# Patient Record
Sex: FEMALE | Race: BLACK OR AFRICAN AMERICAN | Employment: OTHER | URBAN - METROPOLITAN AREA
[De-identification: names, ages, dates, MRNs, and addresses within clinical notes are randomized per-mention and may not be internally consistent; named-entity substitution may affect disease eponyms.]

---

## 2017-01-03 ENCOUNTER — HOSPITAL ENCOUNTER (OUTPATIENT)
Dept: ONCOLOGY | Age: 52
Discharge: HOME | End: 2017-01-03

## 2017-01-03 ENCOUNTER — OFFICE VISIT (OUTPATIENT)
Dept: ONCOLOGY | Age: 52
End: 2017-01-03

## 2017-01-03 ENCOUNTER — HOSPITAL ENCOUNTER (OUTPATIENT)
Dept: LAB | Age: 52
Discharge: HOME | End: 2017-01-03
Payer: COMMERCIAL

## 2017-01-03 VITALS
HEART RATE: 93 BPM | BODY MASS INDEX: 34.23 KG/M2 | TEMPERATURE: 98.4 F | DIASTOLIC BLOOD PRESSURE: 80 MMHG | HEIGHT: 66 IN | SYSTOLIC BLOOD PRESSURE: 132 MMHG | WEIGHT: 213 LBS

## 2017-01-03 DIAGNOSIS — M16.11 PRIMARY OSTEOARTHRITIS OF RIGHT HIP: ICD-10-CM

## 2017-01-03 DIAGNOSIS — C50.919 MALIGNANT NEOPLASM OF FEMALE BREAST, UNSPECIFIED LATERALITY, UNSPECIFIED SITE OF BREAST: ICD-10-CM

## 2017-01-03 DIAGNOSIS — E55.9 VITAMIN D DEFICIENCY: ICD-10-CM

## 2017-01-03 DIAGNOSIS — D50.8 IRON DEFICIENCY ANEMIA DUE TO DIETARY CAUSES: ICD-10-CM

## 2017-01-03 DIAGNOSIS — C50.512 BREAST CANCER OF LOWER-OUTER QUADRANT OF LEFT FEMALE BREAST (HCC): Primary | ICD-10-CM

## 2017-01-03 DIAGNOSIS — D57.1 HB-SS DISEASE WITHOUT CRISIS (HCC): ICD-10-CM

## 2017-01-03 DIAGNOSIS — D69.6 THROMBOCYTOPENIA (HCC): ICD-10-CM

## 2017-01-03 LAB
ALBUMIN SERPL BCP-MCNC: 4 G/DL (ref 3.4–5)
ALBUMIN/GLOB SERPL: 1.1 {RATIO} (ref 0.8–1.7)
ALP SERPL-CCNC: 134 U/L (ref 45–117)
ALT SERPL-CCNC: 15 U/L (ref 13–56)
ANION GAP BLD CALC-SCNC: 9 MMOL/L (ref 3–18)
AST SERPL W P-5'-P-CCNC: 17 U/L (ref 15–37)
BASO+EOS+MONOS # BLD AUTO: 0.4 K/UL (ref 0–2.3)
BASO+EOS+MONOS # BLD AUTO: 5 % (ref 0.1–17)
BILIRUB SERPL-MCNC: 2.3 MG/DL (ref 0.2–1)
BUN SERPL-MCNC: 9 MG/DL (ref 7–18)
BUN/CREAT SERPL: 9 (ref 12–20)
CALCIUM SERPL-MCNC: 9.1 MG/DL (ref 8.5–10.1)
CHLORIDE SERPL-SCNC: 105 MMOL/L (ref 100–108)
CO2 SERPL-SCNC: 25 MMOL/L (ref 21–32)
CREAT SERPL-MCNC: 1.03 MG/DL (ref 0.6–1.3)
DIFFERENTIAL METHOD BLD: ABNORMAL
ERYTHROCYTE [DISTWIDTH] IN BLOOD BY AUTOMATED COUNT: 17.2 % (ref 11.5–14.5)
FERRITIN SERPL-MCNC: 1799 NG/ML (ref 8–388)
GLOBULIN SER CALC-MCNC: 3.6 G/DL (ref 2–4)
GLUCOSE SERPL-MCNC: 101 MG/DL (ref 74–99)
HCT VFR BLD AUTO: 28.2 % (ref 36–48)
HGB BLD-MCNC: 9.9 G/DL (ref 12–16)
IRON SATN MFR SERPL: 42 %
IRON SERPL-MCNC: 94 UG/DL (ref 50–175)
LYMPHOCYTES # BLD AUTO: 22 % (ref 14–44)
LYMPHOCYTES # BLD: 1.9 K/UL (ref 1.1–5.9)
MCH RBC QN AUTO: 29.5 PG (ref 25–35)
MCHC RBC AUTO-ENTMCNC: 35.1 G/DL (ref 31–37)
MCV RBC AUTO: 83.9 FL (ref 78–102)
NEUTS SEG # BLD: 6.5 K/UL (ref 1.8–9.5)
NEUTS SEG NFR BLD AUTO: 73 % (ref 40–70)
PLATELET # BLD AUTO: 113 K/UL (ref 140–440)
POTASSIUM SERPL-SCNC: 3.8 MMOL/L (ref 3.5–5.5)
PROT SERPL-MCNC: 7.6 G/DL (ref 6.4–8.2)
RBC # BLD AUTO: 3.36 M/UL (ref 4.1–5.1)
SODIUM SERPL-SCNC: 139 MMOL/L (ref 136–145)
TIBC SERPL-MCNC: 224 UG/DL (ref 250–450)
WBC # BLD AUTO: 8.8 K/UL (ref 4.5–13)

## 2017-01-03 PROCEDURE — 82728 ASSAY OF FERRITIN: CPT | Performed by: INTERNAL MEDICINE

## 2017-01-03 PROCEDURE — 80053 COMPREHEN METABOLIC PANEL: CPT | Performed by: INTERNAL MEDICINE

## 2017-01-03 PROCEDURE — 83540 ASSAY OF IRON: CPT | Performed by: INTERNAL MEDICINE

## 2017-01-03 NOTE — PROGRESS NOTES
Hematology/Oncology  Progress Note    Name: Aden Lezama  Date: 1/3/2017  : 1965    Michael Moore MD     Ms. Keegan Worrell is a 46year old female who was seen for management of her triple-negative invasive ductal adenocarcinoma, left breast.  Current therapy: Active surveillance; the patient has previously completed systemic chemotherapy and radiation treatment. Subjective:     Ms. Keegan Worrell is a 59-year-old [facility] American woman with a history of triple-negative breast cancer, involving the left breast.  The patient reports that she is doing much better. She is continuing to use her cane for ambulation since she had a left total hip replacement. She is not experiencing a significant degree of pain at this time. She is doing much better psychologically wise and is not require the use of antianxiety medication is as much. Her recent mammogram of the right breast showed no abnormality. Today she has no new complaints or concerns to report. Her right total hip replacement was postponed because she had to undergo gallbladder surgery. She is planning to follow with orthopedic surgeon within the next couple weeks to see if this can be rescheduled. In the interim she continues to use her cane for mobility support. Past medical history, family history, and social history: these were reviewed and remains unchanged.     Past Medical History   Diagnosis Date    Anemia NEC     Arthritis     Cancer ([address]     Chronic pain     Ductal carcinoma ([address]      left breast    Fatigue     Fibromyalgia     Hx of endometriosis     Hypertension     Ill-defined condition      Sickle cell    Osteoarthritis of left hip 2016    Sickle cell anemia (HCC)     Sleep apnea      Does not use CPAP     Past Surgical History   Procedure Laterality Date    Hx  section      Hx mastectomy Left 2012     with axillary lymph node dissection    Hx breast biopsy Left     Pr lap,cholecystectomy N/A 11/10/2016     Dr. Estela Alarcon     Social History     Social History    Marital status:      Spouse name: N/A    Number of children: N/A    Years of education: N/A     Occupational History    Not on file. Social History Main Topics    Smoking status: Former Smoker     Years: 2.00     Quit date: 8/1/2011    Smokeless tobacco: Never Used    Alcohol use No    Drug use: No    Sexual activity: Not Currently     Other Topics Concern    Not on file     Social History Narrative     Family History   Problem Relation Age of Onset    Cancer Mother      breast    Diabetes Mother     Heart Disease Father     Diabetes Father     Sickle Cell Anemia Brother      Current Outpatient Prescriptions   Medication Sig Dispense Refill    HYDROmorphone (DILAUDID) 2 mg tablet Take 1-2 Tabs by mouth every four (4) hours as needed for Pain. Max Daily Amount: 24 mg. 40 Tab 0    ondansetron (ZOFRAN ODT) 4 mg disintegrating tablet Take 1 Tab by mouth every eight (8) hours as needed for Nausea. 40 Tab 1    oxyCODONE ER (OXYCONTIN) 20 mg ER tablet Take 1 Tab by mouth two (2) times a day. Max Daily Amount: 40 mg. 60 Tab 0    temazepam (RESTORIL) 15 mg capsule Take 1 Cap by mouth nightly as needed. Max Daily Amount: 15 mg. 30 Cap 1    naloxegol (MOVANTIK) 12.5 mg tab tablet Take 12.5 mg by mouth daily.  folic acid (FOLVITE) 1 mg tablet Take 1 mg by mouth daily.  metoprolol (LOPRESSOR) 25 mg tablet Take 25 mg by mouth two (2) times a day. Review of Systems  Constitutional: The patient has no acute distress or discomfort. HEENT: The patient denies recent head trauma, eye pain, blurred vision,  hearing deficit, oropharyngeal mucosal pain or lesions, and the patient denies throat pain or discomfort. Chest wall: The patient complained of having several small nodular areas over her left anterior chest wall surgical site for which she is aware it may represent recurrent breast cancer. Lymphatics:  The patient denies palpable peripheral lymphadenopathy. Hematologic: The patient denies having bruising, bleeding, or progressive fatigue. Respiratory: Patient denies having shortness of breath, cough, sputum production, fever, or dyspnea on exertion. Cardiovascular: The patient denies having leg pain, leg swelling, heart palpitations, chest permit, chest pain, or lightheadedness. The patient denies having dyspnea on exertion. Gastrointestinal: The patient denies having nausea, emesis, or diarrhea. The patient denies having any hematemesis or blood in the stool. Genitourinary: Patient denies having urinary urgency, frequency, or dysuria. The patient denies having blood in the urine. Psychological: The patient denies having symptoms of nervousness, anxiety, depression, or thoughts of harming himself some of this. Skin: Patient denies having skin rashes, skin, ulcerations, or unexplained itching or pruritus. Musculoskeletal: The patient his continued to have pain in the hips. She also has weakness and is using a walker for mobility support. Objective:     Visit Vitals    /80    Pulse 93    Temp 98.4 °F (36.9 °C)    Ht 5' 5.5\" (1.664 m)    Wt 96.6 kg (213 lb)    BMI 34.91 kg/m2     ECOG PS=0, pain score=0/10     Physical Exam:   Gen. Appearance: The patient is in no acute distress. Skin: There is no bruise or rash. HEENT: The exam is unremarkable. Neck: Supple without lymphadenopathy or thyromegaly. Lungs: Clear to auscultation and percussion; there are no wheezes or rhonchi. Heart: Regular rate and rhythm; there are no murmurs, gallops, or rubs. Anterior chest wall and breast: The right breast shows no mass, nipple discharge, nipple retraction, or skin dimpling. The axilla reveals no palpable axillary lymphadenopathy. The left breast is surgically absent.  There is some scarring over the left anterior rib cage and a few small nodular areas are noted over the anterior lateral upper chest wall is well. These have the consistency of fatty deposits however the patient is concerned that this may represent recurrent disease. Abdomen: Bowel sounds are present and normal.  There is no guarding, tenderness, or hepatosplenomegaly. Extremities: There is no clubbing, cyanosis, or edema. Neurologic: There are no focal neurologic deficits. Lymphatics: There is no palpable peripheral lymphadenopathy. Musculoskeletal: The patient has full range of motion at all joints. There is no evidence of joint deformity or effusions. There is no focal joint tenderness. Psychological/psychiatric: There is no clinical evidence of anxiety, depression, or melancholy. Lab data:      Results for orders placed or performed during the hospital encounter of 01/03/17   CBC WITH 3 PART DIFF     Status: Abnormal   Result Value Ref Range Status    WBC 8.8 4.5 - 13.0 K/uL Final    RBC 3.36 (L) 4.10 - 5.10 M/uL Final    HGB 9.9 (L) 12.0 - 16.0 g/dL Final    HCT 28.2 (L) 36 - 48 % Final    MCV 83.9 78 - 102 FL Final    MCH 29.5 25.0 - 35.0 PG Final    MCHC 35.1 31 - 37 g/dL Final    RDW 17.2 (H) 11.5 - 14.5 % Final    PLATELET 289 (L) 560 - 440 K/uL Final    NEUTROPHILS 73 (H) 40 - 70 % Final    MIXED CELLS 5 0.1 - 17 % Final    LYMPHOCYTES 22 14 - 44 % Final    ABS. NEUTROPHILS 6.5 1.8 - 9.5 K/UL Final    ABS. MIXED CELLS 0.4 0.0 - 2.3 K/uL Final    ABS. LYMPHOCYTES 1.9 1.1 - 5.9 K/UL Final     Comment: Test performed at [place] [address] Location. Results Reviewed by Medical Director. DF AUTOMATED   Final           Assessment:     1. Breast cancer of lower-outer quadrant of left female breast ([address]    2. Vitamin D deficiency    3. Hb-SS disease without crisis ([address]    4. Thrombocytopenia ([address]    5. Iron deficiency anemia due to dietary causes    6. Primary osteoarthritis of right hip      Plan:   Invasive ductal carcinoma of the left breast: The patient is status post modified radical mastectomy and continues to do well. Clinically there is no evidence of disease recurrence. Her most recent CA-27-29 level from 10/4/2016 was 53.7 U/mL. I will recheck her CA-27-29 level at this time. Vitamin D deficiency: At this time I will check her vitamin D level and if the levels are low she will be started on vitamin D 50,000 units weekly for 12 weeks. Sickle cell disease, SS: Clinically the patient is relatively stable at this time she has not had any recent pain crises. I have recommended that the patient continued to remain well-hydrated and to keep her body warm doing these winter months to minimize the onset of vaso-occlusive pain crises. Thrombocytopenia: I have explained to the patient that a CBC today shows that her platelet count is relatively stable at 113,000. We will continue to monitor this at 4 month intervals. Therapeutic intervention is not warranted unless her platelets decline below 15,000. Iron deficiency anemia: I have explained to the patient that she is not iron deficient any longer. Her most recent ferritin level from 10/4/2016 was 1235 ng/mL. Her iron saturation was 28% and his circulating iron level was 70 mcg/dL. At this point most of her anemia is related to her underlying sickle cell disease. Osteoarthritis involving the right hip: The patient is tentatively scheduled to revisit with orthopedic surgeon within the next couple of weeks to see if her surgical date can be rescheduled so that she can undergo the right total hip replacement. She has previously undergone a successful left total hip replacement. I will continue to see her in clinic at four-month intervals in the future.   Orders Placed This Encounter    COMPLETE CBC & AUTO DIFF WBC    InHouse CBC ([employer])     Standing Status:   Future     Number of Occurrences:   1     Standing Expiration Date:   1/10/2017    VITAMIN D, 1,25 + 25-HYDROXY    FERRITIN     Standing Status:   Future     Standing Expiration Date:   1/4/2018  METABOLIC PANEL, COMPREHENSIVE     Standing Status:   Future     Standing Expiration Date:   1/4/2018    IRON PROFILE     Standing Status:   Future     Standing Expiration Date:   1/4/2018       Earnstine Bence, MD  01/3/2017

## 2017-01-03 NOTE — MR AVS SNAPSHOT
Visit Information Date & Time Provider Department Dept. Phone Encounter #  
 1/3/2017 10:30 AM Esa Rubin MD [facility] END Office [phone] [id number] Follow-up Instructions Return in about 4 months (around 5/3/2017). Upcoming Health Maintenance Date Due Pneumococcal 19-64 Highest Risk (1 of 3 - PCV13) 6/5/1984 DTaP/Tdap/Td series (1 - Tdap) 6/5/1986 PAP AKA CERVICAL CYTOLOGY 6/5/1986 FOBT Q 1 YEAR AGE 50-75 6/5/2015 INFLUENZA AGE 9 TO ADULT 8/1/2016 BREAST CANCER SCRN MAMMOGRAM 9/23/2018 Allergies as of 1/3/2017  Review Complete On: 11/22/2016 By: Ada Coe MD  
  
 Severity Noted Reaction Type Reactions Neulasta [Pegfilgrastim]  08/04/2016   Side Effect Other (comments) \"Put me in a comma for 3 months\" Current Immunizations  Reviewed on 8/23/2016 No immunizations on file. Not reviewed this visit You Were Diagnosed With   
  
 Codes Comments Breast cancer of lower-outer quadrant of left female breast ([facility]    -  Primary ICD-10-CM: S05.499 ICD-9-CM: 174.5 Vitamin D deficiency     ICD-10-CM: E55.9 ICD-9-CM: 268.9 Hb-SS disease without crisis [facility])     ICD-10-CM: D57.1 ICD-9-CM: 282.61 Thrombocytopenia ([facility]     ICD-10-CM: D69.6 ICD-9-CM: 287.5 Iron deficiency anemia due to dietary causes     ICD-10-CM: D50.8 ICD-9-CM: 280.1 Vitals BP Pulse Temp Height(growth percentile) Weight(growth percentile) BMI  
 132/80 93 98.4 °F (36.9 °C) 5' 5.5\" (1.664 m) 213 lb (96.6 kg) 34.91 kg/m2 OB Status Smoking Status Chemically Induced Former Smoker BMI and BSA Data Body Mass Index Body Surface Area 34.91 kg/m 2 2.11 m 2 Preferred Pharmacy Pharmacy Name Phone [address], [address] [facility] [phone] Your Updated Medication List  
  
   
 This list is accurate as of: 1/3/17 11:29 AM.  Always use your most recent med list.  
  
  
  
  
 folic acid 1 mg tablet Commonly known as:  [employer] Take 1 mg by mouth daily. HYDROmorphone 2 mg tablet Commonly known as:  DILAUDID Take 1-2 Tabs by mouth every four (4) hours as needed for Pain. Max Daily Amount: 24 mg.  
  
 metoprolol tartrate 25 mg tablet Commonly known as:  LOPRESSOR Take 25 mg by mouth two (2) times a day.  
  
 naloxegol 12.5 mg Tab tablet Commonly known as:  [place] Take 12.5 mg by mouth daily. ondansetron 4 mg disintegrating tablet Commonly known as:  ZOFRAN ODT Take 1 Tab by mouth every eight (8) hours as needed for Nausea. oxyCODONE ER 20 mg ER tablet Commonly known as:  OxyCONTIN Take 1 Tab by mouth two (2) times a day. Max Daily Amount: 40 mg.  
  
 temazepam 15 mg capsule Commonly known as:  RESTORIL Take 1 Cap by mouth nightly as needed. Max Daily Amount: 15 mg. Follow-up Instructions Return in about 4 months (around 5/3/2017). To-Do List   
 01/03/2017 Lab:  CBC WITH 3 PART DIFF   
  
 01/03/2017 Lab:  FERRITIN   
  
 01/03/2017 Lab:  IRON PROFILE   
  
 01/03/2017 Lab:  METABOLIC PANEL, [facility]! Dear Layne Gonzalez: Thank you for requesting a [employer] account. Our records indicate that you already have an active [employer] account. You can access your account anytime at [email or web address] Did you know that you can access your hospital and ER discharge instructions at any time in [employer]? You can also review all of your test results from your hospital stay or ER visit. Additional Information If you have questions, please visit the Frequently Asked Questions section of the [employer] website at [email or web address] Remember, [employer] is NOT to be used for urgent needs. For medical emergencies, dial 911. Now available from your iPhone and Android! Please provide this summary of care documentation to your next provider. Your primary care clinician is listed as [facility] CM. If you have any questions after today's visit, please call [phone].

## 2017-01-23 ENCOUNTER — HOSPITAL ENCOUNTER (OUTPATIENT)
Dept: PREADMISSION TESTING | Age: 52
Discharge: HOME | End: 2017-01-23
Attending: ORTHOPAEDIC SURGERY
Payer: COMMERCIAL

## 2017-01-23 DIAGNOSIS — M16.11 PRIMARY OSTEOARTHRITIS OF RIGHT HIP: ICD-10-CM

## 2017-01-23 DIAGNOSIS — Z01.812 BLOOD TESTS PRIOR TO TREATMENT OR PROCEDURE: ICD-10-CM

## 2017-01-23 DIAGNOSIS — C50.512 BREAST CANCER OF LOWER-OUTER QUADRANT OF LEFT FEMALE BREAST (HCC): ICD-10-CM

## 2017-01-23 LAB
ALBUMIN SERPL BCP-MCNC: 4.2 G/DL (ref 3.4–5)
ALBUMIN/GLOB SERPL: 1.1 {RATIO} (ref 0.8–1.7)
ALP SERPL-CCNC: 137 U/L (ref 45–117)
ALT SERPL-CCNC: 18 U/L (ref 13–56)
ANION GAP BLD CALC-SCNC: 10 MMOL/L (ref 3–18)
APPEARANCE UR: NORMAL
APTT PPP: 34.3 SEC (ref 23–36.4)
AST SERPL W P-5'-P-CCNC: 15 U/L (ref 15–37)
ATRIAL RATE: 71 BPM
BACTERIA SPEC CULT: NORMAL
BASOPHILS # BLD AUTO: 0 K/UL (ref 0–0.06)
BASOPHILS # BLD: 0 % (ref 0–2)
BILIRUB SERPL-MCNC: 2.8 MG/DL (ref 0.2–1)
BILIRUB UR QL: NEGATIVE
BUN SERPL-MCNC: 9 MG/DL (ref 7–18)
BUN/CREAT SERPL: 8 (ref 12–20)
CALCIUM SERPL-MCNC: 9.5 MG/DL (ref 8.5–10.1)
CALCULATED P AXIS, ECG09: 48 DEGREES
CALCULATED R AXIS, ECG10: 61 DEGREES
CALCULATED T AXIS, ECG11: 39 DEGREES
CHLORIDE SERPL-SCNC: 105 MMOL/L (ref 100–108)
CO2 SERPL-SCNC: 26 MMOL/L (ref 21–32)
COLOR UR: YELLOW
CREAT SERPL-MCNC: 1.08 MG/DL (ref 0.6–1.3)
DIAGNOSIS, 93000: NORMAL
DIFFERENTIAL METHOD BLD: ABNORMAL
EOSINOPHIL # BLD: 0.1 K/UL (ref 0–0.4)
EOSINOPHIL NFR BLD: 1 % (ref 0–5)
ERYTHROCYTE [DISTWIDTH] IN BLOOD BY AUTOMATED COUNT: 16.1 % (ref 11.6–14.5)
ERYTHROCYTE [SEDIMENTATION RATE] IN BLOOD: 67 MM/HR (ref 0–30)
GLOBULIN SER CALC-MCNC: 3.8 G/DL (ref 2–4)
GLUCOSE SERPL-MCNC: 98 MG/DL (ref 74–99)
GLUCOSE UR STRIP.AUTO-MCNC: NEGATIVE MG/DL
HBA1C MFR BLD: <3.5 % (ref 4.5–5.6)
HCT VFR BLD AUTO: 28.4 % (ref 35–45)
HGB BLD-MCNC: 9.8 G/DL (ref 12–16)
HGB UR QL STRIP: NEGATIVE
INR PPP: 1.1 (ref 0.8–1.2)
KETONES UR QL STRIP.AUTO: NEGATIVE MG/DL
LEUKOCYTE ESTERASE UR QL STRIP.AUTO: NEGATIVE
LYMPHOCYTES # BLD AUTO: 24 % (ref 21–52)
LYMPHOCYTES # BLD: 2 K/UL (ref 0.9–3.6)
MCH RBC QN AUTO: 30 PG (ref 24–34)
MCHC RBC AUTO-ENTMCNC: 34.5 G/DL (ref 31–37)
MCV RBC AUTO: 86.9 FL (ref 74–97)
MONOCYTES # BLD: 0.5 K/UL (ref 0.05–1.2)
MONOCYTES NFR BLD AUTO: 6 % (ref 3–10)
NEUTS SEG # BLD: 5.9 K/UL (ref 1.8–8)
NEUTS SEG NFR BLD AUTO: 69 % (ref 40–73)
NITRITE UR QL STRIP.AUTO: NEGATIVE
P-R INTERVAL, ECG05: 150 MS
PH UR STRIP: 5.5 [PH] (ref 5–8)
PLATELET # BLD AUTO: 133 K/UL (ref 135–420)
PMV BLD AUTO: 9.8 FL (ref 9.2–11.8)
POTASSIUM SERPL-SCNC: 4.2 MMOL/L (ref 3.5–5.5)
PROT SERPL-MCNC: 8 G/DL (ref 6.4–8.2)
PROT UR STRIP-MCNC: NEGATIVE MG/DL
PROTHROMBIN TIME: 13.9 SEC (ref 11.5–15.2)
Q-T INTERVAL, ECG07: 384 MS
QRS DURATION, ECG06: 82 MS
QTC CALCULATION (BEZET), ECG08: 417 MS
RBC # BLD AUTO: 3.27 M/UL (ref 4.2–5.3)
SERVICE CMNT-IMP: NORMAL
SODIUM SERPL-SCNC: 141 MMOL/L (ref 136–145)
SP GR UR REFRACTOMETRY: 1.01 (ref 1–1.03)
UROBILINOGEN UR QL STRIP.AUTO: 1 EU/DL (ref 0.2–1)
VENTRICULAR RATE, ECG03: 71 BPM
WBC # BLD AUTO: 8.5 K/UL (ref 4.6–13.2)

## 2017-01-23 PROCEDURE — 36415 COLL VENOUS BLD VENIPUNCTURE: CPT | Performed by: INTERNAL MEDICINE

## 2017-01-23 PROCEDURE — 85025 COMPLETE CBC W/AUTO DIFF WBC: CPT | Performed by: INTERNAL MEDICINE

## 2017-01-23 PROCEDURE — 93005 ELECTROCARDIOGRAM TRACING: CPT

## 2017-01-23 PROCEDURE — 81003 URINALYSIS AUTO W/O SCOPE: CPT | Performed by: ORTHOPAEDIC SURGERY

## 2017-01-23 PROCEDURE — 87641 MR-STAPH DNA AMP PROBE: CPT | Performed by: ORTHOPAEDIC SURGERY

## 2017-01-23 PROCEDURE — 85730 THROMBOPLASTIN TIME PARTIAL: CPT | Performed by: INTERNAL MEDICINE

## 2017-01-23 PROCEDURE — 85610 PROTHROMBIN TIME: CPT | Performed by: INTERNAL MEDICINE

## 2017-01-23 PROCEDURE — 85652 RBC SED RATE AUTOMATED: CPT | Performed by: INTERNAL MEDICINE

## 2017-01-23 PROCEDURE — 80053 COMPREHEN METABOLIC PANEL: CPT | Performed by: INTERNAL MEDICINE

## 2017-01-23 PROCEDURE — 83036 HEMOGLOBIN GLYCOSYLATED A1C: CPT | Performed by: ORTHOPAEDIC SURGERY

## 2017-01-23 PROCEDURE — 86300 IMMUNOASSAY TUMOR CA 15-3: CPT | Performed by: INTERNAL MEDICINE

## 2017-01-25 PROBLEM — M16.11 OSTEOARTHRITIS OF RIGHT HIP: Chronic | Status: ACTIVE | Noted: 2017-01-03

## 2017-01-25 LAB — CANCER AG27-29 SERPL-ACNC: 41 U/ML (ref 0–38.6)

## 2017-01-26 NOTE — H&P
[address] [facility] [address] Medicine  History and Physical Exam    Patient: Stacie Roth MRN: [id number]  SSN: [id number]    YOB: 1965  Age: 46 y.o. Sex: female      Subjective:      Chief Complaint: right hip pain    History of Present Illness:  Patient complains of right hip pain and difficulty ambulating, which has progressed over the past several months. X-rays showed osteoarthritis of the joint. The patient's pain has persisted and progressed despite conservative treatments and therapies. The patient has been previously treated with nsaids. The patient has at this time opted for surgical intervention. Past Medical History   Diagnosis Date    Anemia NEC     Arthritis     Cancer ([facility]     Chronic pain     Coagulation disorder ([facility]     Ductal carcinoma ([facility]      left breast    Fatigue     Fibromyalgia     GERD (gastroesophageal reflux disease)     Hx of endometriosis     Hypertension     Ill-defined condition      Sickle cell    Osteoarthritis of left hip 2016    Osteoarthritis of right hip 1/3/2017    Sickle cell anemia (HCC)     Sleep apnea      Does not use CPAP     Past Surgical History   Procedure Laterality Date    Hx  section      Hx mastectomy Left 2012     with axillary lymph node dissection    Hx breast biopsy Left     Pr lap,cholecystectomy N/A 11/10/2016     Dr. Olayinka Connelly Hx orthopaedic Left      hip replacement    Hx lap cholecystectomy      Hx hernia repair       Social History     Occupational History    Not on file. Social History Main Topics    Smoking status: Former Smoker     Packs/day: 0.25     Years: 30.00    Smokeless tobacco: Never Used      Comment: advised to stop smoking and no smoking before the surgery    Alcohol use No    Drug use: No    Sexual activity: Not Currently     Prior to Admission medications    Medication Sig Start Date End Date Taking?  Authorizing Provider   oxyCODONE IR (ROXICODONE) 10 mg tab immediate release tablet Take 10 mg by mouth four (4) times daily. Historical Provider   HYDROmorphone (DILAUDID) 2 mg tablet Take 1-2 Tabs by mouth every four (4) hours as needed for Pain. Max Daily Amount: 24 mg. 11/15/16   Leila Mckinney MD   oxyCODONE ER (OXYCONTIN) 20 mg ER tablet Take 1 Tab by mouth two (2) times a day. Max Daily Amount: 40 mg. 11/15/16   Leila Mckinney MD   temazepam (RESTORIL) 15 mg capsule Take 1 Cap by mouth nightly as needed. Max Daily Amount: 15 mg. 11/15/16   Leila Mckinney MD   naloxegol (MOVANTIK) 12.5 mg tab tablet Take 12.5 mg by mouth daily. Historical Provider   folic acid (FOLVITE) 1 mg tablet Take 1 mg by mouth daily. Historical Provider   metoprolol (LOPRESSOR) 25 mg tablet Take 25 mg by mouth two (2) times a day. Historical Provider       Allergies: Allergies   Allergen Reactions    Neulasta [Pegfilgrastim] Other (comments)     \"Put me in a comma for 3 months\"        Review of Systems:  A comprehensive review of systems was negative except for that written in the History of Present Illness. Objective:       Physical Exam:  HEENT: Normocephalic, atraumatic  Lungs:  Clear to auscultation  Heart:   Regular rate and rhythm  Abdomen: Soft  Extremities:  Pain with range of motion of the right hip. Passive flexion  degrees,                       passive internal rotation 0-10 degrees, with pain throughout ROM,                        passive external rotation 10-20 degrees with pain at the arc of motion. Antalgic gait noted. Assessment:      Arthritis of the right hip. Plan:       Proceed with scheduled RIGHT TOTAL HIP ARTHROPLASTY. The various methods of treatment have been discussed with the patient and family. After consideration of risks, benefits, and other options for treatment, the patient has consented to surgical interventions.  Questions were answered and preoperative teaching was done by  Stewart Matute.      Signed By: MARTIN Barksdale     January 25, 2017

## 2017-02-02 ENCOUNTER — ANESTHESIA (OUTPATIENT)
Dept: SURGERY | Age: 52
DRG: 470 | End: 2017-02-02
Payer: COMMERCIAL

## 2017-02-02 ENCOUNTER — APPOINTMENT (OUTPATIENT)
Dept: GENERAL RADIOLOGY | Age: 52
DRG: 470 | End: 2017-02-02
Attending: PHYSICIAN ASSISTANT
Payer: COMMERCIAL

## 2017-02-02 ENCOUNTER — HOSPITAL ENCOUNTER (INPATIENT)
Age: 52
LOS: 1 days | Discharge: HOME HEALTH | DRG: 470 | End: 2017-02-03
Attending: ORTHOPAEDIC SURGERY | Admitting: ORTHOPAEDIC SURGERY
Payer: COMMERCIAL

## 2017-02-02 ENCOUNTER — ANESTHESIA EVENT (OUTPATIENT)
Dept: SURGERY | Age: 52
DRG: 470 | End: 2017-02-02
Payer: COMMERCIAL

## 2017-02-02 ENCOUNTER — APPOINTMENT (OUTPATIENT)
Dept: GENERAL RADIOLOGY | Age: 52
DRG: 470 | End: 2017-02-02
Attending: ORTHOPAEDIC SURGERY
Payer: COMMERCIAL

## 2017-02-02 LAB — HCG SERPL QL: NEGATIVE

## 2017-02-02 PROCEDURE — 77030011640 HC PAD GRND REM COVD -A: Performed by: ORTHOPAEDIC SURGERY

## 2017-02-02 PROCEDURE — 74011250636 HC RX REV CODE- 250/636: Performed by: SPECIALIST

## 2017-02-02 PROCEDURE — 76010000153 HC OR TIME 1.5 TO 2 HR: Performed by: ORTHOPAEDIC SURGERY

## 2017-02-02 PROCEDURE — 77030026024 HC DRSG MEPILEX 16-48IN NO BORD MOLN -B: Performed by: ORTHOPAEDIC SURGERY

## 2017-02-02 PROCEDURE — 77030012508 HC MSK AIRWY LMA AMBU -A: Performed by: SPECIALIST

## 2017-02-02 PROCEDURE — 77030031139 HC SUT VCRL2 J&J -A: Performed by: ORTHOPAEDIC SURGERY

## 2017-02-02 PROCEDURE — 76060000034 HC ANESTHESIA 1.5 TO 2 HR: Performed by: ORTHOPAEDIC SURGERY

## 2017-02-02 PROCEDURE — 74011000250 HC RX REV CODE- 250: Performed by: PHYSICIAN ASSISTANT

## 2017-02-02 PROCEDURE — 86922 COMPATIBILITY TEST ANTIGLOB: CPT | Performed by: ORTHOPAEDIC SURGERY

## 2017-02-02 PROCEDURE — 74011250636 HC RX REV CODE- 250/636: Performed by: ORTHOPAEDIC SURGERY

## 2017-02-02 PROCEDURE — 77030018836 HC SOL IRR NACL ICUM -A: Performed by: ORTHOPAEDIC SURGERY

## 2017-02-02 PROCEDURE — 77030027355 HC HNDPC IRR SURGLAV STRY -B: Performed by: ORTHOPAEDIC SURGERY

## 2017-02-02 PROCEDURE — 76210000017 HC OR PH I REC 1.5 TO 2 HR: Performed by: ORTHOPAEDIC SURGERY

## 2017-02-02 PROCEDURE — 74011250636 HC RX REV CODE- 250/636

## 2017-02-02 PROCEDURE — 77030020782 HC GWN BAIR PAWS FLX 3M -B: Performed by: ORTHOPAEDIC SURGERY

## 2017-02-02 PROCEDURE — 36415 COLL VENOUS BLD VENIPUNCTURE: CPT | Performed by: SPECIALIST

## 2017-02-02 PROCEDURE — 86921 COMPATIBILITY TEST INCUBATE: CPT | Performed by: ORTHOPAEDIC SURGERY

## 2017-02-02 PROCEDURE — 74011000250 HC RX REV CODE- 250

## 2017-02-02 PROCEDURE — 74011000258 HC RX REV CODE- 258: Performed by: PHYSICIAN ASSISTANT

## 2017-02-02 PROCEDURE — 77030032490 HC SLV COMPR SCD KNE COVD -B: Performed by: ORTHOPAEDIC SURGERY

## 2017-02-02 PROCEDURE — 74011000258 HC RX REV CODE- 258

## 2017-02-02 PROCEDURE — 84703 CHORIONIC GONADOTROPIN ASSAY: CPT | Performed by: SPECIALIST

## 2017-02-02 PROCEDURE — C1776 JOINT DEVICE (IMPLANTABLE): HCPCS | Performed by: ORTHOPAEDIC SURGERY

## 2017-02-02 PROCEDURE — 77030034695 HC SCPL CANADY PLSM EXT DISP USMD -E: Performed by: ORTHOPAEDIC SURGERY

## 2017-02-02 PROCEDURE — 77030016547 HC BLD SAW SAG1 STRY -B: Performed by: ORTHOPAEDIC SURGERY

## 2017-02-02 PROCEDURE — 86920 COMPATIBILITY TEST SPIN: CPT | Performed by: ORTHOPAEDIC SURGERY

## 2017-02-02 PROCEDURE — 77030003666 HC NDL SPINAL BD -A: Performed by: ORTHOPAEDIC SURGERY

## 2017-02-02 PROCEDURE — 77010033678 HC OXYGEN DAILY

## 2017-02-02 PROCEDURE — 74011250636 HC RX REV CODE- 250/636: Performed by: PHYSICIAN ASSISTANT

## 2017-02-02 PROCEDURE — 65270000029 HC RM PRIVATE

## 2017-02-02 PROCEDURE — 86900 BLOOD TYPING SEROLOGIC ABO: CPT | Performed by: ORTHOPAEDIC SURGERY

## 2017-02-02 PROCEDURE — 0SR904A REPLACEMENT OF RIGHT HIP JOINT WITH CERAMIC ON POLYETHYLENE SYNTHETIC SUBSTITUTE, UNCEMENTED, OPEN APPROACH: ICD-10-PCS | Performed by: ORTHOPAEDIC SURGERY

## 2017-02-02 PROCEDURE — 74011250637 HC RX REV CODE- 250/637: Performed by: SPECIALIST

## 2017-02-02 PROCEDURE — 74011250637 HC RX REV CODE- 250/637: Performed by: PHYSICIAN ASSISTANT

## 2017-02-02 PROCEDURE — 77030034479 HC ADH SKN CLSR PRINEO J&J -B: Performed by: ORTHOPAEDIC SURGERY

## 2017-02-02 PROCEDURE — 73501 X-RAY EXAM HIP UNI 1 VIEW: CPT

## 2017-02-02 DEVICE — CUP ACET DIA50MM HIP GRIPTION PRI CEMENTLESS FIX SECT SER: Type: IMPLANTABLE DEVICE | Site: HIP | Status: FUNCTIONAL

## 2017-02-02 DEVICE — STEM FEM SZ 9 L130MM NK L38.5MM 38.5MM STD OFFSET 135DEG: Type: IMPLANTABLE DEVICE | Site: HIP | Status: FUNCTIONAL

## 2017-02-02 DEVICE — COMPONENT TOT HIP PRIMARY CERM ALTRX: Type: IMPLANTABLE DEVICE | Site: HIP | Status: FUNCTIONAL

## 2017-02-02 DEVICE — LINER ACET OD50MM ID32MM NEUT OFFSET HIP ALTRX PINN: Type: IMPLANTABLE DEVICE | Site: HIP | Status: FUNCTIONAL

## 2017-02-02 DEVICE — HEAD FEM DIA32MM +1MM OFFSET 12/14 TAPR HIP CERAMIC BIOLOX: Type: IMPLANTABLE DEVICE | Site: HIP | Status: FUNCTIONAL

## 2017-02-02 RX ORDER — SODIUM CHLORIDE, SODIUM LACTATE, POTASSIUM CHLORIDE, CALCIUM CHLORIDE 600; 310; 30; 20 MG/100ML; MG/100ML; MG/100ML; MG/100ML
125 INJECTION, SOLUTION INTRAVENOUS CONTINUOUS
Status: DISCONTINUED | OUTPATIENT
Start: 2017-02-02 | End: 2017-02-02 | Stop reason: HOSPADM

## 2017-02-02 RX ORDER — HYDROMORPHONE HYDROCHLORIDE 2 MG/ML
INJECTION, SOLUTION INTRAMUSCULAR; INTRAVENOUS; SUBCUTANEOUS AS NEEDED
Status: DISCONTINUED | OUTPATIENT
Start: 2017-02-02 | End: 2017-02-02 | Stop reason: HOSPADM

## 2017-02-02 RX ORDER — FOLIC ACID 1 MG/1
1 TABLET ORAL DAILY
Status: DISCONTINUED | OUTPATIENT
Start: 2017-02-03 | End: 2017-02-03 | Stop reason: HOSPADM

## 2017-02-02 RX ORDER — METOPROLOL TARTRATE 25 MG/1
25 TABLET, FILM COATED ORAL 2 TIMES DAILY
Status: DISCONTINUED | OUTPATIENT
Start: 2017-02-02 | End: 2017-02-03 | Stop reason: HOSPADM

## 2017-02-02 RX ORDER — PREGABALIN 50 MG/1
50 CAPSULE ORAL
Status: COMPLETED | OUTPATIENT
Start: 2017-02-02 | End: 2017-02-02

## 2017-02-02 RX ORDER — METOCLOPRAMIDE HYDROCHLORIDE 5 MG/ML
10 INJECTION INTRAMUSCULAR; INTRAVENOUS
Status: DISCONTINUED | OUTPATIENT
Start: 2017-02-02 | End: 2017-02-03 | Stop reason: HOSPADM

## 2017-02-02 RX ORDER — CELECOXIB 100 MG/1
400 CAPSULE ORAL
Status: COMPLETED | OUTPATIENT
Start: 2017-02-02 | End: 2017-02-02

## 2017-02-02 RX ORDER — OXYCODONE HYDROCHLORIDE 5 MG/1
10 TABLET ORAL 4 TIMES DAILY
Status: DISCONTINUED | OUTPATIENT
Start: 2017-02-02 | End: 2017-02-03 | Stop reason: HOSPADM

## 2017-02-02 RX ORDER — MAGNESIUM SULFATE 100 %
4 CRYSTALS MISCELLANEOUS AS NEEDED
Status: DISCONTINUED | OUTPATIENT
Start: 2017-02-02 | End: 2017-02-02 | Stop reason: HOSPADM

## 2017-02-02 RX ORDER — HYDROMORPHONE HYDROCHLORIDE 1 MG/ML
0.5 INJECTION, SOLUTION INTRAMUSCULAR; INTRAVENOUS; SUBCUTANEOUS
Status: DISCONTINUED | OUTPATIENT
Start: 2017-02-02 | End: 2017-02-03 | Stop reason: HOSPADM

## 2017-02-02 RX ORDER — SODIUM CHLORIDE 9 MG/ML
300 INJECTION, SOLUTION INTRAVENOUS CONTINUOUS
Status: DISPENSED | OUTPATIENT
Start: 2017-02-02 | End: 2017-02-02

## 2017-02-02 RX ORDER — TEMAZEPAM 15 MG/1
15 CAPSULE ORAL
Status: DISCONTINUED | OUTPATIENT
Start: 2017-02-02 | End: 2017-02-03 | Stop reason: HOSPADM

## 2017-02-02 RX ORDER — ACETAMINOPHEN 10 MG/ML
1000 INJECTION, SOLUTION INTRAVENOUS EVERY 6 HOURS
Status: COMPLETED | OUTPATIENT
Start: 2017-02-02 | End: 2017-02-03

## 2017-02-02 RX ORDER — DEXTROSE 50 % IN WATER (D50W) INTRAVENOUS SYRINGE
25-50 AS NEEDED
Status: DISCONTINUED | OUTPATIENT
Start: 2017-02-02 | End: 2017-02-02 | Stop reason: HOSPADM

## 2017-02-02 RX ORDER — LANOLIN ALCOHOL/MO/W.PET/CERES
1 CREAM (GRAM) TOPICAL 3 TIMES DAILY
Status: DISCONTINUED | OUTPATIENT
Start: 2017-02-02 | End: 2017-02-03 | Stop reason: HOSPADM

## 2017-02-02 RX ORDER — OXYCODONE HCL 20 MG/1
20 TABLET, FILM COATED, EXTENDED RELEASE ORAL 2 TIMES DAILY
Status: DISCONTINUED | OUTPATIENT
Start: 2017-02-02 | End: 2017-02-03 | Stop reason: HOSPADM

## 2017-02-02 RX ORDER — SODIUM CHLORIDE 9 MG/ML
125 INJECTION, SOLUTION INTRAVENOUS CONTINUOUS
Status: DISCONTINUED | OUTPATIENT
Start: 2017-02-02 | End: 2017-02-03 | Stop reason: HOSPADM

## 2017-02-02 RX ORDER — ONDANSETRON 2 MG/ML
INJECTION INTRAMUSCULAR; INTRAVENOUS AS NEEDED
Status: DISCONTINUED | OUTPATIENT
Start: 2017-02-02 | End: 2017-02-02 | Stop reason: HOSPADM

## 2017-02-02 RX ORDER — DEXAMETHASONE SODIUM PHOSPHATE 4 MG/ML
INJECTION, SOLUTION INTRA-ARTICULAR; INTRALESIONAL; INTRAMUSCULAR; INTRAVENOUS; SOFT TISSUE AS NEEDED
Status: DISCONTINUED | OUTPATIENT
Start: 2017-02-02 | End: 2017-02-02 | Stop reason: HOSPADM

## 2017-02-02 RX ORDER — ASPIRIN 325 MG/1
325 TABLET, FILM COATED ORAL
Status: DISCONTINUED | OUTPATIENT
Start: 2017-02-02 | End: 2017-02-03 | Stop reason: HOSPADM

## 2017-02-02 RX ORDER — KETOROLAC TROMETHAMINE 15 MG/ML
15 INJECTION, SOLUTION INTRAMUSCULAR; INTRAVENOUS
Status: DISPENSED | OUTPATIENT
Start: 2017-02-02 | End: 2017-02-03

## 2017-02-02 RX ORDER — MELOXICAM 7.5 MG/1
7.5 TABLET ORAL 2 TIMES DAILY
Qty: 28 TAB | Refills: 0 | Status: SHIPPED | OUTPATIENT
Start: 2017-02-02 | End: 2017-02-16

## 2017-02-02 RX ORDER — MIDAZOLAM HYDROCHLORIDE 1 MG/ML
INJECTION, SOLUTION INTRAMUSCULAR; INTRAVENOUS AS NEEDED
Status: DISCONTINUED | OUTPATIENT
Start: 2017-02-02 | End: 2017-02-02 | Stop reason: HOSPADM

## 2017-02-02 RX ORDER — SODIUM CHLORIDE 0.9 % (FLUSH) 0.9 %
5-10 SYRINGE (ML) INJECTION AS NEEDED
Status: DISCONTINUED | OUTPATIENT
Start: 2017-02-02 | End: 2017-02-03 | Stop reason: HOSPADM

## 2017-02-02 RX ORDER — LIDOCAINE HYDROCHLORIDE 20 MG/ML
INJECTION, SOLUTION EPIDURAL; INFILTRATION; INTRACAUDAL; PERINEURAL AS NEEDED
Status: DISCONTINUED | OUTPATIENT
Start: 2017-02-02 | End: 2017-02-02 | Stop reason: HOSPADM

## 2017-02-02 RX ORDER — SODIUM CHLORIDE 0.9 % (FLUSH) 0.9 %
5-10 SYRINGE (ML) INJECTION AS NEEDED
Status: DISCONTINUED | OUTPATIENT
Start: 2017-02-02 | End: 2017-02-02 | Stop reason: HOSPADM

## 2017-02-02 RX ORDER — ZOLPIDEM TARTRATE 5 MG/1
5-10 TABLET ORAL
Status: DISCONTINUED | OUTPATIENT
Start: 2017-02-02 | End: 2017-02-03 | Stop reason: HOSPADM

## 2017-02-02 RX ORDER — SODIUM CHLORIDE, SODIUM LACTATE, POTASSIUM CHLORIDE, CALCIUM CHLORIDE 600; 310; 30; 20 MG/100ML; MG/100ML; MG/100ML; MG/100ML
125 INJECTION, SOLUTION INTRAVENOUS CONTINUOUS
Status: DISCONTINUED | OUTPATIENT
Start: 2017-02-02 | End: 2017-02-03 | Stop reason: HOSPADM

## 2017-02-02 RX ORDER — OXYCODONE HYDROCHLORIDE 5 MG/1
5 TABLET ORAL ONCE
Status: COMPLETED | OUTPATIENT
Start: 2017-02-02 | End: 2017-02-02

## 2017-02-02 RX ORDER — PANTOPRAZOLE SODIUM 40 MG/1
40 TABLET, DELAYED RELEASE ORAL DAILY
Status: DISCONTINUED | OUTPATIENT
Start: 2017-02-02 | End: 2017-02-03 | Stop reason: HOSPADM

## 2017-02-02 RX ORDER — MELOXICAM 7.5 MG/1
7.5 TABLET ORAL 2 TIMES DAILY
Status: DISCONTINUED | OUTPATIENT
Start: 2017-02-02 | End: 2017-02-03 | Stop reason: HOSPADM

## 2017-02-02 RX ORDER — FENTANYL CITRATE 50 UG/ML
25 INJECTION, SOLUTION INTRAMUSCULAR; INTRAVENOUS
Status: DISCONTINUED | OUTPATIENT
Start: 2017-02-02 | End: 2017-02-02 | Stop reason: HOSPADM

## 2017-02-02 RX ORDER — DIPHENHYDRAMINE HCL 25 MG
25 CAPSULE ORAL
Status: DISCONTINUED | OUTPATIENT
Start: 2017-02-02 | End: 2017-02-03 | Stop reason: HOSPADM

## 2017-02-02 RX ORDER — ACETAMINOPHEN 325 MG/1
650 TABLET ORAL EVERY 6 HOURS
Status: DISCONTINUED | OUTPATIENT
Start: 2017-02-03 | End: 2017-02-03 | Stop reason: HOSPADM

## 2017-02-02 RX ORDER — ACETAMINOPHEN 10 MG/ML
1000 INJECTION, SOLUTION INTRAVENOUS ONCE
Status: COMPLETED | OUTPATIENT
Start: 2017-02-02 | End: 2017-02-02

## 2017-02-02 RX ORDER — PROPOFOL 10 MG/ML
INJECTION, EMULSION INTRAVENOUS AS NEEDED
Status: DISCONTINUED | OUTPATIENT
Start: 2017-02-02 | End: 2017-02-02 | Stop reason: HOSPADM

## 2017-02-02 RX ORDER — CEFAZOLIN SODIUM 2 G/50ML
2 SOLUTION INTRAVENOUS ONCE
Status: COMPLETED | OUTPATIENT
Start: 2017-02-02 | End: 2017-02-02

## 2017-02-02 RX ORDER — ASPIRIN 325 MG
325 TABLET ORAL 2 TIMES DAILY
Qty: 42 TAB | Refills: 0 | Status: SHIPPED | OUTPATIENT
Start: 2017-02-02 | End: 2017-02-25

## 2017-02-02 RX ORDER — NALOXONE HYDROCHLORIDE 0.4 MG/ML
0.4 INJECTION, SOLUTION INTRAMUSCULAR; INTRAVENOUS; SUBCUTANEOUS AS NEEDED
Status: DISCONTINUED | OUTPATIENT
Start: 2017-02-02 | End: 2017-02-03 | Stop reason: HOSPADM

## 2017-02-02 RX ORDER — HYDROMORPHONE HYDROCHLORIDE 1 MG/ML
0.5 INJECTION, SOLUTION INTRAMUSCULAR; INTRAVENOUS; SUBCUTANEOUS
Status: DISCONTINUED | OUTPATIENT
Start: 2017-02-02 | End: 2017-02-02 | Stop reason: HOSPADM

## 2017-02-02 RX ORDER — DOCUSATE SODIUM 100 MG/1
100 CAPSULE, LIQUID FILLED ORAL 2 TIMES DAILY
Status: DISCONTINUED | OUTPATIENT
Start: 2017-02-02 | End: 2017-02-03 | Stop reason: HOSPADM

## 2017-02-02 RX ORDER — CEFAZOLIN SODIUM 2 G/50ML
2 SOLUTION INTRAVENOUS EVERY 8 HOURS
Status: COMPLETED | OUTPATIENT
Start: 2017-02-02 | End: 2017-02-03

## 2017-02-02 RX ORDER — NALOXONE HYDROCHLORIDE 0.4 MG/ML
0.2 INJECTION, SOLUTION INTRAMUSCULAR; INTRAVENOUS; SUBCUTANEOUS AS NEEDED
Status: DISCONTINUED | OUTPATIENT
Start: 2017-02-02 | End: 2017-02-02 | Stop reason: HOSPADM

## 2017-02-02 RX ORDER — HYDROMORPHONE HYDROCHLORIDE 2 MG/1
2-4 TABLET ORAL
Status: DISCONTINUED | OUTPATIENT
Start: 2017-02-02 | End: 2017-02-03 | Stop reason: HOSPADM

## 2017-02-02 RX ORDER — DIPHENHYDRAMINE HYDROCHLORIDE 50 MG/ML
12.5 INJECTION, SOLUTION INTRAMUSCULAR; INTRAVENOUS
Status: DISCONTINUED | OUTPATIENT
Start: 2017-02-02 | End: 2017-02-03 | Stop reason: HOSPADM

## 2017-02-02 RX ORDER — FENTANYL CITRATE 50 UG/ML
INJECTION, SOLUTION INTRAMUSCULAR; INTRAVENOUS AS NEEDED
Status: DISCONTINUED | OUTPATIENT
Start: 2017-02-02 | End: 2017-02-02 | Stop reason: HOSPADM

## 2017-02-02 RX ORDER — INSULIN LISPRO 100 [IU]/ML
INJECTION, SOLUTION INTRAVENOUS; SUBCUTANEOUS ONCE
Status: DISCONTINUED | OUTPATIENT
Start: 2017-02-02 | End: 2017-02-02 | Stop reason: HOSPADM

## 2017-02-02 RX ORDER — ONDANSETRON 2 MG/ML
4 INJECTION INTRAMUSCULAR; INTRAVENOUS
Status: DISCONTINUED | OUTPATIENT
Start: 2017-02-02 | End: 2017-02-03 | Stop reason: HOSPADM

## 2017-02-02 RX ORDER — SODIUM CHLORIDE 0.9 % (FLUSH) 0.9 %
5-10 SYRINGE (ML) INJECTION EVERY 8 HOURS
Status: DISCONTINUED | OUTPATIENT
Start: 2017-02-02 | End: 2017-02-03 | Stop reason: HOSPADM

## 2017-02-02 RX ADMIN — DOCUSATE SODIUM 100 MG: 100 CAPSULE, LIQUID FILLED ORAL at 22:03

## 2017-02-02 RX ADMIN — FENTANYL CITRATE 50 MCG: 50 INJECTION, SOLUTION INTRAMUSCULAR; INTRAVENOUS at 13:49

## 2017-02-02 RX ADMIN — ONDANSETRON 4 MG: 2 INJECTION INTRAMUSCULAR; INTRAVENOUS at 14:24

## 2017-02-02 RX ADMIN — FENTANYL CITRATE 50 MCG: 50 INJECTION INTRAMUSCULAR; INTRAVENOUS at 15:30

## 2017-02-02 RX ADMIN — FERROUS SULFATE TAB 325 MG (65 MG ELEMENTAL FE) 325 MG: 325 (65 FE) TAB at 18:38

## 2017-02-02 RX ADMIN — FENTANYL CITRATE 50 MCG: 50 INJECTION, SOLUTION INTRAMUSCULAR; INTRAVENOUS at 14:35

## 2017-02-02 RX ADMIN — KETOROLAC TROMETHAMINE 15 MG: 15 INJECTION, SOLUTION INTRAMUSCULAR; INTRAVENOUS at 16:07

## 2017-02-02 RX ADMIN — FENTANYL CITRATE 25 MCG: 50 INJECTION, SOLUTION INTRAMUSCULAR; INTRAVENOUS at 14:58

## 2017-02-02 RX ADMIN — OXYCODONE HYDROCHLORIDE 10 MG: 5 TABLET ORAL at 22:08

## 2017-02-02 RX ADMIN — SODIUM CHLORIDE, SODIUM LACTATE, POTASSIUM CHLORIDE, AND CALCIUM CHLORIDE 1000 ML: 600; 310; 30; 20 INJECTION, SOLUTION INTRAVENOUS at 06:59

## 2017-02-02 RX ADMIN — SODIUM CHLORIDE, SODIUM LACTATE, POTASSIUM CHLORIDE, AND CALCIUM CHLORIDE 125 ML/HR: 600; 310; 30; 20 INJECTION, SOLUTION INTRAVENOUS at 06:59

## 2017-02-02 RX ADMIN — FENTANYL CITRATE 25 MCG: 50 INJECTION INTRAMUSCULAR; INTRAVENOUS at 16:28

## 2017-02-02 RX ADMIN — LIDOCAINE HYDROCHLORIDE 100 MG: 20 INJECTION, SOLUTION EPIDURAL; INFILTRATION; INTRACAUDAL; PERINEURAL at 13:49

## 2017-02-02 RX ADMIN — SODIUM CHLORIDE 1 G: 900 INJECTION, SOLUTION INTRAVENOUS at 14:58

## 2017-02-02 RX ADMIN — OXYCODONE HYDROCHLORIDE 5 MG: 5 TABLET ORAL at 13:20

## 2017-02-02 RX ADMIN — OXYCODONE HYDROCHLORIDE 10 MG: 5 TABLET ORAL at 18:38

## 2017-02-02 RX ADMIN — FENTANYL CITRATE 50 MCG: 50 INJECTION, SOLUTION INTRAMUSCULAR; INTRAVENOUS at 13:57

## 2017-02-02 RX ADMIN — HYDROMORPHONE HYDROCHLORIDE 1 MG: 2 INJECTION, SOLUTION INTRAMUSCULAR; INTRAVENOUS; SUBCUTANEOUS at 14:04

## 2017-02-02 RX ADMIN — DEXAMETHASONE SODIUM PHOSPHATE 4 MG: 4 INJECTION, SOLUTION INTRA-ARTICULAR; INTRALESIONAL; INTRAMUSCULAR; INTRAVENOUS; SOFT TISSUE at 14:24

## 2017-02-02 RX ADMIN — CEFAZOLIN SODIUM 2 G: 2 SOLUTION INTRAVENOUS at 13:47

## 2017-02-02 RX ADMIN — FENTANYL CITRATE 25 MCG: 50 INJECTION INTRAMUSCULAR; INTRAVENOUS at 16:12

## 2017-02-02 RX ADMIN — FERROUS SULFATE TAB 325 MG (65 MG ELEMENTAL FE) 325 MG: 325 (65 FE) TAB at 22:02

## 2017-02-02 RX ADMIN — ACETAMINOPHEN 1000 MG: 10 INJECTION, SOLUTION INTRAVENOUS at 20:02

## 2017-02-02 RX ADMIN — PANTOPRAZOLE SODIUM 40 MG: 40 TABLET, DELAYED RELEASE ORAL at 13:20

## 2017-02-02 RX ADMIN — CEFAZOLIN SODIUM 2 G: 2 SOLUTION INTRAVENOUS at 21:57

## 2017-02-02 RX ADMIN — MIDAZOLAM HYDROCHLORIDE 2 MG: 1 INJECTION, SOLUTION INTRAMUSCULAR; INTRAVENOUS at 13:42

## 2017-02-02 RX ADMIN — MELOXICAM 7.5 MG: 7.5 TABLET ORAL at 22:03

## 2017-02-02 RX ADMIN — PREGABALIN 50 MG: 50 CAPSULE ORAL at 13:20

## 2017-02-02 RX ADMIN — PROPOFOL 170 MG: 10 INJECTION, EMULSION INTRAVENOUS at 13:49

## 2017-02-02 RX ADMIN — FENTANYL CITRATE 50 MCG: 50 INJECTION, SOLUTION INTRAMUSCULAR; INTRAVENOUS at 14:19

## 2017-02-02 RX ADMIN — SODIUM CHLORIDE, SODIUM LACTATE, POTASSIUM CHLORIDE, AND CALCIUM CHLORIDE 125 ML/HR: 600; 310; 30; 20 INJECTION, SOLUTION INTRAVENOUS at 16:04

## 2017-02-02 RX ADMIN — ACETAMINOPHEN 1000 MG: 10 INJECTION, SOLUTION INTRAVENOUS at 14:10

## 2017-02-02 RX ADMIN — FENTANYL CITRATE 25 MCG: 50 INJECTION, SOLUTION INTRAMUSCULAR; INTRAVENOUS at 15:00

## 2017-02-02 RX ADMIN — OXYCODONE HYDROCHLORIDE 20 MG: 20 TABLET, FILM COATED, EXTENDED RELEASE ORAL at 22:08

## 2017-02-02 RX ADMIN — FENTANYL CITRATE 25 MCG: 50 INJECTION INTRAMUSCULAR; INTRAVENOUS at 16:37

## 2017-02-02 RX ADMIN — CELECOXIB 400 MG: 100 CAPSULE ORAL at 13:20

## 2017-02-02 RX ADMIN — HYDROMORPHONE HYDROCHLORIDE 0.5 MG: 1 INJECTION, SOLUTION INTRAMUSCULAR; INTRAVENOUS; SUBCUTANEOUS at 11:10

## 2017-02-02 RX ADMIN — SODIUM CHLORIDE 300 ML/HR: 900 INJECTION, SOLUTION INTRAVENOUS at 17:00

## 2017-02-02 RX ADMIN — HYDROMORPHONE HYDROCHLORIDE 0.5 MG: 1 INJECTION, SOLUTION INTRAMUSCULAR; INTRAVENOUS; SUBCUTANEOUS at 09:06

## 2017-02-02 RX ADMIN — SODIUM CHLORIDE, SODIUM LACTATE, POTASSIUM CHLORIDE, AND CALCIUM CHLORIDE: 600; 310; 30; 20 INJECTION, SOLUTION INTRAVENOUS at 14:20

## 2017-02-02 RX ADMIN — KETOROLAC TROMETHAMINE 15 MG: 15 INJECTION, SOLUTION INTRAMUSCULAR; INTRAVENOUS at 20:02

## 2017-02-02 RX ADMIN — SODIUM CHLORIDE 1 G: 900 INJECTION, SOLUTION INTRAVENOUS at 14:19

## 2017-02-02 RX ADMIN — ASPIRIN 325 MG: 325 TABLET, FILM COATED ORAL at 18:38

## 2017-02-02 RX ADMIN — FENTANYL CITRATE 25 MCG: 50 INJECTION INTRAMUSCULAR; INTRAVENOUS at 16:19

## 2017-02-02 NOTE — PERIOP NOTES
Patient transferred to room 205 via bed. Blood pressure 111/61, pulse 71, temperature 98.6 °F (37 °C), resp. rate 12, height 5' 6\" (1.676 m), weight 97.3 kg (214 lb 9 oz), SpO2 100 %, unknown if currently breastfeeding. Patient awake and alert. Sylwia RN at bedside to receive patient.

## 2017-02-02 NOTE — ANESTHESIA POSTPROCEDURE EVALUATION
Post-Anesthesia Evaluation and Assessment    Cardiovascular Function/Vital Signs  Visit Vitals    /60    Pulse 77    Temp 36.6 °C (97.8 °F)    Resp 19    Ht 5' 6\" (1.676 m)    Wt 97.3 kg (214 lb 9 oz)    SpO2 100%    BMI 34.63 kg/m2       Patient is status post Procedure(s):  RIGHT TOTAL HIP REPLACEMENT ANTERIOR APPROACH WITH C-ARM. Nausea/Vomiting: Controlled. Postoperative hydration reviewed and adequate. Pain:  Pain Scale 1: FLACC (02/02/17 1705)  Pain Intensity 1: 0 (02/02/17 1705)   Managed. Neurological Status:   Neuro (WDL): Within Defined Limits (02/02/17 1528)   At baseline. Mental Status and Level of Consciousness: Baseline and stable. Pulmonary Status:   O2 Device: Nasal cannula (02/02/17 1705)   Adequate oxygenation and airway patent. Complications related to anesthesia: None    Post-anesthesia assessment completed. No concerns. Patient has met all discharge requirements.     Signed By: Krista Win MD

## 2017-02-02 NOTE — ROUTINE PROCESS
TRANSFER - IN REPORT:    Verbal report received from Mable Holstein (name) on Aden Pickup  being received from PACU (unit) for routine post - op      Report consisted of patients Situation, Background, Assessment and   Recommendations(SBAR). Information from the following report(s) SBAR, Kardex, OR Summary, Intake/Output and MAR was reviewed with the receiving nurse. Opportunity for questions and clarification was provided. Assessment to be completed upon patients arrival to unit and care assumed.

## 2017-02-02 NOTE — PERIOP NOTES
aware that her T&S is going to take longer related to hx of transfusions and antibodies present.  Pt aware of plan of care, regarding wait time,

## 2017-02-02 NOTE — PERIOP NOTES
Rosamaria Potts and  stated that they are okay to proceed with surgery. Pt and her  updated. Pt up to bathroom to void.

## 2017-02-02 NOTE — PERIOP NOTES
Spoke with Dr Mary Ann Marinelli and requested phase one sign ut, offered hospitalist consult and he states no need at this time, [id number]

## 2017-02-02 NOTE — PERIOP NOTES
Pt stated that she had blood transfusion in Nov and prior to that it was in Aug after left hip replacement. Ami from lab aware that the last time pt was here she had to have multiple tops drawn related to antibodies from past transfusions.

## 2017-02-02 NOTE — ANESTHESIA PREPROCEDURE EVALUATION
Anesthetic History               Review of Systems / Medical History  Patient summary reviewed, nursing notes reviewed and pertinent labs reviewed    Pulmonary        Sleep apnea  Smoker         Neuro/Psych   Within defined limits           Cardiovascular    Hypertension: well controlled                   GI/Hepatic/Renal     GERD: well controlled           Endo/Other        Arthritis     Other Findings   Comments: Sickle cell; last crises approx 4 months ago. Last admission for ss crisis at least 1.5 years. Physical Exam    Airway  Mallampati: II  TM Distance: 4 - 6 cm  Neck ROM: normal range of motion   Mouth opening: Normal     Cardiovascular    Rhythm: regular  Rate: normal         Dental    Dentition: Caps/crowns     Pulmonary  Breath sounds clear to auscultation               Abdominal  GI exam deferred       Other Findings            Anesthetic Plan    ASA: 3  Anesthesia type: general          Induction: Intravenous  Anesthetic plan and risks discussed with: Patient and Family          Keep warm; keep hydrated.   Pt admits she was counselled to stop smoking but smoked this am.

## 2017-02-02 NOTE — OP NOTES
[address] [address],[address]  Total Hip Arthroplasty      Patient: Raymon Domínguez MRN: [id number]  SSN: [id number]    YOB: 1965  Age: 46 y.o. Sex: female      Date of Surgery: 2/2/2017   Preoperative Diagnosis: OSTEOARTHRITIS OF RIGHT HIP   Postoperative Diagnosis: OSTEOARTHRITIS OF RIGHT HIP   Location: [facility]  Surgeon: Gideon Garcia MD  Assistant: Ashley Wright PA-C    Anesthesia: general    Procedure: Total Right Hip Arthroplasty    Findings: Degenerative joint disease of the right hip. Estimated Blood Loss: 300ml    Specimens: None    Implants:   Implant Name Type Inv. Item Serial No.  Lot No. LRB No. Used Action   LINER ACET PINN NEUT [id number] -- ALTRX - SN/A  LINER ACET PINN NEUT [id number] -- ALTRX N/A [facility] [id number] Right 1 Implanted   CUP ACET SECTOR GRIPTION 50MM -- TI - SN/A  CUP ACET SECTOR GRIPTION 50MM -- TI N/A [employer] [id number] Right 1 Implanted   HEAD FEM CER 32MM +1MM NK -- DELTA - SN/A  HEAD FEM CER 32MM +1MM NK -- DELTA N/A [facility] [id number] Right 1 Implanted   STEM FEM CORAIL STD COL SZ 9 --  - SN/A   STEM FEM CORAIL STD COL SZ 9 --  N/A [facility] [id number] Right 1 Implanted       Procedure Detail:  After the patient was brought to the operating suite, She was effectively anesthetized using general anesthesia, then transferred to the [place] table and secured in a standard fashion. Her right hip was then prepped and draped in a normal sterile orthopedic fashion. She was given appropriate intravenous antibiotics preoperatively. After a proper timeout was performed, a direct anterior approach to the hip was performed using a short Brown-Covarrubias interval. Anterior capsulotomy was performed. The degenerative changes of the hip were noted. Femoral neck osteotomy was then performed to the templated area. The head and neck were removed. The pulvinar and labrum were excised.  The acetabulum was then reamed up to 49 mm with good bleeding cancellous bone obtained. The cup was then irrigated with pulse lavage system. A 50 mm Gription cup was then impacted in place with excellent stable fixation obtained, placing the cup at about 45 degrees of abduction, 20 degrees of anteversion. The liner was then impacted in place. A screw was not placed. Attention was turned to the femur, which was delivered into the wound with a combination of extension, external rotation, and adduction, and using the hook on the [place] table to deliver the femur into the wound. The canal was broached up to a size 9 for the [place] stem system with excellent stable fixation obtained. A trial reduction was then performed with the standard neck offset and 32 mm head balls with various neck lengths. With the +1, she appeared to have equalization of leg lengths and restoration of offset radiographically, and excellent functional stability was noted. The trial broach was removed. The canal was irrigated with the pulse lavage system. The final components were impacted in place with excellent stable fixation obtained once again. The final reduction was performed and once again leg lengths and offset were restored radiographically, using the C-arm radiographically intraoperatively, and excellent functional stability was noted. The wound was then irrigated one more time, and then closed in layers. The fascia of the tensor was closed with #1 Vicryl in a running type stitch. Subcutaneous tissue was closed with 2-0 Vicryl in a simple buried stitch, and the skin was closed with Prineo. Dry, sterile dressing was then applied. She tolerated this well, was transferred to the bed, and taken to recovery room, extubated, in stable condition. All sponge and needle counts were correct.     Signed By: Teodora Singh MD     February 2, 2017

## 2017-02-02 NOTE — PERIOP NOTES
Room is 205 and Diandra is the nurse, sean CHAWLAAR, family briefed on room and ETD PACU 45 minutes to 1 hour

## 2017-02-02 NOTE — IP AVS SNAPSHOT
Current Discharge Medication List  
  
Take these medications at their scheduled times Dose & Instructions Dispensing Information Comments Morning Noon Evening Bedtime  
 aspirin 325 mg tablet Commonly known as:  ASPIRIN Your next dose is: Today, Tomorrow Other:  ____________ Dose:  325 mg Take 1 Tab by mouth two (2) times a day for 21 days. Quantity:  42 Tab Refills:  0  
     
   
   
   
  
 folic acid 1 mg tablet Commonly known as:  Google Your next dose is: Today, Tomorrow Other:  ____________ Dose:  1 mg Take 1 mg by mouth daily. Refills:  0  
     
   
   
   
  
 meloxicam 7.5 mg tablet Commonly known as:  MOBIC Your next dose is: Today, Tomorrow Other:  ____________ Dose:  7.5 mg Take 1 Tab by mouth two (2) times a day for 14 days. Quantity:  28 Tab Refills:  0  
     
   
   
   
  
 metoprolol tartrate 25 mg tablet Commonly known as:  LOPRESSOR Your next dose is: Today, Tomorrow Other:  ____________ Dose:  25 mg Take 25 mg by mouth two (2) times a day. Refills:  0  
     
   
   
   
  
 naloxegol 12.5 mg Tab tablet Commonly known as:  Justino Wilcox Your next dose is: Today, Tomorrow Other:  ____________ Dose:  12.5 mg Take 12.5 mg by mouth daily. Refills:  0  
     
   
   
   
  
 * oxyCODONE IR 10 mg Tab immediate release tablet Commonly known as:  Urban [place] Your next dose is: Today, Tomorrow Other:  ____________ Dose:  10 mg Take 10 mg by mouth four (4) times daily. Refills:  0  
     
   
   
   
  
 * oxyCODONE ER 20 mg ER tablet Commonly known as:  OxyCONTIN Your next dose is: Today, Tomorrow Other:  ____________ Dose:  20 mg Take 1 Tab by mouth two (2) times a day. Max Daily Amount: 40 mg.  
 Quantity:  60 Tab Refills:  0 * Notice: This list has 2 medication(s) that are the same as other medications prescribed for you. Read the directions carefully, and ask your doctor or other care provider to review them with you. Take these medications as needed Dose & Instructions Dispensing Information Comments Morning Noon Evening Bedtime HYDROmorphone 2 mg tablet Commonly known as:  DILAUDID Your next dose is: Today, Tomorrow Other:  ____________ Dose:  2-4 mg Take 1-2 Tabs by mouth every four (4) hours as needed for Pain. Max Daily Amount: 24 mg. Quantity:  40 Tab Refills:  0  
     
   
   
   
  
 temazepam 15 mg capsule Commonly known as:  RESTORIL Your next dose is: Today, Tomorrow Other:  ____________ Dose:  15 mg Take 1 Cap by mouth nightly as needed. Max Daily Amount: 15 mg. Quantity:  30 Cap Refills:  1 Where to Get Your Medications Information about where to get these medications is not yet available ! Ask your nurse or doctor about these medications  
  aspirin 325 mg tablet  
 meloxicam 7.5 mg tablet

## 2017-02-02 NOTE — DISCHARGE SUMMARY
[address] [id number]   [address] [facility] [address]     DISCHARGE SUMMARY     PATIENT: Misti Johnson     MRN: [id number]   ADMIT DATE: 2017   [facility]   DISCHARGE DATE:      ATTENDING: Claire Mcgrath MD   DICTATING: MARTIN Jacques     ADMISSION DIAGNOSIS: OSTEOARTHRITIS OF RIGHT HIP  Osteoarthritis of right hip    DISCHARGE DIAGNOSIS: Status post RIGHT TOTAL HIP ARTHROPLASTY    HISTORY OF PRESENT ILLNESS: The patient is a 46y.o. year-old female   with ongoing right hip pain secondary to osteoarthritis of right hip. The patient's pain has persisted and progressed despite conservative treatments and therapies. The patient has at this time opted for surgical intervention. PAST MEDICAL HISTORY:   Past Medical History   Diagnosis Date    Anemia NEC     Arthritis     [facility] ([facility]     Chronic pain     Coagulation disorder ([facility]     Ductal carcinoma ([facility] 2011     left breast    Fatigue     Fibromyalgia     GERD (gastroesophageal reflux disease)     Hx of endometriosis     Hypertension     Ill-defined condition      Sickle cell    Osteoarthritis of left hip 2016    Osteoarthritis of right hip 1/3/2017    Sickle cell anemia (HCC)     Sleep apnea      Does not use CPAP       PAST SURGICAL HISTORY:   Past Surgical History   Procedure Laterality Date    Hx  section      Hx mastectomy Left 2012     with axillary lymph node dissection    Hx breast biopsy Left     Pr lap,cholecystectomy N/A 11/10/2016     Dr. Mary Nicholas Hx orthopaedic Left      hip replacement    Hx lap cholecystectomy      Hx hernia repair         ALLERGIES:   Allergies   Allergen Reactions    Neulasta [Pegfilgrastim] Other (comments)     \"Put me in a comma for 3 months\"        CURRENT MEDICATIONS:  A list of medications prior to the time of admission include:  Prior to Admission medications    Medication Sig Start Date End Date Taking?  Authorizing Provider   IBUPROFEN (MOTRIN PO) Take 800 mg by mouth two (2) times daily as needed for Pain. Yes Historical Provider   oxyCODONE IR (ROXICODONE) 10 mg tab immediate release tablet Take 10 mg by mouth four (4) times daily. Yes Historical Provider   oxyCODONE ER (OXYCONTIN) 20 mg ER tablet Take 1 Tab by mouth two (2) times a day. Max Daily Amount: 40 mg. 11/15/16  Yes Nadeem Chao MD   temazepam (RESTORIL) 15 mg capsule Take 1 Cap by mouth nightly as needed. Max Daily Amount: 15 mg. 11/15/16  Yes Nadeem Chao MD   metoprolol (LOPRESSOR) 25 mg tablet Take 25 mg by mouth two (2) times a day. Yes Historical Provider   HYDROmorphone (DILAUDID) 2 mg tablet Take 1-2 Tabs by mouth every four (4) hours as needed for Pain. Max Daily Amount: 24 mg. Patient taking differently: Take 2-4 mg by mouth every four (4) hours as needed for Pain (sickle cell crisis or severe pain). 11/15/16   Nadeem Chao MD   naloxegol (MOVANTIK) 12.5 mg tab tablet Take 12.5 mg by mouth daily. Historical Provider   folic acid (FOLVITE) 1 mg tablet Take 1 mg by mouth daily. Historical Provider       FAMILY HISTORY:   Family History   Problem Relation Age of Onset   [employer] Cancer Mother      breast    Diabetes Mother     Heart Disease Father     Diabetes Father     Sickle Cell Anemia Brother        SOCIAL HISTORY:   Social History     Social History    Marital status:      Spouse name: N/A    Number of children: N/A    Years of education: N/A     Social History Main Topics    Smoking status: Former Smoker     Packs/day: 0.25     Years: 30.00    Smokeless tobacco: Never Used      Comment: advised to stop smoking and no smoking before the surgery    Alcohol use No    Drug use: No    Sexual activity: Not Currently     Other Topics Concern    None     Social History Narrative       REVIEW OF SYSTEMS: All review of systems are negative.     PHYSICAL EXAMINATION: For a detailed physical exam, please refer to the patient's chart.    HOSPITAL COURSE: The patient was taken to surgery the day of admission. she underwent right total hip replacement via the anterior approach. Operative course was benign. Estimated blood loss approximately 300 cc. The patient was taken to the PACU in stable condition and was later taken to the floor in stable condition. Post-op Day #1, patient has done very well.  she has had little to no pain. she had been cleared by physical therapy with stair training. she was placed on Aspirin for DVT prophylaxis. her vitals have remained stable with the exception of low hemoglobin for which the hospitalist was consulted and the patient received a transfusion. The patient has been recommended for discharge home. DISCHARGE INSTRUCTIONS: The patient is to be discharged home. she is to continue on her prior medications per the medication reconciliation form, to which we will add:         1)  Aspirin 325 mg; 1 tablet p.o. b.i.d. X 21 days         2)  Mobic 7.5 mg; 1 tablet p.o. BID x 14 days      The patient is to continue at home with home physical therapy 3 times a week to work on gait training, range of motion, strengthening, and weightbearing exercises as tolerated on her right lower extremity. The patient is to progress from a walker to a cane to complete total weightbearing as tolerable. The patient is to continue to keep her incision dry. The patient is to followup with Dr. Madeline Baez, Lizzy Montez PA-C, and/or [facility] in the office approximately 10-14 days status post for x-rays and further evaluation.       Seth Fernandez [address] [place] [address]  02/03/17  7:14 AM

## 2017-02-02 NOTE — DISCHARGE SUMMARY
[address] [address]     DISCHARGE SUMMARY     PATIENT: Jessica Plata     MRN: [id number]   ADMIT DATE: 2017   BILLIN   DISCHARGE DATE:      ATTENDING: Tresa Castellon MD   DICTATING: MARTIN Campuzano     ADMISSION DIAGNOSIS: OSTEOARTHRITIS OF RIGHT HIP    DISCHARGE DIAGNOSIS: Status post RIGHT TOTAL HIP ARTHROPLASTY    HISTORY OF PRESENT ILLNESS: The patient is a 46y.o. year-old female   with ongoing right hip pain secondary to osteoarthritis of right hip. The patient's pain has persisted and progressed despite conservative treatments and therapies. The patient has at this time opted for surgical intervention. PAST MEDICAL HISTORY:   Past Medical History   Diagnosis Date    Anemia NEC     Arthritis     Cancer ([facility]     Chronic pain     Coagulation disorder ([facility]     Ductal carcinoma ([facility]      left breast    Fatigue     Fibromyalgia     GERD (gastroesophageal reflux disease)     Hx of endometriosis     Hypertension     Ill-defined condition      Sickle cell    Osteoarthritis of left hip 2016    Osteoarthritis of right hip 1/3/2017    Sickle cell anemia (HCC)     Sleep apnea      Does not use CPAP       PAST SURGICAL HISTORY:   Past Surgical History   Procedure Laterality Date    Hx  section      Hx mastectomy Left 2012     with axillary lymph node dissection    Hx breast biopsy Left     Pr lap,cholecystectomy N/A 11/10/2016     Dr. Crowe Dural Hx orthopaedic Left      hip replacement    Hx lap cholecystectomy      Hx hernia repair         ALLERGIES:   Allergies   Allergen Reactions    Neulasta [Pegfilgrastim] Other (comments)     \"Put me in a comma for 3 months\"        CURRENT MEDICATIONS:  A list of medications prior to the time of admission include:  Prior to Admission medications    Medication Sig Start Date End Date Taking?  Authorizing Provider   IBUPROFEN (MOTRIN PO) Take 800 mg by mouth two (2) times daily as needed for Pain. Yes Historical Provider   oxyCODONE IR (ROXICODONE) 10 mg tab immediate release tablet Take 10 mg by mouth four (4) times daily. Yes Historical Provider   oxyCODONE ER (OXYCONTIN) 20 mg ER tablet Take 1 Tab by mouth two (2) times a day. Max Daily Amount: 40 mg. 11/15/16  Yes Margareth Jha MD   temazepam (RESTORIL) 15 mg capsule Take 1 Cap by mouth nightly as needed. Max Daily Amount: 15 mg. 11/15/16  Yes Margareth Jha MD   metoprolol (LOPRESSOR) 25 mg tablet Take 25 mg by mouth two (2) times a day. Yes Historical Provider   HYDROmorphone (DILAUDID) 2 mg tablet Take 1-2 Tabs by mouth every four (4) hours as needed for Pain. Max Daily Amount: 24 mg. Patient taking differently: Take 2-4 mg by mouth every four (4) hours as needed for Pain (sickle cell crisis or severe pain). 11/15/16   Margareth Jha MD   naloxegol (MOVANTIK) 12.5 mg tab tablet Take 12.5 mg by mouth daily. Historical Provider   folic acid (FOLVITE) 1 mg tablet Take 1 mg by mouth daily. Historical Provider       FAMILY HISTORY:   Family History   Problem Relation Age of Onset   [employer] Cancer Mother      breast    Diabetes Mother     Heart Disease Father     Diabetes Father     Sickle Cell Anemia Brother        SOCIAL HISTORY:   Social History     Social History    Marital status:      Spouse name: N/A    Number of children: N/A    Years of education: N/A     Social History Main Topics    Smoking status: Former Smoker     Packs/day: 0.25     Years: 30.00    Smokeless tobacco: Never Used      Comment: advised to stop smoking and no smoking before the surgery    Alcohol use No    Drug use: No    Sexual activity: Not Currently     Other Topics Concern    None     Social History Narrative       REVIEW OF SYSTEMS: All review of systems are negative. PHYSICAL EXAMINATION: For a detailed physical exam, please refer to the patient's chart.     HOSPITAL COURSE: The patient was taken to surgery the day of admission. she underwent right total hip replacement via the anterior approach. Operative course was benign. Estimated blood loss approximately 300 cc. The patient was taken to the PACU in stable condition and was later taken to the floor in stable condition. Post-op Day #1, patient has done very well.  she has had little to no pain. she had been cleared by physical therapy with stair training. she was placed on Aspirin for DVT prophylaxis. her vitals have remained stable. she has also remained hemodynamically stable. The patient has been recommended for discharge home. DISCHARGE INSTRUCTIONS: The patient is to be discharged home. she is to continue on her prior medications per the medication reconciliation form, to which we will add:         1)  Aspirin 325 mg; 1 tablet p.o. b.i.d. X 21 days         2)  Mobic 7.5 mg; 1 tablet p.o. BID x 14 days      The patient is to continue at home with home physical therapy 3 times a week to work on gait training, range of motion, strengthening, and weightbearing exercises as tolerated on her right lower extremity. The patient is to progress from a walker to a cane to complete total weightbearing as tolerable. The patient is to continue to keep her incision dry. The patient is to followup with Dr. Kerri Cool, Tracey Sharp PA-C, and/or [facility] in the office approximately 10-14 days status post for x-rays and further evaluation.       Sawyer Wilson  2/6/120134:12 AM

## 2017-02-02 NOTE — IP AVS SNAPSHOT
[place] [address] [address] 
[phone] Patient: Luz Maria Guy MRN: [id number] GME:5/9/6355 You are allergic to the following Allergen Reactions Neulasta (Pegfilgrastim) Other (comments) \"Put me in a comma for 3 months\" Recent Documentation Height Weight Breastfeeding? BMI OB Status Smoking Status 1.676 m 97.3 kg No 34.63 kg/m2 Chemically Induced Former Smoker Unresulted Labs Order Current Status TYPE & SCREEN Preliminary result Emergency Contacts Name Discharge Info Relation Home Work Mobile [address] CAREGIVER [3] Spouse [3] [phone] About your hospitalization You were admitted on:  February 2, 2017 You last received care in the:  [facility] [address] You were discharged on:  February 3, 2017 Unit phone number:  [phone] Why you were hospitalized Your primary diagnosis was:  Not on File Your diagnoses also included:  Osteoarthritis Of Right Hip Providers Seen During Your Hospitalizations Provider Role Specialty Primary office phone Anand Michel MD Attending Provider Orthopedic Surgery [phone] Your Primary Care Physician (PCP) Primary Care Physician Office Phone Office Fax Rober Brizuela [phone] Follow-up Information Follow up With Details Comments Contact Info Anand Michel MD On 2/17/2017 Follow up appointment @ 10:45am [address] [place] [address] [address] [place] [address] 
[phone] Emerald Delacruz MD   [address] [place] [address] 
[phone] [facility]   [phone] Current Discharge Medication List  
  
START taking these medications Dose & Instructions Dispensing Information Comments Morning Noon Evening Bedtime  
 aspirin 325 mg tablet Commonly known as:  ASPIRIN  
   
 Your next dose is: Today, Tomorrow Other:  _________ Dose:  325 mg Take 1 Tab by mouth two (2) times a day for 21 days. Quantity:  42 Tab Refills:  0  
     
   
   
   
  
 meloxicam 7.5 mg tablet Commonly known as:  MOBIC Your next dose is: Today, Tomorrow Other:  _________ Dose:  7.5 mg Take 1 Tab by mouth two (2) times a day for 14 days. Quantity:  28 Tab Refills:  0 CONTINUE these medications which have CHANGED Dose & Instructions Dispensing Information Comments Morning Noon Evening Bedtime HYDROmorphone 2 mg tablet Commonly known as:  DILAUDID What changed:  reasons to take this Your next dose is: Today, Tomorrow Other:  _________ Dose:  2-4 mg Take 1-2 Tabs by mouth every four (4) hours as needed for Pain. Max Daily Amount: 24 mg. Quantity:  40 Tab Refills:  0 CONTINUE these medications which have NOT CHANGED Dose & Instructions Dispensing Information Comments Morning Noon Evening Bedtime  
 folic acid 1 mg tablet Commonly known as:  Google Your next dose is: Today, Tomorrow Other:  _________ Dose:  1 mg Take 1 mg by mouth daily. Refills:  0  
     
   
   
   
  
 metoprolol tartrate 25 mg tablet Commonly known as:  LOPRESSOR Your next dose is: Today, Tomorrow Other:  _________ Dose:  25 mg Take 25 mg by mouth two (2) times a day. Refills:  0  
     
   
   
   
  
 naloxegol 12.5 mg Tab tablet Commonly known as:  Ugo Serrano Your next dose is: Today, Tomorrow Other:  _________ Dose:  12.5 mg Take 12.5 mg by mouth daily. Refills:  0  
     
   
   
   
  
 * oxyCODONE IR 10 mg Tab immediate release tablet Commonly known as:  Julita Irvin Your next dose is: Today, Tomorrow Other:  _________  Dose:  10 mg  
 Take 10 mg by mouth four (4) times daily. Refills:  0  
     
   
   
   
  
 * oxyCODONE ER 20 mg ER tablet Commonly known as:  OxyCONTIN Your next dose is: Today, Tomorrow Other:  _________ Dose:  20 mg Take 1 Tab by mouth two (2) times a day. Max Daily Amount: 40 mg.  
 Quantity:  60 Tab Refills:  0  
     
   
   
   
  
 temazepam 15 mg capsule Commonly known as:  RESTORIL Your next dose is: Today, Tomorrow Other:  _________ Dose:  15 mg Take 1 Cap by mouth nightly as needed. Max Daily Amount: 15 mg. Quantity:  30 Cap Refills:  1  
     
   
   
   
  
 * Notice: This list has 2 medication(s) that are the same as other medications prescribed for you. Read the directions carefully, and ask your doctor or other care provider to review them with you. STOP taking these medications MOTRIN PO Where to Get Your Medications Information on where to get these meds will be given to you by the nurse or doctor. ! Ask your nurse or doctor about these medications  
  aspirin 325 mg tablet  
 meloxicam 7.5 mg tablet Discharge Instructions [address] [place] [address] Patient Discharge Instructions Tangelasalvatoretaylor Schuster / [id number] : 1965 Admitted 2017 Discharged: 2017 IF YOU HAVE ANY PROBLEMS ONCE YOU ARE AT HOME CALL THE FOLLOWING NUMBERS:  
Main office number: [phone] Your follow up appointment to see either Dr. Charo Rivers PA-C, or [facility] ROBERTO as scheduled in 2 weeks. If you are unsure of your appointment date call the office at [phone]. Medication Instructions · Resume your home medictions as directed, you may have directed not to resume supplements until after your follow up. · A prescription for pain medication has been given · It is important that you take the medication exactly as they are prescribed. · Keep your medication in the bottles provided by the pharmacist and keep a list of the medication names, dosages, and times to be taken in your wallet. · Do not take other medications without consulting your doctor. What to do at [facility] Resume your prehospital diet. If you have excessive nausea or vomitting call your doctor's office. Be sure to maintain adequate fluid intake. Some pain medications may cause constipation. Remember to drink fluids, stay as active as possible, and eat plenty of fiber-rich foods. Begin In-Home Physical Therapy; 3 times a week to work on gait training, range of motion, strengthening, and weight bearing exercises as tolerable. Continue to use your walker or cane when walking. May progress from the walker to a cane to complete total bearing as tolerable. Patient may shower. Wrap incision with plastic wrap/covering to prevent incision from getting wet. Avoid complete immersion. YOUR DRESSING SHOULD BE CHANGED BY YOUR HOME HEALTH NURSE 3-5 DAYS AFTER SURGERY. When to Call - Call if you have a temperature greater then 101 
- Unable to keep food down - Are unable to bear any wieght  
- Need a pain medication refill Information obtained by : 
I understand that if any problems occur once I am at home I am to contact my physician. I understand and acknowledge receipt of the instructions indicated above. Physician's or R.N.'s Signature                                                                  Date/Time Patient or Representative Signature                                                          Date/Time Discharge Orders None MyChart Announcement We are excited to announce that we are making your provider's discharge notes available to you in [employer]. You will see these notes when they are completed and signed by the physician that discharged you from your recent hospital stay. If you have any questions or concerns about any information you see in [employer], please call the Health Information Department where you were seen or reach out to your Primary Care Provider for more information about your plan of care. Introducing [facility] & HEALTH SERVICES! Dear Maksim Connelly 59: Thank you for requesting a [employer] account. Our records indicate that you already have an active [employer] account. You can access your account anytime at [email or web address] Did you know that you can access your hospital and ER discharge instructions at any time in [employer]? You can also review all of your test results from your hospital stay or ER visit. Additional Information If you have questions, please visit the Frequently Asked Questions section of the [employer] website at [email or web address] Remember, [employer] is NOT to be used for urgent needs. For medical emergencies, dial 911. Now available from your iPhone and Android! General Information Please provide this summary of care documentation to your next provider. Patient Signature:  ____________________________________________________________ Date:  ____________________________________________________________  
  
Aminta Gonzalez Provider Signature:  ____________________________________________________________ Date:  ____________________________________________________________

## 2017-02-02 NOTE — PERIOP NOTES
TRANSFER - OUT REPORT:    Verbal report given to HENNA Jim(name) on Charlotte Villalba  being transferred to [address] [place] [address](unit) for routine progression of care       Report consisted of patients Situation, Background, Assessment and   Recommendations(SBAR). Information from the following report(s) SBAR, Procedure Summary, Intake/Output, MAR and Recent Results was reviewed with the receiving nurse. Lines:   Peripheral IV 02/02/17 Right Wrist (Active)   Site Assessment Clean, dry, & intact 2/2/2017  4:04 PM   Phlebitis Assessment 0 2/2/2017  4:04 PM   Infiltration Assessment 0 2/2/2017  4:04 PM   Dressing Status Clean, dry, & intact 2/2/2017  4:04 PM   Dressing Type Tape;Transparent 2/2/2017  4:04 PM   Hub Color/Line Status Infusing 2/2/2017  4:04 PM        Opportunity for questions and clarification was provided.       Patient transported with:   O2 @ 2 liters   Also briefed on history, EBL, blood type and cross, IV site to right hand, nothing on left upper extremity for BP's history of sickle cell

## 2017-02-02 NOTE — PROGRESS NOTES
conducted a pre-surgery visit with Dewayne Bermudez, who is a 46 y.o.,female.  at the bedside. Members of a [address] in [place]. Dot Stafford is aware of the surgery. Good support. She stated that this was her second hip. First went well. \"Knees are next. \"     The  provided the following Interventions:  Initiated a relationship of care and support. Offered prayer and assurance of continued prayers on patient's behalf. Plan:  Chaplains will continue to follow and will provide pastoral care on an as needed/requested basis.  recommends bedside caregivers page  on duty if patient shows signs of acute spiritual or emotional distress. [address], M.Div.   Board Certified   [phone] - Office

## 2017-02-02 NOTE — INTERVAL H&P NOTE
H&P Update:  Raymon Domínguez was seen and examined. History and physical has been reviewed. The patient has been examined.  There have been no significant clinical changes since the completion of the originally dated History and Physical.    Signed By: Gideon Garcia MD     February 2, 2017 6:54 AM

## 2017-02-02 NOTE — PERIOP NOTES
Maryam Saver Manager in Lab notified that  is ordering Type and Cross for 2 units.  aware of H&H pre-op no repeat at this time.

## 2017-02-03 VITALS
WEIGHT: 214.56 LBS | SYSTOLIC BLOOD PRESSURE: 138 MMHG | HEIGHT: 66 IN | HEART RATE: 76 BPM | DIASTOLIC BLOOD PRESSURE: 65 MMHG | TEMPERATURE: 98.3 F | RESPIRATION RATE: 18 BRPM | OXYGEN SATURATION: 100 % | BODY MASS INDEX: 34.48 KG/M2

## 2017-02-03 LAB
ANION GAP BLD CALC-SCNC: 9 MMOL/L (ref 3–18)
BUN SERPL-MCNC: 10 MG/DL (ref 7–18)
BUN/CREAT SERPL: 8 (ref 12–20)
CALCIUM SERPL-MCNC: 8.5 MG/DL (ref 8.5–10.1)
CHLORIDE SERPL-SCNC: 109 MMOL/L (ref 100–108)
CO2 SERPL-SCNC: 25 MMOL/L (ref 21–32)
CREAT SERPL-MCNC: 1.19 MG/DL (ref 0.6–1.3)
ERYTHROCYTE [DISTWIDTH] IN BLOOD BY AUTOMATED COUNT: 15.4 % (ref 11.6–14.5)
GLUCOSE SERPL-MCNC: 140 MG/DL (ref 74–99)
HCT VFR BLD AUTO: 19.6 % (ref 35–45)
HCT VFR BLD AUTO: 26.3 % (ref 35–45)
HGB BLD-MCNC: 6.8 G/DL (ref 12–16)
HGB BLD-MCNC: 9.2 G/DL (ref 12–16)
MCH RBC QN AUTO: 29.8 PG (ref 24–34)
MCHC RBC AUTO-ENTMCNC: 34.7 G/DL (ref 31–37)
MCV RBC AUTO: 86 FL (ref 74–97)
PLATELET # BLD AUTO: 80 K/UL (ref 135–420)
PMV BLD AUTO: 9.3 FL (ref 9.2–11.8)
POTASSIUM SERPL-SCNC: 4.6 MMOL/L (ref 3.5–5.5)
RBC # BLD AUTO: 2.28 M/UL (ref 4.2–5.3)
SODIUM SERPL-SCNC: 143 MMOL/L (ref 136–145)
WBC # BLD AUTO: 9.2 K/UL (ref 4.6–13.2)

## 2017-02-03 PROCEDURE — 36430 TRANSFUSION BLD/BLD COMPNT: CPT

## 2017-02-03 PROCEDURE — 80048 BASIC METABOLIC PNL TOTAL CA: CPT | Performed by: PHYSICIAN ASSISTANT

## 2017-02-03 PROCEDURE — 85027 COMPLETE CBC AUTOMATED: CPT | Performed by: PHYSICIAN ASSISTANT

## 2017-02-03 PROCEDURE — 30233N1 TRANSFUSION OF NONAUTOLOGOUS RED BLOOD CELLS INTO PERIPHERAL VEIN, PERCUTANEOUS APPROACH: ICD-10-PCS | Performed by: ORTHOPAEDIC SURGERY

## 2017-02-03 PROCEDURE — 97165 OT EVAL LOW COMPLEX 30 MIN: CPT

## 2017-02-03 PROCEDURE — P9040 RBC LEUKOREDUCED IRRADIATED: HCPCS | Performed by: ORTHOPAEDIC SURGERY

## 2017-02-03 PROCEDURE — 74011250637 HC RX REV CODE- 250/637: Performed by: PHYSICIAN ASSISTANT

## 2017-02-03 PROCEDURE — 97116 GAIT TRAINING THERAPY: CPT

## 2017-02-03 PROCEDURE — 74011250636 HC RX REV CODE- 250/636: Performed by: PHYSICIAN ASSISTANT

## 2017-02-03 PROCEDURE — 97530 THERAPEUTIC ACTIVITIES: CPT

## 2017-02-03 PROCEDURE — 97162 PT EVAL MOD COMPLEX 30 MIN: CPT

## 2017-02-03 PROCEDURE — 85018 HEMOGLOBIN: CPT | Performed by: ORTHOPAEDIC SURGERY

## 2017-02-03 PROCEDURE — 36415 COLL VENOUS BLD VENIPUNCTURE: CPT | Performed by: PHYSICIAN ASSISTANT

## 2017-02-03 RX ORDER — SODIUM CHLORIDE 9 MG/ML
250 INJECTION, SOLUTION INTRAVENOUS AS NEEDED
Status: DISCONTINUED | OUTPATIENT
Start: 2017-02-03 | End: 2017-02-03 | Stop reason: HOSPADM

## 2017-02-03 RX ADMIN — OXYCODONE HYDROCHLORIDE 10 MG: 5 TABLET ORAL at 17:21

## 2017-02-03 RX ADMIN — METOPROLOL TARTRATE 25 MG: 25 TABLET ORAL at 09:09

## 2017-02-03 RX ADMIN — ACETAMINOPHEN 1000 MG: 10 INJECTION, SOLUTION INTRAVENOUS at 01:49

## 2017-02-03 RX ADMIN — ASPIRIN 325 MG: 325 TABLET, FILM COATED ORAL at 09:09

## 2017-02-03 RX ADMIN — FERROUS SULFATE TAB 325 MG (65 MG ELEMENTAL FE) 325 MG: 325 (65 FE) TAB at 09:10

## 2017-02-03 RX ADMIN — HYDROMORPHONE HYDROCHLORIDE 4 MG: 2 TABLET ORAL at 16:28

## 2017-02-03 RX ADMIN — FERROUS SULFATE TAB 325 MG (65 MG ELEMENTAL FE) 325 MG: 325 (65 FE) TAB at 16:28

## 2017-02-03 RX ADMIN — CEFAZOLIN SODIUM 2 G: 2 SOLUTION INTRAVENOUS at 05:27

## 2017-02-03 RX ADMIN — FOLIC ACID 1 MG: 1 TABLET ORAL at 09:10

## 2017-02-03 RX ADMIN — DIPHENHYDRAMINE HYDROCHLORIDE 12.5 MG: 50 INJECTION, SOLUTION INTRAMUSCULAR; INTRAVENOUS at 10:39

## 2017-02-03 RX ADMIN — PANTOPRAZOLE SODIUM 40 MG: 40 TABLET, DELAYED RELEASE ORAL at 09:09

## 2017-02-03 RX ADMIN — ASPIRIN 325 MG: 325 TABLET, FILM COATED ORAL at 17:21

## 2017-02-03 RX ADMIN — KETOROLAC TROMETHAMINE 15 MG: 15 INJECTION, SOLUTION INTRAMUSCULAR; INTRAVENOUS at 02:17

## 2017-02-03 RX ADMIN — HYDROMORPHONE HYDROCHLORIDE 2 MG: 2 TABLET ORAL at 05:00

## 2017-02-03 RX ADMIN — OXYCODONE HYDROCHLORIDE 20 MG: 20 TABLET, FILM COATED, EXTENDED RELEASE ORAL at 09:09

## 2017-02-03 RX ADMIN — DIPHENHYDRAMINE HYDROCHLORIDE 12.5 MG: 50 INJECTION, SOLUTION INTRAMUSCULAR; INTRAVENOUS at 07:23

## 2017-02-03 RX ADMIN — MELOXICAM 7.5 MG: 7.5 TABLET ORAL at 09:09

## 2017-02-03 RX ADMIN — OXYCODONE HYDROCHLORIDE 10 MG: 5 TABLET ORAL at 09:09

## 2017-02-03 RX ADMIN — OXYCODONE HYDROCHLORIDE 10 MG: 5 TABLET ORAL at 12:45

## 2017-02-03 RX ADMIN — ACETAMINOPHEN 650 MG: 325 TABLET, FILM COATED ORAL at 09:09

## 2017-02-03 RX ADMIN — SODIUM CHLORIDE 125 ML/HR: 900 INJECTION, SOLUTION INTRAVENOUS at 05:27

## 2017-02-03 RX ADMIN — DOCUSATE SODIUM 100 MG: 100 CAPSULE, LIQUID FILLED ORAL at 09:10

## 2017-02-03 RX ADMIN — ACETAMINOPHEN 650 MG: 325 TABLET, FILM COATED ORAL at 16:30

## 2017-02-03 NOTE — PROGRESS NOTES
Problem: Mobility Impaired (Adult and Pediatric)  Goal: *Acute Goals and Plan of Care (Insert Text)  Physical Therapy Goals  Initiated 2/3/2017 to be met within 1-2 day  STGs:  1. Bed mobility: Supine to/from supine with SBA in prep for ADL activity. 2. Transfers: Sit to/from stand with S in prep for gait. 3. Standing/Ambulation Balance: Good with RW for safe transfers and gait. LTGs  1. Ambulation: Ambulate 150 ft.with RW/S for home mobility at discharge. 2. Patient Education: SBA with HEP for home performance accurately at discharge. 3. Step Negotiation: Up/Down 3-5 of steps with CGA with HR for home navigation as indicated. Outcome: Resolved/Met Date Met:  02/03/17  PHYSICAL THERAPY TREATMENT/DISCHARGE     Patient: Thien Lemon (17 y.o. female)  Date: 2/3/2017  Diagnosis: OSTEOARTHRITIS OF RIGHT HIP  Osteoarthritis of right hip <principal problem not specified>  Procedure(s) (LRB):  RIGHT TOTAL HIP REPLACEMENT ANTERIOR APPROACH WITH C-ARM (Right) 1 Day Post-Op  Precautions: Fall, WBAT  Chart, physical therapy assessment, plan of care and goals were reviewed. ASSESSMENT:  Pt up in chair on PT arrival. At completion of session pt able to complete transfers sit<>stand with supervision/additional time. Pt ambulating 150ft. with RW/S/WBAT RLR and decreased rochelle, step to gt with early heel rise right. Demonstrated stair negotiation with bilateral and L handrail (CGA/min A respectively). Pt educated in  [place] and familiar with same from previous surgery. Bed mobility not assessed at this time; pt SO to assist with same as needed. . Pt has met or progressing to all acute functional goals and is appropriate to transition to home health PT at this time. Pt left up in chair witha all needs in place. Nurse Unruly Cuellar notified of above.   Progression toward goals:  [X]      Goals met/progressing toward goals  [ ]      Improving appropriately and progressing toward goals  [ ]      Improving slowly and progressing toward goals  [ ]      Not making progress toward goals and plan of care will be adjusted       PLAN:  Patient will be discharged from physical therapy at this time. Rationale for discharge:  [X] Goals Achieved  [ ] Mery Espino  [ ] Patient not participating in therapy  [ ] Other:  Discharge Recommendations:  Home Health  Further Equipment Recommendations for Discharge:  N/A       SUBJECTIVE:   Patient stated It's about a six.       OBJECTIVE DATA SUMMARY:   Critical Behavior:  Neurologic State: Alert, Appropriate for age  Orientation Level: Oriented X4  Cognition: Appropriate decision making, Appropriate for age attention/concentration, Appropriate safety awareness, Follows commands  Safety/Judgement: Awareness of environment, Fall prevention  Functional Mobility Training:  Transfers:  Sit to Stand: Supervision  Stand to Sit: Supervision  Balance:  Sitting: Intact  Standing: Intact; With support  Ambulation/Gait Training:  Distance (ft): 150 Feet (ft)  Assistive Device: Gait belt;Walker, rolling  Ambulation - Level of Assistance: Supervision  Gait Description (WDL): Exceptions to WDL  Gait Abnormalities: Early heel rise;Decreased step clearance; Antalgic; Step to gait  Right Side Weight Bearing: As tolerated  Base of Support: Narrowed  Speed/Yoana: Pace decreased (<100 feet/min)  Step Length: Left shortened;Right shortened  Swing Pattern: Left asymmetrical;Right asymmetrical  Interventions: Safety awareness training;Verbal cues; Visual/Demos  Stairs:  Number of Stairs Trained: 5  Stairs - Level of Assistance: Contact guard assistance              Rail Use: Both (and left)  Therapeutic Exercises:   Educated in HEP per protocol and able to complete same with assist as needed at home  Pain:  Pain Scale 1: Numeric (0 - 10)  Pain Intensity 1: 7  Pain Location 1: Hip  Pain Orientation 1: Right  Pain Description 1: Aching  Pain Intervention(s) 1: Medication (see MAR)  Activity Tolerance:   Good   Please refer to the flowsheet for vital signs taken during this treatment.   After treatment:   [X] Patient left in no apparent distress sitting up in chair  [ ] Patient left in no apparent distress in bed  [X] Call bell left within reach  [X] Nursing notified-Errica  [ ] Caregiver present  [ ] Bed alarm activated  Lakeshia Smith, AUGUST   Time Calculation: 20 mins

## 2017-02-03 NOTE — PROGRESS NOTES
0915 Assessment complete. Patient alert and oriented x 4. Cap refills brisk and less than 3 sec. Pedal pulses palpable. Lung sounds clear, bilateraly. Respirations even and unlabored. Abd soft and nontender. Bowel sounds active to all 4 quads. Skin warm and dry. Drsg to right hip noted CDI. IV to left wrist, site CDI. Teds hose to bilateral lower extremites. SCD's to bilateral lower extremites. Medications given. Reports pain 7/10. PAIN medication given. Potential medication side effects explained to patient, patient verbalizes understanding, opportunities for questions provided. Patient stable, no apparent distress at this time, bed in locked position,ice pack applied, patient resting in bed with call light in reach. 1038 Blood trasfusion stopped. Normal saline started to flush line between transfusions. 1039 PRN benadryl given profalactically for blood administration. 1056 Second transfusion started. 1111 Transfusion rate increased to 125 ml/hr. 1245 PAIN medication given. Potential medication side effects explained to patient, patient verbalizes understanding, opportunities for questions provided. Patient stable, no apparent distress at this time, bed in locked position, call bell within reach. 1336 Transfusion complete, switched to normal saline at this time. 1628 PAIN medication given. Potential medication side effects explained to patient, patient verbalizes understanding, opportunities for questions provided. Patient stable, no apparent distress at this time, bed in locked position, call bell within reach. Pt reassessed at this time, no changes noted. 1645 Stat H&H ordered per Tom Barraza, ok to discharge if levels have not decreased. 1704 Stat results came back, ok to discharge pt. Transportation called. 1721 PAIN medication given. Potential medication side effects explained to patient, patient verbalizes understanding, opportunities for questions provided. Patient stable, no apparent distress at this time, bed in locked position, call bell within reach. Dual AVS reviewed with Aracelis Bush RN. All medications reviewed individually with patient. Opportunities for questions and concerns provided. Patient discharged via (mode of transport ie. Car, ambulance or air transport) car. Patient's arm band appropriately discarded. 1800 Shift summary: Patient had uneventful shift. Patient ambulated with assistance. Pain remained well-controlled with medication. No issues/concerns at this time.

## 2017-02-03 NOTE — PROGRESS NOTES
0147-Initial assessment complete. Dressing to right abdominal area in place, no drainage. Patient states that she had a hernia repair in November, but there was no dressing present prior to surgery. Mepilex, right anterior hip, intact, no drainage, ice pack in place. Incentive spirometer in use. Call light and personal items within reach. 0303-Paged Sawyer Ortiz via [facility], waiting return call to report H&H.    0310-Spoke with Sawyer Curran; order placed to transfuse to 2 units PRBC.    0339-Spoke to MARTIN Ortiz. Reported Abel Sheriff from the lab stating Physician has to sign paper work consenting to having the least incompatible blood transfused because of patient status. Order given for Hospitalist Consult to evaluate patient for transfusion of blood components. Paged Hospitalist via answering service. 0357-Spoke to Dr. Yossi Rosas via telephone regarding consult. Dr. Lalo Darling not on call. Dr. Yossi Rosas states this consult should be a Physician to Physician consult. MARTIN Ortiz paged via [facility] again. 0403-Stable. Asymptomatic, no dizziness, vital signs stable. No change from initial assessment. 0407-Spoke to MARTIN Ortiz. Hold blood for now, if patient symptomatic call Dr. Yossi Rosas. 0612-Love Mejiak on floor to see patient. Informed Dr. Yossi Rosas that transfusing the blood wasn't an issue, the units of blood was ready and waiting to be picked up, it's the paper work that has to be signed by a Physician that has to be done within 24 hours of release of blood. Lab called to inquire about paper work to be signed by Physician to transfuse blood for this patient to clarify for Dr. Yossi Rosas, who was at the desk listening to the conversation call via speaker, at the nurses station. Dr. Yossi Rosas to sign paper work needed for lab.    0709-First unit of blood infusing. Stable. Shift Summary:  Uneventful shift. Stable. Chlorhexidine wipes done.

## 2017-02-03 NOTE — PROGRESS NOTES
1912 Assumed care of patient from Rosa Isela Mena RN. Patient stable. Patient voiced at 6/10, will medicated per MAR. Spouse at bedside. No signs of distress noted. Call bell and phone within reach. Will continue to monitor for changes. 2000 Pain voiced at 6/10, medicate per MAR. Assisted to bedside commode. No further needs. 2203 Scheduled medications given. Assisted to bedside commode. No further needs at this time. Bedside and Verbal shift change report given to O. Madelin Aase, RN (oncoming nurse) by CAITLIN Albarado RN (offgoing nurse). Report included the following information SBAR, Kardex, MAR, Recent Results and Med Rec Status.

## 2017-02-03 NOTE — PROGRESS NOTES
Progress Note        Patient: Richie Delarosa MRN: [id number]  SSN: [id number]    YOB: 1965  Age: 46 y.o. Sex: female      1 Day Post-Op status post Procedure(s) (LRB):  RIGHT TOTAL HIP REPLACEMENT ANTERIOR APPROACH WITH C-ARM (Right)    Admit Date: 2017  Admit Diagnosis: OSTEOARTHRITIS OF RIGHT HIP  Osteoarthritis of right hip    Subjective:      Doing well. No complaints. No SOB. No Chest Pain. No Nausea or Vomiting. No problems eating or voiding. Objective:        Temp (24hrs), Av °F (36.7 °C), Min:97.1 °F (36.2 °C), Max:98.6 °F (37 °C)    Body mass index is 34.63 kg/(m^2). Patient Vitals for the past 12 hrs:   BP Temp Pulse Resp SpO2   17 0709 112/61 97.8 °F (36.6 °C) 74 15 100 %   17 0648 114/63 97.8 °F (36.6 °C) 70 16 100 %   17 0403 111/56 97.9 °F (36.6 °C) 76 16 100 %   17 2336 108/59 97.9 °F (36.6 °C) 78 16 100 %   17 2203 112/61 - - - -   177 116/60 97.9 °F (36.6 °C) 68 16 100 %   17 1947 116/67 97.1 °F (36.2 °C) 72 14 99 %     Recent Labs      17   0122   HGB  6.8*   HCT  19.6*   NA  143   K  4.6   CL  109*   CO2  25   BUN  10   CREA  1.19   GLU  140*       Physical Exam:  Vital Signs are Stable. No Acute Distress. Alert and Oriented. Negative Homans sign. Toes AROM Full. Neurovascular exam is normal.    Dressing is Clean, Dry, and Intact. Assessment/Plan:     Stable s/p thr  Acute on chronic anemia-will transfuse  oob with rehab  1.  D/c planning    Continue PT/OT  Discharge Plan: Home    Signed By: Yasmine Velez MD     February 3, 2017

## 2017-02-03 NOTE — PROGRESS NOTES
PT note: consult received and chart reviewed. Evaluation attempted. Nurses currently present starting second unit of blood. Request PT return after transfusion has run initial 15 min (for monitoring). Will f/u at later time as requested. Thank you.  Kal Blum, PT

## 2017-02-03 NOTE — PROGRESS NOTES
Problem: Mobility Impaired (Adult and Pediatric)  Goal: *Acute Goals and Plan of Care (Insert Text)  Physical Therapy Goals  Initiated 2/3/2017 to be met within 1-2 day  STGs:  1. Bed mobility: Supine to/from supine with SBA in prep for ADL activity. 2. Transfers: Sit to/from stand with S in prep for gait. 3. Standing/Ambulation Balance: Good with RW for safe transfers and gait. LTGs  1. Ambulation: Ambulate 150 ft.with RW/S for home mobility at discharge. 2. Patient Education: SBA with HEP for home performance accurately at discharge. 3. Step Negotiation: Up/Down 3-5 of steps with CGA with HR for home navigation as indicated. Outcome: Progressing Towards Goal  PHYSICAL THERAPY EVALUATION     Patient: Demetri Simpson (78 y.o. female)  Date: 2/3/2017  Primary Diagnosis: OSTEOARTHRITIS OF RIGHT HIP  Osteoarthritis of right hip  Procedure(s) (LRB):  RIGHT TOTAL HIP REPLACEMENT ANTERIOR APPROACH WITH C-ARM (Right) 1 Day Post-Op   Precautions:Fall, WBAT right LE      ASSESSMENT :  Based on the objective data described below, the patient presents with decreased independence in functional mobility with regard to bed mobility, transfers, gait, balance and activity tolerance. Patient reports pain 5/10 pre and 6/10 post treatment. Pt transitioned to sit with minimal assist and additional time. Completed transfers with CGA and bed elevated slightly. Pt able to participate in gt training [facility]. Demonstrates step to gt, slow rochelle and early heel rise right LE (decreased foot clearance also R). Pt educated in safety and proper sequencing of activity with accurate return demonstration. Pt returned to room and left sitting up in chair at bedside with all needs in reach and CNA Gita Helms notified of pt need for bathroom assistance. Recommend HHPT for follow up physical therapy upon discharge to reach maximal level of independence/safety with functional mobility.        Patient will benefit from skilled intervention to address the above impairments. Patients rehabilitation potential is considered to be   Factors which may influence rehabilitation potential include:   [ ]         None noted  [ ]         Mental ability/status  [X]         Medical condition  [ ]         Home/family situation and support systems  [ ]         Safety awareness  [ ]         Pain tolerance/management  [ ]         Other:        PLAN :  Recommendations and Planned Interventions:  [X]           Bed Mobility Training             [ ]    Neuromuscular Re-Education  [X]           Transfer Training                   [ ]    Orthotic/Prosthetic Training  [X]           Gait Training                          [ ]    Modalities  [X]           Therapeutic Exercises          [ ]    Edema Management/Control  [X]           Therapeutic Activities            [X]    Patient and Family Training/Education  [ ]           Other (comment):     Frequency/Duration: Patient will be followed by physical therapy  to address goals. Discharge Recommendations: Home Health  Further Equipment Recommendations for Discharge: rolling walker       SUBJECTIVE:   Patient stated I'm ready now.       OBJECTIVE DATA SUMMARY:       Past Medical History   Diagnosis Date    Anemia NEC      Arthritis      [facility] [address] [facility]      Chronic pain      [facility]agulation disorder [facility] [facility]      Ductal carcinoma ([facility] [address] [facility]        left breast    Fatigue      Fibromyalgia      GERD (gastroesophageal reflux disease)      Hx of endometriosis      Hypertension 2011    Ill-defined condition         Sickle cell    Osteoarthritis of left hip 2016    Osteoarthritis of right hip 1/3/2017    Sickle cell anemia (HCC)      Sleep apnea         Does not use CPAP     Past Surgical History   Procedure Laterality Date    Hx  section        Hx mastectomy Left 2012       with axillary lymph node dissection    Hx breast biopsy Left     Pr lap,cholecystectomy N/A 11/10/2016 Dr. Miguel A Pillai Hx orthopaedic Left         hip replacement    Hx lap cholecystectomy        Hx hernia repair         Barriers to Learning/Limitations: None  Compensate with: N/A  Prior Level of Function/Home Situation: amb with cane PTA  Home Situation  Home Environment: Private residence  # Steps to Enter: 4  Rails to Enter: Yes  Hand Rails : Right  One/Two Story Residence: Other (Comment) (split level)  # of Interior Steps: 15 (7 up and 7 down)  [employer]: Left (ascending)  Lift Chair Available: No  Living Alone: No  Support Systems: Spouse/Significant Other/Partner  Patient Expects to be Discharged to[de-identified] Private residence  Current DME Used/Available at Home: Cane, straight, Grab bars, Raised toilet seat, Walker, rolling  Critical Behavior:  Neurologic State: Alert; Appropriate for age  Orientation Level: Oriented X4  Cognition: Appropriate decision making; Appropriate for age attention/concentration; Appropriate safety awareness; Follows commands  Safety/Judgement: Awareness of environment; Fall prevention  Psychosocial  Patient Behaviors: Calm; Cooperative  Family  Behaviors: Calm;Supportive  Family  Behaviors: Calm;Supportive  Strength:    Strength: Generally decreased, functional right LE  Tone & Sensation:   Tone: Normal  Sensation: Intact  Range Of Motion:  AROM: Generally decreased, functional right LE  PROM: Generally decreased, functional right LE  Functional Mobility:  Bed Mobility:  Supine to Sit: Minimum assistance  Transfers:  Sit to Stand: Contact guard assistance  Stand to Sit: Contact guard assistance  Bed to Chair: Contact guard assistance  Balance:   Sitting: Intact  Standing: Intact; With support  Ambulation/Gait Training:  Distance (ft): 80 Feet (ft)  Assistive Device: Gait belt;Walker, rolling  Ambulation - Level of Assistance: Contact guard assistance  Gait Description (WDL): Exceptions to WDL  Gait Abnormalities: Decreased step clearance; Step to gait; Antalgic  Right Side Weight Bearing: As tolerated  Base of Support: Narrowed  Speed/Yoana: Pace decreased (<100 feet/min)  Step Length: Right shortened;Left shortened  Swing Pattern: Right asymmetrical;Left asymmetrical  Interventions: Safety awareness training;Verbal cues; Visual/Demos  Pain:  Pain Scale 1: Numeric (0 - 10)  Pain Intensity 1: 7  Pain Location 1: Hip  Pain Orientation 1: Right  Pain Description 1: Aching  Pain Intervention(s) 1: Medication (see MAR)  Activity Tolerance:   Good   Please refer to the flowsheet for vital signs taken during this treatment. After treatment:   [X]         Patient left in no apparent distress sitting up in chair  [ ]         Patient left in no apparent distress in bed  [X]         Call bell left within reach  [X]         Nursing notified  [ ]         Caregiver present  [ ]         Bed alarm activated      COMMUNICATION/EDUCATION:   [X]         Fall prevention education was provided and the patient/caregiver indicated understanding. [X]         Patient/family have participated as able in goal setting and plan of care. [X]         Patient/family agree to work toward stated goals and plan of care. [ ]         Patient understands intent and goals of therapy, but is neutral about his/her participation. [ ]         Patient is unable to participate in goal setting and plan of care.      Eval Complexity: History: HIGH Complexity :3+ comorbidities / personal factors will impact the outcome/ POC Exam:MEDIUM Complexity : 3 Standardized tests and measures addressing body structure, function, activity limitation and / or participation in recreation  Presentation: LOW Complexity : Stable, uncomplicated  Clinical Decision Making:Low Complexity ambulating > 50 ft Overall Complexity:LOW      Thank you for this referral.  Bosie Aase, PT   Time Calculation: 31 mins

## 2017-02-03 NOTE — PROGRESS NOTES
Problem: Self Care Deficits Care Plan (Adult)  Goal: *Acute Goals and Plan of Care (Insert Text)  OCCUPATIONAL THERAPY EVALUATION/DISCHARGE     Patient: Rebecca Chandler (83 y.o. female)  Date: 2/3/2017  Primary Diagnosis: OSTEOARTHRITIS OF RIGHT HIP  Osteoarthritis of right hip  Procedure(s) (LRB):  RIGHT TOTAL HIP REPLACEMENT ANTERIOR APPROACH WITH C-ARM (Right) 1 Day Post-Op   Precautions:  Fall, WBAT      ASSESSMENT AND RECOMMENDATIONS:  Based on the objective data described below, the patient presents with Right KIMBERLY. Pt completed LB ADL with max assist and UB with supervision. Pt and spouse reported pt has assistance with LB dressing prior to admission and will continue to assist with dressing. Pt not interested in AE for dressing at this time. Pt completed transfers and functional mobility using RW with supervision. No further skilled acute OT needs indicated at this time. Skilled occupational therapy is not indicated at this time. Discharge Recommendations: Home Health  Further Equipment Recommendations for Discharge: N/A        SUBJECTIVE:   Patient stated I'm ready.       OBJECTIVE DATA SUMMARY:       Past Medical History   Diagnosis Date    Anemia NEC      Arthritis      [facility] ([facility] [address]      Chronic pain      Coagulation disorder ([facility] [address] [facility]      Ductal carcinoma ([facility] [address] 2011       left breast    Fatigue      Fibromyalgia      GERD (gastroesophageal reflux disease)      Hx of endometriosis      Hypertension 2011    Ill-defined condition         Sickle cell    Osteoarthritis of left hip 2016    Osteoarthritis of right hip 1/3/2017    Sickle cell anemia (HCC)      Sleep apnea         Does not use CPAP       Past Surgical History   Procedure Laterality Date    Hx  section        Hx mastectomy Left 2012       with axillary lymph node dissection    Hx breast biopsy Left     Pr lap,cholecystectomy N/A 11/10/2016       Dr. Jessie Narvaez Hx orthopaedic Left         hip replacement    Hx lap cholecystectomy        Hx hernia repair          Barriers to Learning/Limitations: None  Compensate with: visual, verbal, tactile, kinesthetic cues/model  Prior Level of Function/Home Situation: I with ADLs prior to admission  Home Situation  Home Environment: Private residence  # Steps to Enter: 4  Rails to Enter: Yes  Hand Rails : Right  One/Two Story Residence: Other (Comment) (split level)  # of Interior Steps: 15 (7 up and 7 down)  [employer]: Left (ascending)  Lift Chair Available: No  Living Alone: No  Support Systems: Spouse/Significant Other/Partner  Patient Expects to be Discharged to[de-identified] Private residence  Current DME Used/Available at Home: Cane, straight, Grab bars, Raised toilet seat, Walker, rolling  Tub or Shower Type: Shower  [ ]     Right hand dominant        [ ]     Left hand dominant  Cognitive/Behavioral Status:  Neurologic State: Alert; Appropriate for age  Orientation Level: Oriented X4  Cognition: Appropriate decision making; Appropriate for age attention/concentration; Appropriate safety awareness; Follows commands  Safety/Judgement: Awareness of environment; Fall prevention  Skin: incision on right LE covered with dressing  Edema: min edema noted on right LE  Vision/Perceptual:    Corrective Lenses: Glasses  Coordination:  Coordination: Within functional limits  Fine Motor Skills-Upper: Left Intact; Right Intact    Gross Motor Skills-Upper: Left Intact; Right Intact  Balance:  Sitting: Intact  Standing: Intact; With support  Strength:  Strength:  Within functional limits  Tone & Sensation:  Tone: Normal  Sensation: Intact  Range of Motion:  AROM: Within functional limits  PROM: Generally decreased, functional  Functional Mobility and Transfers for ADLs:  Bed Mobility:  Supine to Sit: Minimum assistance  Transfers:  Sit to Stand: Supervision  Bed to Chair: Contact guard assistance  ADL Assessment:  Upper Body Dressing: Supervision  Lower Body Dressing: Maximum assistance  ADL Intervention:  Cognitive Retraining  Safety/Judgement: Awareness of environment; Fall prevention     Pain:  Pain Scale 1: Numeric (0 - 10)  Pain Intensity 1: 7  Pain Location 1: Hip  Pain Orientation 1: Right  Pain Description 1: Aching  Pain Intervention(s) 1: Medication (see MAR)  Activity Tolerance:   good  Please refer to the flowsheet for vital signs taken during this treatment. After treatment:   [X]  Patient left in no apparent distress sitting up in chair  [ ]  Patient left in no apparent distress in bed  [X]  Call bell left within reach  [X]  Nursing notified  [X]  Caregiver present  [ ]  Bed alarm activated      COMMUNICATION/EDUCATION:   Communication/Collaboration:  [X]      Home safety education was provided and the patient/caregiver indicated understanding. [X]      Patient/family have participated as able and agree with findings and recommendations. [ ]      Patient is unable to participate in plan of care at this time.      Tyra Lovelace OTR/L  Time Calculation: 29 mins

## 2017-02-03 NOTE — ROUTINE PROCESS
Bedside and Verbal shift change report given to Stefanie Turner RN (oncoming nurse) by Curtis Anderson RN (offgoing nurse). Report included the following information SBAR and Kardex.

## 2017-02-03 NOTE — CONSULTS
Medicine Consult    Patient:  Charlotte Villalba 46 y.o. female  Asked to evaluate patient by Rey SALAZAR  Primary Care Provider:  Carlos Rodríguez MD  Date of Admission:  2/2/2017  Reason for Consult: anemia        Assessment/Plan     Patient Active Problem List   Diagnosis Code    Anemia D64.9    Breast cancer ([facility] [address] C50.919    Sickle cell anemia ([facility] [address] [facility] D57.1    Dehydration E86.0    Fibromyalgia M79.7    Hx of endometriosis Z87.42    Sickle cell crisis ([facility] [address] D57.00    Vitamin D deficiency E55.9    Osteoarthritis M19.90    Breast cancer of lower-outer quadrant of left female breast ([facility] [address] C50.512    Iron deficiency anemia due to dietary causes D50.8    Osteoarthritis of left hip M16.12    Thrombocytopenia (HCC) D69.6    Acute blood loss anemia D62    Choledocholithiasis K80.50    Cholecystitis with cholelithiasis K80.10    Osteoarthritis of right hip M16.11       PLAN:      -Monitor hemodynamics and hemoglobin in and postoperative.   -anemia, post op, in a patient with history of sickle cell anemia. She has multiple transfusions in the past. She has developed antibodies due to multiple transfusions. Patient reports that he surgery was delayed as compatible blood was not ready. She had 2 units of blood matched that has least incompatability before surgery. It is reasonable to transfuse as her H/H is below 7 and it may drop further. Called and spoke with lab and was told these are the only 2 units available in [facility]. Will transfuse 2 units.   -Monitor kidney function. Avoid nephrotoxins. Gentle hydration.  -Continue blood pressure medications. Monitor for hypotension. If that is  to present, we may need to hold some or all of her blood pressure  medications.  -Routine postoperative management, pain control, and deep venous  thrombosis per admitting team.    Thank you for allowing us to participate in this patients care.   HPI:   CC:  Charlotte Villalba is a 46 y.o. female with past medical history significant for sickle cell anemia, breast ca, HTN is admitted by orthopedic service for back surgery. Patient had H/H of 6.8/19.6. She denies any symptoms, no chest pain, SOB, dizziness. She has history of sickle cell anemia and has multiple blood transfusions in the past. She has developed antibodies. Her surgery delayed to get blood matched to least incompatible. Past Medical History   Diagnosis Date    Anemia NEC     Arthritis     Cancer [facility] [address]     Chronic pain     Coagulation disorder ([facility] [address] [facility]     Ductal carcinoma ([facility] [address]      left breast    Fatigue     Fibromyalgia     GERD (gastroesophageal reflux disease)     Hx of endometriosis     Hypertension     Ill-defined condition      Sickle cell    Osteoarthritis of left hip 2016    Osteoarthritis of right hip 1/3/2017    Sickle cell anemia (HCC)     Sleep apnea      Does not use CPAP     Past Surgical History   Procedure Laterality Date    Hx  section      Hx mastectomy Left 2012     with axillary lymph node dissection    Hx breast biopsy Left     Pr lap,cholecystectomy N/A 11/10/2016     Dr. Poli Seals Hx orthopaedic Left      hip replacement    Hx lap cholecystectomy      Hx hernia repair        Social History   Substance Use Topics    Smoking status: Former Smoker     Packs/day: 0.25     Years: 30.00    Smokeless tobacco: Never Used      Comment: advised to stop smoking and no smoking before the surgery    Alcohol use No     Family History   Problem Relation Age of Onset    Cancer Mother      breast    Diabetes Mother     Heart Disease Father     Diabetes Father     Sickle Cell Anemia Brother      No current facility-administered medications on file prior to encounter. Current Outpatient Prescriptions on File Prior to Encounter   Medication Sig Dispense Refill    oxyCODONE ER (OXYCONTIN) 20 mg ER tablet Take 1 Tab by mouth two (2) times a day.  Max Daily Amount: 40 mg. 60 Tab 0    temazepam (RESTORIL) 15 mg capsule Take 1 Cap by mouth nightly as needed. Max Daily Amount: 15 mg. 30 Cap 1    metoprolol (LOPRESSOR) 25 mg tablet Take 25 mg by mouth two (2) times a day.  HYDROmorphone (DILAUDID) 2 mg tablet Take 1-2 Tabs by mouth every four (4) hours as needed for Pain. Max Daily Amount: 24 mg. (Patient taking differently: Take 2-4 mg by mouth every four (4) hours as needed for Pain (sickle cell crisis or severe pain). ) 40 Tab 0    naloxegol (MOVANTIK) 12.5 mg tab tablet Take 12.5 mg by mouth daily.  folic acid (FOLVITE) 1 mg tablet Take 1 mg by mouth daily. Allergies   Allergen Reactions    Neulasta [Pegfilgrastim] Other (comments)     \"Put me in a comma for 3 months\"           Review of Systems  Constitutional: No fever, chills, diaphoresis, malaise, fatigue or weight gain/loss or falls  Skin: no itching or rashes  HEENT: no ear discomfort, hearing loss, tinnitus, epistaxis or sore throat  EYES: no blurry vision, double vision or photophobia  CARDIOVASCULAR: no claudication, cp, palpitations, orthopnea, pnd or LE edema  PULMONARY: no cough, wheeze, shortness of breath or sputum production  GI: no nausea, vomiting, diarrhea, abdominal pain, melena, hematemesis or brbpr  : no dysuria, hematuria  MUSCULOSKELETAL: no back pain, joint pain or myalgias  ENDOCRINE: no heat/cold intolerance, polyuria or polydipsia  HEME: see above  NEURO: no unilateral weakness, numbness, tingling or seizures      Physical Exam:      Visit Vitals    /63 (BP 1 Location: Left arm, BP Patient Position: At rest)    Pulse 70    Temp 97.8 °F (36.6 °C)    Resp 16    Ht 5' 6\" (1.676 m)    Wt 97.3 kg (214 lb 9 oz)    SpO2 100%    Breastfeeding No    BMI 34.63 kg/m2     Body mass index is 34.63 kg/(m^2).     Physical Exam:  GEN: well nourished, laying in bed in no acute distress  HEENT: atraumatic, nose normal,oropharynx clear, MMM  NECK: supple, trachea midline, no supraclavicular or submandibular adenopathy noted  EYES: conjuctiva normal, lids with out lesions, PERRL  HEART: RRR with out m/r/g, pmi nondisplaced, pulses 2+ distally  LUNGS: equal chest wall expansion, cta bl with out wheezes/rales or rhonchi  AB: soft, +BS, nt/nd no organomegaly  NEURO: alert, awake and oriented x3, gait not assessed, cranial nerves intact, strength 5/5 bl UE and LE, sensation intact, reflexes nonpathological  SKIN: dry, intact, warm no breakdown noted        Laboratory Studies:    Recent Results (from the past 24 hour(s))   METABOLIC PANEL, BASIC    Collection Time: 02/03/17  1:22 AM   Result Value Ref Range    Sodium 143 136 - 145 mmol/L    Potassium 4.6 3.5 - 5.5 mmol/L    Chloride 109 (H) 100 - 108 mmol/L    CO2 25 21 - 32 mmol/L    Anion gap 9 3.0 - 18 mmol/L    Glucose 140 (H) 74 - 99 mg/dL    BUN 10 7.0 - 18 MG/DL    Creatinine 1.19 0.6 - 1.3 MG/DL    BUN/Creatinine ratio 8 (L) 12 - 20      GFR est AA 58 (L) >60 ml/min/1.73m2    GFR est non-AA 48 (L) >60 ml/min/1.73m2    Calcium 8.5 8.5 - 10.1 MG/DL   CBC W/O DIFF    Collection Time: 02/03/17  1:22 AM   Result Value Ref Range    WBC 9.2 4.6 - 13.2 K/uL    RBC 2.28 (L) 4.20 - 5.30 M/uL    HGB 6.8 (L) 12.0 - 16.0 g/dL    HCT 19.6 (L) 35.0 - 45.0 %    MCV 86.0 74.0 - 97.0 FL    MCH 29.8 24.0 - 34.0 PG    MCHC 34.7 31.0 - 37.0 g/dL    RDW 15.4 (H) 11.6 - 14.5 %    PLATELET 80 (L) 843 - 420 K/uL    MPV 9.3 9.2 - 11.8 FL

## 2017-02-03 NOTE — DISCHARGE INSTRUCTIONS
[address] [place] [address] Sports Medicine   Patient Discharge Instructions    Robby Foster / [id number] : 1965    Admitted 2017 Discharged: 2017     IF YOU HAVE ANY PROBLEMS ONCE YOU ARE  [facility] FOLLOWING NUMBERS:   Main office number: [phone]    Your follow up appointment to see either Dr. Keyonna Lawton, Tom Barraza PA-C, or [facility] ROBERTO as scheduled in 2 weeks. If you are unsure of your appointment date call the office at [phone]. Medication Instructions     · Resume your home medictions as directed, you may have directed not to resume supplements until after your follow up. · A prescription for pain medication has been given   · It is important that you take the medication exactly as they are prescribed. · Keep your medication in the bottles provided by the pharmacist and keep a list of the medication names, dosages, and times to be taken in your wallet. · Do not take other medications without consulting your doctor. What to do at [address] [place] [address] Ave your prehospital diet. If you have excessive nausea or vomitting call your doctor's office. Be sure to maintain adequate fluid intake. Some pain medications may cause constipation. Remember to drink fluids, stay as active as possible, and eat plenty of fiber-rich foods. Begin In-Home Physical Therapy; 3 times a week to work on gait training, range of motion, strengthening, and weight bearing exercises as tolerable. Continue to use your walker or cane when walking. May progress from the walker to a cane to complete total bearing as tolerable. Patient may shower. Wrap incision with plastic wrap/covering to prevent incision from getting wet. Avoid complete immersion. YOUR DRESSING SHOULD BE CHANGED BY YOUR HOME HEALTH NURSE 3-5 DAYS AFTER SURGERY.           When to Call    - Call if you have a temperature greater then 101  - Unable to keep food down  - Are unable to bear any wieght   - Need a pain medication refill     Information obtained by :  I understand that if any problems occur once I am at home I am to contact my physician. I understand and acknowledge receipt of the instructions indicated above.                                                                                                                                            Physician's or R.N.'s Signature                                                                  Date/Time                                                                                                                                              Patient or Representative Signature                                                          Date/Time

## 2017-02-03 NOTE — ROUTINE PROCESS
Bedside and Verbal shift change report given to CAITLIN Avendaño RN (oncoming nurse) by Jc Reynolds RN (offgoing nurse). Report included the following information SBAR, Kardex, Intake/Output and MAR.

## 2017-02-03 NOTE — PROGRESS NOTES
1837 Assessment completed and documented. Patient alert and oriented x4. Lungs clear/diminished . Bowel sounds hypoactive. Heart sounds WNL. Incision to right hip. mepilex Dressing clean, dry, and intact. Pain 8/10 on  0-10/10 numeric scale. Ice packs to site. Medicated per MAR. Patient denies numbness and tingling to bilateral upper and lower extremities. Palpable bilateral dorsalis pedis. No nausea/vomiting No SOB, dizziness, distress. Teds applied bilateral. SCD applied bilateral. Patient gets up to 1000 using IS. Patient instructed and encouraged to use 10X an hour. Patient instructed not to get out of bed without staff member present and to alert staff when needing to get out of bed for their safety. Patient verbalizes understanding. Patient instructed to alert nurse for pain medication, to stay ahead of pain, and alert nurse if pain is not relieved with pain medication. Side effects sheet handed out and patient educated on possible side effects of pain medication. Patient verbalizes understanding. Shift Summary- Patient had an uneventful shift.

## 2017-02-03 NOTE — PROGRESS NOTES
Met with pt at bedside. Pt plans return home upon discharge with spouse to assist. Pt has not been evaluated by PT,however. FOC offered and pt would like [facility] at [phone] for follow up; referral placed with CMS. Pt has RW for use at home. Care Management Interventions  PCP Verified by CM:  Yes  Transition of Care Consult (CM Consult): [address]: No  Reason Outside [place]: Physician referred to specific agency ([facility] at [phone])  Discharge Durable Medical Equipment: No (pt has RW)  Physical Therapy Consult: Yes  Occupational Therapy Consult: Yes  Current Support Network: Lives with Spouse, Own Home  Confirm Follow Up Transport: Family  Plan discussed with Pt/Family/Caregiver: Yes  Freedom of Choice Offered: Yes  Discharge Location  Discharge Placement: Home with home health

## 2017-02-04 LAB
ABO + RH BLD: NORMAL
ANTIGENS PRESENT RBC DONR: NORMAL
ANTIGENS PRESENT RBC DONR: NORMAL
BLD PROD TYP BPU: NORMAL
BLD PROD TYP BPU: NORMAL
BLOOD BANK CMNT PATIENT-IMP: NORMAL
BLOOD GROUP ANTIBODIES SERPL: NORMAL
BPU ID: NORMAL
BPU ID: NORMAL
CROSSMATCH RESULT,%XM: NORMAL
CROSSMATCH RESULT,%XM: NORMAL
SPECIMEN EXP DATE BLD: NORMAL
STATUS OF UNIT,%ST: NORMAL
STATUS OF UNIT,%ST: NORMAL
UNIT DIVISION, %UDIV: 0
UNIT DIVISION, %UDIV: 0

## 2017-02-20 ENCOUNTER — HOSPITAL ENCOUNTER (INPATIENT)
Age: 52
LOS: 5 days | Discharge: HOME HEALTH | DRG: 812 | End: 2017-02-25
Attending: EMERGENCY MEDICINE | Admitting: INTERNAL MEDICINE
Payer: COMMERCIAL

## 2017-02-20 ENCOUNTER — APPOINTMENT (OUTPATIENT)
Dept: GENERAL RADIOLOGY | Age: 52
DRG: 812 | End: 2017-02-20
Attending: EMERGENCY MEDICINE
Payer: COMMERCIAL

## 2017-02-20 DIAGNOSIS — D64.9 ANEMIA, UNSPECIFIED TYPE: Primary | ICD-10-CM

## 2017-02-20 PROBLEM — D72.829 LEUKOCYTOSIS: Status: ACTIVE | Noted: 2017-02-20

## 2017-02-20 LAB
ANION GAP BLD CALC-SCNC: 11 MMOL/L (ref 3–18)
BASOPHILS # BLD AUTO: 0 K/UL (ref 0–0.06)
BASOPHILS # BLD: 0 % (ref 0–2)
BUN SERPL-MCNC: 12 MG/DL (ref 7–18)
BUN/CREAT SERPL: 11 (ref 12–20)
CALCIUM SERPL-MCNC: 9 MG/DL (ref 8.5–10.1)
CHLORIDE SERPL-SCNC: 103 MMOL/L (ref 100–108)
CO2 SERPL-SCNC: 25 MMOL/L (ref 21–32)
CREAT SERPL-MCNC: 1.05 MG/DL (ref 0.6–1.3)
D DIMER PPP FEU-MCNC: 3.46 UG/ML(FEU)
DIFFERENTIAL METHOD BLD: ABNORMAL
EOSINOPHIL # BLD: 0 K/UL (ref 0–0.4)
EOSINOPHIL NFR BLD: 0 % (ref 0–5)
ERYTHROCYTE [DISTWIDTH] IN BLOOD BY AUTOMATED COUNT: 21.5 % (ref 11.6–14.5)
GLUCOSE SERPL-MCNC: 112 MG/DL (ref 74–99)
HCT VFR BLD AUTO: 15.2 % (ref 35–45)
HGB BLD-MCNC: 4.6 G/DL (ref 12–16)
LYMPHOCYTES # BLD AUTO: 19 % (ref 21–52)
LYMPHOCYTES # BLD: 3.1 K/UL (ref 0.9–3.6)
MCH RBC QN AUTO: 30.5 PG (ref 24–34)
MCHC RBC AUTO-ENTMCNC: 30.3 G/DL (ref 31–37)
MCV RBC AUTO: 100.7 FL (ref 74–97)
MONOCYTES # BLD: 0.6 K/UL (ref 0.05–1.2)
MONOCYTES NFR BLD AUTO: 4 % (ref 3–10)
NEUTS SEG # BLD: 12.5 K/UL (ref 1.8–8)
NEUTS SEG NFR BLD AUTO: 77 % (ref 40–73)
PLATELET # BLD AUTO: 156 K/UL (ref 135–420)
PLATELET COMMENTS,PCOM: ABNORMAL
PMV BLD AUTO: 8.7 FL (ref 9.2–11.8)
POTASSIUM SERPL-SCNC: 4.1 MMOL/L (ref 3.5–5.5)
RBC # BLD AUTO: 1.51 M/UL (ref 4.2–5.3)
RBC MORPH BLD: ABNORMAL
RETICS/RBC NFR AUTO: 31.3 % (ref 0.5–2.3)
SODIUM SERPL-SCNC: 139 MMOL/L (ref 136–145)
WBC # BLD AUTO: 16.2 K/UL (ref 4.6–13.2)

## 2017-02-20 PROCEDURE — 86978 RBC PRETREATMENT SERUM: CPT | Performed by: EMERGENCY MEDICINE

## 2017-02-20 PROCEDURE — 86922 COMPATIBILITY TEST ANTIGLOB: CPT | Performed by: EMERGENCY MEDICINE

## 2017-02-20 PROCEDURE — 86900 BLOOD TYPING SEROLOGIC ABO: CPT | Performed by: EMERGENCY MEDICINE

## 2017-02-20 PROCEDURE — 86902 BLOOD TYPE ANTIGEN DONOR EA: CPT | Performed by: EMERGENCY MEDICINE

## 2017-02-20 PROCEDURE — 85379 FIBRIN DEGRADATION QUANT: CPT | Performed by: EMERGENCY MEDICINE

## 2017-02-20 PROCEDURE — 86880 COOMBS TEST DIRECT: CPT | Performed by: EMERGENCY MEDICINE

## 2017-02-20 PROCEDURE — 86901 BLOOD TYPING SEROLOGIC RH(D): CPT | Performed by: EMERGENCY MEDICINE

## 2017-02-20 PROCEDURE — 71020 XR CHEST AP LAT: CPT

## 2017-02-20 PROCEDURE — 99283 EMERGENCY DEPT VISIT LOW MDM: CPT

## 2017-02-20 PROCEDURE — 94761 N-INVAS EAR/PLS OXIMETRY MLT: CPT

## 2017-02-20 PROCEDURE — 86860 RBC ANTIBODY ELUTION: CPT | Performed by: EMERGENCY MEDICINE

## 2017-02-20 PROCEDURE — 65270000029 HC RM PRIVATE

## 2017-02-20 PROCEDURE — 85045 AUTOMATED RETICULOCYTE COUNT: CPT | Performed by: EMERGENCY MEDICINE

## 2017-02-20 PROCEDURE — 80048 BASIC METABOLIC PNL TOTAL CA: CPT | Performed by: EMERGENCY MEDICINE

## 2017-02-20 PROCEDURE — 93005 ELECTROCARDIOGRAM TRACING: CPT

## 2017-02-20 PROCEDURE — 86870 RBC ANTIBODY IDENTIFICATION: CPT | Performed by: EMERGENCY MEDICINE

## 2017-02-20 PROCEDURE — 74011250636 HC RX REV CODE- 250/636: Performed by: EMERGENCY MEDICINE

## 2017-02-20 PROCEDURE — 86850 RBC ANTIBODY SCREEN: CPT | Performed by: EMERGENCY MEDICINE

## 2017-02-20 PROCEDURE — 86920 COMPATIBILITY TEST SPIN: CPT | Performed by: EMERGENCY MEDICINE

## 2017-02-20 PROCEDURE — 85025 COMPLETE CBC W/AUTO DIFF WBC: CPT | Performed by: EMERGENCY MEDICINE

## 2017-02-20 RX ORDER — ONDANSETRON 2 MG/ML
4 INJECTION INTRAMUSCULAR; INTRAVENOUS
Status: COMPLETED | OUTPATIENT
Start: 2017-02-20 | End: 2017-02-20

## 2017-02-20 RX ORDER — MORPHINE SULFATE 4 MG/ML
4 INJECTION, SOLUTION INTRAMUSCULAR; INTRAVENOUS
Status: COMPLETED | OUTPATIENT
Start: 2017-02-20 | End: 2017-02-20

## 2017-02-20 RX ORDER — SODIUM CHLORIDE 9 MG/ML
250 INJECTION, SOLUTION INTRAVENOUS AS NEEDED
Status: DISCONTINUED | OUTPATIENT
Start: 2017-02-20 | End: 2017-02-25 | Stop reason: HOSPADM

## 2017-02-20 RX ADMIN — Medication 4 MG: at 22:38

## 2017-02-20 RX ADMIN — ONDANSETRON HYDROCHLORIDE 4 MG: 2 SOLUTION INTRAMUSCULAR; INTRAVENOUS at 22:36

## 2017-02-20 RX ADMIN — SODIUM CHLORIDE 1000 ML: 900 INJECTION, SOLUTION INTRAVENOUS at 22:36

## 2017-02-20 NOTE — IP AVS SNAPSHOT
Current Discharge Medication List  
  
Take these medications at their scheduled times Dose & Instructions Dispensing Information Comments Morning Noon Evening Bedtime  
 aspirin 325 mg tablet Commonly known as:  ASPIRIN Your next dose is: Today, Tomorrow Other:  ____________ Dose:  325 mg Take 1 Tab by mouth two (2) times a day. Quantity:  30 Tab Refills:  2  
     
   
   
   
  
 folic acid 1 mg tablet Commonly known as:  Google Your next dose is: Today, Tomorrow Other:  ____________ Dose:  1 mg Take 1 mg by mouth daily. Refills:  0  
     
   
   
   
  
 metoprolol tartrate 25 mg tablet Commonly known as:  LOPRESSOR Your next dose is: Today, Tomorrow Other:  ____________ Dose:  25 mg Take 25 mg by mouth two (2) times a day. Refills:  0  
     
   
   
   
  
 naloxegol 12.5 mg Tab tablet Commonly known as:  Velton Moh Your next dose is: Today, Tomorrow Other:  ____________ Dose:  12.5 mg Take 12.5 mg by mouth daily. Refills:  0  
     
   
   
   
  
 * oxyCODONE IR 10 mg Tab immediate release tablet Commonly known as:  Corrinne Mitten Your next dose is: Today, Tomorrow Other:  ____________ Dose:  10 mg Take 10 mg by mouth four (4) times daily. Refills:  0  
     
   
   
   
  
 * oxyCODONE ER 20 mg ER tablet Commonly known as:  OxyCONTIN Your next dose is: Today, Tomorrow Other:  ____________ Dose:  20 mg Take 1 Tab by mouth two (2) times a day. Max Daily Amount: 40 mg.  
 Quantity:  60 Tab Refills:  0  
     
   
   
   
  
 * Notice: This list has 2 medication(s) that are the same as other medications prescribed for you. Read the directions carefully, and ask your doctor or other care provider to review them with you. Take these medications as needed Dose & Instructions Dispensing Information Comments Morning Noon Evening Bedtime HYDROmorphone 2 mg tablet Commonly known as:  DILAUDID Your next dose is: Today, Tomorrow Other:  ____________ Dose:  2-4 mg Take 1-2 Tabs by mouth every four (4) hours as needed for Pain. Max Daily Amount: 24 mg. Quantity:  40 Tab Refills:  0  
     
   
   
   
  
 temazepam 15 mg capsule Commonly known as:  RESTORIL Your next dose is: Today, Tomorrow Other:  ____________ Dose:  15 mg Take 1 Cap by mouth nightly as needed. Max Daily Amount: 15 mg. Quantity:  30 Cap Refills:  1 Where to Get Your Medications Information about where to get these medications is not yet available ! Ask your nurse or doctor about these medications  
  aspirin 325 mg tablet

## 2017-02-20 NOTE — IP AVS SNAPSHOT
[address] [address] Patient: Ritu Fails MRN: [id number] MFR:[id number] You are allergic to the following Allergen Reactions Neulasta (Pegfilgrastim) Other (comments) \"Put me in a comma for 3 months\" Immunizations Administered for This Admission Name Date Influenza Vaccine (Quad) PF  Deferred () Recent Documentation Height  
  
  
  
  
  
 1.676 m Unresulted Labs Order Current Status TYPE & CROSSMATCH Preliminary result Emergency Contacts Name Discharge Info Relation Home Work Mobile [address] CAREGIVER [3] Spouse [3] [phone] About your hospitalization You were admitted on:  February 20, 2017 You last received care in the:  [facility] [address] You were discharged on:  February 25, 2017 Unit phone number:  [phone] Why you were hospitalized Your primary diagnosis was:  Symptomatic Anemia Your diagnoses also included:  Sickle Cell Anemia (Hcc), Dehydration, Fibromyalgia, Vitamin D Deficiency, Leukocytosis, Breast Cancer (Hcc) Providers Seen During Your Hospitalizations Provider Role Specialty Primary office phone Joselin Ewing MD Attending Provider Emergency Medicine [phone] Fadia Bridges MD Attending Provider Emergency Medicine [phone] Kayce Lynch MD Attending Provider Internal Medicine [phone] Your Primary Care Physician (PCP) Primary Care Physician Office Phone Office Fax Ignacio Boast [phone] Follow-up Information Follow up With Details Comments Contact Info Lydia Barron MD   [address] [place] [address] 
[phone] Current Discharge Medication List  
  
CONTINUE these medications which have CHANGED Dose & Instructions Dispensing Information Comments Morning Noon Evening Bedtime HYDROmorphone 2 mg tablet Commonly known as:  DILAUDID What changed:  reasons to take this Your next dose is: Today, Tomorrow Other:  _________ Dose:  2-4 mg Take 1-2 Tabs by mouth every four (4) hours as needed for Pain. Max Daily Amount: 24 mg. Quantity:  40 Tab Refills:  0 CONTINUE these medications which have NOT CHANGED Dose & Instructions Dispensing Information Comments Morning Noon Evening Bedtime  
 aspirin 325 mg tablet Commonly known as:  ASPIRIN Your next dose is: Today, Tomorrow Other:  _________ Dose:  325 mg Take 1 Tab by mouth two (2) times a day. Quantity:  30 Tab Refills:  2  
     
   
   
   
  
 folic acid 1 mg tablet Commonly known as:  Google Your next dose is: Today, Tomorrow Other:  _________ Dose:  1 mg Take 1 mg by mouth daily. Refills:  0  
     
   
   
   
  
 metoprolol tartrate 25 mg tablet Commonly known as:  LOPRESSOR Your next dose is: Today, Tomorrow Other:  _________ Dose:  25 mg Take 25 mg by mouth two (2) times a day. Refills:  0  
     
   
   
   
  
 naloxegol 12.5 mg Tab tablet Commonly known as:  Layman Peyton Your next dose is: Today, Tomorrow Other:  _________ Dose:  12.5 mg Take 12.5 mg by mouth daily. Refills:  0  
     
   
   
   
  
 * oxyCODONE IR 10 mg Tab immediate release tablet Commonly known as:  Jordan Reis Your next dose is: Today, Tomorrow Other:  _________ Dose:  10 mg Take 10 mg by mouth four (4) times daily. Refills:  0  
     
   
   
   
  
 * oxyCODONE ER 20 mg ER tablet Commonly known as:  OxyCONTIN Your next dose is: Today, Tomorrow Other:  _________ Dose:  20 mg Take 1 Tab by mouth two (2) times a day. Max Daily Amount: 40 mg.  
 Quantity:  60 Tab Refills:  0 temazepam 15 mg capsule Commonly known as:  RESTORIL Your next dose is: Today, Tomorrow Other:  _________ Dose:  15 mg Take 1 Cap by mouth nightly as needed. Max Daily Amount: 15 mg. Quantity:  30 Cap Refills:  1  
     
   
   
   
  
 * Notice: This list has 2 medication(s) that are the same as other medications prescribed for you. Read the directions carefully, and ask your doctor or other care provider to review them with you. Where to Get Your Medications Information on where to get these meds will be given to you by the nurse or doctor. ! Ask your nurse or doctor about these medications  
  aspirin 325 mg tablet Discharge Instructions Anemia: Care Instructions Your Care Instructions Anemia is a low level of red blood cells, which carry oxygen throughout your body. Many things can cause anemia. Lack of iron is one of the most common causes. Your body needs iron to make hemoglobin, a substance in red blood cells that carries oxygen from the lungs to your body's cells. Without enough iron, the body produces fewer and smaller red blood cells. As a result, your body's cells do not get enough oxygen, and you feel tired and weak. And you may have trouble concentrating. Bleeding is the most common cause of a lack of iron. You may have heavy menstrual bleeding or bleeding caused by conditions such as ulcers, hemorrhoids, or cancer. Regular use of aspirin or other anti-inflammatory medicines (such as ibuprofen) also can cause bleeding in some people. A lack of iron in your diet also can cause anemia, especially at times when the body needs more iron, such as during pregnancy, infancy, and the teen years. Your doctor may have prescribed iron pills. It may take several months of treatment for your iron levels to return to normal. Your doctor also may suggest that you eat foods that are rich in iron, such as meat and beans. There are many other causes of anemia. It is not always due to a lack of iron. Finding the specific cause of your anemia will help your doctor find the right treatment for you. Follow-up care is a key part of your treatment and safety. Be sure to make and go to all appointments, and call your doctor if you are having problems. It's also a good idea to know your test results and keep a list of the medicines you take. How can you care for yourself at home? · Take your medicines exactly as prescribed. Call your doctor if you think you are having a problem with your medicine. · If your doctor recommends iron pills, take them as directed: ¨ Try to take the pills on an empty stomach about 1 hour before or 2 hours after meals. But you may need to take iron with food to avoid an upset stomach. ¨ Do not take antacids or drink milk or caffeine drinks (such as coffee, tea, or cola) at the same time or within 2 hours of the time that you take your iron. They can make it hard for your body to absorb the iron. ¨ Vitamin C (from food or supplements) helps your body absorb iron. Try taking iron pills with a glass of orange juice or some other food that is high in vitamin C, such as citrus fruits. ¨ Iron pills may cause stomach problems, such as heartburn, nausea, diarrhea, constipation, and cramps. Be sure to drink plenty of fluids, and include fruits, vegetables, and fiber in your diet each day. Iron pills often make your bowel movements dark or green. ¨ If you forget to take an iron pill, do not take a double dose of iron the next time you take a pill. ¨ Keep iron pills out of the reach of small children. An overdose of iron can be very dangerous. · Follow your doctor's advice about eating iron-rich foods. These include red meat, shellfish, poultry, eggs, beans, raisins, whole-grain bread, and leafy green vegetables. · Steam vegetables to help them keep their iron content. When should you call for help? Call 911 anytime you think you may need emergency care. For example, call if: 
· You have symptoms of a heart attack. These may include: ¨ Chest pain or pressure, or a strange feeling in the chest. 
¨ Sweating. ¨ Shortness of breath. ¨ Nausea or vomiting. ¨ Pain, pressure, or a strange feeling in the back, neck, jaw, or upper belly or in one or both shoulders or arms. ¨ Lightheadedness or sudden weakness. ¨ A fast or irregular heartbeat. After you call 911, the  may tell you to chew 1 adult-strength or 2 to 4 low-dose aspirin. Wait for an ambulance. Do not try to drive yourself. · You passed out (lost consciousness). Call your doctor now or seek immediate medical care if: 
· You have new or increased shortness of breath. · You are dizzy or lightheaded, or you feel like you may faint. · Your fatigue and weakness continue or get worse. · You have any abnormal bleeding, such as: 
¨ Nosebleeds. ¨ Vaginal bleeding that is different (heavier, more frequent, at a different time of the month) than what you are used to. ¨ Bloody or black stools, or rectal bleeding. ¨ Bloody or pink urine. Watch closely for changes in your health, and be sure to contact your doctor if: 
· You do not get better as expected. Where can you learn more? Go to http://beth-roman.info/. Enter R301 in the search box to learn more about \"Anemia: Care Instructions. \" Current as of: February 5, 2016 Content Version: 11.1 © 6665-4140 [employer]. Care instructions adapted under license by [employer] (which disclaims liability or warranty for this information). If you have questions about a medical condition or this instruction, always ask your healthcare professional. [place] [address] any warranty or liability for your use of this information. [facility] [address] DISCHARGE SUMMARY from Nurse The following personal items are in your possession at time of discharge: 
 
Dental Appliances: None Visual Aid: None Home Medications: None Jewelry: None Clothing: None Other Valuables: None PATIENT INSTRUCTIONS: 
 
 
F-face looks uneven A-arms unable to move or move unevenly S-speech slurred or non-existent T-time-call 911 as soon as signs and symptoms begin-DO NOT go Back to bed or wait to see if you get better-TIME IS BRAIN. Warning Signs of HEART ATTACK Call 911 if you have these symptoms: 
? Chest discomfort. Most heart attacks involve discomfort in the center of the chest that lasts more than a few minutes, or that goes away and comes back. It can feel like uncomfortable pressure, squeezing, fullness, or pain. ? Discomfort in other areas of the upper body. Symptoms can include pain or discomfort in one or both arms, the back, neck, jaw, or stomach. ? Shortness of breath with or without chest discomfort. ? Other signs may include breaking out in a cold sweat, nausea, or lightheadedness. Don't wait more than five minutes to call [address]! Fast action can save your life. Calling 911 is almost always the fastest way to get lifesaving treatment. Emergency Medical Services staff can begin treatment when they arrive  up to an hour sooner than if someone gets to the hospital by car. The discharge information has been reviewed with the patient. The patient verbalized understanding. Discharge medications reviewed with the patient and appropriate educational materials and side effects teaching were provided. Patient armband removed and shredded Discharge Instructions Attachments/References SMOKING CESSATION: HEALTH BENEFITS: GENERAL INFO (ENGLISH) ANEMIA (ENGLISH) Discharge Orders None [facility] Announcement We are excited to announce that we are making your provider's discharge notes available to you in [employer]. You will see these notes when they are completed and signed by the physician that discharged you from your recent hospital stay. If you have any questions or concerns about any information you see in [employer], please call the Health Information Department where you were seen or reach out to your Primary Care Provider for more information about your plan of care. Introducing [facility]! Dear Lawrence Vu: Thank you for requesting a [employer] account. Our records indicate that you already have an active [employer] account. You can access your account anytime at [email or web address]"[email or web address]. [employer]/"[employer] Did you know that you can access your hospital and ER discharge instructions at any time in [employer]? You can also review all of your test results from your hospital stay or ER visit. Additional Information If you have questions, please visit the Frequently Asked Questions section of the [employer] website at [email or web address]"[email or web address]. [employer]/"[employer]/. Remember, [employer] is NOT to be used for urgent needs. For medical emergencies, dial 911. Now available from your iPhone and Android! General Information Please provide this summary of care documentation to your next provider. Patient Signature:  ____________________________________________________________ Date:  ____________________________________________________________  
  
[facility] Provider Signature:  ____________________________________________________________ Date:  ____________________________________________________________ More Information Learning About Benefits From Quitting Smoking How does quitting smoking make you healthier? If you're thinking about quitting smoking, you may have a few reasons to be smoke-free. Your health may be one of them. · When you quit smoking, you lower your risks for cancer, lung disease, heart attack, stroke, blood vessel disease, and blindness from macular degeneration. · When you're smoke-free, you get sick less often, and you heal faster. You are less likely to get colds, flu, bronchitis, and pneumonia. · As a nonsmoker, you may find that your mood is better and you are less stressed. When and how will you feel healthier? Quitting has real health benefits that start from day 1 of being smoke-free. And the longer you stay smoke-free, the healthier you get and the better you feel. The first hours · After just 20 minutes, your blood pressure and heart rate go down. That means there's less stress on your heart and blood vessels. · Within 12 hours, the level of carbon monoxide in your blood drops back to normal. That makes room for more oxygen. With more oxygen in your body, you may notice that you have more energy than when you smoked. After 2 weeks · Your lungs start to work better. · Your risk of heart attack starts to drop. After 1 month · When your lungs are clear, you cough less and breathe deeper, so it's easier to be active. · Your sense of taste and smell return. That means you can enjoy food more than you have since you started smoking. Over the years · After 1 year, your risk of heart disease is half what it would be if you kept smoking. · After 5 years, your risk of stroke starts to shrink. Within a few years after that, it's about the same as if you'd never smoked. · After 10 years, your risk of dying from lung cancer is cut by about half. And your risk for many other types of cancer is lower too. How would quitting help others in your life? When you quit smoking, you improve the health of everyone who now breathes in your smoke. · Their heart, lung, and cancer risks drop, much like yours. · They are sick less. For babies and small children, living smoke-free means they're less likely to have ear infections, pneumonia, and bronchitis. · If you're a woman who is or will be pregnant someday, quitting smoking means a healthier . · Children who are close to you are less likely to become adult smokers. Where can you learn more? Go to http://beth-roman.info/. Enter 052 [id number] 72 11 in the search box to learn more about \"Learning About Benefits From Quitting Smoking. \" Current as of: May 26, 2016 Content Version: 11.1 © 6014-5770 [employer]. Care instructions adapted under license by [employer] (which disclaims liability or warranty for this information). If you have questions about a medical condition or this instruction, always ask your healthcare professional. [place] [address] any warranty or liability for your use of this information. Anemia: Care Instructions Your Care Instructions Anemia is a low level of red blood cells, which carry oxygen throughout your body. Many things can cause anemia. Lack of iron is one of the most common causes. Your body needs iron to make hemoglobin, a substance in red blood cells that carries oxygen from the lungs to your body's cells. Without enough iron, the body produces fewer and smaller red blood cells. As a result, your body's cells do not get enough oxygen, and you feel tired and weak. And you may have trouble concentrating. Bleeding is the most common cause of a lack of iron. You may have heavy menstrual bleeding or bleeding caused by conditions such as ulcers, hemorrhoids, or cancer. Regular use of aspirin or other anti-inflammatory medicines (such as ibuprofen) also can cause bleeding in some people.  A lack of iron in your diet also can cause anemia, especially at times when the body needs more iron, such as during pregnancy, infancy, and the teen years. Your doctor may have prescribed iron pills. It may take several months of treatment for your iron levels to return to normal. Your doctor also may suggest that you eat foods that are rich in iron, such as meat and beans. There are many other causes of anemia. It is not always due to a lack of iron. Finding the specific cause of your anemia will help your doctor find the right treatment for you. Follow-up care is a key part of your treatment and safety. Be sure to make and go to all appointments, and call your doctor if you are having problems. It's also a good idea to know your test results and keep a list of the medicines you take. How can you care for yourself at home? · Take your medicines exactly as prescribed. Call your doctor if you think you are having a problem with your medicine. · If your doctor recommends iron pills, take them as directed: ¨ Try to take the pills on an empty stomach about 1 hour before or 2 hours after meals. But you may need to take iron with food to avoid an upset stomach. ¨ Do not take antacids or drink milk or caffeine drinks (such as coffee, tea, or cola) at the same time or within 2 hours of the time that you take your iron. They can make it hard for your body to absorb the iron. ¨ Vitamin C (from food or supplements) helps your body absorb iron. Try taking iron pills with a glass of orange juice or some other food that is high in vitamin C, such as citrus fruits. ¨ Iron pills may cause stomach problems, such as heartburn, nausea, diarrhea, constipation, and cramps. Be sure to drink plenty of fluids, and include fruits, vegetables, and fiber in your diet each day. Iron pills often make your bowel movements dark or green. ¨ If you forget to take an iron pill, do not take a double dose of iron the next time you take a pill. ¨ Keep iron pills out of the reach of small children. An overdose of iron can be very dangerous. · Follow your doctor's advice about eating iron-rich foods. These include red meat, shellfish, poultry, eggs, beans, raisins, whole-grain bread, and leafy green vegetables. · Steam vegetables to help them keep their iron content. When should you call for help? Call 911 anytime you think you may need emergency care. For example, call if: 
· You have symptoms of a heart attack. These may include: ¨ Chest pain or pressure, or a strange feeling in the chest. 
¨ Sweating. ¨ Shortness of breath. ¨ Nausea or vomiting. ¨ Pain, pressure, or a strange feeling in the back, neck, jaw, or upper belly or in one or both shoulders or arms. ¨ Lightheadedness or sudden weakness. ¨ A fast or irregular heartbeat. After you call 911, the  may tell you to chew 1 adult-strength or 2 to 4 low-dose aspirin. Wait for an ambulance. Do not try to drive yourself. · You passed out (lost consciousness). Call your doctor now or seek immediate medical care if: 
· You have new or increased shortness of breath. · You are dizzy or lightheaded, or you feel like you may faint. · Your fatigue and weakness continue or get worse. · You have any abnormal bleeding, such as: 
¨ Nosebleeds. ¨ Vaginal bleeding that is different (heavier, more frequent, at a different time of the month) than what you are used to. ¨ Bloody or black stools, or rectal bleeding. ¨ Bloody or pink urine. Watch closely for changes in your health, and be sure to contact your doctor if: 
· You do not get better as expected. Where can you learn more? Go to http://beth-roman.info/. Enter R301 in the search box to learn more about \"Anemia: Care Instructions. \" Current as of: February 5, 2016 Content Version: 11.1 © 7592-3853 [employer].  Care instructions adapted under license by [address] (which disclaims liability or warranty for this information). If you have questions about a medical condition or this instruction, always ask your healthcare professional. [address] any warranty or liability for your use of this information.

## 2017-02-20 NOTE — Clinical Note
Status[de-identified] Inpatient [101] Type of Bed: Intensive Care [6] Inpatient Hospitalization Certified Necessary for the Following Reasons: 4. Patient requires ICU level of care interventions (further clarification in H&P documentation) Admitting Diagnosis: Anemia [086494] Admitting Physician: Kathrin Carroll [[id number]] Attending Physician: Kathrin Carroll [[id number]] Estimated Length of Stay: > or = to 2 Midnights Discharge Plan[de-identified] Home with Office Follow-up

## 2017-02-20 NOTE — ED PROVIDER NOTES
HPI Comments: 6:11 PM Tegan Neal is a 46 y.o. female with hx of sickle cell disease, anemia, fibromyalgia, sleep apnea, HTN, and GERD who presents to the ED c/o SOB with gradual onset 3 days ago. SOB worse with exertion and she also c/o elevated heart rate. Pt is followed by Dr. Jefferson Longoria, Hematology/Oncology, had R hip replacement at [facility] on 17, and had blood transfusion on 2/3/17. Pt denies cough, CP, SOB, or any other sx at this time. Patient is a 46 y.o. female presenting with shortness of breath. The history is provided by the patient. No  was used. Shortness of Breath   Pertinent negatives include no fever, no headaches, no rhinorrhea, no sore throat, no cough, no chest pain, no vomiting, no abdominal pain and no rash.         Past Medical History:   Diagnosis Date    Anemia NEC     Arthritis     [facility]     Chronic pain     Coagulation disorder ([facility]     Ductal carcinoma ([facility]      left breast    Fatigue     Fibromyalgia     GERD (gastroesophageal reflux disease)     Hx of endometriosis     Hypertension 2011    Ill-defined condition      Sickle cell    Osteoarthritis of left hip 2016    Osteoarthritis of right hip 1/3/2017    Sickle cell anemia (HCC)     Sleep apnea      Does not use CPAP       Past Surgical History:   Procedure Laterality Date    Hx  section      Hx mastectomy Left 2012     with axillary lymph node dissection    Hx breast biopsy Left     Pr lap,cholecystectomy N/A 11/10/2016     Dr. Samira Mota Hx orthopaedic Left      hip replacement    Hx lap cholecystectomy      Hx hernia repair           Family History:   Problem Relation Age of Onset    Cancer Mother      breast    Diabetes Mother     Heart Disease Father     Diabetes Father     Sickle Cell Anemia Brother        Social History     Social History    Marital status:      Spouse name: N/A    Number of children: N/A    Years of education: N/A     Occupational History    Not on file. Social History Main Topics    Smoking status: Former Smoker     Packs/day: 0.25     Years: 30.00    Smokeless tobacco: Never Used      Comment: advised to stop smoking and no smoking before the surgery    Alcohol use No    Drug use: No    Sexual activity: Not Currently     Other Topics Concern    Not on file     Social History Narrative         ALLERGIES: Neulasta [pegfilgrastim]    Review of Systems   Constitutional: Negative for chills, fatigue and fever. HENT: Negative for congestion, rhinorrhea and sore throat. Eyes: Negative for visual disturbance. Respiratory: Positive for shortness of breath. Negative for cough. Cardiovascular: Negative for chest pain and palpitations. Gastrointestinal: Negative for abdominal pain, diarrhea, nausea and vomiting. Genitourinary: Negative for dysuria, hematuria and urgency. Musculoskeletal: Negative for arthralgias and myalgias. Skin: Negative for rash and wound. Neurological: Negative for dizziness and headaches. Psychiatric/Behavioral: The patient is not nervous/anxious. All other systems reviewed and are negative. Vitals:    02/20/17 2100 02/20/17 2115 02/20/17 2141 02/20/17 2222   BP: 120/62 113/83 120/64 124/65   Pulse: 92 99 91 90   Resp: 15 15 16 15   Temp:   99.3 °F (37.4 °C) 98.9 °F (37.2 °C)   SpO2: 100% 100% 100% 100%   Weight:       Height:                Physical Exam   Constitutional: She is oriented to person, place, and time. She appears well-developed and well-nourished. No distress. HENT:   Head: Normocephalic and atraumatic. Right Ear: External ear normal.   Left Ear: External ear normal.   Nose: Nose normal.   Mouth/Throat: Uvula is midline, oropharynx is clear and moist and mucous membranes are normal.   Eyes: Conjunctivae are normal. No scleral icterus. Pale sclera bilaterally    Neck: Neck supple.    Cardiovascular: Regular rhythm, normal heart sounds and intact distal pulses. Tachycardia present. Pulmonary/Chest: Effort normal and breath sounds normal.   Abdominal: Soft. There is no tenderness. Musculoskeletal: She exhibits no edema. Neurological: She is alert and oriented to person, place, and time. Gait normal.   Skin: Skin is warm and dry. She is not diaphoretic. Psychiatric: Her behavior is normal.   Nursing note and vitals reviewed.        [facility]  ED Course       Procedures    Vitals:  Patient Vitals for the past 12 hrs:   Temp Pulse Resp BP SpO2   02/20/17 2222 98.9 °F (37.2 °C) 90 15 124/65 100 %   02/20/17 2141 99.3 °F (37.4 °C) 91 16 120/64 100 %   02/20/17 2115 - 99 15 113/83 100 %   02/20/17 2100 - 92 15 120/62 100 %   02/20/17 2045 - 90 18 127/66 100 %   02/20/17 2030 - 92 18 113/62 100 %   02/20/17 2015 - 94 22 121/61 100 %   02/20/17 2007 98.9 °F (37.2 °C) 94 16 133/72 100 %   02/20/17 1904 - - - - 100 %   02/20/17 1735 97.4 °F (36.3 °C) (!) 105 25 131/82 95 %         Medications ordered:   Medications   0.9% sodium chloride infusion 250 mL (not administered)   ondansetron (ZOFRAN) injection 4 mg (not administered)   morphine injection 4 mg (not administered)   sodium chloride 0.9 % bolus infusion 1,000 mL (not administered)         Lab findings:  Recent Results (from the past 12 hour(s))   EKG, 12 LEAD, INITIAL    Collection Time: 02/20/17  5:41 PM   Result Value Ref Range    Ventricular Rate 102 BPM    Atrial Rate 102 BPM    P-R Interval 132 ms    QRS Duration 82 ms    Q-T Interval 362 ms    QTC Calculation (Bezet) 471 ms    Calculated P Axis 43 degrees    Calculated R Axis 20 degrees    Calculated T Axis 19 degrees    Diagnosis       Sinus tachycardia  Possible Left atrial enlargement  Nonspecific T wave abnormality  Abnormal ECG  When compared with ECG of 23-JAN-2017 07:51,  QT has lengthened     D DIMER    Collection Time: 02/20/17  7:29 PM   Result Value Ref Range    D DIMER 3.46 (H) <0.46 ug/ml(FEU)   TYPE & CROSSMATCH    Collection Time: 02/20/17  7:29 PM   Result Value Ref Range    Crossmatch Expiration 02/23/2017     ABO/Rh(D) O POSITIVE     Antibody screen POS    METABOLIC PANEL, BASIC    Collection Time: 02/20/17  7:55 PM   Result Value Ref Range    Sodium 139 136 - 145 mmol/L    Potassium 4.1 3.5 - 5.5 mmol/L    Chloride 103 100 - 108 mmol/L    CO2 25 21 - 32 mmol/L    Anion gap 11 3.0 - 18 mmol/L    Glucose 112 (H) 74 - 99 mg/dL    BUN 12 7.0 - 18 MG/DL    Creatinine 1.05 0.6 - 1.3 MG/DL    BUN/Creatinine ratio 11 (L) 12 - 20      GFR est AA >60 >60 ml/min/1.73m2    GFR est non-AA 55 (L) >60 ml/min/1.73m2    Calcium 9.0 8.5 - 10.1 MG/DL   CBC WITH AUTOMATED DIFF    Collection Time: 02/20/17  7:55 PM   Result Value Ref Range    WBC 16.2 (H) 4.6 - 13.2 K/uL    RBC 1.51 (L) 4.20 - 5.30 M/uL    HGB 4.6 (LL) 12.0 - 16.0 g/dL    HCT 15.2 (LL) 35.0 - 45.0 %    .7 (H) 74.0 - 97.0 FL    MCH 30.5 24.0 - 34.0 PG    MCHC 30.3 (L) 31.0 - 37.0 g/dL    RDW 21.5 (H) 11.6 - 14.5 %    PLATELET 383 217 - 198 K/uL    MPV 8.7 (L) 9.2 - 11.8 FL    NEUTROPHILS 77 (H) 40 - 73 %    LYMPHOCYTES 19 (L) 21 - 52 %    MONOCYTES 4 3 - 10 %    EOSINOPHILS 0 0 - 5 %    BASOPHILS 0 0 - 2 %    ABS. NEUTROPHILS 12.5 (H) 1.8 - 8.0 K/UL    ABS. LYMPHOCYTES 3.1 0.9 - 3.6 K/UL    ABS. MONOCYTES 0.6 0.05 - 1.2 K/UL    ABS. EOSINOPHILS 0.0 0.0 - 0.4 K/UL    ABS. BASOPHILS 0.0 0.0 - 0.06 K/UL    DF AUTOMATED      PLATELET COMMENTS DECREASED PLATELETS      RBC COMMENTS ANISOCYTOSIS  2+        RBC COMMENTS POLYCHROMASIA  1+        RBC COMMENTS HYPOCHROMIA  2+        RBC COMMENTS POIKILOCYTOSIS  1+       RETICULOCYTE COUNT    Collection Time: 02/20/17  7:55 PM   Result Value Ref Range    Reticulocyte count 31.3 (H) 0.5 - 2.3 %           X-Ray, CT or other radiology findings or impressions:  XR CHEST AP LAT   Final Result   IMPRESSION:  1. No acute cardiopulmonary process.     NM LUNG PERFUSION W VENT    (Results Pending)           Progress notes, Consult notes or additional Procedure notes:   7:00 PM Pt turned over from Dr. Anika Faria to Dr. Brennon Paul. 8:19 PM Consult:  Discussed care with Dr. Naomie Marte, Hospitalist. Standard discussion; including history of patients chief complaint, available diagnostic results, and treatment course. 8:34 PM Consult:  Discussed care with Dr. Judge Georges, Intensivist. Standard discussion; including history of patients chief complaint, available diagnostic results, and treatment course. Accept to ICU.     8:46 PM Consult:  Discussed care with Dr. Vandana Arteaga, [facility] ED Attending. Standard discussion; including history of patients chief complaint, available diagnostic results, and treatment course. Disposition:  Diagnosis:   1. Anemia, unspecified type        Disposition: Admit    Follow-up Information     None           Patient's Medications   Start Taking    No medications on file   Continue Taking    ASPIRIN (ASPIRIN) 325 MG TABLET    Take 1 Tab by mouth two (2) times a day for 21 days. FOLIC ACID (FOLVITE) 1 MG TABLET    Take 1 mg by mouth daily. HYDROMORPHONE (DILAUDID) 2 MG TABLET    Take 1-2 Tabs by mouth every four (4) hours as needed for Pain. Max Daily Amount: 24 mg. METOPROLOL (LOPRESSOR) 25 MG TABLET    Take 25 mg by mouth two (2) times a day. NALOXEGOL (MOVANTIK) 12.5 MG TAB TABLET    Take 12.5 mg by mouth daily. OXYCODONE ER (OXYCONTIN) 20 MG ER TABLET    Take 1 Tab by mouth two (2) times a day. Max Daily Amount: 40 mg. OXYCODONE IR (ROXICODONE) 10 MG TAB IMMEDIATE RELEASE TABLET    Take 10 mg by mouth four (4) times daily. TEMAZEPAM (RESTORIL) 15 MG CAPSULE    Take 1 Cap by mouth nightly as needed. Max Daily Amount: 15 mg.    These Medications have changed    No medications on file   Stop Taking    No medications on file        Scribe Attestation:   Quirino Montez acting as a scribe for and in the presence of Angelito Monae MD February 20, 2017 at 6:08 PM     Signed by: Jovanni Wilder, February 20, 2017, 6:08 PM    Provider Attestation:   I personally performed the services described in the documentation, reviewed the documentation, as recorded by the scribe in my presence, and it accurately and completely records my words and actions. Reviewed and signed by:  Yoshi Murillo MD    Provider Attestation:   I personally performed the services described in the documentation, reviewed the documentation, as recorded by the scribe in my presence, and it accurately and completely records my words and actions.      Reviewed and signed by:  Chavez Pizano MD

## 2017-02-21 ENCOUNTER — APPOINTMENT (OUTPATIENT)
Dept: NUCLEAR MEDICINE | Age: 52
DRG: 812 | End: 2017-02-21
Attending: INTERNAL MEDICINE
Payer: COMMERCIAL

## 2017-02-21 LAB
APTT PPP: 35.4 SEC (ref 23–36.4)
ATRIAL RATE: 102 BPM
CALCULATED P AXIS, ECG09: 43 DEGREES
CALCULATED R AXIS, ECG10: 20 DEGREES
CALCULATED T AXIS, ECG11: 19 DEGREES
DIAGNOSIS, 93000: NORMAL
INR PPP: 1.2 (ref 0.8–1.2)
P-R INTERVAL, ECG05: 132 MS
PROTHROMBIN TIME: 14.7 SEC (ref 11.5–15.2)
Q-T INTERVAL, ECG07: 362 MS
QRS DURATION, ECG06: 82 MS
QTC CALCULATION (BEZET), ECG08: 471 MS
TSH SERPL DL<=0.05 MIU/L-ACNC: 2.35 UIU/ML (ref 0.36–3.74)
VENTRICULAR RATE, ECG03: 102 BPM

## 2017-02-21 PROCEDURE — A9540 TC99M MAA: HCPCS

## 2017-02-21 PROCEDURE — 74011250636 HC RX REV CODE- 250/636: Performed by: PHYSICIAN ASSISTANT

## 2017-02-21 PROCEDURE — 36430 TRANSFUSION BLD/BLD COMPNT: CPT

## 2017-02-21 PROCEDURE — 93970 EXTREMITY STUDY: CPT

## 2017-02-21 PROCEDURE — 30233N1 TRANSFUSION OF NONAUTOLOGOUS RED BLOOD CELLS INTO PERIPHERAL VEIN, PERCUTANEOUS APPROACH: ICD-10-PCS | Performed by: INTERNAL MEDICINE

## 2017-02-21 PROCEDURE — 74011250637 HC RX REV CODE- 250/637: Performed by: INTERNAL MEDICINE

## 2017-02-21 PROCEDURE — 74011250636 HC RX REV CODE- 250/636: Performed by: INTERNAL MEDICINE

## 2017-02-21 PROCEDURE — 85610 PROTHROMBIN TIME: CPT | Performed by: PHYSICIAN ASSISTANT

## 2017-02-21 PROCEDURE — 65270000029 HC RM PRIVATE

## 2017-02-21 PROCEDURE — 77010033678 HC OXYGEN DAILY

## 2017-02-21 PROCEDURE — 74011250637 HC RX REV CODE- 250/637: Performed by: PHYSICIAN ASSISTANT

## 2017-02-21 PROCEDURE — 85660 RBC SICKLE CELL TEST: CPT | Performed by: EMERGENCY MEDICINE

## 2017-02-21 PROCEDURE — 86922 COMPATIBILITY TEST ANTIGLOB: CPT | Performed by: EMERGENCY MEDICINE

## 2017-02-21 PROCEDURE — P9016 RBC LEUKOCYTES REDUCED: HCPCS | Performed by: EMERGENCY MEDICINE

## 2017-02-21 PROCEDURE — 85730 THROMBOPLASTIN TIME PARTIAL: CPT | Performed by: PHYSICIAN ASSISTANT

## 2017-02-21 PROCEDURE — 84443 ASSAY THYROID STIM HORMONE: CPT | Performed by: PHYSICIAN ASSISTANT

## 2017-02-21 PROCEDURE — 86921 COMPATIBILITY TEST INCUBATE: CPT | Performed by: EMERGENCY MEDICINE

## 2017-02-21 RX ORDER — TEMAZEPAM 15 MG/1
15 CAPSULE ORAL
Status: DISCONTINUED | OUTPATIENT
Start: 2017-02-21 | End: 2017-02-25 | Stop reason: HOSPADM

## 2017-02-21 RX ORDER — OXYCODONE HYDROCHLORIDE 5 MG/1
10 TABLET ORAL 4 TIMES DAILY
Status: DISCONTINUED | OUTPATIENT
Start: 2017-02-21 | End: 2017-02-25 | Stop reason: HOSPADM

## 2017-02-21 RX ORDER — METOPROLOL TARTRATE 25 MG/1
25 TABLET, FILM COATED ORAL 2 TIMES DAILY
Status: DISCONTINUED | OUTPATIENT
Start: 2017-02-21 | End: 2017-02-25 | Stop reason: HOSPADM

## 2017-02-21 RX ORDER — OXYCODONE HCL 10 MG/1
20 TABLET, FILM COATED, EXTENDED RELEASE ORAL 2 TIMES DAILY
Status: DISCONTINUED | OUTPATIENT
Start: 2017-02-21 | End: 2017-02-25 | Stop reason: HOSPADM

## 2017-02-21 RX ORDER — HEPARIN SODIUM 5000 [USP'U]/ML
5000 INJECTION, SOLUTION INTRAVENOUS; SUBCUTANEOUS EVERY 8 HOURS
Status: DISCONTINUED | OUTPATIENT
Start: 2017-02-21 | End: 2017-02-21

## 2017-02-21 RX ORDER — HYDROMORPHONE HYDROCHLORIDE 2 MG/1
2-4 TABLET ORAL
Status: DISCONTINUED | OUTPATIENT
Start: 2017-02-21 | End: 2017-02-25 | Stop reason: HOSPADM

## 2017-02-21 RX ORDER — SODIUM CHLORIDE 9 MG/ML
100 INJECTION, SOLUTION INTRAVENOUS CONTINUOUS
Status: DISCONTINUED | OUTPATIENT
Start: 2017-02-21 | End: 2017-02-25

## 2017-02-21 RX ORDER — DIPHENHYDRAMINE HYDROCHLORIDE 50 MG/ML
12.5 INJECTION, SOLUTION INTRAMUSCULAR; INTRAVENOUS ONCE
Status: COMPLETED | OUTPATIENT
Start: 2017-02-21 | End: 2017-02-21

## 2017-02-21 RX ORDER — ASPIRIN 325 MG
325 TABLET ORAL 2 TIMES DAILY
Status: DISCONTINUED | OUTPATIENT
Start: 2017-02-21 | End: 2017-02-25 | Stop reason: HOSPADM

## 2017-02-21 RX ORDER — FOLIC ACID 1 MG/1
1 TABLET ORAL DAILY
Status: DISCONTINUED | OUTPATIENT
Start: 2017-02-21 | End: 2017-02-25 | Stop reason: HOSPADM

## 2017-02-21 RX ORDER — PANTOPRAZOLE SODIUM 40 MG/1
40 TABLET, DELAYED RELEASE ORAL DAILY
Status: DISCONTINUED | OUTPATIENT
Start: 2017-02-21 | End: 2017-02-21

## 2017-02-21 RX ORDER — PANTOPRAZOLE SODIUM 40 MG/1
40 TABLET, DELAYED RELEASE ORAL DAILY
Status: DISCONTINUED | OUTPATIENT
Start: 2017-02-22 | End: 2017-02-25 | Stop reason: HOSPADM

## 2017-02-21 RX ADMIN — NALOXEGOL OXALATE 12.5 MG: 12.5 TABLET, FILM COATED ORAL at 12:16

## 2017-02-21 RX ADMIN — ASPIRIN 325 MG ORAL TABLET 325 MG: 325 PILL ORAL at 18:37

## 2017-02-21 RX ADMIN — HYDROMORPHONE HYDROCHLORIDE 4 MG: 2 TABLET ORAL at 05:04

## 2017-02-21 RX ADMIN — METOPROLOL TARTRATE 25 MG: 25 TABLET ORAL at 18:37

## 2017-02-21 RX ADMIN — ASPIRIN 325 MG ORAL TABLET 325 MG: 325 PILL ORAL at 08:15

## 2017-02-21 RX ADMIN — OXYCODONE HYDROCHLORIDE 20 MG: 20 TABLET, FILM COATED, EXTENDED RELEASE ORAL at 12:16

## 2017-02-21 RX ADMIN — DIPHENHYDRAMINE HYDROCHLORIDE 12.5 MG: 50 INJECTION INTRAMUSCULAR; INTRAVENOUS at 21:07

## 2017-02-21 RX ADMIN — SODIUM CHLORIDE 100 ML/HR: 900 INJECTION, SOLUTION INTRAVENOUS at 05:07

## 2017-02-21 RX ADMIN — OXYCODONE HYDROCHLORIDE 10 MG: 5 TABLET ORAL at 08:15

## 2017-02-21 RX ADMIN — OXYCODONE HYDROCHLORIDE 10 MG: 5 TABLET ORAL at 18:37

## 2017-02-21 RX ADMIN — OXYCODONE HYDROCHLORIDE 20 MG: 20 TABLET, FILM COATED, EXTENDED RELEASE ORAL at 19:44

## 2017-02-21 RX ADMIN — PANTOPRAZOLE SODIUM 40 MG: 40 TABLET, DELAYED RELEASE ORAL at 08:15

## 2017-02-21 RX ADMIN — FOLIC ACID 1 MG: 1 TABLET ORAL at 08:15

## 2017-02-21 RX ADMIN — METOPROLOL TARTRATE 25 MG: 25 TABLET ORAL at 08:15

## 2017-02-21 RX ADMIN — OXYCODONE HYDROCHLORIDE 10 MG: 5 TABLET ORAL at 13:00

## 2017-02-21 RX ADMIN — OXYCODONE HYDROCHLORIDE 10 MG: 5 TABLET ORAL at 22:39

## 2017-02-21 NOTE — CONSULTS
[facility]  Pulmonary, Critical Care, and Sleep Medicine      Name: Robby Foster MRN: [id number]   : 1965 Hospital: [address] [place] [address]   Date: 2017          Critical Care Initial Patient Consult    Requesting MD:  Dr. Christina Plaza                                                Reason for CC Consult: Acute on Chronic Sickle Cell Anemia     IMPRESSION:   · Severe Acute on Chronic Sickle Cell Anemia- with elevated retic count  · Sickle Cell Crisis- mild, minimal pain, dyspnea on exertion present  · Elevated D-Dimer- consider DVT/PE given recent hip surgery   · Leucocytosis- mild, likely reactive   · HTN- well-controlled  · OA s/p right hip replacement on 17 and left hip replacement 2016  · Tobacco Abuse- 7 cigarettes/day for 30 years. · Hx of KINSEY- does not use CPAP  · Hx of breast cancer s/p mastectomy   · Hx of cholecystitis s/p cholecystectomy    · Fibromyalgia       RECOMMENDATIONS:   · Resp - stable on 2 Liters NC. Smoking Cessation recommended. CXR-no acute process. Elevated D-Dimer in ER, V/Q scan-pending. · ID - Afebrile, leucocytosis-most likely reactive. Monitor for s/s of infection. · CVS - Hemodynamically stable. Restart home BP meds- metoprolol and aspirin. Hold for SBP < 100 or HR < 65. EKG-sinus tach  · Heme/Onc- H/H 4.6/15.2 with elevated retic count, Transfuse 2 units PRBCs. H/H q 6 hrs. INR-pending. Elevated D-Dimer, V/Q Scan and Lower Extremity venous Duplex-pending. Occult Blood, Stool-pending. Dr. Mariely Davis has been consulted. Daily CBC. · Metabolic - Monitor electrolytes and replace as needed per protocol. Daily BMP, Mg.   · Renal - No acute issues. Monitor Cr and UOP closely. NS @ 100 ml/hr. · Endocrine - No acute issues. TSH-elevated in past, will check. · Neuro/ Pain/ Sedation - Resume pt's home pain medications, Oxycontin and Roxicodone. PRN dilaudid. · GI - Regular Diet. LFTs-pending.    · Prophylaxis - DVT(hold SCDs for now), GI(Protonix)      Subjective/History: This patient has been seen and evaluated at the request of Dr. Alberto Lopez for Acute Sickle Cell Anemia. Patient is a 46 y.o. female  with pmhx of sickle cell anemia, fibromyalgia, GERD, HTN, breast cancer s/p mastectomy, OA s/p right hip surgery, tobacco abuse,  presented to the [facility] ER with 3 days of increasing dyspnea on exertion and dizziness. She states that she knew she needed a blood transfusion. Pt states she recently had right hip replacement at [facility] about 3 weeks ago and got several units of PRBCs during her stay at the hospital. She denies any pain, hemoptysis, cough, fevers/chills, n/v/d, chest pain, palpitations, peripheral edema, orthopnea, hematuria, epistaxis, sick contacts, or recent illnesses. Pt sees Dr Serina Pitt as an outpatient for management of her sickle cell disease and past breast cancer. Pt states that she usually has several sickle cell crisis's per year. Her last admission for a sickle cell crisis was last March where she was found to have pneumonia as well. Patient takes chronic pain meds at home. Pt admits to smoking 7 cigarettes/day for the last 30 years. Denies alcohol or IV drug use. Pt's D-dimer was found to be elevated upon arrival to the ER with a very low H/H. Pt was transferred from the [facility] ER to the [facility] ER because there are no beds in the ICU. Pt is currently resting in bed on 2 L NC awaiting her blood transfusions and a V/Q Scan. Will monitor in the ICU until H/H has improved.         Past Medical History   Diagnosis Date    Anemia NEC     Arthritis     Cancer ([address]     Chronic pain     Coagulation disorder ([address]     Ductal carcinoma ([address] 2011     left breast    Fatigue     Fibromyalgia     GERD (gastroesophageal reflux disease)     Hx of endometriosis     Hypertension 2011    Ill-defined condition      Sickle cell    Osteoarthritis of left hip 8/17/2016    Osteoarthritis of right hip 1/3/2017    Sickle cell anemia ([address]     Sleep apnea      Does not use CPAP      Past Surgical History   Procedure Laterality Date    Hx  section      Hx mastectomy Left 2012     with axillary lymph node dissection    Hx breast biopsy Left     Pr lap,cholecystectomy N/A 11/10/2016     Dr. Chadwick Cushing Hx orthopaedic Left      hip replacement    Hx lap cholecystectomy      Hx hernia repair        Prior to Admission medications    Medication Sig Start Date End Date Taking? Authorizing Provider   aspirin (ASPIRIN) 325 mg tablet Take 1 Tab by mouth two (2) times a day for 21 days. 17  MARTIN Alvares   oxyCODONE IR (ROXICODONE) 10 mg tab immediate release tablet Take 10 mg by mouth four (4) times daily. Historical Provider   HYDROmorphone (DILAUDID) 2 mg tablet Take 1-2 Tabs by mouth every four (4) hours as needed for Pain. Max Daily Amount: 24 mg. Patient taking differently: Take 2-4 mg by mouth every four (4) hours as needed for Pain (sickle cell crisis or severe pain). 11/15/16   Juarez Romero MD   oxyCODONE ER (OXYCONTIN) 20 mg ER tablet Take 1 Tab by mouth two (2) times a day. Max Daily Amount: 40 mg. 11/15/16   Juarez Romero MD   temazepam (RESTORIL) 15 mg capsule Take 1 Cap by mouth nightly as needed. Max Daily Amount: 15 mg. 11/15/16   Juarez Romero MD   naloxegol (MOVANTIK) 12.5 mg tab tablet Take 12.5 mg by mouth daily. Historical Provider   folic acid (FOLVITE) 1 mg tablet Take 1 mg by mouth daily. Historical Provider   metoprolol (LOPRESSOR) 25 mg tablet Take 25 mg by mouth two (2) times a day.     Historical Provider     Current Facility-Administered Medications   Medication Dose Route Frequency    0.9% sodium chloride infusion  100 mL/hr IntraVENous CONTINUOUS    heparin (porcine) injection 5,000 Units  5,000 Units SubCUTAneous Q8H     Allergies   Allergen Reactions    Neulasta [Pegfilgrastim] Other (comments)     \"Put me in a comma for 3 months\"      Social History   Substance Use Topics    Smoking status: Former Smoker     Packs/day: 0.25     Years: 30.00    Smokeless tobacco: Never Used      Comment: advised to stop smoking and no smoking before the surgery    Alcohol use No      Family History   Problem Relation Age of Onset    Cancer Mother      breast    Diabetes Mother     Heart Disease Father     Diabetes Father     Sickle Cell Anemia Brother         Review of Systems:  Constitutional: positive for fatigue and malaise, negative for fevers, chills, sweats, anorexia and weight loss  Eyes: negative for irritation and redness  Ears, nose, mouth, throat, and face: negative for hearing loss, epistaxis, sore throat and voice change  Respiratory: positive for dyspnea on exertion, negative for cough, sputum or hemoptysis  Cardiovascular: positive for dyspnea, dizziness, negative for chest pain, chest pressure/discomfort, palpitations, irregular heart beats, orthopnea, lower extremity edema  Gastrointestinal: negative for nausea, vomiting, melena, diarrhea, constipation and abdominal pain  Genitourinary:negative for frequency, dysuria and hematuria  Integument/breast: negative for rash, skin lesion(s), pruritus and dryness  Hematologic/lymphatic: negative for easy bruising, bleeding, blood in stool or urine, coughing up blood and epistaxis  Musculoskeletal:positive for arthralgias and stiff joints, negative for myalgias  Neurological: positive for dizziness and weakness, negative for headaches, vertigo and tremor  Endocrine: negative for diabetic symptoms including polyuria, polydipsia and polyphagia    Objective:   Vital Signs:    Visit Vitals    /76    Pulse 90    Temp 98.9 °F (37.2 °C)    Resp 18    Ht 5' 6\" (1.676 m)    Wt 94.3 kg (208 lb)    SpO2 100%    BMI 33.57 kg/m2       O2 Device: Nasal cannula   O2 Flow Rate (L/min): 2 l/min   Temp (24hrs), Av.6 °F (37 °C), Min:97.4 °F (36.3 °C), Max:99.3 °F (37.4 °C)       Intake/Output:   Last shift:         Last 3 shifts:    No intake or output data in the 24 hours ending 02/21/17 0215      Physical Exam:    General:  Alert, cooperative, no distress, appears stated age. Head:  Normocephalic, without obvious abnormality, atraumatic. Eyes:  Conjunctivae pale. PERRL, EOMs intact. Nose: Nares normal. Septum midline. Mucosa normal.   Throat: Lips, mucosa, and tongue normal. Teeth and gums normal.   Neck: Supple, symmetrical, trachea midline. Lungs:   Clear to auscultation bilaterally. Chest wall:  No tenderness or deformity. Heart:  Regular rate and rhythm, S1, S2 normal, no murmur, click, rub or gallop. Abdomen:   Soft, non-tender. Bowel sounds normal. No masses,  No organomegaly. Extremities: Extremities normal, atraumatic, no cyanosis or edema. Pulses: 2+ and symmetric all extremities. Skin: Skin color, texture, turgor normal. No rashes or lesions. mastectomy on left side. Clean, dry incision on right hip.     Neurologic: Grossly nonfocal       Data:     Recent Results (from the past 24 hour(s))   EKG, 12 LEAD, INITIAL    Collection Time: 02/20/17  5:41 PM   Result Value Ref Range    Ventricular Rate 102 BPM    Atrial Rate 102 BPM    P-R Interval 132 ms    QRS Duration 82 ms    Q-T Interval 362 ms    QTC Calculation (Bezet) 471 ms    Calculated P Axis 43 degrees    Calculated R Axis 20 degrees    Calculated T Axis 19 degrees    Diagnosis       Sinus tachycardia  Possible Left atrial enlargement  Nonspecific T wave abnormality  Abnormal ECG  When compared with ECG of 23-JAN-2017 07:51,  QT has lengthened     D DIMER    Collection Time: 02/20/17  7:29 PM   Result Value Ref Range    D DIMER 3.46 (H) <0.46 ug/ml(FEU)   TYPE & CROSSMATCH    Collection Time: 02/20/17  7:29 PM   Result Value Ref Range    Crossmatch Expiration 02/23/2017     ABO/Rh(D) Nichole Sheriff POSITIVE     Antibody screen POS    METABOLIC PANEL, BASIC    Collection Time: 02/20/17  7:55 PM   Result Value Ref Range    Sodium 139 136 - 145 mmol/L    Potassium 4.1 3.5 - 5.5 mmol/L    Chloride 103 100 - 108 mmol/L    CO2 25 21 - 32 mmol/L    Anion gap 11 3.0 - 18 mmol/L    Glucose 112 (H) 74 - 99 mg/dL    BUN 12 7.0 - 18 MG/DL    Creatinine 1.05 0.6 - 1.3 MG/DL    BUN/Creatinine ratio 11 (L) 12 - 20      GFR est AA >60 >60 ml/min/1.73m2    GFR est non-AA 55 (L) >60 ml/min/1.73m2    Calcium 9.0 8.5 - 10.1 MG/DL   CBC WITH AUTOMATED DIFF    Collection Time: 02/20/17  7:55 PM   Result Value Ref Range    WBC 16.2 (H) 4.6 - 13.2 K/uL    RBC 1.51 (L) 4.20 - 5.30 M/uL    HGB 4.6 (LL) 12.0 - 16.0 g/dL    HCT 15.2 (LL) 35.0 - 45.0 %    .7 (H) 74.0 - 97.0 FL    MCH 30.5 24.0 - 34.0 PG    MCHC 30.3 (L) 31.0 - 37.0 g/dL    RDW 21.5 (H) 11.6 - 14.5 %    PLATELET 691 455 - 008 K/uL    MPV 8.7 (L) 9.2 - 11.8 FL    NEUTROPHILS 77 (H) 40 - 73 %    LYMPHOCYTES 19 (L) 21 - 52 %    MONOCYTES 4 3 - 10 %    EOSINOPHILS 0 0 - 5 %    BASOPHILS 0 0 - 2 %    ABS. NEUTROPHILS 12.5 (H) 1.8 - 8.0 K/UL    ABS. LYMPHOCYTES 3.1 0.9 - 3.6 K/UL    ABS. MONOCYTES 0.6 0.05 - 1.2 K/UL    ABS. EOSINOPHILS 0.0 0.0 - 0.4 K/UL    ABS. BASOPHILS 0.0 0.0 - 0.06 K/UL    DF AUTOMATED      PLATELET COMMENTS DECREASED PLATELETS      RBC COMMENTS ANISOCYTOSIS  2+        RBC COMMENTS POLYCHROMASIA  1+        RBC COMMENTS HYPOCHROMIA  2+        RBC COMMENTS POIKILOCYTOSIS  1+       RETICULOCYTE COUNT    Collection Time: 02/20/17  7:55 PM   Result Value Ref Range    Reticulocyte count 31.3 (H) 0.5 - 2.3 %             Telemetry:normal sinus rhythm    Imaging:  I have personally reviewed the patients radiographs and have reviewed the reports:    CXR 02/20/17: 1. No acute cardiopulmonary process.        Jeff Gardiner PA-C

## 2017-02-21 NOTE — PROGRESS NOTES
met with patient's  while patient is in Emergency Department. Patient is out of room for a medical test.  completed the initial Spiritual Assessment of the patient via the , and offered Pastoral Care, see flow sheets for interventions. The family is a member of a [facility] [employer] and family will let Roman Catholic know. Pastoral support provided. Patient does not have any Catholic/cultural needs that will affect patients preferences in health care. Chart reviewed. Chaplains will continue to follow and will provide pastoral care on an as needed/as requested basis. Idris Mazno MDiv.   Board Certified [employer] [phone]

## 2017-02-21 NOTE — PROGRESS NOTES
PCCM Follow up    Followed up on patient's transfusion orders. Still no matching PRBCs available per RN who has followed up with [place]. May take 2 days before matched blood to be received. VS on review appears stable.     Vane Holman PA-C  6:14 PM

## 2017-02-21 NOTE — ROUTINE PROCESS
TRANSFER - OUT REPORT:    Verbal report given to [facility] RN(name) on Charlotte Villalba  being transferred to [facility] ED(unit) for routine progression of care for rule out PE and blood transfusions    Report consisted of patients Situation, Background, Assessment and   Recommendations(SBAR) to include need for blood transfusion and VQ scan    Information from the following report(s) ED Summary was reviewed with the receiving nurse. Lines:   Peripheral IV 02/20/17 Right Forearm (Active)        Opportunity for questions and clarification was provided.       Patient transported with:  Monitor/nasal cannula

## 2017-02-21 NOTE — ED NOTES
Patient assisted back onto bedside commode. Patient complaining of sickle cell pain all over her body. Requesting pain medication. Will page hospitalist. Denies any other complaints at this time.

## 2017-02-21 NOTE — ED NOTES
Pt watching tv without distress; other than report of feeling fatigued pt has no other complaints. Affect is calm, skin warm/dry, respirations regular/non labored. No distress noted. IV site clear, rails up x 2, call light in reach.

## 2017-02-21 NOTE — ED NOTES
Unable to call report d/t emergent situation in [facility] ED; will attempt shortly

## 2017-02-21 NOTE — ED TRIAGE NOTES
Recent treatment of iv fluids/medications provided discussed with [facility] RN.   Pt transferred awake/alert/conversant without distress

## 2017-02-21 NOTE — ED NOTES
2L NC applied; pt affect is calm, skin warm/dry, respirations regular/non labored. Pt denies pain/dizziness/shortness of breath/other concerns at this time. Dr Martinez Diaz notified of elevated d dimer and hgb 4.6; no further orders noted at this time.

## 2017-02-21 NOTE — ED NOTES
Patient arrived from [facility] emergency department. Patient connected to cardiac monitor, receiving 2 liters O2 via NC. Patient denies any complaints of chest pain, SOB, abdominal pain, N/V at this time. Awaiting patient's blood from blood bank.

## 2017-02-21 NOTE — H&P
Hospitalist Admission History and Physical    NAME:  Kerri Verdugo   :   1965   MRN:   [id number]     PCP:  Kip Gifford MD  Date/Time:  2017 12:01 AM  Subjective:   CHIEF COMPLAINT:  sob    HISTORY OF PRESENT ILLNESS:     Joie Aguilar is a 46 y.o. With pmx as listed comes to the hospital for evaluation of sob. Patient states she recently had right hip replacement at [facility] about 3 weeks ago which went well and had been doing well since. During that hospital stay she did receive multiple units of PRBC. But for past 2 days she has been feeling increasingly short of breath with exertion. Even walking to the bathroom makes her feel short of breath. She denies any pain, hemoptysis, cough, fevers, chills, nausea, vomiting, chest pain and other complaints. No other stress at home at this time, illness or sick contact. She follows up Dr Milton Ma for breast cancer (which has been in remission for 6 yrs now) and Sickle cell. Doesn't take any particular meds for sickle cell besides pain meds. No other complaints at this time. Smokes about 6-7 cig/day, no alcohol or IVDA.      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 Dasha Nugent Hx orthopaedic Left      hip replacement    Hx lap cholecystectomy      Hx hernia repair         Social History   Substance Use Topics    Smoking status: Former Smoker     Packs/day: 0.25     Years: 30.00    Smokeless tobacco: Never Used      Comment: advised to stop smoking and no smoking before the surgery    Alcohol use No        Family History   Problem Relation Age of Onset    Cancer Mother      breast    Diabetes Mother     Heart Disease Father     Diabetes Father     Sickle Cell Anemia Brother         Allergies   Allergen Reactions    Neulasta [Pegfilgrastim] Other (comments)     \"Put me in a comma for 3 months\"        Prior to Admission Medications   Prescriptions Last Dose Informant Patient Reported? Taking? HYDROmorphone (DILAUDID) 2 mg tablet   No No   Sig: Take 1-2 Tabs by mouth every four (4) hours as needed for Pain. Max Daily Amount: 24 mg. Patient taking differently: Take 2-4 mg by mouth every four (4) hours as needed for Pain (sickle cell crisis or severe pain). aspirin (ASPIRIN) 325 mg tablet   No No   Sig: Take 1 Tab by mouth two (2) times a day for 21 days. folic acid (FOLVITE) 1 mg tablet   Yes No   Sig: Take 1 mg by mouth daily. metoprolol (LOPRESSOR) 25 mg tablet   Yes No   Sig: Take 25 mg by mouth two (2) times a day.   naloxegol (MOVANTIK) 12.5 mg tab tablet   Yes No   Sig: Take 12.5 mg by mouth daily. oxyCODONE ER (OXYCONTIN) 20 mg ER tablet   No No   Sig: Take 1 Tab by mouth two (2) times a day. Max Daily Amount: 40 mg.   oxyCODONE IR (ROXICODONE) 10 mg tab immediate release tablet   Yes No   Sig: Take 10 mg by mouth four (4) times daily. temazepam (RESTORIL) 15 mg capsule   No No   Sig: Take 1 Cap by mouth nightly as needed. Max Daily Amount: 15 mg.       Facility-Administered Medications: None       REVIEW OF SYSTEMS:     [] Unable to obtain  ROS due to  []mental status change  []sedated   []intubated   [x]Total of 12 systems reviewed as follows:  Constitutional:  negative fever, negative chills, negative weight loss  Eyes:   negative visual changes  ENT:   negative sore throat, tongue or lip swelling  Respiratory:  negative cough  Cards:   negative for chest pain, palpitations, lower extremity edema  GI:   negative for nausea, vomiting, diarrhea, and abdominal pain  Genitourinary: negative for frequency, dysuria  Integument:  negative for rash and pruritus  Hematologic:  negative for easy bruising and gum/nose bleeding  Musculoskel: negative for myalgias,  back pain and muscle weakness  Neurological:  negative for headaches, dizziness, vertigo  Behavl/Psych:  negative for feelings of anxiety, depression     Pertinent Positives include : per hpi     Objective:   VITALS:    Visit Vitals    /65 (BP Patient Position: At rest)    Pulse 90    Temp 98.9 °F (37.2 °C)    Resp 15    Ht 5' 6\" (1.676 m)    Wt 94.3 kg (208 lb)    SpO2 100%    BMI 33.57 kg/m2     Temp (24hrs), Av.6 °F (37 °C), Min:97.4 °F (36.3 °C), Max:99.3 °F (37.4 °C)      PHYSICAL EXAM:   General:    Alert, cooperative, no distress, appears stated age. Head:   Normocephalic, without obvious abnormality, atraumatic. Eyes:   Conjunctivae clear, anicteric sclerae. Pupils are equal  Nose:  Nares normal. No drainage or sinus tenderness. Throat:    Lips, mucosa, and tongue normal.  No Thrush  Neck:  Supple, symmetrical,  no adenopathy, thyroid: non tender    no carotid bruit and no JVD. Back:    Symmetric,  No CVA tenderness. Lungs:   Clear to auscultation bilaterally. No Wheezing or Rhonchi. No rales. Chest wall:  No tenderness or deformity. No Accessory muscle use. Heart:   Regular rate and rhythm,  no murmur, rub or gallop. Abdomen:   Soft, non-tender. Not distended. Bowel sounds normal. No masses  Extremities: Extremities normal, atraumatic, No cyanosis. No edema. No clubbing  Skin:     Texture, turgor normal. No rashes or lesions. Not Jaundiced  Lymph nodes: Cervical, supraclavicular normal.  Psych:  Good insight. Not depressed.   Not anxious or agitated. Neurologic: EOMs intact. No facial asymmetry. No aphasia or slurred speech. Normal   strength, Alert and oriented X 3. LAB DATA REVIEWED:    Lab Results   Component Value Date/Time    Glucose (POC) 109 11/13/2016 08:14 AM    Glucose (POC) 101 11/10/2016 10:50 AM    Glucose (POC) 97 11/08/2016 11:05 AM    Glucose (POC) 87 11/14/2011 03:56 PM    Glucose (POC) 109 11/14/2011 11:12 AM     Recent Labs      02/20/17   1955   NA  139   K  4.1   CL  103   CO2  25   BUN  12   CREA  1.05   GLU  112*   CA  9.0   WBC  16.2*   HGB  4.6*   HCT  15.2*   PLT  156         IMAGING RESULTS:   [x]  I have personally reviewed the actual   [x]CXR  []CT scan    CXR: no acute cardiopulm process   CT :    Assessment/Plan:      Principal Problem:    Symptomatic anemia (8/20/2012)    Active Problems:    Breast cancer ([facility] (8/20/2012)      Sickle cell anemia ([facility] (8/20/2012)      Dehydration (11/16/2012)      Fibromyalgia ()      Sickle cell crisis ([facility] (8/24/2013)      Overview: Acute sickle cell crisis. Vitamin D deficiency (5/19/2015)      Leukocytosis (2/20/2017)       ___________________________________________________  PLAN:    Risk of deterioration:  []Low    []Moderate  [x]High    1. Symptomatic Anemia  2. Sickle Cell Crisis  3. Shortness of Breath with Exertion   4. Dehydration  5. Leukocytosis, Reactive   6. Hx of Breast Cancer in Remission  7. Recent Right Hip Replacement in [facility]  8. Fibromyalgia  FULL CODE     -admit to ICU; unsure of exact etiology, stress vs infection. Elevated Retic count  -type and screen done, transfuse 2Units prbc. Recheck H&H, transfuse prn   -IVF, supplemental O2  -Currently not in pain, cont her home pain meds for pain control   -consult Dr Mae Zacarias think she needs Abx at this time as her leukocytosis appears to be reactive in nature. Low threshold for Abx administration.  Monitor WBC count for now.   -monitor LFTs  -Orthopedic surgical site looks ok.   -D Dimer was elevated, therefore V/Q scan ordered in ED.              Prophylaxis:  []Lovenox  []Coumadin  [x]Hep SQ  []SCDs  []H2B/PPI    Disposition:  []Home w/ Family   []HH PT,OT,RN   []SNF/LTC   []SAH/Rehab    Discussed Code Status:    [x]Full Code      []DNR     ___________________________________________________    Care Plan discussed with:    [x]Patient   []Family    []ED Care Manager  []ED Doc   []Specialist :    Total Time Coordinating Admission:   70   minutes    []Total Critical Care Time:     ___________________________________________________  Admitting Physician: Farooq Luther MD

## 2017-02-21 NOTE — ED NOTES
8:36 PM d/w dr Lorie Gilford, to accept to icu  D/w dr Urban Franco, to accept  D/w dr Rick Rico in ed, to accept for transfusion pending bed avail

## 2017-02-21 NOTE — PROCEDURES
[facility]  *** FINAL REPORT ***    Name: Darius Mario  MRN: [id number]    Inpatient  : 1965  HIS Order #: [id number]  [address] [facility] Visit #: [id number]  Date: 2017    TYPE OF TEST: Peripheral Venous Testing    REASON FOR TEST  Pain in limb, Limb swelling, Limb Pain    Right Leg:-  Deep venous thrombosis:           No  Superficial venous thrombosis:    No  Deep venous insufficiency:        Not examined  Superficial venous insufficiency: Not examined    Left Leg:-  Deep venous thrombosis:           No  Superficial venous thrombosis:    No  Deep venous insufficiency:        Not examined  Superficial venous insufficiency: Not examined      INTERPRETATION/FINDINGS  Duplex images were obtained using 2-D gray scale, color flow and  spectral doppler analysis. Right leg :  1. No evidence of deep venous thrombosis detected in the veins  visualized. 2. Deep vein(s) visualized include the common femoral, femoral,  popliteal, posterior tibial and peroneal veins. 3. No evidence of superficial thrombosis detected. 4. Superficial vein(s) visualized include the proximal great saphenous   vein. 5. Pulsatile flow detected. Left leg :  1. No evidence of deep venous thrombosis detected in the veins  visualized. 2. Deep vein(s) visualized include the common femoral, femoral,  popliteal, posterior tibial and peroneal veins. 3. No evidence of superficial thrombosis detected. 4. Superficial vein(s) visualized include the proximal great saphenous   vein. 5. Pulsatile flow detected. ADDITIONAL COMMENTS    I have personally reviewed the data relevant to the interpretation of  this  study.     TECHNOLOGIST: Felicity Shetty RVT  Signed: 2017 09:03 AM    PHYSICIAN: Sidra Paredes MD  Signed: 2017 10:16 AM

## 2017-02-21 NOTE — PROGRESS NOTES
Patient has been accepted for icu admission at [facility] for Hb 4.6 with hx of sickle cell disease and no active symptoms of sickle cell crisis. Patient will be transferred to [facility] ER and receive blood transfusion followed by repeat CBC.    HD stable  CXR unremarkable    Full consult to follow     Shauna Tanner MD 2/20/2017

## 2017-02-21 NOTE — ED NOTES
Report given to EMS transport; pt affect remains calm, skin warm/dry, respirations regular/non labored. Reports generalized pain 7/10 which per pt presents as her usual sickle cell pain. Verbal orders received for morphine, fluids, and zofran. EMS transport notified.

## 2017-02-21 NOTE — ED NOTES
Paged blood bank regarding pt blood. Informed that [place] has not found a compatible match at this time.  Will continue to follow-up

## 2017-02-22 LAB
ALBUMIN SERPL BCP-MCNC: 3.2 G/DL (ref 3.4–5)
ALBUMIN/GLOB SERPL: 1 {RATIO} (ref 0.8–1.7)
ALP SERPL-CCNC: 141 U/L (ref 45–117)
ALT SERPL-CCNC: 17 U/L (ref 13–56)
ANION GAP BLD CALC-SCNC: 7 MMOL/L (ref 3–18)
ANTI-COMPLEMENT (C3B,C3D): NORMAL
AST SERPL W P-5'-P-CCNC: 22 U/L (ref 15–37)
BASOPHILS # BLD AUTO: 0 K/UL (ref 0–0.06)
BASOPHILS # BLD AUTO: 0 K/UL (ref 0–0.06)
BASOPHILS # BLD: 0 % (ref 0–3)
BASOPHILS # BLD: 0 % (ref 0–3)
BILIRUB SERPL-MCNC: 7.2 MG/DL (ref 0.2–1)
BLOOD GROUP ANTIBODIES SERPL: NORMAL
BUN SERPL-MCNC: 11 MG/DL (ref 7–18)
BUN/CREAT SERPL: 10 (ref 12–20)
CALCIUM SERPL-MCNC: 8.5 MG/DL (ref 8.5–10.1)
CHLORIDE SERPL-SCNC: 108 MMOL/L (ref 100–108)
CO2 SERPL-SCNC: 25 MMOL/L (ref 21–32)
CREAT SERPL-MCNC: 1.1 MG/DL (ref 0.6–1.3)
DAT IGG-SP REAG RBC QL: NORMAL
DAT POLY-SP REAG RBC QL: NORMAL
DIFFERENTIAL METHOD BLD: ABNORMAL
DIFFERENTIAL METHOD BLD: ABNORMAL
EOSINOPHIL # BLD: 0 K/UL (ref 0–0.4)
EOSINOPHIL # BLD: 0.1 K/UL (ref 0–0.4)
EOSINOPHIL NFR BLD: 0 % (ref 0–5)
EOSINOPHIL NFR BLD: 1 % (ref 0–5)
ERYTHROCYTE [DISTWIDTH] IN BLOOD BY AUTOMATED COUNT: 19.7 % (ref 11.6–14.5)
ERYTHROCYTE [DISTWIDTH] IN BLOOD BY AUTOMATED COUNT: 21.1 % (ref 11.6–14.5)
GLOBULIN SER CALC-MCNC: 3.3 G/DL (ref 2–4)
GLUCOSE SERPL-MCNC: 98 MG/DL (ref 74–99)
HCT VFR BLD AUTO: 12.5 % (ref 35–45)
HCT VFR BLD AUTO: 18.1 % (ref 35–45)
HGB BLD-MCNC: 4 G/DL (ref 12–16)
HGB BLD-MCNC: 5.8 G/DL (ref 12–16)
INR PPP: 1.3 (ref 0.8–1.2)
LYMPHOCYTES # BLD AUTO: 11 % (ref 20–51)
LYMPHOCYTES # BLD AUTO: 20 % (ref 20–51)
LYMPHOCYTES # BLD: 1.8 K/UL (ref 0.8–3.5)
LYMPHOCYTES # BLD: 2.7 K/UL (ref 0.8–3.5)
MAGNESIUM SERPL-MCNC: 2.3 MG/DL (ref 1.8–2.4)
MCH RBC QN AUTO: 30.3 PG (ref 24–34)
MCH RBC QN AUTO: 30.4 PG (ref 24–34)
MCHC RBC AUTO-ENTMCNC: 32 G/DL (ref 31–37)
MCHC RBC AUTO-ENTMCNC: 32 G/DL (ref 31–37)
MCV RBC AUTO: 94.7 FL (ref 74–97)
MCV RBC AUTO: 94.8 FL (ref 74–97)
MONOCYTES # BLD: 0.3 K/UL (ref 0–1)
MONOCYTES # BLD: 0.4 K/UL (ref 0–1)
MONOCYTES NFR BLD AUTO: 2 % (ref 2–9)
MONOCYTES NFR BLD AUTO: 3 % (ref 2–9)
NEUTS BAND NFR BLD MANUAL: 2 % (ref 0–5)
NEUTS BAND NFR BLD MANUAL: 2 % (ref 0–5)
NEUTS SEG # BLD: 14 K/UL (ref 1.8–8)
NEUTS SEG # BLD: 9.9 K/UL (ref 1.8–8)
NEUTS SEG NFR BLD AUTO: 70 % (ref 42–75)
NEUTS SEG NFR BLD AUTO: 85 % (ref 42–75)
NRBC BLD-RTO: 5 PER 100 WBC
NRBC BLD-RTO: 7 PER 100 WBC
PHOSPHATE SERPL-MCNC: 2.9 MG/DL (ref 2.5–4.9)
PLATELET # BLD AUTO: 56 K/UL (ref 135–420)
PLATELET # BLD AUTO: 63 K/UL (ref 135–420)
PLATELET COMMENTS,PCOM: ABNORMAL
PLATELET COMMENTS,PCOM: ABNORMAL
PMV BLD AUTO: 9.4 FL (ref 9.2–11.8)
PMV BLD AUTO: 9.6 FL (ref 9.2–11.8)
POTASSIUM SERPL-SCNC: 4.2 MMOL/L (ref 3.5–5.5)
PROT SERPL-MCNC: 6.5 G/DL (ref 6.4–8.2)
PROTHROMBIN TIME: 15.9 SEC (ref 11.5–15.2)
RBC # BLD AUTO: 1.32 M/UL (ref 4.2–5.3)
RBC # BLD AUTO: 1.91 M/UL (ref 4.2–5.3)
RBC MORPH BLD: ABNORMAL
SODIUM SERPL-SCNC: 140 MMOL/L (ref 136–145)
WBC # BLD AUTO: 13.7 K/UL (ref 4.6–13.2)
WBC # BLD AUTO: 16.1 K/UL (ref 4.6–13.2)
WBC OTHER # BLD MANUAL: 4 10*3/UL

## 2017-02-22 PROCEDURE — 86880 COOMBS TEST DIRECT: CPT | Performed by: INTERNAL MEDICINE

## 2017-02-22 PROCEDURE — 36430 TRANSFUSION BLD/BLD COMPNT: CPT

## 2017-02-22 PROCEDURE — 83735 ASSAY OF MAGNESIUM: CPT | Performed by: PHYSICIAN ASSISTANT

## 2017-02-22 PROCEDURE — 86922 COMPATIBILITY TEST ANTIGLOB: CPT | Performed by: EMERGENCY MEDICINE

## 2017-02-22 PROCEDURE — 85660 RBC SICKLE CELL TEST: CPT | Performed by: EMERGENCY MEDICINE

## 2017-02-22 PROCEDURE — 86921 COMPATIBILITY TEST INCUBATE: CPT | Performed by: EMERGENCY MEDICINE

## 2017-02-22 PROCEDURE — 80053 COMPREHEN METABOLIC PANEL: CPT | Performed by: PHYSICIAN ASSISTANT

## 2017-02-22 PROCEDURE — 84100 ASSAY OF PHOSPHORUS: CPT | Performed by: PHYSICIAN ASSISTANT

## 2017-02-22 PROCEDURE — 97162 PT EVAL MOD COMPLEX 30 MIN: CPT

## 2017-02-22 PROCEDURE — 85025 COMPLETE CBC W/AUTO DIFF WBC: CPT | Performed by: PHYSICIAN ASSISTANT

## 2017-02-22 PROCEDURE — 74011250637 HC RX REV CODE- 250/637: Performed by: INTERNAL MEDICINE

## 2017-02-22 PROCEDURE — 97165 OT EVAL LOW COMPLEX 30 MIN: CPT

## 2017-02-22 PROCEDURE — 65610000006 HC RM INTENSIVE CARE

## 2017-02-22 PROCEDURE — 85610 PROTHROMBIN TIME: CPT | Performed by: PHYSICIAN ASSISTANT

## 2017-02-22 PROCEDURE — 97530 THERAPEUTIC ACTIVITIES: CPT

## 2017-02-22 PROCEDURE — P9016 RBC LEUKOCYTES REDUCED: HCPCS | Performed by: EMERGENCY MEDICINE

## 2017-02-22 PROCEDURE — P9040 RBC LEUKOREDUCED IRRADIATED: HCPCS | Performed by: EMERGENCY MEDICINE

## 2017-02-22 PROCEDURE — 86902 BLOOD TYPE ANTIGEN DONOR EA: CPT | Performed by: EMERGENCY MEDICINE

## 2017-02-22 PROCEDURE — 74011250636 HC RX REV CODE- 250/636: Performed by: INTERNAL MEDICINE

## 2017-02-22 PROCEDURE — 36415 COLL VENOUS BLD VENIPUNCTURE: CPT | Performed by: PHYSICIAN ASSISTANT

## 2017-02-22 RX ORDER — SODIUM CHLORIDE 9 MG/ML
250 INJECTION, SOLUTION INTRAVENOUS AS NEEDED
Status: DISCONTINUED | OUTPATIENT
Start: 2017-02-22 | End: 2017-02-25 | Stop reason: HOSPADM

## 2017-02-22 RX ADMIN — PANTOPRAZOLE SODIUM 40 MG: 40 TABLET, DELAYED RELEASE ORAL at 09:05

## 2017-02-22 RX ADMIN — OXYCODONE HYDROCHLORIDE 20 MG: 20 TABLET, FILM COATED, EXTENDED RELEASE ORAL at 21:05

## 2017-02-22 RX ADMIN — SODIUM CHLORIDE 100 ML/HR: 900 INJECTION, SOLUTION INTRAVENOUS at 19:30

## 2017-02-22 RX ADMIN — OXYCODONE HYDROCHLORIDE 10 MG: 5 TABLET ORAL at 17:23

## 2017-02-22 RX ADMIN — TEMAZEPAM 15 MG: 15 CAPSULE ORAL at 01:26

## 2017-02-22 RX ADMIN — HYDROMORPHONE HYDROCHLORIDE 4 MG: 2 TABLET ORAL at 01:02

## 2017-02-22 RX ADMIN — NALOXEGOL OXALATE 12.5 MG: 12.5 TABLET, FILM COATED ORAL at 09:56

## 2017-02-22 RX ADMIN — HYDROMORPHONE HYDROCHLORIDE 4 MG: 2 TABLET ORAL at 07:41

## 2017-02-22 RX ADMIN — ASPIRIN 325 MG ORAL TABLET 325 MG: 325 PILL ORAL at 09:05

## 2017-02-22 RX ADMIN — ASPIRIN 325 MG ORAL TABLET 325 MG: 325 PILL ORAL at 17:23

## 2017-02-22 RX ADMIN — OXYCODONE HYDROCHLORIDE 20 MG: 20 TABLET, FILM COATED, EXTENDED RELEASE ORAL at 09:09

## 2017-02-22 RX ADMIN — METOPROLOL TARTRATE 25 MG: 25 TABLET ORAL at 09:05

## 2017-02-22 RX ADMIN — FOLIC ACID 1 MG: 1 TABLET ORAL at 09:05

## 2017-02-22 RX ADMIN — METOPROLOL TARTRATE 25 MG: 25 TABLET ORAL at 17:23

## 2017-02-22 RX ADMIN — OXYCODONE HYDROCHLORIDE 10 MG: 5 TABLET ORAL at 12:53

## 2017-02-22 NOTE — PROGRESS NOTES
attended the interdisciplinary rounds for Hawthorn Tatiana, who is a 46 y.o.,female. Patients Primary Language is: [place]. According to the patients EMR Scientologist Affiliation is: [facility] [employer] [facility].     The reason the Patient came to the hospital is:   Patient Active Problem List    Diagnosis Date Noted    Leukocytosis 02/20/2017    Osteoarthritis of right hip 01/03/2017    Choledocholithiasis 11/07/2016    Cholecystitis with cholelithiasis 11/07/2016    Thrombocytopenia ([address] 08/22/2016    Acute blood loss anemia 08/22/2016    Osteoarthritis of left hip 08/17/2016    Breast cancer of lower-outer quadrant of left female breast ([address] 07/01/2016    Iron deficiency anemia due to dietary causes 07/01/2016    Osteoarthritis 04/08/2016    Vitamin D deficiency 05/19/2015    Sickle cell crisis ([address] 08/24/2013    Fibromyalgia     Hx of endometriosis     Dehydration 11/16/2012    Symptomatic anemia 08/20/2012    Breast cancer ([address] 08/20/2012    Sickle cell anemia ([address] 08/20/2012      Plan:  Analilia Black will continue to follow and will provide pastoral care on an as needed/requested basis.  recommends bedside caregivers page  on duty if patient shows signs of acute spiritual or emotional distress.     [address] [facility]  Board Certified [address] [place] [address]   [phone]

## 2017-02-22 NOTE — ED NOTES
TRANSFER - OUT REPORT:    Verbal report given to HENNA Cook(name) on Luz Maria Guy  being transferred to [facility](unit) for routine progression of care       Report consisted of patients Situation, Background, Assessment and   Recommendations(SBAR). Information from the following report(s) SBAR, ED Summary, [facility] and Recent Results was reviewed with the receiving nurse. Opportunity for questions and clarification was provided.       Patient transported with:   Monitor  Registered Nurse

## 2017-02-22 NOTE — CONSULTS
[address]    Name:  Wilian Briggs  MR#:  [id number]  :  1965  Account #:  [de-identified]  Date of Adm:  2017  Date of Consultation:      REASON FOR CONSULTATION: Severe anemia in a sickle cell  patient who is status post recent right total hip replacement. CHIEF COMPLAINT ON ADMISSION: Progressive shortness of  breath. HISTORY OF PRESENT ILLNESS: The patient is a 49-year-old   woman who has a history of sickle cell disease and  breast cancer. She has undergone remission inducing chemotherapy  for breast cancer at least 6 years ago. She has remained with no  evidence of disease since that time. She also has lifelong sickle cell  disease. Recently, about 3 weeks ago, she underwent a right total hip  replacement at [facility] and did well. She had been  doing her physical rehabilitation/physical therapy program as  recommended. Unfortunately, she began to experience shortness of  breath and was seen at the [place] [address] Emergency room initially  with shortness of breath and was told that she was markedly anemic. She was subsequently referred here for admission [facility]. She had a hemoglobin on admission of approximately  4.6 g/dL and did receive 2 units of packed red blood cells; however,  her repeat a.m. CBC from today shows that her hemoglobin is at 4  grams per deciliter. She has antibodies and therefore it is difficult to  find compatible blood, so now she is being transfused with the least  incompatible units. She reports that shortness of breath has resolved  significantly following the initial 2 units of packed red blood cells. She  denies having any hematuria or blood in her stool. I was called to see  the patient to assist in her inpatient management. ALLERGIES: SHE IS ALLERGIC TO NEULASTA. MEDICATIONS PRIOR TO ADMISSION  1. The patient has been taking aspirin 325 mg in the outpatient setting.   2. Oxycodone IR 10 mg every 4 hours as needed for pain. 3. Dilaudid 2 mg every 4 hours as needed for pain. 4. She also takes long-acting OxyContin 20 mg 1 tablet twice daily. 5. Restoril 15 mg at bedtime. 6. She also takes Movantik 12.5 mg tablets daily. 7. Folvite 1 mg daily. 8. Lopressor 25 mg daily. MEDICAL DIAGNOSES: Invasive ductal adenocarcinoma of the  breast, sickle cell disease, chronic pain due to sickle cell, coagulation  disorder, and chronic arthritis. SURGICAL HISTORY: The patient has undergone bilateral hip  replacements. She had a left total hip replacement in 2016 and  about 3 weeks ago underwent a right total hip replacement. She has  also undergone a modified radical mastectomy and axillary lymph node  dissection on the left breast in . She also has a history of  cholecystectomy, a left breast biopsy, bilateral total hip replacement  and hernia repair. Additionally, the patient has had a  section. SOCIAL HISTORY: The patient is  and lives at home with her  . She is a tobacco user, continuing to smoke at least 1/4 pack  of cigarettes per day and she has done this for 30 years. There is no  alcohol use or drug use. FAMILY HISTORY: The family history is notable for breast cancer in  her mother, and her mother also has diabetes. Her father had a history  of diabetes and heart disease. Brother has sickle cell anemia. REVIEW OF SYSTEMS  The primary complaint on admission was of progressive shortness of  breath. She denied having any hematuria or blood in stools. She  denied having any significant chest pain. She also has chronic low-  grade pain associated with her underlying sickle cell disease. She  denied all other complaints on the multiorgan system review. PHYSICAL EXAMINATION  GENERAL APPEARANCE: The patient is appearing comfortable at  this time. She is in no acute distress.   VITAL SIGNS: Most recent vital signs show blood pressure 107/51,  pulse 105, respiratory rate 20, and temperature was 98.7. Oxygen  saturation is currently 100% on room air. SKIN: Examination of the skin shows no bruise or rash. HEENT: The head is normocephalic and atraumatic. Conjunctivae and  sclerae are clear. Pupils are reactive to light and accommodation. EOMs are intact. ENT reveals no oral mucosal lesions or ulceration. NECK: Supple. LUNGS: Clear to auscultation and percussion. HEART: There is tachycardia, but no murmurs or gallops. ABDOMEN: Bowel sounds are present and normal. There is no focal  guarding, tenderness, organomegaly. GENITOURINARY/RECTAL: Exams deferred. EXTREMITIES: There is no clubbing, cyanosis, or edema. NEUROLOGIC: There are no focal neurologic deficits. LABORATORY DATA: On the a.m. lab data, the CBC from today  shows a WBC count of 13.7, hemoglobin is 4 grams per deciliter,  hematocrit is 12.5%, MCV 94.7, and a platelet count of 26,244. There  was a decline in her platelets from 64/78/7941 of 156,000 down to the  current level of 63,000. Chemistry profile from today shows sodium 140, potassium 4.2,  chloride 108, CO2 of 25, BUN 11, creatinine 1.1, calcium 8.5,  phosphorus 2.9, magnesium 2.3, total bilirubin 7.2, total protein 6.5,  albumin 3.2, ALT 17, AST 22, alkaline phosphatase 141. A general  diagnostic chest x-ray from 02/20/2017 showed no acute pulmonary  processes. ASSESSMENT AND PLAN: The patient is a 49-year-old [facility]  American woman with sickle cell disease and a history of breast  cancer. She recently had a right total hip replacement and came in with  complaints of shortness of breath and was found to have severe  anemia. I suspect that she has some ongoing accelerated hemolysis  due to her underlying sickle cell disease. Her bilirubin total from  01/23/2017 was 2.8. The most recent bilirubin following this admission  shows bilirubin level is now at 7. Her a.m. CBC showed that her  hemoglobin is 4 grams per deciliter.  I agree with additional transfusion  of packed red blood cells. Due to the presence of multiple antibodies,  she will receive the least incompatible units. She has also had a  significant decline in her platelets from 906,561 down to 63,000. Platelet transfusion will be reserved for platelet counts below 20,000. She is in the process of receiving an additional unit of blood at this  time. She will probably require 2-3 more units of packed red blood cells  in order to raise her hemoglobin above 7 grams per deciliter. I will  check for direct and indirect Ino to help define whether or not she  has any progressive immune mediated hemolysis ongoing beyond the  innate hemolytic process associated with her underlying sickle cell  disease. Thanks for notifying me of this patient's admission.         MD Neo Reyna / Sruthi Sellerscal  D:  02/22/2017   08:04  T:  02/22/2017   08:44  Job #:  [id number]

## 2017-02-22 NOTE — PROGRESS NOTES
Problem: Self Care Deficits Care Plan (Adult)  Goal: *Acute Goals and Plan of Care (Insert Text)  Outcome: Resolved/Met Date Met:  02/22/17  OCCUPATIONAL THERAPY EVALUATION/DISCHARGE     Patient: Lubna Velasquez (25 y.o. female)  Date: 2/22/2017  Primary Diagnosis: Anemia  Symptomatic anemia        Precautions:   Fall      ASSESSMENT AND RECOMMENDATIONS:  Based on the objective data described below, the patient is able to perform basic self care tasks without assistance using AE she has at home from recent hip surgery. Patient receives min assist at times from her  secondary to multiple medical issues. She states that she has received OT recently and feels she has all needed AE/DME for home. Patient declines further OT presently but desires to focus on mobility with PT. Skilled occupational therapy is not indicated at this time. Discharge Recommendations: None  Further Equipment Recommendations for Discharge: N/A       COMPLEXITY      Eval Complexity: History: LOW Complexity : Brief history review ; Examination: LOW Complexity : 1-3 performance deficits relating to physical, cognitive , or psychosocial skils that result in activity limitations and / or participation restrictions ; Decision Making:LOW Complexity : No comorbidities that affect functional and no verbal or physical assistance needed to complete eval tasks  Assessment: Low Complexity          G-CODES:      Self Care  Current  CJ= 20-39%   Goal  CJ= 20-39%   D/C  CJ= 20-39%. The severity rating is based on the Level of Assistance required for Functional Mobility and ADLs. SUBJECTIVE:   Patient stated My  helps me at home.       OBJECTIVE DATA SUMMARY:       Past Medical History:   Diagnosis Date    Anemia NEC      Arthritis      Cancer ([facility]      Chronic pain      [facility]      Ductal carcinoma ([facility] 2011     left breast    Fatigue      Fibromyalgia      GERD (gastroesophageal reflux disease)      Hx of endometriosis      Hypertension     Ill-defined condition       Sickle cell    Osteoarthritis of left hip 2016    Osteoarthritis of right hip 1/3/2017    Sickle cell anemia (HCC)      Sleep apnea       Does not use CPAP     Past Surgical History:   Procedure Laterality Date    HX BREAST BIOPSY Left 2016    HX  SECTION        HX HERNIA REPAIR        HX LAP CHOLECYSTECTOMY        HX MASTECTOMY Left 2012     with axillary lymph node dissection    HX ORTHOPAEDIC Left       hip replacement    LAP,CHOLECYSTECTOMY N/A 11/10/2016     Dr. Kee Varghese     Prior Level of Function/Home Situation:Pt required min assist with basic self care tasks and used a RW for functional mobility PTA. Home Situation  Home Environment: Private residence  # Steps to Enter: 4  One/Two Story Residence: Two story  # of Interior Steps: 4  Height of Each Step (in): 12 inches  Interior Rails: Left  Lift Chair Available: No  Living Alone: No  Support Systems: Spouse/Significant Other/Partner  Patient Expects to be Discharged to[de-identified] Private residence  Current DME Used/Available at Home: Walker, rolling  Tub or Shower Type: Shower (with bench)  [X]     Right hand dominant       [ ]     Left hand dominant  Cognitive/Behavioral Status:  Neurologic State: Alert  Orientation Level: Oriented X4  Cognition: Follows commands  Safety/Judgement: Awareness of environment; Fall prevention  Skin: Intact on UEs  Edema: None noted in UEs  Vision/Perceptual:    Acuity: Within Defined Limits    Corrective Lenses: Glasses  Coordination:  Coordination:  (N/A)  Fine Motor Skills-Upper: Left Intact; Right Intact    Gross Motor Skills-Upper: Left Intact; Right Intact  Balance:  Sitting: Intact  Standing: Intact; With support (with RW)  Strength:  Strength:  Within functional limits (UEs)  Tone & Sensation:  Tone: Normal (UEs)  Sensation: Intact (UEs)  Range of Motion:  AROM: Within functional limits (UEs)  PROM: Within functional limits (UEs)  Functional Mobility and Transfers for ADLs:  Bed Mobility:  Rolling: Supervision  Supine to Sit: Stand-by asssistance  Sit to Supine: Stand-by asssistance  Scooting: Supervision  Transfers:  Sit to Stand: Contact guard assistance; Additional time (with RW)              Toilet Transfer : Contact guard assistance  ADL Assessment:  Feeding: Independent     Oral Facial Hygiene/Grooming: Independent     Bathing: Minimum assistance     Upper Body Dressing: Independent     Lower Body Dressing: Minimum assistance (without AD)     Toileting: Contact guard assistance     Pain:  Pt reports 3/10 pain or discomfort prior to treatment, in right hip. Pt reports 3/10 pain or discomfort post treatment, in right hip. Activity Tolerance:   Good  Please refer to the flowsheet for vital signs taken during this treatment. After treatment:   [ ]  Patient left in no apparent distress sitting up in chair  [X]  Patient left in no apparent distress in bed  [X]  Call bell left within reach  [ ]  Nursing notified  [ ]  Caregiver present  [ ]  Bed alarm activated      COMMUNICATION/EDUCATION:   Communication/Collaboration:  [X]      Home safety education was provided and the patient/caregiver indicated understanding. [X]      Patient/family have participated as able and agree with findings and recommendations. [ ]      Patient is unable to participate in plan of care at this time.      Yareli Joseph MS OTR/L  Time Calculation: 25 mins

## 2017-02-22 NOTE — ED NOTES
Pt resting comfortably in bed. Spouse at the bedside. Vitals stable. Pt denies SOB. Pt states she is feeling good at this time.

## 2017-02-22 NOTE — ED NOTES
Bedside and Verbal shift change report given to Alanna Colon (oncoming nurse) by Alejandra Shah RN (offgoing nurse). Report included the following information SBAR, Kardex, Intake/Output, MAR and Recent Results.

## 2017-02-22 NOTE — PROGRESS NOTES
[facility]  ICU Progress Note      Name: Raymon Domínguez   : 1965   MRN: [id number]   Date: 2017 1:42 AM     [x]I have reviewed the flowsheet and previous days notes. Events overnight reviewed and discussed with nursing staff. Vital signs and records reviewed. Pt is a 46 y.o. female with pmhx of sickle cell anemia, fibromyalgia, GERD, HTN, breast cancer s/p mastectomy, OA s/p right hip surgery, tobacco abuse, presented to the [facility] ER with 3 days of increasing dyspnea on exertion and dizziness. She states that she knew she needed a blood transfusion. D-dimer was found to be elevated upon arrival to the ER with a very low H/H. Pt was transferred from the [facility] ER to the [facility] ER because there are no beds in the ICU. Will monitor in the ICU until H/H has improved.         Subjective:    Pt was transferred from the ICU to the ER in the middle of the night. Her PRBCs took 24 hrs to obtain due to complicated blood type with antigens. Blood transfusion began at 7:00 pm on 17, pt states that her breathing and weakness has improved significantly after 2 units of PRBCs. She feels generalized mild pain throughout her body. Denies chest pain, palpitations,  hematuria, epistaxis, melena, hemoptysis, abdominal pain, n/v/d, fevers/chills. Urine light colored and with adequate output. Pt has not had any stools since admission. Respiratory status Stable on 2 L NC. Hemodynamically stable. Afebrile. ROS:Pertinent items are noted in HPI.     Medication Review:  · Pressors - none  · Sedation - none  · Antibiotics - none  · Pain - oxycodone   · GI/ DVT - Protonix/SCDS  · Others (other gtts)    Safety Bundles: Electrolyte Replacement Protocol    Vital Signs:    Visit Vitals    /75    Pulse 96    Temp 99.5 °F (37.5 °C)    Resp 16    Ht 5' 6\" (1.676 m)    Wt 101.3 kg (223 lb 5.2 oz)    SpO2 99%    BMI 36.05 kg/m2       O2 Device: Nasal cannula   O2 Flow Rate (L/min): 2 l/min Temp (24hrs), Av °F (37.2 °C), Min:98.5 °F (36.9 °C), Max:99.5 °F (37.5 °C)       Intake/Output:   Last shift:         Last 3 shifts:    No intake or output data in the 24 hours ending 17 0142      Physical Exam:    General:  Alert, cooperative, no distress, appears stated age. Head:  Normocephalic, without obvious abnormality, atraumatic. Eyes:  Conjunctivae pale. PERRL, EOMs intact. Nose: Nares normal. Septum midline. Mucosa normal.   Throat: Lips, mucosa, and tongue normal. Teeth and gums normal.   Neck: Supple, symmetrical, trachea midline. Lungs:  Clear to auscultation bilaterally. Chest wall:  No tenderness or deformity. Heart:  Regular rate and rhythm, S1, S2 normal, no murmur, click, rub or gallop. Abdomen:  Soft, non-tender. Bowel sounds normal. No masses, No organomegaly. Extremities: Extremities normal, atraumatic, no cyanosis or edema. Pulses: 2+ and symmetric all extremities. Skin: Skin color, texture, turgor normal. No rashes or lesions. mastectomy on left side. Clean, dry incision on right hip.     Neurologic: Grossly nonfocal       DATA:     Current Facility-Administered Medications   Medication Dose Route Frequency    aspirin (ASPIRIN) tablet 325 mg  325 mg Oral BID    folic acid (FOLVITE) tablet 1 mg  1 mg Oral DAILY    HYDROmorphone (DILAUDID) tablet 2-4 mg  2-4 mg Oral Q4H PRN    metoprolol tartrate (LOPRESSOR) tablet 25 mg  25 mg Oral BID    naloxegol (MOVANTIK) tablet 12.5 mg  12.5 mg Oral DAILY    oxyCODONE ER (OxyCONTIN) tablet 20 mg  20 mg Oral BID    oxyCODONE IR (ROXICODONE) tablet 10 mg  10 mg Oral QID    temazepam (RESTORIL) capsule 15 mg  15 mg Oral QHS PRN    0.9% sodium chloride infusion  100 mL/hr IntraVENous CONTINUOUS    pantoprazole (PROTONIX) tablet 40 mg  40 mg Oral DAILY    0.9% sodium chloride infusion 250 mL  250 mL IntraVENous PRN         Labs: Results:       Chemistry Recent Labs      17   GLU  112*   NA  139   K  4.1 CL  103   CO2  25   BUN  12   CREA  1.05   CA  9.0   AGAP  11   BUCR  11*      CBC w/Diff Recent Labs      02/20/17   1955   WBC  16.2*   RBC  1.51*   HGB  4.6*   HCT  15.2*   PLT  156   GRANS  77*   LYMPH  19*   EOS  0      Coagulation Recent Labs      02/21/17   0456   PTP  14.7   INR  1.2   APTT  35.4       Liver Enzymes No results for input(s): TP, ALB, TBIL, AP, SGOT, GPT in the last 72 hours. No lab exists for component: DBIL   ABG No results found for: PH, PHI, PCO2, PCO2I, PO2, PO2I, HCO3, HCO3I, FIO2, FIO2I   Microbiology No results for input(s): CULT in the last 72 hours. Telemetry: [x]Sinus []A-flutter []Paced    []A-fib []Multiple PVCs                    Imaging:  [x]I have personally reviewed the patients radiographs  []Radiographs reviewed with radiologist   [x]No change from prior, tubes and lines in adequate position  []Improved   []Worsening        IMPRESSION:   · Severe Acute on Chronic Sickle Cell Anemia- with elevated retic count  · Sickle Cell Crisis- mild, minimal pain, dyspnea on exertion present  · Elevated D-Dimer- negative for PE  · Leucocytosis- mild, likely reactive   · HTN- well-controlled  · OA s/p right hip replacement on 02/03/17 and left hip replacement 08/2016  · Tobacco Abuse- 7 cigarettes/day for 30 years. · Hx of KINSEY- does not use CPAP  · Hx of breast cancer s/p mastectomy   · Hx of cholecystitis s/p cholecystectomy   · Fibromyalgia       PLAN:   · Resp - stable on 2 Liters NC. Smoking Cessation recommended. CXR-no acute process. V/Q scan-negative for PE.   · ID - Afebrile, leucocytosis-most likely reactive. Monitor for s/s of infection. · CVS - Hemodynamically stable. Continue home BP meds- metoprolol and aspirin. Hold for SBP < 100 or HR < 65.   · Heme/Onc- H/H 4.6/15.2 with elevated retic count, 2 units PRBCs were transfused and H/H remained 4.0/12.5, Transfuse 3 additional units and recheck H/H. Platelets dropped this AM, monitor closely.  INR-normal. Elevated D-Dimer, V/Q Scan and Lower Extremity venous Duplex-negative. Occult Blood, Stool-pending. Dr. Brian Washington has been consulted. Daily CBC. · Metabolic - Monitor electrolytes and replace as needed per protocol. Daily BMP, Mg.   · Renal - No acute issues. Monitor Cr and UOP closely. NS @ 100 ml/hr. · Endocrine - No acute issues. TSH-normal.   · Neuro/ Pain/ Sedation - Resume pt's home pain medications, Oxycontin and Roxicodone. PRN dilaudid. · GI - Regular Diet.  LFTs-normal   · Prophylaxis - DVT(SCDs), GI(Protonix)              The patient is: [x] acutely ill Risk of deterioration: [x] moderate    [] critically ill  [] high     [x]See my orders for details    My assessment/plan was discussed with:  [x]nursing []PT/OT    []respiratory therapy [x]Dr. Michael Monique   []family []         Elle Newman PA-C

## 2017-02-22 NOTE — PROGRESS NOTES
Problem: Mobility Impaired (Adult and Pediatric)  Goal: *Acute Goals and Plan of Care (Insert Text)  STGs to be addressed within 3 days:  1. Bed mobility: Supine to sit to supine S with HR for meals. 2. Activity tolerance: Tolerate up in chair 1-2 hrs for ADLs. 3. Transfers: Sit to stand to chair S with LRAD for ADLs. LTGs to be addressed within 7 days:  1. Standing/Ambulation Balance: Increase to Good with LRAD for safe transfers and gait. 2. Ambulation: Ambulate > 150 ft. S with LRAD for home mobility. 3. Patient Education: Independent with HEP for home safety. Outcome: Progressing Towards Goal  PHYSICAL THERAPY EVALUATION     Patient: Luz Maria Guy (65 y.o. female)  Date: 2/22/2017  Primary Diagnosis: Anemia  Symptomatic anemia  Precautions:   Fall      ASSESSMENT :  Based on the objective data described below, the patient presents to PT with decreased functional mobility with regard to bed mobility, transfers, gait, and overall tolerance for activity. Patient reports that she is s/p R KIMBERLY (anterior approach), developed shortness of breath shortly after discharged home. Patient demonstrated SBA for bed mobility, extended time required. Patient transitioned into standing CGA with RW. Patient able to ambulate ~ 10 ft CGA with RW, further distance limited by increased dyspnea on exertion. Patient was assisted back to bed, positioned for comfort. Patient would benefit from PT to address above impairments and assist with discharge planning. Patient will benefit from skilled intervention to address the above impairments.   Patients rehabilitation potential is considered to be Good  Factors which may influence rehabilitation potential include:   [X]         None noted  [ ]         Mental ability/status  [ ]         Medical condition  [ ]         Home/family situation and support systems  [ ]         Safety awareness  [ ]         Pain tolerance/management  [ ]         Other:        PLAN :  Recommendations and Planned Interventions:  [X]           Bed Mobility Training             [X]    Neuromuscular Re-Education  [X]           Transfer Training                   [ ]    Orthotic/Prosthetic Training  [X]           Gait Training                          [ ]    Modalities  [X]           Therapeutic Exercises          [ ]    Edema Management/Control  [X]           Therapeutic Activities            [X]    Patient and Family Training/Education  [ ]           Other (comment):     Frequency/Duration: Patient will be followed by physical therapy daily x 4-7 x week to address goals. Discharge Recommendations: Home Health  Further Equipment Recommendations for Discharge: Pt has equipment at home       SUBJECTIVE:   Patient stated I seem to be doing better.       OBJECTIVE DATA SUMMARY:       Past Medical History:   Diagnosis Date    Anemia NEC      Arthritis      Cancer ([facility]      Chronic pain      Coagulation disorder ([facility]      Ductal carcinoma ([facility]      left breast    Fatigue      Fibromyalgia      GERD (gastroesophageal reflux disease)      Hx of endometriosis      Hypertension     Ill-defined condition       Sickle cell    Osteoarthritis of left hip 2016    Osteoarthritis of right hip 1/3/2017    Sickle cell anemia ([facility]      Sleep apnea       Does not use CPAP     Past Surgical History:   Procedure Laterality Date    HX BREAST BIOPSY Left     HX  SECTION        HX HERNIA REPAIR        HX LAP CHOLECYSTECTOMY        HX MASTECTOMY Left 2012     with axillary lymph node dissection    HX ORTHOPAEDIC Left       hip replacement    LAP,CHOLECYSTECTOMY N/A 11/10/2016     Dr. Sarah Luther     Barriers to Learning/Limitations: None  Compensate with: N/A  Prior Level of Function/Home Situation:   Home Situation  Home Environment: Private residence  # Steps to Enter: 4  One/Two Story Residence: Two story  # of Interior Steps: 4  Height of Each Step (in): 12 inches  Interior Rails: Left  Lift Chair Available: No  Living Alone: No  Support Systems: Spouse/Significant Other/Partner  Patient Expects to be Discharged to[de-identified] Private residence  Current DME Used/Available at Home: [facility]  Critical Behavior:  Neurologic State: Alert; Appropriate for age  Psychosocial  Patient Behaviors: Calm; Cooperative  Strength:    Strength: Generally decreased, functional (R LE 3/5, L LE 5/5)  Tone & Sensation:   Tone: Normal  Sensation: Intact (B LE intact to LT)   Range Of Motion:  AROM: Generally decreased, functional (R hip flexion)  Functional Mobility:  Bed Mobility:  Rolling: Supervision  Supine to Sit: Stand-by asssistance  Sit to Supine: Stand-by asssistance  Scooting: Supervision  Transfers:  Sit to Stand: Contact guard assistance; Additional time (with RW)  Stand to Sit: Contact guard assistance (with RW)  Balance:   Sitting: Intact  Standing: Intact; With support (with RW)  Ambulation/Gait Training:  Distance (ft): 10 Feet (ft)  Assistive Device: Gait belt;Walker, rolling  Ambulation - Level of Assistance: Contact guard assistance  Base of Support: Widened  Speed/Yoana: Pace decreased (<100 feet/min); Slow  Interventions: Visual/Demos; Verbal cues  Stairs:  Stairs - Level of Assistance:  (N/A)  Pain:  Pain Scale 1: Numeric (0 - 10)  Pain Intensity 1: 3  Pain Location 1: Generalized  Pain Intervention(s) 1: Medication (see MAR)  Activity Tolerance:   Good  Please refer to the flowsheet for vital signs taken during this treatment.   After treatment:   [ ] Patient left in no apparent distress sitting up in chair  [ ] Patient left sitting on EOB  [X] Patient left in no apparent distress in bed  [ ] Patient declined to be OOB at this time due to   [X] Call bell left within reach  [X] Nursing notified(HENNA Lira)  [ ] Caregiver present  [ ] Bed alarm activated      COMMUNICATION/EDUCATION:   [X]         Fall prevention education was provided and the patient/caregiver indicated understanding. [X]         Patient/family have participated as able in goal setting and plan of care. [X]         Patient/family agree to work toward stated goals and plan of care. [ ]         Patient understands intent and goals of therapy, but is neutral about his/her participation. [ ]         Patient is unable to participate in goal setting and plan of care. Thank you for this referral.  Deirdre Waters, PT   Time Calculation: 18 mins      G-codes:  Mobility  Current  CI= 1-19%   Goal  CI= 1-19%. The severity rating is based on the Level of Assistance required for Functional Mobility and ADLs.      Eval Complexity: History: MEDIUM  Complexity : 1-2 comorbidities / personal factors will impact the outcome/ POC Exam:MEDIUM Complexity : 3 Standardized tests and measures addressing body structure, function, activity limitation and / or participation in recreation  Presentation: MEDIUM Complexity : Evolving with changing characteristics  Overall Complexity:MEDIUM

## 2017-02-22 NOTE — ED NOTES
One unit PRBC infused. No s/s of allergic reaction. Vitals stable. Pt up to [facility] with assist. Pt updated on room assignment. Pt placed on portable monitor and awaiting transport to room.

## 2017-02-23 ENCOUNTER — APPOINTMENT (OUTPATIENT)
Dept: GENERAL RADIOLOGY | Age: 52
DRG: 812 | End: 2017-02-23
Attending: PHYSICIAN ASSISTANT
Payer: COMMERCIAL

## 2017-02-23 LAB
ALBUMIN SERPL BCP-MCNC: 3 G/DL (ref 3.4–5)
ALBUMIN/GLOB SERPL: 1 {RATIO} (ref 0.8–1.7)
ALP SERPL-CCNC: 133 U/L (ref 45–117)
ALT SERPL-CCNC: 13 U/L (ref 13–56)
ANION GAP BLD CALC-SCNC: 8 MMOL/L (ref 3–18)
AST SERPL W P-5'-P-CCNC: 23 U/L (ref 15–37)
BASOPHILS # BLD AUTO: 0 K/UL (ref 0–0.06)
BASOPHILS # BLD: 0 % (ref 0–3)
BILIRUB SERPL-MCNC: 8.9 MG/DL (ref 0.2–1)
BUN SERPL-MCNC: 17 MG/DL (ref 7–18)
BUN/CREAT SERPL: 19 (ref 12–20)
CALCIUM SERPL-MCNC: 8.2 MG/DL (ref 8.5–10.1)
CHLORIDE SERPL-SCNC: 109 MMOL/L (ref 100–108)
CO2 SERPL-SCNC: 23 MMOL/L (ref 21–32)
CREAT SERPL-MCNC: 0.89 MG/DL (ref 0.6–1.3)
DIFFERENTIAL METHOD BLD: ABNORMAL
EOSINOPHIL # BLD: 0.1 K/UL (ref 0–0.4)
EOSINOPHIL NFR BLD: 1 % (ref 0–5)
ERYTHROCYTE [DISTWIDTH] IN BLOOD BY AUTOMATED COUNT: 20.6 % (ref 11.6–14.5)
GLOBULIN SER CALC-MCNC: 3.1 G/DL (ref 2–4)
GLUCOSE SERPL-MCNC: 95 MG/DL (ref 74–99)
HCT VFR BLD AUTO: 17.5 % (ref 35–45)
HGB BLD-MCNC: 5.7 G/DL (ref 12–16)
LYMPHOCYTES # BLD AUTO: 11 % (ref 20–51)
LYMPHOCYTES # BLD: 1.2 K/UL (ref 0.8–3.5)
MAGNESIUM SERPL-MCNC: 2.1 MG/DL (ref 1.8–2.4)
MCH RBC QN AUTO: 29.8 PG (ref 24–34)
MCHC RBC AUTO-ENTMCNC: 32.6 G/DL (ref 31–37)
MCV RBC AUTO: 91.6 FL (ref 74–97)
METAMYELOCYTES NFR BLD MANUAL: 1 %
MONOCYTES # BLD: 0.2 K/UL (ref 0–1)
MONOCYTES NFR BLD AUTO: 2 % (ref 2–9)
NEUTS BAND NFR BLD MANUAL: 5 % (ref 0–5)
NEUTS SEG # BLD: 8.9 K/UL (ref 1.8–8)
NEUTS SEG NFR BLD AUTO: 80 % (ref 42–75)
NRBC BLD-RTO: 3 PER 100 WBC
PHOSPHATE SERPL-MCNC: 2.7 MG/DL (ref 2.5–4.9)
PLATELET # BLD AUTO: 47 K/UL (ref 135–420)
PLATELET COMMENTS,PCOM: ABNORMAL
PMV BLD AUTO: 10.2 FL (ref 9.2–11.8)
POTASSIUM SERPL-SCNC: 3.6 MMOL/L (ref 3.5–5.5)
PROT SERPL-MCNC: 6.1 G/DL (ref 6.4–8.2)
RBC # BLD AUTO: 1.91 M/UL (ref 4.2–5.3)
RBC MORPH BLD: ABNORMAL
SODIUM SERPL-SCNC: 140 MMOL/L (ref 136–145)
WBC # BLD AUTO: 10.5 K/UL (ref 4.6–13.2)

## 2017-02-23 PROCEDURE — 85025 COMPLETE CBC W/AUTO DIFF WBC: CPT | Performed by: PHYSICIAN ASSISTANT

## 2017-02-23 PROCEDURE — 74011250636 HC RX REV CODE- 250/636: Performed by: INTERNAL MEDICINE

## 2017-02-23 PROCEDURE — 74011250637 HC RX REV CODE- 250/637: Performed by: INTERNAL MEDICINE

## 2017-02-23 PROCEDURE — 80053 COMPREHEN METABOLIC PANEL: CPT | Performed by: PHYSICIAN ASSISTANT

## 2017-02-23 PROCEDURE — 85660 RBC SICKLE CELL TEST: CPT | Performed by: EMERGENCY MEDICINE

## 2017-02-23 PROCEDURE — P9040 RBC LEUKOREDUCED IRRADIATED: HCPCS | Performed by: EMERGENCY MEDICINE

## 2017-02-23 PROCEDURE — 36415 COLL VENOUS BLD VENIPUNCTURE: CPT | Performed by: PHYSICIAN ASSISTANT

## 2017-02-23 PROCEDURE — 36430 TRANSFUSION BLD/BLD COMPNT: CPT

## 2017-02-23 PROCEDURE — 86921 COMPATIBILITY TEST INCUBATE: CPT | Performed by: EMERGENCY MEDICINE

## 2017-02-23 PROCEDURE — 86902 BLOOD TYPE ANTIGEN DONOR EA: CPT | Performed by: EMERGENCY MEDICINE

## 2017-02-23 PROCEDURE — 86922 COMPATIBILITY TEST ANTIGLOB: CPT | Performed by: EMERGENCY MEDICINE

## 2017-02-23 PROCEDURE — 71010 XR CHEST PORT: CPT

## 2017-02-23 PROCEDURE — 84100 ASSAY OF PHOSPHORUS: CPT | Performed by: PHYSICIAN ASSISTANT

## 2017-02-23 PROCEDURE — 83735 ASSAY OF MAGNESIUM: CPT | Performed by: PHYSICIAN ASSISTANT

## 2017-02-23 PROCEDURE — 74011250637 HC RX REV CODE- 250/637: Performed by: PHYSICIAN ASSISTANT

## 2017-02-23 PROCEDURE — 65610000006 HC RM INTENSIVE CARE

## 2017-02-23 RX ORDER — ACETAMINOPHEN 325 MG/1
650 TABLET ORAL
Status: COMPLETED | OUTPATIENT
Start: 2017-02-23 | End: 2017-02-23

## 2017-02-23 RX ORDER — SODIUM CHLORIDE 9 MG/ML
250 INJECTION, SOLUTION INTRAVENOUS AS NEEDED
Status: DISCONTINUED | OUTPATIENT
Start: 2017-02-23 | End: 2017-02-25 | Stop reason: HOSPADM

## 2017-02-23 RX ORDER — DIPHENHYDRAMINE HYDROCHLORIDE 50 MG/ML
25 INJECTION, SOLUTION INTRAMUSCULAR; INTRAVENOUS
Status: COMPLETED | OUTPATIENT
Start: 2017-02-23 | End: 2017-02-23

## 2017-02-23 RX ORDER — POTASSIUM CHLORIDE 20 MEQ/1
40 TABLET, EXTENDED RELEASE ORAL ONCE
Status: COMPLETED | OUTPATIENT
Start: 2017-02-23 | End: 2017-02-23

## 2017-02-23 RX ADMIN — OXYCODONE HYDROCHLORIDE 10 MG: 5 TABLET ORAL at 05:40

## 2017-02-23 RX ADMIN — ACETAMINOPHEN 650 MG: 325 TABLET, FILM COATED ORAL at 18:55

## 2017-02-23 RX ADMIN — POTASSIUM CHLORIDE 40 MEQ: 1500 TABLET, EXTENDED RELEASE ORAL at 08:19

## 2017-02-23 RX ADMIN — ASPIRIN 325 MG ORAL TABLET 325 MG: 325 PILL ORAL at 17:53

## 2017-02-23 RX ADMIN — PANTOPRAZOLE SODIUM 40 MG: 40 TABLET, DELAYED RELEASE ORAL at 08:18

## 2017-02-23 RX ADMIN — OXYCODONE HYDROCHLORIDE 10 MG: 5 TABLET ORAL at 00:25

## 2017-02-23 RX ADMIN — OXYCODONE HYDROCHLORIDE 10 MG: 5 TABLET ORAL at 17:53

## 2017-02-23 RX ADMIN — HYDROMORPHONE HYDROCHLORIDE 4 MG: 2 TABLET ORAL at 08:34

## 2017-02-23 RX ADMIN — OXYCODONE HYDROCHLORIDE 20 MG: 20 TABLET, FILM COATED, EXTENDED RELEASE ORAL at 21:43

## 2017-02-23 RX ADMIN — DIPHENHYDRAMINE HYDROCHLORIDE 25 MG: 50 INJECTION INTRAMUSCULAR; INTRAVENOUS at 18:55

## 2017-02-23 RX ADMIN — OXYCODONE HYDROCHLORIDE 10 MG: 5 TABLET ORAL at 12:46

## 2017-02-23 RX ADMIN — FOLIC ACID 1 MG: 1 TABLET ORAL at 08:19

## 2017-02-23 RX ADMIN — NALOXEGOL OXALATE 12.5 MG: 12.5 TABLET, FILM COATED ORAL at 08:35

## 2017-02-23 RX ADMIN — OXYCODONE HYDROCHLORIDE 10 MG: 5 TABLET ORAL at 23:35

## 2017-02-23 RX ADMIN — METOPROLOL TARTRATE 25 MG: 25 TABLET ORAL at 08:19

## 2017-02-23 RX ADMIN — HYDROMORPHONE HYDROCHLORIDE 4 MG: 2 TABLET ORAL at 16:09

## 2017-02-23 RX ADMIN — ASPIRIN 325 MG ORAL TABLET 325 MG: 325 PILL ORAL at 08:18

## 2017-02-23 RX ADMIN — OXYCODONE HYDROCHLORIDE 20 MG: 20 TABLET, FILM COATED, EXTENDED RELEASE ORAL at 10:52

## 2017-02-23 RX ADMIN — METOPROLOL TARTRATE 25 MG: 25 TABLET ORAL at 17:53

## 2017-02-23 NOTE — INTERDISCIPLINARY ROUNDS
CRITICAL CARE INTERDISCIPLINARY ROUNDS      Patient Information:    Name:   Mary Acosta    Age:   46 y.o. Admission Date:   2/20/2017    Readmit Risk Assessment Information:      Readmit Risk Tool Support Systems: Spouse/Significant Other/Partner    Surgery Date:      Day of Stay:     Expected Discharge Date:        Attending Provider:   Iona Alpers, MD    Surgeon:        Consultant:       Primary Care Provider:   Lee Land MD    Problem List:     Patient Active Problem List   Diagnosis Code    Symptomatic anemia D64.9    Breast cancer ([facility] C50.919    Sickle cell anemia ([facility] D57.1    Dehydration E86.0    Fibromyalgia M79.7    Hx of endometriosis Z87.42    Sickle cell crisis ([facility] D57.00    Vitamin D deficiency E55.9    Osteoarthritis M19.90    Breast cancer of lower-outer quadrant of left female breast ([facility] C50.512    Iron deficiency anemia due to dietary causes D50.8    Osteoarthritis of left hip M16.12    Thrombocytopenia (HCC) D69.6    Acute blood loss anemia D62    Choledocholithiasis K80.50    Cholecystitis with cholelithiasis K80.10    Osteoarthritis of right hip M16.11    Leukocytosis D72.829       Principal Problem:  Symptomatic anemia    Procedure:       During rounds the following quality care indicators and evidence based practices were addressed :     DVT Prophylaxis, Pressure Ulcer Prevention, Pain Management, Sepsis resuscitation and management, Nutritional Status, Critical Care Interventions Airways, Drains and Lines and IHI Bundles:  Bull Bundle Followed           Acute MI/PCI:   Not applicable    Heart Failure:    Not applicable    Cardiac Surgery:  Not applicable    SCIP Measures for other Surgeries:   Not applicable    Pneumonia:    Not applicable    Stroke:  Patient's Personal Risk Factors for Stroke are:   hypertension, family history, hyperlipidemia or diabetes mellitus    NIH Stroke Score       Transfer Level of Care:  Progressing to Transfer    The patient will require the following interventions based on the Readmission Risk Assessment:  Pharmacy evaluation and teaching, Care Management involvement for home health follow up for:  mobility and assistance with ADL's and Spiritual Care evaluation      Discharge Management:  Home    Anticipated Discharge Date:  3days      Interdisciplinary team rounds were held  with the following team membersNursing, Physician and Clinical Coordinator and the  patient. Plan of care discussed. See clinical pathway and/or care plan for interventions and desired outcomes.  Transfuse blood and repeat H/H.

## 2017-02-23 NOTE — PROGRESS NOTES
Care Management Interventions  PCP Verified by CM: No  Palliative Care Consult (Criteria: CHF and RRAT>21): No  Reason for No Palliative Care Consult: Other (see comment)  Mode of Transport at Discharge: Other (see comment)  Transition of Care Consult (CM Consult): Discharge Planning  MyChart Signup: No  Discharge Durable Medical Equipment: No  Health Maintenance Reviewed: Yes  Physical Therapy Consult: Yes  Occupational Therapy Consult: Yes  Speech Therapy Consult: No  Current Support Network: Other  Confirm Follow Up Transport: Other (see comment)  Plan discussed with Pt/Family/Caregiver: No  Discharge Location  Discharge Placement:  (tbd)    Patient 46years old female admitted to ICU with symptomatic anemia. Saw the patient, her mother, and her aunt in hospital room. Patient was awake and alert, fully oriented. CM made an attempt to conduct interview with this patient and to assess her living situation. However, patient was aggravated and said that she is already provided information about the house she lives in and about her level of function and support earlier. CM informed this patient to stand by in case if any questions/concerns to rise and to assist if any dcp needs come up.  Johanna Beach rn, cm

## 2017-02-23 NOTE — PROGRESS NOTES
Bedside and Verbal shift change report given to MaryRN (oncoming nurse) by Blaise Barron RN   (offgoing nurse). Report included the following information SBAR, MAR and Cardiac Rhythm . Obdulia Jackson

## 2017-02-23 NOTE — ROUTINE PROCESS
Bedside and Verbal shift change report given to Luis Castanon RN (oncoming nurse) by SABINO Vidal RN (offgoing nurse). Report included the following information SBAR, Kardex, Intake/Output, MAR and Recent Results.

## 2017-02-23 NOTE — PROGRESS NOTES
[facility] Hospitalist Group  Progress Note    Patient: Mary Acosta Age: 46 y.o. : 1965 MR#: [id number] SSN: [id number]  Date/Time: 2017 11:16 AM    Subjective/24-hour events:     Relatively stable overnight. Received PRBCs, but minimal improvement in H/H. Pain in good control. Remains afebrile. Assessment:   Symptomatic anemia  SC disease with acute hemolytic crisis  Leukocytosis, suspect reactive  Recent R hip replacement  Fibromyalgia  Breast cancer hx    Plan: Additional PRBCs ordered - continue to trend. Usual pain regimen as ordered. Keep in ICU until counts improve/stabilize. Following. Case discussed with:  [x]Patient  []Family  []Nursing  []Case Management  DVT Prophylaxis:  []Lovenox  []Hep SQ  [x]SCDs  []Coumadin   []On Heparin gtt    Objective:   VS:   Visit Vitals    /71    Pulse 70    Temp 98 °F (36.7 °C)    Resp 14    Ht 5' 6\" (1.676 m)    Wt 101.3 kg (223 lb 5.2 oz)    SpO2 100%    Breastfeeding No    BMI 36.05 kg/m2      Tmax/24hrs: Temp (24hrs), Av.5 °F (36.9 °C), Min:98 °F (36.7 °C), Max:99.1 °F (37.3 °C)      Intake/Output Summary (Last 24 hours) at 17 1118  Last data filed at 17 0915   Gross per 24 hour   Intake           2572.9 ml   Output             2050 ml   Net            522.9 ml       General:  In NAD. Nontoxic-appearing. Cardiovascular:  RRR. Pulmonary:  Clear, no wheezes. Effort nonlabored. GI:  Abdomen soft, NTTP. Extremities:  Warm, no ischemia. Neuro:  Alert/appropriate. Moves extremities spontaneously.     Labs:    Recent Results (from the past 24 hour(s))   CBC WITH AUTOMATED DIFF    Collection Time: 17  3:30 PM   Result Value Ref Range    WBC 16.1 (H) 4.6 - 13.2 K/uL    RBC 1.91 (L) 4.20 - 5.30 M/uL    HGB 5.8 (LL) 12.0 - 16.0 g/dL    HCT 18.1 (L) 35.0 - 45.0 %    MCV 94.8 74.0 - 97.0 FL    MCH 30.4 24.0 - 34.0 PG    MCHC 32.0 31.0 - 37.0 g/dL    RDW 19.7 (H) 11.6 - 14.5 % PLATELET 56 (L) 248 - 420 K/uL    MPV 9.6 9.2 - 11.8 FL    NEUTROPHILS 85 (H) 42 - 75 %    BAND NEUTROPHILS 2 0 - 5 %    LYMPHOCYTES 11 (L) 20 - 51 %    MONOCYTES 2 2 - 9 %    EOSINOPHILS 0 0 - 5 %    BASOPHILS 0 0 - 3 %    NRBC 5.0 (H) 0  WBC    ABS. NEUTROPHILS 14.0 (H) 1.8 - 8.0 K/UL    ABS. LYMPHOCYTES 1.8 0.8 - 3.5 K/UL    ABS. MONOCYTES 0.3 0 - 1.0 K/UL    ABS. EOSINOPHILS 0.0 0.0 - 0.4 K/UL    ABS. BASOPHILS 0.0 0.0 - 0.06 K/UL    DF MANUAL      PLATELET COMMENTS DECREASED PLATELETS      RBC COMMENTS ANISOCYTOSIS  2+        RBC COMMENTS POLYCHROMASIA  2+        RBC COMMENTS HYPOCHROMIA  1+        RBC COMMENTS SCHISTOCYTES  1+       PROTHROMBIN TIME + INR    Collection Time: 02/22/17  3:30 PM   Result Value Ref Range    Prothrombin time 15.9 (H) 11.5 - 15.2 sec    INR 1.3 (H) 0.8 - 1.2     DIRECT & INDIRECT RUDI    Collection Time: 02/22/17  3:30 PM   Result Value Ref Range    ROOPA Poly POS     Antibody screen POS     ROOPA IgG POS     ROOPA C3b/C3d POS    CBC WITH AUTOMATED DIFF    Collection Time: 02/23/17  3:45 AM   Result Value Ref Range    WBC 10.5 4.6 - 13.2 K/uL    RBC 1.91 (L) 4.20 - 5.30 M/uL    HGB 5.7 (LL) 12.0 - 16.0 g/dL    HCT 17.5 (LL) 35.0 - 45.0 %    MCV 91.6 74.0 - 97.0 FL    MCH 29.8 24.0 - 34.0 PG    MCHC 32.6 31.0 - 37.0 g/dL    RDW 20.6 (H) 11.6 - 14.5 %    PLATELET 47 (L) 247 - 420 K/uL    MPV 10.2 9.2 - 11.8 FL    NEUTROPHILS 80 (H) 42 - 75 %    BAND NEUTROPHILS 5 0 - 5 %    LYMPHOCYTES 11 (L) 20 - 51 %    MONOCYTES 2 2 - 9 %    EOSINOPHILS 1 0 - 5 %    BASOPHILS 0 0 - 3 %    METAMYELOCYTES 1 (H) 0 %    NRBC 3.0 (H) 0  WBC    ABS. NEUTROPHILS 8.9 (H) 1.8 - 8.0 K/UL    ABS. LYMPHOCYTES 1.2 0.8 - 3.5 K/UL    ABS. MONOCYTES 0.2 0 - 1.0 K/UL    ABS. EOSINOPHILS 0.1 0.0 - 0.4 K/UL    ABS.  BASOPHILS 0.0 0.0 - 0.06 K/UL    DF MANUAL      PLATELET COMMENTS DECREASED PLATELETS      RBC COMMENTS ANISOCYTOSIS  2+        RBC COMMENTS POLYCHROMASIA  2+        RBC COMMENTS STOMATOCYTES  2+        RBC COMMENTS TARGET CELLS  1+       METABOLIC PANEL, COMPREHENSIVE    Collection Time: 02/23/17  3:45 AM   Result Value Ref Range    Sodium 140 136 - 145 mmol/L    Potassium 3.6 3.5 - 5.5 mmol/L    Chloride 109 (H) 100 - 108 mmol/L    CO2 23 21 - 32 mmol/L    Anion gap 8 3.0 - 18 mmol/L    Glucose 95 74 - 99 mg/dL    BUN 17 7.0 - 18 MG/DL    Creatinine 0.89 0.6 - 1.3 MG/DL    BUN/Creatinine ratio 19 12 - 20      GFR est AA >60 >60 ml/min/1.73m2    GFR est non-AA >60 >60 ml/min/1.73m2    Calcium 8.2 (L) 8.5 - 10.1 MG/DL    Bilirubin, total 8.9 (H) 0.2 - 1.0 MG/DL    ALT (SGPT) 13 13 - 56 U/L    AST (SGOT) 23 15 - 37 U/L    Alk.  phosphatase 133 (H) 45 - 117 U/L    Protein, total 6.1 (L) 6.4 - 8.2 g/dL    Albumin 3.0 (L) 3.4 - 5.0 g/dL    Globulin 3.1 2.0 - 4.0 g/dL    A-G Ratio 1.0 0.8 - 1.7     MAGNESIUM    Collection Time: 02/23/17  3:45 AM   Result Value Ref Range    Magnesium 2.1 1.8 - 2.4 mg/dL   PHOSPHORUS    Collection Time: 02/23/17  3:45 AM   Result Value Ref Range    Phosphorus 2.7 2.5 - 4.9 MG/DL       Signed By: Reynold Meza MD     February 23, 2017 11:16 AM

## 2017-02-23 NOTE — PROGRESS NOTES
Hematology/ Medical Oncology  Progress Note      NAME: Kevin Chance   :  1965  MRM:  [id number]    Date/Time: 2017  08:59 AM         Subjective: The patient is a 66-year-old [facility] American woman with sickle cell disease and a history of breast cancer. She recently had a right total hip replacement and came in with complaints of shortness of breath and was found to have severe anemia. Due to the presence of multiple antibodies, it takes longer to obtain compatible units of PRBCs. She is resting in bed in NAD. She denies significant pain at this time. She states he current pain medication is managing her pain adequately. She denies CP, light headedness but does have SOB when she gets up and out of bed. Otherwise there are no complaints. .    Past Medical History reviewed and unchanged from Admission History and Physical    Review of Systems   Constitutional: Positive for fatigue. Negative for activity change. HENT: Negative. Eyes: Negative. Respiratory: Positive for shortness of breath. When out of bed. Cardiovascular: Negative for chest pain. Gastrointestinal: Negative for abdominal distention, abdominal pain, constipation, diarrhea and nausea. Endocrine: Negative. Genitourinary: Negative. Musculoskeletal: Negative for arthralgias and myalgias. Skin: Negative. Neurological: Negative for dizziness, light-headedness and headaches. Hematological: Negative for adenopathy. Does not bruise/bleed easily. Psychiatric/Behavioral: Negative. Objective:       Vitals:      Last 24hrs VS reviewed since prior progress note.  Most recent are:    Visit Vitals    /64    Pulse 85    Temp 98 °F (36.7 °C)    Resp 12    Ht 5' 6\" (1.676 m)    Wt 101.3 kg (223 lb 5.2 oz)    SpO2 100%    Breastfeeding No    BMI 36.05 kg/m2     SpO2 Readings from Last 6 Encounters:   17 100%   17 100%   16 99%   11/15/16 99%   10/05/16 100%   16 100% O2 Flow Rate (L/min): 2 l/min     Intake/Output Summary (Last 24 hours) at 02/23/17 1003  Last data filed at 02/23/17 0408   Gross per 24 hour   Intake           2262.9 ml   Output             1450 ml   Net            812.9 ml          Exam:      Physical Exam   Nursing note and vitals reviewed. Constitutional: She is oriented to person, place, and time. She appears well-developed and well-nourished. HENT:   Head: Normocephalic and atraumatic. Eyes: Pupils are equal, round, and reactive to light. Neck: Normal range of motion. Neck supple. No thyromegaly present. Cardiovascular: Normal rate and regular rhythm. Pulmonary/Chest: Effort normal and breath sounds normal.   Abdominal: Soft. Bowel sounds are normal.   Musculoskeletal: Normal range of motion. Lymphadenopathy:     She has no cervical adenopathy. Neurological: She is alert and oriented to person, place, and time. Skin: Skin is warm and dry. Psychiatric: She has a normal mood and affect.  Her behavior is normal. Judgment and thought content normal.       Telemetry reviewed:   normal sinus rhythm        Lab Data Reviewed: (see below)      Medications:  Current Facility-Administered Medications   Medication Dose Route Frequency    0.9% sodium chloride infusion 250 mL  250 mL IntraVENous PRN    0.9% sodium chloride infusion 250 mL  250 mL IntraVENous PRN    0.9% sodium chloride infusion 250 mL  250 mL IntraVENous PRN    influenza vaccine 2016-17 (36mos+)(PF) (FLUZONE/FLUARIX/FLULAVAL QUAD) injection 0.5 mL  0.5 mL IntraMUSCular PRIOR TO DISCHARGE    aspirin (ASPIRIN) tablet 325 mg  325 mg Oral BID    folic acid (FOLVITE) tablet 1 mg  1 mg Oral DAILY    HYDROmorphone (DILAUDID) tablet 2-4 mg  2-4 mg Oral Q4H PRN    metoprolol tartrate (LOPRESSOR) tablet 25 mg  25 mg Oral BID    naloxegol (MOVANTIK) tablet 12.5 mg  12.5 mg Oral DAILY    oxyCODONE ER (OxyCONTIN) tablet 20 mg  20 mg Oral BID    oxyCODONE IR (ROXICODONE) tablet 10 mg  10 mg Oral QID    temazepam (RESTORIL) capsule 15 mg  15 mg Oral QHS PRN    0.9% sodium chloride infusion  100 mL/hr IntraVENous CONTINUOUS    pantoprazole (PROTONIX) tablet 40 mg  40 mg Oral DAILY    0.9% sodium chloride infusion 250 mL  250 mL IntraVENous PRN       ______________________________________________________________________      Lab Review:     Recent Labs      02/23/17 0345 02/22/17 1530 02/22/17 0520   WBC  10.5  16.1*  13.7*   HGB  5.7*  5.8*  4.0*   HCT  17.5*  18.1*  12.5*   PLT  47*  56*  63*     Recent Labs      02/23/17 0345 02/22/17 1530 02/22/17 0520  02/21/17 0456 02/20/17   1955   NA  140   --   140   --   139   K  3.6   --   4.2   --   4.1   CL  109*   --   108   --   103   CO2  23   --   25   --   25   GLU  95   --   98   --   112*   BUN  17   --   11   --   12   CREA  0.89   --   1.10   --   1.05   CA  8.2*   --   8.5   --   9.0   MG  2.1   --   2.3   --    --    PHOS  2.7   --   2.9   --    --    ALB  3.0*   --   3.2*   --    --    SGOT  23   --   22   --    --    ALT  13   --   17   --    --    INR   --   1.3*   --   1.2   --      No components found for: GLPOC  No results for input(s): PH, PCO2, PO2, HCO3, FIO2 in the last 72 hours. Recent Labs      02/22/17 1530 02/21/17 0456   INR  1.3*  1.2                Assessment:     Principal Problem:    Symptomatic anemia (8/20/2012)    Active Problems:    Breast cancer ([address] (8/20/2012)      Sickle cell anemia (HCC) (8/20/2012)      Dehydration (11/16/2012)      Fibromyalgia ()      Sickle cell crisis ([facility] [address] (8/24/2013)      Overview: Acute sickle cell crisis. Vitamin D deficiency (5/19/2015)      Leukocytosis (2/20/2017)           Plan:     Risk of deterioration: medium             1. Sickle cell anemia:  H/H this morning is 5.7/17.5. She is S/P 5 units of blood. I will order an additional 2 units of PRBCs today. We will continue to monitor her CBC daily.   She will continue to receive additional transfusions until her HGB is > 7.0. Direct and indirect garry results still pending. 2. Thrombocytopenia:  Platelets today are 89,172. Transfusion will be received for platelets of < 49,624. Will continue to monitor daily. 3. Sickle cell associated pain:  Continue current pain management regimen.          Total time spent with patient: 20 minutes                 Care Plan discussed with: Patient and Nursing Staff    Discussed:  Care Plan    Prophylaxis:  SCD's and H2B/PPI    Disposition:  Home w/Family           ___________________________________________________    Attending Physician: Enrrique Mora NP

## 2017-02-23 NOTE — PROGRESS NOTES
[facility]  ICU Progress Note      Name: Thien Lemon   : 1965   MRN: [id number]   Date: 2017 1:42 AM     [x]I have reviewed the flowsheet and previous days notes. Events overnight reviewed and discussed with nursing staff. Vital signs and records reviewed. Pt is a 46 y.o. female with pmhx of sickle cell anemia, fibromyalgia, GERD, HTN, breast cancer s/p mastectomy, OA s/p right hip surgery, tobacco abuse, presented to the [facility] ER with 3 days of increasing dyspnea on exertion and dizziness. She states that she knew she needed a blood transfusion. D-dimer was found to be elevated upon arrival to the ER with a very low H/H. Pt was transferred from the [facility] ER to the [facility] ER because there are no beds in the ICU. Will monitor in the ICU until H/H has improved.         Subjective:    Received PRBC still HB is 5.8  Another unit of blood is ordered  Patient is doing well   Drop in PLT is concerning for hemopysis     ROS:Pertinent items are noted in HPI.     Medication Review:  · Pressors - none  · Sedation - none  · Antibiotics - none  · Pain - oxycodone   · GI/ DVT - Protonix/SCDS  · Others (other gtts)    Safety Bundles: Electrolyte Replacement Protocol    Vital Signs:    Visit Vitals    /64    Pulse 85    Temp 98 °F (36.7 °C)    Resp 12    Ht 5' 6\" (1.676 m)    Wt 101.3 kg (223 lb 5.2 oz)    SpO2 100%    Breastfeeding No    BMI 36.05 kg/m2       O2 Device: Room air   O2 Flow Rate (L/min): 2 l/min   Temp (24hrs), Av.6 °F (37 °C), Min:98 °F (36.7 °C), Max:99.6 °F (37.6 °C)       Intake/Output:   Last shift:         Last 3 shifts:  1901 -  0700  In: 5074.6 [P.O.:480; I.V.:3821.7]  Out: 2260 [Urine:2260]    Intake/Output Summary (Last 24 hours) at 17 0921  Last data filed at 17 0408   Gross per 24 hour   Intake           2262.9 ml   Output             1450 ml   Net            812.9 ml         Physical Exam:    General:  Alert, cooperative, no distress, appears stated age. Head:  Normocephalic, without obvious abnormality, atraumatic. Eyes:  Conjunctivae pale. PERRL, EOMs intact. Nose: Nares normal. Septum midline. Mucosa normal.   Throat: Lips, mucosa, and tongue normal. Teeth and gums normal.   Neck: Supple, symmetrical, trachea midline. Lungs:  Clear to auscultation bilaterally. Chest wall:  No tenderness or deformity. Heart:  Regular rate and rhythm, S1, S2 normal, no murmur, click, rub or gallop. Abdomen:  Soft, non-tender. Bowel sounds normal. No masses, No organomegaly. Extremities: Extremities normal, atraumatic, no cyanosis or edema. Pulses: 2+ and symmetric all extremities. Skin: Skin color, texture, turgor normal. No rashes or lesions. mastectomy on left side. Clean, dry incision on right hip.     Neurologic: Grossly nonfocal       DATA:     Current Facility-Administered Medications   Medication Dose Route Frequency    0.9% sodium chloride infusion 250 mL  250 mL IntraVENous PRN    0.9% sodium chloride infusion 250 mL  250 mL IntraVENous PRN    0.9% sodium chloride infusion 250 mL  250 mL IntraVENous PRN    influenza vaccine 2016-17 (36mos+)(PF) (FLUZONE/FLUARIX/FLULAVAL QUAD) injection 0.5 mL  0.5 mL IntraMUSCular PRIOR TO DISCHARGE    aspirin (ASPIRIN) tablet 325 mg  325 mg Oral BID    folic acid (FOLVITE) tablet 1 mg  1 mg Oral DAILY    HYDROmorphone (DILAUDID) tablet 2-4 mg  2-4 mg Oral Q4H PRN    metoprolol tartrate (LOPRESSOR) tablet 25 mg  25 mg Oral BID    naloxegol (MOVANTIK) tablet 12.5 mg  12.5 mg Oral DAILY    oxyCODONE ER (OxyCONTIN) tablet 20 mg  20 mg Oral BID    oxyCODONE IR (ROXICODONE) tablet 10 mg  10 mg Oral QID    temazepam (RESTORIL) capsule 15 mg  15 mg Oral QHS PRN    0.9% sodium chloride infusion  100 mL/hr IntraVENous CONTINUOUS    pantoprazole (PROTONIX) tablet 40 mg  40 mg Oral DAILY    0.9% sodium chloride infusion 250 mL  250 mL IntraVENous PRN         Labs: Results: Chemistry Recent Labs      02/23/17   0345  02/22/17   0520  02/20/17   1955   GLU  95  98  112*   NA  140  140  139   K  3.6  4.2  4.1   CL  109*  108  103   CO2  23  25  25   BUN  17  11  12   CREA  0.89  1.10  1.05   CA  8.2*  8.5  9.0   AGAP  8  7  11   BUCR  19  10*  11*   AP  133*  141*   --    TP  6.1*  6.5   --    ALB  3.0*  3.2*   --    GLOB  3.1  3.3   --    AGRAT  1.0  1.0   --       CBC w/Diff Recent Labs      02/23/17 0345 02/22/17   1530  02/22/17   0520   WBC  10.5  16.1*  13.7*   RBC  1.91*  1.91*  1.32*   HGB  5.7*  5.8*  4.0*   HCT  17.5*  18.1*  12.5*   PLT  47*  56*  63*   GRANS  80*  85*  70   LYMPH  11*  11*  20   EOS  1  0  1      Coagulation Recent Labs      02/22/17   1530  02/21/17   0456   PTP  15.9*  14.7   INR  1.3*  1.2   APTT   --   35.4       Liver Enzymes Recent Labs      02/23/17 0345   TP  6.1*   ALB  3.0*   AP  133*   SGOT  23      ABG No results found for: PH, PHI, PCO2, PCO2I, PO2, PO2I, HCO3, HCO3I, FIO2, FIO2I   Microbiology No results for input(s): CULT in the last 72 hours. Telemetry: [x]Sinus []A-flutter []Paced    []A-fib []Multiple PVCs                    Imaging:  [x]I have personally reviewed the patients radiographs  []Radiographs reviewed with radiologist   [x]No change from prior, tubes and lines in adequate position  []Improved   []Worsening        IMPRESSION:   · Severe Acute on Chronic Sickle Cell Anemia- with elevated retic count  · Sickle Cell Crisis- mild, minimal pain, dyspnea on exertion present  · Issues getting blood due to multiple antibodies and trying to give her  · Elevated D-Dimer- negative for PE  · Leucocytosis- mild, likely reactive   · HTN- well-controlled  · OA s/p right hip replacement on 02/03/17 and left hip replacement 08/2016  · Tobacco Abuse- 7 cigarettes/day for 30 years.    · Hx of KINSEY- does not use CPAP  · Hx of breast cancer s/p mastectomy   · Hx of cholecystitis s/p cholecystectomy   · Fibromyalgia       PLAN:  Hematology consult appreciated needs to decide of exchange transfusion  Worry about DIC   Patient has multiple antibodies to blood and will need special blood from [facility] is aware about it if HB is > 7 will hold and repeat CBC if <7 than will transfuse accordingly  Hemodynamically stable  Oxygenating well   OOB to chair  No grijalva  No central line  GI DVT prophylaxis  CCM time 31 mi      · Resp - stable on 2 Liters NC. Smoking Cessation recommended. CXR-no acute process. V/Q scan-negative for PE.   · ID - Afebrile, leucocytosis-most likely reactive. Monitor for s/s of infection. · CVS - Hemodynamically stable. Continue home BP meds- metoprolol and aspirin. Hold for SBP < 100 or HR < 65.   · Heme/Onc- H/H 5.7with elevated retic count, 1 units PRBCs were transfused and H/H remained  INR-normal. Elevated D-Dimer, V/Q Scan and Lower Extremity venous Duplex-negative. Occult Blood, Stool-pending. Dr. Dennis Ruiz has been consulted. Daily CBC. · Metabolic - Monitor electrolytes and replace as needed per protocol. Daily BMP, Mg.   · Renal - No acute issues. Monitor Cr and UOP closely. NS @ 100 ml/hr. · Endocrine - No acute issues. TSH-normal.   · Neuro/ Pain/ Sedation - Resume pt's home pain medications, Oxycontin and Roxicodone. PRN dilaudid. · GI - Regular Diet.  LFTs-normal   · Prophylaxis - DVT(SCDs), GI(Protonix)              CCM time 31 min    Niya Andino MD

## 2017-02-24 ENCOUNTER — APPOINTMENT (OUTPATIENT)
Dept: GENERAL RADIOLOGY | Age: 52
DRG: 812 | End: 2017-02-24
Attending: NURSE PRACTITIONER
Payer: COMMERCIAL

## 2017-02-24 LAB
ALBUMIN SERPL BCP-MCNC: 2.8 G/DL (ref 3.4–5)
ALBUMIN/GLOB SERPL: 0.9 {RATIO} (ref 0.8–1.7)
ALP SERPL-CCNC: 123 U/L (ref 45–117)
ALT SERPL-CCNC: 13 U/L (ref 13–56)
ANION GAP BLD CALC-SCNC: 8 MMOL/L (ref 3–18)
AST SERPL W P-5'-P-CCNC: 14 U/L (ref 15–37)
BASOPHILS # BLD AUTO: 0 K/UL (ref 0–0.06)
BASOPHILS # BLD: 0 % (ref 0–3)
BILIRUB SERPL-MCNC: 6.9 MG/DL (ref 0.2–1)
BUN SERPL-MCNC: 15 MG/DL (ref 7–18)
BUN/CREAT SERPL: 18 (ref 12–20)
CA-I SERPL-SCNC: 1.15 MMOL/L (ref 1.12–1.32)
CALCIUM SERPL-MCNC: 7.9 MG/DL (ref 8.5–10.1)
CHLORIDE SERPL-SCNC: 112 MMOL/L (ref 100–108)
CO2 SERPL-SCNC: 22 MMOL/L (ref 21–32)
CREAT SERPL-MCNC: 0.82 MG/DL (ref 0.6–1.3)
DIFFERENTIAL METHOD BLD: ABNORMAL
EOSINOPHIL # BLD: 0.1 K/UL (ref 0–0.4)
EOSINOPHIL NFR BLD: 2 % (ref 0–5)
ERYTHROCYTE [DISTWIDTH] IN BLOOD BY AUTOMATED COUNT: 20.1 % (ref 11.6–14.5)
GLOBULIN SER CALC-MCNC: 3 G/DL (ref 2–4)
GLUCOSE SERPL-MCNC: 87 MG/DL (ref 74–99)
HCT VFR BLD AUTO: 20.2 % (ref 35–45)
HGB BLD-MCNC: 6.5 G/DL (ref 12–16)
LYMPHOCYTES # BLD AUTO: 25 % (ref 20–51)
LYMPHOCYTES # BLD: 1.8 K/UL (ref 0.8–3.5)
MAGNESIUM SERPL-MCNC: 2.3 MG/DL (ref 1.8–2.4)
MCH RBC QN AUTO: 29.8 PG (ref 24–34)
MCHC RBC AUTO-ENTMCNC: 32.2 G/DL (ref 31–37)
MCV RBC AUTO: 92.7 FL (ref 74–97)
METAMYELOCYTES NFR BLD MANUAL: 1 %
MONOCYTES # BLD: 0.3 K/UL (ref 0–1)
MONOCYTES NFR BLD AUTO: 4 % (ref 2–9)
NEUTS BAND NFR BLD MANUAL: 4 % (ref 0–5)
NEUTS SEG # BLD: 5 K/UL (ref 1.8–8)
NEUTS SEG NFR BLD AUTO: 64 % (ref 42–75)
PHOSPHATE SERPL-MCNC: 2.9 MG/DL (ref 2.5–4.9)
PLATELET # BLD AUTO: 39 K/UL (ref 135–420)
PLATELET COMMENTS,PCOM: ABNORMAL
PMV BLD AUTO: 10.7 FL (ref 9.2–11.8)
POTASSIUM SERPL-SCNC: 3.7 MMOL/L (ref 3.5–5.5)
PROT SERPL-MCNC: 5.8 G/DL (ref 6.4–8.2)
RBC # BLD AUTO: 2.18 M/UL (ref 4.2–5.3)
RBC MORPH BLD: ABNORMAL
SODIUM SERPL-SCNC: 142 MMOL/L (ref 136–145)
WBC # BLD AUTO: 7.3 K/UL (ref 4.6–13.2)

## 2017-02-24 PROCEDURE — 82330 ASSAY OF CALCIUM: CPT | Performed by: NURSE PRACTITIONER

## 2017-02-24 PROCEDURE — 71010 XR CHEST PORT: CPT

## 2017-02-24 PROCEDURE — 65610000006 HC RM INTENSIVE CARE

## 2017-02-24 PROCEDURE — 74011250637 HC RX REV CODE- 250/637: Performed by: PHYSICIAN ASSISTANT

## 2017-02-24 PROCEDURE — 86870 RBC ANTIBODY IDENTIFICATION: CPT | Performed by: PHYSICIAN ASSISTANT

## 2017-02-24 PROCEDURE — 84100 ASSAY OF PHOSPHORUS: CPT | Performed by: PHYSICIAN ASSISTANT

## 2017-02-24 PROCEDURE — 86900 BLOOD TYPING SEROLOGIC ABO: CPT | Performed by: PHYSICIAN ASSISTANT

## 2017-02-24 PROCEDURE — 80053 COMPREHEN METABOLIC PANEL: CPT | Performed by: PHYSICIAN ASSISTANT

## 2017-02-24 PROCEDURE — 85025 COMPLETE CBC W/AUTO DIFF WBC: CPT | Performed by: PHYSICIAN ASSISTANT

## 2017-02-24 PROCEDURE — 83735 ASSAY OF MAGNESIUM: CPT | Performed by: PHYSICIAN ASSISTANT

## 2017-02-24 PROCEDURE — 74011250637 HC RX REV CODE- 250/637: Performed by: INTERNAL MEDICINE

## 2017-02-24 PROCEDURE — 86920 COMPATIBILITY TEST SPIN: CPT | Performed by: PHYSICIAN ASSISTANT

## 2017-02-24 PROCEDURE — 74011250636 HC RX REV CODE- 250/636: Performed by: INTERNAL MEDICINE

## 2017-02-24 PROCEDURE — 36415 COLL VENOUS BLD VENIPUNCTURE: CPT | Performed by: PHYSICIAN ASSISTANT

## 2017-02-24 RX ORDER — SODIUM CHLORIDE 9 MG/ML
250 INJECTION, SOLUTION INTRAVENOUS AS NEEDED
Status: DISCONTINUED | OUTPATIENT
Start: 2017-02-24 | End: 2017-02-25 | Stop reason: HOSPADM

## 2017-02-24 RX ORDER — POTASSIUM CHLORIDE 20 MEQ/1
20 TABLET, EXTENDED RELEASE ORAL ONCE
Status: COMPLETED | OUTPATIENT
Start: 2017-02-24 | End: 2017-02-24

## 2017-02-24 RX ADMIN — NALOXEGOL OXALATE 12.5 MG: 12.5 TABLET, FILM COATED ORAL at 10:45

## 2017-02-24 RX ADMIN — PANTOPRAZOLE SODIUM 40 MG: 40 TABLET, DELAYED RELEASE ORAL at 10:37

## 2017-02-24 RX ADMIN — OXYCODONE HYDROCHLORIDE 10 MG: 5 TABLET ORAL at 13:47

## 2017-02-24 RX ADMIN — HYDROMORPHONE HYDROCHLORIDE 4 MG: 2 TABLET ORAL at 04:08

## 2017-02-24 RX ADMIN — POTASSIUM CHLORIDE 20 MEQ: 1500 TABLET, EXTENDED RELEASE ORAL at 10:10

## 2017-02-24 RX ADMIN — METOPROLOL TARTRATE 25 MG: 25 TABLET ORAL at 10:37

## 2017-02-24 RX ADMIN — METOPROLOL TARTRATE 25 MG: 25 TABLET ORAL at 18:38

## 2017-02-24 RX ADMIN — OXYCODONE HYDROCHLORIDE 10 MG: 5 TABLET ORAL at 18:38

## 2017-02-24 RX ADMIN — OXYCODONE HYDROCHLORIDE 10 MG: 5 TABLET ORAL at 06:38

## 2017-02-24 RX ADMIN — ASPIRIN 325 MG ORAL TABLET 325 MG: 325 PILL ORAL at 10:37

## 2017-02-24 RX ADMIN — OXYCODONE HYDROCHLORIDE 20 MG: 20 TABLET, FILM COATED, EXTENDED RELEASE ORAL at 10:38

## 2017-02-24 RX ADMIN — FOLIC ACID 1 MG: 1 TABLET ORAL at 10:38

## 2017-02-24 RX ADMIN — ASPIRIN 325 MG ORAL TABLET 325 MG: 325 PILL ORAL at 18:38

## 2017-02-24 RX ADMIN — SODIUM CHLORIDE 100 ML/HR: 900 INJECTION, SOLUTION INTRAVENOUS at 04:09

## 2017-02-24 RX ADMIN — OXYCODONE HYDROCHLORIDE 20 MG: 20 TABLET, FILM COATED, EXTENDED RELEASE ORAL at 21:20

## 2017-02-24 RX ADMIN — SODIUM CHLORIDE 100 ML/HR: 900 INJECTION, SOLUTION INTRAVENOUS at 22:25

## 2017-02-24 NOTE — PROGRESS NOTES
Bedside and Verbal shift change report given to HENNA Gibson  (oncoming nurse) by Robyn Rai RN   (offgoing nurse). Report included the following information SBAR, Procedure Summary, Intake/Output and MAR. Monica Armando

## 2017-02-24 NOTE — PROGRESS NOTES
Hematology/ Medical Oncology  Progress Note      NAME: Thien Lemon   :  1965  MRM:  [id number]    Date/Time: 2017  08:03 AM         Subjective: The patient is a 68-year-old [facility] woman with sickle cell disease and a history of breast cancer. She recently had a right total hip replacement and came in with complaints of shortness of breath and was found to have severe anemia. Due to the presence of multiple antibodies, it takes longer to obtain compatible units of PRBCs. She is resting in bed in NAD. She denies significant pain at this time. She is reporting relatively good pain control with the current pain medication. She denies CP, light headedness but does have mid SOB when she gets up and out of bed. Otherwise there are no new complaints. .    Past Medical History reviewed and unchanged from Admission History and Physical    Review of Systems   Constitutional: Positive for fatigue. Negative for activity change. HENT: Negative. Eyes: Negative. Respiratory: Positive for shortness of breath. When out of bed. Cardiovascular: Negative for chest pain. Gastrointestinal: Negative for abdominal distention, abdominal pain, constipation, diarrhea and nausea. Endocrine: Negative. Genitourinary: Negative. Musculoskeletal: Negative for arthralgias and myalgias. Skin: Negative. Neurological: Negative for dizziness, light-headedness and headaches. Hematological: Negative for adenopathy. Does not bruise/bleed easily. Psychiatric/Behavioral: Negative. Objective:       Vitals:      Last 24hrs VS reviewed since prior progress note.  Most recent are:    Visit Vitals    /68    Pulse 72    Temp 98 °F (36.7 °C)    Resp 10    Ht 5' 6\" (1.676 m)    Wt 101.3 kg (223 lb 5.2 oz)    SpO2 100%    Breastfeeding No    BMI 36.05 kg/m2     SpO2 Readings from Last 6 Encounters:   17 100%   17 100%   16 99%   11/15/16 99%   10/05/16 100% 08/23/16 100%    O2 Flow Rate (L/min): 2 l/min       Intake/Output Summary (Last 24 hours) at 02/24/17 0803  Last data filed at 02/24/17 0000   Gross per 24 hour   Intake             1290 ml   Output             1750 ml   Net             -460 ml          Exam:      Physical Exam   Nursing note and vitals reviewed. Constitutional: She is oriented to person, place, and time. She appears well-developed and well-nourished. HENT:   Head: Normocephalic and atraumatic. Eyes: Pupils are equal, round, and reactive to light. Neck: Normal range of motion. Neck supple. No thyromegaly present. Cardiovascular: Normal rate and regular rhythm. Pulmonary/Chest: Effort normal and breath sounds normal.   Abdominal: Soft. Bowel sounds are normal.   Musculoskeletal: Normal range of motion. Lymphadenopathy:     She has no cervical adenopathy. Neurological: She is alert and oriented to person, place, and time. Skin: Skin is warm and dry. Psychiatric: She has a normal mood and affect.  Her behavior is normal. Judgment and thought content normal.       Telemetry reviewed:   normal sinus rhythm        Lab Data Reviewed: (see below)      Medications:  Current Facility-Administered Medications   Medication Dose Route Frequency    0.9% sodium chloride infusion 250 mL  250 mL IntraVENous PRN    potassium chloride (K-DUR, KLOR-CON) SR tablet 20 mEq  20 mEq Oral ONCE    0.9% sodium chloride infusion 250 mL  250 mL IntraVENous PRN    0.9% sodium chloride infusion 250 mL  250 mL IntraVENous PRN    0.9% sodium chloride infusion 250 mL  250 mL IntraVENous PRN    influenza vaccine 2016-17 (36mos+)(PF) (FLUZONE/FLUARIX/FLULAVAL QUAD) injection 0.5 mL  0.5 mL IntraMUSCular PRIOR TO DISCHARGE    aspirin (ASPIRIN) tablet 325 mg  325 mg Oral BID    folic acid (FOLVITE) tablet 1 mg  1 mg Oral DAILY    HYDROmorphone (DILAUDID) tablet 2-4 mg  2-4 mg Oral Q4H PRN    metoprolol tartrate (LOPRESSOR) tablet 25 mg  25 mg Oral BID    naloxegol (MOVANTIK) tablet 12.5 mg  12.5 mg Oral DAILY    oxyCODONE ER (OxyCONTIN) tablet 20 mg  20 mg Oral BID    oxyCODONE IR (ROXICODONE) tablet 10 mg  10 mg Oral QID    temazepam (RESTORIL) capsule 15 mg  15 mg Oral QHS PRN    0.9% sodium chloride infusion  100 mL/hr IntraVENous CONTINUOUS    pantoprazole (PROTONIX) tablet 40 mg  40 mg Oral DAILY    0.9% sodium chloride infusion 250 mL  250 mL IntraVENous PRN       ______________________________________________________________________      Lab Review:     Recent Labs      02/24/17 0340 02/23/17   0345  02/22/17   1530   WBC  7.3  10.5  16.1*   HGB  6.5*  5.7*  5.8*   HCT  20.2*  17.5*  18.1*   PLT  39*  47*  56*     Recent Labs      02/24/17   0340 02/23/17   0345  02/22/17   1530  02/22/17   0520   NA  142  140   --   140   K  3.7  3.6   --   4.2   CL  112*  109*   --   108   CO2  22  23   --   25   GLU  87  95   --   98   BUN  15  17   --   11   CREA  0.82  0.89   --   1.10   CA  7.9*  8.2*   --   8.5   MG  2.3  2.1   --   2.3   PHOS  2.9  2.7   --   2.9   ALB  2.8*  3.0*   --   3.2*   SGOT  14*  23   --   22   ALT  13  13   --   17   INR   --    --   1.3*   --      No components found for: GLPOC  No results for input(s): PH, PCO2, PO2, HCO3, FIO2 in the last 72 hours. Recent Labs      02/22/17   1530   INR  1.3*                Assessment:     Principal Problem:    Symptomatic anemia (8/20/2012)    Active Problems:    Breast cancer ([facility] (8/20/2012)      Vitamin D deficiency (5/19/2015)      Sickle cell anemia ([facility] (8/20/2012)      Dehydration (11/16/2012)      Fibromyalgia ()      Sickle cell crisis ([facility] (8/24/2013)      Overview: Acute sickle cell crisis. Leukocytosis (2/20/2017)           Plan:     Risk of deterioration: medium             1. Sickle cell anemia:  The am cbc shows a hemoglobin of 6.5 gm/dl and hematocrit of 20.2%. She was transfused with two additional units of blood yesterday.  The cbc will be checked again in the am tomorrow. We will continue to monitor her CBC daily. Direct and indirect garry results still pending. 2. Thrombocytopenia:  Platelets today are 63,833. Transfusion will be received for platelets of < 03,907. Will continue to monitor daily. 3. Sickle cell associated pain:  Continue current pain management regimen.          Total time spent with patient: 30 minutes                 Care Plan discussed with: Patient and Nursing Staff    Discussed:  Care Plan    Prophylaxis:  SCD's and H2B/PPI    Disposition:  Home w/Family           ___________________________________________________    Attending Physician: Al Guadalupe MD

## 2017-02-24 NOTE — ROUTINE PROCESS
Bedside shift change report given to Gino Rob RN (oncoming nurse) by Cynthia Vasquez RN (offgoing nurse). Report included the following information SBAR, Kardex, Procedure Summary, Intake/Output, MAR, Recent Results, Med Rec Status and Cardiac Rhythm NSR.

## 2017-02-24 NOTE — PROGRESS NOTES
[facility]  ICU Progress Note      Name: Ritu Bell   : 1965   MRN: [id number]   Date: 2017 1:42 AM     [x]I have reviewed the flowsheet and previous days notes. Events overnight reviewed and discussed with nursing staff. Vital signs and records reviewed. Pt is a 46 y.o. female with pmhx of sickle cell anemia, fibromyalgia, GERD, HTN, breast cancer s/p mastectomy, OA s/p right hip surgery, tobacco abuse, presented to the [facility] ER with 3 days of increasing dyspnea on exertion and dizziness. She states that she knew she needed a blood transfusion. D-dimer was found to be elevated upon arrival to the ER with a very low H/H. Pt was transferred from the [facility] ER to the [facility] ER because there are no beds in the ICU. Will monitor in the ICU until H/H has improved.         Subjective:    Pt has received total of 7 units PRBCs, H/H 6.5/20.2 this AM.   Pt states she is feeling much better with no complaints at this time. Denies chest pain, palpitations, hematuria, epistaxis, melena, hemoptysis, abdominal pain, n/v/d, fevers/chills. Urine light colored and with adequate output. Pt has not had any stools since admission. Respiratory status Stable on 2 L NC. Hemodynamically stable. Afebrile. ROS:Pertinent items are noted in HPI.     Medication Review:  · Pressors - none  · Sedation - none  · Antibiotics - none  · Pain - oxycodone   · GI/ DVT - Protonix/SCDS  · Others (other gtts)    Safety Bundles: Electrolyte Replacement Protocol    Vital Signs:    Visit Vitals    /60    Pulse 73    Temp 98 °F (36.7 °C)    Resp 17    Ht 5' 6\" (1.676 m)    Wt 101.3 kg (223 lb 5.2 oz)    SpO2 98%    Breastfeeding No    BMI 36.05 kg/m2       O2 Device: Room air   O2 Flow Rate (L/min): 2 l/min   Temp (24hrs), Av.2 °F (36.8 °C), Min:98 °F (36.7 °C), Max:98.5 °F (36.9 °C)       Intake/Output:   Last shift:      1901 -  0700  In: 500 [P.O.:240]  Out: 700 [Urine:700]  Last 3 shifts: 02/22 0701 - 02/23 1900  In: 3292.9 [P.O.:960; I.V.:1650]  Out: 3160 [Urine:3160]    Intake/Output Summary (Last 24 hours) at 02/24/17 0524  Last data filed at 02/24/17 0000   Gross per 24 hour   Intake             1290 ml   Output             2250 ml   Net             -960 ml         Physical Exam:    General:  Alert, cooperative, no distress, appears stated age. Head:  Normocephalic, without obvious abnormality, atraumatic. Eyes:  Conjunctivae pale. PERRL, EOMs intact. Nose: Nares normal. Septum midline. Mucosa normal.   Throat: Lips, mucosa, and tongue normal. Teeth and gums normal.   Neck: Supple, symmetrical, trachea midline. Lungs:  Clear to auscultation bilaterally. Chest wall:  No tenderness or deformity. Heart:  Regular rate and rhythm, S1, S2 normal, no murmur, click, rub or gallop. Abdomen:  Soft, non-tender. Bowel sounds normal. No masses, No organomegaly. Extremities: Extremities normal, atraumatic, no cyanosis or edema. Pulses: 2+ and symmetric all extremities. Skin: Skin color, texture, turgor normal. No rashes or lesions. mastectomy on left side. Clean, dry incision on right hip.     Neurologic: Grossly nonfocal       DATA:     Current Facility-Administered Medications   Medication Dose Route Frequency    0.9% sodium chloride infusion 250 mL  250 mL IntraVENous PRN    0.9% sodium chloride infusion 250 mL  250 mL IntraVENous PRN    0.9% sodium chloride infusion 250 mL  250 mL IntraVENous PRN    influenza vaccine 2016-17 (36mos+)(PF) (FLUZONE/FLUARIX/FLULAVAL QUAD) injection 0.5 mL  0.5 mL IntraMUSCular PRIOR TO DISCHARGE    aspirin (ASPIRIN) tablet 325 mg  325 mg Oral BID    folic acid (FOLVITE) tablet 1 mg  1 mg Oral DAILY    HYDROmorphone (DILAUDID) tablet 2-4 mg  2-4 mg Oral Q4H PRN    metoprolol tartrate (LOPRESSOR) tablet 25 mg  25 mg Oral BID    naloxegol (MOVANTIK) tablet 12.5 mg  12.5 mg Oral DAILY    oxyCODONE ER (OxyCONTIN) tablet 20 mg  20 mg Oral BID  oxyCODONE IR (ROXICODONE) tablet 10 mg  10 mg Oral QID    temazepam (RESTORIL) capsule 15 mg  15 mg Oral QHS PRN    0.9% sodium chloride infusion  100 mL/hr IntraVENous CONTINUOUS    pantoprazole (PROTONIX) tablet 40 mg  40 mg Oral DAILY    0.9% sodium chloride infusion 250 mL  250 mL IntraVENous PRN         Labs: Results:       Chemistry Recent Labs      02/23/17   0345  02/22/17   0520   GLU  95  98   NA  140  140   K  3.6  4.2   CL  109*  108   CO2  23  25   BUN  17  11   CREA  0.89  1.10   CA  8.2*  8.5   AGAP  8  7   BUCR  19  10*   AP  133*  141*   TP  6.1*  6.5   ALB  3.0*  3.2*   GLOB  3.1  3.3   AGRAT  1.0  1.0      CBC w/Diff Recent Labs      02/24/17   0340  02/23/17   0345  02/22/17   1530   WBC  7.3  10.5  16.1*   RBC  2.18*  1.91*  1.91*   HGB  6.5*  5.7*  5.8*   HCT  20.2*  17.5*  18.1*   PLT  39*  47*  56*   GRANS  PENDING  80*  85*   LYMPH  PENDING  11*  11*   EOS  PENDING  1  0      Coagulation Recent Labs      02/22/17   1530   PTP  15.9*   INR  1.3*       Liver Enzymes Recent Labs      02/23/17   0345   TP  6.1*   ALB  3.0*   AP  133*   SGOT  23      ABG No results found for: PH, PHI, PCO2, PCO2I, PO2, PO2I, HCO3, HCO3I, FIO2, FIO2I   Microbiology No results for input(s): CULT in the last 72 hours. Telemetry: [x]Sinus []A-flutter []Paced    []A-fib []Multiple PVCs                    Imaging:  [x]I have personally reviewed the patients radiographs  []Radiographs reviewed with radiologist   [x]No change from prior, tubes and lines in adequate position  []Improved   []Worsening        IMPRESSION:   · Severe Acute on Chronic Sickle Cell Anemia- with elevated retic count  · Sickle Cell Crisis- mild, minimal pain, dyspnea on exertion present  · Elevated D-Dimer- negative for PE  · Leucocytosis- mild, likely reactive   · HTN- well-controlled  · OA s/p right hip replacement on 02/03/17 and left hip replacement 08/2016  · Tobacco Abuse- 7 cigarettes/day for 30 years.    · Hx of KINSEY- does not use CPAP  · Hx of breast cancer s/p mastectomy   · Hx of cholecystitis s/p cholecystectomy   · Fibromyalgia       PLAN:   · Resp - stable on 2 Liters NC. Smoking Cessation recommended. CXR-no acute process. V/Q scan-negative for PE.   · ID - Afebrile, leucocytosis-most likely reactive. Monitor for s/s of infection. · CVS - Hemodynamically stable. Continue home BP meds- metoprolol and aspirin. Hold for SBP < 100 or HR < 65.   · Heme/Onc- Per Hematology Recommendations, H/H 6.5/20/2 with elevated retic count. Total of 7 units PRBCs have been given, will order 1 more to maintain HgHb > 7. Platelets continue to trend down from 47 to 39, transfuse if platelets drop < 66,384. INR-normal. Elevated D-Dimer, V/Q Scan and Lower Extremity venous Duplex-negative. Occult Blood, Stool-pending. Dr. Oz Damian has been consulted. Daily CBC. · Metabolic - Monitor electrolytes and replace as needed per protocol. Daily BMP, Mg.   · Renal - No acute issues. Monitor Cr and UOP closely. NS @ 100 ml/hr. · Endocrine - No acute issues. TSH-normal.   · Neuro/ Pain/ Sedation - Resume pt's home pain medications, Oxycontin and Roxicodone. PRN dilaudid. · GI - Regular Diet. LFTs-normal   · No central line, No Bull.    · Prophylaxis - DVT(SCDs), GI(Protonix)              The patient is: [x] acutely ill Risk of deterioration: [x] moderate    [] critically ill  [] high     [x]See my orders for details    My assessment/plan was discussed with:  [x]nursing []PT/OT    []respiratory therapy [x]Dr. Hong Fritz   []family []         Noris Sauceda PA-C

## 2017-02-25 VITALS
RESPIRATION RATE: 13 BRPM | BODY MASS INDEX: 36.35 KG/M2 | WEIGHT: 226.19 LBS | DIASTOLIC BLOOD PRESSURE: 95 MMHG | SYSTOLIC BLOOD PRESSURE: 128 MMHG | TEMPERATURE: 97.6 F | HEIGHT: 66 IN | OXYGEN SATURATION: 100 % | HEART RATE: 60 BPM

## 2017-02-25 LAB
ALBUMIN SERPL BCP-MCNC: 3 G/DL (ref 3.4–5)
ALBUMIN/GLOB SERPL: 0.9 {RATIO} (ref 0.8–1.7)
ALP SERPL-CCNC: 129 U/L (ref 45–117)
ALT SERPL-CCNC: 15 U/L (ref 13–56)
ANION GAP BLD CALC-SCNC: 7 MMOL/L (ref 3–18)
AST SERPL W P-5'-P-CCNC: 19 U/L (ref 15–37)
BASOPHILS # BLD AUTO: 0 K/UL (ref 0–0.1)
BASOPHILS # BLD: 0 % (ref 0–2)
BILIRUB SERPL-MCNC: 4.4 MG/DL (ref 0.2–1)
BUN SERPL-MCNC: 12 MG/DL (ref 7–18)
BUN/CREAT SERPL: 13 (ref 12–20)
CALCIUM SERPL-MCNC: 8.3 MG/DL (ref 8.5–10.1)
CHLORIDE SERPL-SCNC: 108 MMOL/L (ref 100–108)
CO2 SERPL-SCNC: 25 MMOL/L (ref 21–32)
CREAT SERPL-MCNC: 0.91 MG/DL (ref 0.6–1.3)
DIFFERENTIAL METHOD BLD: ABNORMAL
EOSINOPHIL # BLD: 0.1 K/UL (ref 0–0.4)
EOSINOPHIL NFR BLD: 1 % (ref 0–5)
ERYTHROCYTE [DISTWIDTH] IN BLOOD BY AUTOMATED COUNT: 18.2 % (ref 11.6–14.5)
GLOBULIN SER CALC-MCNC: 3.4 G/DL (ref 2–4)
GLUCOSE SERPL-MCNC: 74 MG/DL (ref 74–99)
HCT VFR BLD AUTO: 27.3 % (ref 35–45)
HGB BLD-MCNC: 9.1 G/DL (ref 12–16)
LYMPHOCYTES # BLD AUTO: 24 % (ref 21–52)
LYMPHOCYTES # BLD: 1.8 K/UL (ref 0.9–3.6)
MAGNESIUM SERPL-MCNC: 2.3 MG/DL (ref 1.8–2.4)
MCH RBC QN AUTO: 30.3 PG (ref 24–34)
MCHC RBC AUTO-ENTMCNC: 33.3 G/DL (ref 31–37)
MCV RBC AUTO: 91 FL (ref 74–97)
MONOCYTES # BLD: 0.2 K/UL (ref 0.05–1.2)
MONOCYTES NFR BLD AUTO: 3 % (ref 3–10)
NEUTS SEG # BLD: 5.6 K/UL (ref 1.8–8)
NEUTS SEG NFR BLD AUTO: 72 % (ref 40–73)
PHOSPHATE SERPL-MCNC: 3.2 MG/DL (ref 2.5–4.9)
PLATELET # BLD AUTO: 36 K/UL (ref 135–420)
PMV BLD AUTO: 10.2 FL (ref 9.2–11.8)
POTASSIUM SERPL-SCNC: 4.1 MMOL/L (ref 3.5–5.5)
PROT SERPL-MCNC: 6.4 G/DL (ref 6.4–8.2)
RBC # BLD AUTO: 3 M/UL (ref 4.2–5.3)
SODIUM SERPL-SCNC: 140 MMOL/L (ref 136–145)
WBC # BLD AUTO: 7.7 K/UL (ref 4.6–13.2)

## 2017-02-25 PROCEDURE — 74011250637 HC RX REV CODE- 250/637: Performed by: INTERNAL MEDICINE

## 2017-02-25 PROCEDURE — 86922 COMPATIBILITY TEST ANTIGLOB: CPT | Performed by: PHYSICIAN ASSISTANT

## 2017-02-25 PROCEDURE — 85660 RBC SICKLE CELL TEST: CPT | Performed by: PHYSICIAN ASSISTANT

## 2017-02-25 PROCEDURE — 85025 COMPLETE CBC W/AUTO DIFF WBC: CPT | Performed by: INTERNAL MEDICINE

## 2017-02-25 PROCEDURE — 84100 ASSAY OF PHOSPHORUS: CPT | Performed by: PHYSICIAN ASSISTANT

## 2017-02-25 PROCEDURE — 36415 COLL VENOUS BLD VENIPUNCTURE: CPT | Performed by: PHYSICIAN ASSISTANT

## 2017-02-25 PROCEDURE — 86902 BLOOD TYPE ANTIGEN DONOR EA: CPT | Performed by: PHYSICIAN ASSISTANT

## 2017-02-25 PROCEDURE — 86921 COMPATIBILITY TEST INCUBATE: CPT | Performed by: PHYSICIAN ASSISTANT

## 2017-02-25 PROCEDURE — 83735 ASSAY OF MAGNESIUM: CPT | Performed by: PHYSICIAN ASSISTANT

## 2017-02-25 PROCEDURE — 80053 COMPREHEN METABOLIC PANEL: CPT | Performed by: PHYSICIAN ASSISTANT

## 2017-02-25 PROCEDURE — 36430 TRANSFUSION BLD/BLD COMPNT: CPT

## 2017-02-25 PROCEDURE — P9040 RBC LEUKOREDUCED IRRADIATED: HCPCS | Performed by: PHYSICIAN ASSISTANT

## 2017-02-25 RX ORDER — ASPIRIN 325 MG
325 TABLET ORAL 2 TIMES DAILY
Qty: 30 TAB | Refills: 2 | Status: SHIPPED | OUTPATIENT
Start: 2017-02-25 | End: 2018-06-27

## 2017-02-25 RX ADMIN — ASPIRIN 325 MG ORAL TABLET 325 MG: 325 PILL ORAL at 17:44

## 2017-02-25 RX ADMIN — METOPROLOL TARTRATE 25 MG: 25 TABLET ORAL at 08:45

## 2017-02-25 RX ADMIN — PANTOPRAZOLE SODIUM 40 MG: 40 TABLET, DELAYED RELEASE ORAL at 08:44

## 2017-02-25 RX ADMIN — METOPROLOL TARTRATE 25 MG: 25 TABLET ORAL at 17:44

## 2017-02-25 RX ADMIN — NALOXEGOL OXALATE 12.5 MG: 12.5 TABLET, FILM COATED ORAL at 08:52

## 2017-02-25 RX ADMIN — OXYCODONE HYDROCHLORIDE 10 MG: 5 TABLET ORAL at 17:44

## 2017-02-25 RX ADMIN — ASPIRIN 325 MG ORAL TABLET 325 MG: 325 PILL ORAL at 08:44

## 2017-02-25 RX ADMIN — OXYCODONE HYDROCHLORIDE 20 MG: 20 TABLET, FILM COATED, EXTENDED RELEASE ORAL at 09:14

## 2017-02-25 RX ADMIN — HYDROMORPHONE HYDROCHLORIDE 4 MG: 2 TABLET ORAL at 16:32

## 2017-02-25 RX ADMIN — OXYCODONE HYDROCHLORIDE 10 MG: 5 TABLET ORAL at 06:29

## 2017-02-25 RX ADMIN — OXYCODONE HYDROCHLORIDE 10 MG: 5 TABLET ORAL at 12:46

## 2017-02-25 RX ADMIN — FOLIC ACID 1 MG: 1 TABLET ORAL at 08:45

## 2017-02-25 RX ADMIN — OXYCODONE HYDROCHLORIDE 10 MG: 5 TABLET ORAL at 00:43

## 2017-02-25 NOTE — DISCHARGE SUMMARY
Discharge Summary    Patient: Jesus Matute MRN: [id number]  CSN: [id number]    YOB: 1965  Age: 46 y.o. Sex: female    DOA: 2/20/2017 LOS:  LOS: 5 days   Discharge Date:      Admission Diagnoses: Anemia  Symptomatic anemia    Discharge Diagnoses:    Problem List as of 2/25/2017  Date Reviewed: 2/20/2017          Codes Class Noted - Resolved    Leukocytosis ICD-10-CM: D72.829  ICD-9-CM: 288.60  2/20/2017 - Present        Osteoarthritis of right hip (Chronic) ICD-10-CM: M16.11  ICD-9-CM: 715.95  1/3/2017 - Present        Choledocholithiasis ICD-10-CM: K80.50  ICD-9-CM: 574.50  11/7/2016 - Present        Cholecystitis with cholelithiasis ICD-10-CM: K80.10  ICD-9-CM: 574.10  11/7/2016 - Present        Thrombocytopenia ([facility] ICD-10-CM: D69.6  ICD-9-CM: 287.5  8/22/2016 - Present        Acute blood loss anemia ICD-10-CM: D62  ICD-9-CM: 285.1  8/22/2016 - Present        Osteoarthritis of left hip (Chronic) ICD-10-CM: M16.12  ICD-9-CM: 715.95  8/17/2016 - Present        Breast cancer of lower-outer quadrant of left female breast ([facility] ICD-10-CM: C50.512  ICD-9-CM: 174.5  7/1/2016 - Present        Iron deficiency anemia due to dietary causes ICD-10-CM: D50.8  ICD-9-CM: 280.1  7/1/2016 - Present        Osteoarthritis ICD-10-CM: M19.90  ICD-9-CM: 715.90  4/8/2016 - Present        Vitamin D deficiency ICD-10-CM: E55.9  ICD-9-CM: 268.9  5/19/2015 - Present        Sickle cell crisis ([facility] ICD-10-CM: D57.00  ICD-9-CM: 282.62  8/24/2013 - Present    Overview Signed 8/24/2013  2:57 PM by Bal Burgos     Acute sickle cell crisis.               Fibromyalgia ICD-10-CM: M79.7  ICD-9-CM: 729.1  Unknown - Present        Hx of endometriosis ICD-10-CM: Z87.42  ICD-9-CM: V13.29  Unknown - Present        Dehydration ICD-10-CM: E86.0  ICD-9-CM: 276.51  11/16/2012 - Present        * (Principal)Symptomatic anemia ICD-10-CM: D64.9  ICD-9-CM: 285.9  8/20/2012 - Present        Breast cancer ([facility] ICD-10-CM: C50.919  ICD-9-CM: 174.9  8/20/2012 - Present        Sickle cell anemia (HCC) ICD-10-CM: D57.1  ICD-9-CM: 282.60  8/20/2012 - Present        RESOLVED: UTI (lower urinary tract infection) ICD-10-CM: N39.0  ICD-9-CM: 599.0  8/20/2012 - 7/1/2016            Reason for Admission  46 y. o.with sickle cell anemia and hx breast ca in remission presented to the ED forr evaluation of sob. Patient recently had right hip replacement at [facility] about 3 weeks prior which went well and had been doing well since. During that hospital stay she did receive multiple units of PRBC. But for 2 days she felt increasingly short of breath with exertion. Even walking to the bathroom made her feel short of breath. She denied any pain, hemoptysis, cough, fevers, chills, nausea, vomiting, chest pain. No other stress at home at this time, illness or sick contact. She follows up Dr Nan Jimenez for breast cancer (which has been in remission for 6 yrs now) and sickle cell. Doesn't take any particular meds for sickle cell besides pain meds. Hb noted 4.6 in the ED. Discharge Condition: good    PHYSICAL EXAM at discharge  Visit Vitals    BP (!) 128/95    Pulse 61    Temp 97.6 °F (36.4 °C)    Resp 17    Ht 5' 6\" (1.676 m)    Wt 102.6 kg (226 lb 3.1 oz)    SpO2 100%    Breastfeeding No    BMI 36.51 kg/m2     Awake alert and oriented. Ambulating in room independently and w/o difficulty  [place]. Perrl. Oropharynx clear. MMM  RRR  cta b.l  Soft nt nd nabs. No edema. dp 2+ b.l. Clean, dry incision on right hip. No focal deficit  No rash    Hospital Course:   1. Symptomatic anemia transfused w/ appropriate increase in hct. 2. SC disease with acute hemolytic crisis resolving. No evidence of acute chest syndrome. 3. Leukocytosis, likely reactive and resolved. 4. Recent R hip replacement  5. Fibromyalgia  6. Breast cancer hx s/p mastectomy - follows w/ Dr. Nan Jimenez. 7. TOBACCO abuse - smoking cessation education  8. anemia requiring transfusion  9. Elevated D-Dimer in the setting of recent hip surgery - duplex and VQ scan negative for dvt / PE  10. Hypertension controlled  11. OA s/p right hip replacement on 02/03/17 and left hip replacement 08/2016  12. Hx of KINSEY- does not use CPAP  13. Hx of cholecystitis s/p cholecystectomy  14. Recent death in the family - referred to [facility]  15. Full code. Discharge to home. Patient agrees; all questions answered to the best of my ability. Consults: Pulmonary/Critical Care Dr. Vahe Ramirez / onc Dr. Denia Valente    Significant Diagnostic Studies: labs:   Recent Results (from the past 24 hour(s))   MAGNESIUM    Collection Time: 02/25/17  3:58 AM   Result Value Ref Range    Magnesium 2.3 1.8 - 2.4 mg/dL   PHOSPHORUS    Collection Time: 02/25/17  3:58 AM   Result Value Ref Range    Phosphorus 3.2 2.5 - 4.9 MG/DL   METABOLIC PANEL, COMPREHENSIVE    Collection Time: 02/25/17  3:58 AM   Result Value Ref Range    Sodium 140 136 - 145 mmol/L    Potassium 4.1 3.5 - 5.5 mmol/L    Chloride 108 100 - 108 mmol/L    CO2 25 21 - 32 mmol/L    Anion gap 7 3.0 - 18 mmol/L    Glucose 74 74 - 99 mg/dL    BUN 12 7.0 - 18 MG/DL    Creatinine 0.91 0.6 - 1.3 MG/DL    BUN/Creatinine ratio 13 12 - 20      GFR est AA >60 >60 ml/min/1.73m2    GFR est non-AA >60 >60 ml/min/1.73m2    Calcium 8.3 (L) 8.5 - 10.1 MG/DL    Bilirubin, total 4.4 (H) 0.2 - 1.0 MG/DL    ALT (SGPT) 15 13 - 56 U/L    AST (SGOT) 19 15 - 37 U/L    Alk.  phosphatase 129 (H) 45 - 117 U/L    Protein, total 6.4 6.4 - 8.2 g/dL    Albumin 3.0 (L) 3.4 - 5.0 g/dL    Globulin 3.4 2.0 - 4.0 g/dL    A-G Ratio 0.9 0.8 - 1.7     CBC WITH AUTOMATED DIFF    Collection Time: 02/25/17  9:46 AM   Result Value Ref Range    WBC 7.7 4.6 - 13.2 K/uL    RBC 3.00 (L) 4.20 - 5.30 M/uL    HGB 9.1 (L) 12.0 - 16.0 g/dL    HCT 27.3 (L) 35.0 - 45.0 %    MCV 91.0 74.0 - 97.0 FL    MCH 30.3 24.0 - 34.0 PG    MCHC 33.3 31.0 - 37.0 g/dL    RDW 18.2 (H) 11.6 - 14.5 %    PLATELET 36 (L) 859 - 420 K/uL MPV 10.2 9.2 - 11.8 FL    NEUTROPHILS 72 40 - 73 %    LYMPHOCYTES 24 21 - 52 %    MONOCYTES 3 3 - 10 %    EOSINOPHILS 1 0 - 5 %    BASOPHILS 0 0 - 2 %    ABS. NEUTROPHILS 5.6 1.8 - 8.0 K/UL    ABS. LYMPHOCYTES 1.8 0.9 - 3.6 K/UL    ABS. MONOCYTES 0.2 0.05 - 1.2 K/UL    ABS. EOSINOPHILS 0.1 0.0 - 0.4 K/UL    ABS. BASOPHILS 0.0 0.0 - 0.1 K/UL    DF AUTOMATED       All Micro Results     None        IMAGING  XR Results (most recent):    Results from Hospital Encounter encounter on 02/20/17   XR CHEST PORT   Narrative Chest One View portable 0433 hours    CPT CODE: 24634    HISTORY: Dyspnea on exertion. , Sickle cell disease, severe anemia, history of  breast cancer    COMPARISON: Chest x-ray February 23, 2017, February 20, 2017. FINDINGS:     One view obtained. The cardiac and mediastinal silhouette is top normal. The  lungs are clear and hypoinflated. Pulmonary vascularity is normal. The  costophrenic angles are sharply defined. Mild disc space narrowing with marginal  spurring involves the mid and lower thoracic spine. .         Impression IMPRESSION:    Persistent hypoinflation. Top normal cardiac size. Minimal scarring at the  lingula. Peripheral Venous Testing 21 Feb 2017  Right Leg:-  Deep venous thrombosis: No  Superficial venous thrombosis: No  Deep venous insufficiency: Not examined  Superficial venous insufficiency: Not examined  Left Leg:-  Deep venous thrombosis: No  Superficial venous thrombosis: No  Deep venous insufficiency: Not examined  Superficial venous insufficiency: Not examined    Nuclear Medicine Results (most recent):    Results from [place] encounter on 02/20/17   NM LUNG PERFUSION W VENT   Narrative VQ SCAN     CPT CODE: 54388    INDICATION: Shortness of breath. COMPARISON: None Available.     TECHNIQUE: After aerosolization of 33 mCi 99mTc-DTPA, which delivers  approximately 1-2 mCi of radiopharmaceutical to the lungs, ventilatory images of  the lungs were obtained in multiple projections. After intravenous  administration of 6.02 mCi 99mTc-MAA in the right forearm IV, perfusion images  of the lungs were obtained in multiple projections. Images are correlated with  PA and lateral chest radiographs dated 2/20/2017. FINDINGS:    Moderate area of matched ventilation/perfusion defect in the superior left lower  lobe without clear correlate on the radiographs. There is no evidence of  moderate or large mismatched segmental perfusion defect to indicate the presence  of pulmonary embolism. Impression IMPRESSION:    Low probability VQ scan. Discharge Medications:     Current Discharge Medication List      CONTINUE these medications which have CHANGED    Details   aspirin (ASPIRIN) 325 mg tablet Take 1 Tab by mouth two (2) times a day. Qty: 30 Tab, Refills: 2         CONTINUE these medications which have NOT CHANGED    Details   oxyCODONE IR (ROXICODONE) 10 mg tab immediate release tablet Take 10 mg by mouth four (4) times daily. HYDROmorphone (DILAUDID) 2 mg tablet Take 1-2 Tabs by mouth every four (4) hours as needed for Pain. Max Daily Amount: 24 mg. Qty: 40 Tab, Refills: 0      oxyCODONE ER (OXYCONTIN) 20 mg ER tablet Take 1 Tab by mouth two (2) times a day. Max Daily Amount: 40 mg.  Qty: 60 Tab, Refills: 0      temazepam (RESTORIL) 15 mg capsule Take 1 Cap by mouth nightly as needed. Max Daily Amount: 15 mg.  Qty: 30 Cap, Refills: 1      naloxegol (MOVANTIK) 12.5 mg tab tablet Take 12.5 mg by mouth daily. folic acid (FOLVITE) 1 mg tablet Take 1 mg by mouth daily. metoprolol (LOPRESSOR) 25 mg tablet Take 25 mg by mouth two (2) times a day. Activity: PT/OT per Home Health    Diet: Cardiac Diet    Wound Care: None needed    Follow-up:   1. Dr. Jerald Thorne 7 - 10 days  2. Dr. Serina Pitt 2 - 3 weeks. 3. [facility] 4 weeks. New patient; dx: grief reaction.     Minutes spent on discharge: greater than 30

## 2017-02-25 NOTE — PROGRESS NOTES
[facility] Hospitalist Group  Progress Note    Patient: Kayleigh Hogue Age: 46 y.o. : 1965 MR#: [id number] SSN: [id number]  Date/Time: 2017 3:59 PM    Subjective/24-hour events:     Feels well, no chest pain or SOB. Denies any significant pain currently. Assessment:   Symptomatic anemia  SC disease with acute hemolytic crisis  Leukocytosis, suspect reactive  Recent R hip replacement  Fibromyalgia  Breast cancer hx    Plan:  PRBCs as necessary, monitor H/H. Analgesia per usual regimen. Continue to monitor in ICU until counts improve/stabilize. Case discussed with:  [x]Patient  []Family  []Nursing  []Case Management  DVT Prophylaxis:  []Lovenox  []Hep SQ  [x]SCDs  []Coumadin   []On Heparin gtt    Objective:   VS:   Visit Vitals    /62    Pulse 68    Temp 97.5 °F (36.4 °C)    Resp 10    Ht 5' 6\" (1.676 m)    Wt 101.3 kg (223 lb 5.2 oz)    SpO2 100%    Breastfeeding No    BMI 36.05 kg/m2      Tmax/24hrs: Temp (24hrs), Av.2 °F (36.8 °C), Min:97.5 °F (36.4 °C), Max:98.7 °F (37.1 °C)      Intake/Output Summary (Last 24 hours)    Gross per 24 hour   Intake             2500 ml   Output             1450 ml   Net             1050 ml       General:  In NAD. Nontoxic-appearing. Cardiovascular:  RRR. Pulmonary:  Clear, no wheezes. Effort nonlabored. GI:  Abdomen soft, NTTP. Extremities:  Warm, no ischemia. Neuro:  Alert/appropriate. Moves extremities spontaneously.     Labs:    Recent Results (from the past 24 hour(s))   CBC WITH AUTOMATED DIFF    Collection Time: 17  3:40 AM   Result Value Ref Range    WBC 7.3 4.6 - 13.2 K/uL    RBC 2.18 (L) 4.20 - 5.30 M/uL    HGB 6.5 (L) 12.0 - 16.0 g/dL    HCT 20.2 (L) 35.0 - 45.0 %    MCV 92.7 74.0 - 97.0 FL    MCH 29.8 24.0 - 34.0 PG    MCHC 32.2 31.0 - 37.0 g/dL    RDW 20.1 (H) 11.6 - 14.5 %    PLATELET 39 (L) 854 - 420 K/uL    MPV 10.7 9.2 - 11.8 FL    NEUTROPHILS 64 42 - 75 %    BAND NEUTROPHILS 4 0 - 5 % LYMPHOCYTES 25 20 - 51 %    MONOCYTES 4 2 - 9 %    EOSINOPHILS 2 0 - 5 %    BASOPHILS 0 0 - 3 %    METAMYELOCYTES 1 (H) 0 %    ABS. NEUTROPHILS 5.0 1.8 - 8.0 K/UL    ABS. LYMPHOCYTES 1.8 0.8 - 3.5 K/UL    ABS. MONOCYTES 0.3 0 - 1.0 K/UL    ABS. EOSINOPHILS 0.1 0.0 - 0.4 K/UL    ABS. BASOPHILS 0.0 0.0 - 0.06 K/UL    DF MANUAL      PLATELET COMMENTS DECREASED PLATELETS      RBC COMMENTS ANISOCYTOSIS  2+        RBC COMMENTS POLYCHROMASIA  2+        RBC COMMENTS TARGET CELLS  1+        RBC COMMENTS STOMATOCYTES  1+       MAGNESIUM    Collection Time: 02/24/17  3:40 AM   Result Value Ref Range    Magnesium 2.3 1.8 - 2.4 mg/dL   PHOSPHORUS    Collection Time: 02/24/17  3:40 AM   Result Value Ref Range    Phosphorus 2.9 2.5 - 4.9 MG/DL   METABOLIC PANEL, COMPREHENSIVE    Collection Time: 02/24/17  3:40 AM   Result Value Ref Range    Sodium 142 136 - 145 mmol/L    Potassium 3.7 3.5 - 5.5 mmol/L    Chloride 112 (H) 100 - 108 mmol/L    CO2 22 21 - 32 mmol/L    Anion gap 8 3.0 - 18 mmol/L    Glucose 87 74 - 99 mg/dL    BUN 15 7.0 - 18 MG/DL    Creatinine 0.82 0.6 - 1.3 MG/DL    BUN/Creatinine ratio 18 12 - 20      GFR est AA >60 >60 ml/min/1.73m2    GFR est non-AA >60 >60 ml/min/1.73m2    Calcium 7.9 (L) 8.5 - 10.1 MG/DL    Bilirubin, total 6.9 (H) 0.2 - 1.0 MG/DL    ALT (SGPT) 13 13 - 56 U/L    AST (SGOT) 14 (L) 15 - 37 U/L    Alk. phosphatase 123 (H) 45 - 117 U/L    Protein, total 5.8 (L) 6.4 - 8.2 g/dL    Albumin 2.8 (L) 3.4 - 5.0 g/dL    Globulin 3.0 2.0 - 4.0 g/dL    A-G Ratio 0.9 0.8 - 1.7     CALCIUM, IONIZED    Collection Time: 02/24/17  3:40 AM   Result Value Ref Range    Ionized Calcium 1.15 1.12 - 1.32 MMOL/L   TYPE & CROSSMATCH    Collection Time: 02/24/17  6:25 AM   Result Value Ref Range    Crossmatch Expiration 02/27/2017     ABO/Rh(D) O POSITIVE     Antibody screen POS     Antibody ID NONSPECIFIC ANTIBODY     CALLED TO: SUDEEP NEAL RN(ICU) BY SHAYAN AT 0109 02/25/2017.      Unit number [id number]     Blood component type [facility] AS1     Unit division 00     Status of unit ALLOCATED     Crossmatch result LEAST INCOMPATIBLE     ANTIGEN/ANTIBODY INFO       C NEGATIVE,  S NEGATIVE,  FY(A) NEGATIVE,  Jk(B) NEGATIVE,  RHIANNA NEGATIVE,      Unit number [id number]     Blood component type [facility] AS1     Unit division 00     Status of unit ISSUED     Crossmatch result LEAST INCOMPATIBLE     ANTIGEN/ANTIBODY INFO       C NEGATIVE,  S NEGATIVE,  RHIANNA NEGATIVE,  FY(A) NEGATIVE,  Jk(B) NEGATIVE,         Signed By: Chai Piper MD     February 24, 2017 3:59 PM

## 2017-02-25 NOTE — PROGRESS NOTES
2000 Received pt alert, pt denies pain, assisted to bedside commode. Bed in lowest position and call bell within reach. Will continue to monitor. [address] from Blood Bank to ask for the status of 2 units of  blood. They haven't received it from [facility] yet. They will call us to [facility] when its ready. Assisted pt to bedside commode. 0000 Pt is resting. No signs of distress noted. 0400 Pt is resting, 2nd unit of blood txt started. Will continue to monitor. 0700 Bedside and Verbal shift change report given to Elvia Meza by Henry Zamora RN   (offgoing nurse). Report included the following information SBAR, MAR and Recent Results.

## 2017-02-25 NOTE — ROUTINE PROCESS
Bedside and Verbal shift change report given to Korin Barrera RN (oncoming nurse) by Gerber Quintana RN (offgoing nurse).  Report included the following information SBAR, Intake/Output, MAR, Recent Results and Cardiac Rhythm SR.

## 2017-02-25 NOTE — PROGRESS NOTES
[facility]  ICU Progress Note  DUPLICATE NOTE, SEE MY NOTE DATED SAME DAY      Name: Kayleigh Hogue   : 1965   MRN: [id number]   Date: 2017 1:42 AM     [x]I have reviewed the flowsheet and previous days notes. Events overnight reviewed and discussed with nursing staff. Vital signs and records reviewed. Pt is a 46 y.o. female with pmhx of sickle cell anemia, fibromyalgia, GERD, HTN, breast cancer s/p mastectomy, OA s/p right hip surgery, tobacco abuse, presented to the [facility] ER with 3 days of increasing dyspnea on exertion and dizziness. She states that she knew she needed a blood transfusion. D-dimer was found to be elevated upon arrival to the ER with a very low H/H. Pt was transferred from the [facility] ER to the [facility] ER because there are no beds in the ICU. Will monitor in the ICU until H/H has improved.         Subjective:    Pt has received total of 7 units PRBCs, H/H 6.5/20.2 this AM, pt is currently getting transfused two more units. Pt states she is feeling much better with no complaints at this time. Denies chest pain, palpitations, hematuria, epistaxis, melena, hemoptysis, abdominal pain, n/v/d, fevers/chills. Urine light colored and with adequate output. Respiratory status Stable on RA. Hemodynamically stable. Afebrile. ROS:Pertinent items are noted in HPI.     Medication Review:  · Pressors - none  · Sedation - none  · Antibiotics - none  · Pain - oxycodone   · GI/ DVT - Protonix/SCDS  · Others (other gtts)    Safety Bundles: Electrolyte Replacement Protocol    Vital Signs:    Visit Vitals    /62    Pulse 68    Temp 97.5 °F (36.4 °C)    Resp 10    Ht 5' 6\" (1.676 m)    Wt 101.3 kg (223 lb 5.2 oz)    SpO2 100%    Breastfeeding No    BMI 36.05 kg/m2       O2 Device: Room air   O2 Flow Rate (L/min): 2 l/min   Temp (24hrs), Av.2 °F (36.8 °C), Min:97.5 °F (36.4 °C), Max:98.7 °F (37.1 °C)       Intake/Output:   Last shift:      1901 -  0700  In: 100 [I.V.:100]  Out: 300 [Urine:300]  Last 3 shifts: 02/23 0701 - 02/24 1900  In: 8431 [P.O.:1420; I.V.:3970]  Out: 3400 [Urine:3400]    Intake/Output Summary (Last 24 hours) at 02/25/17 0229  Last data filed at 02/24/17 2000   Gross per 24 hour   Intake             2500 ml   Output             1450 ml   Net             1050 ml         Physical Exam:    General:  Alert, cooperative, no distress, appears stated age. Head:  Normocephalic, without obvious abnormality, atraumatic. Eyes:  Conjunctivae pale. PERRL, EOMs intact. Nose: Nares normal. Septum midline. Mucosa normal.   Throat: Lips, mucosa, and tongue normal. Teeth and gums normal.   Neck: Supple, symmetrical, trachea midline. Lungs:  Clear to auscultation bilaterally. Chest wall:  No tenderness or deformity. Heart:  Regular rate and rhythm, S1, S2 normal, no murmur, click, rub or gallop. Abdomen:  Soft, non-tender. Bowel sounds normal. No masses, No organomegaly. Extremities: Extremities normal, atraumatic, no cyanosis or edema. Pulses: 2+ and symmetric all extremities. Skin: Skin color, texture, turgor normal. No rashes or lesions. mastectomy on left side. Clean, dry incision on right hip.     Neurologic: Grossly nonfocal       DATA:     Current Facility-Administered Medications   Medication Dose Route Frequency    0.9% sodium chloride infusion 250 mL  250 mL IntraVENous PRN    0.9% sodium chloride infusion 250 mL  250 mL IntraVENous PRN    0.9% sodium chloride infusion 250 mL  250 mL IntraVENous PRN    0.9% sodium chloride infusion 250 mL  250 mL IntraVENous PRN    influenza vaccine 2016-17 (36mos+)(PF) (FLUZONE/FLUARIX/FLULAVAL QUAD) injection 0.5 mL  0.5 mL IntraMUSCular PRIOR TO DISCHARGE    aspirin (ASPIRIN) tablet 325 mg  325 mg Oral BID    folic acid (FOLVITE) tablet 1 mg  1 mg Oral DAILY    HYDROmorphone (DILAUDID) tablet 2-4 mg  2-4 mg Oral Q4H PRN    metoprolol tartrate (LOPRESSOR) tablet 25 mg  25 mg Oral BID  naloxegol (MOVANTIK) tablet 12.5 mg  12.5 mg Oral DAILY    oxyCODONE ER (OxyCONTIN) tablet 20 mg  20 mg Oral BID    oxyCODONE IR (ROXICODONE) tablet 10 mg  10 mg Oral QID    temazepam (RESTORIL) capsule 15 mg  15 mg Oral QHS PRN    0.9% sodium chloride infusion  100 mL/hr IntraVENous CONTINUOUS    pantoprazole (PROTONIX) tablet 40 mg  40 mg Oral DAILY    0.9% sodium chloride infusion 250 mL  250 mL IntraVENous PRN         Labs: Results:       Chemistry Recent Labs      02/24/17   0340  02/23/17   0345  02/22/17   0520   GLU  87  95  98   NA  142  140  140   K  3.7  3.6  4.2   CL  112*  109*  108   CO2  22  23  25   BUN  15  17  11   CREA  0.82  0.89  1.10   CA  7.9*  8.2*  8.5   AGAP  8  8  7   BUCR  18  19  10*   AP  123*  133*  141*   TP  5.8*  6.1*  6.5   ALB  2.8*  3.0*  3.2*   GLOB  3.0  3.1  3.3   AGRAT  0.9  1.0  1.0      CBC w/Diff Recent Labs      02/24/17   0340 02/23/17   0345  02/22/17   1530   WBC  7.3  10.5  16.1*   RBC  2.18*  1.91*  1.91*   HGB  6.5*  5.7*  5.8*   HCT  20.2*  17.5*  18.1*   PLT  39*  47*  56*   GRANS  64  80*  85*   LYMPH  25  11*  11*   EOS  2  1  0      Coagulation Recent Labs      02/22/17   1530   PTP  15.9*   INR  1.3*       Liver Enzymes Recent Labs      02/24/17   0340   TP  5.8*   ALB  2.8*   AP  123*   SGOT  14*      ABG No results found for: PH, PHI, PCO2, PCO2I, PO2, PO2I, HCO3, HCO3I, FIO2, FIO2I   Microbiology No results for input(s): CULT in the last 72 hours.        Telemetry: [x]Sinus []A-flutter []Paced    []A-fib []Multiple PVCs                    Imaging:  [x]I have personally reviewed the patients radiographs  []Radiographs reviewed with radiologist   [x]No change from prior, tubes and lines in adequate position  []Improved   []Worsening        IMPRESSION:   · Severe Acute on Chronic Sickle Cell Anemia- with elevated retic count  · Sickle Cell Crisis- mild, minimal pain, dyspnea on exertion present  · Elevated D-Dimer- negative for PE  · Leucocytosis- mild, likely reactive   · HTN- well-controlled  · OA s/p right hip replacement on 02/03/17 and left hip replacement 08/2016  · Tobacco Abuse- 7 cigarettes/day for 30 years. · Hx of KINSEY- does not use CPAP  · Hx of breast cancer s/p mastectomy   · Hx of cholecystitis s/p cholecystectomy   · Fibromyalgia       PLAN:     DUPLICATE NOTE, SEE MY NOTE DATED SAME DAY    · Resp - off O2, CXR with e/o bronchitis, no ABX though pt improving without ABX or BD (would ordinarily give if ACS present). Smoking Cessation recommended. CXR-no acute process. V/Q scan-negative for PE.   · ID - Afebrile, leucocytosis-most likely reactive. Monitor for s/s of infection. If platelets still falling, will start nebs and ABX given bronchitis/bronchopneumonia on CT  · CVS - Hemodynamically stable. Continue home BP meds- metoprolol and aspirin. Hold for SBP < 100 or HR < 65.   · Heme/Onc- Per Hematology Recommendations, H/H 6.5/20/2 with elevated retic count. Post Tx CBC pending today. Etiology of thrombocytopenia unclear--> splenic sequestration, which would explain anemia and thrombocytopenia (had U/S in 2011 which surprisingly showed splenomegaly at age 46; will repeat). Possibly anemia and thrombocytopenia all hemolytic crisis  · Metabolic - Monitor electrolytes and replace as needed per protocol. Daily BMP, Mg.   · Renal - No acute issues. Monitor Cr and UOP closely. DC IVF. · Endocrine - No acute issues. TSH-normal.   · Neuro/ Pain/ Sedation - Resume pt's home pain medications, Oxycontin and Roxicodone. PRN dilaudid. · GI - Regular Diet. LFTs-normal   · No central line, No Bull.    · Prophylaxis - DVT(SCDs), GI(Protonix)     If CBC improved, will transfer out of ICU    Natalie Vanessa MD  PCCM  35 min             The patient is: [x] acutely ill Risk of deterioration: [x] moderate    [] critically ill  [] high     [x]See my orders for details    My assessment/plan was discussed with:  [x]nursing []PT/OT    []respiratory therapy [x] Edward    []family [x] Patient          Colleen Lao PA-C

## 2017-02-25 NOTE — DISCHARGE INSTRUCTIONS
Anemia: Care Instructions  Your Care Instructions    Anemia is a low level of red blood cells, which carry oxygen throughout your body. Many things can cause anemia. Lack of iron is one of the most common causes. Your body needs iron to make hemoglobin, a substance in red blood cells that carries oxygen from the lungs to your body's cells. Without enough iron, the body produces fewer and smaller red blood cells. As a result, your body's cells do not get enough oxygen, and you feel tired and weak. And you may have trouble concentrating. Bleeding is the most common cause of a lack of iron. You may have heavy menstrual bleeding or bleeding caused by conditions such as ulcers, hemorrhoids, or cancer. Regular use of aspirin or other anti-inflammatory medicines (such as ibuprofen) also can cause bleeding in some people. A lack of iron in your diet also can cause anemia, especially at times when the body needs more iron, such as during pregnancy, infancy, and the teen years. Your doctor may have prescribed iron pills. It may take several months of treatment for your iron levels to return to normal. Your doctor also may suggest that you eat foods that are rich in iron, such as meat and beans. There are many other causes of anemia. It is not always due to a lack of iron. Finding the specific cause of your anemia will help your doctor find the right treatment for you. Follow-up care is a key part of your treatment and safety. Be sure to make and go to all appointments, and call your doctor if you are having problems. It's also a good idea to know your test results and keep a list of the medicines you take. How can you care for yourself at home? · Take your medicines exactly as prescribed. Call your doctor if you think you are having a problem with your medicine. · If your doctor recommends iron pills, take them as directed:  ¨ Try to take the pills on an empty stomach about 1 hour before or 2 hours after meals. But you may need to take iron with food to avoid an upset stomach. ¨ Do not take antacids or drink milk or caffeine drinks (such as coffee, tea, or cola) at the same time or within 2 hours of the time that you take your iron. They can make it hard for your body to absorb the iron. ¨ Vitamin C (from food or supplements) helps your body absorb iron. Try taking iron pills with a glass of orange juice or some other food that is high in vitamin C, such as citrus fruits. ¨ Iron pills may cause stomach problems, such as heartburn, nausea, diarrhea, constipation, and cramps. Be sure to drink plenty of fluids, and include fruits, vegetables, and fiber in your diet each day. Iron pills often make your bowel movements dark or green. ¨ If you forget to take an iron pill, do not take a double dose of iron the next time you take a pill. ¨ Keep iron pills out of the reach of small children. An overdose of iron can be very dangerous. · Follow your doctor's advice about eating iron-rich foods. These include red meat, shellfish, poultry, eggs, beans, raisins, whole-grain bread, and leafy green vegetables. · Steam vegetables to help them keep their iron content. When should you call for help? Call 911 anytime you think you may need emergency care. For example, call if:  · You have symptoms of a heart attack. These may include:  ¨ Chest pain or pressure, or a strange feeling in the chest.  ¨ Sweating. ¨ Shortness of breath. ¨ Nausea or vomiting. ¨ Pain, pressure, or a strange feeling in the back, neck, jaw, or upper belly or in one or both shoulders or arms. ¨ Lightheadedness or sudden weakness. ¨ A fast or irregular heartbeat. After you call 911, the  may tell you to chew 1 adult-strength or 2 to 4 low-dose aspirin. Wait for an ambulance. Do not try to drive yourself. · You passed out (lost consciousness).   Call your doctor now or seek immediate medical care if:  · You have new or increased shortness of breath. · You are dizzy or lightheaded, or you feel like you may faint. · Your fatigue and weakness continue or get worse. · You have any abnormal bleeding, such as:  ¨ Nosebleeds. ¨ Vaginal bleeding that is different (heavier, more frequent, at a different time of the month) than what you are used to. ¨ Bloody or black stools, or rectal bleeding. ¨ Bloody or pink urine. Watch closely for changes in your health, and be sure to contact your doctor if:  · You do not get better as expected. Where can you learn more? Go to http://beth-roman.info/. Enter R301 in the search box to learn more about \"Anemia: Care Instructions. \"  Current as of: February 5, 2016  Content Version: 11.1  © 3982-8642 [employer]. Care instructions adapted under license by [employer] (which disclaims liability or warranty for this information). If you have questions about a medical condition or this instruction, always ask your healthcare professional. [place] [address] any warranty or liability for your use of this information. [facility] [address]  [phone]    DISCHARGE SUMMARY from Nurse    The following personal items are in your possession at time of discharge:    Dental Appliances: None  Visual Aid: None     Home Medications: None  Jewelry: None  Clothing: None  Other Valuables: None             PATIENT INSTRUCTIONS:    After general anesthesia or intravenous sedation, for 24 hours or while taking prescription Narcotics:  · Limit your activities  · Do not drive and operate hazardous machinery  · Do not make important personal or business decisions  · Do  not drink alcoholic beverages  · If you have not urinated within 8 hours after discharge, please contact your surgeon on call.     Report the following to your surgeon:  · Excessive pain, swelling, redness or odor of or around the surgical area  · Temperature over 100.5  · Nausea and vomiting lasting longer than 4 hours or if unable to take medications  · Any signs of decreased circulation or nerve impairment to extremity: change in color, persistent  numbness, tingling, coldness or increase pain  · Any questions        What to do at Home:  Recommended activity: Activity as tolerated    If you experience any of the following symptoms- Shortness of breath, dizziness, any symptoms as before- please follow up with Dr. aSmantha Laguerre. *  Please give a list of your current medications to your Primary Care Provider. *  Please update this list whenever your medications are discontinued, doses are      changed, or new medications (including over-the-counter products) are added. *  Please carry medication information at all times in case of emergency situations. These are general instructions for a healthy lifestyle:    No smoking/ No tobacco products/ Avoid exposure to second hand smoke    Surgeon General's Warning:  Quitting smoking now greatly reduces serious risk to your health. Obesity, smoking, and sedentary lifestyle greatly increases your risk for illness    A healthy diet, regular physical exercise & weight monitoring are important for maintaining a healthy lifestyle    You may be retaining fluid if you have a history of heart failure or if you experience any of the following symptoms:  Weight gain of 3 pounds or more overnight or 5 pounds in a week, increased swelling in our hands or feet or shortness of breath while lying flat in bed. Please call your doctor as soon as you notice any of these symptoms; do not wait until your next office visit. Recognize signs and symptoms of STROKE:    F-face looks uneven    A-arms unable to move or move unevenly    S-speech slurred or non-existent    T-time-call 911 as soon as signs and symptoms begin-DO NOT go       Back to bed or wait to see if you get better-TIME IS BRAIN.     Warning Signs of HEART ATTACK     Call 911 if you have these symptoms:   Chest discomfort. Most heart attacks involve discomfort in the center of the chest that lasts more than a few minutes, or that goes away and comes back. It can feel like uncomfortable pressure, squeezing, fullness, or pain.  Discomfort in other areas of the upper body. Symptoms can include pain or discomfort in one or both arms, the back, neck, jaw, or stomach.  Shortness of breath with or without chest discomfort.  Other signs may include breaking out in a cold sweat, nausea, or lightheadedness. Don't wait more than five minutes to call 911 - MINUTES MATTER! Fast action can save your life. Calling 911 is almost always the fastest way to get lifesaving treatment. Emergency Medical Services staff can begin treatment when they arrive -- up to an hour sooner than if someone gets to the hospital by car. The discharge information has been reviewed with the patient. The patient verbalized understanding. Discharge medications reviewed with the patient and appropriate educational materials and side effects teaching were provided.     Patient armband removed and shredded

## 2017-02-25 NOTE — PROGRESS NOTES
Hematology/Medical Oncology Progress Note             Name: Divya Short   : 1965   MRN: [id number]   Date: 2017 6:22 PM     [x]I have reviewed the flowsheet and previous days notes. Events overnight reviewed and discussed with nursing staff. Vital signs and records reviewed. The patient is a 80-year-old [facility] American woman with sickle cell disease and a history of breast cancer. She recently had a right total hip replacement and came in with complaints of shortness of breath and was found to have severe anemia. She denies significant pain at this time. She is reporting relatively good pain control with the current pain medication. She denies CP, light headedness or SOB. Pt states that she feels much better this evening and is ready to be discharged to home. ROS:  Constitutional:  Negative for fever, chills, diaphoresis, activity change, appetite change and unexpected weight change. HENT: Negative for nosebleeds, congestion, facial swelling, mouth sores, trouble swallowing, neck pain, neck stiffness, voice change and postnasal drip. Eyes: Negative for photophobia, pain, discharge and itching. Respiratory: Negative for apnea, cough, choking, chest tightness, wheezing and stridor. Cardiovascular: Negative for chest pain, palpitations and leg swelling. Gastrointestinal: Negative for abdominal pain. Negative for nausea, diarrhea, constipation, blood in stool and rectal pain. Genitourinary: Negative for dysuria, urgency, hematuria, flank pain and difficulty urinating. Musculoskeletal: Negative for back pain, joint swelling, arthralgias and gait problem. Skin: Negative for color change, pallor, rash and wound. Neurological:  Negative for dizziness, facial asymmetry, speech difficulty, light-headedness and headaches. Hematological: Negative for adenopathy. Does not bruise/bleed easily.    Psychiatric/Behavioral: Negative for hallucinations, confusion, disturbed wake/sleep cycle and agitation. Vital Signs:    Visit Vitals    BP (!) 128/95    Pulse 61    Temp 97.6 °F (36.4 °C)    Resp 17    Ht 5' 6\" (1.676 m)    Wt 102.6 kg (226 lb 3.1 oz)    SpO2 100%    Breastfeeding No    BMI 36.51 kg/m2       O2 Device: Room air   O2 Flow Rate (L/min): 2 l/min   Temp (24hrs), Av.9 °F (36.6 °C), Min:97.5 °F (36.4 °C), Max:98.4 °F (36.9 °C)       Intake/Output:   Last shift:       07 -  190  In: 360 [P.O.:360]  Out: 935 [Urine:935]  Last 3 shifts:  190 -  0700  In: 4500 [P.O.:940; I.V.:3300]  Out: 2150 [Urine:2150]    Intake/Output Summary (Last 24 hours) at 17 1822  Last data filed at 17 1631   Gross per 24 hour   Intake             1360 ml   Output             1235 ml   Net              125 ml       Physical Exam:  Constitutional: Comfortable, sitting up in bed, NAD. HENT:   Head: Normocephalic and atraumatic. Eyes: Pupils are equal, round, and reactive to light. Neck: Normal range of motion. Neck supple. No thyromegaly present. Cardiovascular: Normal rate and regular rhythm. Pulmonary/Chest: Effort normal and breath sounds normal.   Abdominal: Soft. Bowel sounds are normal.   Musculoskeletal: Normal range of motion. Lymphadenopathy:   She has no cervical adenopathy. Neurological: She is alert and oriented to person, place, and time. Skin: Skin is warm and dry. Psychiatric: She has a normal mood and affect.  Her behavior is normal. Judgment and thought content normal.      DATA:   Current Facility-Administered Medications   Medication Dose Route Frequency    0.9% sodium chloride infusion 250 mL  250 mL IntraVENous PRN    0.9% sodium chloride infusion 250 mL  250 mL IntraVENous PRN    0.9% sodium chloride infusion 250 mL  250 mL IntraVENous PRN    0.9% sodium chloride infusion 250 mL  250 mL IntraVENous PRN    influenza vaccine - (36mos+)(PF) (FLUZONE/FLUARIX/FLULAVAL QUAD) injection 0.5 mL  0.5 mL IntraMUSCular PRIOR TO DISCHARGE    aspirin (ASPIRIN) tablet 325 mg  325 mg Oral BID    folic acid (FOLVITE) tablet 1 mg  1 mg Oral DAILY    HYDROmorphone (DILAUDID) tablet 2-4 mg  2-4 mg Oral Q4H PRN    metoprolol tartrate (LOPRESSOR) tablet 25 mg  25 mg Oral BID    naloxegol (MOVANTIK) tablet 12.5 mg  12.5 mg Oral DAILY    oxyCODONE ER (OxyCONTIN) tablet 20 mg  20 mg Oral BID    oxyCODONE IR (ROXICODONE) tablet 10 mg  10 mg Oral QID    temazepam (RESTORIL) capsule 15 mg  15 mg Oral QHS PRN    pantoprazole (PROTONIX) tablet 40 mg  40 mg Oral DAILY    0.9% sodium chloride infusion 250 mL  250 mL IntraVENous PRN                    Labs:  Recent Labs      02/25/17   0946  02/24/17   0340  02/23/17   0345   WBC  7.7  7.3  10.5   HGB  9.1*  6.5*  5.7*   HCT  27.3*  20.2*  17.5*   PLT  36*  39*  47*     Recent Labs      02/25/17   0358  02/24/17   0340  02/23/17   0345   NA  140  142  140   K  4.1  3.7  3.6   CL  108  112*  109*   CO2  25  22  23   GLU  74  87  95   BUN  12  15  17   CREA  0.91  0.82  0.89   CA  8.3*  7.9*  8.2*   MG  2.3  2.3  2.1   PHOS  3.2  2.9  2.7   ALB  3.0*  2.8*  3.0*   SGOT  19  14*  23   ALT  15  13  13     No results for input(s): PH, PCO2, PO2, HCO3, FIO2 in the last 72 hours. IMPRESSION:   Principal Problem:  Symptomatic anemia (8/20/2012)     Active Problems:  Breast cancer ([facility] (8/20/2012)     Vitamin D deficiency (5/19/2015)     Sickle cell anemia ([facility] (8/20/2012)     Dehydration (11/16/2012), resolved     Fibromyalgia ()     Sickle cell crisis ([facility] (8/24/2013)  Overview: Acute sickle cell crisis.      Leukocytosis (2/20/2017), resolved              PLAN:   1. Sickle cell anemia: CBC after 2 additional units of PRBC today  is 9.1  gm/dl and hematocrit of 27.3%. Direct and indirect garry results still pending. 2. Thrombocytopenia:  Platelets today are 21,320.    3. Sickle cell associated pain:  Continue home pain management regimen.  Follow up with Dr. Serina Pitt 1-1/2 week after discharge.   4. Ok to discharge today per Heme/Onc perspective  ·  ·          My assessment/plan was discussed with:  [x]nursing []PT/OT    []respiratory therapy [x]Dr.Lloyd David Arteaga MD      []family []       Aminta Elliott NP

## 2017-02-25 NOTE — PROGRESS NOTES
[facility]  ICU Progress Note      Name: Juan Castillo   : 1965   MRN: [id number]   Date: 2017 1:42 AM     [x]I have reviewed the flowsheet and previous days notes. Events overnight reviewed and discussed with nursing staff. Vital signs and records reviewed. Pt is a 46 y.o. female with pmhx of sickle cell anemia, fibromyalgia, GERD, HTN, breast cancer s/p mastectomy, OA s/p right hip surgery, tobacco abuse, presented to the [facility] ER with 3 days of increasing dyspnea on exertion and dizziness. She states that she knew she needed a blood transfusion. D-dimer was found to be elevated upon arrival to the ER with a very low H/H. Pt was transferred from the [facility] ER to the [facility] ER because there are no beds in the ICU. Will monitor in the ICU until H/H has improved.         Subjective:    17: pt feels much better this AM. Less breathless. Had 2 units RBCs early this AM, CBC pending. ROS:Pertinent items are noted in HPI. Medication Review:  · Pressors - none  · Sedation - none  · Antibiotics - none  · Pain - oxycodone   · GI/ DVT - Protonix/SCDS  · Others (other gtts)    Safety Bundles: Electrolyte Replacement Protocol    Vital Signs:    Visit Vitals    /69    Pulse 66    Temp 98.4 °F (36.9 °C)    Resp 13    Ht 5' 6\" (1.676 m)    Wt 102.6 kg (226 lb 3.1 oz)    SpO2 100%    Breastfeeding No    BMI 36.51 kg/m2       O2 Device: Room air   O2 Flow Rate (L/min): 2 l/min   Temp (24hrs), Av.1 °F (36.7 °C), Min:97.5 °F (36.4 °C), Max:98.7 °F (37.1 °C)       Intake/Output:   Last shift:         Last 3 shifts:  1901 -  0700  In: 4500 [P.O.:940; I.V.:3300]  Out: 2150 [Urine:2150]    Intake/Output Summary (Last 24 hours) at 17 0746  Last data filed at 17 0400   Gross per 24 hour   Intake             2900 ml   Output             1450 ml   Net             1450 ml         Physical Exam:    General:  Alert, cooperative, no distress, appears stated age. Head:  Normocephalic, without obvious abnormality, atraumatic. Eyes:  Conjunctivae pale. PERRL, EOMs intact. Nose: Nares normal. Septum midline. Mucosa normal.   Throat: Lips, mucosa, and tongue normal. Teeth and gums normal.   Neck: Supple, symmetrical, trachea midline. Lungs:  Clear to auscultation bilaterally. Chest wall:  No tenderness or deformity. Heart:  Regular rate and rhythm, S1, S2 normal, no murmur, click, rub or gallop. Abdomen:  Soft, non-tender. Bowel sounds normal. No masses, No organomegaly. Extremities: Extremities normal, atraumatic, no cyanosis or edema. Pulses: 2+ and symmetric all extremities. Skin: Skin color, texture, turgor normal. No rashes or lesions. mastectomy on left side. Clean, dry incision on right hip.     Neurologic: Grossly nonfocal       DATA:     Current Facility-Administered Medications   Medication Dose Route Frequency    0.9% sodium chloride infusion 250 mL  250 mL IntraVENous PRN    0.9% sodium chloride infusion 250 mL  250 mL IntraVENous PRN    0.9% sodium chloride infusion 250 mL  250 mL IntraVENous PRN    0.9% sodium chloride infusion 250 mL  250 mL IntraVENous PRN    influenza vaccine 2016-17 (36mos+)(PF) (FLUZONE/FLUARIX/FLULAVAL QUAD) injection 0.5 mL  0.5 mL IntraMUSCular PRIOR TO DISCHARGE    aspirin (ASPIRIN) tablet 325 mg  325 mg Oral BID    folic acid (FOLVITE) tablet 1 mg  1 mg Oral DAILY    HYDROmorphone (DILAUDID) tablet 2-4 mg  2-4 mg Oral Q4H PRN    metoprolol tartrate (LOPRESSOR) tablet 25 mg  25 mg Oral BID    naloxegol (MOVANTIK) tablet 12.5 mg  12.5 mg Oral DAILY    oxyCODONE ER (OxyCONTIN) tablet 20 mg  20 mg Oral BID    oxyCODONE IR (ROXICODONE) tablet 10 mg  10 mg Oral QID    temazepam (RESTORIL) capsule 15 mg  15 mg Oral QHS PRN    pantoprazole (PROTONIX) tablet 40 mg  40 mg Oral DAILY    0.9% sodium chloride infusion 250 mL  250 mL IntraVENous PRN         Labs: Results:       Chemistry Recent Labs      02/25/17 9192  02/24/17   0340  02/23/17   0345   GLU  74  87  95   NA  140  142  140   K  4.1  3.7  3.6   CL  108  112*  109*   CO2  25  22  23   BUN  12  15  17   CREA  0.91  0.82  0.89   CA  8.3*  7.9*  8.2*   AGAP  7  8  8   BUCR  13  18  19   AP  129*  123*  133*   TP  6.4  5.8*  6.1*   ALB  3.0*  2.8*  3.0*   GLOB  3.4  3.0  3.1   AGRAT  0.9  0.9  1.0      CBC w/Diff Recent Labs      02/24/17   0340  02/23/17   0345  02/22/17   1530   WBC  7.3  10.5  16.1*   RBC  2.18*  1.91*  1.91*   HGB  6.5*  5.7*  5.8*   HCT  20.2*  17.5*  18.1*   PLT  39*  47*  56*   GRANS  64  80*  85*   LYMPH  25  11*  11*   EOS  2  1  0      Coagulation Recent Labs      02/22/17   1530   PTP  15.9*   INR  1.3*       Liver Enzymes Recent Labs      02/25/17   0358   TP  6.4   ALB  3.0*   AP  129*   SGOT  19      ABG No results found for: PH, PHI, PCO2, PCO2I, PO2, PO2I, HCO3, HCO3I, FIO2, FIO2I   Microbiology No results for input(s): CULT in the last 72 hours. Telemetry: [x]Sinus []A-flutter []Paced    []A-fib []Multiple PVCs                    Imaging:  [x]I have personally reviewed the patients radiographs  []Radiographs reviewed with radiologist   [x]No change from prior, tubes and lines in adequate position  []Improved   []Worsening        IMPRESSION:   · Severe Acute on Chronic Sickle Cell Anemia- with elevated retic count  · Sickle Cell Crisis- mild, minimal pain, dyspnea on exertion present  · Elevated D-Dimer- negative for PE  · Leucocytosis- mild, likely reactive   · HTN- well-controlled  · OA s/p right hip replacement on 02/03/17 and left hip replacement 08/2016  · Tobacco Abuse- 7 cigarettes/day for 30 years. · Hx of KINSEY- does not use CPAP  · Hx of breast cancer s/p mastectomy   · Hx of cholecystitis s/p cholecystectomy   · Fibromyalgia       PLAN:   · Resp - off O2, CXR with e/o bronchitis, no ABX though pt improving without ABX or BD (would ordinarily give if ACS present). Smoking Cessation recommended.  CXR-no acute process. V/Q scan-negative for PE.   · ID - Afebrile, leucocytosis-most likely reactive. Monitor for s/s of infection. If platelets still falling, will start nebs and ABX given bronchitis/bronchopneumonia on CT  · CVS - Hemodynamically stable. Continue home BP meds- metoprolol and aspirin. Hold for SBP < 100 or HR < 65.   · Heme/Onc- Per Hematology Recommendations, H/H 6.5/20/2 with elevated retic count. Post Tx CBC pending today. Etiology of thrombocytopenia unclear--> splenic sequestration, which would explain anemia and thrombocytopenia (had U/S in 2011 which surprisingly showed splenomegaly at age 46; will repeat). Possibly anemia and thrombocytopenia all hemolytic crisis  · Metabolic - Monitor electrolytes and replace as needed per protocol. Daily BMP, Mg.   · Renal - No acute issues. Monitor Cr and UOP closely. DC IVF. · Endocrine - No acute issues. TSH-normal.   · Neuro/ Pain/ Sedation - Resume pt's home pain medications, Oxycontin and Roxicodone. PRN dilaudid. · GI - Regular Diet. LFTs-normal   · No central line, No Bull.    · Prophylaxis - DVT(SCDs), GI(Protonix)     If CBC improved, will transfer out of ICU    Torres Shea MD  PCCM  35 min             The patient is: [x] acutely ill Risk of deterioration: [x] moderate    [] critically ill  [] high     [x]See my orders for details    My assessment/plan was discussed with:  [x]nursing []PT/OT        []family []         Sotero Ha MD

## 2017-02-25 NOTE — PROGRESS NOTES
Pt states she is active with [facility]. FOC given and signed by pt and she chose [facility]. [facility]. notified.

## 2017-02-26 LAB
ABO + RH BLD: NORMAL
ANTIGENS PRESENT RBC DONR: NORMAL
ANTIGENS PRESENT RBC DONR: NORMAL
BLD PROD TYP BPU: NORMAL
BLD PROD TYP BPU: NORMAL
BLOOD GROUP ANTIBODIES SERPL: NORMAL
BLOOD GROUP ANTIBODIES SERPL: NORMAL
BPU ID: NORMAL
BPU ID: NORMAL
CALLED TO:,BCALL1: NORMAL
CROSSMATCH RESULT,%XM: NORMAL
CROSSMATCH RESULT,%XM: NORMAL
PHYSICIAN INSTRUCTIO,%PI: NORMAL
SPECIMEN EXP DATE BLD: NORMAL
STATUS OF UNIT,%ST: NORMAL
STATUS OF UNIT,%ST: NORMAL
UNIT DIVISION, %UDIV: 0
UNIT DIVISION, %UDIV: 0

## 2017-02-26 NOTE — ROUTINE PROCESS
1840- discharged home with -home health in place per case management- informed of appt scheduling by staff on Mon- will confirm with her per phone.

## 2017-02-27 ENCOUNTER — PATIENT OUTREACH (OUTPATIENT)
Dept: CARDIOTHORACIC SURGERY | Age: 52
End: 2017-02-27

## 2017-02-27 NOTE — PROGRESS NOTES
Transitions of Care Coordination    Manda Little was hospitalized at [facility] 2/20-2/25/17 for symptomatic anemia, sickle cell crisis and discharged to home with continuation of [facility]. The patient had a right THR on 2/2/17 at [facility]. Reached patient and identified self/role. Ms Jodi Ruiz stated \"I am doing just fine. \"  Patient politely stated that she has received three calls from Lester Mendoza today (including this call) and four calls from [facility]. Patient stated she wishes Lester Mendoza could coordinate calls to patients. Medication reconciliation and assessment not completed in entirety. The patient agreed to a call back in 2-3 weeks. Will follow.

## 2017-02-28 LAB
ABO + RH BLD: NORMAL
ANTIGENS PRESENT RBC DONR: NORMAL
BLD PROD TYP BPU: NORMAL
BLOOD GROUP ANTIBODIES SERPL: NORMAL
BLOOD GROUP ANTIBODIES SERPL: NORMAL
BPU ID: NORMAL
CALLED TO:,BCALL1: NORMAL
CALLED TO:,BCALL2: NORMAL
CALLED TO:,BCALL3: NORMAL
CROSSMATCH RESULT,%XM: NORMAL
PHYSICIAN INSTRUCTIO,%PI: NORMAL
SPECIMEN EXP DATE BLD: NORMAL
STATUS OF UNIT,%ST: NORMAL
UNIT DIVISION, %UDIV: 0

## 2017-03-17 ENCOUNTER — PATIENT OUTREACH (OUTPATIENT)
Dept: CARDIOTHORACIC SURGERY | Age: 52
End: 2017-03-17

## 2017-03-17 NOTE — PROGRESS NOTES
Transitions of Care Coordination    Follow up for symptomatic anemia, sickle cell disease. Reached patient and identified self/role. Ms Dior Bronson stated \"I'm doing just fine, thank you. \"  [facility] is still providing physical therapy for right THR done on 2/2/17. Patient voiced no questions or concerns. Will follow.

## 2017-03-24 LAB
Lab: NORMAL
TEST DESCRIPTION:,ATST: NORMAL

## 2017-03-27 ENCOUNTER — PATIENT OUTREACH (OUTPATIENT)
Dept: CARDIOTHORACIC SURGERY | Age: 52
End: 2017-03-27

## 2017-03-27 NOTE — PROGRESS NOTES
Transitions of Care Coordination    Karlene Fritz was hospitalized at [facility] 2/20-2/25/17 for symptomatic anemia, sickle cell crisis and discharged to home with continuation of [facility]. The patient had a right THR on 2/2/17 at [facility]. The patient has completed a 30 day post hospital period with no readmission and no ED visits noted. Goal met. Episode resolved.

## 2017-05-16 ENCOUNTER — OFFICE VISIT (OUTPATIENT)
Dept: ONCOLOGY | Age: 52
End: 2017-05-16

## 2017-05-16 ENCOUNTER — HOSPITAL ENCOUNTER (OUTPATIENT)
Dept: ONCOLOGY | Age: 52
Discharge: HOME | End: 2017-05-16

## 2017-05-16 ENCOUNTER — HOSPITAL ENCOUNTER (OUTPATIENT)
Dept: LAB | Age: 52
Discharge: HOME | End: 2017-05-16
Payer: COMMERCIAL

## 2017-05-16 VITALS
HEIGHT: 66 IN | SYSTOLIC BLOOD PRESSURE: 137 MMHG | DIASTOLIC BLOOD PRESSURE: 85 MMHG | HEART RATE: 67 BPM | TEMPERATURE: 98.3 F

## 2017-05-16 DIAGNOSIS — C50.512 BREAST CANCER OF LOWER-OUTER QUADRANT OF LEFT FEMALE BREAST (HCC): ICD-10-CM

## 2017-05-16 DIAGNOSIS — M16.12 PRIMARY OSTEOARTHRITIS OF LEFT HIP: Chronic | ICD-10-CM

## 2017-05-16 DIAGNOSIS — D50.8 IRON DEFICIENCY ANEMIA DUE TO DIETARY CAUSES: ICD-10-CM

## 2017-05-16 DIAGNOSIS — D72.829 LEUKOCYTOSIS, UNSPECIFIED TYPE: ICD-10-CM

## 2017-05-16 DIAGNOSIS — M16.11 PRIMARY OSTEOARTHRITIS OF RIGHT HIP: Chronic | ICD-10-CM

## 2017-05-16 DIAGNOSIS — D57.1 HB-SS DISEASE WITHOUT CRISIS (HCC): ICD-10-CM

## 2017-05-16 DIAGNOSIS — C50.512 BREAST CANCER OF LOWER-OUTER QUADRANT OF LEFT FEMALE BREAST (HCC): Primary | ICD-10-CM

## 2017-05-16 DIAGNOSIS — D69.6 THROMBOCYTOPENIA (HCC): ICD-10-CM

## 2017-05-16 DIAGNOSIS — E55.9 VITAMIN D DEFICIENCY: ICD-10-CM

## 2017-05-16 LAB
25(OH)D3 SERPL-MCNC: 22.3 NG/ML (ref 30–100)
ALBUMIN SERPL BCP-MCNC: 4 G/DL (ref 3.4–5)
ALBUMIN/GLOB SERPL: 1.1 {RATIO} (ref 0.8–1.7)
ALP SERPL-CCNC: 139 U/L (ref 45–117)
ALT SERPL-CCNC: 15 U/L (ref 13–56)
ANION GAP BLD CALC-SCNC: 8 MMOL/L (ref 3–18)
AST SERPL W P-5'-P-CCNC: 16 U/L (ref 15–37)
BASO+EOS+MONOS # BLD AUTO: 0.2 K/UL (ref 0–2.3)
BASO+EOS+MONOS # BLD AUTO: 3 % (ref 0.1–17)
BILIRUB SERPL-MCNC: 2 MG/DL (ref 0.2–1)
BUN SERPL-MCNC: 13 MG/DL (ref 7–18)
BUN/CREAT SERPL: 12 (ref 12–20)
CALCIUM SERPL-MCNC: 8.9 MG/DL (ref 8.5–10.1)
CHLORIDE SERPL-SCNC: 107 MMOL/L (ref 100–108)
CO2 SERPL-SCNC: 24 MMOL/L (ref 21–32)
CREAT SERPL-MCNC: 1.11 MG/DL (ref 0.6–1.3)
DIFFERENTIAL METHOD BLD: ABNORMAL
ERYTHROCYTE [DISTWIDTH] IN BLOOD BY AUTOMATED COUNT: 14.5 % (ref 11.5–14.5)
FERRITIN SERPL-MCNC: 1330 NG/ML (ref 8–388)
GLOBULIN SER CALC-MCNC: 3.7 G/DL (ref 2–4)
GLUCOSE SERPL-MCNC: 95 MG/DL (ref 74–99)
HCT VFR BLD AUTO: 27.7 % (ref 36–48)
HGB BLD-MCNC: 9.7 G/DL (ref 12–16)
IRON SATN MFR SERPL: 39 %
IRON SERPL-MCNC: 93 UG/DL (ref 50–175)
LYMPHOCYTES # BLD AUTO: 35 % (ref 14–44)
LYMPHOCYTES # BLD: 2.5 K/UL (ref 1.1–5.9)
MCH RBC QN AUTO: 29.3 PG (ref 25–35)
MCHC RBC AUTO-ENTMCNC: 35 G/DL (ref 31–37)
MCV RBC AUTO: 83.7 FL (ref 78–102)
NEUTS SEG # BLD: 4.6 K/UL (ref 1.8–9.5)
NEUTS SEG NFR BLD AUTO: 63 % (ref 40–70)
PLATELET # BLD AUTO: 134 K/UL (ref 140–440)
POTASSIUM SERPL-SCNC: 4.3 MMOL/L (ref 3.5–5.5)
PROT SERPL-MCNC: 7.7 G/DL (ref 6.4–8.2)
RBC # BLD AUTO: 3.31 M/UL (ref 4.1–5.1)
SODIUM SERPL-SCNC: 139 MMOL/L (ref 136–145)
TIBC SERPL-MCNC: 240 UG/DL (ref 250–450)
WBC # BLD AUTO: 7.3 K/UL (ref 4.5–13)

## 2017-05-16 PROCEDURE — 82728 ASSAY OF FERRITIN: CPT | Performed by: INTERNAL MEDICINE

## 2017-05-16 PROCEDURE — 80053 COMPREHEN METABOLIC PANEL: CPT | Performed by: INTERNAL MEDICINE

## 2017-05-16 PROCEDURE — 83540 ASSAY OF IRON: CPT | Performed by: INTERNAL MEDICINE

## 2017-05-16 PROCEDURE — 82306 VITAMIN D 25 HYDROXY: CPT | Performed by: INTERNAL MEDICINE

## 2017-05-16 PROCEDURE — 36415 COLL VENOUS BLD VENIPUNCTURE: CPT | Performed by: INTERNAL MEDICINE

## 2017-05-16 PROCEDURE — 86300 IMMUNOASSAY TUMOR CA 15-3: CPT | Performed by: INTERNAL MEDICINE

## 2017-05-16 NOTE — PATIENT INSTRUCTIONS
Iron Deficiency Anemia: Care Instructions  Your Care Instructions    Anemia means that you do not have enough red blood cells. Red blood cells carry oxygen around your body. When you have anemia, it can make you pale, weak, and tired. Many things can cause anemia. The most common cause is loss of blood. This can happen if you have heavy menstrual periods. It can also happen if you have bleeding in your stomach or bowel. You can also get anemia if you don't have enough iron in your diet or if it's hard for your body to absorb iron. In some cases, pregnancy causes anemia. That's because a pregnant woman needs more iron. Your doctor may do more tests to find the cause of your anemia. If a disease or other health problem is causing it, your doctor will treat that problem. It's important to follow up with your doctor to make sure that your iron level returns to normal.  Follow-up care is a key part of your treatment and safety. Be sure to make and go to all appointments, and call your doctor if you are having problems. It's also a good idea to know your test results and keep a list of the medicines you take. How can you care for yourself at home? · If your doctor recommended iron pills, take them as directed. ¨ Try to take the pills on an empty stomach. You can do this about 1 hour before or 2 hours after meals. But you may need to take iron with food to avoid an upset stomach. ¨ Do not take antacids or drink milk or anything with caffeine within 2 hours of when you take your iron. They can keep your body from absorbing the iron well. ¨ Vitamin C helps your body absorb iron. You may want to take iron pills with a glass of orange juice or some other food high in vitamin C.  ¨ Iron pills may cause stomach problems. These include heartburn, nausea, diarrhea, constipation, and cramps. It can help to drink plenty of fluids and include fruits, vegetables, and fiber in your diet.   ¨ It's normal for iron pills to make your stool a greenish or grayish black. But internal bleeding can also cause dark stool. So it's important to tell your doctor about any color changes. ¨ Call your doctor if you think you are having a problem with your iron pills. Even after you start to feel better, it will take several months for your body to build up its supply of iron. ¨ If you miss a pill, don't take a double dose. ¨ Keep iron pills out of the reach of small children. Too much iron can be very dangerous. · Eat foods with a lot of iron. These include red meat, shellfish, poultry, and eggs. They also include beans, raisins, whole-grain bread, and leafy green vegetables. · Steam your vegetables. This is the best way to prepare them if you want to get as much iron as possible. · Be safe with medicines. Do not take nonsteroidal anti-inflammatory pain relievers unless your doctor tells you to. These include aspirin, naproxen (Aleve), and ibuprofen (Advil, Motrin). · Liquid iron can stain your teeth. But you can mix it with water or juice and drink it with a straw. Then it won't get on your teeth. When should you call for help? Call 911 anytime you think you may need emergency care. For example, call if:  · You passed out (lost consciousness). · You vomit blood or what looks like coffee grounds. · You pass maroon or very bloody stools. Call your doctor now or seek immediate medical care if:  · Your stools are black and look like tar, or they have streaks of blood. · You are dizzy or lightheaded, or you feel like you may faint. Watch closely for changes in your health, and be sure to contact your doctor if:  · Your fatigue and weakness continue or get worse. · You have side effects from taking iron pills, such as nausea, vomiting, constipation, diarrhea, or heartburn. · You do not get better as expected. Where can you learn more? Go to http://beth-roman.info/.   Enter M958 in the search box to learn more about \"Iron Deficiency Anemia: Care Instructions. \"  Current as of: October 13, 2016  Content Version: 11.2  © 2930-6487 [employer]. Care instructions adapted under license by [employer] (which disclaims liability or warranty for this information). If you have questions about a medical condition or this instruction, always ask your healthcare professional. [address] any warranty or liability for your use of this information. Breast Cancer: Care Instructions  Your Care Instructions  Breast cancer occurs when abnormal cells grow out of control in the breast. These cancer cells can spread within the breast, to nearby lymph nodes and other tissues, and to other parts of the body. Being treated for cancer can weaken your body, and you may feel very tired. Get the rest your body needs so you can feel better. Finding out that you have cancer is scary. You may feel many emotions and may need some help coping. Seek out family, friends, and counselors for support. You also can do things at home to make yourself feel better while you go through treatment. Call the [employer] (1-492.214.1452) or visit its website at [address] for more information. Follow-up care is a key part of your treatment and safety. Be sure to make and go to all appointments, and call your doctor if you are having problems. It's also a good idea to know your test results and keep a list of the medicines you take. How can you care for yourself at home? · Take your medicines exactly as prescribed. Call your doctor if you think you are having a problem with your medicine. You may get medicine for nausea and vomiting if you have these side effects. · Follow your doctor's instructions to relieve pain. Pain from cancer and surgery can almost always be controlled. Use pain medicine when you first notice pain, before it becomes severe. · Eat healthy food.  If you do not feel like eating, try to eat food that has protein and extra calories to keep up your strength and prevent weight loss. Drink liquid meal replacements for extra calories and protein. Try to eat your main meal early. · Get some physical activity every day, but do not get too tired. Keep doing the hobbies you enjoy as your energy allows. · Do not smoke. Smoking can make your cancer worse. If you need help quitting, talk to your doctor about stop-smoking programs and medicines. These can increase your chances of quitting for good. · Take steps to control your stress and workload. Learn relaxation techniques. ¨ Share your feelings. Stress and tension affect our emotions. By expressing your feelings to others, you may be able to understand and cope with them. ¨ Consider joining a support group. Talking about a problem with your spouse, a good friend, or other people with similar problems is a good way to reduce tension and stress. ¨ Express yourself through art. Try writing, crafts, dance, or art to relieve stress. Some dance, writing, or art groups may be available just for people who have cancer. ¨ Be kind to your body and mind. Getting enough sleep, eating a healthy diet, and taking time to do things you enjoy can contribute to an overall feeling of balance in your life and can help reduce stress. ¨ Get help if you need it. Discuss your concerns with your doctor or counselor. · If you are vomiting or have diarrhea:  ¨ Drink plenty of fluids (enough so that your urine is light yellow or clear like water) to prevent dehydration. Choose water and other caffeine-free clear liquids. If you have kidney, heart, or liver disease and have to limit fluids, talk with your doctor before you increase the amount of fluids you drink. ¨ When you are able to eat, try clear soups, mild foods, and liquids until all symptoms are gone for 12 to 48 hours.  Other good choices include dry toast, crackers, cooked cereal, and gelatin dessert, such as Jell-O.  · If you have not already done so, prepare a list of advance directives. Advance directives are instructions to your doctor and family members about what kind of care you want if you become unable to speak or express yourself. When should you call for help? Call your doctor now or seek immediate medical care if:  · You have a fever. · Any part of your breast becomes red, tender, swollen, or hot. · You have pain, redness, or swelling in the arm on the same side as your breast cancer. Watch closely for changes in your health, and be sure to contact your doctor if:  · You have pain that is not controlled by medicine. · You have nausea or vomiting. · You are constipated or have diarrhea. Where can you learn more? Go to http://beth-roman.info/. Enter V321 in the search box to learn more about \"Breast Cancer: Care Instructions. \"  Current as of: July 26, 2016  Content Version: 11.2  © 0429-4344 [employer]. Care instructions adapted under license by [employer] (which disclaims liability or warranty for this information). If you have questions about a medical condition or this instruction, always ask your healthcare professional. [place] [address] any warranty or liability for your use of this information. Sickle Cell Disease: Care Instructions  Your Care Instructions    Sickle cell disease turns normal, round red blood cells into misshaped cells that look like viki or crescent moons. The sickle-shaped cells can get stuck in blood vessels, blocking blood flow and causing severe pain. The sickle-shaped cells also can harm organs, muscles, and bones. It is a lifelong condition. Sickle cell disease is passed down in families. You can talk to your doctor about whether to have genetic tests to find out the chance of having a child with the disease.  Your doctor also may recommend that your family members get tested for sickle cell disease. Your doctor may treat you with medicines. Some people get blood transfusions or a bone marrow transplant. Managing pain is an important part of your treatment. Follow-up care is a key part of your treatment and safety. Be sure to make and go to all appointments, and call your doctor if you are having problems. It's also a good idea to know your test results and keep a list of the medicines you take. How can you care for yourself at home? · Take your medicines exactly as prescribed. Call your doctor if you think you are having a problem with your medicine. · Take pain medicines exactly as directed. ¨ If the doctor gave you a prescription medicine for pain, take it as prescribed. ¨ If you are not taking a prescription pain medicine, ask your doctor if you can take an over-the-counter medicine. · Try to help ease pain by distracting yourself. Use guided imagery, deep breathing, and relaxation exercises. A pain specialist can teach you pain management skills. · Avoid alcohol. It can make you dehydrated. · Dress warmly in cold weather. The cold and windy weather can lead to severe pain. · Do not smoke. Smoking can reduce the amount of oxygen in your blood. If you need help quitting, talk to your doctor about stop-smoking programs and medicines. These can increase your chances of quitting for good. · Get plenty of sleep. · Get regular eye exams. Sickle cell disease can cause vision problems. · Wear medical alert jewelry that says that you have sickle cell disease. You can buy this at most drugstores. · Avoid colds and flu. Get a pneumococcal vaccine shot. If you have had one before, ask your doctor whether you need another dose. Get a flu shot every year. If you must be around people with colds or flu, wash your hands often. When should you call for help? Call 911 anytime you think you may need emergency care. For example, call if:  · You passed out (lost consciousness).   · You are in severe pain that is not helped by your pain medicines. Call your doctor now or seek immediate medical care if:  · You have these signs of acute chest syndrome, a problem caused by sickle cell disease:  ¨ Cough. ¨ Chest pain. ¨ Fever. ¨ Shortness of breath. · You have vision problems. · You have severe belly pain. · You have a severe headache. · You have less urine than normal or no urine. · You have vomiting or diarrhea that does not go away after 2 hours. · You are dizzy or lightheaded, or you feel like you may faint. · You have sudden numbness or tingling in your hands, feet, fingers, or toes (even if it goes away). · You suddenly have poor balance and coordination when you walk (even if it goes away). · You have an erection that lasts more than 2 to 3 hours or is extremely painful. Watch closely for changes in your health, and be sure to contact your doctor if you have any problems. Where can you learn more? Go to http://beth-roman.info/. Enter 035 [id number] in the search box to learn more about \"Sickle Cell Disease: Care Instructions. \"  Current as of: October 13, 2016  Content Version: 11.2  © 1742-5061 [employer]. Care instructions adapted under license by [employer] (which disclaims liability or warranty for this information). If you have questions about a medical condition or this instruction, always ask your healthcare professional. [place] [address] any warranty or liability for your use of this information.

## 2017-05-16 NOTE — PROGRESS NOTES
Hematology/Oncology  Progress Note    Name: Jaxson Palacios  Date: 2017  : 1965    Mirian Figueroa MD     Ms. 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  Current therapy: Active surveillance; the patient has previously completed systemic chemotherapy and radiation treatment. Subjective:     Ms. Anna So is a 20-year-old [facility] [place] woman with a history of triple-negative breast cancer, involving the left breast.  The patient reports that she is doing much better. She is continuing to use her cane for ambulation since she had a left total hip replacement. She is not experiencing a significant degree of pain at this time. She is doing much better psychologically wise and is not require the use of antianxiety medication is as much. Her recent mammogram of the right breast showed no abnormality. Today she has no new complaints or concerns to report. Her right total hip replacement was postponed because she had to undergo gallbladder surgery. She continues to use her cane for mobility support. Past medical history, family history, and social history: these were reviewed and remains unchanged.     Past Medical History:   Diagnosis Date    Anemia NEC     Arthritis     Cancer ([address]     Chronic pain     Coagulation disorder ([address]     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    Fatigue     Fibromyalgia     GERD (gastroesophageal reflux disease)     Hx of endometriosis     Hypertension     Ill-defined condition     Sickle cell    Osteoarthritis of left hip 2016    Osteoarthritis of right hip 1/3/2017    Sickle cell anemia ([address]     Sleep apnea     Does not use CPAP     Past Surgical History:   Procedure Laterality Date    HX BREAST BIOPSY Left     HX  SECTION      HX HERNIA REPAIR      HX LAP CHOLECYSTECTOMY      HX MASTECTOMY Left 2012    with axillary lymph node dissection    HX ORTHOPAEDIC Left     hip replacement    LAP,CHOLECYSTECTOMY N/A 11/10/2016    Dr. Bocanegra Cone History     Social History    Marital status:      Spouse name: N/A    Number of children: N/A    Years of education: N/A     Occupational History    Not on file. Social History Main Topics    Smoking status: Former Smoker     Packs/day: 0.25     Years: 30.00    Smokeless tobacco: Never Used      Comment: advised to stop smoking and no smoking before the surgery    Alcohol use No    Drug use: No    Sexual activity: Not Currently     Other Topics Concern    Not on file     Social History Narrative     Family History   Problem Relation Age of Onset    Cancer Mother      breast    Diabetes Mother     Heart Disease Father     Diabetes Father     Sickle Cell Anemia Brother      Current Outpatient Prescriptions   Medication Sig Dispense Refill    aspirin (ASPIRIN) 325 mg tablet Take 1 Tab by mouth two (2) times a day. 30 Tab 2    oxyCODONE IR (ROXICODONE) 10 mg tab immediate release tablet Take 10 mg by mouth four (4) times daily.  HYDROmorphone (DILAUDID) 2 mg tablet Take 1-2 Tabs by mouth every four (4) hours as needed for Pain. Max Daily Amount: 24 mg. (Patient taking differently: Take 2-4 mg by mouth every four (4) hours as needed for Pain (sickle cell crisis or severe pain). ) 40 Tab 0    oxyCODONE ER (OXYCONTIN) 20 mg ER tablet Take 1 Tab by mouth two (2) times a day. Max Daily Amount: 40 mg. 60 Tab 0    temazepam (RESTORIL) 15 mg capsule Take 1 Cap by mouth nightly as needed. Max Daily Amount: 15 mg. 30 Cap 1    naloxegol (MOVANTIK) 12.5 mg tab tablet Take 12.5 mg by mouth daily.  folic acid (FOLVITE) 1 mg tablet Take 1 mg by mouth daily.  metoprolol (LOPRESSOR) 25 mg tablet Take 25 mg by mouth two (2) times a day. Review of Systems  Constitutional: The patient has no acute distress or discomfort.   HEENT: The patient denies recent head trauma, eye pain, blurred vision,  hearing deficit, oropharyngeal mucosal pain or lesions, and the patient denies throat pain or discomfort. Chest wall: The patient complained of having several small nodular areas over her left anterior chest wall surgical site for which she is aware it may represent recurrent breast cancer. Lymphatics: The patient denies palpable peripheral lymphadenopathy. Hematologic: The patient denies having bruising, bleeding, or progressive fatigue. Respiratory: Patient denies having shortness of breath, cough, sputum production, fever, or dyspnea on exertion. Cardiovascular: The patient denies having leg pain, leg swelling, heart palpitations, chest permit, chest pain, or lightheadedness. The patient denies having dyspnea on exertion. Gastrointestinal: The patient denies having nausea, emesis, or diarrhea. The patient denies having any hematemesis or blood in the stool. Genitourinary: Patient denies having urinary urgency, frequency, or dysuria. The patient denies having blood in the urine. Psychological: The patient denies having symptoms of nervousness, anxiety, depression, or thoughts of harming himself some of this. Skin: Patient denies having skin rashes, skin, ulcerations, or unexplained itching or pruritus. Musculoskeletal: The patient his continued to have pain in the hips. She also has weakness and is using a walker for mobility support. Objective:     Visit Vitals    /85    Pulse 67    Temp 98.3 °F (36.8 °C)    Ht 5' 6\" (1.676 m)     ECOG PS=0, pain score=0/10     Physical Exam:   Gen. Appearance: The patient is in no acute distress. Skin: There is no bruise or rash. HEENT: The exam is unremarkable. Neck: Supple without lymphadenopathy or thyromegaly. Lungs: Clear to auscultation and percussion; there are no wheezes or rhonchi. Heart: Regular rate and rhythm; there are no murmurs, gallops, or rubs.  Anterior chest wall and breast: The right breast shows no mass, nipple discharge, nipple retraction, or skin dimpling. The axilla reveals no palpable axillary lymphadenopathy. The left breast is surgically absent. There is some scarring over the left anterior rib cage and a few small nodular areas are noted over the anterior lateral upper chest wall is well. These have the consistency of fatty deposits however the patient is concerned that this may represent recurrent disease. Abdomen: Bowel sounds are present and normal.  There is no guarding, tenderness, or hepatosplenomegaly. Extremities: There is no clubbing, cyanosis, or edema. Neurologic: There are no focal neurologic deficits. Lymphatics: There is no palpable peripheral lymphadenopathy. Musculoskeletal: The patient has full range of motion at all joints. There is no evidence of joint deformity or effusions. There is no focal joint tenderness. Psychological/psychiatric: There is no clinical evidence of anxiety, depression, or melancholy. Lab data:      Results for orders placed or performed during the hospital encounter of 05/16/17   CBC WITH 3 PART DIFF     Status: Abnormal   Result Value Ref Range Status    WBC 7.3 4.5 - 13.0 K/uL Final    RBC 3.31 (L) 4.10 - 5.10 M/uL Final    HGB 9.7 (L) 12.0 - 16.0 g/dL Final    HCT 27.7 (L) 36 - 48 % Final    MCV 83.7 78 - 102 FL Final    MCH 29.3 25.0 - 35.0 PG Final    MCHC 35.0 31 - 37 g/dL Final    RDW 14.5 11.5 - 14.5 % Final    PLATELET 949 (L) 677 - 440 K/uL Final    NEUTROPHILS 63 40 - 70 % Final    MIXED CELLS 3 0.1 - 17 % Final    LYMPHOCYTES 35 14 - 44 % Final    ABS. NEUTROPHILS 4.6 1.8 - 9.5 K/UL Final    ABS. MIXED CELLS 0.2 0.0 - 2.3 K/uL Final    ABS. LYMPHOCYTES 2.5 1.1 - 5.9 K/UL Final     Comment: Test performed at [place] [address] Location. Results Reviewed by Medical Director. DF AUTOMATED   Final           Assessment:     1. Breast cancer of lower-outer quadrant of left female breast ([facility]    2.  Hb-SS disease without crisis ([facility] 3. Primary osteoarthritis of right hip    4. Primary osteoarthritis of left hip    5. Leukocytosis, unspecified type    6. Iron deficiency anemia due to dietary causes    7. Vitamin D deficiency    8. Thrombocytopenia ([address]      Plan:   Invasive ductal carcinoma of the left breast: The patient is status post modified radical mastectomy and continues to do well. Clinically there is no evidence of disease recurrence. Her most recent CA-27-29 level from 1/23/2017 was 41 U/mL. I will recheck her CA-27-29 level at this time. Vitamin D deficiency: At this time I will check her vitamin D level and if the levels are low she will be started on vitamin D 50,000 units weekly for 12 weeks. Sickle cell disease, SS: Clinically the patient is relatively stable at this time she has not had any recent pain crises. I have recommended that the patient continued to remain well-hydrated and to keep her body warm doing these winter months to minimize the onset of vaso-occlusive pain crises. Thrombocytopenia: I have explained to the patient that a CBC today shows that her platelet count is relatively stable at 134,000. We will continue to monitor this at 4 month intervals. Therapeutic intervention is not warranted unless her platelets decline below 15,000. Iron deficiency anemia: I have explained to the patient that she is not iron deficient any longer. Her most recent ferritin level from from 1/23/2017 was 1799 ng/mL. Her iron saturation was 28% and his circulating iron level was 70 mcg/dL. At this point most of her anemia is related to her underlying sickle cell disease. Osteoarthritis involving the right hip: The patient is tentatively scheduled to revisit with orthopedic surgeon regarding total hip replacement on the right. Lymphocytosis: The total WBC count is 7.3 and absolute lymphocyte count is 2.5. Therefore, I have explained to the patient that her lymphocytosis has resolved.   Her lymphocyte count represents 35% of the total white count at 7.3. I will continue to see her in clinic at four-month intervals in the future.   Orders Placed This Encounter    COMPLETE CBC & AUTO DIFF WBC    InHouse CBC ([place])     Standing Status:   Future     Number of Occurrences:   1     Standing Expiration Date:   5/23/2017    VITAMIN D, 25 HYDROXY     Standing Status:   Future     Standing Expiration Date:   5/17/2018    CA 27.29     Standing Status:   Future     Standing Expiration Date:   6/08/5759    METABOLIC PANEL, COMPREHENSIVE     Standing Status:   Future     Standing Expiration Date:   5/17/2018    IRON PROFILE     Standing Status:   Future     Standing Expiration Date:   5/17/2018    FERRITIN     Standing Status:   Future     Standing Expiration Date:   5/17/2018       Damon Lara MD  5/16/2017

## 2017-05-17 LAB — CANCER AG27-29 SERPL-ACNC: 38.2 U/ML (ref 0–38.6)

## 2017-05-18 DIAGNOSIS — E55.9 VITAMIN D DEFICIENCY: Primary | ICD-10-CM

## 2017-05-18 RX ORDER — ERGOCALCIFEROL 1.25 MG/1
50000 CAPSULE ORAL
Qty: 12 CAP | Refills: 0 | Status: SHIPPED | OUTPATIENT
Start: 2017-05-18 | End: 2018-03-08 | Stop reason: SDUPTHER

## 2017-05-18 NOTE — PROGRESS NOTES
Precription sent to patients pharmacy for Vitamin D 50,000 units take 1 tab weekly for the next 12 weeks.

## 2017-09-17 NOTE — PERIOP NOTES
Assumed care from Timothy Rivera Rn, continued phase one level of care.  Bear paws warmer applied on admission to PACU right

## 2017-10-25 ENCOUNTER — HOSPITAL ENCOUNTER (OUTPATIENT)
Dept: ONCOLOGY | Age: 52
Discharge: HOME | End: 2017-10-25

## 2017-10-25 ENCOUNTER — OFFICE VISIT (OUTPATIENT)
Dept: ONCOLOGY | Age: 52
End: 2017-10-25

## 2017-10-25 VITALS
WEIGHT: 223 LBS | HEART RATE: 62 BPM | SYSTOLIC BLOOD PRESSURE: 136 MMHG | TEMPERATURE: 98.5 F | DIASTOLIC BLOOD PRESSURE: 84 MMHG | BODY MASS INDEX: 35.99 KG/M2

## 2017-10-25 DIAGNOSIS — Z17.1 MALIGNANT NEOPLASM OF BREAST IN FEMALE, ESTROGEN RECEPTOR NEGATIVE, UNSPECIFIED LATERALITY, UNSPECIFIED SITE OF BREAST (HCC): Primary | ICD-10-CM

## 2017-10-25 DIAGNOSIS — E55.9 VITAMIN D DEFICIENCY: ICD-10-CM

## 2017-10-25 DIAGNOSIS — D57.1 HB-SS DISEASE WITHOUT CRISIS (HCC): ICD-10-CM

## 2017-10-25 DIAGNOSIS — D69.6 THROMBOCYTOPENIA (HCC): ICD-10-CM

## 2017-10-25 DIAGNOSIS — D63.8 ANEMIA IN OTHER CHRONIC DISEASES CLASSIFIED ELSEWHERE: ICD-10-CM

## 2017-10-25 DIAGNOSIS — C50.919 MALIGNANT NEOPLASM OF BREAST IN FEMALE, ESTROGEN RECEPTOR NEGATIVE, UNSPECIFIED LATERALITY, UNSPECIFIED SITE OF BREAST (HCC): Primary | ICD-10-CM

## 2017-10-25 LAB
BASO+EOS+MONOS # BLD AUTO: 0.3 K/UL (ref 0–2.3)
BASO+EOS+MONOS # BLD AUTO: 4 % (ref 0.1–17)
DIFFERENTIAL METHOD BLD: ABNORMAL
ERYTHROCYTE [DISTWIDTH] IN BLOOD BY AUTOMATED COUNT: 14.9 % (ref 11.5–14.5)
HCT VFR BLD AUTO: 33.3 % (ref 36–48)
HGB BLD-MCNC: 11.6 G/DL (ref 12–16)
LYMPHOCYTES # BLD: 2.5 K/UL (ref 1.1–5.9)
LYMPHOCYTES NFR BLD: 33 % (ref 14–44)
MCH RBC QN AUTO: 29.4 PG (ref 25–35)
MCHC RBC AUTO-ENTMCNC: 34.8 G/DL (ref 31–37)
MCV RBC AUTO: 84.5 FL (ref 78–102)
NEUTS SEG # BLD: 4.8 K/UL (ref 1.8–9.5)
NEUTS SEG NFR BLD: 63 % (ref 40–70)
PLATELET # BLD AUTO: 111 K/UL (ref 140–440)
RBC # BLD AUTO: 3.94 M/UL (ref 4.1–5.1)
WBC # BLD AUTO: 7.6 K/UL (ref 4.5–13)

## 2017-10-25 RX ORDER — HYDROMORPHONE HYDROCHLORIDE 2 MG/1
2-4 TABLET ORAL
Qty: 40 TAB | Refills: 0 | Status: SHIPPED | OUTPATIENT
Start: 2017-10-25 | End: 2018-07-23 | Stop reason: SDUPTHER

## 2017-10-25 NOTE — MR AVS SNAPSHOT
Visit Information Date & Time Provider Department Dept. Phone Encounter #  
 10/25/2017  9:30 AM Tatyana Cartwright MD [facility] Medical Oncology [phone] [id number] Your Appointments 2/21/2018 10:00 AM  
Office Visit with Tatyana Cartwright MD  
[address]  Oncology [facility]) Appt Note: 4 month  
 [address], [address] [place] [address] Upcoming Health Maintenance Date Due Pneumococcal 19-64 Highest Risk (1 of 3 - PCV13) 6/5/1984 DTaP/Tdap/Td series (1 - Tdap) 6/5/1986 PAP AKA CERVICAL CYTOLOGY 6/5/1986 FOBT Q 1 YEAR AGE 50-75 6/5/2015 INFLUENZA AGE 9 TO ADULT 8/1/2017 BREAST CANCER SCRN MAMMOGRAM 9/27/2019 Allergies as of 10/25/2017  Review Complete On: 5/16/2017 By: Tatyana Cartwright MD  
  
 Severity Noted Reaction Type Reactions Neulasta [Pegfilgrastim] High 08/04/2016   Side Effect Other (comments) \"Put me in a comma for 3 months\" Current Immunizations  Reviewed on 8/23/2016 No immunizations on file. Not reviewed this visit You Were Diagnosed With   
  
 Codes Comments Malignant neoplasm of breast in female, estrogen receptor negative, unspecified laterality, unspecified site of breast ([facility]    -  Primary ICD-10-CM: C50.919, Z17.1 ICD-9-CM: 174.9, V86.1 Hb-SS disease without crisis [facility])     ICD-10-CM: D57.1 ICD-9-CM: 282.61 Vitamin D deficiency     ICD-10-CM: E55.9 ICD-9-CM: 268.9 Thrombocytopenia ([facility]     ICD-10-CM: D69.6 ICD-9-CM: 287.5 Primary osteoarthritis of right hip     ICD-10-CM: M16.11 
ICD-9-CM: 715.15 Anemia in other chronic diseases classified elsewhere     ICD-10-CM: D63.8 ICD-9-CM: 285.29 Vitals OB Status Smoking Status Chemically Induced Former Smoker Preferred Pharmacy Pharmacy Name Phone [address], [phone]  [place] [facility] [phone] Your Updated Medication List  
  
   
This list is accurate as of: 10/25/17 11:03 AM.  Always use your most recent med list.  
  
  
  
  
 aspirin 325 mg tablet Commonly known as:  ASPIRIN Take 1 Tab by mouth two (2) times a day. ergocalciferol 50,000 unit capsule Commonly known as:  ERGOCALCIFEROL Take 1 Cap by mouth every seven (7) days. folic acid 1 mg tablet Commonly known as:  Google Take 1 mg by mouth daily. HYDROmorphone 2 mg tablet Commonly known as:  DILAUDID Take 1-2 Tabs by mouth every four (4) hours as needed for Pain. Max Daily Amount: 24 mg.  
  
 metoprolol tartrate 25 mg tablet Commonly known as:  LOPRESSOR Take 25 mg by mouth two (2) times a day.  
  
 naloxegol 12.5 mg Tab tablet Commonly known as:  Teryl Olive Take 12.5 mg by mouth daily. * oxyCODONE IR 10 mg Tab immediate release tablet Commonly known as:  Enzo Fuelling Take 10 mg by mouth four (4) times daily. * oxyCODONE ER 20 mg ER tablet Commonly known as:  OxyCONTIN Take 1 Tab by mouth two (2) times a day. Max Daily Amount: 40 mg.  
  
 temazepam 15 mg capsule Commonly known as:  RESTORIL Take 1 Cap by mouth nightly as needed. Max Daily Amount: 15 mg.  
  
 * Notice: This list has 2 medication(s) that are the same as other medications prescribed for you. Read the directions carefully, and ask your doctor or other care provider to review them with you. Prescriptions Printed Refills HYDROmorphone (DILAUDID) 2 mg tablet 0 Sig: Take 1-2 Tabs by mouth every four (4) hours as needed for Pain. Max Daily Amount: 24 mg. Class: Print Route: Oral  
  
We Performed the Following COMPLETE CBC & AUTO DIFF WBC [87190 CPT(R)] To-Do List   
 10/25/2017 Lab:  CBC WITH 3 PART DIFF   
  
 10/25/2017 Lab: VITAMIN D, 25 HYDROXY   
  
 10/26/2017 Lab:  CA 27.29   
  
 10/26/2017 Lab:  FERRITIN   
  
 10/26/2017 Lab:  IRON PROFILE   
  
 10/26/2017 Lab:  METABOLIC PANEL, COMPREHENSIVE Introducing \[facility]\"" & HEALTH SERVICES! Dear Finn Watts: Thank you for requesting a [email or web address] account. Our records indicate that you already have an active [email or web address] account. You can access your account anytime at [email or web address] Did you know that you can access your hospital and ER discharge instructions at any time in [email or web address]? You can also review all of your test results from your hospital stay or ER visit. Additional Information If you have questions, please visit the Frequently Asked Questions section of the [email or web address] website at [email or web address] Remember, [email or web address] is NOT to be used for urgent needs. For medical emergencies, dial 911. Now available from your iPhone and Android! Please provide this summary of care documentation to your next provider. Your primary care clinician is listed as [address] [place] [address]. If you have any questions after today's visit, please call [phone].

## 2017-10-25 NOTE — PROGRESS NOTES
Hematology/Oncology  Progress Note    Name: Jesus Matute  Date: 10/25/2017  : 1965    Lam Villavicencio MD     Ms. 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  Current therapy: Active surveillance; the patient has previously completed systemic chemotherapy and radiation treatment. Subjective:     Ms. Malka Luis is a 40-year-old [facility] American woman with a history of triple-negative breast cancer, involving the left breast.  The patient reports that she is doing much better. She is continuing to use her cane for ambulation since she had a left total hip replacement. She is not experiencing a significant degree of pain at this time. She is doing much better psychologically wise and is not require the use of antianxiety medication is as much. Her recent mammogram of the right breast showed no abnormality. Today she has no new complaints or concerns to report. The patient reports she recently received knee injections for pain. She will probably undergo knee replacements in the near future. Past medical history, family history, and social history: these were reviewed and remains unchanged.     Past Medical History:   Diagnosis Date    Anemia NEC     Arthritis     Cancer ([address]     Chronic pain     Coagulation disorder ([address]     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    Fatigue     Fibromyalgia     GERD (gastroesophageal reflux disease)     Hx of endometriosis     Hypertension 2011    Ill-defined condition     Sickle cell    Osteoarthritis of left hip 2016    Osteoarthritis of right hip 1/3/2017    Sickle cell anemia ([address]     Sleep apnea     Does not use CPAP     Past Surgical History:   Procedure Laterality Date    HX BREAST BIOPSY Left 2016    HX  SECTION      HX HERNIA REPAIR      HX LAP CHOLECYSTECTOMY      HX MASTECTOMY Left 2012    with axillary lymph node dissection    HX ORTHOPAEDIC Left     hip replacement    LAP,CHOLECYSTECTOMY N/A 11/10/2016    Dr. Apoorva Bansal History     Social History    Marital status:      Spouse name: N/A    Number of children: N/A    Years of education: N/A     Occupational History    Not on file. Social History Main Topics    Smoking status: Former Smoker     Packs/day: 0.25     Years: 30.00    Smokeless tobacco: Never Used      Comment: advised to stop smoking and no smoking before the surgery    Alcohol use No    Drug use: No    Sexual activity: Not Currently     Other Topics Concern    Not on file     Social History Narrative     Family History   Problem Relation Age of Onset    Cancer Mother      breast    Diabetes Mother     Heart Disease Father     Diabetes Father     Sickle Cell Anemia Brother      Current Outpatient Prescriptions   Medication Sig Dispense Refill    HYDROmorphone (DILAUDID) 2 mg tablet Take 1-2 Tabs by mouth every four (4) hours as needed for Pain. Max Daily Amount: 24 mg. 40 Tab 0    ergocalciferol (ERGOCALCIFEROL) 50,000 unit capsule Take 1 Cap by mouth every seven (7) days. 12 Cap 0    aspirin (ASPIRIN) 325 mg tablet Take 1 Tab by mouth two (2) times a day. 30 Tab 2    oxyCODONE IR (ROXICODONE) 10 mg tab immediate release tablet Take 10 mg by mouth four (4) times daily.  oxyCODONE ER (OXYCONTIN) 20 mg ER tablet Take 1 Tab by mouth two (2) times a day. Max Daily Amount: 40 mg. 60 Tab 0    temazepam (RESTORIL) 15 mg capsule Take 1 Cap by mouth nightly as needed. Max Daily Amount: 15 mg. 30 Cap 1    naloxegol (MOVANTIK) 12.5 mg tab tablet Take 12.5 mg by mouth daily.  folic acid (FOLVITE) 1 mg tablet Take 1 mg by mouth daily.  metoprolol (LOPRESSOR) 25 mg tablet Take 25 mg by mouth two (2) times a day. Review of Systems  Constitutional: The patient has no acute distress or discomfort.   HEENT: The patient denies recent head trauma, eye pain, blurred vision,  hearing deficit, oropharyngeal mucosal pain or lesions, and the patient denies throat pain or discomfort. Chest wall: The patient complained of having several small nodular areas over her left anterior chest wall surgical site for which she is aware it may represent recurrent breast cancer. Lymphatics: The patient denies palpable peripheral lymphadenopathy. Hematologic: The patient denies having bruising, bleeding, or progressive fatigue. Respiratory: Patient denies having shortness of breath, cough, sputum production, fever, or dyspnea on exertion. Cardiovascular: The patient denies having leg pain, leg swelling, heart palpitations, chest permit, chest pain, or lightheadedness. The patient denies having dyspnea on exertion. Gastrointestinal: The patient denies having nausea, emesis, or diarrhea. The patient denies having any hematemesis or blood in the stool. Genitourinary: Patient denies having urinary urgency, frequency, or dysuria. The patient denies having blood in the urine. Psychological: The patient denies having symptoms of nervousness, anxiety, depression, or thoughts of harming himself some of this. Skin: Patient denies having skin rashes, skin, ulcerations, or unexplained itching or pruritus. Musculoskeletal: The patient his continued to have pain in the knees. She also has weakness and is using a canefor mobility support. Objective:     Visit Vitals    /84 (BP 1 Location: Right arm, BP Patient Position: Sitting)    Pulse 62    Temp 98.5 °F (36.9 °C) (Oral)    Wt 101.2 kg (223 lb)    BMI 35.99 kg/m2     ECOG PS=0, pain score=0/10     Physical Exam:   Gen. Appearance: The patient is in no acute distress. Skin: There is no bruise or rash. HEENT: The exam is unremarkable. Neck: Supple without lymphadenopathy or thyromegaly. Lungs: Clear to auscultation and percussion; there are no wheezes or rhonchi. Heart: Regular rate and rhythm; there are no murmurs, gallops, or rubs.  Anterior chest wall and breast: The right breast shows no mass, nipple discharge, nipple retraction, or skin dimpling. The axilla reveals no palpable axillary lymphadenopathy. The left breast is surgically absent. There is some scarring over the left anterior rib cage and a few small nodular areas are noted over the anterior lateral upper chest wall is well. These have the consistency of fatty deposits however the patient is concerned that this may represent recurrent disease. Abdomen: Bowel sounds are present and normal.  There is no guarding, tenderness, or hepatosplenomegaly. Extremities: There is no clubbing, cyanosis, or edema. Neurologic: There are no focal neurologic deficits. Lymphatics: There is no palpable peripheral lymphadenopathy. Musculoskeletal: The patient has full range of motion at all joints. There is no evidence of joint deformity or effusions. There is no focal joint tenderness. Psychological/psychiatric: There is no clinical evidence of anxiety, depression, or melancholy. Lab data:      Results for orders placed or performed during the hospital encounter of 10/25/17   CBC WITH 3 PART DIFF     Status: Abnormal   Result Value Ref Range Status    WBC 7.6 4.5 - 13.0 K/uL Final    RBC 3.94 (L) 4.10 - 5.10 M/uL Final    HGB 11.6 (L) 12.0 - 16 g/dL Final    HCT 33.3 (L) 36 - 48 % Final    MCV 84.5 78 - 102 FL Final    MCH 29.4 25.0 - 35.0 PG Final    MCHC 34.8 31 - 37 g/dL Final    RDW 14.9 (H) 11.5 - 14.5 % Final    PLATELET 940 (L) 796 - 440 K/uL Final    NEUTROPHILS 63 40 - 70 % Final    MIXED CELLS 4 0.1 - 17 % Final    LYMPHOCYTES 33 14 - 44 % Final    ABS. NEUTROPHILS 4.8 1.8 - 9.5 K/UL Final    ABS. MIXED CELLS 0.3 0.0 - 2.3 K/uL Final    ABS. LYMPHOCYTES 2.5 1.1 - 5.9 K/UL Final     Comment: Test performed at [place] [address] Location. Results Reviewed by Medical Director. DF AUTOMATED   Final           Assessment:     1.  Malignant neoplasm of breast in female, estrogen receptor negative, unspecified laterality, unspecified site of breast ([facility]    2. Hb-SS disease without crisis ([facility]    3. Vitamin D deficiency    4. Thrombocytopenia ([facility]    5. Anemia in other chronic diseases classified elsewhere      Plan:   Invasive ductal carcinoma of the left breast: The patient is status post modified radical mastectomy and continues to do well. Clinically there is no evidence of disease recurrence. Her most recent CA-27-29 level  was 38.2 U/mL. I will recheck her CA-27-29 level at this time. Vitamin D deficiency: She recently completed a 12 week prescription of Ergocalciferol 50,000 units weekly. At this time I will check her vitamin D level. She was advised to begin taking an oral vitamin D and calcium supplement once daily. Sickle cell disease, SS: Clinically the patient is relatively stable at this time she has not had any recent pain crises. I have recommended that the patient continued to remain well-hydrated and to keep her body warm doing these winter months to minimize the onset of vaso-occlusive pain crises. A Rx for Dilaudid 2mg tabs was provided for breakthrough pain. Thrombocytopenia: I have explained to the patient that a CBC today shows that her platelet count is relatively stable at 111,000. We will continue to monitor this at 4 month intervals. Therapeutic intervention is not warranted unless her platelets decline below 15,000. Iron deficiency anemia: I have explained to the patient that she is not iron deficient any longer. Her most recent ferritin level  was 1330 ng/mL. Her iron saturation was 39% and his circulating iron level was 93 mcg/dL. At this point most of her anemia is related to her underlying sickle cell disease. Lymphocytosis: The total WBC count is 7.3 and absolute lymphocyte count is 2.5. Therefore, I have explained to the patient that her lymphocytosis has resolved. Her lymphocyte count represents 35% of the total white count at 7.3. I will continue to see her in clinic at four-month intervals in the future. Orders Placed This Encounter    COMPLETE CBC & AUTO DIFF WBC    InHouse CBC ([employer])     Standing Status:   Future     Number of Occurrences:   1     Standing Expiration Date:   62/7/4383    METABOLIC PANEL, COMPREHENSIVE     Standing Status:   Future     Number of Occurrences:   1     Standing Expiration Date:   10/26/2018    IRON PROFILE     Standing Status:   Future     Number of Occurrences:   1     Standing Expiration Date:   10/26/2018    CA 27.29     Standing Status:   Future     Number of Occurrences:   1     Standing Expiration Date:   10/26/2018    FERRITIN     Standing Status:   Future     Number of Occurrences:   1     Standing Expiration Date:   10/26/2018    VITAMIN D, 25 HYDROXY     Standing Status:   Future     Number of Occurrences:   1     Standing Expiration Date:   10/26/2017    HYDROmorphone (DILAUDID) 2 mg tablet     Sig: Take 1-2 Tabs by mouth every four (4) hours as needed for Pain. Max Daily Amount: 24 mg.      Dispense:  40 Tab     Refill:  0       Osiel Martinez MD  10/25/2017

## 2017-10-26 LAB
25(OH)D3+25(OH)D2 SERPL-MCNC: 39.4 NG/ML (ref 30–100)
ALBUMIN SERPL-MCNC: 4.7 G/DL (ref 3.5–5.5)
ALBUMIN/GLOB SERPL: 1.5 {RATIO} (ref 1.2–2.2)
ALP SERPL-CCNC: 151 IU/L (ref 39–117)
ALT SERPL-CCNC: 7 IU/L (ref 0–32)
AST SERPL-CCNC: 13 IU/L (ref 0–40)
BILIRUB SERPL-MCNC: 2 MG/DL (ref 0–1.2)
BUN SERPL-MCNC: 11 MG/DL (ref 6–24)
BUN/CREAT SERPL: 10 (ref 9–23)
CALCIUM SERPL-MCNC: 9.7 MG/DL (ref 8.7–10.2)
CANCER AG27-29 SERPL-ACNC: 45.2 U/ML (ref 0–38.6)
CHLORIDE SERPL-SCNC: 99 MMOL/L (ref 96–106)
CO2 SERPL-SCNC: 25 MMOL/L (ref 18–29)
CREAT SERPL-MCNC: 1.06 MG/DL (ref 0.57–1)
FERRITIN SERPL-MCNC: 1595 NG/ML (ref 15–150)
GFR SERPLBLD CREATININE-BSD FMLA CKD-EPI: 61 ML/MIN/1.73
GFR SERPLBLD CREATININE-BSD FMLA CKD-EPI: 70 ML/MIN/1.73
GLOBULIN SER CALC-MCNC: 3.1 G/DL (ref 1.5–4.5)
GLUCOSE SERPL-MCNC: 90 MG/DL (ref 65–99)
IRON SATN MFR SERPL: 50 % (ref 15–55)
IRON SERPL-MCNC: 126 UG/DL (ref 27–159)
POTASSIUM SERPL-SCNC: 4.3 MMOL/L (ref 3.5–5.2)
PROT SERPL-MCNC: 7.8 G/DL (ref 6–8.5)
SODIUM SERPL-SCNC: 140 MMOL/L (ref 134–144)
TIBC SERPL-MCNC: 253 UG/DL (ref 250–450)
UIBC SERPL-MCNC: 127 UG/DL (ref 131–425)

## 2018-03-07 ENCOUNTER — OFFICE VISIT (OUTPATIENT)
Dept: ONCOLOGY | Age: 53
End: 2018-03-07

## 2018-03-07 VITALS
WEIGHT: 226.4 LBS | HEIGHT: 66 IN | SYSTOLIC BLOOD PRESSURE: 143 MMHG | DIASTOLIC BLOOD PRESSURE: 82 MMHG | BODY MASS INDEX: 36.38 KG/M2 | TEMPERATURE: 97.5 F | HEART RATE: 77 BPM

## 2018-03-07 DIAGNOSIS — E55.9 VITAMIN D DEFICIENCY: ICD-10-CM

## 2018-03-07 DIAGNOSIS — D69.6 THROMBOCYTOPENIA (HCC): ICD-10-CM

## 2018-03-07 DIAGNOSIS — Z85.3 HISTORY OF BREAST CANCER: Primary | ICD-10-CM

## 2018-03-07 DIAGNOSIS — D57.1 HB-SS DISEASE WITHOUT CRISIS (HCC): ICD-10-CM

## 2018-03-07 DIAGNOSIS — M15.9 PRIMARY OSTEOARTHRITIS INVOLVING MULTIPLE JOINTS: ICD-10-CM

## 2018-03-07 NOTE — PROGRESS NOTES
Hematology/Oncology  Progress Note    Name: Carlos Proffer  Date: 3/7/2018  : 1965    Elizabeth Clayton MD     Ms. Vikki Duverney is a 46year old female who was seen for management of her triple-negative invasive ductal adenocarcinoma, left breast.  Current therapy: Active surveillance; the patient has previously completed systemic chemotherapy and radiation treatment. Subjective:     Ms. Vikki Duverney is a 59-year-old [facility] American woman with a history of triple-negative breast cancer, involving the left breast.  The patient reports that she is doing much better. She is continuing to use her cane for ambulation since she had a left total hip replacement. She is not experiencing a significant degree of pain at this time. She is doing much better psychologically wise and is not require the use of antianxiety medication is as much. Her recent mammogram in 2017 of the right breast showed no abnormality. Today she has no new complaints or concerns to report. Past medical history, family history, and social history: these were reviewed and remains unchanged.     Past Medical History:   Diagnosis Date    Anemia NEC     Arthritis     Cancer ([address]     Chronic pain     Coagulation disorder ([address]     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    Fatigue     Fibromyalgia     GERD (gastroesophageal reflux disease)     Hx of endometriosis     Hypertension 2011    Ill-defined condition     Sickle cell    Osteoarthritis of left hip 2016    Osteoarthritis of right hip 1/3/2017    Sickle cell anemia ([address]     Sleep apnea     Does not use CPAP     Past Surgical History:   Procedure Laterality Date    HX BREAST BIOPSY Left     HX  SECTION      HX HERNIA REPAIR      HX LAP CHOLECYSTECTOMY      HX MASTECTOMY Left 2012    with axillary lymph node dissection    HX ORTHOPAEDIC Left     hip replacement    LAP,CHOLECYSTECTOMY N/A 11/10/2016    Dr. Oscar Marinelli History    Marital status:      Spouse name: N/A    Number of children: N/A    Years of education: N/A     Occupational History    Not on file. Social History Main Topics    Smoking status: Former Smoker     Packs/day: 0.25     Years: 30.00    Smokeless tobacco: Never Used      Comment: advised to stop smoking and no smoking before the surgery    Alcohol use No    Drug use: No    Sexual activity: Not Currently     Other Topics Concern    Not on file     Social History Narrative     Family History   Problem Relation Age of Onset    Cancer Mother      breast    Diabetes Mother     Heart Disease Father     Diabetes Father     Sickle Cell Anemia Brother      Current Outpatient Prescriptions   Medication Sig Dispense Refill    HYDROmorphone (DILAUDID) 2 mg tablet Take 1-2 Tabs by mouth every four (4) hours as needed for Pain. Max Daily Amount: 24 mg. 40 Tab 0    ergocalciferol (ERGOCALCIFEROL) 50,000 unit capsule Take 1 Cap by mouth every seven (7) days. 12 Cap 0    aspirin (ASPIRIN) 325 mg tablet Take 1 Tab by mouth two (2) times a day. 30 Tab 2    oxyCODONE IR (ROXICODONE) 10 mg tab immediate release tablet Take 10 mg by mouth four (4) times daily.  oxyCODONE ER (OXYCONTIN) 20 mg ER tablet Take 1 Tab by mouth two (2) times a day. Max Daily Amount: 40 mg. 60 Tab 0    temazepam (RESTORIL) 15 mg capsule Take 1 Cap by mouth nightly as needed. Max Daily Amount: 15 mg. 30 Cap 1    naloxegol (MOVANTIK) 12.5 mg tab tablet Take 12.5 mg by mouth daily.  folic acid (FOLVITE) 1 mg tablet Take 1 mg by mouth daily.  metoprolol (LOPRESSOR) 25 mg tablet Take 25 mg by mouth two (2) times a day. Review of Systems  Constitutional: The patient has no acute distress or discomfort. HEENT: The patient denies recent head trauma, eye pain, blurred vision,  hearing deficit, oropharyngeal mucosal pain or lesions, and the patient denies throat pain or discomfort. Chest wall:  The patient complained of having several small nodular areas over her left anterior chest wall surgical site for which she is aware it may represent recurrent breast cancer. Lymphatics: The patient denies palpable peripheral lymphadenopathy. Hematologic: The patient denies having bruising, bleeding, or progressive fatigue. Respiratory: Patient denies having shortness of breath, cough, sputum production, fever, or dyspnea on exertion. Cardiovascular: The patient denies having leg pain, leg swelling, heart palpitations, chest permit, chest pain, or lightheadedness. The patient denies having dyspnea on exertion. Gastrointestinal: The patient denies having nausea, emesis, or diarrhea. The patient denies having any hematemesis or blood in the stool. Genitourinary: Patient denies having urinary urgency, frequency, or dysuria. The patient denies having blood in the urine. Psychological: The patient denies having symptoms of nervousness, anxiety, depression, or thoughts of harming himself some of this. Skin: Patient denies having skin rashes, skin, ulcerations, or unexplained itching or pruritus. Musculoskeletal: She denies muscle or joint aches/pain. Objective:     Visit Vitals    /82 (BP 1 Location: Left arm)    Pulse 77    Temp 97.5 °F (36.4 °C)    Ht 5' 6\" (1.676 m)    Wt 102.7 kg (226 lb 6.4 oz)    BMI 36.54 kg/m2     ECOG PS=0, pain score=0/10     Physical Exam:   Gen. Appearance: The patient is in no acute distress. Skin: There is no bruise or rash. HEENT: The exam is unremarkable. Neck: Supple without lymphadenopathy or thyromegaly. Lungs: Clear to auscultation and percussion; there are no wheezes or rhonchi. Heart: Regular rate and rhythm; there are no murmurs, gallops, or rubs. Anterior chest wall and breast: The right breast shows no mass, nipple discharge, nipple retraction, or skin dimpling. The axilla reveals no palpable axillary lymphadenopathy. The left breast is surgically absent.  There is some scarring over the left anterior rib cage and a few small nodular areas are noted over the anterior lateral upper chest wall is well. These have the consistency of fatty deposits however the patient is concerned that this may represent recurrent disease. Abdomen: Bowel sounds are present and normal.  There is no guarding, tenderness, or hepatosplenomegaly. Extremities: There is no clubbing, cyanosis, or edema. Neurologic: There are no focal neurologic deficits. Lymphatics: There is no palpable peripheral lymphadenopathy. Musculoskeletal: The patient has full range of motion at all joints. There is no evidence of joint deformity or effusions. There is no focal joint tenderness. Psychological/psychiatric: There is no clinical evidence of anxiety, depression, or melancholy. Lab data:      Results for orders placed or performed during the hospital encounter of 10/25/17   CBC WITH 3 PART DIFF     Status: Abnormal   Result Value Ref Range Status    WBC 7.6 4.5 - 13.0 K/uL Final    RBC 3.94 (L) 4.10 - 5.10 M/uL Final    HGB 11.6 (L) 12.0 - 16 g/dL Final    HCT 33.3 (L) 36 - 48 % Final    MCV 84.5 78 - 102 FL Final    MCH 29.4 25.0 - 35.0 PG Final    MCHC 34.8 31 - 37 g/dL Final    RDW 14.9 (H) 11.5 - 14.5 % Final    PLATELET 832 (L) 645 - 440 K/uL Final    NEUTROPHILS 63 40 - 70 % Final    MIXED CELLS 4 0.1 - 17 % Final    LYMPHOCYTES 33 14 - 44 % Final    ABS. NEUTROPHILS 4.8 1.8 - 9.5 K/UL Final    ABS. MIXED CELLS 0.3 0.0 - 2.3 K/uL Final    ABS. LYMPHOCYTES 2.5 1.1 - 5.9 K/UL Final     Comment: Test performed at [place] [address] Location. Results Reviewed by Medical Director. DF AUTOMATED   Final           Assessment:     1. History of breast cancer    2. Hb-SS disease without crisis ([address]    3. Vitamin D deficiency    4. Primary osteoarthritis involving multiple joints    5.  Thrombocytopenia ([facility]      Plan:   Invasive ductal carcinoma of the left breast: The patient is status post modified radical mastectomy and continues to do well. Clinically there is no evidence of disease recurrence. Her most recent CA-27-29 level  was 45.2 U/mL. I will recheck her CA-27-29 level at this time. If there is a significant increase her value, we will order CT scans of the chest, abdomen, and pelvis. Vitamin D deficiency: She recently completed a 12 week prescription of Ergocalciferol 50,000 units weekly. At this time I will check her vitamin D level. She was advised to begin taking an oral vitamin D and calcium supplement once daily. Sickle cell disease, SS: Clinically the patient is relatively stable at this time she has not had any recent pain crises. I have recommended that the patient continued to remain well-hydrated and to keep her body warm doing these winter months to minimize the onset of vaso-occlusive pain crises. Thrombocytopenia: I have explained to the patient that a CBC today will be sent out for interpretation. Therapeutic intervention is not warranted unless her platelets decline below 15,000. Iron deficiency anemia: CBC will sent out for interpretation. Her most recent ferritin level  was 1595 ng/mL. Her iron saturation was 50% and his circulating iron level was 126 mcg/dL. At this point most of her anemia is related to her underlying sickle cell disease. She was advised to hold PO iron at this time. Lymphocytosis: The total WBC count  Will be sent out for interpretation. I will continue to see her in clinic at four-month intervals in the future.   Orders Placed This Encounter    CBC WITH AUTOMATED DIFF     Standing Status:   Future     Number of Occurrences:   1     Standing Expiration Date:   3/8/2019    VITAMIN D, 25 HYDROXY     Standing Status:   Future     Number of Occurrences:   1     Standing Expiration Date:   6/8/2539    METABOLIC PANEL, COMPREHENSIVE     Standing Status:   Future     Number of Occurrences:   1     Standing Expiration Date: 3/8/2019    IRON PROFILE     Standing Status:   Future     Number of Occurrences:   1     Standing Expiration Date:   3/8/2019    CA 27.29     Standing Status:   Future     Number of Occurrences:   1     Standing Expiration Date:   3/8/2019    FERRITIN     Standing Status:   Future     Number of Occurrences:   1     Standing Expiration Date:   3/8/2019       Es Frazier MD  3/7/2018

## 2018-03-07 NOTE — MR AVS SNAPSHOT
[address] [address] [facility] 
[phone] Patient: Marquetta Kanner MRN: [id number] TOB:[birth year] Visit Information Date & Time Provider Department Dept. Phone Encounter #  
 3/7/2018  9:15 AM Jennifer Pereira MD [facility] Medical Oncology [phone] [id number] Follow-up Instructions Return in about 4 months (around 7/7/2018). Your Appointments 7/11/2018 10:15 AM  
Office Visit with Jennifer Pereira MD  
[address]  Oncology [facility]) Appt Note: 4 month  
 [facility], [address], [address] [place] [address] 200 [facility] Upcoming Health Maintenance Date Due Pneumococcal 19-64 Highest Risk (1 of 3 - PCV13) 6/5/1984 DTaP/Tdap/Td series (1 - Tdap) 6/5/1986 PAP AKA CERVICAL CYTOLOGY 6/5/1986 FOBT Q 1 YEAR AGE 50-75 6/5/2015 Influenza Age 5 to Adult 8/1/2017 BREAST CANCER SCRN MAMMOGRAM 9/27/2019 Allergies as of 3/7/2018  Review Complete On: 5/16/2017 By: Jennifer Pereira MD  
  
 Severity Noted Reaction Type Reactions Neulasta [Pegfilgrastim] High 08/04/2016   Side Effect Other (comments) \"Put me in a comma for 3 months\" Current Immunizations  Reviewed on 8/23/2016 No immunizations on file. Not reviewed this visit You Were Diagnosed With   
  
 Codes Comments History of breast cancer    -  Primary ICD-10-CM: Z85.3 ICD-9-CM: V10.3 Hb-SS disease without crisis [facility])     ICD-10-CM: D57.1 ICD-9-CM: 282.61 Vitamin D deficiency     ICD-10-CM: E55.9 ICD-9-CM: 268.9 Primary osteoarthritis involving multiple joints     ICD-10-CM: M15.0 ICD-9-CM: 715.09 Thrombocytopenia ([address]     ICD-10-CM: D69.6 ICD-9-CM: 287.5 Vitals BP Pulse Temp Height(growth percentile) Weight(growth percentile) BMI 143/82 (BP 1 Location: Left arm) 77 97.5 °F (36.4 °C) 5' 6\" (1.676 m) 226 lb 6.4 oz (102.7 kg) 36.54 kg/m2 OB Status Smoking Status Chemically Induced Former Smoker BMI and BSA Data Body Mass Index Body Surface Area  
 36.54 kg/m 2 2.19 m 2 Preferred Pharmacy Pharmacy Name Phone [address], [address] [place] [phone] [facility] [phone] Your Updated Medication List  
  
   
This list is accurate as of 3/7/18 10:00 AM.  Always use your most recent med list.  
  
  
  
  
 aspirin 325 mg tablet Commonly known as:  ASPIRIN Take 1 Tab by mouth two (2) times a day. ergocalciferol 50,000 unit capsule Commonly known as:  ERGOCALCIFEROL Take 1 Cap by mouth every seven (7) days. folic acid 1 mg tablet Commonly known as:  Google Take 1 mg by mouth daily. HYDROmorphone 2 mg tablet Commonly known as:  DILAUDID Take 1-2 Tabs by mouth every four (4) hours as needed for Pain. Max Daily Amount: 24 mg.  
  
 metoprolol tartrate 25 mg tablet Commonly known as:  LOPRESSOR Take 25 mg by mouth two (2) times a day.  
  
 naloxegol 12.5 mg Tab tablet Commonly known as:  Melvinia Stallings Take 12.5 mg by mouth daily. * oxyCODONE IR 10 mg Tab immediate release tablet Commonly known as:  Inge Snellen Take 10 mg by mouth four (4) times daily. * oxyCODONE ER 20 mg ER tablet Commonly known as:  OxyCONTIN Take 1 Tab by mouth two (2) times a day. Max Daily Amount: 40 mg.  
  
 temazepam 15 mg capsule Commonly known as:  RESTORIL Take 1 Cap by mouth nightly as needed. Max Daily Amount: 15 mg.  
  
 * Notice: This list has 2 medication(s) that are the same as other medications prescribed for you. Read the directions carefully, and ask your doctor or other care provider to review them with you. We Performed the Following CA 27.29 [22866 CPT(R)] CBC WITH AUTOMATED DIFF [25132 CPT(R)] FERRITIN [86327 CPT(R)] IRON PROFILE [id number] CPT(R)] METABOLIC PANEL, COMPREHENSIVE [26835 CPT(R)] VITAMIN D, 25 HYDROXY [id number] CPT(R)] Follow-up Instructions Return in about 4 months (around 7/7/2018). To-Do List   
 03/07/2018 Lab:  CBC WITH AUTOMATED DIFF Introducing [facility] & [facility] SERVICES! Dear Sridhar Armas: Thank you for requesting a [employer] account. Our records indicate that you already have an active [employer] account. You can access your account anytime at [email or web address]. [email or web address] Did you know that you can access your hospital and ER discharge instructions at any time in [employer]? You can also review all of your test results from your hospital stay or ER visit. Additional Information If you have questions, please visit the Frequently Asked Questions section of the [employer] website at [email or web address] Remember, [employer] is NOT to be used for urgent needs. For medical emergencies, dial 911. Now available from your iPhone and Android! Please provide this summary of care documentation to your next provider. Your primary care clinician is listed as Alla Quigley. If you have any questions after today's visit, please call [phone].

## 2018-03-08 DIAGNOSIS — E55.9 VITAMIN D DEFICIENCY: ICD-10-CM

## 2018-03-08 LAB
25(OH)D3+25(OH)D2 SERPL-MCNC: 28.5 NG/ML (ref 30–100)
ALBUMIN SERPL-MCNC: 4.6 G/DL (ref 3.5–5.5)
ALBUMIN/GLOB SERPL: 1.4 {RATIO} (ref 1.2–2.2)
ALP SERPL-CCNC: 145 IU/L (ref 39–117)
ALT SERPL-CCNC: 10 IU/L (ref 0–32)
AST SERPL-CCNC: 16 IU/L (ref 0–40)
BASOPHILS # BLD AUTO: 0 X10E3/UL (ref 0–0.2)
BASOPHILS NFR BLD AUTO: 0 %
BILIRUB SERPL-MCNC: 2.3 MG/DL (ref 0–1.2)
BUN SERPL-MCNC: 11 MG/DL (ref 6–24)
BUN/CREAT SERPL: 10 (ref 9–23)
CALCIUM SERPL-MCNC: 9.7 MG/DL (ref 8.7–10.2)
CANCER AG27-29 SERPL-ACNC: 40.9 U/ML (ref 0–38.6)
CHLORIDE SERPL-SCNC: 103 MMOL/L (ref 96–106)
CO2 SERPL-SCNC: 24 MMOL/L (ref 18–29)
CREAT SERPL-MCNC: 1.07 MG/DL (ref 0.57–1)
EOSINOPHIL # BLD AUTO: 0.1 X10E3/UL (ref 0–0.4)
EOSINOPHIL NFR BLD AUTO: 1 %
ERYTHROCYTE [DISTWIDTH] IN BLOOD BY AUTOMATED COUNT: 15.5 % (ref 12.3–15.4)
FERRITIN SERPL-MCNC: 2126 NG/ML (ref 15–150)
GFR SERPLBLD CREATININE-BSD FMLA CKD-EPI: 60 ML/MIN/1.73
GFR SERPLBLD CREATININE-BSD FMLA CKD-EPI: 69 ML/MIN/1.73
GLOBULIN SER CALC-MCNC: 3.2 G/DL (ref 1.5–4.5)
GLUCOSE SERPL-MCNC: 81 MG/DL (ref 65–99)
HCT VFR BLD AUTO: 34.3 % (ref 34–46.6)
HGB BLD-MCNC: 11.6 G/DL (ref 11.1–15.9)
IMM GRANULOCYTES # BLD: 0 X10E3/UL (ref 0–0.1)
IMM GRANULOCYTES NFR BLD: 0 %
IRON SATN MFR SERPL: 55 % (ref 15–55)
IRON SERPL-MCNC: 128 UG/DL (ref 27–159)
LYMPHOCYTES # BLD AUTO: 2.2 X10E3/UL (ref 0.7–3.1)
LYMPHOCYTES NFR BLD AUTO: 32 %
MCH RBC QN AUTO: 30 PG (ref 26.6–33)
MCHC RBC AUTO-ENTMCNC: 33.8 G/DL (ref 31.5–35.7)
MCV RBC AUTO: 89 FL (ref 79–97)
MONOCYTES # BLD AUTO: 0.4 X10E3/UL (ref 0.1–0.9)
MONOCYTES NFR BLD AUTO: 5 %
NEUTROPHILS # BLD AUTO: 4.3 X10E3/UL (ref 1.4–7)
NEUTROPHILS NFR BLD AUTO: 62 %
PLATELET # BLD AUTO: 125 X10E3/UL (ref 150–379)
POTASSIUM SERPL-SCNC: 4.2 MMOL/L (ref 3.5–5.2)
PROT SERPL-MCNC: 7.8 G/DL (ref 6–8.5)
RBC # BLD AUTO: 3.87 X10E6/UL (ref 3.77–5.28)
SODIUM SERPL-SCNC: 144 MMOL/L (ref 134–144)
TIBC SERPL-MCNC: 231 UG/DL (ref 250–450)
UIBC SERPL-MCNC: 103 UG/DL (ref 131–425)
WBC # BLD AUTO: 7 X10E3/UL (ref 3.4–10.8)

## 2018-03-08 RX ORDER — ERGOCALCIFEROL 1.25 MG/1
50000 CAPSULE ORAL
Qty: 12 CAP | Refills: 0 | Status: SHIPPED | OUTPATIENT
Start: 2018-03-08 | End: 2019-05-15

## 2018-03-08 NOTE — PROGRESS NOTES
She recently completed a 12 week prescription of Ergocalciferol 50,000 units weekly. She was advised to begin taking an oral vitamin D and calcium supplement once daily. Also, she was advised to hold her PO iron.

## 2018-06-19 ENCOUNTER — OFFICE VISIT (OUTPATIENT)
Dept: ONCOLOGY | Age: 53
End: 2018-06-19

## 2018-06-19 ENCOUNTER — HOSPITAL ENCOUNTER (OUTPATIENT)
Dept: ONCOLOGY | Age: 53
Discharge: HOME | End: 2018-06-19

## 2018-06-19 VITALS
WEIGHT: 224 LBS | DIASTOLIC BLOOD PRESSURE: 82 MMHG | SYSTOLIC BLOOD PRESSURE: 139 MMHG | BODY MASS INDEX: 36.15 KG/M2 | TEMPERATURE: 98.5 F | HEART RATE: 71 BPM

## 2018-06-19 DIAGNOSIS — Z17.1 MALIGNANT NEOPLASM OF BREAST IN FEMALE, ESTROGEN RECEPTOR NEGATIVE, UNSPECIFIED LATERALITY, UNSPECIFIED SITE OF BREAST (HCC): Primary | ICD-10-CM

## 2018-06-19 DIAGNOSIS — C50.919 MALIGNANT NEOPLASM OF BREAST IN FEMALE, ESTROGEN RECEPTOR NEGATIVE, UNSPECIFIED LATERALITY, UNSPECIFIED SITE OF BREAST (HCC): Primary | ICD-10-CM

## 2018-06-19 DIAGNOSIS — E55.9 VITAMIN D DEFICIENCY: ICD-10-CM

## 2018-06-19 DIAGNOSIS — D64.9 SYMPTOMATIC ANEMIA: ICD-10-CM

## 2018-06-19 DIAGNOSIS — Z17.1 MALIGNANT NEOPLASM OF BREAST IN FEMALE, ESTROGEN RECEPTOR NEGATIVE, UNSPECIFIED LATERALITY, UNSPECIFIED SITE OF BREAST (HCC): ICD-10-CM

## 2018-06-19 DIAGNOSIS — D57.1 HB-SS DISEASE WITHOUT CRISIS (HCC): ICD-10-CM

## 2018-06-19 DIAGNOSIS — D72.820 LYMPHOCYTOSIS: ICD-10-CM

## 2018-06-19 DIAGNOSIS — Z17.0 MALIGNANT NEOPLASM OF LOWER-OUTER QUADRANT OF LEFT BREAST OF FEMALE, ESTROGEN RECEPTOR POSITIVE (HCC): ICD-10-CM

## 2018-06-19 DIAGNOSIS — D69.6 THROMBOCYTOPENIA (HCC): ICD-10-CM

## 2018-06-19 DIAGNOSIS — C50.919 MALIGNANT NEOPLASM OF BREAST IN FEMALE, ESTROGEN RECEPTOR NEGATIVE, UNSPECIFIED LATERALITY, UNSPECIFIED SITE OF BREAST (HCC): ICD-10-CM

## 2018-06-19 DIAGNOSIS — D50.8 IRON DEFICIENCY ANEMIA DUE TO DIETARY CAUSES: ICD-10-CM

## 2018-06-19 DIAGNOSIS — C50.512 MALIGNANT NEOPLASM OF LOWER-OUTER QUADRANT OF LEFT BREAST OF FEMALE, ESTROGEN RECEPTOR POSITIVE (HCC): ICD-10-CM

## 2018-06-19 LAB
BASO+EOS+MONOS # BLD AUTO: 0.5 K/UL (ref 0–2.3)
BASO+EOS+MONOS # BLD AUTO: 5 % (ref 0.1–17)
DIFFERENTIAL METHOD BLD: ABNORMAL
ERYTHROCYTE [DISTWIDTH] IN BLOOD BY AUTOMATED COUNT: 13.7 % (ref 11.5–14.5)
HCT VFR BLD AUTO: 31.6 % (ref 36–48)
HGB BLD-MCNC: 11.4 G/DL (ref 12–16)
LYMPHOCYTES # BLD: 2.7 K/UL (ref 1.1–5.9)
LYMPHOCYTES NFR BLD: 28 % (ref 14–44)
MCH RBC QN AUTO: 30 PG (ref 25–35)
MCHC RBC AUTO-ENTMCNC: 36.1 G/DL (ref 31–37)
MCV RBC AUTO: 83.2 FL (ref 78–102)
NEUTS SEG # BLD: 6.2 K/UL (ref 1.8–9.5)
NEUTS SEG NFR BLD: 67 % (ref 40–70)
PLATELET # BLD AUTO: 144 K/UL (ref 140–440)
RBC # BLD AUTO: 3.8 M/UL (ref 4.1–5.1)
WBC # BLD AUTO: 9.4 K/UL (ref 4.5–13)

## 2018-06-19 NOTE — MR AVS SNAPSHOT
[address] [place] [address] [address] [place] [address] 
[phone] Patient: Rajni Romo MRN: [id number] BOA:0[birth year] Visit Information Date & Time Provider Department Dept. Phone Encounter #  
 6/19/2018  3:45 PM Clarisa Vasquez MD [facility]  [phone] [id number] Follow-up Instructions Return in about 4 months (around 10/19/2018). Your Appointments 12/19/2018 10:00 AM  
Office Visit with Clarisa Vasquez MD  
[facility]  Oncology [address] [place] [address]) Appt Note: [address], [address] [place] [address] Upcoming Health Maintenance Date Due Pneumococcal 19-64 Highest Risk (1 of 3 - PCV13) 6/5/1984 DTaP/Tdap/Td series (1 - Tdap) 6/5/1986 PAP AKA CERVICAL CYTOLOGY 6/5/1986 FOBT Q 1 YEAR AGE 50-75 6/5/2015 MEDICARE YEARLY EXAM 6/19/2018 Influenza Age 5 to Adult 8/1/2018 BREAST CANCER SCRN MAMMOGRAM 9/27/2019 Allergies as of 6/19/2018  Review Complete On: 6/19/2018 By: Clarisa Vasquez MD  
  
 Severity Noted Reaction Type Reactions Neulasta [Pegfilgrastim] High 08/04/2016   Side Effect Other (comments) \"Put me in a comma for 3 months\" Current Immunizations  Reviewed on 8/23/2016 No immunizations on file. Not reviewed this visit You Were Diagnosed With   
  
 Codes Comments Malignant neoplasm of breast in female, estrogen receptor negative, unspecified laterality, unspecified site of breast ([facility]    -  Primary ICD-10-CM: C50.919, Z17.1 ICD-9-CM: 174.9, V86.1 Malignant neoplasm of lower-outer quadrant of left breast of female, estrogen receptor positive ([facility]     ICD-10-CM: C50.512, Z17.0 ICD-9-CM: 174.5, V86.0 Vitamin D deficiency     ICD-10-CM: E55.9 ICD-9-CM: 268.9 Symptomatic anemia     ICD-10-CM: D64.9 ICD-9-CM: 285.9 Hb-SS disease without crisis [facility])     ICD-10-CM: D57.1 ICD-9-CM: 282.61 Thrombocytopenia ([address]     ICD-10-CM: D69.6 ICD-9-CM: 287.5 Iron deficiency anemia due to dietary causes     ICD-10-CM: D50.8 ICD-9-CM: 280.1 Lymphocytosis     ICD-10-CM: M76.527 ICD-9-CM: 288.61 Vitals BP Pulse Temp Weight(growth percentile) BMI OB Status 139/82 71 98.5 °F (36.9 °C) (Oral) 224 lb (101.6 kg) 36.15 kg/m2 Chemically Induced Smoking Status Former Smoker BMI and BSA Data Body Mass Index Body Surface Area  
 36.15 kg/m 2 2.18 m 2 Preferred Pharmacy Pharmacy Name Phone [address], [address] [phone] [facility] [phone] Your Updated Medication List  
  
   
This list is accurate as of 6/19/18  4:23 PM.  Always use your most recent med list.  
  
  
  
  
 aspirin 325 mg tablet Commonly known as:  ASPIRIN Take 1 Tab by mouth two (2) times a day. ergocalciferol 50,000 unit capsule Commonly known as:  ERGOCALCIFEROL Take 1 Cap by mouth every seven (7) days. folic acid 1 mg tablet Commonly known as:  Google Take 1 mg by mouth daily. HYDROmorphone 2 mg tablet Commonly known as:  DILAUDID Take 1-2 Tabs by mouth every four (4) hours as needed for Pain. Max Daily Amount: 24 mg.  
  
 metoprolol tartrate 25 mg tablet Commonly known as:  LOPRESSOR Take 25 mg by mouth two (2) times a day.  
  
 naloxegol 12.5 mg Tab tablet Commonly known as:  Evalina Gonzalo Take 12.5 mg by mouth daily. * oxyCODONE IR 10 mg Tab immediate release tablet Commonly known as:  Wyvonnia Finley Take 10 mg by mouth four (4) times daily. * oxyCODONE ER 20 mg ER tablet Commonly known as:  OxyCONTIN Take 1 Tab by mouth two (2) times a day. Max Daily Amount: 40 mg.  
  
 temazepam 15 mg capsule Commonly known as:  RESTORIL  
 Take 1 Cap by mouth nightly as needed. Max Daily Amount: 15 mg.  
  
 * Notice: This list has 2 medication(s) that are the same as other medications prescribed for you. Read the directions carefully, and ask your doctor or other care provider to review them with you. We Performed the Following CA 27.29 [23283 CPT(R)] COMPLETE CBC & AUTO DIFF WBC [58420 CPT(R)] FERRITIN [04476 CPT(R)] IRON PROFILE [id number] CPT(R)] METABOLIC PANEL, COMPREHENSIVE [34604 CPT(R)] VITAMIN D, 25 HYDROXY [id number] CPT(R)] Follow-up Instructions Return in about 4 months (around 10/19/2018). To-Do List   
 06/19/2018 Lab:  CBC WITH 3 PART DIFF   
  
 06/27/2018 10:00 AM  
  Appointment with [facility] PAT ROOM P5 at [address] [place] [address] ([phone]) Introducing [facility] SERVICES! Dear Monica Slot: Thank you for requesting a [employer] account. Our records indicate that you already have an active [employer] account. You can access your account anytime at [email or web address]) Did you know that you can access your hospital and ER discharge instructions at any time in [employer]? You can also review all of your test results from your hospital stay or ER visit. Additional Information If you have questions, please visit the Frequently Asked Questions section of the [employer] website at [email or web address]. Remember, [employer] is NOT to be used for urgent needs. For medical emergencies, dial 911. Now available from your iPhone and Android! Please provide this summary of care documentation to your next provider. Your primary care clinician is listed as [address] [place] [address]. If you have any questions after today's visit, please call [phone].

## 2018-06-19 NOTE — PROGRESS NOTES
Hematology/Oncology  Progress Note    Name: Linda Gave  Date: 2018  : 1965    Chan Calderon MD     Ms. Elida Somers is a 48year old female who was seen for management of her triple-negative invasive ductal adenocarcinoma, left breast.  Current therapy: Active surveillance; the patient has previously completed systemic chemotherapy and radiation treatment. Subjective:     Ms. Elida Somers is a 63-year-old [facility] American woman with a history of triple-negative breast cancer, involving the left breast.  The patient reports that she is doing much better. She is continuing to use her cane for ambulation since she had a left total hip replacement. She is not experiencing a significant degree of pain at this time. She is doing much better psychologically wise and is not requiring the use of antianxiety medication as much. She is scheduled to have mammography done in the upcoming month. The patient is also asking for release so that she can undergoing orthopedic surgical procedure in the upcoming weeks as well. Past medical history, family history, and social history: these were reviewed and remains unchanged.     Past Medical History:   Diagnosis Date    Anemia NEC     Arthritis     Cancer ([address]     Chronic pain     Coagulation disorder ([address]     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    Fatigue     Fibromyalgia     GERD (gastroesophageal reflux disease)     Hx of endometriosis     Hypertension     Ill-defined condition     Sickle cell    Osteoarthritis of left hip 2016    Osteoarthritis of right hip 1/3/2017    Sickle cell anemia ([address]     Sleep apnea     Does not use CPAP     Past Surgical History:   Procedure Laterality Date    HX BREAST BIOPSY Left     HX  SECTION      HX HERNIA REPAIR      HX LAP CHOLECYSTECTOMY      HX MASTECTOMY Left 2012    with axillary lymph node dissection    HX ORTHOPAEDIC Left     hip replacement    LAP,CHOLECYSTECTOMY N/A 11/10/2016    Dr. Dale Navarrete     Social History     Social History    Marital status:      Spouse name: N/A    Number of children: N/A    Years of education: N/A     Occupational History    Not on file. Social History Main Topics    Smoking status: Former Smoker     Packs/day: 0.25     Years: 30.00    Smokeless tobacco: Never Used      Comment: advised to stop smoking and no smoking before the surgery    Alcohol use No    Drug use: No    Sexual activity: Not Currently     Other Topics Concern    Not on file     Social History Narrative     Family History   Problem Relation Age of Onset    Cancer Mother      breast    Diabetes Mother     Heart Disease Father     Diabetes Father     Sickle Cell Anemia Brother      Current Outpatient Prescriptions   Medication Sig Dispense Refill    ergocalciferol (ERGOCALCIFEROL) 50,000 unit capsule Take 1 Cap by mouth every seven (7) days. 12 Cap 0    HYDROmorphone (DILAUDID) 2 mg tablet Take 1-2 Tabs by mouth every four (4) hours as needed for Pain. Max Daily Amount: 24 mg. 40 Tab 0    aspirin (ASPIRIN) 325 mg tablet Take 1 Tab by mouth two (2) times a day. 30 Tab 2    oxyCODONE IR (ROXICODONE) 10 mg tab immediate release tablet Take 10 mg by mouth four (4) times daily.  oxyCODONE ER (OXYCONTIN) 20 mg ER tablet Take 1 Tab by mouth two (2) times a day. Max Daily Amount: 40 mg. 60 Tab 0    temazepam (RESTORIL) 15 mg capsule Take 1 Cap by mouth nightly as needed. Max Daily Amount: 15 mg. 30 Cap 1    naloxegol (MOVANTIK) 12.5 mg tab tablet Take 12.5 mg by mouth daily.  folic acid (FOLVITE) 1 mg tablet Take 1 mg by mouth daily.  metoprolol (LOPRESSOR) 25 mg tablet Take 25 mg by mouth two (2) times a day. Review of Systems  Constitutional: The patient has no acute distress or discomfort.   HEENT: The patient denies recent head trauma, eye pain, blurred vision,  hearing deficit, oropharyngeal mucosal pain or lesions, and the patient denies throat pain or discomfort. Chest wall: The patient complained of having several small nodular areas over her left anterior chest wall surgical site for which she is aware it may represent recurrent breast cancer. Lymphatics: The patient denies palpable peripheral lymphadenopathy. Hematologic: The patient denies having bruising, bleeding, or progressive fatigue. Respiratory: Patient denies having shortness of breath, cough, sputum production, fever, or dyspnea on exertion. Cardiovascular: The patient denies having leg pain, leg swelling, heart palpitations, chest permit, chest pain, or lightheadedness. The patient denies having dyspnea on exertion. Gastrointestinal: The patient denies having nausea, emesis, or diarrhea. The patient denies having any hematemesis or blood in the stool. Genitourinary: Patient denies having urinary urgency, frequency, or dysuria. The patient denies having blood in the urine. Psychological: The patient denies having symptoms of nervousness, anxiety, depression, or thoughts of harming himself some of this. Skin: Patient denies having skin rashes, skin, ulcerations, or unexplained itching or pruritus. Musculoskeletal: She denies muscle or joint aches/pain. Objective:     Visit Vitals    /82    Pulse 71    Temp 98.5 °F (36.9 °C) (Oral)    Wt 101.6 kg (224 lb)    BMI 36.15 kg/m2     ECOG PS=0, pain score=0/10     Physical Exam:   Gen. Appearance: The patient is in no acute distress. Skin: There is no bruise or rash. HEENT: The exam is unremarkable. Neck: Supple without lymphadenopathy or thyromegaly. Lungs: Clear to auscultation and percussion; there are no wheezes or rhonchi. Heart: Regular rate and rhythm; there are no murmurs, gallops, or rubs. Anterior chest wall and breast: The right breast shows no mass, nipple discharge, nipple retraction, or skin dimpling. The axilla reveals no palpable axillary lymphadenopathy.  The left breast is surgically absent. There is some scarring over the left anterior rib cage and a few small nodular areas are noted over the anterior lateral upper chest wall is well. These have the consistency of fatty deposits however the patient is concerned that this may represent recurrent disease. Abdomen: Bowel sounds are present and normal.  There is no guarding, tenderness, or hepatosplenomegaly. Extremities: There is no clubbing, cyanosis, or edema. Neurologic: There are no focal neurologic deficits. Lymphatics: There is no palpable peripheral lymphadenopathy. Musculoskeletal: The patient has full range of motion at all joints. There is no evidence of joint deformity or effusions. There is no focal joint tenderness. Psychological/psychiatric: There is no clinical evidence of anxiety, depression, or melancholy. Lab data:      Results for orders placed or performed during the hospital encounter of 06/19/18   CBC WITH 3 PART DIFF     Status: Abnormal   Result Value Ref Range Status    WBC 9.4 4.5 - 13.0 K/uL Final    RBC 3.80 (L) 4.10 - 5.10 M/uL Final    HGB 11.4 (L) 12.0 - 16.0 g/dL Final    HCT 31.6 (L) 36 - 48 % Final    MCV 83.2 78 - 102 FL Final    MCH 30.0 25.0 - 35.0 PG Final    MCHC 36.1 31 - 37 g/dL Final    RDW 13.7 11.5 - 14.5 % Final    PLATELET 673 812 - 338 K/uL Final    NEUTROPHILS 67 40 - 70 % Final    MIXED CELLS 5 0.1 - 17 % Final    LYMPHOCYTES 28 14 - 44 % Final    ABS. NEUTROPHILS 6.2 1.8 - 9.5 K/UL Final    ABS. MIXED CELLS 0.5 0.0 - 2.3 K/uL Final    ABS. LYMPHOCYTES 2.7 1.1 - 5.9 K/UL Final     Comment: Test performed at [place] [id number] Location. Results Reviewed by Medical Director. DF AUTOMATED   Final           Assessment:     1. Malignant neoplasm of breast in female, estrogen receptor negative, unspecified laterality, unspecified site of breast ([address]    2. Malignant neoplasm of lower-outer quadrant of left breast of female, estrogen receptor positive ([address]    3.  Vitamin D deficiency 4. Symptomatic anemia    5. Hb-SS disease without crisis ([facility]    6. Thrombocytopenia ([facility]    7. Iron deficiency anemia due to dietary causes      Plan:   Invasive ductal carcinoma of the left breast: The patient is status post modified radical mastectomy and continues to do well. Clinically there is no evidence of disease recurrence. Her most recent CA-27-29 level  was 40.9 u/mL, from 3/17/2018. I will recheck her CA-27-29 level at this time. If there is a significant increase her value, we will order CT scans of the chest, abdomen, and pelvis. Vitamin D deficiency: She previously completed a 12 week prescription of Ergocalciferol 50,000 units weekly. At this time I will check her vitamin D level. She was advised to begin taking an oral vitamin D and calcium supplement once daily. Sickle cell disease, SS: Clinically the patient is relatively stable at this time she has not had any recent pain crises. I have recommended that the patient continued to remain well-hydrated and to keep her body warm doing these winter months to minimize the onset of vaso-occlusive pain crises. Thrombocytopenia: I have explained to the patient that her CBC from today shows that her platelet count is normal at 144,000. Therapeutic intervention is not warranted unless her platelets decline below 15,000. Iron deficiency anemia: CBC will sent out for interpretation. Her most recent ferritin level  was 1595 ng/mL. Her iron saturation was 50% and his circulating iron level was 126 mcg/dL. At this point most of her anemia is related to her underlying sickle cell disease. She was advised to hold PO iron at this time. Lymphocytosis: The total WBC count is currently 9.4 with 67% neutrophils and 28% lymphocytes. I have explained to the patient that her CBC shows stable findings. Clinically she is doing well with no evidence of recurrent breast cancer.   Based on her clinical presentation and lab data she is cleared for her upcoming orthopedic surgical procedure. I have signed a clearance form that was provided to her former orthopedic surgeon's office as well. I will continue to see her in clinic at four-month intervals in the future.   Orders Placed This Encounter    COMPLETE CBC & AUTO DIFF WBC    InHouse CBC ([employer])     Standing Status:   Future     Number of Occurrences:   1     Standing Expiration Date:   6/26/2018       Janusz Paez MD  6/19/2018

## 2018-06-20 LAB
25(OH)D3+25(OH)D2 SERPL-MCNC: 38.5 NG/ML (ref 30–100)
ALBUMIN SERPL-MCNC: 4.7 G/DL (ref 3.5–5.5)
ALBUMIN/GLOB SERPL: 1.7 {RATIO} (ref 1.2–2.2)
ALP SERPL-CCNC: 151 IU/L (ref 39–117)
ALT SERPL-CCNC: 12 IU/L (ref 0–32)
AST SERPL-CCNC: 16 IU/L (ref 0–40)
BILIRUB SERPL-MCNC: 1.5 MG/DL (ref 0–1.2)
BUN SERPL-MCNC: 12 MG/DL (ref 6–24)
BUN/CREAT SERPL: 10 (ref 9–23)
CALCIUM SERPL-MCNC: 9.5 MG/DL (ref 8.7–10.2)
CANCER AG27-29 SERPL-ACNC: 28.4 U/ML (ref 0–38.6)
CHLORIDE SERPL-SCNC: 105 MMOL/L (ref 96–106)
CO2 SERPL-SCNC: 19 MMOL/L (ref 20–29)
CREAT SERPL-MCNC: 1.24 MG/DL (ref 0.57–1)
FERRITIN SERPL-MCNC: 2084 NG/ML (ref 15–150)
GFR SERPLBLD CREATININE-BSD FMLA CKD-EPI: 50 ML/MIN/1.73
GFR SERPLBLD CREATININE-BSD FMLA CKD-EPI: 57 ML/MIN/1.73
GLOBULIN SER CALC-MCNC: 2.8 G/DL (ref 1.5–4.5)
GLUCOSE SERPL-MCNC: 99 MG/DL (ref 65–99)
IRON SATN MFR SERPL: 26 % (ref 15–55)
IRON SERPL-MCNC: 58 UG/DL (ref 27–159)
POTASSIUM SERPL-SCNC: 4.1 MMOL/L (ref 3.5–5.2)
PROT SERPL-MCNC: 7.5 G/DL (ref 6–8.5)
SODIUM SERPL-SCNC: 142 MMOL/L (ref 134–144)
TIBC SERPL-MCNC: 226 UG/DL (ref 250–450)
UIBC SERPL-MCNC: 168 UG/DL (ref 131–425)

## 2018-06-26 ENCOUNTER — HOSPITAL ENCOUNTER (OUTPATIENT)
Dept: PREADMISSION TESTING | Age: 53
Discharge: HOME | End: 2018-06-26
Payer: COMMERCIAL

## 2018-06-26 DIAGNOSIS — M17.11 OSTEOARTHRITIS OF RIGHT KNEE: ICD-10-CM

## 2018-06-26 LAB
ALBUMIN SERPL-MCNC: 4 G/DL (ref 3.4–5)
ALBUMIN/GLOB SERPL: 0.9 {RATIO} (ref 0.8–1.7)
ALP SERPL-CCNC: 157 U/L (ref 45–117)
ALT SERPL-CCNC: 19 U/L (ref 13–56)
ANION GAP SERPL CALC-SCNC: 10 MMOL/L (ref 3–18)
APPEARANCE UR: NORMAL
APTT PPP: 40 SEC (ref 23–36.4)
AST SERPL-CCNC: 18 U/L (ref 15–37)
ATRIAL RATE: 63 BPM
BACTERIA SPEC CULT: NORMAL
BASOPHILS # BLD: 0 K/UL (ref 0–0.06)
BASOPHILS NFR BLD: 0 % (ref 0–2)
BILIRUB SERPL-MCNC: 2 MG/DL (ref 0.2–1)
BILIRUB UR QL: NEGATIVE
BUN SERPL-MCNC: 11 MG/DL (ref 7–18)
BUN/CREAT SERPL: 10 (ref 12–20)
CALCIUM SERPL-MCNC: 9 MG/DL (ref 8.5–10.1)
CALCULATED P AXIS, ECG09: 28 DEGREES
CALCULATED R AXIS, ECG10: 25 DEGREES
CALCULATED T AXIS, ECG11: 16 DEGREES
CHLORIDE SERPL-SCNC: 105 MMOL/L (ref 100–108)
CO2 SERPL-SCNC: 25 MMOL/L (ref 21–32)
COLOR UR: YELLOW
CREAT SERPL-MCNC: 1.09 MG/DL (ref 0.6–1.3)
DIAGNOSIS, 93000: NORMAL
DIFFERENTIAL METHOD BLD: ABNORMAL
EOSINOPHIL # BLD: 0 K/UL (ref 0–0.4)
EOSINOPHIL NFR BLD: 1 % (ref 0–5)
ERYTHROCYTE [DISTWIDTH] IN BLOOD BY AUTOMATED COUNT: 14.6 % (ref 11.6–14.5)
ERYTHROCYTE [SEDIMENTATION RATE] IN BLOOD: 40 MM/HR (ref 0–30)
EST. AVERAGE GLUCOSE BLD GHB EST-MCNC: ABNORMAL MG/DL
GLOBULIN SER CALC-MCNC: 4.4 G/DL (ref 2–4)
GLUCOSE SERPL-MCNC: 101 MG/DL (ref 74–99)
GLUCOSE UR STRIP.AUTO-MCNC: NEGATIVE MG/DL
HBA1C MFR BLD: 4.1 % (ref 4.5–5.6)
HCT VFR BLD AUTO: 29.3 % (ref 35–45)
HGB BLD-MCNC: 10.2 G/DL (ref 12–16)
HGB UR QL STRIP: NEGATIVE
INR PPP: 1.1 (ref 0.8–1.2)
KETONES UR QL STRIP.AUTO: NEGATIVE MG/DL
LEUKOCYTE ESTERASE UR QL STRIP.AUTO: NEGATIVE
LYMPHOCYTES # BLD: 2.1 K/UL (ref 0.9–3.6)
LYMPHOCYTES NFR BLD: 27 % (ref 21–52)
MCH RBC QN AUTO: 29.3 PG (ref 24–34)
MCHC RBC AUTO-ENTMCNC: 34.8 G/DL (ref 31–37)
MCV RBC AUTO: 84.2 FL (ref 74–97)
MONOCYTES # BLD: 0.4 K/UL (ref 0.05–1.2)
MONOCYTES NFR BLD: 5 % (ref 3–10)
NEUTS SEG # BLD: 5.1 K/UL (ref 1.8–8)
NEUTS SEG NFR BLD: 67 % (ref 40–73)
NITRITE UR QL STRIP.AUTO: NEGATIVE
P-R INTERVAL, ECG05: 148 MS
PH UR STRIP: 5.5 [PH] (ref 5–8)
PLATELET # BLD AUTO: 87 K/UL (ref 135–420)
PMV BLD AUTO: 9.8 FL (ref 9.2–11.8)
POTASSIUM SERPL-SCNC: 4.1 MMOL/L (ref 3.5–5.5)
PROT SERPL-MCNC: 8.4 G/DL (ref 6.4–8.2)
PROT UR STRIP-MCNC: NEGATIVE MG/DL
PROTHROMBIN TIME: 13.5 SEC (ref 11.5–15.2)
Q-T INTERVAL, ECG07: 422 MS
QRS DURATION, ECG06: 82 MS
QTC CALCULATION (BEZET), ECG08: 431 MS
RBC # BLD AUTO: 3.48 M/UL (ref 4.2–5.3)
SERVICE CMNT-IMP: NORMAL
SODIUM SERPL-SCNC: 140 MMOL/L (ref 136–145)
SP GR UR REFRACTOMETRY: 1.01 (ref 1–1.03)
UROBILINOGEN UR QL STRIP.AUTO: 1 EU/DL (ref 0.2–1)
VENTRICULAR RATE, ECG03: 63 BPM
WBC # BLD AUTO: 7.7 K/UL (ref 4.6–13.2)

## 2018-06-26 PROCEDURE — 85730 THROMBOPLASTIN TIME PARTIAL: CPT | Performed by: ORTHOPAEDIC SURGERY

## 2018-06-26 PROCEDURE — 85652 RBC SED RATE AUTOMATED: CPT | Performed by: ORTHOPAEDIC SURGERY

## 2018-06-26 PROCEDURE — 85610 PROTHROMBIN TIME: CPT | Performed by: ORTHOPAEDIC SURGERY

## 2018-06-26 PROCEDURE — 85025 COMPLETE CBC W/AUTO DIFF WBC: CPT | Performed by: ORTHOPAEDIC SURGERY

## 2018-06-26 PROCEDURE — 93005 ELECTROCARDIOGRAM TRACING: CPT

## 2018-06-26 PROCEDURE — 83036 HEMOGLOBIN GLYCOSYLATED A1C: CPT | Performed by: ORTHOPAEDIC SURGERY

## 2018-06-26 PROCEDURE — 80053 COMPREHEN METABOLIC PANEL: CPT | Performed by: ORTHOPAEDIC SURGERY

## 2018-06-26 PROCEDURE — 36415 COLL VENOUS BLD VENIPUNCTURE: CPT | Performed by: ORTHOPAEDIC SURGERY

## 2018-06-26 PROCEDURE — 81003 URINALYSIS AUTO W/O SCOPE: CPT | Performed by: ORTHOPAEDIC SURGERY

## 2018-06-26 PROCEDURE — 87641 MR-STAPH DNA AMP PROBE: CPT | Performed by: ORTHOPAEDIC SURGERY

## 2018-06-28 PROBLEM — M17.11 OSTEOARTHRITIS OF RIGHT KNEE: Chronic | Status: ACTIVE | Noted: 2018-06-28

## 2018-06-28 NOTE — H&P
[address] [facility] [address] Medicine  History and Physical Exam    Patient: Josef Schwartz MRN: [id number]  SSN: [id number]    YOB: 1965  Age: 48 y.o. Sex: female      Subjective:      Chief Complaint: Right knee pain    History of Present Illness:  Patient complains of pain to the right knee and difficulty ambulating, which has progressively worsened over several months. X-rays showed osteoarthritis of the joint. The patient's pain has persisted and progressed despite conservative treatments and therapies. The patient has been previously treated with nsaids. The patient has at this time opted for surgical intervention. Past Medical History:   Diagnosis Date    Anemia NEC     Arthritis     Chronic pain     Ductal carcinoma ([facility]     left breast    Fatigue     Fibromyalgia     GERD (gastroesophageal reflux disease)     Hx of endometriosis     Hypertension     Ill-defined condition 2018    Sickle cell crisis    Osteoarthritis of left hip 2016    Osteoarthritis of right hip 1/3/2017    Osteoarthritis of right knee 2018    Sickle cell anemia ([facility]     Sleep apnea     Does not use CPAP    Thyroid disease      Past Surgical History:   Procedure Laterality Date    HX BREAST BIOPSY Left     HX  SECTION      HX HERNIA REPAIR      HX LAP CHOLECYSTECTOMY      HX MASTECTOMY Left 2012    with axillary lymph node dissection    TOTAL HIP ARTHROPLASTY Bilateral  &      Social History     Occupational History    Not on file. Social History Main Topics    Smoking status: Former Smoker     Packs/day: 0.25     Years: 30.00     Quit date: 2011    Smokeless tobacco: Former User    Alcohol use Yes      Comment: 2 times yearly    Drug use: No    Sexual activity: Not Currently     Prior to Admission medications    Medication Sig Start Date End Date Taking?  Authorizing Provider   thyroid, Pork, (ARMOUR THYROID) 30 mg tablet Take 30 mg by mouth daily. Historical Provider   ergocalciferol (ERGOCALCIFEROL) 50,000 unit capsule Take 1 Cap by mouth every seven (7) days. 3/8/18   Lyle Michaels NP   HYDROmorphone (DILAUDID) 2 mg tablet Take 1-2 Tabs by mouth every four (4) hours as needed for Pain. Max Daily Amount: 24 mg. 10/25/17   Susan Hinton NP   oxyCODONE IR (ROXICODONE) 10 mg tab immediate release tablet Take 10 mg by mouth four (4) times daily as needed. Historical Provider   oxyCODONE ER (OXYCONTIN) 20 mg ER tablet Take 1 Tab by mouth two (2) times a day. Max Daily Amount: 40 mg. Patient taking differently: Take 20 mg by mouth two (2) times a day. 0600 & 1800 11/15/16   Kaya Sanches MD   naloxegol (MOVANTIK) 12.5 mg tab tablet Take 12.5 mg by mouth daily. Historical Provider   folic acid (FOLVITE) 1 mg tablet Take 1 mg by mouth daily. Historical Provider   metoprolol (LOPRESSOR) 25 mg tablet Take 25 mg by mouth two (2) times a day. Historical Provider       Allergies: Allergies   Allergen Reactions    Neulasta [Pegfilgrastim] Other (comments)     \"Put me in a comma for 3 months\"        Review of Systems:  A comprehensive review of systems was negative except for that written in the History of Present Illness. Objective:       Physical Exam:  HEENT: Normocephalic, atraumatic  Lungs:  Clear to auscultation  Heart:   Regular rate and rhythm  Abdomen: Soft  Extremities:  Pain with range of motion of the right knee(s). Active extension decreased, active flexion decreased. Tenderness generalized. No deformity. No effusion. Positive crepitus. Antalgic gait. Assessment:      Arthritis of the right knee. Plan:       Proceed with scheduled RIGHT TOTAL KNEE ARTHROPLASTY. The various methods of treatment have been discussed with the patient and family. After consideration of risks, benefits, and other options for treatment, the patient has consented to surgical interventions.  Questions were answered and preoperative teaching was done by Dr Jose Chisholm.      Signed By: MARTIN Francisco     June 28, 2018

## 2018-07-13 ENCOUNTER — HOSPITAL ENCOUNTER (OUTPATIENT)
Dept: PREADMISSION TESTING | Age: 53
Discharge: HOME | End: 2018-07-13
Payer: COMMERCIAL

## 2018-07-13 PROCEDURE — 86902 BLOOD TYPE ANTIGEN DONOR EA: CPT | Performed by: ORTHOPAEDIC SURGERY

## 2018-07-13 PROCEDURE — 86870 RBC ANTIBODY IDENTIFICATION: CPT | Performed by: ORTHOPAEDIC SURGERY

## 2018-07-13 PROCEDURE — 86920 COMPATIBILITY TEST SPIN: CPT | Performed by: ORTHOPAEDIC SURGERY

## 2018-07-13 PROCEDURE — 86880 COOMBS TEST DIRECT: CPT | Performed by: ORTHOPAEDIC SURGERY

## 2018-07-13 PROCEDURE — 36415 COLL VENOUS BLD VENIPUNCTURE: CPT | Performed by: ORTHOPAEDIC SURGERY

## 2018-07-13 PROCEDURE — 86900 BLOOD TYPING SEROLOGIC ABO: CPT | Performed by: ORTHOPAEDIC SURGERY

## 2018-07-13 PROCEDURE — 86978 RBC PRETREATMENT SERUM: CPT | Performed by: ORTHOPAEDIC SURGERY

## 2018-07-13 PROCEDURE — 86901 BLOOD TYPING SEROLOGIC RH(D): CPT | Performed by: ORTHOPAEDIC SURGERY

## 2018-07-13 PROCEDURE — 86922 COMPATIBILITY TEST ANTIGLOB: CPT | Performed by: ORTHOPAEDIC SURGERY

## 2018-07-13 PROCEDURE — 86921 COMPATIBILITY TEST INCUBATE: CPT | Performed by: ORTHOPAEDIC SURGERY

## 2018-07-19 ENCOUNTER — ANESTHESIA (OUTPATIENT)
Dept: SURGERY | Age: 53
DRG: 470 | End: 2018-07-19
Payer: COMMERCIAL

## 2018-07-19 ENCOUNTER — ANESTHESIA EVENT (OUTPATIENT)
Dept: SURGERY | Age: 53
DRG: 470 | End: 2018-07-19
Payer: COMMERCIAL

## 2018-07-19 ENCOUNTER — APPOINTMENT (OUTPATIENT)
Dept: GENERAL RADIOLOGY | Age: 53
DRG: 470 | End: 2018-07-19
Attending: PHYSICIAN ASSISTANT
Payer: COMMERCIAL

## 2018-07-19 ENCOUNTER — HOSPITAL ENCOUNTER (INPATIENT)
Age: 53
LOS: 1 days | Discharge: HOME HEALTH | DRG: 470 | End: 2018-07-20
Attending: ORTHOPAEDIC SURGERY | Admitting: ORTHOPAEDIC SURGERY
Payer: COMMERCIAL

## 2018-07-19 DIAGNOSIS — M17.11 PRIMARY OSTEOARTHRITIS OF RIGHT KNEE: Primary | Chronic | ICD-10-CM

## 2018-07-19 PROCEDURE — 77030038011: Performed by: ORTHOPAEDIC SURGERY

## 2018-07-19 PROCEDURE — 77030027138 HC INCENT SPIROMETER -A

## 2018-07-19 PROCEDURE — 74011250636 HC RX REV CODE- 250/636: Performed by: ORTHOPAEDIC SURGERY

## 2018-07-19 PROCEDURE — 77030020259 HC SOL INJ SOD CL 0.9% 100ML BG: Performed by: ORTHOPAEDIC SURGERY

## 2018-07-19 PROCEDURE — 77030039267 HC ADH SKN EXOFIN S2SG -B: Performed by: ORTHOPAEDIC SURGERY

## 2018-07-19 PROCEDURE — 77030013708 HC HNDPC SUC IRR PULS STRY –B: Performed by: ORTHOPAEDIC SURGERY

## 2018-07-19 PROCEDURE — 77030031139 HC SUT VCRL2 J&J -A: Performed by: ORTHOPAEDIC SURGERY

## 2018-07-19 PROCEDURE — 77030002934 HC SUT MCRYL J&J -B: Performed by: ORTHOPAEDIC SURGERY

## 2018-07-19 PROCEDURE — 77030038010: Performed by: ORTHOPAEDIC SURGERY

## 2018-07-19 PROCEDURE — 74011000258 HC RX REV CODE- 258: Performed by: ORTHOPAEDIC SURGERY

## 2018-07-19 PROCEDURE — 73560 X-RAY EXAM OF KNEE 1 OR 2: CPT

## 2018-07-19 PROCEDURE — 74011250636 HC RX REV CODE- 250/636

## 2018-07-19 PROCEDURE — 77030012508 HC MSK AIRWY LMA AMBU -A: Performed by: ANESTHESIOLOGY

## 2018-07-19 PROCEDURE — 74011250637 HC RX REV CODE- 250/637: Performed by: ANESTHESIOLOGY

## 2018-07-19 PROCEDURE — 74011250637 HC RX REV CODE- 250/637: Performed by: PHYSICIAN ASSISTANT

## 2018-07-19 PROCEDURE — 0SRC0J9 REPLACEMENT OF RIGHT KNEE JOINT WITH SYNTHETIC SUBSTITUTE, CEMENTED, OPEN APPROACH: ICD-10-PCS | Performed by: ORTHOPAEDIC SURGERY

## 2018-07-19 PROCEDURE — 77030020407 HC IV BLD WRMR ST 3M -A: Performed by: ANESTHESIOLOGY

## 2018-07-19 PROCEDURE — 76060000034 HC ANESTHESIA 1.5 TO 2 HR: Performed by: ORTHOPAEDIC SURGERY

## 2018-07-19 PROCEDURE — 77010033678 HC OXYGEN DAILY

## 2018-07-19 PROCEDURE — 3E0T3BZ INTRODUCTION OF ANESTHETIC AGENT INTO PERIPHERAL NERVES AND PLEXI, PERCUTANEOUS APPROACH: ICD-10-PCS | Performed by: ANESTHESIOLOGY

## 2018-07-19 PROCEDURE — 77030011628: Performed by: ORTHOPAEDIC SURGERY

## 2018-07-19 PROCEDURE — 77030020813 HC INST SCULP CEM KT DISP S&N -B: Performed by: ORTHOPAEDIC SURGERY

## 2018-07-19 PROCEDURE — C1776 JOINT DEVICE (IMPLANTABLE): HCPCS | Performed by: ORTHOPAEDIC SURGERY

## 2018-07-19 PROCEDURE — 77030032489 HC SLV COMPR SCD FT CUF COVD -B: Performed by: ORTHOPAEDIC SURGERY

## 2018-07-19 PROCEDURE — 97530 THERAPEUTIC ACTIVITIES: CPT

## 2018-07-19 PROCEDURE — 65270000029 HC RM PRIVATE

## 2018-07-19 PROCEDURE — 77030003666 HC NDL SPINAL BD -A: Performed by: ORTHOPAEDIC SURGERY

## 2018-07-19 PROCEDURE — 77030036563 HC WRP CLD THER KNE S2SG -B: Performed by: ORTHOPAEDIC SURGERY

## 2018-07-19 PROCEDURE — 77030020782 HC GWN BAIR PAWS FLX 3M -B: Performed by: ORTHOPAEDIC SURGERY

## 2018-07-19 PROCEDURE — C9290 INJ, BUPIVACAINE LIPOSOME: HCPCS | Performed by: ORTHOPAEDIC SURGERY

## 2018-07-19 PROCEDURE — 77030037876 HC DRSG MEPILEX 16-48IN BORD MOLN -A: Performed by: ORTHOPAEDIC SURGERY

## 2018-07-19 PROCEDURE — 77030016060 HC NDL NRV BLK TELE -A: Performed by: ANESTHESIOLOGY

## 2018-07-19 PROCEDURE — 74011250637 HC RX REV CODE- 250/637: Performed by: ORTHOPAEDIC SURGERY

## 2018-07-19 PROCEDURE — 76010000153 HC OR TIME 1.5 TO 2 HR: Performed by: ORTHOPAEDIC SURGERY

## 2018-07-19 PROCEDURE — 77030018836 HC SOL IRR NACL ICUM -A: Performed by: ORTHOPAEDIC SURGERY

## 2018-07-19 PROCEDURE — 74011000250 HC RX REV CODE- 250

## 2018-07-19 PROCEDURE — 64447 NJX AA&/STRD FEMORAL NRV IMG: CPT | Performed by: ANESTHESIOLOGY

## 2018-07-19 PROCEDURE — 74011250636 HC RX REV CODE- 250/636: Performed by: PHYSICIAN ASSISTANT

## 2018-07-19 PROCEDURE — C1713 ANCHOR/SCREW BN/BN,TIS/BN: HCPCS | Performed by: ORTHOPAEDIC SURGERY

## 2018-07-19 PROCEDURE — 74011000250 HC RX REV CODE- 250: Performed by: ORTHOPAEDIC SURGERY

## 2018-07-19 PROCEDURE — 77030011640 HC PAD GRND REM COVD -A: Performed by: ORTHOPAEDIC SURGERY

## 2018-07-19 PROCEDURE — 76942 ECHO GUIDE FOR BIOPSY: CPT | Performed by: ORTHOPAEDIC SURGERY

## 2018-07-19 PROCEDURE — 76210000006 HC OR PH I REC 0.5 TO 1 HR: Performed by: ORTHOPAEDIC SURGERY

## 2018-07-19 PROCEDURE — 97162 PT EVAL MOD COMPLEX 30 MIN: CPT

## 2018-07-19 PROCEDURE — 77030034694 HC SCPL CANADY PLSM DISP USMD -E: Performed by: ORTHOPAEDIC SURGERY

## 2018-07-19 DEVICE — CEMENT BNE 40GM FULL DOSE PMMA W/O ANTIBIO HI VISC N RADPQ: Type: IMPLANTABLE DEVICE | Site: KNEE | Status: FUNCTIONAL

## 2018-07-19 DEVICE — INSERT TIB RP FEM KNEE CEM: Type: IMPLANTABLE DEVICE | Site: KNEE | Status: FUNCTIONAL

## 2018-07-19 DEVICE — IMPLANTABLE DEVICE: Type: IMPLANTABLE DEVICE | Site: KNEE | Status: FUNCTIONAL

## 2018-07-19 DEVICE — COMPONENT PAT DIA35MM KNEE POLY DOME CEM MEDIALIZED ATTUNE: Type: IMPLANTABLE DEVICE | Site: KNEE | Status: FUNCTIONAL

## 2018-07-19 RX ORDER — DEXMEDETOMIDINE HYDROCHLORIDE 4 UG/ML
INJECTION, SOLUTION INTRAVENOUS AS NEEDED
Status: DISCONTINUED | OUTPATIENT
Start: 2018-07-19 | End: 2018-07-19 | Stop reason: HOSPADM

## 2018-07-19 RX ORDER — ERGOCALCIFEROL 1.25 MG/1
50000 CAPSULE ORAL
Status: DISCONTINUED | OUTPATIENT
Start: 2018-07-22 | End: 2018-07-20 | Stop reason: HOSPADM

## 2018-07-19 RX ORDER — METOCLOPRAMIDE HYDROCHLORIDE 5 MG/ML
10 INJECTION INTRAMUSCULAR; INTRAVENOUS
Status: DISCONTINUED | OUTPATIENT
Start: 2018-07-19 | End: 2018-07-20 | Stop reason: HOSPADM

## 2018-07-19 RX ORDER — FENTANYL CITRATE 50 UG/ML
50 INJECTION, SOLUTION INTRAMUSCULAR; INTRAVENOUS
Status: DISCONTINUED | OUTPATIENT
Start: 2018-07-19 | End: 2018-07-19 | Stop reason: HOSPADM

## 2018-07-19 RX ORDER — NALOXONE HYDROCHLORIDE 0.4 MG/ML
0.4 INJECTION, SOLUTION INTRAMUSCULAR; INTRAVENOUS; SUBCUTANEOUS AS NEEDED
Status: DISCONTINUED | OUTPATIENT
Start: 2018-07-19 | End: 2018-07-20 | Stop reason: HOSPADM

## 2018-07-19 RX ORDER — ACETAMINOPHEN 500 MG
1000 TABLET ORAL
Status: COMPLETED | OUTPATIENT
Start: 2018-07-19 | End: 2018-07-19

## 2018-07-19 RX ORDER — FENTANYL CITRATE 50 UG/ML
INJECTION, SOLUTION INTRAMUSCULAR; INTRAVENOUS AS NEEDED
Status: DISCONTINUED | OUTPATIENT
Start: 2018-07-19 | End: 2018-07-19 | Stop reason: HOSPADM

## 2018-07-19 RX ORDER — PREGABALIN 50 MG/1
50 CAPSULE ORAL
Status: COMPLETED | OUTPATIENT
Start: 2018-07-19 | End: 2018-07-19

## 2018-07-19 RX ORDER — FLUMAZENIL 0.1 MG/ML
0.2 INJECTION INTRAVENOUS
Status: DISCONTINUED | OUTPATIENT
Start: 2018-07-19 | End: 2018-07-19 | Stop reason: HOSPADM

## 2018-07-19 RX ORDER — LANOLIN ALCOHOL/MO/W.PET/CERES
1 CREAM (GRAM) TOPICAL 3 TIMES DAILY
Status: DISCONTINUED | OUTPATIENT
Start: 2018-07-19 | End: 2018-07-20 | Stop reason: HOSPADM

## 2018-07-19 RX ORDER — ASPIRIN 325 MG
325 TABLET ORAL 2 TIMES DAILY
Qty: 42 TAB | Refills: 0 | Status: SHIPPED | OUTPATIENT
Start: 2018-07-19 | End: 2018-08-09

## 2018-07-19 RX ORDER — ZOLPIDEM TARTRATE 5 MG/1
5-10 TABLET ORAL
Status: DISCONTINUED | OUTPATIENT
Start: 2018-07-19 | End: 2018-07-20 | Stop reason: HOSPADM

## 2018-07-19 RX ORDER — TRANEXAMIC ACID 650 1/1
1950 TABLET ORAL ONCE
Status: COMPLETED | OUTPATIENT
Start: 2018-07-19 | End: 2018-07-19

## 2018-07-19 RX ORDER — MELOXICAM 7.5 MG/1
7.5 TABLET ORAL 2 TIMES DAILY
Qty: 28 TAB | Refills: 0 | Status: SHIPPED | OUTPATIENT
Start: 2018-07-19 | End: 2018-07-19

## 2018-07-19 RX ORDER — METOPROLOL TARTRATE 25 MG/1
25 TABLET, FILM COATED ORAL 2 TIMES DAILY
Status: DISCONTINUED | OUTPATIENT
Start: 2018-07-19 | End: 2018-07-20 | Stop reason: HOSPADM

## 2018-07-19 RX ORDER — ROPIVACAINE HYDROCHLORIDE 5 MG/ML
INJECTION, SOLUTION EPIDURAL; INFILTRATION; PERINEURAL AS NEEDED
Status: DISCONTINUED | OUTPATIENT
Start: 2018-07-19 | End: 2018-07-19 | Stop reason: HOSPADM

## 2018-07-19 RX ORDER — CELECOXIB 100 MG/1
400 CAPSULE ORAL
Status: COMPLETED | OUTPATIENT
Start: 2018-07-19 | End: 2018-07-19

## 2018-07-19 RX ORDER — CEFADROXIL 500 MG/1
500 CAPSULE ORAL 2 TIMES DAILY
Qty: 10 CAP | Refills: 0 | Status: SHIPPED | OUTPATIENT
Start: 2018-07-19 | End: 2018-07-24

## 2018-07-19 RX ORDER — HYDROMORPHONE HYDROCHLORIDE 2 MG/ML
INJECTION, SOLUTION INTRAMUSCULAR; INTRAVENOUS; SUBCUTANEOUS AS NEEDED
Status: DISCONTINUED | OUTPATIENT
Start: 2018-07-19 | End: 2018-07-19 | Stop reason: HOSPADM

## 2018-07-19 RX ORDER — PANTOPRAZOLE SODIUM 40 MG/1
40 TABLET, DELAYED RELEASE ORAL DAILY
Status: DISCONTINUED | OUTPATIENT
Start: 2018-07-19 | End: 2018-07-20 | Stop reason: HOSPADM

## 2018-07-19 RX ORDER — KETOROLAC TROMETHAMINE 15 MG/ML
15 INJECTION, SOLUTION INTRAMUSCULAR; INTRAVENOUS EVERY 6 HOURS
Status: DISCONTINUED | OUTPATIENT
Start: 2018-07-19 | End: 2018-07-20

## 2018-07-19 RX ORDER — ACETAMINOPHEN 325 MG/1
650 TABLET ORAL EVERY 6 HOURS
Status: DISCONTINUED | OUTPATIENT
Start: 2018-07-19 | End: 2018-07-20 | Stop reason: HOSPADM

## 2018-07-19 RX ORDER — PROPOFOL 10 MG/ML
INJECTION, EMULSION INTRAVENOUS AS NEEDED
Status: DISCONTINUED | OUTPATIENT
Start: 2018-07-19 | End: 2018-07-19 | Stop reason: HOSPADM

## 2018-07-19 RX ORDER — DIPHENHYDRAMINE HCL 25 MG
25 CAPSULE ORAL
Status: DISCONTINUED | OUTPATIENT
Start: 2018-07-19 | End: 2018-07-20 | Stop reason: HOSPADM

## 2018-07-19 RX ORDER — OXYCODONE HCL 20 MG/1
20 TABLET, FILM COATED, EXTENDED RELEASE ORAL 2 TIMES DAILY
Status: DISCONTINUED | OUTPATIENT
Start: 2018-07-19 | End: 2018-07-20 | Stop reason: HOSPADM

## 2018-07-19 RX ORDER — SODIUM CHLORIDE, SODIUM LACTATE, POTASSIUM CHLORIDE, CALCIUM CHLORIDE 600; 310; 30; 20 MG/100ML; MG/100ML; MG/100ML; MG/100ML
125 INJECTION, SOLUTION INTRAVENOUS CONTINUOUS
Status: DISCONTINUED | OUTPATIENT
Start: 2018-07-19 | End: 2018-07-20 | Stop reason: HOSPADM

## 2018-07-19 RX ORDER — SODIUM CHLORIDE 9 MG/ML
125 INJECTION, SOLUTION INTRAVENOUS CONTINUOUS
Status: DISCONTINUED | OUTPATIENT
Start: 2018-07-19 | End: 2018-07-20 | Stop reason: HOSPADM

## 2018-07-19 RX ORDER — ONDANSETRON 2 MG/ML
4 INJECTION INTRAMUSCULAR; INTRAVENOUS ONCE
Status: DISCONTINUED | OUTPATIENT
Start: 2018-07-19 | End: 2018-07-19 | Stop reason: HOSPADM

## 2018-07-19 RX ORDER — DEXAMETHASONE SODIUM PHOSPHATE 4 MG/ML
8 INJECTION, SOLUTION INTRA-ARTICULAR; INTRALESIONAL; INTRAMUSCULAR; INTRAVENOUS; SOFT TISSUE ONCE
Status: COMPLETED | OUTPATIENT
Start: 2018-07-19 | End: 2018-07-19

## 2018-07-19 RX ORDER — SODIUM CHLORIDE 0.9 % (FLUSH) 0.9 %
5-10 SYRINGE (ML) INJECTION AS NEEDED
Status: DISCONTINUED | OUTPATIENT
Start: 2018-07-19 | End: 2018-07-20 | Stop reason: HOSPADM

## 2018-07-19 RX ORDER — LEVOTHYROXINE AND LIOTHYRONINE 19; 4.5 UG/1; UG/1
30 TABLET ORAL DAILY
Status: DISCONTINUED | OUTPATIENT
Start: 2018-07-20 | End: 2018-07-20 | Stop reason: HOSPADM

## 2018-07-19 RX ORDER — ONDANSETRON 2 MG/ML
INJECTION INTRAMUSCULAR; INTRAVENOUS AS NEEDED
Status: DISCONTINUED | OUTPATIENT
Start: 2018-07-19 | End: 2018-07-19 | Stop reason: HOSPADM

## 2018-07-19 RX ORDER — KETAMINE HYDROCHLORIDE 10 MG/ML
INJECTION, SOLUTION INTRAMUSCULAR; INTRAVENOUS AS NEEDED
Status: DISCONTINUED | OUTPATIENT
Start: 2018-07-19 | End: 2018-07-19 | Stop reason: HOSPADM

## 2018-07-19 RX ORDER — ACETAMINOPHEN 10 MG/ML
1000 INJECTION, SOLUTION INTRAVENOUS EVERY 6 HOURS
Status: DISCONTINUED | OUTPATIENT
Start: 2018-07-19 | End: 2018-07-19 | Stop reason: CLARIF

## 2018-07-19 RX ORDER — MELOXICAM 7.5 MG/1
7.5 TABLET ORAL 2 TIMES DAILY
Qty: 28 TAB | Refills: 0 | Status: SHIPPED | OUTPATIENT
Start: 2018-07-19 | End: 2018-07-20

## 2018-07-19 RX ORDER — CEFADROXIL 500 MG/1
500 CAPSULE ORAL 2 TIMES DAILY
Qty: 10 CAP | Refills: 0 | Status: SHIPPED | OUTPATIENT
Start: 2018-07-19 | End: 2018-07-19

## 2018-07-19 RX ORDER — ASPIRIN 325 MG/1
325 TABLET, FILM COATED ORAL
Status: DISCONTINUED | OUTPATIENT
Start: 2018-07-20 | End: 2018-07-20 | Stop reason: HOSPADM

## 2018-07-19 RX ORDER — SODIUM CHLORIDE 0.9 % (FLUSH) 0.9 %
5-10 SYRINGE (ML) INJECTION EVERY 8 HOURS
Status: DISCONTINUED | OUTPATIENT
Start: 2018-07-19 | End: 2018-07-20 | Stop reason: HOSPADM

## 2018-07-19 RX ORDER — OXYCODONE HYDROCHLORIDE 5 MG/1
10 TABLET ORAL
Status: DISCONTINUED | OUTPATIENT
Start: 2018-07-19 | End: 2018-07-20 | Stop reason: HOSPADM

## 2018-07-19 RX ORDER — HYDROMORPHONE HYDROCHLORIDE 2 MG/1
2-4 TABLET ORAL
Status: DISCONTINUED | OUTPATIENT
Start: 2018-07-19 | End: 2018-07-20 | Stop reason: HOSPADM

## 2018-07-19 RX ORDER — MELOXICAM 7.5 MG/1
7.5 TABLET ORAL 2 TIMES DAILY
Status: DISCONTINUED | OUTPATIENT
Start: 2018-07-19 | End: 2018-07-20

## 2018-07-19 RX ORDER — CEFAZOLIN SODIUM/WATER 2 G/20 ML
2 SYRINGE (ML) INTRAVENOUS ONCE
Status: COMPLETED | OUTPATIENT
Start: 2018-07-19 | End: 2018-07-19

## 2018-07-19 RX ORDER — CEFAZOLIN SODIUM/WATER 2 G/20 ML
2 SYRINGE (ML) INTRAVENOUS EVERY 8 HOURS
Status: COMPLETED | OUTPATIENT
Start: 2018-07-19 | End: 2018-07-20

## 2018-07-19 RX ORDER — SODIUM CHLORIDE 9 MG/ML
300 INJECTION, SOLUTION INTRAVENOUS CONTINUOUS
Status: DISPENSED | OUTPATIENT
Start: 2018-07-19 | End: 2018-07-19

## 2018-07-19 RX ORDER — ASPIRIN 325 MG
325 TABLET ORAL 2 TIMES DAILY
Qty: 42 TAB | Refills: 0 | Status: SHIPPED | OUTPATIENT
Start: 2018-07-19 | End: 2018-07-19

## 2018-07-19 RX ORDER — ONDANSETRON 2 MG/ML
4 INJECTION INTRAMUSCULAR; INTRAVENOUS
Status: DISCONTINUED | OUTPATIENT
Start: 2018-07-19 | End: 2018-07-20 | Stop reason: HOSPADM

## 2018-07-19 RX ORDER — NALOXONE HYDROCHLORIDE 0.4 MG/ML
0.4 INJECTION, SOLUTION INTRAMUSCULAR; INTRAVENOUS; SUBCUTANEOUS AS NEEDED
Status: DISCONTINUED | OUTPATIENT
Start: 2018-07-19 | End: 2018-07-19 | Stop reason: HOSPADM

## 2018-07-19 RX ORDER — DIPHENHYDRAMINE HYDROCHLORIDE 50 MG/ML
12.5 INJECTION, SOLUTION INTRAMUSCULAR; INTRAVENOUS
Status: DISCONTINUED | OUTPATIENT
Start: 2018-07-19 | End: 2018-07-20 | Stop reason: HOSPADM

## 2018-07-19 RX ORDER — DOCUSATE SODIUM 100 MG/1
100 CAPSULE, LIQUID FILLED ORAL 2 TIMES DAILY
Status: DISCONTINUED | OUTPATIENT
Start: 2018-07-19 | End: 2018-07-20 | Stop reason: HOSPADM

## 2018-07-19 RX ORDER — FOLIC ACID 1 MG/1
1 TABLET ORAL DAILY
Status: DISCONTINUED | OUTPATIENT
Start: 2018-07-20 | End: 2018-07-20 | Stop reason: HOSPADM

## 2018-07-19 RX ORDER — MIDAZOLAM HYDROCHLORIDE 1 MG/ML
INJECTION, SOLUTION INTRAMUSCULAR; INTRAVENOUS AS NEEDED
Status: DISCONTINUED | OUTPATIENT
Start: 2018-07-19 | End: 2018-07-19 | Stop reason: HOSPADM

## 2018-07-19 RX ORDER — LIDOCAINE HYDROCHLORIDE 20 MG/ML
INJECTION, SOLUTION EPIDURAL; INFILTRATION; INTRACAUDAL; PERINEURAL AS NEEDED
Status: DISCONTINUED | OUTPATIENT
Start: 2018-07-19 | End: 2018-07-19 | Stop reason: HOSPADM

## 2018-07-19 RX ORDER — SODIUM CHLORIDE, SODIUM LACTATE, POTASSIUM CHLORIDE, CALCIUM CHLORIDE 600; 310; 30; 20 MG/100ML; MG/100ML; MG/100ML; MG/100ML
100 INJECTION, SOLUTION INTRAVENOUS CONTINUOUS
Status: DISCONTINUED | OUTPATIENT
Start: 2018-07-19 | End: 2018-07-19 | Stop reason: HOSPADM

## 2018-07-19 RX ADMIN — MELOXICAM 7.5 MG: 7.5 TABLET ORAL at 22:01

## 2018-07-19 RX ADMIN — ONDANSETRON 4 MG: 2 INJECTION INTRAMUSCULAR; INTRAVENOUS at 14:28

## 2018-07-19 RX ADMIN — SODIUM CHLORIDE, SODIUM LACTATE, POTASSIUM CHLORIDE, AND CALCIUM CHLORIDE 1000 ML: 600; 310; 30; 20 INJECTION, SOLUTION INTRAVENOUS at 12:54

## 2018-07-19 RX ADMIN — HYDROMORPHONE HYDROCHLORIDE 1 MG: 2 INJECTION, SOLUTION INTRAMUSCULAR; INTRAVENOUS; SUBCUTANEOUS at 15:17

## 2018-07-19 RX ADMIN — PREGABALIN 50 MG: 50 CAPSULE ORAL at 12:51

## 2018-07-19 RX ADMIN — CELECOXIB 400 MG: 100 CAPSULE ORAL at 12:51

## 2018-07-19 RX ADMIN — DEXMEDETOMIDINE HYDROCHLORIDE 8 MCG: 4 INJECTION, SOLUTION INTRAVENOUS at 14:25

## 2018-07-19 RX ADMIN — DEXAMETHASONE SODIUM PHOSPHATE 8 MG: 4 INJECTION, SOLUTION INTRAMUSCULAR; INTRAVENOUS at 12:50

## 2018-07-19 RX ADMIN — Medication 2 G: at 14:26

## 2018-07-19 RX ADMIN — FERROUS SULFATE TAB 325 MG (65 MG ELEMENTAL FE) 325 MG: 325 (65 FE) TAB at 22:00

## 2018-07-19 RX ADMIN — METOPROLOL TARTRATE 25 MG: 25 TABLET ORAL at 22:01

## 2018-07-19 RX ADMIN — OXYCODONE HYDROCHLORIDE 20 MG: 20 TABLET, FILM COATED, EXTENDED RELEASE ORAL at 22:01

## 2018-07-19 RX ADMIN — FENTANYL CITRATE 100 MCG: 50 INJECTION, SOLUTION INTRAMUSCULAR; INTRAVENOUS at 13:26

## 2018-07-19 RX ADMIN — LIDOCAINE HYDROCHLORIDE 40 MG: 20 INJECTION, SOLUTION EPIDURAL; INFILTRATION; INTRACAUDAL; PERINEURAL at 14:21

## 2018-07-19 RX ADMIN — TRANEXAMIC ACID 1950 MG: 650 TABLET ORAL at 12:38

## 2018-07-19 RX ADMIN — MIDAZOLAM HYDROCHLORIDE 2 MG: 1 INJECTION, SOLUTION INTRAMUSCULAR; INTRAVENOUS at 13:26

## 2018-07-19 RX ADMIN — DEXMEDETOMIDINE HYDROCHLORIDE 12 MCG: 4 INJECTION, SOLUTION INTRAVENOUS at 14:21

## 2018-07-19 RX ADMIN — SODIUM CHLORIDE, SODIUM LACTATE, POTASSIUM CHLORIDE, AND CALCIUM CHLORIDE 125 ML/HR: 600; 310; 30; 20 INJECTION, SOLUTION INTRAVENOUS at 12:55

## 2018-07-19 RX ADMIN — Medication 2 G: at 22:04

## 2018-07-19 RX ADMIN — DOCUSATE SODIUM 100 MG: 100 CAPSULE, LIQUID FILLED ORAL at 22:00

## 2018-07-19 RX ADMIN — PROPOFOL 200 MG: 10 INJECTION, EMULSION INTRAVENOUS at 14:21

## 2018-07-19 RX ADMIN — SODIUM CHLORIDE 125 ML/HR: 900 INJECTION, SOLUTION INTRAVENOUS at 22:05

## 2018-07-19 RX ADMIN — HYDROMORPHONE HYDROCHLORIDE 2 MG: 2 TABLET ORAL at 18:27

## 2018-07-19 RX ADMIN — SODIUM CHLORIDE, SODIUM LACTATE, POTASSIUM CHLORIDE, AND CALCIUM CHLORIDE: 600; 310; 30; 20 INJECTION, SOLUTION INTRAVENOUS at 14:54

## 2018-07-19 RX ADMIN — KETAMINE HYDROCHLORIDE 30 MG: 10 INJECTION, SOLUTION INTRAMUSCULAR; INTRAVENOUS at 14:45

## 2018-07-19 RX ADMIN — KETAMINE HYDROCHLORIDE 20 MG: 10 INJECTION, SOLUTION INTRAMUSCULAR; INTRAVENOUS at 14:21

## 2018-07-19 RX ADMIN — PANTOPRAZOLE SODIUM 40 MG: 40 TABLET, DELAYED RELEASE ORAL at 12:51

## 2018-07-19 RX ADMIN — ACETAMINOPHEN 1000 MG: 500 TABLET, FILM COATED ORAL at 12:51

## 2018-07-19 RX ADMIN — ROPIVACAINE HYDROCHLORIDE 30 ML: 5 INJECTION, SOLUTION EPIDURAL; INFILTRATION; PERINEURAL at 13:30

## 2018-07-19 RX ADMIN — KETOROLAC TROMETHAMINE 15 MG: 15 INJECTION, SOLUTION INTRAMUSCULAR; INTRAVENOUS at 18:34

## 2018-07-19 RX ADMIN — ACETAMINOPHEN 650 MG: 325 TABLET, FILM COATED ORAL at 18:27

## 2018-07-19 RX ADMIN — SODIUM CHLORIDE 300 ML/HR: 900 INJECTION, SOLUTION INTRAVENOUS at 18:29

## 2018-07-19 RX ADMIN — FENTANYL CITRATE 100 MCG: 50 INJECTION, SOLUTION INTRAMUSCULAR; INTRAVENOUS at 14:18

## 2018-07-19 RX ADMIN — MIDAZOLAM HYDROCHLORIDE 2 MG: 1 INJECTION, SOLUTION INTRAMUSCULAR; INTRAVENOUS at 14:18

## 2018-07-19 RX ADMIN — HYDROMORPHONE HYDROCHLORIDE 1 MG: 2 INJECTION, SOLUTION INTRAMUSCULAR; INTRAVENOUS; SUBCUTANEOUS at 14:58

## 2018-07-19 NOTE — PERIOP NOTES
Reviewed PTA medication list with patient/caregiver and patient/caregiver denies any additional medications. Patient admits to having a responsible adult care for them for at least 24 hours after surgery.     Dual skin assessment completed by Alton Bermeo RN and RICHAR Varghese RN

## 2018-07-19 NOTE — ROUTINE PROCESS
1646 rcved PACU report, will assess pt upon arrival to unit    1708 rcved pt from PACU, pt awake and alert, no acute distress noted, vitals obtained, will monitor     1950 Bedside and Verbal shift change report given to Laura Hendreson RN (oncoming nurse) by HENNA Luong (offgoing nurse). Report included the following information SBAR, Kardex and MAR.

## 2018-07-19 NOTE — PERIOP NOTES
TRANSFER - OUT REPORT:    Verbal report given to Zhou Sparrow RN(name) on Allie Ferrari  being transferred to (unit) for routine post - op       Report consisted of patients Situation, Background, Assessment and   Recommendations(SBAR). Information from the following report(s) SBAR, Kardex, OR Summary, Procedure Summary, Intake/Output and MAR was reviewed with the receiving nurse. Lines:   Peripheral IV 19/83/69 Right Cephalic (Active)   Site Assessment Clean, dry, & intact 7/19/2018  4:32 PM   Phlebitis Assessment 0 7/19/2018  4:32 PM   Infiltration Assessment 0 7/19/2018  4:32 PM   Dressing Status Clean, dry, & intact 7/19/2018  4:32 PM   Dressing Type Transparent;Tape 7/19/2018  4:32 PM   Hub Color/Line Status Green; Infusing 7/19/2018  4:32 PM        Opportunity for questions and clarification was provided.       Patient transported with:   Registered Nurse  Tech

## 2018-07-19 NOTE — INTERVAL H&P NOTE
H&P Update:  Nel Piper was seen and examined. History and physical has been reviewed. The patient has been examined.  There have been no significant clinical changes since the completion of the originally dated History and Physical.    Signed By: Katrine Hatchet, MD     July 19, 2018 11:05 AM

## 2018-07-19 NOTE — ANESTHESIA POSTPROCEDURE EVALUATION
Post-Anesthesia Evaluation and Assessment    Cardiovascular Function/Vital Signs  Visit Vitals    /69    Pulse 76    Temp (!) 38.3 °C (101 °F)    Resp 13    Ht 5' 6.5\" (1.689 m)    Wt 101.6 kg (224 lb)    SpO2 98%    BMI 35.61 kg/m2       Patient is status post Procedure(s):  RIGHT TOTAL KNEE REPLACEMENT, \"SPEC POP\". Nausea/Vomiting: Controlled. Postoperative hydration reviewed and adequate. Pain:  Pain Scale 1: Visual (07/19/18 1606)  Pain Intensity 1: 0 (07/19/18 1606)   Managed. Neurological Status:   Neuro (WDL): Exceptions to WDL (unsteady gait) (07/19/18 1242)   At baseline. Mental Status and Level of Consciousness: Arousable. Pulmonary Status:   O2 Device: Nasal cannula (07/19/18 1621)   Adequate oxygenation and airway patent. Complications related to anesthesia: None    Post-anesthesia assessment completed. No concerns. Patient has met all discharge requirements.     Signed By: Dominick Mi CRNA    July 19, 2018

## 2018-07-19 NOTE — ANESTHESIA PROCEDURE NOTES
Peripheral Block    Start time: 7/19/2018 1:26 PM  End time: 7/19/2018 1:36 PM  Performed by: Azul Clark  Authorized by: Azul Clark       Pre-procedure: Indications: at surgeon's request and post-op pain management    Preanesthetic Checklist: patient identified, risks and benefits discussed, site marked, timeout performed, anesthesia consent given and patient being monitored    Timeout Time: 13:26          Block Type:   Block Type:   Adductor canal  Laterality:  Right  Monitoring:  Standard ASA monitoring, continuous pulse ox, frequent vital sign checks, heart rate, oxygen and responsive to questions  Injection Technique:  Single shot  Procedures: ultrasound guided    Patient Position: supine (frog leg)  Prep: chlorhexidine    Location:  Mid thigh  Needle Type:  Stimuplex  Needle Gauge:  21 G  Needle Localization:  Ultrasound guidance  Medication Injected:  0.5%  ropivacaine  Volume (mL):  30    Assessment:  Number of attempts:  1  Injection Assessment:  Incremental injection every 5 mL, local visualized surrounding nerve on ultrasound, negative aspiration for blood, no intravascular symptoms, no paresthesia and ultrasound image on chart  Patient tolerance:  Patient tolerated the procedure well with no immediate complications

## 2018-07-19 NOTE — OP NOTES
[address] [address],[address]  Total Knee Arthroplasty    Patient: Josef Schwartz MRN: [id number]  SSN: [id number]    YOB: 1965  Age: 48 y.o. Sex: female      Date of Surgery: 7/19/2018   Preoperative Diagnosis: RIGHT KNEE OSTEOARTHRITIS   Postoperative Diagnosis: RIGHT KNEE OSTEOARTHRITIS   [facility]: [facility]  Surgeon: Manjeet Lujan MD  Assistant: Jones Louie PA-C    Anesthesia: General and Femoral Nerve Block    Procedure: Total Knee Arthroplasty: Depuy   The complexity of the total joint surgery requires the use of a first assistant for positioning, retraction and assistance in closure. Tourniquet Time: Tourniquet not used. Estimated Blood Loss: Less than 100cc     Implants:   Implant Name Type Inv. Item Serial No.  Lot No. LRB No. Used Action   CEMENT BNE SMARTSET HV 40GM -- ORDER IN SETS OF 20 - [id number]  CEMENT BNE SMARTSET HV 40GM -- ORDER IN SETS OF 20  [facility] [employer] [id number] Right 1 Implanted   ATTUNE TIBIAL INSERT ROTATING PLATFORM CRUCIATE RETAINING SIZE 6 5MM AOX    [facility] [employer] [id number] Right 1 Implanted   ATTUNE TIBIAL BASE ROATAING PLATFORM SIZE 5 CEMENTED    [facility] [employer] [id number] Right 1 Implanted   PAT ELMER DOME MEDIAL 35MM -- ATTUNE - [id number]  PAT ELMER DOME MEDIAL 35MM -- ATTUNE  [facility] [employer] [id number] Right 1 Implanted   FEM CR RT CAMILA SZ 6 ELMER -- ATTUNE - [id number]   FEM CR RT CAMILA SZ 6 ELMER -- ATTUNE   [facility] [employer] [id number] Right 1 Implanted        Specimens: None    Additional Findings: None     Body Mass Index: Body mass index is 35.61 kg/(m^2). Procedure Detail:  Prior to the surgery the patient was administered a femoral nerve block in the preoperative holding area by the anesthesiologist. Josef Schwartz was brought to the operating room and positioned on the operating table. She was anesthetized with anesthesia. Intravenous antibiotics were administered.  Prior to the incision being made a timeout was called identifying the patient, procedure, operative side, and surgeon. A pneumatic tourniquet was placed about the limb and the right leg was prepped and draped in the usual sterile manner. The tourniquet was not inflated throughout the case. A midline anterior incision made over the knee. The incision was carried down through the subcutaneous tissue to the underlying capsule. A medial parapatellar capsular incision was performed. The medial capsular flap was carefully elevated around to the posterior medial corner protecting the medial collateral ligaments and the fibers. The patella was sized with a caliper, and approximately 10-12 mm was resected with an oscillating saw allowing the patella to be slid into the lateral gutter. It was not everted throughout the case. Our attention was first turned to the distal femur and using intermedullary instrumentation, a 5-degree valgus cut was on the distal end of the femur. The distal end of the femur was sized to a size 6N femoral component. Pins were inserted through the sizer and the corresponding 4-in-1 block was slid into place and pinned for stability. Anterior and posterior and chamfer cuts were made to accommodate the femoral component. The medial and lateral menisci were excised as were the anterior cruciate ligaments. Our attention was then turned to the tibia. Using extramedullary instrumentation, a 3-degree cut was made on the proximal end of the tibia. A spacer block was placed to show gaps had been balanced and a size 5  tibial base plate was placed on the tibia and pinned into place. Intramedullary reaming guide was placed on the tibia and the appropriate reamer was used followed by the keel punch to complete the preparation of the tibia. A trial femoral component was then impacted on to the distal end of the femur. Trial reduction was then performed with incremental size trial bearing surfaces.  The Attune RP CR 5mm bearing surface matching the femur was inserted and allowed for full extension, good medial, lateral stability at 90 degrees of flexion, especially medially. Our attention was then turned to the patella. The patella was sized to a 35mm oval DePuy patella. The guide was pinned, placed over patella, 3 holes were drilled. Trial patella button was inserted and the patella was reduced in the knee. The patella tracked normally using no-touch technique. The trial components were then all removed. The real components were opened on the back. The cut surfaces of the bone were prepared using the PulsaVac lavage. Prior to this, the Aquamantys was used to cauterize any soft tissue bleeding. 40 grams of Depuy HV cement was mixed. The femoral and tibial components  and patellar component was cemented into place. Excess cement was removed from around the edge of bone using plastic curette. Once the cement had hardened, the knee was placed through range of motion and noted to be stable as mentioned above with the trail components. The wound was dry, therefore no drain was used. The operative knee was injected with 20 cc of exparel. The knee was then soaked with a diluted betadine solution for approximately 3 min. This was then thoroughly irrigated. The capsular layer was closed using a #2 stratafix suture with the knee flexed 90 degrees, while subcutaneous layers were closed using 2-0 Vicryl suture. Finally the skin was closed using Prineo, which were applied in occlusive fashion and sterile bandage applied. An Iceman cryotherapy pad was applied on the operative leg. Sponge count and needle counts were correct. She was taken to the recovery room extubated in stable condition.     Signed By: Flaca Woodward MD     July 19, 2018

## 2018-07-19 NOTE — IP AVS SNAPSHOT
[address] [address] 
[phone] Patient: Tiera Rowe MRN: [id number] OCR:3[birth year] About your hospitalization You were admitted on:  July 19, 2018 You last received care in the:  THE [facility] [address] You were discharged on:  July 20, 2018 Why you were hospitalized Your primary diagnosis was:  Osteoarthritis Of Right Knee Follow-up Information Follow up With Details Comments Contact Info Jewel Webb MD On 8/3/2018 Follow up appointment @ [address] [address] [address] [place] [address] 
[phone] Bettina James MD   [address] [place] [address] [address] [place] [id number] [phone] [address] to continue managing your healthcare needs. [phone] Discharge Orders None A check brandy indicates which time of day the medication should be taken. My Medications START taking these medications Instructions Each Dose to Equal  
 Morning Noon Evening Bedtime  
 aspirin 325 mg tablet Commonly known as:  ASPIRIN Your last dose was:  9:39 am 7/20/18 Your next dose is:  6 pm 7/20/18 Take 1 Tab by mouth two (2) times a day for 21 days. 325 mg  
    
   
   
   
  
 cefadroxil 500 mg capsule Commonly known as:  Deven Sober Your next dose is:  When obtained Take 1 Cap by mouth two (2) times a day for 5 days. 500 mg CHANGE how you take these medications Instructions Each Dose to Equal  
 Morning Noon Evening Bedtime * oxyCODONE IR 10 mg Tab immediate release tablet Commonly known as:  Madhu Hoyt What changed:  Another medication with the same name was changed. Make sure you understand how and when to take each. Your last dose was:  11:54 pm 7/20/18 Your next dose is:  3:54 pm 7/20/18 Take 10 mg by mouth four (4) times daily as needed.   
 10 mg  
    
   
   
 * oxyCODONE ER 20 mg ER tablet Commonly known as:  OxyCONTIN What changed:  additional instructions Your last dose was:  9:40 am 7/20/18 Your next dose is:  9:40 pm 7/20/18 Take 1 Tab by mouth two (2) times a day. Max Daily Amount: 40 mg.  
 20 mg  
    
   
   
   
  
 * Notice: This list has 2 medication(s) that are the same as other medications prescribed for you. Read the directions carefully, and ask your doctor or other care provider to review them with you. CONTINUE taking these medications Instructions Each Dose to Equal  
 Morning Noon Evening Bedtime ARMOUR THYROID 30 mg tablet Generic drug:  thyroid (Pork) Take 30 mg by mouth daily. 30 mg  
    
   
   
   
  
 ergocalciferol 50,000 unit capsule Commonly known as:  ERGOCALCIFEROL Take 1 Cap by mouth every seven (7) days. [address] Units  
    
   
   
   
  
 folic acid 1 mg tablet Commonly known as:  Google Take 1 mg by mouth daily. 1 mg HYDROmorphone 2 mg tablet Commonly known as:  DILAUDID Your last dose was:  11:54 7/20/18 Your next dose is:  3:54 pm  
   
 Take 1-2 Tabs by mouth every four (4) hours as needed for Pain. Max Daily Amount: 24 mg.  
 2-4 mg  
    
   
   
   
  
 metoprolol tartrate 25 mg tablet Commonly known as:  LOPRESSOR Take 25 mg by mouth two (2) times a day. 25 mg  
    
   
   
   
  
 naloxegol 12.5 mg Tab tablet Commonly known as:  Anthony Crews Take 12.5 mg by mouth daily. 12.5 mg Where to Get Your Medications Information on where to get these meds will be given to you by the nurse or doctor. ! Ask your nurse or doctor about these medications  
  aspirin 325 mg tablet  
 cefadroxil 500 mg capsule Opioid Education  Prescription Opioids: What You Need to Know: 
 
Prescription opioids can be used to help relieve moderate-to-severe pain and are often prescribed following a surgery or injury, or for certain health conditions. These medications can be an important part of treatment but also come with serious risks. Opioids are strong pain medicines. Examples include hydrocodone, oxycodone, fentanyl, and morphine. Heroin is an example of an illegal opioid. It is important to work with your health care provider to make sure you are getting the safest, most effective care. WHAT ARE THE RISKS AND SIDE EFFECTS OF OPIOID USE? Prescription opioids carry serious risks of addiction and overdose, especially with prolonged use. An opioid overdose, often marked by slow breathing, can cause sudden death. The use of prescription opioids can have a number of side effects as well, even when taken as directed. · Tolerance-meaning you might need to take more of a medication for the same pain relief · Physical dependence-meaning you have symptoms of withdrawal when the medication is stopped. Withdrawal symptoms can include nausea, sweating, chills, diarrhea, stomach cramps, and muscle aches. Withdrawal can last up to several weeks, depending on which drug you took and how long you took it. · Increased sensitivity to pain · Constipation · Nausea, vomiting, and dry mouth · Sleepiness and dizziness · Confusion · Depression · Low levels of testosterone that can result in lower sex drive, energy, and strength · Itching and sweating RISKS ARE GREATER WITH:      
· History of drug misuse, substance use disorder, or overdose · Mental health conditions (such as depression or anxiety) · Sleep apnea · Older age (72 years or older) · Pregnancy Avoid alcohol while taking prescription opioids. Also, unless specifically advised by your health care provider, medications to avoid include: · Benzodiazepines (such as Xanax or Valium) · Muscle relaxants (such as Soma or Flexeril) · Hypnotics (such as Ambien or Lunesta) · Other prescription opioids KNOW YOUR OPTIONS Talk to your health care provider about ways to manage your pain that don't involve prescription opioids. Some of these options may actually work better and have fewer risks and side effects. Options may include: 
· Pain relievers such as acetaminophen, ibuprofen, and naproxen · Some medications that are also used for depression or seizures · Physical therapy and exercise · Counseling to help patients learn how to cope better with triggers of pain and stress. · Application of heat or cold compress · Massage therapy · Relaxation techniques Be Informed Make sure you know the name of your medication, how much and how often to take it, and its potential risks & side effects. IF YOU ARE PRESCRIBED OPIOIDS FOR PAIN: 
· Never take opioids in greater amounts or more often than prescribed. Remember the goal is not to be pain-free but to manage your pain at a tolerable level. · Follow up with your primary care provider to: · Work together to create a plan on how to manage your pain. · Talk about ways to help manage your pain that don't involve prescription opioids. · Talk about any and all concerns and side effects. · Help prevent misuse and abuse. · Never sell or share prescription opioids · Help prevent misuse and abuse. · Store prescription opioids in a secure place and out of reach of others (this may include visitors, children, friends, and family). · Safely dispose of unused/unwanted prescription opioids: Find your community drug take-back program or your pharmacy mail-back program, or flush them down the toilet, following guidance from the Food and Drug Administration (www.fda.gov/Drugs/ResourcesForYou). · Visit www.cdc.gov/drugoverdose to learn about the risks of opioid abuse and overdose. · If you believe you may be struggling with addiction, tell your health care provider and ask for guidance or call [employer] at 4-687-125-DSLZ. Discharge Instructions [facility] Patient Discharge Instructions Charlotte Villalba / [id number] : 1965 Admitted (Not on file) Discharged: 2018 IF YOU HAVE ANY PROBLEMS ONCE YOU ARE AT HOME CALL THE FOLLOWING NUMBERS:  
Main office number: [phone] Your follow up appointment to see either Dr. Silvia Hector PA-C, or [facility] ROBERTO as scheduled in 2 weeks. If you are unsure of your appointment date call the office at [phone]. Medication Instructions · Resume your home medictions as directed, you may have directed not to resume supplements until after your follow up. · A prescription for pain medication has been given · It is important that you take the medication exactly as they are prescribed. · Keep your medication in the bottles provided by the pharmacist and keep a list of the medication names, dosages, and times to be taken in your wallet. · Do not take other medications without consulting your doctor. What to do at [facility] Resume your prehospital diet. If you have excessive nausea or vomitting call your doctor's office. Be sure to maintain adequate fluid intake. Some pain medications may cause constipation. Remember to drink fluids, stay as active as possible, and eat plenty of fiber-rich foods. Begin In-Home Physical Therapy; 3 times a week to work on gait training, range of motion, strengthening, and weight bearing exercises as tolerable. Continue to use your walker or cane when walking. May progress from the walker to a cane to complete total bearing as tolerable. Patient may shower. Wrap incision with plastic wrap/covering to prevent incision from getting wet. Avoid complete immersion. YOUR DRESSING SHOULD BE CHANGED IN 3-5 DAYS BY [facility] NURSE. When to Call - Call if you have a temperature greater then 101 
- Unable to keep food down - Are unable to bear any wieght  
- Need a pain medication refill Information obtained by : 
I understand that if any problems occur once I am at home I am to contact my physician. I understand and acknowledge receipt of the instructions indicated above. Physician's or R.N.'s Signature                                                                  Date/Time Patient or Representative Signature                                                          Date/Time 
 
 
 
 
 
ACO Transitions of Care Introducing [employer] Shefali Garcia offers a voluntary care coordination program to provide high quality service and care to [facility] fee-for-service beneficiaries. Conniefrankaugusto Lopez was designed to help you enhance your health and well-being through the following services: ? Transitions of Care  support for individuals who are transitioning from one care setting to another (example: Hospital to home). ? Chronic and Complex Care Coordination  support for individuals and caregivers of those with serious or chronic illnesses or with more than one chronic (ongoing) condition and those who take a number of different medications. If you meet specific medical criteria, a [facility] [address] may call you directly to coordinate your care with your primary care physician and your other care providers. For questions about the [facility] MEDICAL [place] programs, please, contact your physicians office. For general questions or additional information about Accountable Care Organizations: Please visit www.medicare.gov/acos. html or call 1-800-MEDICARE (3-280.336.7949) TTY users should call 3-771.768.1023. Next[facility] Announcement We are excited to announce that we are making your provider's discharge notes available to you in NextCare. You will see these notes when they are completed and signed by the physician that discharged you from your recent hospital stay. If you have any questions or concerns about any information you see in NextCare, please call the Health Information Department where you were seen or reach out to your Primary Care Provider for more information about your plan of care. Introducing [facility]! Dear Ananda Livingston: Thank you for requesting a NextCare account. Our records indicate that you already have an active NextCare account. You can access your account anytime at [email or web address] Did you know that you can access your hospital and ER discharge instructions at any time in NextCare? You can also review all of your test results from your hospital stay or ER visit. Additional Information If you have questions, please visit the Frequently Asked Questions section of the NextCare website at [email or web address] Remember, NextCare is NOT to be used for urgent needs. For medical emergencies, dial 911. Now available from your iPhone and Android! Introducing Vicente Munguia As a [employer] patient, I wanted to make you aware of our electronic visit tool called Vicente Munguia. [employer] 24/7 allows you to connect within minutes with a medical provider 24 hours a day, seven days a week via a mobile device or tablet or logging into a secure website from your computer. You can access Vicente Munguia from anywhere in the [place].  
 
A virtual visit might be right for you when you have a simple condition and feel like you just dont want to get out of bed, or cant get away from work for an appointment, when your regular [address] [place] [address] provider is not available (evenings, weekends or holidays), or when youre out of town and need minor care. Electronic visits cost only $49 and if the [address] [place] [address] 24/7 provider determines a prescription is needed to treat your condition, one can be electronically transmitted to a nearby pharmacy*. Please take a moment to enroll today if you have not already done so. The enrollment process is free and takes just a few minutes. To enroll, please download the [employer] [facility] 24/7 elba to your tablet or phone, or visit [email or web address] to enroll on your computer. And, as an [address] [place] [address] patient with a [employer] account, the results of your visits will be scanned into your electronic medical record and your primary care provider will be able to view the scanned results. We urge you to continue to see your regular [address] [place] [address] provider for your ongoing medical care. And while your primary care provider may not be the one available when you seek a Done In :60 Secondsradhafin virtual visit, the peace of mind you get from getting a real diagnosis real time can be priceless. For more information on [employer], view our Frequently Asked Questions (FAQs) at [email or web address]. Sincerely, 
 
Karo Arguello MD 
Chief Medical Officer [facility] Shefali Garcia *:  certain medications cannot be prescribed via [employer] Providers Seen During Your Hospitalization Provider Specialty Primary office phone Cherie Bell MD Orthopedic Surgery [phone] Your Primary Care Physician (PCP) Primary Care Physician Office Phone Office Fax Angela Jacobson [phone] You are allergic to the following Allergen Reactions Neulasta (Pegfilgrastim) Other (comments) \"Put me in a comma for 3 months\" Recent Documentation Height Weight Breastfeeding? BMI OB Status Smoking Status 1.689 m 101.6 kg No 35.61 kg/m2 Chemically Induced Former Smoker Emergency Contacts Name Discharge Info Relation Home Work Mobile [address] CAREGIVER [3] Spouse [3] [phone] Patient Belongings The following personal items are in your possession at time of discharge: 
  Dental Appliances: None  Visual Aid: Glasses          Jewelry: None  Clothing: Pants, Shirt, Footwear, Undergarments, Socks (with spouse)    Other Valuables: Eyeglasses (with spouse) Please provide this summary of care documentation to your next provider. Signatures-by signing, you are acknowledging that this After Visit Summary has been reviewed with you and you have received a copy. Patient Signature:  ____________________________________________________________ Date:  ____________________________________________________________  
  
Ivonneia Kras Provider Signature:  ____________________________________________________________ Date:  ____________________________________________________________

## 2018-07-19 NOTE — ANESTHESIA PREPROCEDURE EVALUATION
Anesthetic History   No history of anesthetic complications            Review of Systems / Medical History  Patient summary reviewed, nursing notes reviewed and pertinent labs reviewed    Pulmonary        Sleep apnea: CPAP      Pertinent negatives: No COPD, asthma and recent URI  Comments: Former smoker   Neuro/Psych   Within defined limits           Cardiovascular    Hypertension: well controlled            Pertinent negatives: No past MI, CAD, PAD, dysrhythmias, angina and CHF  Exercise tolerance: >4 METS     GI/Hepatic/Renal     GERD: well controlled        Pertinent negatives: No hepatitis, liver disease and renal disease   Endo/Other      Hypothyroidism: well controlled  Obesity, blood dyscrasia and arthritis  Pertinent negatives: No diabetes, hyperthyroidism and morbid obesity   Other Findings   Comments: Sickle cell anemia         Physical Exam    Airway  Mallampati: III  TM Distance: 4 - 6 cm  Neck ROM: normal range of motion   Mouth opening: Normal     Cardiovascular  Regular rate and rhythm,  S1 and S2 normal,  no murmur, click, rub, or gallop             Dental  No notable dental hx       Pulmonary  Breath sounds clear to auscultation               Abdominal  GI exam deferred       Other Findings            Anesthetic Plan    ASA: 3  Anesthesia type: general and regional          Induction: Intravenous  Anesthetic plan and risks discussed with: Patient and Spouse      GA/LMA with single shot adductor canal block for postop pain control

## 2018-07-19 NOTE — IP AVS SNAPSHOT
[address] [place] [id number] 
[phone] Patient: Darrin Gomez MRN: [id number] A check brandy indicates which time of day the medication should be taken. My Medications START taking these medications Instructions Each Dose to Equal  
 Morning Noon Evening Bedtime  
 aspirin 325 mg tablet Commonly known as:  ASPIRIN Your last dose was:  9:39 am 18 Your next dose is:  6 pm 18 Take 1 Tab by mouth two (2) times a day for 21 days. 325 mg  
    
   
   
   
  
 cefadroxil 500 mg capsule Commonly known as:  Mary Dex Your next dose is:  When obtained Take 1 Cap by mouth two (2) times a day for 5 days. 500 mg CHANGE how you take these medications Instructions Each Dose to Equal  
 Morning Noon Evening Bedtime * oxyCODONE IR 10 mg Tab immediate release tablet Commonly known as:  Vita Nathan What changed:  Another medication with the same name was changed. Make sure you understand how and when to take each. Your last dose was:  11:54 pm 18 Your next dose is:  3:54 pm 18 Take 10 mg by mouth four (4) times daily as needed. 10 mg  
    
   
   
   
  
 * oxyCODONE ER 20 mg ER tablet Commonly known as:  OxyCONTIN What changed:  additional instructions Your last dose was:  9:40 am 18 Your next dose is:  9:40 pm 18 Take 1 Tab by mouth two (2) times a day. Max Daily Amount: 40 mg.  
 20 mg  
    
   
   
   
  
 * Notice: This list has 2 medication(s) that are the same as other medications prescribed for you. Read the directions carefully, and ask your doctor or other care provider to review them with you. CONTINUE taking these medications Instructions Each Dose to Equal  
 Morning Noon Evening Bedtime ARMOUR THYROID 30 mg tablet Generic drug:  thyroid (Pork) Take 30 mg by mouth daily. 30 mg  
    
   
   
   
  
 ergocalciferol 50,000 unit capsule Commonly known as:  ERGOCALCIFEROL Take 1 Cap by mouth every seven (7) days. 41455 Units  
    
   
   
   
  
 folic acid 1 mg tablet Commonly known as:  Google Take 1 mg by mouth daily. 1 mg HYDROmorphone 2 mg tablet Commonly known as:  DILAUDID Your last dose was:  11:54 7/20/18 Your next dose is:  3:54 pm  
   
 Take 1-2 Tabs by mouth every four (4) hours as needed for Pain. Max Daily Amount: 24 mg.  
 2-4 mg  
    
   
   
   
  
 metoprolol tartrate 25 mg tablet Commonly known as:  LOPRESSOR Take 25 mg by mouth two (2) times a day. 25 mg  
    
   
   
   
  
 naloxegol 12.5 mg Tab tablet Commonly known as:  James Lin Take 12.5 mg by mouth daily. 12.5 mg Where to Get Your Medications Information on where to get these meds will be given to you by the nurse or doctor. ! Ask your nurse or doctor about these medications  
  aspirin 325 mg tablet  
 cefadroxil 500 mg capsule

## 2018-07-20 VITALS
HEART RATE: 62 BPM | OXYGEN SATURATION: 100 % | TEMPERATURE: 98.1 F | SYSTOLIC BLOOD PRESSURE: 132 MMHG | WEIGHT: 224 LBS | DIASTOLIC BLOOD PRESSURE: 62 MMHG | HEIGHT: 67 IN | RESPIRATION RATE: 16 BRPM | BODY MASS INDEX: 35.16 KG/M2

## 2018-07-20 LAB
ANION GAP SERPL CALC-SCNC: 8 MMOL/L (ref 3–18)
BUN SERPL-MCNC: 16 MG/DL (ref 7–18)
BUN/CREAT SERPL: 12 (ref 12–20)
CALCIUM SERPL-MCNC: 8.6 MG/DL (ref 8.5–10.1)
CHLORIDE SERPL-SCNC: 109 MMOL/L (ref 100–108)
CO2 SERPL-SCNC: 25 MMOL/L (ref 21–32)
CREAT SERPL-MCNC: 1.32 MG/DL (ref 0.6–1.3)
ERYTHROCYTE [DISTWIDTH] IN BLOOD BY AUTOMATED COUNT: 14.3 % (ref 11.6–14.5)
GLUCOSE SERPL-MCNC: 152 MG/DL (ref 74–99)
HCT VFR BLD AUTO: 21.8 % (ref 35–45)
HGB BLD-MCNC: 7.5 G/DL (ref 12–16)
MCH RBC QN AUTO: 28.7 PG (ref 24–34)
MCHC RBC AUTO-ENTMCNC: 34.4 G/DL (ref 31–37)
MCV RBC AUTO: 83.5 FL (ref 74–97)
PLATELET # BLD AUTO: 60 K/UL (ref 135–420)
PMV BLD AUTO: 10.1 FL (ref 9.2–11.8)
POTASSIUM SERPL-SCNC: 4.7 MMOL/L (ref 3.5–5.5)
RBC # BLD AUTO: 2.61 M/UL (ref 4.2–5.3)
SODIUM SERPL-SCNC: 142 MMOL/L (ref 136–145)
WBC # BLD AUTO: 7.2 K/UL (ref 4.6–13.2)

## 2018-07-20 PROCEDURE — 74011250637 HC RX REV CODE- 250/637: Performed by: PHYSICIAN ASSISTANT

## 2018-07-20 PROCEDURE — 97116 GAIT TRAINING THERAPY: CPT

## 2018-07-20 PROCEDURE — 85027 COMPLETE CBC AUTOMATED: CPT | Performed by: PHYSICIAN ASSISTANT

## 2018-07-20 PROCEDURE — 36430 TRANSFUSION BLD/BLD COMPNT: CPT

## 2018-07-20 PROCEDURE — 97165 OT EVAL LOW COMPLEX 30 MIN: CPT

## 2018-07-20 PROCEDURE — 36415 COLL VENOUS BLD VENIPUNCTURE: CPT | Performed by: PHYSICIAN ASSISTANT

## 2018-07-20 PROCEDURE — P9040 RBC LEUKOREDUCED IRRADIATED: HCPCS | Performed by: ORTHOPAEDIC SURGERY

## 2018-07-20 PROCEDURE — 80048 BASIC METABOLIC PNL TOTAL CA: CPT | Performed by: PHYSICIAN ASSISTANT

## 2018-07-20 PROCEDURE — 74011250636 HC RX REV CODE- 250/636: Performed by: PHYSICIAN ASSISTANT

## 2018-07-20 RX ORDER — SODIUM CHLORIDE 9 MG/ML
250 INJECTION, SOLUTION INTRAVENOUS AS NEEDED
Status: DISCONTINUED | OUTPATIENT
Start: 2018-07-20 | End: 2018-07-20 | Stop reason: HOSPADM

## 2018-07-20 RX ADMIN — HYDROMORPHONE HYDROCHLORIDE 4 MG: 2 TABLET ORAL at 00:06

## 2018-07-20 RX ADMIN — LEVOTHYROXINE, LIOTHYRONINE 30 MG: 19; 4.5 TABLET ORAL at 09:39

## 2018-07-20 RX ADMIN — HYDROMORPHONE HYDROCHLORIDE 4 MG: 2 TABLET ORAL at 05:24

## 2018-07-20 RX ADMIN — FOLIC ACID 1 MG: 1 TABLET ORAL at 09:40

## 2018-07-20 RX ADMIN — KETOROLAC TROMETHAMINE 15 MG: 15 INJECTION, SOLUTION INTRAMUSCULAR; INTRAVENOUS at 05:24

## 2018-07-20 RX ADMIN — METOPROLOL TARTRATE 25 MG: 25 TABLET ORAL at 09:39

## 2018-07-20 RX ADMIN — PANTOPRAZOLE SODIUM 40 MG: 40 TABLET, DELAYED RELEASE ORAL at 09:40

## 2018-07-20 RX ADMIN — ASPIRIN 325 MG: 325 TABLET, FILM COATED ORAL at 09:39

## 2018-07-20 RX ADMIN — DOCUSATE SODIUM 100 MG: 100 CAPSULE, LIQUID FILLED ORAL at 09:39

## 2018-07-20 RX ADMIN — ACETAMINOPHEN 650 MG: 325 TABLET, FILM COATED ORAL at 06:41

## 2018-07-20 RX ADMIN — FERROUS SULFATE TAB 325 MG (65 MG ELEMENTAL FE) 325 MG: 325 (65 FE) TAB at 09:39

## 2018-07-20 RX ADMIN — NALOXEGOL OXALATE 12.5 MG: 12.5 TABLET, FILM COATED ORAL at 09:38

## 2018-07-20 RX ADMIN — Medication 2 G: at 05:24

## 2018-07-20 RX ADMIN — OXYCODONE HYDROCHLORIDE 20 MG: 20 TABLET, FILM COATED, EXTENDED RELEASE ORAL at 09:40

## 2018-07-20 RX ADMIN — HYDROMORPHONE HYDROCHLORIDE 4 MG: 2 TABLET ORAL at 11:54

## 2018-07-20 RX ADMIN — KETOROLAC TROMETHAMINE 15 MG: 15 INJECTION, SOLUTION INTRAMUSCULAR; INTRAVENOUS at 00:06

## 2018-07-20 RX ADMIN — ACETAMINOPHEN 650 MG: 325 TABLET, FILM COATED ORAL at 00:06

## 2018-07-20 RX ADMIN — ACETAMINOPHEN 650 MG: 325 TABLET, FILM COATED ORAL at 12:38

## 2018-07-20 NOTE — PROGRESS NOTES
Problem: Mobility Impaired (Adult and Pediatric)  Goal: *Acute Goals and Plan of Care (Insert Text)  In 1-7 days pt will be able to perform:  ST.  Bed mobility:  Rolling L to R to L modified independent for positioning. 2.  Supine to sit to supine S with HR for meals. 3.  Sit to stand to sit S with RW in prep for ambulation. LT.  Gait:  Ambulate >150ft S with RW, WBAT, for home/community mobility. 2.  Stair Negotiation:  Ascend/descend >2 steps CGA with HR for home entry. 3.  Activity tolerance: Tolerate up in chair 1-2 hours for ADLs. 4.  Patient/Family Education:  Patient/family to be independent with HEP for follow-up care and safe discharge. physical Therapy EVALUATION    Patient: Dinesh Abbott (12 y.o. female)  Date: 2018  Primary Diagnosis: RIGHT KNEE OSTEOARTHRITIS  Osteoarthritis of right knee  Procedure(s) (LRB):  RIGHT TOTAL KNEE REPLACEMENT, \"SPEC POP\" (Right) Day of Surgery   Precautions:   Fall, WBAT    ASSESSMENT :  Based on the objective data described below, the patient presents with decreased functional mobility and independence in regard to bed mobility, transfers, gt quality and tolerance, R knee AROM, R knee strength, pain, stair negotiation and safety due to recent R TKA surgery. Pt rating pain in R knee 9/10 on numerical pain scale. Pt required CGA for supine to sit. Max A for sit to stand w/ multiple attempts and bed elevation. Pt had difficulty WS to L LE to offload R LE as pt reports not wanting to WB on R despite WBAT. Pt able to participate in limited gt training w/ RW, WBAT, GB and mod A w/ antalgic pattern. Pt able to void on [facility] and perform own hygiene. Pt returned to sitting EOB per request w/ all needs within reach and ice pack to R knee. Nurse Marie Ye 28. aware and  present. Recommend [facility] vs rehab upon hospital d/c depending on progress w/ PT. Patient will benefit from skilled intervention to address the above impairments.   Patients rehabilitation potential is considered to be Fair  Factors which may influence rehabilitation potential include:   []         None noted  []         Mental ability/status  []         Medical condition  []         Home/family situation and support systems  []         Safety awareness  [x]         Pain tolerance/management  []         Other:      PLAN :  Recommendations and Planned Interventions:  [x]           Bed Mobility Training             []    Neuromuscular Re-Education  [x]           Transfer Training                   []    Orthotic/Prosthetic Training  [x]           Gait Training                          []    Modalities  [x]           Therapeutic Exercises          []    Edema Management/Control  [x]           Therapeutic Activities            [x]    Patient and Family Training/Education  []           Other (comment):    Frequency/Duration: Patient will be followed by physical therapy twice daily to address goals. Discharge Recommendations: To Be Determined  Further Equipment Recommendations for Discharge: N/A     SUBJECTIVE:   Patient stated I just can't figure out how to do this.     OBJECTIVE DATA SUMMARY:     Past Medical History:   Diagnosis Date    Anemia NEC     Arthritis     Chronic pain     Ductal carcinoma ([facility] [address]     left breast    Fatigue     Fibromyalgia     GERD (gastroesophageal reflux disease)     Hx of endometriosis     Hypertension     Ill-defined condition 2018    Sickle cell crisis    Osteoarthritis of left hip 2016    Osteoarthritis of right hip 1/3/2017    Osteoarthritis of right knee 2018    Sickle cell anemia ([facility] [address]     Sleep apnea     Does not use CPAP    Thyroid disease     hypo     Past Surgical History:   Procedure Laterality Date    HX BREAST BIOPSY Left     HX  SECTION      HX HERNIA REPAIR      HX LAP CHOLECYSTECTOMY      HX MASTECTOMY Left 2012    with axillary lymph node dissection    TOTAL HIP ARTHROPLASTY Bilateral 2016 & 2017     Barriers to Learning/Limitations: None  Compensate with: visual, verbal, tactile, kinesthetic cues/model  Prior Level of Function/Home Situation:   Home Situation  Home Environment: Private residence  # Steps to Enter: 3  Rails to Enter: Yes  Hand Rails : Left  One/Two Story Residence: Two story  # of Interior Steps: 14  Interior Rails: Both  Lift Chair Available: No  Living Alone: No  Support Systems: Spouse/Significant Other/Partner, Family member(s)  Patient Expects to be Discharged to[de-identified] Private residence  Current DME Used/Available at Home: Walker, rolling  Critical Behavior:  Neurologic State: Alert; Appropriate for age  Orientation Level: Oriented X4  Cognition: Appropriate decision making; Appropriate for age attention/concentration; Appropriate safety awareness; Follows commands  Safety/Judgement: Awareness of environment  Psychosocial  Patient Behaviors: Calm; Cooperative  Family  Behaviors: Supportive;Calm  Purposeful Interaction: Yes  Pt Identified Daily Priority: Clinical issues (comment)  Caritas Process: Nurture loving kindness;Establish trust  Caring Interventions: Reassure; Therapeutic modalities  Reassure: Therapeutic listening  Therapeutic Modalities: Humor; Intentional therapeutic touch  Skin Condition/Temp: Dry;Warm  Family  Behaviors: Supportive;Calm  Skin Integrity: Incision (comment) (R knee)  Skin Integumentary  Skin Color: Appropriate for ethnicity  Skin Condition/Temp: Dry;Warm  Skin Integrity: Incision (comment) (R knee)  Strength:    Strength: Generally decreased, functional  Tone & Sensation:   Tone: Normal  Sensation: Intact  Range Of Motion:  AROM: Generally decreased, functional  Functional Mobility:  Bed Mobility:  Supine to Sit: Contact guard assistance; Additional time (vc)  Scooting: Minimum assistance; Additional time (vc)  Transfers:  Sit to Stand: Maximum assistance; Additional time (vc; bed elevated)  Stand to Sit: Moderate assistance; Additional time (vc; bed elevated)  Balance:   Sitting: Intact  Standing: Impaired; With support  Standing - Static: Fair;Constant support  Standing - Dynamic : Poor  Ambulation/Gait Training:  Distance (ft): 2 Feet (ft)  Assistive Device: Walker, rolling;Gait belt  Ambulation - Level of Assistance: Moderate assistance; Additional time (vc)  Gait Abnormalities: Antalgic;Decreased step clearance; Step to gait  Right Side Weight Bearing: As tolerated  Base of Support: Shift to left  Stance: Right decreased  Speed/Yoana: Slow  Step Length: Left shortened;Right shortened  Swing Pattern: Right asymmetrical;Left asymmetrical  Interventions: Safety awareness training; Tactile cues; Verbal cues  Therapeutic Exercises:   HEP written copy issued to pt per MD protocol. Pain:  Pain Scale 1: Numeric (0 - 10)  Pain Intensity 1: 9  Pain Location 1: Knee  Pain Orientation 1: Right  Pain Description 1: Aching  Pain Intervention(s) 1: Rest  Activity Tolerance:   Fair   Please refer to the flowsheet for vital signs taken during this treatment. After treatment:   [x]         Patient left in no apparent distress sitting up EOB  []         Patient left in no apparent distress in bed  [x]         Call bell left within reach  [x]         Nursing notified  []         Caregiver present  []         Bed alarm activated    COMMUNICATION/EDUCATION:   [x]         Fall prevention education was provided and the patient/caregiver indicated understanding. [x]         Patient/family have participated as able in goal setting and plan of care. [x]         Patient/family agree to work toward stated goals and plan of care. []         Patient understands intent and goals of therapy, but is neutral about his/her participation. []         Patient is unable to participate in goal setting and plan of care.     Thank you for this referral.  Diogenes Robert, PT   Time Calculation: 31 mins      Eval Complexity: History: HIGH Complexity :3+ comorbidities / personal factors will impact the outcome/ POC Exam:MEDIUM Complexity : 3 Standardized tests and measures addressing body structure, function, activity limitation and / or participation in recreation  Presentation: MEDIUM Complexity : Evolving with changing characteristics  Clinical Decision Making:High Complexity amb 2' Overall Complexity:MEDIUM       Mobility  Current  CL= 60-79%   Goal  CI= 1-19%. The severity rating is based on the Level of Assistance required for Functional Mobility and ADLs.

## 2018-07-20 NOTE — DISCHARGE INSTRUCTIONS
[address] [place] [address] Sports Medicine   Patient Discharge Instructions    Darrin Patricia / [id number] : 1965    Admitted (Not on file) Discharged: 2018     IF YOU HAVE ANY PROBLEMS ONCE YOU ARE  [facility]:   Main office number: [phone]    Your follow up appointment to see either Dr. Jonah Delong PA-C, or [facility] ROBERTO as scheduled in 2 weeks. If you are unsure of your appointment date call the office at [phone]. Medication Instructions     · Resume your home medictions as directed, you may have directed not to resume supplements until after your follow up. · A prescription for pain medication has been given   · It is important that you take the medication exactly as they are prescribed. · Keep your medication in the bottles provided by the pharmacist and keep a list of the medication names, dosages, and times to be taken in your wallet. · Do not take other medications without consulting your doctor. What to do at [address] [place] [address] Ave your prehospital diet. If you have excessive nausea or vomitting call your doctor's office. Be sure to maintain adequate fluid intake. Some pain medications may cause constipation. Remember to drink fluids, stay as active as possible, and eat plenty of fiber-rich foods. Begin In-Home Physical Therapy; 3 times a week to work on gait training, range of motion, strengthening, and weight bearing exercises as tolerable. Continue to use your walker or cane when walking. May progress from the walker to a cane to complete total bearing as tolerable. Patient may shower. Wrap incision with plastic wrap/covering to prevent incision from getting wet. Avoid complete immersion. YOUR DRESSING SHOULD BE CHANGED IN 3-5 DAYS BY [facility] NURSE.       When to Call    - Call if you have a temperature greater then 101  - Unable to keep food down  - Are unable to bear any wieght   - Need a pain medication refill     Information obtained by :  I understand that if any problems occur once I am at home I am to contact my physician. I understand and acknowledge receipt of the instructions indicated above.                                                                                                                                            Physician's or R.N.'s Signature                                                                  Date/Time                                                                                                                                              Patient or Representative Signature                                                          Date/Time

## 2018-07-20 NOTE — PROGRESS NOTES
1940 - Bedside report received from Fadumo Zimmer RN. Patient in bed. Pain 6/10. Will medicate when due.    2205 - Patient in bed at this time. IV to R wrist  intact and patent. Plexis compression device bilaterally. TEDs to BLE. Dressing to R knee CDI. + CMS. Pt A & O x 4. LS clear, on RA. Abdomen soft, NT and ND. + BS to all 4 quadrants. Denies nausea. Pain 4/10. Call light within reach. Medications given. Potential side effects explained to patient, patient verbalizes understanding, opportunities for questions provided. Patient stable, No apparent distress at this time, bed in locked position, call bell and phone within reach. 0006-Medications given. Potential side effects explained to patient, patient verbalizes understanding, opportunities for questions provided. Patient stable, No apparent distress at this time, bed in locked position, call bell and phone within reach. 0526-Medications given. Potential side effects explained to patient, patient verbalizes understanding, opportunities for questions provided. Patient stable, No apparent distress at this time, bed in locked position, call bell and phone within reach. 0715-The PA, Halford Spatz made aware that pt's Hgn is low and the pt was concerned as she always gets a blood transfusion after surgery, has H/o sickle cell anemia. Halford Spatz verbalized that as long as she is asymptomatic, they do not transfuse per protocol. Rishabh Madden he would talk to Dr Zaria Soria. Pt had uneventful shift. Uses IS every hour while awake. Pt ambulated with assistance with a walker. Pain remained well-controlled with medication. No issues/concerns at this time.  Call bell within reach

## 2018-07-20 NOTE — PROGRESS NOTES
Problem: Falls - Risk of  Goal: *Absence of Falls  Document Jasmyn Fall Risk and appropriate interventions in the flowsheet.    Outcome: Progressing Towards Goal  Fall Risk Interventions:  Mobility Interventions: Communicate number of staff needed for ambulation/transfer, Patient to call before getting OOB, Utilize walker, cane, or other assistive device         Medication Interventions: Patient to call before getting OOB, Teach patient to arise slowly

## 2018-07-20 NOTE — PROGRESS NOTES
Problem: Mobility Impaired (Adult and Pediatric)  Goal: *Acute Goals and Plan of Care (Insert Text)  In 1-7 days pt will be able to perform:  ST.  Bed mobility:  Rolling L to R to L modified independent for positioning. 2.  Supine to sit to supine S with HR for meals. 3.  Sit to stand to sit S with RW in prep for ambulation. LT.  Gait:  Ambulate >150ft S with RW, WBAT, for home/community mobility. 2.  Stair Negotiation:  Ascend/descend >2 steps CGA with HR for home entry. 3.  Activity tolerance: Tolerate up in chair 1-2 hours for ADLs. 4.  Patient/Family Education:  Patient/family to be independent with HEP for follow-up care and safe discharge. PT session held due to:  []  Nausea/vomiting  [x]  RN Communication/ suggestion. Blood transfusion started  []  Extreme Pain  []  Dialysis treatment in progress. Will f/u later as pt's schedule allows. Thank you.   Porfirio Reyes, PTA

## 2018-07-20 NOTE — DISCHARGE SUMMARY
[address] [id number]   [address] [facility] [place] [address]     DISCHARGE SUMMARY     PATIENT: Raymon Domínguez     MRN: [id number]   ADMIT DATE: 2018   BILLIN   DISCHARGE DATE:      ATTENDING: Vikki Marino MD   DICTATING: MARTIN Chance         ADMISSION DIAGNOSIS: RIGHT KNEE OSTEOARTHRITIS  Osteoarthritis of right knee    DISCHARGE DIAGNOSIS: Status post RIGHT TOTAL KNEE ARTHROPLASTY    HISTORY OF PRESENT ILLNESS: The patient is a 48y.o. year-old female   with ongoing right knee pain secondary to osteoarthritis of her right knee. The patient's pain has persisted and progressed despite conservative treatments and therapies. The patient has at this time opted for surgical intervention. PAST MEDICAL HISTORY:   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    Fatigue     Fibromyalgia     GERD (gastroesophageal reflux disease)     Hx of endometriosis     Hypertension     Ill-defined condition 2018    Sickle cell crisis    Osteoarthritis of left hip 2016    Osteoarthritis of right hip 1/3/2017    Osteoarthritis of right knee 2018    Sickle cell anemia ([address]     Sleep apnea     Does not use CPAP    Thyroid disease     hypo       PAST SURGICAL HISTORY:   Past Surgical History:   Procedure Laterality Date    HX BREAST BIOPSY Left 2016    HX  SECTION      HX HERNIA REPAIR      HX LAP CHOLECYSTECTOMY      HX MASTECTOMY Left 2012    with axillary lymph node dissection    TOTAL HIP ARTHROPLASTY Bilateral  &        ALLERGIES:   Allergies   Allergen Reactions    Neulasta [Pegfilgrastim] Other (comments)     \"Put me in a comma for 3 months\"        CURRENT MEDICATIONS:  A list of medications prior to the time of admission include:  Prior to Admission medications    Medication Sig Start Date End Date Taking?  Authorizing Provider aspirin (ASPIRIN) 325 mg tablet Take 1 Tab by mouth two (2) times a day for 21 days. 7/19/18 8/9/18 Yes MARTIN Marinelli   meloxicam (MOBIC) 7.5 mg tablet Take 1 Tab by mouth two (2) times a day for 14 days. 7/19/18 8/2/18 Yes MARTIN Mairnelli   cefadroxil (DURICEF) 500 mg capsule Take 1 Cap by mouth two (2) times a day for 5 days. 7/19/18 7/24/18 Yes MARTIN Marinelli   thyroid, Pork, (ARMOUR THYROID) 30 mg tablet Take 30 mg by mouth daily. Yes Historical Provider   HYDROmorphone (DILAUDID) 2 mg tablet Take 1-2 Tabs by mouth every four (4) hours as needed for Pain. Max Daily Amount: 24 mg. 10/25/17  Yes Riley Cervantes NP   oxyCODONE IR (ROXICODONE) 10 mg tab immediate release tablet Take 10 mg by mouth four (4) times daily as needed. Yes Historical Provider   naloxegol (MOVANTIK) 12.5 mg tab tablet Take 12.5 mg by mouth daily. Yes Historical Provider   folic acid (FOLVITE) 1 mg tablet Take 1 mg by mouth daily. Yes Historical Provider   metoprolol (LOPRESSOR) 25 mg tablet Take 25 mg by mouth two (2) times a day. Yes Historical Provider   ergocalciferol (ERGOCALCIFEROL) 50,000 unit capsule Take 1 Cap by mouth every seven (7) days. 3/8/18   Bettina Santos NP   oxyCODONE ER (OXYCONTIN) 20 mg ER tablet Take 1 Tab by mouth two (2) times a day. Max Daily Amount: 40 mg. Patient taking differently: Take 20 mg by mouth two (2) times a day.  0600 & 1800 11/15/16   Clau Cruz MD       FAMILY HISTORY:   Family History   Problem Relation Age of Onset   [facility] Cancer Mother      breast    Diabetes Mother     Heart Disease Father     Diabetes Father     Sickle Cell Anemia Brother        SOCIAL HISTORY:   Social History     Social History    Marital status:      Spouse name: N/A    Number of children: N/A    Years of education: N/A     Social History Main Topics    Smoking status: Former Smoker     Packs/day: 0.25     Years: 30.00     Quit date: 1/1/2011    Smokeless tobacco: Former User    Alcohol use Yes      Comment: 2 times yearly    Drug use: No    Sexual activity: Not Currently     Other Topics Concern    None     Social History Narrative       REVIEW OF SYSTEMS: All review of systems are negative. PHYSICAL EXAMINATION: For a detailed physical exam, please refer to the patient's chart. HOSPITAL COURSE: The patient was taken to surgery the day of admission. she underwent a right total knee replacement. Operative course was benign. Estimated blood loss was approximately 150 cc. The patient was taken to the PACU in stable condition and was later taken to the floor in stable condition. During her hospital stay, the patient progressed well with physical therapy and occupational therapy, adherent to instructions. she had been cleared by physical therapy with stair training. she was placed on Aspirin for DVT prophylaxis. her pain has been well controlled with oral pain medications. her vitals have remained stable. she has also remained hemodynamically stable. The patient has been recommended for discharge home. DISCHARGE INSTRUCTIONS: The patient is to be discharged home. she is to continue on her prior medications per the medication reconciliation form, to which we will add:   1. Aspirin 325 mg; 1 tablet po bid x 21 days              2.   Duricef 500 mg; 1 tablet p.o. Every 12 hours x 5 days    The patient is to continue at home with home physical therapy 3 times a week to work on gait training, range of motion, strengthening, and weightbearing exercises as tolerated on her right lower extremity. The patient is to continue to keep her incision dry. The patient is to followup with Madhu Forrest PA-C and/or [facility] ROBERTO in the office approximately 10-14 days status post for x-rays and further evaluation.     Jaja Gray [facility]  [id number]/53/[id number]:84 AM

## 2018-07-20 NOTE — PROGRESS NOTES
Problem: Mobility Impaired (Adult and Pediatric)  Goal: *Acute Goals and Plan of Care (Insert Text)  In 1-7 days pt will be able to perform:  ST.  Bed mobility:  Rolling L to R to L modified independent for positioning. 2.  Supine to sit to supine S with HR for meals. 3.  Sit to stand to sit S with RW in prep for ambulation. LT.  Gait:  Ambulate >150ft S with RW, WBAT, for home/community mobility. 2.  Stair Negotiation:  Ascend/descend >2 steps CGA with HR for home entry. 3.  Activity tolerance: Tolerate up in chair 1-2 hours for ADLs. 4.  Patient/Family Education:  Patient/family to be independent with HEP for follow-up care and safe discharge. Outcome: Resolved/Met Date Met: 18  physical Therapy TREATMENT/DISCHARGE    Patient: Allie Ferrari (56 y.o. female)  Date: 2018  Diagnosis: RIGHT KNEE OSTEOARTHRITIS  Osteoarthritis of right knee Osteoarthritis of right knee  Procedure(s) (LRB):  RIGHT TOTAL KNEE REPLACEMENT, \"SPEC POP\" (Right) 1 Day Post-Op  Precautions: Fall, WBAT  Chart, physical therapy assessment, plan of care and goals were reviewed. ASSESSMENT:  Pt meets needs for safe home mobility, expresses understanding of HEP and is cleared from this level of PT. Progression toward goals:  [x]      Goals met  []      Improving appropriately and progressing toward goals  []      Improving slowly and progressing toward goals  []      Not making progress toward goals and plan of care will be adjusted     PLAN:  Patient will be discharged from physical therapy at this time. Rationale for discharge:  [x] Goals Achieved  [] [address]  [] Patient not participating in therapy  [] Other:  Discharge Recommendations:  Home Health  Further Equipment Recommendations for Discharge:  rolling walker     SUBJECTIVE:   Patient stated  I have had enough.  I am ready to go home      OBJECTIVE DATA SUMMARY:   Critical Behavior:  Neurologic State: Alert, Appropriate for age  Orientation Level: Oriented X4  Cognition: Appropriate decision making  Safety/Judgement: Awareness of environment  Functional Mobility Training:  Bed Mobility:  Rolling: Supervision  Supine to Sit: Supervision  Sit to Supine: Supervision  Scooting: Supervision  Transfers:  Sit to Stand: Supervision  Stand to Sit: Supervision  Balance:  Sitting: Intact  Standing: Intact; With support  Standing - Static: Good  Standing - Dynamic : Fair  Ambulation/Gait Training:  Distance (ft): 150 Feet (ft)  Assistive Device: Walker, rolling;Gait belt  Ambulation - Level of Assistance: Supervision  Gait Abnormalities: Antalgic;Decreased step clearance; Step to gait  Right Side Weight Bearing: As tolerated  Base of Support: Shift to left  Stance: Right decreased  Speed/Yoana: Slow  Step Length: Right shortened;Left shortened  Swing Pattern: Left asymmetrical;Right asymmetrical  Interventions: Verbal cues; Safety awareness training    Stairs:  Number of Stairs Trained: 3  Stairs - Level of Assistance: Contact guard assistance   Rail Use: Both    Therapeutic Exercises:   Reviewed HEP     Pain:  Pain Scale 1: Numeric (0 - 10)  Pain Intensity 1: 5  Pain Location 1: Knee  Pain Orientation 1: Right  Pain Description 1: Aching  Pain Intervention(s) 1: Medication (see MAR)  Activity Tolerance:   Good     After treatment:   [] Patient left in no apparent distress sitting up in chair  [x] Patient left in no apparent distress in bed(EOB with OT)   [x] Call bell left within reach  [] Nursing notified  [x] Caregiver present  [] Bed alarm activated  Marky Gannon PTA   Time Calculation: 19 mins

## 2018-07-20 NOTE — PROGRESS NOTES
OT note: Pt just started getting 1st unit of blood. Will re-attempt eval later when pt done getting blood.  Thank you Lola Rey OTR/L

## 2018-07-20 NOTE — PROGRESS NOTES
Transition of Care (LG) Plan:     Chart reviewed, met with pt in room. Pt receiving blood, plans discharge home, has spouse and daughter at home to assist. [address] offered, pt chose [facility] ([phone] for follow up; referral placed with CMS. Pt has RW for home. LG Transportation:   How is patient being transported at discharge? Family/Friend      When? Once cleared by Therapy between 12-2pm     Is transport scheduled? N/A      Follow-up appointment and transportation:   PCP/Specialist?  See AVS for Appointment         Who is transporting to the follow-up appointment? Family/Friend      Is transport for follow up appointment scheduled? N/A    Communication plan (with patient/family): Who is being called? Patient or Next of Kin? Responsible party? Patient      What number(s) is to be used? See Facesheet      What service provider is calling for [facility] services? When are they calling? 24-48 hours following discharge    Readmission Risk? (Green/Low; Yellow/Moderate; Red/High):  Green    Care Management Interventions  PCP Verified by CM:  Yes  Transition of Care Consult (CM Consult): [address]: No  Reason Outside [facility]: Patient already serviced by other home care/hospice agency ([facility])  Discharge Durable Medical Equipment: No  Physical Therapy Consult: Yes  Occupational Therapy Consult: Yes  Current Support Network: Lives with Spouse, Own Home  Confirm Follow Up Transport: Family  Plan discussed with Pt/Family/Caregiver: Yes  Freedom of Choice Offered: Yes  Discharge Location  Discharge Placement: Home with home health

## 2018-07-20 NOTE — PROGRESS NOTES
Progress Note        Patient: Abel Schuster MRN: [id number]  SSN: [id number]    YOB: 1965  Age: 48 y.o. Sex: female      1 Day Post-Op status post Procedure(s) (LRB):  RIGHT TOTAL KNEE REPLACEMENT, \"SPEC POP\" (Right)    Admit Date: 2018  Admit Diagnosis: RIGHT KNEE OSTEOARTHRITIS  Osteoarthritis of right knee    Subjective:      Doing well. No complaints. No SOB. No Chest Pain. No Nausea or Vomiting. No problems eating or voiding. Objective:        Temp (24hrs), Av.2 °F (36.8 °C), Min:96.8 °F (36 °C), Max:101 °F (38.3 °C)    Body mass index is 35.61 kg/(m^2). Patient Vitals for the past 12 hrs:   BP Temp Pulse Resp SpO2   18 0713 131/67 98 °F (36.7 °C) (!) 58 16 100 %   18 0240 114/67 97.7 °F (36.5 °C) 65 16 99 %   18 2340 117/56 98 °F (36.7 °C) 64 15 100 %     Recent Labs      18   0225   HGB  7.5*   HCT  21.8*   NA  142   K  4.7   CL  109*   CO2  25   BUN  16   CREA  1.32*   GLU  152*       Physical Exam:  Vital Signs are Stable. No Acute Distress. Alert and Oriented. Negative Homans sign. Toes AROM Full. Neurovascular exam is normal.    Dressing is Clean, Dry, and Intact. Assessment/Plan:     1. Continue oob/rehab  2. Acute on chronic anemia-will transfuse 1 unit prbc's at pt request  3.  D/c planning    Continue PT/OT  Continue CPM/ROM    Discharge Plan: Home    Signed By: Anthony Rodriguez MD     2018

## 2018-07-20 NOTE — PROGRESS NOTES
Problem: Self Care Deficits Care Plan (Adult)  Goal: *Acute Goals and Plan of Care (Insert Text)  Occupational Therapy EVALUATION/discharge    Patient: Thien Lemon (68 y.o. female)  Date: 2018  Primary Diagnosis: RIGHT KNEE OSTEOARTHRITIS  Osteoarthritis of right knee  Procedure(s) (LRB):  RIGHT TOTAL KNEE REPLACEMENT, \"SPEC POP\" (Right) 1 Day Post-Op   Precautions:  Fall, WBAT    ASSESSMENT AND RECOMMENDATIONS:  Based on the objective data described below, the patient presents with right TKA. Pt completed transfers and functional mobility with supervision using RW. Pt completed UB and LB dressing with supervision. Pt education on home safety, fall prevention, ADL technique and demonstrated understanding through verbal feedback. No further skilled acute OT needs indicated at this time. Skilled occupational therapy is not indicated at this time. Discharge Recommendations: Home Health  Further Equipment Recommendations for Discharge: N/A      SUBJECTIVE:   Patient stated I'm doing much better.     OBJECTIVE DATA SUMMARY:     Past Medical History:   Diagnosis Date    Anemia NEC     Arthritis     Chronic pain     Ductal carcinoma ([facility] [address]     left breast    Fatigue     Fibromyalgia     GERD (gastroesophageal reflux disease)     Hx of endometriosis     Hypertension 2011    Ill-defined condition 2018    Sickle cell crisis    Osteoarthritis of left hip 2016    Osteoarthritis of right hip 1/3/2017    Osteoarthritis of right knee 2018    Sickle cell anemia ([address]     Sleep apnea     Does not use CPAP    Thyroid disease     hypo     Past Surgical History:   Procedure Laterality Date    HX BREAST BIOPSY Left 2016    HX  SECTION      HX HERNIA REPAIR      HX LAP CHOLECYSTECTOMY      HX MASTECTOMY Left 2012    with axillary lymph node dissection    TOTAL HIP ARTHROPLASTY Bilateral  & 2017     Barriers to Learning/Limitations: None  Compensate with: visual, verbal, tactile, kinesthetic cues/model  Prior Level of Function/Home Situation: I with ADLs prior to admission  Home Situation  Home Environment: Private residence  # Steps to Enter: 3  Rails to Enter: Yes  Hand Rails : Left  One/Two Story Residence: One story  # of Interior Steps: 14  Interior Rails: Both  Lift Chair Available: No  Living Alone: No  Support Systems: Spouse/Significant Other/Partner  Patient Expects to be Discharged to[de-identified] Private residence  Current DME Used/Available at Home: Commode, bedside, Shower chair  Tub or Shower Type: Shower  []     Right hand dominant   []     Left hand dominant  Cognitive/Behavioral Status:  Neurologic State: Alert; Appropriate for age  Orientation Level: Oriented X4  Cognition: Appropriate decision making  Safety/Judgement: Awareness of environment; Fall prevention  Skin: incision on right LE covered with dressing  Edema: min edema noted on right LE  Vision/Perceptual:    Corrective Lenses: Glasses  Coordination:  Coordination: Within functional limits  Fine Motor Skills-Upper: Left Intact; Right Intact    Gross Motor Skills-Upper: Left Intact; Right Intact  Balance:  Sitting: Intact  Standing: Intact; With support  Standing - Static: Good  Standing - Dynamic : Fair  Strength:  Strength: Within functional limits  Tone & Sensation:  Tone: Normal  Sensation: Intact  Range of Motion:  AROM: Within functional limits  Functional Mobility and Transfers for ADLs:  Bed Mobility:  Rolling: Supervision  Supine to Sit: Supervision  Sit to Supine: Supervision  Scooting: Supervision  Transfers:  Sit to Stand: Supervision  ADL Assessment:  Upper Body Dressing: Supervision  Lower Body Dressing: Supervision  ADL Intervention:  Cognitive Retraining  Safety/Judgement: Awareness of environment; Fall prevention    Pain:  Pain Scale 1: Numeric (0 - 10)  Pain Intensity 1: 5  Pain Location 1: Knee  Pain Orientation 1: Right  Pain Description 1: Aching  Pain Intervention(s) 1: Medication (see MAR)  Activity Tolerance:   good  Please refer to the flowsheet for vital signs taken during this treatment. After treatment:   []  Patient left in no apparent distress sitting up in chair  [x]  Patient left in no apparent distress in bed  [x]  Call bell left within reach  []  Nursing notified  [x]  Caregiver present  []  Bed alarm activated    COMMUNICATION/EDUCATION:   Communication/Collaboration:  [x]      Home safety education was provided and the patient/caregiver indicated understanding. [x]      Patient/family have participated as able and agree with findings and recommendations. []      Patient is unable to participate in plan of care at this time.     Katelyn Murillo, OTR/L  Time Calculation: 14 mins

## 2018-07-20 NOTE — ROUTINE PROCESS
Bedside and Verbal shift change report given to [facility], RN by Shamir Kinney. Report included the following information SBAR, Kardex, OR Summary, Intake/Output and MAR.

## 2018-07-20 NOTE — ROUTINE PROCESS
Bedside report received from [facility]. Patient in chair at this time. Plan of care for the day addressed with the patient.

## 2018-07-20 NOTE — ROUTINE PROCESS
0935 Blood admin started at 70ml/hr    0945 - Patient in bed at this time. A/O x 3. IV to right arm  intact and patent. Teds and plexis to legs. Mepilex dressing to right knee CDI. Denies numbness/tingling. Pedal pulses palpable. Lungs clear. Bowel sounds present to all quadrants. Patient able to get to 1750 on the incentive spirometer. Pain 6/10.     0950- Pt stable no signs or symptoms of distress blood rate changed to 125ml/hr. 1158-Infusion completed. PRN Dilaudid 4 mg given for 7/10 pain.     12:30 Discharge instructions given, Dual AVS with Tomas Nunez RN

## 2018-07-23 ENCOUNTER — DOCUMENTATION ONLY (OUTPATIENT)
Dept: ONCOLOGY | Age: 53
End: 2018-07-23

## 2018-07-23 DIAGNOSIS — C50.512 MALIGNANT NEOPLASM OF LOWER-OUTER QUADRANT OF LEFT BREAST OF FEMALE, ESTROGEN RECEPTOR POSITIVE (HCC): Primary | ICD-10-CM

## 2018-07-23 DIAGNOSIS — Z17.0 MALIGNANT NEOPLASM OF LOWER-OUTER QUADRANT OF LEFT BREAST OF FEMALE, ESTROGEN RECEPTOR POSITIVE (HCC): Primary | ICD-10-CM

## 2018-07-23 DIAGNOSIS — C50.919 MALIGNANT NEOPLASM OF BREAST IN FEMALE, ESTROGEN RECEPTOR NEGATIVE, UNSPECIFIED LATERALITY, UNSPECIFIED SITE OF BREAST (HCC): ICD-10-CM

## 2018-07-23 DIAGNOSIS — Z17.1 MALIGNANT NEOPLASM OF BREAST IN FEMALE, ESTROGEN RECEPTOR NEGATIVE, UNSPECIFIED LATERALITY, UNSPECIFIED SITE OF BREAST (HCC): ICD-10-CM

## 2018-07-23 RX ORDER — HYDROMORPHONE HYDROCHLORIDE 2 MG/1
2-4 TABLET ORAL
Qty: 40 TAB | Refills: 0 | Status: CANCELLED | OUTPATIENT
Start: 2018-07-23

## 2018-07-23 RX ORDER — HYDROMORPHONE HYDROCHLORIDE 2 MG/1
2-4 TABLET ORAL
Qty: 40 TAB | Refills: 0 | Status: ON HOLD | OUTPATIENT
Start: 2018-07-23 | End: 2018-10-19 | Stop reason: SDUPTHER

## 2018-07-23 NOTE — TELEPHONE ENCOUNTER
Patient asked for 4mg tab instead of 2mg tab. Also, she wants to  the Rx at [facility] instead of at [place].

## 2018-09-14 LAB
ABO + RH BLD: NORMAL
ANTIGENS PRESENT RBC DONR: NORMAL
ANTIGENS PRESENT RBC DONR: NORMAL
BLD PROD TYP BPU: NORMAL
BLD PROD TYP BPU: NORMAL
BLOOD BANK CMNT PATIENT-IMP: NORMAL
BLOOD GROUP ANTIBODIES SERPL: NORMAL
BLOOD GROUP ANTIBODIES SERPL: NORMAL
BPU ID: NORMAL
BPU ID: NORMAL
CROSSMATCH RESULT,%XM: NORMAL
CROSSMATCH RESULT,%XM: NORMAL
SPECIMEN EXP DATE BLD: NORMAL
STATUS OF UNIT,%ST: NORMAL
STATUS OF UNIT,%ST: NORMAL
UNIT DIVISION, %UDIV: 0
UNIT DIVISION, %UDIV: 0

## 2018-10-18 ENCOUNTER — APPOINTMENT (OUTPATIENT)
Dept: GENERAL RADIOLOGY | Age: 53
DRG: 812 | End: 2018-10-18
Attending: EMERGENCY MEDICINE
Payer: COMMERCIAL

## 2018-10-18 ENCOUNTER — HOSPITAL ENCOUNTER (INPATIENT)
Age: 53
LOS: 4 days | Discharge: HOME | DRG: 812 | End: 2018-10-22
Attending: EMERGENCY MEDICINE | Admitting: INTERNAL MEDICINE
Payer: COMMERCIAL

## 2018-10-18 ENCOUNTER — APPOINTMENT (OUTPATIENT)
Dept: CT IMAGING | Age: 53
DRG: 812 | End: 2018-10-18
Attending: NURSE PRACTITIONER
Payer: COMMERCIAL

## 2018-10-18 DIAGNOSIS — D72.829 LEUKOCYTOSIS, UNSPECIFIED TYPE: ICD-10-CM

## 2018-10-18 DIAGNOSIS — D63.1 ANEMIA DUE TO CHRONIC KIDNEY DISEASE, UNSPECIFIED CKD STAGE: ICD-10-CM

## 2018-10-18 DIAGNOSIS — R06.02 SOB (SHORTNESS OF BREATH): Primary | ICD-10-CM

## 2018-10-18 DIAGNOSIS — R65.10 SIRS (SYSTEMIC INFLAMMATORY RESPONSE SYNDROME) (HCC): ICD-10-CM

## 2018-10-18 DIAGNOSIS — N18.9 ANEMIA DUE TO CHRONIC KIDNEY DISEASE, UNSPECIFIED CKD STAGE: ICD-10-CM

## 2018-10-18 DIAGNOSIS — R53.1 WEAKNESS: ICD-10-CM

## 2018-10-18 LAB
ALBUMIN SERPL-MCNC: 3.2 G/DL (ref 3.4–5)
ALBUMIN SERPL-MCNC: 4 G/DL (ref 3.4–5)
ALBUMIN/GLOB SERPL: 0.8 {RATIO} (ref 0.8–1.7)
ALBUMIN/GLOB SERPL: 0.9 {RATIO} (ref 0.8–1.7)
ALP SERPL-CCNC: 157 U/L (ref 45–117)
ALP SERPL-CCNC: 192 U/L (ref 45–117)
ALT SERPL-CCNC: 29 U/L (ref 13–56)
ALT SERPL-CCNC: 37 U/L (ref 13–56)
ANION GAP SERPL CALC-SCNC: 10 MMOL/L (ref 3–18)
ANION GAP SERPL CALC-SCNC: 10 MMOL/L (ref 3–18)
APPEARANCE UR: CLEAR
APPEARANCE UR: CLEAR
ARTERIAL PATENCY WRIST A: YES
AST SERPL-CCNC: 51 U/L (ref 15–37)
AST SERPL-CCNC: 74 U/L (ref 15–37)
BACTERIA URNS QL MICRO: ABNORMAL /HPF
BASE DEFICIT BLD-SCNC: 2 MMOL/L
BASOPHILS # BLD: 0 K/UL (ref 0–0.06)
BASOPHILS # BLD: 0 K/UL (ref 0–0.06)
BASOPHILS NFR BLD: 0 % (ref 0–3)
BASOPHILS NFR BLD: 0 % (ref 0–3)
BDY SITE: ABNORMAL
BILIRUB SERPL-MCNC: 4.4 MG/DL (ref 0.2–1)
BILIRUB SERPL-MCNC: 5.4 MG/DL (ref 0.2–1)
BILIRUB UR QL: ABNORMAL
BILIRUB UR QL: NEGATIVE
BNP SERPL-MCNC: 160 PG/ML (ref 0–900)
BODY TEMPERATURE: 98.6
BUN SERPL-MCNC: 13 MG/DL (ref 7–18)
BUN SERPL-MCNC: 20 MG/DL (ref 7–18)
BUN/CREAT SERPL: 12 (ref 12–20)
BUN/CREAT SERPL: 14 (ref 12–20)
CALCIUM SERPL-MCNC: 8.2 MG/DL (ref 8.5–10.1)
CALCIUM SERPL-MCNC: 8.3 MG/DL (ref 8.5–10.1)
CHLORIDE SERPL-SCNC: 104 MMOL/L (ref 100–108)
CHLORIDE SERPL-SCNC: 107 MMOL/L (ref 100–108)
CK MB CFR SERPL CALC: NORMAL % (ref 0–4)
CK MB SERPL-MCNC: <1 NG/ML (ref 5–25)
CK SERPL-CCNC: 27 U/L (ref 26–192)
CO2 SERPL-SCNC: 22 MMOL/L (ref 21–32)
CO2 SERPL-SCNC: 23 MMOL/L (ref 21–32)
COLOR UR: ABNORMAL
COLOR UR: YELLOW
CREAT SERPL-MCNC: 1.07 MG/DL (ref 0.6–1.3)
CREAT SERPL-MCNC: 1.38 MG/DL (ref 0.6–1.3)
DIFFERENTIAL METHOD BLD: ABNORMAL
DIFFERENTIAL METHOD BLD: ABNORMAL
EOSINOPHIL # BLD: 0 K/UL (ref 0–0.4)
EOSINOPHIL # BLD: 0 K/UL (ref 0–0.4)
EOSINOPHIL NFR BLD: 0 % (ref 0–5)
EOSINOPHIL NFR BLD: 0 % (ref 0–5)
EPITH CASTS URNS QL MICRO: ABNORMAL /LPF (ref 0–5)
ERYTHROCYTE [DISTWIDTH] IN BLOOD BY AUTOMATED COUNT: 15.6 % (ref 11.6–14.5)
ERYTHROCYTE [DISTWIDTH] IN BLOOD BY AUTOMATED COUNT: 16.2 % (ref 11.6–14.5)
FLUAV AG NPH QL IA: NEGATIVE
FLUBV AG NOSE QL IA: NEGATIVE
GAS FLOW.O2 O2 DELIVERY SYS: ABNORMAL L/MIN
GLOBULIN SER CALC-MCNC: 4 G/DL (ref 2–4)
GLOBULIN SER CALC-MCNC: 4.4 G/DL (ref 2–4)
GLUCOSE SERPL-MCNC: 113 MG/DL (ref 74–99)
GLUCOSE SERPL-MCNC: 162 MG/DL (ref 74–99)
GLUCOSE UR STRIP.AUTO-MCNC: NEGATIVE MG/DL
GLUCOSE UR STRIP.AUTO-MCNC: NEGATIVE MG/DL
HCO3 BLD-SCNC: 22.7 MMOL/L (ref 22–26)
HCT VFR BLD AUTO: 16.9 % (ref 35–45)
HCT VFR BLD AUTO: 20.5 % (ref 35–45)
HGB BLD-MCNC: 6.1 G/DL (ref 12–16)
HGB BLD-MCNC: 7.3 G/DL (ref 12–16)
HGB UR QL STRIP: NEGATIVE
HGB UR QL STRIP: NEGATIVE
INR PPP: 1.2 (ref 0.8–1.2)
KETONES UR QL STRIP.AUTO: NEGATIVE MG/DL
KETONES UR QL STRIP.AUTO: NEGATIVE MG/DL
LACTATE BLD-SCNC: 1.3 MMOL/L (ref 0.4–2)
LACTATE SERPL-SCNC: 0.8 MMOL/L (ref 0.4–2)
LEUKOCYTE ESTERASE UR QL STRIP.AUTO: ABNORMAL
LEUKOCYTE ESTERASE UR QL STRIP.AUTO: NEGATIVE
LYMPHOCYTES # BLD: 2.1 K/UL (ref 0.8–3.5)
LYMPHOCYTES # BLD: 2.9 K/UL (ref 0.8–3.5)
LYMPHOCYTES NFR BLD: 12 % (ref 20–51)
LYMPHOCYTES NFR BLD: 21 % (ref 20–51)
MAGNESIUM SERPL-MCNC: 2.3 MG/DL (ref 1.6–2.6)
MCH RBC QN AUTO: 29 PG (ref 24–34)
MCH RBC QN AUTO: 29.8 PG (ref 24–34)
MCHC RBC AUTO-ENTMCNC: 35.6 G/DL (ref 31–37)
MCHC RBC AUTO-ENTMCNC: 35.9 G/DL (ref 31–37)
MCV RBC AUTO: 81 FL (ref 74–97)
MCV RBC AUTO: 83.7 FL (ref 74–97)
MONOCYTES # BLD: 0.9 K/UL (ref 0–1)
MONOCYTES # BLD: 1 K/UL (ref 0–1)
MONOCYTES NFR BLD: 5 % (ref 2–9)
MONOCYTES NFR BLD: 7 % (ref 2–9)
NEUTS BAND NFR BLD MANUAL: 5 % (ref 0–5)
NEUTS BAND NFR BLD MANUAL: 8 % (ref 0–5)
NEUTS SEG # BLD: 14.1 K/UL (ref 1.8–8)
NEUTS SEG # BLD: 9.9 K/UL (ref 1.8–8)
NEUTS SEG NFR BLD: 64 % (ref 42–75)
NEUTS SEG NFR BLD: 78 % (ref 42–75)
NITRITE UR QL STRIP.AUTO: NEGATIVE
NITRITE UR QL STRIP.AUTO: NEGATIVE
O2/TOTAL GAS SETTING VFR VENT: 21 %
PCO2 BLD: 36.5 MMHG (ref 35–45)
PH BLD: 7.4 [PH] (ref 7.35–7.45)
PH UR STRIP: 5.5 [PH] (ref 5–8)
PH UR STRIP: 5.5 [PH] (ref 5–8)
PLATELET # BLD AUTO: 28 K/UL (ref 135–420)
PLATELET # BLD AUTO: 33 K/UL (ref 135–420)
PLATELET COMMENTS,PCOM: ABNORMAL
PLATELET COMMENTS,PCOM: ABNORMAL
PMV BLD AUTO: 10.3 FL (ref 9.2–11.8)
PMV BLD AUTO: 9.7 FL (ref 9.2–11.8)
PO2 BLD: 69 MMHG (ref 80–100)
POTASSIUM SERPL-SCNC: 3.8 MMOL/L (ref 3.5–5.5)
POTASSIUM SERPL-SCNC: 4 MMOL/L (ref 3.5–5.5)
PROT SERPL-MCNC: 7.2 G/DL (ref 6.4–8.2)
PROT SERPL-MCNC: 8.4 G/DL (ref 6.4–8.2)
PROT UR STRIP-MCNC: ABNORMAL MG/DL
PROT UR STRIP-MCNC: NEGATIVE MG/DL
PROTHROMBIN TIME: 15.4 SEC (ref 11.5–15.2)
RBC # BLD AUTO: 2.1 M/UL (ref 4.2–5.3)
RBC # BLD AUTO: 2.45 M/UL (ref 4.2–5.3)
RBC #/AREA URNS HPF: ABNORMAL /HPF (ref 0–5)
RBC MORPH BLD: ABNORMAL
RETICS/RBC NFR AUTO: 10.1 % (ref 0.5–2.3)
SAO2 % BLD: 94 % (ref 92–97)
SERVICE CMNT-IMP: ABNORMAL
SODIUM SERPL-SCNC: 137 MMOL/L (ref 136–145)
SODIUM SERPL-SCNC: 139 MMOL/L (ref 136–145)
SP GR UR REFRACTOMETRY: 1.02 (ref 1–1.03)
SP GR UR REFRACTOMETRY: 1.03 (ref 1–1.03)
SPECIMEN TYPE: ABNORMAL
TOTAL RESP. RATE, ITRR: 17
TROPONIN I SERPL-MCNC: <0.02 NG/ML (ref 0–0.04)
UROBILINOGEN UR QL STRIP.AUTO: 1 EU/DL (ref 0.2–1)
UROBILINOGEN UR QL STRIP.AUTO: 1 EU/DL (ref 0.2–1)
WBC # BLD AUTO: 13.8 K/UL (ref 4.6–13.2)
WBC # BLD AUTO: 17.1 K/UL (ref 4.6–13.2)
WBC URNS QL MICRO: ABNORMAL /HPF (ref 0–4)

## 2018-10-18 PROCEDURE — 71046 X-RAY EXAM CHEST 2 VIEWS: CPT

## 2018-10-18 PROCEDURE — 87040 BLOOD CULTURE FOR BACTERIA: CPT | Performed by: NURSE PRACTITIONER

## 2018-10-18 PROCEDURE — 81003 URINALYSIS AUTO W/O SCOPE: CPT | Performed by: INTERNAL MEDICINE

## 2018-10-18 PROCEDURE — 74011250636 HC RX REV CODE- 250/636: Performed by: EMERGENCY MEDICINE

## 2018-10-18 PROCEDURE — 93005 ELECTROCARDIOGRAM TRACING: CPT

## 2018-10-18 PROCEDURE — 86900 BLOOD TYPING SEROLOGIC ABO: CPT | Performed by: EMERGENCY MEDICINE

## 2018-10-18 PROCEDURE — 74011636320 HC RX REV CODE- 636/320: Performed by: EMERGENCY MEDICINE

## 2018-10-18 PROCEDURE — 36600 WITHDRAWAL OF ARTERIAL BLOOD: CPT

## 2018-10-18 PROCEDURE — 83735 ASSAY OF MAGNESIUM: CPT | Performed by: NURSE PRACTITIONER

## 2018-10-18 PROCEDURE — 86880 COOMBS TEST DIRECT: CPT | Performed by: EMERGENCY MEDICINE

## 2018-10-18 PROCEDURE — 87086 URINE CULTURE/COLONY COUNT: CPT | Performed by: NURSE PRACTITIONER

## 2018-10-18 PROCEDURE — 86870 RBC ANTIBODY IDENTIFICATION: CPT | Performed by: EMERGENCY MEDICINE

## 2018-10-18 PROCEDURE — 81001 URINALYSIS AUTO W/SCOPE: CPT | Performed by: NURSE PRACTITIONER

## 2018-10-18 PROCEDURE — 99285 EMERGENCY DEPT VISIT HI MDM: CPT

## 2018-10-18 PROCEDURE — 74011250636 HC RX REV CODE- 250/636: Performed by: NURSE PRACTITIONER

## 2018-10-18 PROCEDURE — 96365 THER/PROPH/DIAG IV INF INIT: CPT

## 2018-10-18 PROCEDURE — 83605 ASSAY OF LACTIC ACID: CPT

## 2018-10-18 PROCEDURE — 87804 INFLUENZA ASSAY W/OPTIC: CPT | Performed by: EMERGENCY MEDICINE

## 2018-10-18 PROCEDURE — 74011250637 HC RX REV CODE- 250/637: Performed by: INTERNAL MEDICINE

## 2018-10-18 PROCEDURE — 86902 BLOOD TYPE ANTIGEN DONOR EA: CPT | Performed by: EMERGENCY MEDICINE

## 2018-10-18 PROCEDURE — 74011000258 HC RX REV CODE- 258: Performed by: NURSE PRACTITIONER

## 2018-10-18 PROCEDURE — 71275 CT ANGIOGRAPHY CHEST: CPT

## 2018-10-18 PROCEDURE — 74011250636 HC RX REV CODE- 250/636: Performed by: INTERNAL MEDICINE

## 2018-10-18 PROCEDURE — 36415 COLL VENOUS BLD VENIPUNCTURE: CPT | Performed by: INTERNAL MEDICINE

## 2018-10-18 PROCEDURE — 85045 AUTOMATED RETICULOCYTE COUNT: CPT | Performed by: EMERGENCY MEDICINE

## 2018-10-18 PROCEDURE — 96375 TX/PRO/DX INJ NEW DRUG ADDON: CPT

## 2018-10-18 PROCEDURE — 96367 TX/PROPH/DG ADDL SEQ IV INF: CPT

## 2018-10-18 PROCEDURE — 85610 PROTHROMBIN TIME: CPT | Performed by: NURSE PRACTITIONER

## 2018-10-18 PROCEDURE — 86901 BLOOD TYPING SEROLOGIC RH(D): CPT | Performed by: EMERGENCY MEDICINE

## 2018-10-18 PROCEDURE — 74011250637 HC RX REV CODE- 250/637: Performed by: EMERGENCY MEDICINE

## 2018-10-18 PROCEDURE — 82803 BLOOD GASES ANY COMBINATION: CPT

## 2018-10-18 PROCEDURE — 96366 THER/PROPH/DIAG IV INF ADDON: CPT

## 2018-10-18 PROCEDURE — 65660000000 HC RM CCU STEPDOWN

## 2018-10-18 PROCEDURE — 82550 ASSAY OF CK (CPK): CPT | Performed by: EMERGENCY MEDICINE

## 2018-10-18 PROCEDURE — 85025 COMPLETE CBC W/AUTO DIFF WBC: CPT | Performed by: EMERGENCY MEDICINE

## 2018-10-18 PROCEDURE — 96376 TX/PRO/DX INJ SAME DRUG ADON: CPT

## 2018-10-18 PROCEDURE — 80053 COMPREHEN METABOLIC PANEL: CPT | Performed by: EMERGENCY MEDICINE

## 2018-10-18 PROCEDURE — 83605 ASSAY OF LACTIC ACID: CPT | Performed by: INTERNAL MEDICINE

## 2018-10-18 PROCEDURE — 83880 ASSAY OF NATRIURETIC PEPTIDE: CPT | Performed by: NURSE PRACTITIONER

## 2018-10-18 PROCEDURE — 80053 COMPREHEN METABOLIC PANEL: CPT | Performed by: INTERNAL MEDICINE

## 2018-10-18 PROCEDURE — 86920 COMPATIBILITY TEST SPIN: CPT | Performed by: EMERGENCY MEDICINE

## 2018-10-18 PROCEDURE — 86970 RBC PRETX INCUBATJ W/CHEMICL: CPT | Performed by: EMERGENCY MEDICINE

## 2018-10-18 RX ORDER — ACETAMINOPHEN 500 MG
1000 TABLET ORAL ONCE
Status: COMPLETED | OUTPATIENT
Start: 2018-10-18 | End: 2018-10-18

## 2018-10-18 RX ORDER — FOLIC ACID 1 MG/1
1 TABLET ORAL DAILY
Status: DISCONTINUED | OUTPATIENT
Start: 2018-10-19 | End: 2018-10-22 | Stop reason: HOSPADM

## 2018-10-18 RX ORDER — HYDROMORPHONE HYDROCHLORIDE 2 MG/ML
0.5 INJECTION, SOLUTION INTRAMUSCULAR; INTRAVENOUS; SUBCUTANEOUS ONCE
Status: COMPLETED | OUTPATIENT
Start: 2018-10-18 | End: 2018-10-18

## 2018-10-18 RX ORDER — HYDROMORPHONE HYDROCHLORIDE 2 MG/ML
1 INJECTION, SOLUTION INTRAMUSCULAR; INTRAVENOUS; SUBCUTANEOUS ONCE
Status: COMPLETED | OUTPATIENT
Start: 2018-10-18 | End: 2018-10-18

## 2018-10-18 RX ORDER — METOPROLOL TARTRATE 50 MG/1
50 TABLET ORAL
COMMUNITY
End: 2019-05-15

## 2018-10-18 RX ORDER — TEMAZEPAM 7.5 MG/1
7.5 CAPSULE ORAL
Status: DISCONTINUED | OUTPATIENT
Start: 2018-10-18 | End: 2018-10-22 | Stop reason: HOSPADM

## 2018-10-18 RX ORDER — ENOXAPARIN SODIUM 100 MG/ML
30 INJECTION SUBCUTANEOUS EVERY 24 HOURS
Status: DISCONTINUED | OUTPATIENT
Start: 2018-10-18 | End: 2018-10-18

## 2018-10-18 RX ORDER — VANCOMYCIN 2 GRAM/500 ML IN 0.9 % SODIUM CHLORIDE INTRAVENOUS
2000 ONCE
Status: COMPLETED | OUTPATIENT
Start: 2018-10-18 | End: 2018-10-18

## 2018-10-18 RX ORDER — SODIUM CHLORIDE 0.9 % (FLUSH) 0.9 %
5-10 SYRINGE (ML) INJECTION AS NEEDED
Status: DISCONTINUED | OUTPATIENT
Start: 2018-10-18 | End: 2018-10-22 | Stop reason: HOSPADM

## 2018-10-18 RX ORDER — ERGOCALCIFEROL 1.25 MG/1
50000 CAPSULE ORAL
Status: DISCONTINUED | OUTPATIENT
Start: 2018-10-18 | End: 2018-10-22 | Stop reason: HOSPADM

## 2018-10-18 RX ORDER — VANCOMYCIN 1.75 GRAM/500 ML IN 0.9 % SODIUM CHLORIDE INTRAVENOUS
1750 EVERY 24 HOURS
Status: DISCONTINUED | OUTPATIENT
Start: 2018-10-19 | End: 2018-10-19

## 2018-10-18 RX ORDER — SODIUM CHLORIDE 9 MG/ML
100 INJECTION, SOLUTION INTRAVENOUS CONTINUOUS
Status: DISPENSED | OUTPATIENT
Start: 2018-10-18 | End: 2018-10-19

## 2018-10-18 RX ORDER — LEVOFLOXACIN 5 MG/ML
750 INJECTION, SOLUTION INTRAVENOUS EVERY 24 HOURS
Status: DISCONTINUED | OUTPATIENT
Start: 2018-10-18 | End: 2018-10-20 | Stop reason: ALTCHOICE

## 2018-10-18 RX ORDER — LANOLIN ALCOHOL/MO/W.PET/CERES
400 CREAM (GRAM) TOPICAL
COMMUNITY
End: 2018-10-22

## 2018-10-18 RX ORDER — HYDROMORPHONE HYDROCHLORIDE 2 MG/1
2-4 TABLET ORAL
Status: DISCONTINUED | OUTPATIENT
Start: 2018-10-18 | End: 2018-10-22 | Stop reason: HOSPADM

## 2018-10-18 RX ADMIN — LEVOFLOXACIN 750 MG: 5 INJECTION, SOLUTION INTRAVENOUS at 05:06

## 2018-10-18 RX ADMIN — IOPAMIDOL 78 ML: 755 INJECTION, SOLUTION INTRAVENOUS at 11:33

## 2018-10-18 RX ADMIN — SODIUM CHLORIDE 4.5 G: 900 INJECTION, SOLUTION INTRAVENOUS at 22:02

## 2018-10-18 RX ADMIN — SODIUM CHLORIDE 4.5 G: 900 INJECTION, SOLUTION INTRAVENOUS at 16:20

## 2018-10-18 RX ADMIN — HYDROMORPHONE HYDROCHLORIDE 1 MG: 2 INJECTION, SOLUTION INTRAMUSCULAR; INTRAVENOUS; SUBCUTANEOUS at 05:25

## 2018-10-18 RX ADMIN — HYDROMORPHONE HYDROCHLORIDE 0.5 MG: 2 INJECTION, SOLUTION INTRAMUSCULAR; INTRAVENOUS; SUBCUTANEOUS at 10:17

## 2018-10-18 RX ADMIN — HYDROMORPHONE HYDROCHLORIDE 4 MG: 2 TABLET ORAL at 20:20

## 2018-10-18 RX ADMIN — ACETAMINOPHEN 1000 MG: 500 TABLET, FILM COATED ORAL at 10:16

## 2018-10-18 RX ADMIN — VANCOMYCIN HYDROCHLORIDE 2000 MG: 10 INJECTION, POWDER, LYOPHILIZED, FOR SOLUTION INTRAVENOUS at 07:42

## 2018-10-18 RX ADMIN — ERGOCALCIFEROL 50000 UNITS: 1.25 CAPSULE ORAL at 16:21

## 2018-10-18 RX ADMIN — SODIUM CHLORIDE 4.5 G: 900 INJECTION, SOLUTION INTRAVENOUS at 06:55

## 2018-10-18 RX ADMIN — SODIUM CHLORIDE 100 ML/HR: 900 INJECTION, SOLUTION INTRAVENOUS at 18:31

## 2018-10-18 RX ADMIN — SODIUM CHLORIDE 1000 ML: 900 INJECTION, SOLUTION INTRAVENOUS at 05:26

## 2018-10-18 RX ADMIN — SODIUM CHLORIDE 4.5 G: 900 INJECTION, SOLUTION INTRAVENOUS at 10:19

## 2018-10-18 RX ADMIN — HYDROMORPHONE HYDROCHLORIDE 2 MG: 2 TABLET ORAL at 16:21

## 2018-10-18 NOTE — ED PROVIDER NOTES
3:04 AM Cora Diaz is a 48 y.o. female with a history of sickle cell disease, fibromyalgia, htn, and chronic pain who presents to ED with increased weakness and sob with activity. Pt states she usually gets this when she needs a blood transfusion. Pt denies chest pain denies fever, she does state that she has had a cold. Mild cough reported. No other complaints, associated symptoms or modifying factors at this time. PCP: Dian Erwin MD        The history is provided by the patient. No  was used. Shortness of Breath   This is a recurrent problem. The problem occurs intermittently. The current episode started 12 to 24 hours ago. The problem has not changed since onset. Associated symptoms include cough. Pertinent negatives include no fever, no headaches, no sore throat, no sputum production, no wheezing, no chest pain and no leg swelling. She has tried nothing for the symptoms. She has had prior hospitalizations. She has had prior ED visits.         Past Medical History:   Diagnosis Date    Anemia NEC     Arthritis     Chronic pain     Ductal carcinoma ([facility] [address]     left breast    Fatigue     Fibromyalgia     GERD (gastroesophageal reflux disease)     Hx of endometriosis     Hypertension 2011    Ill-defined condition 2018    Sickle cell crisis    Osteoarthritis of left hip 2016    Osteoarthritis of right hip 1/3/2017    Osteoarthritis of right knee 2018    Sickle cell anemia (HCC)     Sleep apnea     Does not use CPAP    Thyroid disease     hypo       Past Surgical History:   Procedure Laterality Date    HX BREAST BIOPSY Left 2016    HX  SECTION      HX HERNIA REPAIR      HX LAP CHOLECYSTECTOMY      HX MASTECTOMY Left 2012    with axillary lymph node dissection    TOTAL HIP ARTHROPLASTY Bilateral  &          Family History:   Problem Relation Age of Onset    Cancer Mother         breast    Diabetes Mother     Heart Disease Father     Diabetes Father     Sickle Cell Anemia Brother        Social History     Socioeconomic History    Marital status:      Spouse name: Not on file    Number of children: Not on file    Years of education: Not on file    Highest education level: Not on file   Social Needs    Financial resource strain: Not on file    Food insecurity - worry: Not on file    Food insecurity - inability: Not on file   [employer] needs - medical: Not on file   [employer] needs - non-medical: Not on file   Occupational History    Not on file   Tobacco Use    Smoking status: Former Smoker     Packs/day: 0.25     Years: 30.00     Pack years: 7.50     Last attempt to quit: 2011     Years since quittin.8    Smokeless tobacco: Former User   Substance and Sexual Activity    Alcohol use: Yes     Comment: 2 times yearly    Drug use: No    Sexual activity: Not Currently   Other Topics Concern    Not on file   Social History Narrative    Not on file         ALLERGIES: Neulasta [[email or web address]]    Review of Systems   Constitutional: Negative for fever. HENT: Negative for sore throat. Respiratory: Positive for cough and shortness of breath. Negative for sputum production and wheezing. Cardiovascular: Negative for chest pain and leg swelling. Neurological: Negative for headaches. All other systems reviewed and are negative. Vitals:    10/18/18 0148 10/18/18 0245   BP: 117/82 134/78   Pulse: (!) 103 95   Resp: 18 18   Temp: 98.3 °F (36.8 °C)    SpO2: 99% 100%   Weight: 97.5 kg (215 lb)             Physical Exam   Constitutional: She is oriented to person, place, and time. She appears well-developed and well-nourished. No distress. HENT:   Head: Normocephalic and atraumatic. Eyes: Conjunctivae and EOM are normal. Pupils are equal, round, and reactive to light. Neck: Normal range of motion. Neck supple. Cardiovascular: Normal rate and regular rhythm.    Pulmonary/Chest: Effort normal. No accessory muscle usage. No tachypnea and no bradypnea. No respiratory distress. She has no decreased breath sounds. She has wheezes. She has rhonchi. She has no rales. Diffuse wheezes and rhonchi   Abdominal: Soft. Bowel sounds are normal. There is no tenderness. Musculoskeletal: Normal range of motion. She exhibits no edema or deformity. Neurological: She is alert and oriented to person, place, and time. No cranial nerve deficit. Skin: Skin is warm and dry. Capillary refill takes less than 2 seconds. No rash noted. No erythema. Psychiatric: She has a normal mood and affect. Her behavior is normal. Judgment and thought content normal.   Nursing note and vitals reviewed. MDM  Number of Diagnoses or Management Options  Anemia due to chronic kidney disease, unspecified CKD stage: established and worsening  Leukocytosis, unspecified type: established and worsening  SIRS (systemic inflammatory response syndrome) ([facility] [address]): established and worsening  SOB (shortness of breath): established and worsening  Weakness: established and worsening  Diagnosis management comments: Pt with chronic anemia and has had a cold, it suspect it could be either factor causing her sob/weakness. Pt comfortable and in no distress, her feeling of sob and weakness is more subjective than objective to this provider. I will obtain labs, chest xray. I offered pain medication for right arm pain that she states is associated with sickle cell and pt states she took pain med before coming to ed and she is fine at this time    After labs and leukocytosis noted with left shift and bandemia septic protocol started, cultures, lactic, labs, antibiotics ordered. Unknown source of infection, sirs criteria abx ordered for unknown source of infection. Discussed case with Dr Chinyere Blum and he requested more testing before admission and discussed with Dr. Tricia Luis. Chest Ct ordered also.   Blood transfusion not ordered yet as pt has chronic anemia and I suspect the sob and weakness is sepsis related and not solely from anemia. Amount and/or Complexity of Data Reviewed  Clinical lab tests: ordered and reviewed  Tests in the radiology section of CPT®: ordered and reviewed  Tests in the medicine section of CPT®: ordered and reviewed  Review and summarize past medical records: yes  Discuss the patient with other providers: yes  Independent visualization of images, tracings, or specimens: yes    Risk of Complications, Morbidity, and/or Mortality  Presenting problems: moderate  Diagnostic procedures: moderate  Management options: moderate    Critical Care  Total time providing critical care: 30-74 minutes (I have personally provided _31__ minutes of critical care time exclusive of time spent on separately billable procedures. Time includes review of laboratory data, radiology results, discussion with consultants, and monitoring for potential decompensation.  Interventions were performed as documented above  )    Patient Progress  Patient progress: stable         Procedures            Vitals:  Patient Vitals for the past 12 hrs:   Temp Pulse Resp BP SpO2   10/18/18 0245 -- 95 18 134/78 100 %   10/18/18 0148 98.3 °F (36.8 °C) (!) 103 18 117/82 99 %       Medications ordered:   Medications   sodium chloride (NS) flush 5-10 mL (not administered)   vancomycin (VANCOCIN) 1,000 mg in 0.9% sodium chloride (MBP/ADV) 250 mL adv (not administered)   piperacillin-tazobactam (ZOSYN) 4.5 g in 0.9% sodium chloride (MBP/ADV) 100 mL ADV (not administered)   levoFLOXacin (LEVAQUIN) 750 mg in D5W IVPB (not administered)   sodium chloride 0.9 % bolus infusion 1,000 mL (not administered)     Followed by   sodium chloride 0.9 % bolus infusion 1,000 mL (not administered)     Followed by   sodium chloride 0.9 % bolus infusion 925 mL (not administered)         Lab findings:  Recent Results (from the past 12 hour(s))   CBC WITH AUTOMATED DIFF    Collection Time: 10/18/18  2:09 AM   Result Value Ref Range    WBC 17.1 (H) 4.6 - 13.2 K/uL    RBC 2.45 (L) 4.20 - 5.30 M/uL    HGB 7.3 (L) 12.0 - 16.0 g/dL    HCT 20.5 (L) 35.0 - 45.0 %    MCV 83.7 74.0 - 97.0 FL    MCH 29.8 24.0 - 34.0 PG    MCHC 35.6 31.0 - 37.0 g/dL    RDW 15.6 (H) 11.6 - 14.5 %    PLATELET 33 (L) 155 - 420 K/uL    MPV 10.3 9.2 - 11.8 FL    NEUTROPHILS 78 (H) 42 - 75 %    BAND NEUTROPHILS 5 0 - 5 %    LYMPHOCYTES 12 (L) 20 - 51 %    MONOCYTES 5 2 - 9 %    EOSINOPHILS 0 0 - 5 %    BASOPHILS 0 0 - 3 %    ABS. NEUTROPHILS 14.1 (H) 1.8 - 8.0 K/UL    ABS. LYMPHOCYTES 2.1 0.8 - 3.5 K/UL    ABS. MONOCYTES 0.9 0 - 1.0 K/UL    ABS. EOSINOPHILS 0.0 0.0 - 0.4 K/UL    ABS. BASOPHILS 0.0 0.0 - 0.06 K/UL    DF MANUAL      PLATELET COMMENTS DECREASED PLATELETS      RBC COMMENTS STOMATOCYTES  2+        RBC COMMENTS ANISOCYTOSIS  1+        RBC COMMENTS POLYCHROMASIA  1+        RBC COMMENTS TARGET CELLS  1+       CARDIAC PANEL,(CK, CKMB & TROPONIN)    Collection Time: 10/18/18  2:09 AM   Result Value Ref Range    CK 27 26 - 192 U/L    CK - MB <1.0 <3.6 ng/ml    CK-MB Index  0.0 - 4.0 %     CALCULATION NOT PERFORMED WHEN RESULT IS BELOW LINEAR LIMIT    Troponin-I, Qt. <0.02 0.0 - 0.692 NG/ML   METABOLIC PANEL, COMPREHENSIVE    Collection Time: 10/18/18  2:09 AM   Result Value Ref Range    Sodium 137 136 - 145 mmol/L    Potassium 3.8 3.5 - 5.5 mmol/L    Chloride 104 100 - 108 mmol/L    CO2 23 21 - 32 mmol/L    Anion gap 10 3.0 - 18 mmol/L    Glucose 162 (H) 74 - 99 mg/dL    BUN 20 (H) 7.0 - 18 MG/DL    Creatinine 1.38 (H) 0.6 - 1.3 MG/DL    BUN/Creatinine ratio 14 12 - 20      GFR est AA 48 (L) >60 ml/min/1.73m2    GFR est non-AA 40 (L) >60 ml/min/1.73m2    Calcium 8.3 (L) 8.5 - 10.1 MG/DL    Bilirubin, total 5.4 (H) 0.2 - 1.0 MG/DL    ALT (SGPT) 37 13 - 56 U/L    AST (SGOT) 74 (H) 15 - 37 U/L    Alk.  phosphatase 192 (H) 45 - 117 U/L    Protein, total 8.4 (H) 6.4 - 8.2 g/dL    Albumin 4.0 3.4 - 5.0 g/dL    Globulin 4.4 (H) 2.0 - 4.0 g/dL    A-G Ratio 0.9 0.8 - 1.7     PROTHROMBIN TIME + INR    Collection Time: 10/18/18  2:09 AM   Result Value Ref Range    Prothrombin time 15.4 (H) 11.5 - 15.2 sec    INR 1.2 0.8 - 1.2     NT-PRO BNP    Collection Time: 10/18/18  2:09 AM   Result Value Ref Range    NT pro- 0 - 900 PG/ML   MAGNESIUM    Collection Time: 10/18/18  2:09 AM   Result Value Ref Range    Magnesium 2.3 1.6 - 2.6 mg/dL   TYPE & SCREEN    Collection Time: 10/18/18  2:09 AM   Result Value Ref Range    Crossmatch Expiration 10/21/2018     ABO/Rh(D) Alveria Alvaro POSITIVE     Antibody screen POS     Antibody ID PENDING    RETICULOCYTE COUNT    Collection Time: 10/18/18  2:09 AM   Result Value Ref Range    Reticulocyte count 10.1 (H) 0.5 - 2.3 %   EKG, 12 LEAD, INITIAL    Collection Time: 10/18/18  2:09 AM   Result Value Ref Range    Ventricular Rate 95 BPM    Atrial Rate 95 BPM    P-R Interval 136 ms    QRS Duration 76 ms    Q-T Interval 354 ms    QTC Calculation (Bezet) 444 ms    Calculated P Axis 42 degrees    Calculated R Axis 14 degrees    Calculated T Axis 11 degrees    Diagnosis       Normal sinus rhythm  Moderate voltage criteria for LVH, may be normal variant  Borderline ECG  When compared with ECG of 26-JUN-2018 11:50,  Vent. rate has increased BY  32 BPM     POC G3    Collection Time: 10/18/18  4:02 AM   Result Value Ref Range    Device: ROOM AIR      FIO2 (POC) 21 %    pH (POC) 7.402 7.35 - 7.45      pCO2 (POC) 36.5 35.0 - 45.0 MMHG    pO2 (POC) 69 (L) 80 - 100 MMHG    HCO3 (POC) 22.7 22 - 26 MMOL/L    sO2 (POC) 94 92 - 97 %    Base deficit (POC) 2 mmol/L    Allens test (POC) YES      Total resp. rate 17      Site RIGHT RADIAL      Patient temp. 98.6      Specimen type (POC) ARTERIAL      Performed by Tj Dolan          EKG:  NSR. ..rate 95  Normal Axis. Normal Intervals. No ST elevation or depression. No Hypertrophy.  3:18 AM         X-Ray, CT or other radiology findings or impressions:  XR CHEST PA LAT    (Results Pending) CTA CHEST W OR W WO CONT    (Results Pending)       No acute findings per my read on chest xray       Disposition:    Diagnosis:   1. SOB (shortness of breath)    2. Weakness    3. SIRS (systemic inflammatory response syndrome) (HCC)    4. Leukocytosis, unspecified type    5. Anemia due to chronic kidney disease, unspecified CKD stage        Disposition: TBD          Medication List      ASK your doctor about these medications    ARMOUR THYROID 30 mg tablet  Generic drug:  thyroid (Pork)     ergocalciferol 50,000 unit capsule  Commonly known as:  ERGOCALCIFEROL  Take 1 Cap by mouth every seven (7) days. folic acid 1 mg tablet  Commonly known as:  FOLVITE     HYDROmorphone 2 mg tablet  Commonly known as:  DILAUDID  Take 1-2 Tabs by mouth every four (4) hours as needed for Pain. Max Daily Amount: 24 mg.     metoprolol tartrate 25 mg tablet  Commonly known as:  LOPRESSOR     naloxegol 12.5 mg Tab tablet  Commonly known as:  MOVANTIK     * oxyCODONE IR 10 mg Tab immediate release tablet  Commonly known as:  ROXICODONE     * oxyCODONE ER 20 mg ER tablet  Commonly known as:  OxyCONTIN  Take 1 Tab by mouth two (2) times a day. Max Daily Amount: 40 mg.         * This list has 2 medication(s) that are the same as other medications prescribed for you. Read the directions carefully, and ask your doctor or other care provider to review them with you. 4:40 AM : Pt care transferred to Dr. Chandu Velasquez  ,ED provider. History of patient complaint(s), available diagnostic reports and current treatment plan has been discussed thoroughly. Bedside rounding on patient occured : no .   Intended disposition of patient :  TBD  Pending diagnostics reports and/or labs (please list):  Chest ct       Mic Riley ENP-C,FNP-C

## 2018-10-18 NOTE — H&P
GENERAL GENERIC H&P/CONSULT  A 48 yrs old lady with PMH of sickle cell disease, HTN came to the ER after she developed SOB since yesterday. Minor activities like waking brings about the pain, per patient she has had URI infection for the last week. She developed fever and chills yesterday but stayed home. After she came to the ER she was found to have high WBC count, tachycardia and fever     Patient to be admitted to hospital for management of post fl;u Pneumonia, Acute sickle cell crisis     Subjective:    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    Fatigue     Fibromyalgia     GERD (gastroesophageal reflux disease)     Hx of endometriosis     Hypertension     Ill-defined condition 2018    Sickle cell crisis    Osteoarthritis of left hip 2016    Osteoarthritis of right hip 1/3/2017    Osteoarthritis of right knee 2018    Sick[facility] [address]     Sleep apnea     Does not use CPAP    Thyroid disease     hypo      Past Surgical History:   Procedure Laterality Date    HX BREAST BIOPSY Left 2016    HX  SECTION      HX HERNIA REPAIR      HX LAP CHOLECYSTECTOMY      HX MASTECTOMY Left 2012    with axillary lymph node dissection    TOTAL HIP ARTHROPLASTY Bilateral  &       Prior to Admission medications    Medication Sig Start Date End Date Taking? Authorizing Provider   folic acid 166 mcg tablet 400 mcg. Provider, Historical   metoprolol tartrate (LOPRESSOR) 50 mg tablet 50 mg.    Provider, Historical   HYDROmorphone (DILAUDID) 2 mg tablet Take 1-2 Tabs by mouth every four (4) hours as needed for Pain. Max Daily Amount: 24 mg. 18   Kaia ADAMS NP   thyroid, Pork, (ARMOUR THYROID) 30 mg tablet Take 30 mg by mouth daily. Provider, Historical   ergocalciferol (ERGOCALCIFEROL) 50,000 unit capsule Take 1 Cap by mouth every seven (7) days.  3/8/18   Otoo, Mercedes ADAMS NP   oxyCODONE IR (ROXICODONE) 10 mg tab immediate release tablet Take 10 mg by mouth four (4) times daily as needed. Provider, Historical   oxyCODONE ER (OXYCONTIN) 20 mg ER tablet Take 1 Tab by mouth two (2) times a day. Max Daily Amount: 40 mg. Patient taking differently: Take 20 mg by mouth two (2) times a day. 0600 & 1800 11/15/16   Fain Dakins, MD   naloxegol (MOVANTIK) 12.5 mg tab tablet Take 12.5 mg by mouth daily. Provider, Historical   folic acid (FOLVITE) 1 mg tablet Take 1 mg by mouth daily. Provider, Historical   metoprolol (LOPRESSOR) 25 mg tablet Take 25 mg by mouth two (2) times a day. Provider, Historical     Allergies   Allergen Reactions    Neulasta [Pegfilgrastim] Other (comments)     \"Put me in a comma for 3 months\"      Social History     Tobacco Use    Smoking status: Former Smoker     Packs/day: 0.25     Years: 30.00     Pack years: 7.50     Last attempt to quit: 2011     Years since quittin.8    Smokeless tobacco: Former User   Substance Use Topics    Alcohol use: Yes     Comment: 2 times yearly      Family History   Problem Relation Age of Onset    Cancer Mother         breast    Diabetes Mother     Heart Disease Father     Diabetes Father     Sickle Cell Anemia Brother       Review of Systems   Constitutional: Positive for chills and fever. Respiratory: Positive for cough and shortness of breath. Negative for chest tightness, wheezing and stridor. Cardiovascular: Negative for chest pain, palpitations and leg swelling. Gastrointestinal: Negative for abdominal distention, abdominal pain, anal bleeding, constipation, diarrhea, nausea and rectal pain. Musculoskeletal: Negative for back pain, gait problem, joint swelling and neck pain. Objective:    No intake/output data recorded. No intake/output data recorded.   Patient Vitals for the past 8 hrs:   BP Temp Pulse Resp SpO2   10/18/18 1548 (P) 129/79 (P) 97.4 °F (36.3 °C) (P) 87 (P) 19 (P) 100 %   10/18/18 1215 124/71 -- (!) 125 20 99 %   10/18/18 1045 130/65 -- (!) 107 19 100 %   10/18/18 1030 135/71 -- (!) 109 18 100 %   10/18/18 0945 144/70 100.1 °F (37.8 °C) (!) 107 21 100 %     Physical Exam   Constitutional: She appears well-nourished. No distress. Cardiovascular: Exam reveals no gallop and no friction rub. No murmur heard. Pulmonary/Chest: Effort normal and breath sounds normal. No respiratory distress. She has no wheezes. She has no rales. She exhibits no tenderness. Abdominal: She exhibits mass. There is no tenderness. Musculoskeletal: She exhibits deformity. She exhibits no edema. Skin: She is diaphoretic. Labs:    Recent Results (from the past 24 hour(s))   CBC WITH AUTOMATED DIFF    Collection Time: 10/18/18  2:09 AM   Result Value Ref Range    WBC 17.1 (H) 4.6 - 13.2 K/uL    RBC 2.45 (L) 4.20 - 5.30 M/uL    HGB 7.3 (L) 12.0 - 16.0 g/dL    HCT 20.5 (L) 35.0 - 45.0 %    MCV 83.7 74.0 - 97.0 FL    MCH 29.8 24.0 - 34.0 PG    MCHC 35.6 31.0 - 37.0 g/dL    RDW 15.6 (H) 11.6 - 14.5 %    PLATELET 33 (L) 047 - 420 K/uL    MPV 10.3 9.2 - 11.8 FL    NEUTROPHILS 78 (H) 42 - 75 %    BAND NEUTROPHILS 5 0 - 5 %    LYMPHOCYTES 12 (L) 20 - 51 %    MONOCYTES 5 2 - 9 %    EOSINOPHILS 0 0 - 5 %    BASOPHILS 0 0 - 3 %    ABS. NEUTROPHILS 14.1 (H) 1.8 - 8.0 K/UL    ABS. LYMPHOCYTES 2.1 0.8 - 3.5 K/UL    ABS. MONOCYTES 0.9 0 - 1.0 K/UL    ABS. EOSINOPHILS 0.0 0.0 - 0.4 K/UL    ABS.  BASOPHILS 0.0 0.0 - 0.06 K/UL    DF MANUAL      PLATELET COMMENTS DECREASED PLATELETS      RBC COMMENTS STOMATOCYTES  2+        RBC COMMENTS ANISOCYTOSIS  1+        RBC COMMENTS POLYCHROMASIA  1+        RBC COMMENTS TARGET CELLS  1+       CARDIAC PANEL,(CK, CKMB & TROPONIN)    Collection Time: 10/18/18  2:09 AM   Result Value Ref Range    CK 27 26 - 192 U/L    CK - MB <1.0 <3.6 ng/ml    CK-MB Index  0.0 - 4.0 %     CALCULATION NOT PERFORMED WHEN RESULT IS BELOW LINEAR LIMIT    Troponin-I, Qt. <0.02 0.0 - 0.045 NG/ML   METABOLIC PANEL, COMPREHENSIVE    Collection Time: 10/18/18  2:09 AM   Result Value Ref Range    Sodium 137 136 - 145 mmol/L    Potassium 3.8 3.5 - 5.5 mmol/L    Chloride 104 100 - 108 mmol/L    CO2 23 21 - 32 mmol/L    Anion gap 10 3.0 - 18 mmol/L    Glucose 162 (H) 74 - 99 mg/dL    BUN 20 (H) 7.0 - 18 MG/DL    Creatinine 1.38 (H) 0.6 - 1.3 MG/DL    BUN/Creatinine ratio 14 12 - 20      GFR est AA 48 (L) >60 ml/min/1.73m2    GFR est non-AA 40 (L) >60 ml/min/1.73m2    Calcium 8.3 (L) 8.5 - 10.1 MG/DL    Bilirubin, total 5.4 (H) 0.2 - 1.0 MG/DL    ALT (SGPT) 37 13 - 56 U/L    AST (SGOT) 74 (H) 15 - 37 U/L    Alk.  phosphatase 192 (H) 45 - 117 U/L    Protein, total 8.4 (H) 6.4 - 8.2 g/dL    Albumin 4.0 3.4 - 5.0 g/dL    Globulin 4.4 (H) 2.0 - 4.0 g/dL    A-G Ratio 0.9 0.8 - 1.7     PROTHROMBIN TIME + INR    Collection Time: 10/18/18  2:09 AM   Result Value Ref Range    Prothrombin time 15.4 (H) 11.5 - 15.2 sec    INR 1.2 0.8 - 1.2     NT-PRO BNP    Collection Time: 10/18/18  2:09 AM   Result Value Ref Range    NT pro- 0 - 900 PG/ML   MAGNESIUM    Collection Time: 10/18/18  2:09 AM   Result Value Ref Range    Magnesium 2.3 1.6 - 2.6 mg/dL   TYPE & SCREEN    Collection Time: 10/18/18  2:09 AM   Result Value Ref Range    Crossmatch Expiration 10/21/2018     ABO/Rh(D) [place] POSITIVE     Antibody screen POS     Antibody ID PENDING    RETICULOCYTE COUNT    Collection Time: 10/18/18  2:09 AM   Result Value Ref Range    Reticulocyte count 10.1 (H) 0.5 - 2.3 %   EKG, 12 LEAD, INITIAL    Collection Time: 10/18/18  2:09 AM   Result Value Ref Range    Ventricular Rate 95 BPM    Atrial Rate 95 BPM    P-R Interval 136 ms    QRS Duration 76 ms    Q-T Interval 354 ms    QTC Calculation (Bezet) 444 ms    Calculated P Axis 42 degrees    Calculated R Axis 14 degrees    Calculated T Axis 11 degrees    Diagnosis       Normal sinus rhythm  Moderate voltage criteria for LVH, may be normal variant  Borderline ECG  When compared with ECG of 26-JUN-2018 11:50,  Vent. rate has increased BY  32 BPM     POC G3    Collection Time: 10/18/18  4:02 AM   Result Value Ref Range    Device: ROOM AIR      FIO2 (POC) 21 %    pH (POC) 7.402 7.35 - 7.45      pCO2 (POC) 36.5 35.0 - 45.0 MMHG    pO2 (POC) 69 (L) 80 - 100 MMHG    HCO3 (POC) 22.7 22 - 26 MMOL/L    sO2 (POC) 94 92 - 97 %    Base deficit (POC) 2 mmol/L    Allens test (POC) YES      Total resp. rate 17      Site RIGHT RADIAL      Patient temp.  98.6      Specimen type (POC) ARTERIAL      Performed by [employer]    CULTURE, BLOOD    Collection Time: 10/18/18  4:35 AM   Result Value Ref Range    Special Requests: NO SPECIAL REQUESTS      Culture result: NO GROWTH AFTER 2 HOURS     POC LACTIC ACID    Collection Time: 10/18/18  4:49 AM   Result Value Ref Range    Lactic Acid (POC) 1.3 0.4 - 2.0 mmol/L   CULTURE, BLOOD    Collection Time: 10/18/18  5:20 AM   Result Value Ref Range    Special Requests: NO SPECIAL REQUESTS      Culture result: NO GROWTH AFTER 2 HOURS     INFLUENZA A & B AG (RAPID TEST)    Collection Time: 10/18/18  7:40 AM   Result Value Ref Range    Influenza A Antigen NEGATIVE  NEG      Influenza B Antigen NEGATIVE  NEG     URINALYSIS W/ RFLX MICROSCOPIC    Collection Time: 10/18/18  9:30 AM   Result Value Ref Range    Color DARK YELLOW      Appearance CLEAR      Specific gravity 1.016 1.005 - 1.030      pH (UA) 5.5 5.0 - 8.0      Protein TRACE (A) NEG mg/dL    Glucose NEGATIVE  NEG mg/dL    Ketone NEGATIVE  NEG mg/dL    Bilirubin SMALL (A) NEG      Blood NEGATIVE  NEG      Urobilinogen 1.0 0.2 - 1.0 EU/dL    Nitrites NEGATIVE  NEG      Leukocyte Esterase TRACE (A) NEG     URINE MICROSCOPIC ONLY    Collection Time: 10/18/18  9:30 AM   Result Value Ref Range    WBC 0 to 3 0 - 4 /hpf    RBC 0 to 3 0 - 5 /hpf    Epithelial cells 1+ 0 - 5 /lpf    Bacteria 1+ (A) NEG /hpf   LACTIC ACID    Collection Time: 10/18/18  4:10 PM   Result Value Ref Range    Lactic acid 0.8 0.4 - 2.0 MMOL/L METABOLIC PANEL, COMPREHENSIVE    Collection Time: 10/18/18  4:20 PM   Result Value Ref Range    Sodium 139 136 - 145 mmol/L    Potassium 4.0 3.5 - 5.5 mmol/L    Chloride 107 100 - 108 mmol/L    CO2 22 21 - 32 mmol/L    Anion gap 10 3.0 - 18 mmol/L    Glucose 113 (H) 74 - 99 mg/dL    BUN 13 7.0 - 18 MG/DL    Creatinine 1.07 0.6 - 1.3 MG/DL    BUN/Creatinine ratio 12 12 - 20      GFR est AA >60 >60 ml/min/1.73m2    GFR est non-AA 54 (L) >60 ml/min/1.73m2    Calcium 8.2 (L) 8.5 - 10.1 MG/DL    Bilirubin, total 4.4 (H) 0.2 - 1.0 MG/DL    ALT (SGPT) 29 13 - 56 U/L    AST (SGOT) 51 (H) 15 - 37 U/L    Alk. phosphatase 157 (H) 45 - 117 U/L    Protein, total 7.2 6.4 - 8.2 g/dL    Albumin 3.2 (L) 3.4 - 5.0 g/dL    Globulin 4.0 2.0 - 4.0 g/dL    A-G Ratio 0.8 0.8 - 1.7     CBC WITH AUTOMATED DIFF    Collection Time: 10/18/18  4:20 PM   Result Value Ref Range    WBC 13.8 (H) 4.6 - 13.2 K/uL    RBC 2.10 (L) 4.20 - 5.30 M/uL    HGB 6.1 (L) 12.0 - 16.0 g/dL    HCT 16.9 (LL) 35.0 - 45.0 %    MCV 81.0 74.0 - 97.0 FL    MCH 29.0 24.0 - 34.0 PG    MCHC 35.9 31.0 - 37.0 g/dL    RDW 16.2 (H) 11.6 - 14.5 %    PLATELET 28 (LL) 976 - 420 K/uL    MPV 9.7 9.2 - 11.8 FL    NEUTROPHILS PENDING %    LYMPHOCYTES PENDING %    MONOCYTES PENDING %    EOSINOPHILS PENDING %    BASOPHILS PENDING %    ABS. NEUTROPHILS PENDING K/UL    ABS. LYMPHOCYTES PENDING K/UL    ABS. MONOCYTES PENDING K/UL    ABS. EOSINOPHILS PENDING K/UL    ABS.  BASOPHILS PENDING K/UL    DF PENDING        ECG: sinus tachycardia   Chest X-Ray: normal    Assessment:  Active Problems:    Shortness of breath   Sepsis due to Pneumonia    Post influenza Pneumonia with no radiologic evidence    Acute hemolytic sickle cell crisis   Thrombocytopenia      Plan:  Admit patient to the hospital for management of sepsis  - started on [employer] Abx: vanc zosyn and Levaquin   - follow blood culture  Closely monitor for acute chest syndrome noted drop in HGB and Platelet: monitor closely and hold  transfusion for now   Continue: folic acid   -continue oxygen supplementation   Tylenol for fever       Diet: regular   Code: full Code  DVT PPX: SCD, has sever thrombocytopenia     Signed:  Lidia Restrepo MD 10/18/2018

## 2018-10-18 NOTE — PROGRESS NOTES
7: 17 AM :Pt care assumed from Dr. David Nicholas, ED provider. Pt complaint(s), current treatment plan, progression and available diagnostic results have been discussed thoroughly. Rounding occurred: yes  Intended Disposition: Admit    2:17 PM  Reviewed CT findings. No evidence of PE. Due to SIRS with unclear source in patient with sickle cell disease, will admit for further management. Discussed with Dr. Herlinda Carr, hospitalist, accepts admission.   CT imaging delayed due to patient being very difficult IV access

## 2018-10-18 NOTE — ED NOTES
4:38 AM :Pt care assumed from Lizzie Pettit , ED provider. Pt complaint(s), current treatment plan, progression and available diagnostic results have been discussed thoroughly. Rounding occurred: yes  Intended Disposition: TBD  Pending diagnostic reports and/or labs (please list): Pending CTA Chest    7:00 AM : Pt care transferred to Dr. Nae Curtis  ,ED provider. History of patient complaint(s), available diagnostic reports and current treatment plan has been discussed thoroughly. Bedside rounding on patient occured : yes . Intended disposition of patient :TBD  Pending diagnostics reports and/or labs (please list): Pending CTA chest results      Scribe [id number] acting as a scribe for and in the presence of Dates, Alston Curling, MD      October 18, 2018 at 4:38 AM       Provider Attestation:      I personally performed the services described in the documentation, reviewed the documentation, as recorded by the scribe in my presence, and it accurately and completely records my words and actions.  October 18, 2018 at 4:38 AM - Dates, Alston Curling, MD

## 2018-10-19 LAB
ALBUMIN SERPL-MCNC: 3.1 G/DL (ref 3.4–5)
ALBUMIN/GLOB SERPL: 0.8 {RATIO} (ref 0.8–1.7)
ALP SERPL-CCNC: 151 U/L (ref 45–117)
ALT SERPL-CCNC: 26 U/L (ref 13–56)
ANION GAP SERPL CALC-SCNC: 6 MMOL/L (ref 3–18)
AST SERPL-CCNC: 56 U/L (ref 15–37)
ATRIAL RATE: 95 BPM
BACTERIA SPEC CULT: NORMAL
BASOPHILS # BLD: 0 K/UL (ref 0–0.06)
BASOPHILS NFR BLD: 0 % (ref 0–3)
BILIRUB SERPL-MCNC: 5.2 MG/DL (ref 0.2–1)
BUN SERPL-MCNC: 12 MG/DL (ref 7–18)
BUN/CREAT SERPL: 11 (ref 12–20)
CALCIUM SERPL-MCNC: 8.1 MG/DL (ref 8.5–10.1)
CALCULATED P AXIS, ECG09: 42 DEGREES
CALCULATED R AXIS, ECG10: 14 DEGREES
CALCULATED T AXIS, ECG11: 11 DEGREES
CHLORIDE SERPL-SCNC: 110 MMOL/L (ref 100–108)
CO2 SERPL-SCNC: 24 MMOL/L (ref 21–32)
CREAT SERPL-MCNC: 1.12 MG/DL (ref 0.6–1.3)
DIAGNOSIS, 93000: NORMAL
DIFFERENTIAL METHOD BLD: ABNORMAL
EOSINOPHIL # BLD: 0.2 K/UL (ref 0–0.4)
EOSINOPHIL NFR BLD: 1 % (ref 0–5)
ERYTHROCYTE [DISTWIDTH] IN BLOOD BY AUTOMATED COUNT: 16.4 % (ref 11.6–14.5)
GLOBULIN SER CALC-MCNC: 3.8 G/DL (ref 2–4)
GLUCOSE SERPL-MCNC: 109 MG/DL (ref 74–99)
HCT VFR BLD AUTO: 13.7 % (ref 35–45)
HGB BLD-MCNC: 4.8 G/DL (ref 12–16)
LYMPHOCYTES # BLD: 2.8 K/UL (ref 0.8–3.5)
LYMPHOCYTES NFR BLD: 14 % (ref 20–51)
MCH RBC QN AUTO: 29.6 PG (ref 24–34)
MCHC RBC AUTO-ENTMCNC: 35 G/DL (ref 31–37)
MCV RBC AUTO: 84.6 FL (ref 74–97)
METAMYELOCYTES NFR BLD MANUAL: 1 %
MONOCYTES # BLD: 1 K/UL (ref 0–1)
MONOCYTES NFR BLD: 5 % (ref 2–9)
NEUTS BAND NFR BLD MANUAL: 7 % (ref 0–5)
NEUTS SEG # BLD: 16 K/UL (ref 1.8–8)
NEUTS SEG NFR BLD: 72 % (ref 42–75)
NRBC BLD-RTO: 2 PER 100 WBC
P-R INTERVAL, ECG05: 136 MS
PLATELET # BLD AUTO: 35 K/UL (ref 135–420)
PLATELET COMMENTS,PCOM: ABNORMAL
PMV BLD AUTO: 10.3 FL (ref 9.2–11.8)
POTASSIUM SERPL-SCNC: 4.2 MMOL/L (ref 3.5–5.5)
PROT SERPL-MCNC: 6.9 G/DL (ref 6.4–8.2)
Q-T INTERVAL, ECG07: 354 MS
QRS DURATION, ECG06: 76 MS
QTC CALCULATION (BEZET), ECG08: 444 MS
RBC # BLD AUTO: 1.62 M/UL (ref 4.2–5.3)
RBC MORPH BLD: ABNORMAL
SERVICE CMNT-IMP: NORMAL
SODIUM SERPL-SCNC: 140 MMOL/L (ref 136–145)
VENTRICULAR RATE, ECG03: 95 BPM
WBC # BLD AUTO: 20.2 K/UL (ref 4.6–13.2)

## 2018-10-19 PROCEDURE — 74011250636 HC RX REV CODE- 250/636: Performed by: INTERNAL MEDICINE

## 2018-10-19 PROCEDURE — 86921 COMPATIBILITY TEST INCUBATE: CPT | Performed by: EMERGENCY MEDICINE

## 2018-10-19 PROCEDURE — 85025 COMPLETE CBC W/AUTO DIFF WBC: CPT | Performed by: INTERNAL MEDICINE

## 2018-10-19 PROCEDURE — 80053 COMPREHEN METABOLIC PANEL: CPT | Performed by: INTERNAL MEDICINE

## 2018-10-19 PROCEDURE — 86902 BLOOD TYPE ANTIGEN DONOR EA: CPT | Performed by: EMERGENCY MEDICINE

## 2018-10-19 PROCEDURE — 86922 COMPATIBILITY TEST ANTIGLOB: CPT | Performed by: EMERGENCY MEDICINE

## 2018-10-19 PROCEDURE — 36430 TRANSFUSION BLD/BLD COMPNT: CPT

## 2018-10-19 PROCEDURE — P9040 RBC LEUKOREDUCED IRRADIATED: HCPCS | Performed by: EMERGENCY MEDICINE

## 2018-10-19 PROCEDURE — 36415 COLL VENOUS BLD VENIPUNCTURE: CPT | Performed by: INTERNAL MEDICINE

## 2018-10-19 PROCEDURE — 65660000000 HC RM CCU STEPDOWN

## 2018-10-19 PROCEDURE — 74011000258 HC RX REV CODE- 258: Performed by: NURSE PRACTITIONER

## 2018-10-19 PROCEDURE — 30233N1 TRANSFUSION OF NONAUTOLOGOUS RED BLOOD CELLS INTO PERIPHERAL VEIN, PERCUTANEOUS APPROACH: ICD-10-PCS | Performed by: HOSPITALIST

## 2018-10-19 PROCEDURE — 85660 RBC SICKLE CELL TEST: CPT | Performed by: EMERGENCY MEDICINE

## 2018-10-19 PROCEDURE — 74011250636 HC RX REV CODE- 250/636: Performed by: NURSE PRACTITIONER

## 2018-10-19 PROCEDURE — 74011250636 HC RX REV CODE- 250/636: Performed by: EMERGENCY MEDICINE

## 2018-10-19 PROCEDURE — 74011250637 HC RX REV CODE- 250/637: Performed by: INTERNAL MEDICINE

## 2018-10-19 PROCEDURE — 77030011254 HC DRSG HYDRGEL S&N -A

## 2018-10-19 RX ORDER — LEVOTHYROXINE AND LIOTHYRONINE 19; 4.5 UG/1; UG/1
30 TABLET ORAL DAILY
Status: DISCONTINUED | OUTPATIENT
Start: 2018-10-19 | End: 2018-10-22 | Stop reason: HOSPADM

## 2018-10-19 RX ORDER — SODIUM CHLORIDE 9 MG/ML
250 INJECTION, SOLUTION INTRAVENOUS AS NEEDED
Status: DISCONTINUED | OUTPATIENT
Start: 2018-10-19 | End: 2018-10-21 | Stop reason: SDUPTHER

## 2018-10-19 RX ORDER — DIPHENHYDRAMINE HYDROCHLORIDE 50 MG/ML
50 INJECTION, SOLUTION INTRAMUSCULAR; INTRAVENOUS
Status: ACTIVE | OUTPATIENT
Start: 2018-10-19 | End: 2018-10-20

## 2018-10-19 RX ORDER — METOPROLOL TARTRATE 25 MG/1
25 TABLET, FILM COATED ORAL EVERY 12 HOURS
Status: DISCONTINUED | OUTPATIENT
Start: 2018-10-19 | End: 2018-10-21

## 2018-10-19 RX ORDER — VANCOMYCIN HYDROCHLORIDE
1250 EVERY 12 HOURS
Status: DISCONTINUED | OUTPATIENT
Start: 2018-10-19 | End: 2018-10-21

## 2018-10-19 RX ORDER — HYDROMORPHONE HYDROCHLORIDE 2 MG/1
2-4 TABLET ORAL
Qty: 40 TAB | Refills: 0 | Status: SHIPPED | OUTPATIENT
Start: 2018-10-19 | End: 2019-04-04 | Stop reason: SDUPTHER

## 2018-10-19 RX ORDER — OXYCODONE HCL 20 MG/1
20 TABLET, FILM COATED, EXTENDED RELEASE ORAL 2 TIMES DAILY
Status: DISCONTINUED | OUTPATIENT
Start: 2018-10-19 | End: 2018-10-22 | Stop reason: HOSPADM

## 2018-10-19 RX ORDER — SODIUM CHLORIDE 9 MG/ML
250 INJECTION, SOLUTION INTRAVENOUS AS NEEDED
Status: DISCONTINUED | OUTPATIENT
Start: 2018-10-19 | End: 2018-10-19

## 2018-10-19 RX ADMIN — HYDROMORPHONE HYDROCHLORIDE 4 MG: 2 TABLET ORAL at 00:05

## 2018-10-19 RX ADMIN — LEVOTHYROXINE, LIOTHYRONINE 30 MG: 19; 4.5 TABLET ORAL at 08:14

## 2018-10-19 RX ADMIN — SODIUM CHLORIDE 4.5 G: 900 INJECTION, SOLUTION INTRAVENOUS at 17:18

## 2018-10-19 RX ADMIN — SODIUM CHLORIDE 4.5 G: 900 INJECTION, SOLUTION INTRAVENOUS at 03:43

## 2018-10-19 RX ADMIN — VANCOMYCIN HYDROCHLORIDE 1750 MG: 10 INJECTION, POWDER, LYOPHILIZED, FOR SOLUTION INTRAVENOUS at 08:14

## 2018-10-19 RX ADMIN — METOPROLOL TARTRATE 25 MG: 25 TABLET ORAL at 22:37

## 2018-10-19 RX ADMIN — SODIUM CHLORIDE 100 ML/HR: 900 INJECTION, SOLUTION INTRAVENOUS at 04:26

## 2018-10-19 RX ADMIN — HYDROMORPHONE HYDROCHLORIDE 4 MG: 2 TABLET ORAL at 04:26

## 2018-10-19 RX ADMIN — LEVOFLOXACIN 750 MG: 5 INJECTION, SOLUTION INTRAVENOUS at 04:25

## 2018-10-19 RX ADMIN — FOLIC ACID 1 MG: 1 TABLET ORAL at 08:14

## 2018-10-19 RX ADMIN — OXYCODONE HYDROCHLORIDE 20 MG: 20 TABLET, FILM COATED, EXTENDED RELEASE ORAL at 17:18

## 2018-10-19 RX ADMIN — SODIUM CHLORIDE 4.5 G: 900 INJECTION, SOLUTION INTRAVENOUS at 11:55

## 2018-10-19 RX ADMIN — VANCOMYCIN HYDROCHLORIDE 1250 MG: 10 INJECTION, POWDER, LYOPHILIZED, FOR SOLUTION INTRAVENOUS at 21:00

## 2018-10-19 RX ADMIN — OXYCODONE HYDROCHLORIDE 20 MG: 20 TABLET, FILM COATED, EXTENDED RELEASE ORAL at 08:14

## 2018-10-19 RX ADMIN — SODIUM CHLORIDE 4.5 G: 900 INJECTION, SOLUTION INTRAVENOUS at 22:38

## 2018-10-19 NOTE — PROGRESS NOTES
Dr. Audra Willis paged at [phone] and [phone]: need signature for patient to receive blood:

## 2018-10-19 NOTE — PROGRESS NOTES
Dr. Cortez: patient requesting her blood pressure medication     Patient is verbally aggressive towards nurse. Stated to leave the door open, then became upset that the door was open when she needed to go to the [facility].  Using profound language and taking pictures

## 2018-10-19 NOTE — PROGRESS NOTES
Problem: Discharge Planning  Goal: *Discharge to safe environment  Outcome: Resolved/Met Date Met: 10/19/18  PLAN : Home with spouse

## 2018-10-19 NOTE — PROGRESS NOTES
Problem: Falls - Risk of  Goal: *Absence of Falls  Document Jasmyn Fall Risk and appropriate interventions in the flowsheet.   Fall Risk Interventions:            Medication Interventions: Patient to call before getting OOB, Teach patient to arise slowly

## 2018-10-19 NOTE — PROGRESS NOTES
Notified Dr. Linda Higgins of H/H, reviewed current labs. Order placed to transfuse 1 unit PRBC. Concern for Dilution, Dr. Linda Higgins said to make sure  Recheck away for IVF. Advised lab said still waiting for unit to arrive from [facility]. Dr. Linda Higgins placed order to transfuse 1 unit and keep one unit ahead.

## 2018-10-19 NOTE — PROGRESS NOTES
Patient has designated her spouse, Susanne Nicholson  to participate in his/her discharge plan and to receive any needed information. Phone number:  [phone]    Reason for Admission:   Sepsis                  RRAT Score:     14/yellow             Do you (patient/family) have any concerns for   No transition/discharge? Plan for utilizing home health:   Offered. Pt declined    Likelihood of readmission? Low            Transition of Care Plan:     Home with spouse    Verified face sheet and corrections are as follows: Insurance: [employer] is primary as spouse is still working. Medicare is secondary    Patient states the following:  - Lives with spouse  -Ambulates independently, has walker if needed. Can do Upper Body bathing dressing etc. SPouse assists with Lower body care,dressing.  Spouse cooks,cleans, transports  -[facility]: walker, BSC  -Had recent home health, doesn't feel she will need again    Care Management Interventions  PCP Verified by CM: Yes(Dr Flaherty (Last seen 5 weeks ago))  Mode of Transport at Discharge: Self(Spouse will transport home)  Transition of Care Consult (CM Consult): Discharge Planning  Current Support Network: Lives with Spouse  Confirm Follow Up Transport: Family  Plan discussed with Pt/Family/Caregiver: Yes  Discharge Location  Discharge Placement: Home

## 2018-10-19 NOTE — PROGRESS NOTES
conducted an initial consultation and Spiritual Assessment for Madeline Zambrano, who is a 48 y.o.,female. Patients Primary Language is: [place]. According to the patients EMR [place] Affiliation is: [place].     The reason the Patient came to the hospital is:   Patient Active Problem List    Diagnosis Date Noted    Shortness of breath 10/18/2018    Osteoarthritis of right knee 06/28/2018    Leukocytosis 02/20/2017    Osteoarthritis of right hip 01/03/2017    Choledocholithiasis 11/07/2016    Cholecystitis with cholelithiasis 11/07/2016    Thrombocytopenia ([address] 08/22/2016    Acute blood loss anemia 08/22/2016    Osteoarthritis of left hip 08/17/2016    Breast cancer of lower-outer quadrant of left female breast ([facility] [address] 07/01/2016    Iron deficiency anemia due to dietary causes 07/01/2016    Osteoarthritis 04/08/2016    Vitamin D deficiency 05/19/2015    Sickle cell crisis ([facility] 08/24/2013    Fibromyalgia     Hx of endometriosis     Dehydration 11/16/2012    Symptomatic anemia 08/20/2012    Breast cancer ([facility] 08/20/2012    Sickle cell anemia ([facility] [facility] 08/20/2012        The  provided the following Interventions:  Initiated a relationship of care and support. Provided information about Spiritual Care Services. Chart reviewed. The following outcomes where achieved:  Patient expressed gratitude for 's visit. Assessment:  Patient does not have any Hinduism/cultural needs that will affect patients preferences in health care. There are no spiritual or Hinduism issues which require intervention at this time. Plan:  Chaplains will continue to follow and will provide pastoral care on an as needed/requested basis.  recommends bedside caregivers page  on duty if patient shows signs of acute spiritual or emotional distress.     [address]  [phone])

## 2018-10-19 NOTE — NURSE NAVIGATOR
Important Message from [facility]" reviewed and explained with the patient and/or representative at bedside and a verbal signature was obtained (she stated that her hands were covered with feces- nursing staff aware). A signed copy provided to patient/representative. Original signed document placed in patient's chart.

## 2018-10-19 NOTE — PROGRESS NOTES
[facility] Hospitalist Group  Progress Note    Patient: Danny Sanchez Age: 48 y.o. : 1965 MR#: [id number] SSN: [id number]  Date/Time: 10/19/2018     Subjective: Increased SOB and fatigue   No fever,  Assessment/Plan:       Shortness of breath due to Pneumonia and Sever Anemia    Sepsis due to Pneumonia    Post influenza Pneumonia with no radiologic evidence    Acute hemolytic sickle cell crisis with worsening anemia: current HGB is 4.8   Thrombocytopenia stabel around 30K    Continue: : fe hernandez, JOVANNI Levaquin   blood culture No growth to dateTansfusion ordered: patient has non ABO cross reactivity: will try to transfuse with less reactive donener  Continue: folic acid   continue oxygen supplementation   Tylenol for fever    solumendrol and Benadryl on call for reaction      Diet: regular   Code: full Code      I spent 60 minutes with the patient in face-to-face consultation, of which greater than 50% was spent in counseling and coordination of care as described above. Case discussed with:  []Patient  [x]Family  [x]Nursing  []Case Management  DVT Prophylaxis:  []Lovenox  []Hep SQ  [x]SCDs  []Coumadin   []On Heparin gtt due to thrombocytopenia 3    Objective:   VS:   Visit Vitals  /76 (BP 1 Location: Right arm, BP Patient Position: At rest)   Pulse 98   Temp 98.3 °F (36.8 °C)   Resp 18   Ht 5' 6.14\" (1.68 m)   Wt 99.6 kg (219 lb 9.6 oz)   SpO2 97%   BMI 35.29 kg/m²      Tmax/24hrs: Temp (24hrs), Av.6 °F (37 °C), Min:97.4 °F (36.3 °C), Max:99.5 °F (37.5 °C)  IOBRIEF    Intake/Output Summary (Last 24 hours) at 10/19/2018 1442  Last data filed at 10/19/2018 0609  Gross per 24 hour   Intake 2203.33 ml   Output 1025 ml   Net 1178.33 ml       General:  Alert, cooperative, no acute distress    HEENT: PERRLA, anicteric sclerae. Pulmonary:  CTA Bilaterally. No Wheezing/Rhonchi/Rales. Cardiovascular: Regular rate and Rhythm.   GI:  Soft, Non distended, Non tender. + Bowel sounds. Extremities:  No edema, cyanosis, clubbing. No calf tenderness. Psych: Good insight. Not anxious or agitated. Neurologic: Alert and oriented X 3. No acute neuro deficits.   Additional:    Medications:   Current Facility-Administered Medications   Medication Dose Route Frequency    naloxegol (MOVANTIK) tablet 12.5 mg  12.5 mg Oral DAILY    oxyCODONE ER (OxyCONTIN) tablet 20 mg  20 mg Oral BID    thyroid (Pork) (ARMOUR) tablet 30 mg  30 mg Oral DAILY    0.9% sodium chloride infusion 250 mL  250 mL IntraVENous PRN    metoprolol tartrate (LOPRESSOR) tablet 25 mg  25 mg Oral Q12H    vancomycin (VANCOCIN) 1250 mg in  ml infusion  1,250 mg IntraVENous Q12H    methylPREDNISolone (PF) (SOLU-MEDROL) injection 125 mg  125 mg IntraVENous ONCE PRN    diphenhydrAMINE (BENADRYL) injection 50 mg  50 mg IntraVENous ONCE PRN    sodium chloride (NS) flush 5-10 mL  5-10 mL IntraVENous PRN    piperacillin-tazobactam (ZOSYN) 4.5 g in 0.9% sodium chloride (MBP/ADV) 100 mL ADV  4.5 g IntraVENous Q6H    levoFLOXacin (LEVAQUIN) 750 mg in D5W IVPB  750 mg IntraVENous Q24H    ergocalciferol capsule 50,000 Units  50,000 Units Oral E6U    folic acid (FOLVITE) tablet 1 mg  1 mg Oral DAILY    HYDROmorphone (DILAUDID) tablet 2-4 mg  2-4 mg Oral Q4H PRN    sodium chloride (NS) flush 5-10 mL  5-10 mL IntraVENous PRN    0.9% sodium chloride infusion  100 mL/hr IntraVENous CONTINUOUS    temazepam (RESTORIL) capsule 7.5 mg  7.5 mg Oral QHS PRN       Labs:    Recent Results (from the past 24 hour(s))   LACTIC ACID    Collection Time: 10/18/18  4:10 PM   Result Value Ref Range    Lactic acid 0.8 0.4 - 2.0 MMOL/L   METABOLIC PANEL, COMPREHENSIVE    Collection Time: 10/18/18  4:20 PM   Result Value Ref Range    Sodium 139 136 - 145 mmol/L    Potassium 4.0 3.5 - 5.5 mmol/L    Chloride 107 100 - 108 mmol/L    CO2 22 21 - 32 mmol/L    Anion gap 10 3.0 - 18 mmol/L    Glucose 113 (H) 74 - 99 mg/dL    BUN 13 7.0 - 18 MG/DL Creatinine 1.07 0.6 - 1.3 MG/DL    BUN/Creatinine ratio 12 12 - 20      GFR est AA >60 >60 ml/min/1.73m2    GFR est non-AA 54 (L) >60 ml/min/1.73m2    Calcium 8.2 (L) 8.5 - 10.1 MG/DL    Bilirubin, total 4.4 (H) 0.2 - 1.0 MG/DL    ALT (SGPT) 29 13 - 56 U/L    AST (SGOT) 51 (H) 15 - 37 U/L    Alk. phosphatase 157 (H) 45 - 117 U/L    Protein, total 7.2 6.4 - 8.2 g/dL    Albumin 3.2 (L) 3.4 - 5.0 g/dL    Globulin 4.0 2.0 - 4.0 g/dL    A-G Ratio 0.8 0.8 - 1.7     CBC WITH AUTOMATED DIFF    Collection Time: 10/18/18  4:20 PM   Result Value Ref Range    WBC 13.8 (H) 4.6 - 13.2 K/uL    RBC 2.10 (L) 4.20 - 5.30 M/uL    HGB 6.1 (L) 12.0 - 16.0 g/dL    HCT 16.9 (LL) 35.0 - 45.0 %    MCV 81.0 74.0 - 97.0 FL    MCH 29.0 24.0 - 34.0 PG    MCHC 35.9 31.0 - 37.0 g/dL    RDW 16.2 (H) 11.6 - 14.5 %    PLATELET 28 (LL) 997 - 420 K/uL    MPV 9.7 9.2 - 11.8 FL    NEUTROPHILS 64 42 - 75 %    BAND NEUTROPHILS 8 (H) 0 - 5 %    LYMPHOCYTES 21 20 - 51 %    MONOCYTES 7 2 - 9 %    EOSINOPHILS 0 0 - 5 %    BASOPHILS 0 0 - 3 %    ABS. NEUTROPHILS 9.9 (H) 1.8 - 8.0 K/UL    ABS. LYMPHOCYTES 2.9 0.8 - 3.5 K/UL    ABS. MONOCYTES 1.0 0 - 1.0 K/UL    ABS. EOSINOPHILS 0.0 0.0 - 0.4 K/UL    ABS.  BASOPHILS 0.0 0.0 - 0.06 K/UL    DF MANUAL      PLATELET COMMENTS DECREASED PLATELETS      RBC COMMENTS ANISOCYTOSIS  1+        RBC COMMENTS TARGET CELLS  1+       URINALYSIS W/ RFLX MICROSCOPIC    Collection Time: 10/18/18  8:00 PM   Result Value Ref Range    Color YELLOW      Appearance CLEAR      Specific gravity 1.026 1.003 - 1.030      pH (UA) 5.5 5.0 - 8.0      Protein NEGATIVE  NEG mg/dL    Glucose NEGATIVE  NEG mg/dL    Ketone NEGATIVE  NEG mg/dL    Bilirubin NEGATIVE  NEG      Blood NEGATIVE  NEG      Urobilinogen 1.0 0.2 - 1.0 EU/dL    Nitrites NEGATIVE  NEG      Leukocyte Esterase NEGATIVE  NEG     METABOLIC PANEL, COMPREHENSIVE    Collection Time: 10/19/18  2:15 AM   Result Value Ref Range    Sodium 140 136 - 145 mmol/L    Potassium 4.2 3.5 - 5.5 mmol/L    Chloride 110 (H) 100 - 108 mmol/L    CO2 24 21 - 32 mmol/L    Anion gap 6 3.0 - 18 mmol/L    Glucose 109 (H) 74 - 99 mg/dL    BUN 12 7.0 - 18 MG/DL    Creatinine 1.12 0.6 - 1.3 MG/DL    BUN/Creatinine ratio 11 (L) 12 - 20      GFR est AA >60 >60 ml/min/1.73m2    GFR est non-AA 51 (L) >60 ml/min/1.73m2    Calcium 8.1 (L) 8.5 - 10.1 MG/DL    Bilirubin, total 5.2 (H) 0.2 - 1.0 MG/DL    ALT (SGPT) 26 13 - 56 U/L    AST (SGOT) 56 (H) 15 - 37 U/L    Alk. phosphatase 151 (H) 45 - 117 U/L    Protein, total 6.9 6.4 - 8.2 g/dL    Albumin 3.1 (L) 3.4 - 5.0 g/dL    Globulin 3.8 2.0 - 4.0 g/dL    A-G Ratio 0.8 0.8 - 1.7     CBC WITH AUTOMATED DIFF    Collection Time: 10/19/18  2:15 AM   Result Value Ref Range    WBC 20.2 (H) 4.6 - 13.2 K/uL    RBC 1.62 (L) 4.20 - 5.30 M/uL    HGB 4.8 (LL) 12.0 - 16.0 g/dL    HCT 13.7 (LL) 35.0 - 45.0 %    MCV 84.6 74.0 - 97.0 FL    MCH 29.6 24.0 - 34.0 PG    MCHC 35.0 31.0 - 37.0 g/dL    RDW 16.4 (H) 11.6 - 14.5 %    PLATELET 35 (L) 636 - 420 K/uL    MPV 10.3 9.2 - 11.8 FL    NEUTROPHILS 72 42 - 75 %    BAND NEUTROPHILS 7 (H) 0 - 5 %    LYMPHOCYTES 14 (L) 20 - 51 %    MONOCYTES 5 2 - 9 %    EOSINOPHILS 1 0 - 5 %    BASOPHILS 0 0 - 3 %    METAMYELOCYTES 1 (H) 0 %    NRBC 2.0 (H) 0  WBC    ABS. NEUTROPHILS 16.0 (H) 1.8 - 8.0 K/UL    ABS. LYMPHOCYTES 2.8 0.8 - 3.5 K/UL    ABS. MONOCYTES 1.0 0 - 1.0 K/UL    ABS. EOSINOPHILS 0.2 0.0 - 0.4 K/UL    ABS.  BASOPHILS 0.0 0.0 - 0.06 K/UL    DF MANUAL      PLATELET COMMENTS DECREASED PLATELETS      RBC COMMENTS ANISOCYTOSIS  1+        RBC COMMENTS POLYCHROMASIA  1+        RBC COMMENTS TARGET CELLS  1+        RBC COMMENTS STOMATOCYTES  1+           Signed By: Carson Johns MD     October 19, 2018

## 2018-10-19 NOTE — PROGRESS NOTES
Tx sepsis/CAP  Day of therapy: 2  Leukocytosis, afebrile, VSS  Blood and urine Cx - no growth to date    ABx: vanc + levofloxacin + piperacillin-tazobactam    Levofloxacin is being used for double Pseudomonas coverage. Per [facility] 2017 antibiogram, 96% of Pseudomonas were sensitive to piperacillin-tazobactam but only 66% were sensitive to levofloxacin. Recommend to DC levofloxacin.

## 2018-10-20 LAB
ANION GAP SERPL CALC-SCNC: 6 MMOL/L (ref 3–18)
BASOPHILS # BLD: 0 K/UL (ref 0–0.06)
BASOPHILS NFR BLD: 0 % (ref 0–3)
BUN SERPL-MCNC: 9 MG/DL (ref 7–18)
BUN/CREAT SERPL: 9 (ref 12–20)
CALCIUM SERPL-MCNC: 7.8 MG/DL (ref 8.5–10.1)
CHLORIDE SERPL-SCNC: 111 MMOL/L (ref 100–108)
CO2 SERPL-SCNC: 23 MMOL/L (ref 21–32)
CREAT SERPL-MCNC: 0.98 MG/DL (ref 0.6–1.3)
DATE LAST DOSE: NORMAL
DIFFERENTIAL METHOD BLD: ABNORMAL
EOSINOPHIL # BLD: 0 K/UL (ref 0–0.4)
EOSINOPHIL NFR BLD: 0 % (ref 0–5)
ERYTHROCYTE [DISTWIDTH] IN BLOOD BY AUTOMATED COUNT: 16.8 % (ref 11.6–14.5)
GLUCOSE SERPL-MCNC: 91 MG/DL (ref 74–99)
HCT VFR BLD AUTO: 18.5 % (ref 35–45)
HGB BLD-MCNC: 6.5 G/DL (ref 12–16)
LYMPHOCYTES # BLD: 3.1 K/UL (ref 0.8–3.5)
LYMPHOCYTES NFR BLD: 20 % (ref 20–51)
MCH RBC QN AUTO: 28.4 PG (ref 24–34)
MCHC RBC AUTO-ENTMCNC: 35.1 G/DL (ref 31–37)
MCV RBC AUTO: 80.8 FL (ref 74–97)
MONOCYTES # BLD: 0.6 K/UL (ref 0–1)
MONOCYTES NFR BLD: 4 % (ref 2–9)
NEUTS BAND NFR BLD MANUAL: 1 % (ref 0–5)
NEUTS SEG # BLD: 11.8 K/UL (ref 1.8–8)
NEUTS SEG NFR BLD: 75 % (ref 42–75)
NRBC BLD-RTO: 18 PER 100 WBC
PLATELET # BLD AUTO: 39 K/UL (ref 135–420)
PLATELET COMMENTS,PCOM: ABNORMAL
PMV BLD AUTO: 10.6 FL (ref 9.2–11.8)
POTASSIUM SERPL-SCNC: 3.5 MMOL/L (ref 3.5–5.5)
RBC # BLD AUTO: 2.29 M/UL (ref 4.2–5.3)
RBC MORPH BLD: ABNORMAL
REPORTED DOSE,DOSE: NORMAL UNITS
REPORTED DOSE/TIME,TMG: 830
SODIUM SERPL-SCNC: 140 MMOL/L (ref 136–145)
VANCOMYCIN TROUGH SERPL-MCNC: 18 UG/ML (ref 10–20)
WBC # BLD AUTO: 15.5 K/UL (ref 4.6–13.2)

## 2018-10-20 PROCEDURE — 74011250636 HC RX REV CODE- 250/636: Performed by: NURSE PRACTITIONER

## 2018-10-20 PROCEDURE — 74011250637 HC RX REV CODE- 250/637: Performed by: INTERNAL MEDICINE

## 2018-10-20 PROCEDURE — 80048 BASIC METABOLIC PNL TOTAL CA: CPT | Performed by: INTERNAL MEDICINE

## 2018-10-20 PROCEDURE — 74011000258 HC RX REV CODE- 258: Performed by: NURSE PRACTITIONER

## 2018-10-20 PROCEDURE — 85025 COMPLETE CBC W/AUTO DIFF WBC: CPT | Performed by: INTERNAL MEDICINE

## 2018-10-20 PROCEDURE — 80202 ASSAY OF VANCOMYCIN: CPT | Performed by: INTERNAL MEDICINE

## 2018-10-20 PROCEDURE — 65660000000 HC RM CCU STEPDOWN

## 2018-10-20 PROCEDURE — 74011250636 HC RX REV CODE- 250/636: Performed by: INTERNAL MEDICINE

## 2018-10-20 PROCEDURE — 36415 COLL VENOUS BLD VENIPUNCTURE: CPT | Performed by: INTERNAL MEDICINE

## 2018-10-20 RX ADMIN — SODIUM CHLORIDE 4.5 G: 900 INJECTION, SOLUTION INTRAVENOUS at 21:49

## 2018-10-20 RX ADMIN — VANCOMYCIN HYDROCHLORIDE 1250 MG: 10 INJECTION, POWDER, LYOPHILIZED, FOR SOLUTION INTRAVENOUS at 08:30

## 2018-10-20 RX ADMIN — METOPROLOL TARTRATE 25 MG: 25 TABLET ORAL at 21:41

## 2018-10-20 RX ADMIN — NALOXEGOL OXALATE 12.5 MG: 12.5 TABLET, FILM COATED ORAL at 08:17

## 2018-10-20 RX ADMIN — HYDROMORPHONE HYDROCHLORIDE 4 MG: 2 TABLET ORAL at 22:32

## 2018-10-20 RX ADMIN — SODIUM CHLORIDE 4.5 G: 900 INJECTION, SOLUTION INTRAVENOUS at 17:17

## 2018-10-20 RX ADMIN — METOPROLOL TARTRATE 25 MG: 25 TABLET ORAL at 08:16

## 2018-10-20 RX ADMIN — OXYCODONE HYDROCHLORIDE 20 MG: 20 TABLET, FILM COATED, EXTENDED RELEASE ORAL at 08:16

## 2018-10-20 RX ADMIN — FOLIC ACID 1 MG: 1 TABLET ORAL at 08:16

## 2018-10-20 RX ADMIN — VANCOMYCIN HYDROCHLORIDE 1250 MG: 10 INJECTION, POWDER, LYOPHILIZED, FOR SOLUTION INTRAVENOUS at 21:00

## 2018-10-20 RX ADMIN — LEVOTHYROXINE, LIOTHYRONINE 30 MG: 19; 4.5 TABLET ORAL at 09:46

## 2018-10-20 RX ADMIN — OXYCODONE HYDROCHLORIDE 20 MG: 20 TABLET, FILM COATED, EXTENDED RELEASE ORAL at 17:17

## 2018-10-20 RX ADMIN — SODIUM CHLORIDE 4.5 G: 900 INJECTION, SOLUTION INTRAVENOUS at 09:52

## 2018-10-20 RX ADMIN — HYDROMORPHONE HYDROCHLORIDE 4 MG: 2 TABLET ORAL at 13:12

## 2018-10-20 RX ADMIN — LEVOFLOXACIN 750 MG: 5 INJECTION, SOLUTION INTRAVENOUS at 03:37

## 2018-10-20 RX ADMIN — SODIUM CHLORIDE 4.5 G: 900 INJECTION, SOLUTION INTRAVENOUS at 03:36

## 2018-10-20 NOTE — PROGRESS NOTES
[facility] Hospitalist Group  Progress Note    Patient: Aaron Gaona Age: 48 y.o. : 1965 MR#: [id number] SSN: [id number]  Date/Time: 10/20/2018     Subjective:   SOB has improved, fatigue better   NO fever , chills   Assessment/Plan:     Shortness of breath due to Pneumonia and Sever Anemia    Sepsis due to Pneumonia    Post influenza Pneumonia with no radiologic evidence       Continue: : fe hernandez DC Levaquin. Blood culture No growth to date. Leukocyte count improving. Will continue IV antibiotics for today and assess to [place]. Tylenol for fever    Acute hemolytic sickle cell crisis with worsening anemia: current HGB down to 4.8  transfused and HGB improved to 6.5. Patient feeling stronger today   Continue: folic acid, continue oxygen supplementation     Thrombocytopenia: slowly improving 30K        Diet: regular   Code: full Code         I spent 25  minutes with the patient in face-to-face consultation, of which greater than 50% was spent in counseling and coordination of care as described above. Case discussed with:  [x]Patient  [x]Family  []Nursing  []Case Management  DVT Prophylaxis:  []Lovenox  []Hep SQ  [x]SCDs  []Coumadin   []On Heparin gtt    Objective:   VS:   Visit Vitals  /72 (BP 1 Location: Right arm, BP Patient Position: At rest)   Pulse 79   Temp 98.5 °F (36.9 °C)   Resp 18   Ht 5' 6.14\" (1.68 m)   Wt 100.1 kg (220 lb 9.6 oz)   SpO2 100%   BMI 35.46 kg/m²      Tmax/24hrs: Temp (24hrs), Av.5 °F (36.9 °C), Min:97.6 °F (36.4 °C), Max:99.9 °F (37.7 °C)  IOBRIEF    Intake/Output Summary (Last 24 hours) at 10/20/2018 1448  Last data filed at 10/20/2018 0428  Gross per 24 hour   Intake 560 ml   Output 1300 ml   Net -740 ml       General:  Alert, cooperative, no acute distress    HEENT: PERRLA, anicteric sclerae. Pulmonary:  CTA Bilaterally. No Wheezing/Rhonchi/Rales. Cardiovascular: Regular rate and Rhythm.   GI:  Soft, Non distended, Non tender. + Bowel sounds. Extremities:  No edema, cyanosis, clubbing. No calf tenderness. Psych: Good insight. Not anxious or agitated. Neurologic: Alert and oriented X 3. No acute neuro deficits. Additional:    Medications:   Current Facility-Administered Medications   Medication Dose Route Frequency    Vancomycin trough level DUE 30 minutes prior to next scheduled dose @ 2100, Thank you!    Other ONCE    naloxegol (MOVANTIK) tablet 12.5 mg  12.5 mg Oral DAILY    oxyCODONE ER (OxyCONTIN) tablet 20 mg  20 mg Oral BID    thyroid (Pork) (ARMOUR) tablet 30 mg  30 mg Oral DAILY    0.9% sodium chloride infusion 250 mL  250 mL IntraVENous PRN    metoprolol tartrate (LOPRESSOR) tablet 25 mg  25 mg Oral Q12H    vancomycin (VANCOCIN) 1250 mg in  ml infusion  1,250 mg IntraVENous Q12H    sodium chloride (NS) flush 5-10 mL  5-10 mL IntraVENous PRN    piperacillin-tazobactam (ZOSYN) 4.5 g in 0.9% sodium chloride (MBP/ADV) 100 mL ADV  4.5 g IntraVENous Q6H    ergocalciferol capsule 50,000 Units  50,000 Units Oral E7Z    folic acid (FOLVITE) tablet 1 mg  1 mg Oral DAILY    HYDROmorphone (DILAUDID) tablet 2-4 mg  2-4 mg Oral Q4H PRN    sodium chloride (NS) flush 5-10 mL  5-10 mL IntraVENous PRN    temazepam (RESTORIL) capsule 7.5 mg  7.5 mg Oral QHS PRN       Labs:    Recent Results (from the past 24 hour(s))   METABOLIC PANEL, BASIC    Collection Time: 10/20/18  1:21 AM   Result Value Ref Range    Sodium 140 136 - 145 mmol/L    Potassium 3.5 3.5 - 5.5 mmol/L    Chloride 111 (H) 100 - 108 mmol/L    CO2 23 21 - 32 mmol/L    Anion gap 6 3.0 - 18 mmol/L    Glucose 91 74 - 99 mg/dL    BUN 9 7.0 - 18 MG/DL    Creatinine 0.98 0.6 - 1.3 MG/DL    BUN/Creatinine ratio 9 (L) 12 - 20      GFR est AA >60 >60 ml/min/1.73m2    GFR est non-AA 59 (L) >60 ml/min/1.73m2    Calcium 7.8 (L) 8.5 - 10.1 MG/DL   CBC WITH AUTOMATED DIFF    Collection Time: 10/20/18  1:21 AM   Result Value Ref Range    WBC 15.5 (H) 4.6 - 13.2 K/uL    RBC 2.29 (L) 4.20 - 5.30 M/uL    HGB 6.5 (L) 12.0 - 16.0 g/dL    HCT 18.5 (L) 35.0 - 45.0 %    MCV 80.8 74.0 - 97.0 FL    MCH 28.4 24.0 - 34.0 PG    MCHC 35.1 31.0 - 37.0 g/dL    RDW 16.8 (H) 11.6 - 14.5 %    PLATELET 39 (L) 659 - 420 K/uL    MPV 10.6 9.2 - 11.8 FL    NEUTROPHILS 75 42 - 75 %    BAND NEUTROPHILS 1 0 - 5 %    LYMPHOCYTES 20 20 - 51 %    MONOCYTES 4 2 - 9 %    EOSINOPHILS 0 0 - 5 %    BASOPHILS 0 0 - 3 %    NRBC 18.0 (H) 0  WBC    ABS. NEUTROPHILS 11.8 (H) 1.8 - 8.0 K/UL    ABS. LYMPHOCYTES 3.1 0.8 - 3.5 K/UL    ABS. MONOCYTES 0.6 0 - 1.0 K/UL    ABS. EOSINOPHILS 0.0 0.0 - 0.4 K/UL    ABS.  BASOPHILS 0.0 0.0 - 0.06 K/UL    DF MANUAL      PLATELET COMMENTS DECREASED PLATELETS      RBC COMMENTS ANISOCYTOSIS  1+        RBC COMMENTS POLYCHROMASIA  1+        RBC COMMENTS TARGET CELLS  1+           Signed By: Maggie Gao MD     October 20, 2018

## 2018-10-21 LAB
ALBUMIN SERPL-MCNC: 2.8 G/DL (ref 3.4–5)
ALBUMIN/GLOB SERPL: 0.8 {RATIO} (ref 0.8–1.7)
ALP SERPL-CCNC: 116 U/L (ref 45–117)
ALT SERPL-CCNC: 16 U/L (ref 13–56)
ANION GAP SERPL CALC-SCNC: 6 MMOL/L (ref 3–18)
AST SERPL-CCNC: 23 U/L (ref 15–37)
BASOPHILS # BLD: 0 K/UL (ref 0–0.06)
BASOPHILS NFR BLD: 0 % (ref 0–3)
BILIRUB SERPL-MCNC: 3.1 MG/DL (ref 0.2–1)
BUN SERPL-MCNC: 6 MG/DL (ref 7–18)
BUN/CREAT SERPL: 6 (ref 12–20)
CALCIUM SERPL-MCNC: 7.9 MG/DL (ref 8.5–10.1)
CHLORIDE SERPL-SCNC: 110 MMOL/L (ref 100–108)
CO2 SERPL-SCNC: 25 MMOL/L (ref 21–32)
CREAT SERPL-MCNC: 1.01 MG/DL (ref 0.6–1.3)
DIFFERENTIAL METHOD BLD: ABNORMAL
EOSINOPHIL # BLD: 0.6 K/UL (ref 0–0.4)
EOSINOPHIL NFR BLD: 3 % (ref 0–5)
ERYTHROCYTE [DISTWIDTH] IN BLOOD BY AUTOMATED COUNT: 18.4 % (ref 11.6–14.5)
GLOBULIN SER CALC-MCNC: 3.6 G/DL (ref 2–4)
GLUCOSE SERPL-MCNC: 83 MG/DL (ref 74–99)
HCT VFR BLD AUTO: 16.8 % (ref 35–45)
HCT VFR BLD AUTO: 21.1 % (ref 35–45)
HGB BLD-MCNC: 5.8 G/DL (ref 12–16)
HGB BLD-MCNC: 7.4 G/DL (ref 12–16)
LYMPHOCYTES # BLD: 6.2 K/UL (ref 0.8–3.5)
LYMPHOCYTES NFR BLD: 29 % (ref 20–51)
MCH RBC QN AUTO: 28.7 PG (ref 24–34)
MCHC RBC AUTO-ENTMCNC: 34.5 G/DL (ref 31–37)
MCV RBC AUTO: 83.2 FL (ref 74–97)
MONOCYTES # BLD: 0.9 K/UL (ref 0–1)
MONOCYTES NFR BLD: 4 % (ref 2–9)
NEUTS BAND NFR BLD MANUAL: 3 % (ref 0–5)
NEUTS SEG # BLD: 13.7 K/UL (ref 1.8–8)
NEUTS SEG NFR BLD: 61 % (ref 42–75)
NRBC BLD-RTO: 23 PER 100 WBC
PLATELET # BLD AUTO: 50 K/UL (ref 135–420)
PLATELET COMMENTS,PCOM: ABNORMAL
PMV BLD AUTO: 10 FL (ref 9.2–11.8)
POTASSIUM SERPL-SCNC: 3.4 MMOL/L (ref 3.5–5.5)
PROT SERPL-MCNC: 6.4 G/DL (ref 6.4–8.2)
RBC # BLD AUTO: 2.02 M/UL (ref 4.2–5.3)
RBC MORPH BLD: ABNORMAL
RETICS/RBC NFR AUTO: 14.7 % (ref 0.5–2.3)
SODIUM SERPL-SCNC: 141 MMOL/L (ref 136–145)
WBC # BLD AUTO: 21.4 K/UL (ref 4.6–13.2)

## 2018-10-21 PROCEDURE — 65660000000 HC RM CCU STEPDOWN

## 2018-10-21 PROCEDURE — 80053 COMPREHEN METABOLIC PANEL: CPT | Performed by: INTERNAL MEDICINE

## 2018-10-21 PROCEDURE — 74011250636 HC RX REV CODE- 250/636: Performed by: NURSE PRACTITIONER

## 2018-10-21 PROCEDURE — 86921 COMPATIBILITY TEST INCUBATE: CPT | Performed by: EMERGENCY MEDICINE

## 2018-10-21 PROCEDURE — 85660 RBC SICKLE CELL TEST: CPT | Performed by: EMERGENCY MEDICINE

## 2018-10-21 PROCEDURE — 85025 COMPLETE CBC W/AUTO DIFF WBC: CPT | Performed by: INTERNAL MEDICINE

## 2018-10-21 PROCEDURE — 85018 HEMOGLOBIN: CPT | Performed by: INTERNAL MEDICINE

## 2018-10-21 PROCEDURE — 74011000258 HC RX REV CODE- 258: Performed by: NURSE PRACTITIONER

## 2018-10-21 PROCEDURE — 36415 COLL VENOUS BLD VENIPUNCTURE: CPT | Performed by: INTERNAL MEDICINE

## 2018-10-21 PROCEDURE — 74011250637 HC RX REV CODE- 250/637: Performed by: INTERNAL MEDICINE

## 2018-10-21 PROCEDURE — 74011250636 HC RX REV CODE- 250/636: Performed by: INTERNAL MEDICINE

## 2018-10-21 PROCEDURE — 36430 TRANSFUSION BLD/BLD COMPNT: CPT

## 2018-10-21 PROCEDURE — 85045 AUTOMATED RETICULOCYTE COUNT: CPT | Performed by: INTERNAL MEDICINE

## 2018-10-21 PROCEDURE — P9040 RBC LEUKOREDUCED IRRADIATED: HCPCS | Performed by: EMERGENCY MEDICINE

## 2018-10-21 PROCEDURE — 86922 COMPATIBILITY TEST ANTIGLOB: CPT | Performed by: EMERGENCY MEDICINE

## 2018-10-21 PROCEDURE — 86902 BLOOD TYPE ANTIGEN DONOR EA: CPT | Performed by: EMERGENCY MEDICINE

## 2018-10-21 RX ORDER — SODIUM CHLORIDE 9 MG/ML
250 INJECTION, SOLUTION INTRAVENOUS AS NEEDED
Status: DISCONTINUED | OUTPATIENT
Start: 2018-10-21 | End: 2018-10-22 | Stop reason: HOSPADM

## 2018-10-21 RX ORDER — METOPROLOL TARTRATE 50 MG/1
50 TABLET ORAL EVERY 12 HOURS
Status: DISCONTINUED | OUTPATIENT
Start: 2018-10-21 | End: 2018-10-22 | Stop reason: HOSPADM

## 2018-10-21 RX ORDER — LEVOFLOXACIN 750 MG/1
750 TABLET ORAL EVERY 24 HOURS
Status: DISCONTINUED | OUTPATIENT
Start: 2018-10-21 | End: 2018-10-22 | Stop reason: HOSPADM

## 2018-10-21 RX ORDER — SODIUM CHLORIDE 9 MG/ML
250 INJECTION, SOLUTION INTRAVENOUS AS NEEDED
Status: DISCONTINUED | OUTPATIENT
Start: 2018-10-21 | End: 2018-10-21 | Stop reason: SDUPTHER

## 2018-10-21 RX ADMIN — FOLIC ACID 1 MG: 1 TABLET ORAL at 09:34

## 2018-10-21 RX ADMIN — NALOXEGOL OXALATE 12.5 MG: 12.5 TABLET, FILM COATED ORAL at 09:34

## 2018-10-21 RX ADMIN — METOPROLOL TARTRATE 25 MG: 25 TABLET ORAL at 09:34

## 2018-10-21 RX ADMIN — OXYCODONE HYDROCHLORIDE 20 MG: 20 TABLET, FILM COATED, EXTENDED RELEASE ORAL at 09:34

## 2018-10-21 RX ADMIN — METOPROLOL TARTRATE 50 MG: 50 TABLET ORAL at 20:45

## 2018-10-21 RX ADMIN — VANCOMYCIN HYDROCHLORIDE 1250 MG: 10 INJECTION, POWDER, LYOPHILIZED, FOR SOLUTION INTRAVENOUS at 09:00

## 2018-10-21 RX ADMIN — LEVOTHYROXINE, LIOTHYRONINE 30 MG: 19; 4.5 TABLET ORAL at 09:34

## 2018-10-21 RX ADMIN — LEVOFLOXACIN 750 MG: 750 TABLET, FILM COATED ORAL at 13:41

## 2018-10-21 RX ADMIN — SODIUM CHLORIDE 4.5 G: 900 INJECTION, SOLUTION INTRAVENOUS at 10:59

## 2018-10-21 RX ADMIN — HYDROMORPHONE HYDROCHLORIDE 4 MG: 2 TABLET ORAL at 06:34

## 2018-10-21 RX ADMIN — HYDROMORPHONE HYDROCHLORIDE 4 MG: 2 TABLET ORAL at 12:37

## 2018-10-21 RX ADMIN — HYDROMORPHONE HYDROCHLORIDE 4 MG: 2 TABLET ORAL at 20:45

## 2018-10-21 RX ADMIN — SODIUM CHLORIDE 4.5 G: 900 INJECTION, SOLUTION INTRAVENOUS at 05:28

## 2018-10-21 RX ADMIN — OXYCODONE HYDROCHLORIDE 20 MG: 20 TABLET, FILM COATED, EXTENDED RELEASE ORAL at 17:16

## 2018-10-21 NOTE — PROGRESS NOTES
10/21/18 0200   Critical Result Types   Type of Critical Result Laboratory   Critical Lab Result Types   Critical Lab Value Hemoglobin & Hematocrit   Hemoglobin & Hematocrit Value 5.8/16.8   Notification Information   Notified By (Name) [facility], in lab   Time of Critical Result Notification 1373   Verbal Readback Provided Yes   Provider Notification   Was Provider Notified Yes   Name of Provider Dr. Earl Suero Yes   Time Provider Notified 0300   Date Provider Notified 10/19/18   Were Orders Received Yes     Blood bank made aware of need for 1 unit needed and 1 unit to keep ahead. Artur in blood bank stated irradiated blood will be ordered from [facility] and call unit when blood is ready.

## 2018-10-21 NOTE — PROGRESS NOTES
[facility] Hospitalist Group  Progress Note    Patient: Gricelda Burgess Age: 48 y.o. : 1965 MR#: [id number] SSN: [id number]  Date/Time: 10/21/2018     Subjective:   Reports fatigue and loss of energy . SOB has improved, fatigue better   NO fever , chills   Assessment/Plan:      Shortness of breath due to Pneumonia and Sever Anemia    Sepsis due to Pneumonia    Post influenza Pneumonia with no radiologic evidence       Continue: : DC IV vanc zosyn 10/21, DC Levaquin 10/20. Started Oral levaquin 10/21 Blood culture No growth to date. Leukocyte count improving. Will continue IV antibiotics for today and assess to [place]. Tylenol for fever    Acute hemolytic sickle cell crisis with worsening anemia: current HGB down to 4.8  transfused and HGB improved to 6.5. dropped to 5.8 again, retic count 15    Continue: folic acid, continue oxygen supplementation      Thrombocytopenia: slowly improving 30K--> 50K         Diet: regular   Code: full Code     NO heparin SQ for DVT due to thrombocytopenia, continue SCDs     I spent 36 minutes with the patient in face-to-face consultation, of which greater than 50% was spent in counseling and coordination of care as described above.     Case discussed with:  [x]Patient  [x]Family  []Nursing  []Case Management  DVT Prophylaxis:  []Lovenox  []Hep SQ  [x]SCDs  []Coumadin   []On Heparin gtt    Objective:   VS:   Visit Vitals  /83 (BP 1 Location: Right arm, BP Patient Position: At rest)   Pulse 70   Temp 98.1 °F (36.7 °C)   Resp 20   Ht 5' 6.14\" (1.68 m)   Wt 99.9 kg (220 lb 3.2 oz)   SpO2 98%   BMI 35.39 kg/m²      Tmax/24hrs: Temp (24hrs), Av.2 °F (36.8 °C), Min:97.9 °F (36.6 °C), Max:98.9 °F (37.2 °C)  IOBRIEF    Intake/Output Summary (Last 24 hours) at 10/21/2018 1243  Last data filed at 10/21/2018 0544  Gross per 24 hour   Intake 690 ml   Output 900 ml   Net -210 ml       General:  Alert, cooperative, no acute distress    HEENT: PERRLA, anicteric sclerae. Pulmonary:  CTA Bilaterally. No Wheezing/Rhonchi/Rales. Cardiovascular: Regular rate and Rhythm. GI:  Soft, Non distended, Non tender. + Bowel sounds. Extremities:  No edema, cyanosis, clubbing. No calf tenderness. Psych: Good insight. Not anxious or agitated. Neurologic: Alert and oriented X 3. No acute neuro deficits.   Additional:    Medications:   Current Facility-Administered Medications   Medication Dose Route Frequency    0.9% sodium chloride infusion 250 mL  250 mL IntraVENous PRN    naloxegol (MOVANTIK) tablet 12.5 mg  12.5 mg Oral DAILY    oxyCODONE ER (OxyCONTIN) tablet 20 mg  20 mg Oral BID    thyroid (Pork) (ARMOUR) tablet 30 mg  30 mg Oral DAILY    metoprolol tartrate (LOPRESSOR) tablet 25 mg  25 mg Oral Q12H    vancomycin (VANCOCIN) 1250 mg in  ml infusion  1,250 mg IntraVENous Q12H    sodium chloride (NS) flush 5-10 mL  5-10 mL IntraVENous PRN    piperacillin-tazobactam (ZOSYN) 4.5 g in 0.9% sodium chloride (MBP/ADV) 100 mL ADV  4.5 g IntraVENous Q6H    ergocalciferol capsule 50,000 Units  50,000 Units Oral F3S    folic acid (FOLVITE) tablet 1 mg  1 mg Oral DAILY    HYDROmorphone (DILAUDID) tablet 2-4 mg  2-4 mg Oral Q4H PRN    sodium chloride (NS) flush 5-10 mL  5-10 mL IntraVENous PRN    temazepam (RESTORIL) capsule 7.5 mg  7.5 mg Oral QHS PRN       Labs:    Recent Results (from the past 24 hour(s))   VANCOMYCIN, TROUGH    Collection Time: 10/20/18  9:06 PM   Result Value Ref Range    Vancomycin,trough 18.0 10.0 - 20.0 ug/mL    Reported dose date: 20,181,020      Reported dose time: 830      Reported dose: 1250 MG UNITS   CBC WITH AUTOMATED DIFF    Collection Time: 10/21/18  1:20 AM   Result Value Ref Range    WBC 21.4 (H) 4.6 - 13.2 K/uL    RBC 2.02 (L) 4.20 - 5.30 M/uL    HGB 5.8 (LL) 12.0 - 16.0 g/dL    HCT 16.8 (LL) 35.0 - 45.0 %    MCV 83.2 74.0 - 97.0 FL    MCH 28.7 24.0 - 34.0 PG    MCHC 34.5 31.0 - 37.0 g/dL    RDW 18.4 (H) 11.6 - 14.5 % PLATELET 50 (L) 967 - 420 K/uL    MPV 10.0 9.2 - 11.8 FL    NEUTROPHILS 61 42 - 75 %    BAND NEUTROPHILS 3 0 - 5 %    LYMPHOCYTES 29 20 - 51 %    MONOCYTES 4 2 - 9 %    EOSINOPHILS 3 0 - 5 %    BASOPHILS 0 0 - 3 %    NRBC 23.0 (H) 0  WBC    ABS. NEUTROPHILS 13.7 (H) 1.8 - 8.0 K/UL    ABS. LYMPHOCYTES 6.2 (H) 0.8 - 3.5 K/UL    ABS. MONOCYTES 0.9 0 - 1.0 K/UL    ABS. EOSINOPHILS 0.6 (H) 0.0 - 0.4 K/UL    ABS. BASOPHILS 0.0 0.0 - 0.06 K/UL    DF MANUAL      PLATELET COMMENTS DECREASED PLATELETS      RBC COMMENTS ANISOCYTOSIS  2+        RBC COMMENTS POLYCHROMASIA  2+        RBC COMMENTS TARGET CELLS  1+       METABOLIC PANEL, COMPREHENSIVE    Collection Time: 10/21/18  1:20 AM   Result Value Ref Range    Sodium 141 136 - 145 mmol/L    Potassium 3.4 (L) 3.5 - 5.5 mmol/L    Chloride 110 (H) 100 - 108 mmol/L    CO2 25 21 - 32 mmol/L    Anion gap 6 3.0 - 18 mmol/L    Glucose 83 74 - 99 mg/dL    BUN 6 (L) 7.0 - 18 MG/DL    Creatinine 1.01 0.6 - 1.3 MG/DL    BUN/Creatinine ratio 6 (L) 12 - 20      GFR est AA >60 >60 ml/min/1.73m2    GFR est non-AA 57 (L) >60 ml/min/1.73m2    Calcium 7.9 (L) 8.5 - 10.1 MG/DL    Bilirubin, total 3.1 (H) 0.2 - 1.0 MG/DL    ALT (SGPT) 16 13 - 56 U/L    AST (SGOT) 23 15 - 37 U/L    Alk.  phosphatase 116 45 - 117 U/L    Protein, total 6.4 6.4 - 8.2 g/dL    Albumin 2.8 (L) 3.4 - 5.0 g/dL    Globulin 3.6 2.0 - 4.0 g/dL    A-G Ratio 0.8 0.8 - 1.7     RETICULOCYTE COUNT    Collection Time: 10/21/18  8:59 AM   Result Value Ref Range    Reticulocyte count 14.7 (H) 0.5 - 2.3 %       Signed By: Sheila Delgado MD     October 21, 2018

## 2018-10-21 NOTE — ROUTINE PROCESS
1927 Assumed care of patient from off going nurse. Patient resting in bed. No distress noted. Call bell within reach, siderails up x 3, bed in lowest position, and patient instructed to use call bell for assistance. Will continue to monitor. 0720 Bedside and Verbal shift change report given to Jhonatan Vasques RN (oncoming nurse) by Ángel Rodriguez RN(offgoing nurse). Report included the following information Kardex, Intake/Output, MAR, Recent Results and Cardiac Rhythm SR tele box# [id number]. Awaiting for blood to arrive from [facility].

## 2018-10-21 NOTE — PROGRESS NOTES
Problem: Falls - Risk of  Goal: *Absence of Falls  Document Jasmyn Fall Risk and appropriate interventions in the flowsheet.   Outcome: Progressing Towards Goal  Fall Risk Interventions:            Medication Interventions: Evaluate medications/consider consulting pharmacy, Patient to call before getting OOB, Teach patient to arise slowly         History of Falls Interventions: Evaluate medications/consider consulting pharmacy, Investigate reason for fall, Room close to nurse's station

## 2018-10-22 VITALS
HEART RATE: 60 BPM | RESPIRATION RATE: 16 BRPM | BODY MASS INDEX: 35.39 KG/M2 | HEIGHT: 66 IN | WEIGHT: 220.2 LBS | DIASTOLIC BLOOD PRESSURE: 83 MMHG | OXYGEN SATURATION: 99 % | SYSTOLIC BLOOD PRESSURE: 162 MMHG | TEMPERATURE: 98.8 F

## 2018-10-22 LAB
ANION GAP SERPL CALC-SCNC: 8 MMOL/L (ref 3–18)
BASOPHILS # BLD: 0 K/UL (ref 0–0.06)
BASOPHILS NFR BLD: 0 % (ref 0–3)
BUN SERPL-MCNC: 5 MG/DL (ref 7–18)
BUN/CREAT SERPL: 5 (ref 12–20)
CALCIUM SERPL-MCNC: 8.2 MG/DL (ref 8.5–10.1)
CHLORIDE SERPL-SCNC: 110 MMOL/L (ref 100–108)
CO2 SERPL-SCNC: 23 MMOL/L (ref 21–32)
CREAT SERPL-MCNC: 0.91 MG/DL (ref 0.6–1.3)
DIFFERENTIAL METHOD BLD: ABNORMAL
EOSINOPHIL # BLD: 0.5 K/UL (ref 0–0.4)
EOSINOPHIL NFR BLD: 3 % (ref 0–5)
ERYTHROCYTE [DISTWIDTH] IN BLOOD BY AUTOMATED COUNT: 18.7 % (ref 11.6–14.5)
GLUCOSE SERPL-MCNC: 85 MG/DL (ref 74–99)
HCT VFR BLD AUTO: 24.1 % (ref 35–45)
HGB BLD-MCNC: 8.2 G/DL (ref 12–16)
LYMPHOCYTES # BLD: 3.9 K/UL (ref 0.8–3.5)
LYMPHOCYTES NFR BLD: 23 % (ref 20–51)
MCH RBC QN AUTO: 29 PG (ref 24–34)
MCHC RBC AUTO-ENTMCNC: 34 G/DL (ref 31–37)
MCV RBC AUTO: 85.2 FL (ref 74–97)
MONOCYTES # BLD: 0.8 K/UL (ref 0–1)
MONOCYTES NFR BLD: 5 % (ref 2–9)
NEUTS BAND NFR BLD MANUAL: 8 % (ref 0–5)
NEUTS SEG # BLD: 11.6 K/UL (ref 1.8–8)
NEUTS SEG NFR BLD: 61 % (ref 42–75)
NRBC BLD-RTO: 17 PER 100 WBC
PLATELET # BLD AUTO: 64 K/UL (ref 135–420)
PLATELET COMMENTS,PCOM: ABNORMAL
PMV BLD AUTO: 10.3 FL (ref 9.2–11.8)
POTASSIUM SERPL-SCNC: 3 MMOL/L (ref 3.5–5.5)
RBC # BLD AUTO: 2.83 M/UL (ref 4.2–5.3)
RBC MORPH BLD: ABNORMAL
RETICS/RBC NFR AUTO: 13.7 % (ref 0.5–2.3)
SODIUM SERPL-SCNC: 141 MMOL/L (ref 136–145)
WBC # BLD AUTO: 16.8 K/UL (ref 4.6–13.2)

## 2018-10-22 PROCEDURE — 85045 AUTOMATED RETICULOCYTE COUNT: CPT | Performed by: INTERNAL MEDICINE

## 2018-10-22 PROCEDURE — 74011250637 HC RX REV CODE- 250/637: Performed by: HOSPITALIST

## 2018-10-22 PROCEDURE — 85025 COMPLETE CBC W/AUTO DIFF WBC: CPT | Performed by: INTERNAL MEDICINE

## 2018-10-22 PROCEDURE — 80048 BASIC METABOLIC PNL TOTAL CA: CPT | Performed by: INTERNAL MEDICINE

## 2018-10-22 PROCEDURE — 36415 COLL VENOUS BLD VENIPUNCTURE: CPT | Performed by: INTERNAL MEDICINE

## 2018-10-22 PROCEDURE — 74011250637 HC RX REV CODE- 250/637: Performed by: INTERNAL MEDICINE

## 2018-10-22 RX ORDER — POTASSIUM CHLORIDE 20 MEQ/1
40 TABLET, EXTENDED RELEASE ORAL
Status: COMPLETED | OUTPATIENT
Start: 2018-10-22 | End: 2018-10-22

## 2018-10-22 RX ORDER — POTASSIUM CHLORIDE 20 MEQ/1
20 TABLET, EXTENDED RELEASE ORAL DAILY
Qty: 5 TAB | Refills: 0 | Status: SHIPPED | OUTPATIENT
Start: 2018-10-22 | End: 2019-05-15

## 2018-10-22 RX ADMIN — LEVOFLOXACIN 750 MG: 750 TABLET, FILM COATED ORAL at 12:50

## 2018-10-22 RX ADMIN — METOPROLOL TARTRATE 50 MG: 50 TABLET ORAL at 10:13

## 2018-10-22 RX ADMIN — LEVOTHYROXINE, LIOTHYRONINE 30 MG: 19; 4.5 TABLET ORAL at 10:13

## 2018-10-22 RX ADMIN — HYDROMORPHONE HYDROCHLORIDE 2 MG: 2 TABLET ORAL at 10:19

## 2018-10-22 RX ADMIN — POTASSIUM CHLORIDE 40 MEQ: 20 TABLET, EXTENDED RELEASE ORAL at 15:28

## 2018-10-22 RX ADMIN — OXYCODONE HYDROCHLORIDE 20 MG: 20 TABLET, FILM COATED, EXTENDED RELEASE ORAL at 10:21

## 2018-10-22 RX ADMIN — HYDROMORPHONE HYDROCHLORIDE 2 MG: 2 TABLET ORAL at 05:57

## 2018-10-22 RX ADMIN — NALOXEGOL OXALATE 12.5 MG: 12.5 TABLET, FILM COATED ORAL at 10:20

## 2018-10-22 RX ADMIN — FOLIC ACID 1 MG: 1 TABLET ORAL at 10:13

## 2018-10-22 NOTE — PROGRESS NOTES
Problem: Falls - Risk of  Goal: *Absence of Falls  Document Jasmyn Fall Risk and appropriate interventions in the flowsheet.   Outcome: Progressing Towards Goal  Fall Risk Interventions:  Mobility Interventions: Communicate number of staff needed for ambulation/transfer, OT consult for ADLs, Patient to call before getting OOB, PT Consult for mobility concerns         Medication Interventions: Evaluate medications/consider consulting pharmacy, Patient to call before getting OOB, Teach patient to arise slowly         History of Falls Interventions: Evaluate medications/consider consulting pharmacy, Investigate reason for fall, Room close to nurse's station

## 2018-10-22 NOTE — DISCHARGE SUMMARY
Discharge Summary    Patient: Isaak Meyer MRN: [id number]  CSN: [id number]    YOB: 1965  Age: 48 y.o. Sex: female    DOA: 10/18/2018 LOS:  LOS: 4 days   Discharge Date:      Admission Diagnoses: Shortness of breath    Discharge Diagnoses:  PLEASE SEE DICTATION. Discharge Condition: Stable    PHYSICAL EXAM  Visit Vitals  /83 (BP 1 Location: Right arm, BP Patient Position: At rest)   Pulse 60   Temp 98.8 °F (37.1 °C)   Resp 16   Ht 5' 6.14\" (1.68 m)   Wt 99.9 kg (220 lb 3.2 oz)   SpO2 99%   BMI 35.39 kg/m²       General: Alert, cooperative, no acute distress    Lungs:  CTA Bilaterally. No Wheezing/Rhonchi/Rales. Heart:  Regular rate and Rhythm. Abdomen: Soft, Non distended, Non tender. + Bowel sounds. Extremities: No edema/ cyanosis/ clubbing  Psych:   Good insight. Not anxious or agitated. Neurologic:  AA oriented X 3. Moves all extremities. Hospital Course: Please see dictation. code # [id number]. Discharge Medications:     Current Discharge Medication List      START taking these medications    Details   potassium chloride (K-DUR, KLOR-CON) 20 mEq tablet Take 1 Tab by mouth daily. Qty: 5 Tab, Refills: 0      OTHER CBC, BMP in 1 week  Dx: sickle call disease   Fax results to Dr. Rodrigo Yoo and Dr. Ariane Butler: 1 Each, Refills: 0         CONTINUE these medications which have NOT CHANGED    Details   HYDROmorphone (DILAUDID) 2 mg tablet Take 1-2 Tabs by mouth every four (4) hours as needed for Pain. Max Daily Amount: 24 mg. Qty: 40 Tab, Refills: 0    Associated Diagnoses: Malignant neoplasm of lower-outer quadrant of left breast of female, estrogen receptor positive ([address] Malignant neoplasm of breast in female, estrogen receptor negative, unspecified laterality, unspecified site of breast ([address]  Hb-SS disease without crisis ([facility] [address]      metoprolol tartrate (LOPRESSOR) 50 mg tablet 50 mg.      thyroid, [place], (ARMOUR THYROID) 30 mg tablet Take 30 mg by mouth daily. ergocalciferol (ERGOCALCIFEROL) 50,000 unit capsule Take 1 Cap by mouth every seven (7) days. Qty: 12 Cap, Refills: 0    Associated Diagnoses: Vitamin D deficiency      oxyCODONE ER (OXYCONTIN) 20 mg ER tablet Take 1 Tab by mouth two (2) times a day. Max Daily Amount: 40 mg.  Qty: 60 Tab, Refills: 0      naloxegol (MOVANTIK) 12.5 mg tab tablet Take 12.5 mg by mouth daily. folic acid (FOLVITE) 1 mg tablet Take 1 mg by mouth daily. STOP taking these medications       oxyCODONE IR (ROXICODONE) 10 mg tab immediate release tablet Comments:   Reason for Stopping:             · It is important that you take the medication exactly as they are prescribed. · Keep your medication in the bottles provided by the pharmacist and keep a list of the medication names, dosages, and times to be taken in your wallet. · Do not take other medications without consulting your doctor. DIET:  Cardiac Diet    ACTIVITY: Activity as tolerated    ADDITIONAL INFORMATION: If you experience any of the following symptoms but not limited to Fever, chills, nausea, vomiting, diarrhea, change in mentation, falling, bleeding, shortness of breath, chest pain, please call your primary care physician or return to the emergency room if you cannot get hold of your doctor:     FOLLOW UP CARE:  Dr. Antonino Castle MD in 5 days. Please call and set up an appointment.   Dr. Setphanie Sidhu, oncology in 1 week    Minutes spent on discharge: 40 minutes spent coordinating this discharge (review instructions/follow-up, prescriptions, preparing report for sign off)    Long Delgado MD  10/22/2018 2:33 PM

## 2018-10-22 NOTE — HOME CARE
Rounded on this \" [facility]\" pt , explained [facility] services offered and left pt a brochure on [facility] services, pt states she has had [facility] in the past. Juan David Reid.

## 2018-10-22 NOTE — PROGRESS NOTES
Discharge:    Patient discharged home today (10/22/2018). Patient had no home health needs returning home. Patient has a strong support system returning home. There were no other care manager concerns regarding this discharge.     Lillie Bonilla [facility] Manager  VSEWR#[phone]

## 2018-10-22 NOTE — DISCHARGE INSTRUCTIONS
Discharge Instructions    Patient: Danny Sanchez MRN: [id number]  CSN: [id number]    YOB: 1965  Age: 48 y.o. Sex: female    DOA: 10/18/2018 LOS:  LOS: 4 days   Discharge Date:      DIET:  Cardiac Diet    ACTIVITY: Activity as tolerated    ADDITIONAL INFORMATION: If you experience any of the following symptoms but not limited to Fever, chills, nausea, vomiting, diarrhea, change in mentation, falling, bleeding, shortness of breath, chest pain, please call your primary care physician or return to the emergency room if you cannot get hold of your doctor:     FOLLOW UP CARE:  Dr. Mendez Evans MD in 5 days. Please call and set up an appointment. Dr. Tami Elkins, oncology in 1 week      Clemencia Royal MD  10/22/2018 2:32 PM      DISCHARGE SUMMARY from Nurse    PATIENT INSTRUCTIONS:    After general anesthesia or intravenous sedation, for 24 hours or while taking prescription Narcotics:  · Limit your activities  · Do not drive and operate hazardous machinery  · Do not make important personal or business decisions  · Do  not drink alcoholic beverages  · If you have not urinated within 8 hours after discharge, please contact your surgeon on call. Report the following to your surgeon:  · Excessive pain, swelling, redness or odor of or around the surgical area  · Temperature over 100.5  · Nausea and vomiting lasting longer than 4 hours or if unable to take medications  · Any signs of decreased circulation or nerve impairment to extremity: change in color, persistent  numbness, tingling, coldness or increase pain  · Any questions    What to do at Home:  Recommended activity: Activity as tolerated,     If you experience any of the following symptoms chest pain,shortness of breath, fever of 102  please follow up with md or call 911. *  Please give a list of your current medications to your Primary Care Provider.     *  Please update this list whenever your medications are discontinued, doses are      changed, or new medications (including over-the-counter products) are added. *  Please carry medication information at all times in case of emergency situations. These are general instructions for a healthy lifestyle:    No smoking/ No tobacco products/ Avoid exposure to second hand smoke  Surgeon General's Warning:  Quitting smoking now greatly reduces serious risk to your health. Obesity, smoking, and sedentary lifestyle greatly increases your risk for illness    A healthy diet, regular physical exercise & weight monitoring are important for maintaining a healthy lifestyle    You may be retaining fluid if you have a history of heart failure or if you experience any of the following symptoms:  Weight gain of 3 pounds or more overnight or 5 pounds in a week, increased swelling in our hands or feet or shortness of breath while lying flat in bed. Please call your doctor as soon as you notice any of these symptoms; do not wait until your next office visit. Recognize signs and symptoms of STROKE:    F-face looks uneven    A-arms unable to move or move unevenly    S-speech slurred or non-existent    T-time-call 911 as soon as signs and symptoms begin-DO NOT go       Back to bed or wait to see if you get better-TIME IS BRAIN. Warning Signs of HEART ATTACK     Call 911 if you have these symptoms:   Chest discomfort. Most heart attacks involve discomfort in the center of the chest that lasts more than a few minutes, or that goes away and comes back. It can feel like uncomfortable pressure, squeezing, fullness, or pain.  Discomfort in other areas of the upper body. Symptoms can include pain or discomfort in one or both arms, the back, neck, jaw, or stomach.  Shortness of breath with or without chest discomfort.  Other signs may include breaking out in a cold sweat, nausea, or lightheadedness. Don't wait more than five minutes to call 911 - MINUTES MATTER! Fast action can save your life. Calling 911 is almost always the fastest way to get lifesaving treatment. Emergency Medical Services staff can begin treatment when they arrive -- up to an hour sooner than if someone gets to the hospital by car. The discharge information has been reviewed with the patient. The patient verbalized understanding. Discharge medications reviewed with the patient and appropriate educational materials and side effects teaching were provided.   ___________________________________________________________________________________________________________________________________    Patient armband removed and shredded

## 2018-10-22 NOTE — CDMP QUERY
This patient has been diagnosed with Sepsis. Please document in the progress notes the Clinical Indicators and associated Acute Organ Dysfunction that supports this diagnosis or state that the diagnosis has been ruled out. Sepsis  (BSV Approved definition) Documented Source of suspected or confirmed infection as manifested by 2 or more of the following, not easily explained by another co-existing condition:  Temperature:  >38C (100.9F)   -OR-  <36C  (96.8F)  Heart Rate:  > 90 bpm 
 Respiratory Rate:  > 20 / min  -OR-  PaCO2 < 32 
 WBC:  > 12K  -OR- < 4K  -OR- Bands > 10 Explicitly link all related/associated Acute Organ Dysfunction to Sepsis:     
 Hyperbilirubinemia     [Bilirubin > 2 mg/dL] Severe Sepsis = Sepsis with associated Acute Organ Dysfunction Please clarify and document your clinical opinion in the progress notes and discharge summary including the definitive and/or presumptive diagnosis, (suspected or probable), related to the above clinical findings. Please include clinical findings supporting your diagnosis. If you DECLINE this query or would like to communicate with [facility], please utilize the \"[employer] message box\" at the TOP of the Progress Note on the right. Thank you, Manuel Donis [phone]

## 2018-10-23 LAB
ABO + RH BLD: NORMAL
ANTIGENS PRESENT RBC DONR: NORMAL
BLD PROD TYP BPU: NORMAL
BLOOD GROUP ANTIBODIES SERPL: NORMAL
BLOOD GROUP ANTIBODIES SERPL: NORMAL
BPU ID: NORMAL
CROSSMATCH RESULT,%XM: NORMAL
SPECIMEN EXP DATE BLD: NORMAL
STATUS OF UNIT,%ST: NORMAL
UNIT DIVISION, %UDIV: 0

## 2018-10-23 NOTE — DISCHARGE SUMMARY
[address] [facility]  MR#: [id number]  : 1965  ACCOUNT #: [de-identified]   ADMIT DATE: 10/18/2018  DISCHARGE DATE: 10/22/2018    PRIMARY CARE PHYSICIAN:  Dr. Fatimah Jones:  Discharged to home. DISCHARGE CONDITION:  Stable. DISCHARGE DIAGNOSES:  1. Systemic inflammatory response syndrome, possibly due to sickle cell crisis resolved now, sepsis ruled out. 2.  Acute bronchitis, pneumonia ruled out. 3.  Shortness of breath due to severe anemia. 4.  Sickle cell crisis with hemolysis, improved now. 5.  Thrombocytopenia, chronic. 6.  History of ductal breast cancer, status post treatment in the past.  7.  Hypertension. 8.  Hypothyroidism. DISCHARGE MEDICATIONS:  [place] Thyroid 30 mg daily, folic acid 1 mg daily, Dilaudid 1 tablet every 4 hours p.r.n., Metoprolol 50 mg daily, Movantik 12.5 mg daily, Oxycodone ER 20 mg b.i.d., potassium chloride 20 mEq daily for 5 days. SECONDARY DISCHARGE DIAGNOSES:    1. Hyperkalemia. 2.  Leukocytosis, possibly reactive. HOSPITAL COURSE:  This is a 55-year-old -American female with known history of sickle cell disease, hypertension, comes to the emergency room with shortness of breath. In the emergency room, patient was noted to have anemia along with signs and symptoms of sepsis. Concern was raised for possible pneumonia. Chest x-ray was done which was nonconclusive. CT chest was done which did not show any signs of pneumonia, but given her recent flu infection, the concern was for post-influenza pneumonia, was started on empiric antibiotics and admitted to the hospital.  During the admission, the patient also did complain of some body pains and she felt like she was going through a sickle cell crisis. Post-admission the patient developed anemia, worsening hemoglobin dropping up to 4.8. She did receive transfusion in the hospital.  Post-transfusion her hemoglobin slowly improved. She does have thrombocytopenia, which has been chronic. She had multiple bowel movements in the hospital, all the stools have been normal, brown stool. Did not show any bleeding. She did not have any hematuria either. Patient says post transfusion she is feeling much better now. Shortness of breath resolved. She was initially treated with empiric antibiotics and blood cultures were obtained. Cultures remained negative and patient responded well with the transfusion, so antibiotics were deescalated in the hospital course. The patient still had some white count, which I think is probably from her transfusion and also more stress related. She does not have any signs of infection. She says she has a cough which is also resolved now. Retic count is 30.7, her hemoglobin is stable. She does not have any signs of hemolysis of sickle cell crisis right now. Her pain is much resolved and she has been ambulating without any problem. She does have some mild abnormal LFTs with a total bilirubin up to 2.1, which is expected with a recent hemolysis. The patient is currently ambulating and wanting to go home. Denies any diarrhea, I do see that her potassium was slightly low. We will replace the potassium. Given her acute drop in hemoglobin, I wanted to get a rectal exam on the patient for stool occult but patient declined. I did explain to the patient that I was looking for rectal bleeding. The patient states she has clearly seen brown stools and no signs of melena, no bleeding. Patient did tell me that she did have multiple times where her blood count dropped significantly, worse than symptoms, and multiple times she has had done rectal exam by different physicians and it has always been negative.   Patient declined a rectal exam.  Discussed with Dr. Jeanne Lagunas, the primary oncologist.  Per him, the patient does have multiple episodes of sickle cell, but not often and every time she does she drops her hemoglobin and also platelets. Dr. Asmita Lucas thinks that this is possibly a sickle cell crisis which she is better now. He recommends from his standpoint to be discharged and he will see the patient in outpatient followup in a week. The patient is currently ambulating without any problem. She is hemodynamically and medically stable. The patient wants to be discharged now. We will be replacing the potassium and then will be followed as an outpatient. Since the potassium was low and we will replace, continue with potassium supplements for 5 more days and then another repeat BMP and a CBC with her PCP and also with her oncologist.  Further plan according to the oncologist.  Currently, I do not see any signs of infection. I am going to discontinue antibiotics, pt had 5 days of Levaquin. I think her symptoms were most likely from sickle cell crisis. I do not see any suspected pneumonia. She could have had bronchitis, which has already been treated. Discharge instructions, followup appointments and physical exam, please refer to the electronic medical records and previous discharge summary. Patient alert, awake, oriented x3, along with her family including her mother and  at the bedside. I discussed with the  about the discharge plan and followup appointments. All of them understood, agreed with the plan. I answered all their questions and concerns appropriately.       Tiffany Bernal MD       BT/AN  D: 10/22/2018 14:42     T: 10/23/2018 11:52  JOB #: [id number]  CC: Curtis Kiran MD  CC: Whit Landa MD

## 2018-10-24 LAB
BACTERIA SPEC CULT: NORMAL
BACTERIA SPEC CULT: NORMAL
SERVICE CMNT-IMP: NORMAL
SERVICE CMNT-IMP: NORMAL

## 2018-11-02 ENCOUNTER — HOSPITAL ENCOUNTER (OUTPATIENT)
Dept: ONCOLOGY | Age: 53
Discharge: HOME | End: 2018-11-02

## 2018-11-02 ENCOUNTER — OFFICE VISIT (OUTPATIENT)
Dept: ONCOLOGY | Age: 53
End: 2018-11-02

## 2018-11-02 VITALS
WEIGHT: 220 LBS | BODY MASS INDEX: 34.53 KG/M2 | SYSTOLIC BLOOD PRESSURE: 143 MMHG | OXYGEN SATURATION: 100 % | TEMPERATURE: 98.3 F | HEIGHT: 67 IN | RESPIRATION RATE: 16 BRPM | HEART RATE: 97 BPM | DIASTOLIC BLOOD PRESSURE: 83 MMHG

## 2018-11-02 DIAGNOSIS — Z17.0 MALIGNANT NEOPLASM OF LOWER-OUTER QUADRANT OF LEFT BREAST OF FEMALE, ESTROGEN RECEPTOR POSITIVE (HCC): ICD-10-CM

## 2018-11-02 DIAGNOSIS — Z17.0 MALIGNANT NEOPLASM OF LOWER-OUTER QUADRANT OF LEFT BREAST OF FEMALE, ESTROGEN RECEPTOR POSITIVE (HCC): Primary | ICD-10-CM

## 2018-11-02 DIAGNOSIS — D64.9 SYMPTOMATIC ANEMIA: ICD-10-CM

## 2018-11-02 DIAGNOSIS — M15.9 PRIMARY OSTEOARTHRITIS INVOLVING MULTIPLE JOINTS: ICD-10-CM

## 2018-11-02 DIAGNOSIS — C50.512 MALIGNANT NEOPLASM OF LOWER-OUTER QUADRANT OF LEFT BREAST OF FEMALE, ESTROGEN RECEPTOR POSITIVE (HCC): ICD-10-CM

## 2018-11-02 DIAGNOSIS — E55.9 VITAMIN D DEFICIENCY: ICD-10-CM

## 2018-11-02 DIAGNOSIS — C50.512 MALIGNANT NEOPLASM OF LOWER-OUTER QUADRANT OF LEFT BREAST OF FEMALE, ESTROGEN RECEPTOR POSITIVE (HCC): Primary | ICD-10-CM

## 2018-11-02 DIAGNOSIS — D57.1 HB-SS DISEASE WITHOUT CRISIS (HCC): ICD-10-CM

## 2018-11-02 LAB
BASO+EOS+MONOS # BLD AUTO: 0.3 K/UL (ref 0–2.3)
BASO+EOS+MONOS # BLD AUTO: 3 % (ref 0.1–17)
DIFFERENTIAL METHOD BLD: ABNORMAL
ERYTHROCYTE [DISTWIDTH] IN BLOOD BY AUTOMATED COUNT: 16.9 % (ref 11.5–14.5)
HCT VFR BLD AUTO: 30.5 % (ref 36–48)
HGB BLD-MCNC: 10.5 G/DL (ref 12–16)
LYMPHOCYTES # BLD: 2.5 K/UL (ref 1.1–5.9)
LYMPHOCYTES NFR BLD: 27 % (ref 14–44)
MCH RBC QN AUTO: 29.9 PG (ref 25–35)
MCHC RBC AUTO-ENTMCNC: 34.4 G/DL (ref 31–37)
MCV RBC AUTO: 86.9 FL (ref 78–102)
NEUTS SEG # BLD: 6.7 K/UL (ref 1.8–9.5)
NEUTS SEG NFR BLD: 70 % (ref 40–70)
PLATELET # BLD AUTO: 188 K/UL (ref 140–440)
RBC # BLD AUTO: 3.51 M/UL (ref 4.1–5.1)
WBC # BLD AUTO: 9.5 K/UL (ref 4.5–13)

## 2018-11-02 NOTE — PROGRESS NOTES
Hematology/Oncology  Progress Note    Name: Alvina Aquino  Date: 2018  : 1965    Marv Mccloud MD     Ms. Genette Cowden is a 48year old female who was seen for management of her triple-negative invasive ductal adenocarcinoma, left breast.  Current therapy: Active surveillance; the patient has previously completed systemic chemotherapy and radiation treatment. Subjective:     Ms. Genette Cowden is a 63-year-old [facility] [place] woman with a history of triple-negative breast cancer, involving the left breast.  The patient reports that she is doing much better. She is continuing to use her cane for ambulation since she had a left total hip replacement. She is not experiencing a significant degree of pain at this time. She is doing much better psychologically wise and is not requiring the use of antianxiety medication as much. The patient was previously hospitalized with significant symptomatic anemia. She required transfusion of multiple units of packed red blood cells. She was discharged from the hospital on 10/22/2018 and states that she has done much better since that time. She has no new complaints or concerns at this time. She reports that the vaso-occlusive pain crisis occur about 3-4 times a year. Past medical history, family history, and social history: these were reviewed and remains unchanged.     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    Fatigue     Fibromyalgia     GERD (gastroesophageal reflux disease)     Hx of endometriosis     Hypertension 2011    Ill-defined condition 2018    Sickle cell crisis    Osteoarthritis of left hip 2016    Osteoarthritis of right hip 1/3/2017    Osteoarthritis of right knee 2018    Sickle cell anemia (HCC)     Sleep apnea     Does not use CPAP    Thyroid disease     hypo     Past Surgical History:   Procedure Laterality Date    HX BREAST BIOPSY Left 2016    HX  SECTION      HX HERNIA REPAIR      HX LAP CHOLECYSTECTOMY      HX MASTECTOMY Left 2012    with axillary lymph node dissection    TOTAL HIP ARTHROPLASTY Bilateral 2016 & 2017     Social History     Socioeconomic History    Marital status:      Spouse name: Not on file    Number of children: Not on file    Years of education: Not on file    Highest education level: Not on file   Social Needs    Financial resource strain: Not on file    Food insecurity - worry: Not on file    Food insecurity - inability: Not on file   Unruly Â® needs - medical: Not on file   Unruly Â® needs - non-medical: Not on file   Occupational History    Not on file   Tobacco Use    Smoking status: Former Smoker     Packs/day: 0.25     Years: 30.00     Pack years: 7.50     Last attempt to quit: 2011     Years since quittin.8    Smokeless tobacco: Former User   Substance and Sexual Activity    Alcohol use: Yes     Comment: 2 times yearly    Drug use: No    Sexual activity: Not Currently   Other Topics Concern    Not on file   Social History Narrative    Not on file     Family History   Problem Relation Age of Onset    Cancer Mother         breast    Diabetes Mother     Heart Disease Father     Diabetes Father     Sickle Cell Anemia Brother      Current Outpatient Medications   Medication Sig Dispense Refill    potassium chloride (K-DUR, KLOR-CON) 20 mEq tablet Take 1 Tab by mouth daily. 5 Tab 0    OTHER CBC, BMP in 1 week  Dx: sickle call disease   Fax results to Dr. Pranay White and Dr. William Hartman 1 Each 0    HYDROmorphone (DILAUDID) 2 mg tablet Take 1-2 Tabs by mouth every four (4) hours as needed for Pain. Max Daily Amount: 24 mg. 40 Tab 0    metoprolol tartrate (LOPRESSOR) 50 mg tablet 50 mg.      thyroid, Pork, (ARMOUR THYROID) 30 mg tablet Take 30 mg by mouth daily.       ergocalciferol (ERGOCALCIFEROL) 50,000 unit capsule Take 1 Cap by mouth every seven (7) days. 12 Cap 0    oxyCODONE ER (OXYCONTIN) 20 mg ER tablet Take 1 Tab by mouth two (2) times a day. Max Daily Amount: 40 mg. (Patient taking differently: Take 20 mg by mouth two (2) times a day. 0600 & 1800) 60 Tab 0    naloxegol (MOVANTIK) 12.5 mg tab tablet Take 12.5 mg by mouth daily.  folic acid (FOLVITE) 1 mg tablet Take 1 mg by mouth daily. Review of Systems  Constitutional: The patient has no acute distress or discomfort. HEENT: The patient denies recent head trauma, eye pain, blurred vision,  hearing deficit, oropharyngeal mucosal pain or lesions, and the patient denies throat pain or discomfort. Chest wall: The patient complained of having several small nodular areas over her left anterior chest wall surgical site for which she is aware it may represent recurrent breast cancer. Lymphatics: The patient denies palpable peripheral lymphadenopathy. Hematologic: The patient denies having bruising, bleeding, or progressive fatigue. Respiratory: Patient denies having shortness of breath, cough, sputum production, fever, or dyspnea on exertion. Cardiovascular: The patient denies having leg pain, leg swelling, heart palpitations, chest permit, chest pain, or lightheadedness. The patient denies having dyspnea on exertion. Gastrointestinal: The patient denies having nausea, emesis, or diarrhea. The patient denies having any hematemesis or blood in the stool. Genitourinary: Patient denies having urinary urgency, frequency, or dysuria. The patient denies having blood in the urine. Psychological: The patient denies having symptoms of nervousness, anxiety, depression, or thoughts of harming himself some of this. Skin: Patient denies having skin rashes, skin, ulcerations, or unexplained itching or pruritus. Musculoskeletal: She denies muscle or joint aches/pain.       Objective:     Visit Vitals  /83   Pulse 97   Temp 98.3 °F (36.8 °C) (Oral)   Resp 16   Ht 5' 6.5\" (1.689 m)   Wt 99.8 kg (220 lb)   SpO2 100%   BMI 34.98 kg/m²     ECOG PS=0, pain score=0/10     Physical Exam:   Gen. Appearance: The patient is in no acute distress. Skin: There is no bruise or rash. HEENT: The exam is unremarkable. Neck: Supple without lymphadenopathy or thyromegaly. Lungs: Clear to auscultation and percussion; there are no wheezes or rhonchi. Heart: Regular rate and rhythm; there are no murmurs, gallops, or rubs. Anterior chest wall and breast: The right breast shows no mass, nipple discharge, nipple retraction, or skin dimpling. The axilla reveals no palpable axillary lymphadenopathy. The left breast is surgically absent. There is some scarring over the left anterior rib cage and a few small nodular areas are noted over the anterior lateral upper chest wall is well. These have the consistency of fatty deposits however the patient is concerned that this may represent recurrent disease. Abdomen: Bowel sounds are present and normal.  There is no guarding, tenderness, or hepatosplenomegaly. Extremities: There is no clubbing, cyanosis, or edema. Neurologic: There are no focal neurologic deficits. Lymphatics: There is no palpable peripheral lymphadenopathy. Musculoskeletal: The patient has full range of motion at all joints. There is no evidence of joint deformity or effusions. There is no focal joint tenderness. Psychological/psychiatric: There is no clinical evidence of anxiety, depression, or melancholy.     Lab data:      Results for orders placed or performed during the hospital encounter of 11/02/18   CBC WITH 3 PART DIFF     Status: Abnormal   Result Value Ref Range Status    WBC 9.5 4.5 - 13.0 K/uL Final    RBC 3.51 (L) 4.10 - 5.10 M/uL Final    HGB 10.5 (L) 12.0 - 16.0 g/dL Final    HCT 30.5 (L) 36 - 48 % Final    MCV 86.9 78 - 102 FL Final    MCH 29.9 25.0 - 35.0 PG Final    MCHC 34.4 31 - 37 g/dL Final    RDW 16.9 (H) 11.5 - 14.5 % Final    PLATELET 901 414 - 808 K/uL Final    NEUTROPHILS 70 40 - 70 % Final    MIXED CELLS 3 0.1 - 17 % Final    LYMPHOCYTES 27 14 - 44 % Final    ABS. NEUTROPHILS 6.7 1.8 - 9.5 K/UL Final    ABS. MIXED CELLS 0.3 0.0 - 2.3 K/uL Final    ABS. LYMPHOCYTES 2.5 1.1 - 5.9 K/UL Final     Comment: Test performed at [place] [address] Location. Results Reviewed by Medical Director. DF AUTOMATED   Final           Assessment:     1. Malignant neoplasm of lower-outer quadrant of left breast of female, estrogen receptor positive ([facility] [address]    2. Vitamin D deficiency    3. Symptomatic anemia    4. Hb-SS disease without crisis ([facility] [address]    5. Primary osteoarthritis involving multiple joints      Plan:   Invasive ductal carcinoma of the left breast: The patient is status post modified radical mastectomy and continues to do well. Clinically there is no evidence of disease recurrence. Her most recent CA-27-29 level  was 28.4 from 6/20/2018. I will recheck her CA-27-29 level at this time. If there is a significant increase her value, we will order CT scans of the chest, abdomen, and pelvis. Vitamin D deficiency: She previously completed a 12 week prescription of Ergocalciferol 50,000 units weekly. At this time I will check her vitamin D level. She was advised to begin taking an oral vitamin D and calcium supplement once daily. Sickle cell disease, SS: Clinically the patient is relatively stable at this time she has not had any recent pain crises. I have recommended that the patient continued to remain well-hydrated and to keep her body warm doing these winter months to minimize the onset of vaso-occlusive pain crises. Thrombocytopenia: I have explained to the patient that her CBC from today shows that her platelet count is normal at 188,000. Therapeutic intervention is not warranted unless her platelets decline below 15,000. Iron deficiency anemia: CBC will sent out for interpretation. Her most recent ferritin level  was 1595 ng/mL.   Her iron saturation was 50% and his circulating iron level was 126 mcg/dL. At this point most of her anemia is related to her underlying sickle cell disease. I will recheck her iron profile and ferritin levels at this time. The patient was advised to continue to hold off on using oral iron supplementation. Lymphocytosis: The total WBC count is currently 9.5 with 70 % neutrophils and 28% lymphocytes. I will continue to see her in clinic in 4 months.   Orders Placed This Encounter    COMPLETE CBC & AUTO DIFF WBC    RETICULOCYTE COUNT     Standing Status:   Future     Number of Occurrences:   1     Standing Expiration Date:   11/3/2019    InHouse CBC (for; to (do) Centers)     Standing Status:   Future     Number of Occurrences:   1     Standing Expiration Date:   07/2/1150    METABOLIC PANEL, COMPREHENSIVE     Standing Status:   Future     Number of Occurrences:   1     Standing Expiration Date:   11/3/2019    IRON PROFILE     Standing Status:   Future     Number of Occurrences:   1     Standing Expiration Date:   11/3/2019    CA 27.29     Standing Status:   Future     Number of Occurrences:   1     Standing Expiration Date:   11/3/2019    VITAMIN D, 25 HYDROXY     Standing Status:   Future     Number of Occurrences:   1     Standing Expiration Date:   11/3/2019    FERRITIN     Standing Status:   Future     Number of Occurrences:   1     Standing Expiration Date:   11/3/2019       Juwan Zuñiga MD  6/19/2018

## 2018-11-03 LAB
25(OH)D3+25(OH)D2 SERPL-MCNC: 30.7 NG/ML (ref 30–100)
ALBUMIN SERPL-MCNC: 4.3 G/DL (ref 3.5–5.5)
ALBUMIN/GLOB SERPL: 1.6 {RATIO} (ref 1.2–2.2)
ALP SERPL-CCNC: 174 IU/L (ref 39–117)
ALT SERPL-CCNC: 21 IU/L (ref 0–32)
AST SERPL-CCNC: 30 IU/L (ref 0–40)
BILIRUB SERPL-MCNC: 2.1 MG/DL (ref 0–1.2)
BUN SERPL-MCNC: 8 MG/DL (ref 6–24)
BUN/CREAT SERPL: 7 (ref 9–23)
CALCIUM SERPL-MCNC: 9 MG/DL (ref 8.7–10.2)
CANCER AG27-29 SERPL-ACNC: 37.8 U/ML (ref 0–38.6)
CHLORIDE SERPL-SCNC: 104 MMOL/L (ref 96–106)
CO2 SERPL-SCNC: 24 MMOL/L (ref 20–29)
CREAT SERPL-MCNC: 1.15 MG/DL (ref 0.57–1)
FERRITIN SERPL-MCNC: 2474 NG/ML (ref 15–150)
GLOBULIN SER CALC-MCNC: 2.7 G/DL (ref 1.5–4.5)
GLUCOSE SERPL-MCNC: 89 MG/DL (ref 65–99)
IRON SATN MFR SERPL: 27 % (ref 15–55)
IRON SERPL-MCNC: 59 UG/DL (ref 27–159)
POTASSIUM SERPL-SCNC: 4.1 MMOL/L (ref 3.5–5.2)
PROT SERPL-MCNC: 7 G/DL (ref 6–8.5)
RETICS/RBC NFR AUTO: 7.3 % (ref 0.6–2.6)
SODIUM SERPL-SCNC: 142 MMOL/L (ref 134–144)
TIBC SERPL-MCNC: 216 UG/DL (ref 250–450)
UIBC SERPL-MCNC: 157 UG/DL (ref 131–425)

## 2018-11-26 ENCOUNTER — PATIENT OUTREACH (OUTPATIENT)
Dept: CASE MANAGEMENT | Age: 53
End: 2018-11-26

## 2019-01-14 ENCOUNTER — HOSPITAL ENCOUNTER (OUTPATIENT)
Dept: ONCOLOGY | Age: 54
Discharge: HOME | End: 2019-01-14

## 2019-01-14 ENCOUNTER — OFFICE VISIT (OUTPATIENT)
Dept: ONCOLOGY | Age: 54
End: 2019-01-14

## 2019-01-14 VITALS
TEMPERATURE: 97.5 F | WEIGHT: 221 LBS | HEIGHT: 67 IN | HEART RATE: 69 BPM | DIASTOLIC BLOOD PRESSURE: 78 MMHG | RESPIRATION RATE: 18 BRPM | BODY MASS INDEX: 34.69 KG/M2 | SYSTOLIC BLOOD PRESSURE: 133 MMHG | OXYGEN SATURATION: 96 %

## 2019-01-14 DIAGNOSIS — E55.9 VITAMIN D DEFICIENCY: ICD-10-CM

## 2019-01-14 DIAGNOSIS — Z17.0 MALIGNANT NEOPLASM OF LOWER-OUTER QUADRANT OF LEFT BREAST OF FEMALE, ESTROGEN RECEPTOR POSITIVE (HCC): ICD-10-CM

## 2019-01-14 DIAGNOSIS — C50.512 MALIGNANT NEOPLASM OF LOWER-OUTER QUADRANT OF LEFT BREAST OF FEMALE, ESTROGEN RECEPTOR POSITIVE (HCC): ICD-10-CM

## 2019-01-14 DIAGNOSIS — D69.6 THROMBOCYTOPENIA (HCC): ICD-10-CM

## 2019-01-14 DIAGNOSIS — D57.1 HB-SS DISEASE WITHOUT CRISIS (HCC): ICD-10-CM

## 2019-01-14 DIAGNOSIS — D64.9 SYMPTOMATIC ANEMIA: ICD-10-CM

## 2019-01-14 DIAGNOSIS — E83.19 IRON OVERLOAD: Primary | ICD-10-CM

## 2019-01-14 LAB
BASO+EOS+MONOS # BLD AUTO: 0.3 K/UL (ref 0–2.3)
BASO+EOS+MONOS NFR BLD AUTO: 5 % (ref 0.1–17)
DIFFERENTIAL METHOD BLD: ABNORMAL
ERYTHROCYTE [DISTWIDTH] IN BLOOD BY AUTOMATED COUNT: 13.8 % (ref 11.5–14.5)
HCT VFR BLD AUTO: 28.8 % (ref 36–48)
HGB BLD-MCNC: 10.4 G/DL (ref 12–16)
LYMPHOCYTES # BLD: 2 K/UL (ref 1.1–5.9)
LYMPHOCYTES NFR BLD: 33 % (ref 14–44)
MCH RBC QN AUTO: 30.9 PG (ref 25–35)
MCHC RBC AUTO-ENTMCNC: 36.1 G/DL (ref 31–37)
MCV RBC AUTO: 85.5 FL (ref 78–102)
NEUTS SEG # BLD: 3.9 K/UL (ref 1.8–9.5)
NEUTS SEG NFR BLD: 63 % (ref 40–70)
PLATELET # BLD AUTO: 106 K/UL (ref 140–440)
RBC # BLD AUTO: 3.37 M/UL (ref 4.1–5.1)
WBC # BLD AUTO: 6.2 K/UL (ref 4.5–13)

## 2019-01-14 NOTE — PROGRESS NOTES
Hematology/Oncology  Progress Note    Name: Zuleima Black  Date: 2019  : 1965    Oswaldo Roy MD     Ms. Melba Catherine is a 48year old female who was seen for management of her triple-negative invasive ductal adenocarcinoma, left breast.  Current therapy: Active surveillance; the patient has previously completed systemic chemotherapy and radiation treatment. Subjective:     Ms. Melba Catherine is a 55-year-old [facility] American woman with a history of triple-negative breast cancer, involving the left breast.  The patient reports that she is doing much better. She is continuing to use her cane for ambulation since she had a left total hip replacement. She is not experiencing a significant degree of pain at this time. She is doing much better psychologically wise and is not requiring the use of antianxiety medication as much. The patient was previously hospitalized with significant symptomatic anemia. She required transfusion of multiple units of packed red blood cells. She was discharged from the hospital on 10/22/2018 and states that she has done much better since that time. She has no new complaints or concerns at this time. She reports that the vaso-occlusive pain crisis occur about 3-4 times a year. She is overdue for her annual mammography and plan to get it done within the next 3-4 weeks. Past medical history, family history, and social history: these were reviewed and remains unchanged.     Past Medical History:   Diagnosis Date    Anemia NEC     Arthritis     Chronic pain     Ductal carcinoma ([facility] [address]     left breast    Fatigue     Fibromyalgia     GERD (gastroesophageal reflux disease)     Hx of endometriosis     Hypertension 2011    Ill-defined condition 2018    Sickle cell crisis    Osteoarthritis of left hip 2016    Osteoarthritis of right hip 1/3/2017    Osteoarthritis of right knee 2018    Sickle cell anemia (HCC)     Sleep apnea     Does not use CPAP    Thyroid disease     hypo     Past Surgical History:   Procedure Laterality Date    HX BREAST BIOPSY Left 2016    HX  SECTION      HX HERNIA REPAIR      HX LAP CHOLECYSTECTOMY      HX MASTECTOMY Left 2012    with axillary lymph node dissection    TOTAL HIP ARTHROPLASTY Bilateral 2016 & 2017     Social History     Socioeconomic History    Marital status:      Spouse name: Not on file    Number of children: Not on file    Years of education: Not on file    Highest education level: Not on file   Social Needs    Financial resource strain: Not on file    Food insecurity - worry: Not on file    Food insecurity - inability: Not on file   Advaction needs - medical: Not on file   Advaction needs - non-medical: Not on file   Occupational History    Not on file   Tobacco Use    Smoking status: Former Smoker     Packs/day: 0.25     Years: 30.00     Pack years: 7.50     Last attempt to quit: 2011     Years since quittin.0    Smokeless tobacco: Former User   Substance and Sexual Activity    Alcohol use: Yes     Comment: 2 times yearly    Drug use: No    Sexual activity: Not Currently   Other Topics Concern    Not on file   Social History Narrative    Not on file     Family History   Problem Relation Age of Onset    Cancer Mother         breast    Diabetes Mother     Heart Disease Father     Diabetes Father     Sickle Cell Anemia Brother      Current Outpatient Medications   Medication Sig Dispense Refill    potassium chloride (K-DUR, KLOR-CON) 20 mEq tablet Take 1 Tab by mouth daily. 5 Tab 0    OTHER CBC, BMP in 1 week  Dx: sickle call disease   Fax results to Dr. Danika Alex and Dr. Canelo Toscano 1 Each 0    HYDROmorphone (DILAUDID) 2 mg tablet Take 1-2 Tabs by mouth every four (4) hours as needed for Pain. Max Daily Amount: 24 mg. 40 Tab 0    metoprolol tartrate (LOPRESSOR) 50 mg tablet 50 mg.      thyroid, Pork, (ARMOUR THYROID) 30 mg tablet Take 30 mg by mouth daily.  ergocalciferol (ERGOCALCIFEROL) 50,000 unit capsule Take 1 Cap by mouth every seven (7) days. 12 Cap 0    oxyCODONE ER (OXYCONTIN) 20 mg ER tablet Take 1 Tab by mouth two (2) times a day. Max Daily Amount: 40 mg. (Patient taking differently: Take 20 mg by mouth two (2) times a day. 0600 & 1800) 60 Tab 0    naloxegol (MOVANTIK) 12.5 mg tab tablet Take 12.5 mg by mouth daily.  folic acid (FOLVITE) 1 mg tablet Take 1 mg by mouth daily. Review of Systems  Constitutional: The patient has no acute distress or discomfort. HEENT: The patient denies recent head trauma, eye pain, blurred vision,  hearing deficit, oropharyngeal mucosal pain or lesions, and the patient denies throat pain or discomfort. Chest wall: The patient complained of having several small nodular areas over her left anterior chest wall surgical site for which she is aware it may represent recurrent breast cancer. Lymphatics: The patient denies palpable peripheral lymphadenopathy. Hematologic: The patient denies having bruising, bleeding, or progressive fatigue. Respiratory: Patient denies having shortness of breath, cough, sputum production, fever, or dyspnea on exertion. Cardiovascular: The patient denies having leg pain, leg swelling, heart palpitations, chest permit, chest pain, or lightheadedness. The patient denies having dyspnea on exertion. Gastrointestinal: The patient denies having nausea, emesis, or diarrhea. The patient denies having any hematemesis or blood in the stool. Genitourinary: Patient denies having urinary urgency, frequency, or dysuria. The patient denies having blood in the urine. Psychological: The patient denies having symptoms of nervousness, anxiety, depression, or thoughts of harming himself some of this. Skin: Patient denies having skin rashes, skin, ulcerations, or unexplained itching or pruritus. Musculoskeletal: She denies muscle or joint aches/pain.       Objective:     Visit Vitals  /78 Pulse 69   Temp 97.5 °F (36.4 °C) (Oral)   Resp 18   Ht 5' 6.5\" (1.689 m)   Wt 100.2 kg (221 lb)   SpO2 96%   BMI 35.14 kg/m²     ECOG PS=0, pain score=0/10     Physical Exam:   Gen. Appearance: The patient is in no acute distress. Skin: There is no bruise or rash. HEENT: The exam is unremarkable. Neck: Supple without lymphadenopathy or thyromegaly. Lungs: Clear to auscultation and percussion; there are no wheezes or rhonchi. Heart: Regular rate and rhythm; there are no murmurs, gallops, or rubs. Anterior chest wall and breast: The right breast shows no mass, nipple discharge, nipple retraction, or skin dimpling. The axilla reveals no palpable axillary lymphadenopathy. The left breast is surgically absent. There is some scarring over the left anterior rib cage and a few small nodular areas are noted over the anterior lateral upper chest wall is well. These have the consistency of fatty deposits however the patient is concerned that this may represent recurrent disease. Abdomen: Bowel sounds are present and normal.  There is no guarding, tenderness, or hepatosplenomegaly. Extremities: There is no clubbing, cyanosis, or edema. Neurologic: There are no focal neurologic deficits. Lymphatics: There is no palpable peripheral lymphadenopathy. Musculoskeletal: The patient has full range of motion at all joints. There is no evidence of joint deformity or effusions. There is no focal joint tenderness. Psychological/psychiatric: There is no clinical evidence of anxiety, depression, or melancholy.     Lab data:      Results for orders placed or performed during the hospital encounter of 01/14/19   CBC WITH 3 PART DIFF     Status: Abnormal   Result Value Ref Range Status    WBC 6.2 4.5 - 13.0 K/uL Final    RBC 3.37 (L) 4.10 - 5.10 M/uL Final    HGB 10.4 (L) 12.0 - 16 g/dL Final    HCT 28.8 (L) 36 - 48 % Final    MCV 85.5 78 - 102 FL Final    MCH 30.9 25.0 - 35.0 PG Final    MCHC 36.1 31 - 37 g/dL Final    RDW 13.8 11.5 - 14.5 % Final    PLATELET 297 (L) 383 - 440 K/uL Final    NEUTROPHILS 63 40 - 70 % Final    MIXED CELLS 5 0.1 - 17 % Final    LYMPHOCYTES 33 14 - 44 % Final    ABS. NEUTROPHILS 3.9 1.8 - 9.5 K/UL Final    ABS. MIXED CELLS 0.3 0.0 - 2.3 K/uL Final    ABS. LYMPHOCYTES 2.0 1.1 - 5.9 K/UL Final     Comment: Test performed at [place] [address] Location. Results Reviewed by Medical Director. DF AUTOMATED   Final           Assessment:     1. Malignant neoplasm of lower-outer quadrant of left breast of female, estrogen receptor positive ([facility] [address]    2. Vitamin D deficiency    3. Symptomatic anemia    4. Hb-SS disease without crisis ([facility] [address]    5. Thrombocytopenia ([facility]    6. Iron overload      Plan:   Invasive ductal carcinoma of the left breast: The patient is status post modified radical mastectomy and continues to do well. Clinically there is no evidence of disease recurrence. Her most recent CA-27-29 level  was 28.4 from 11/2/2018 showed a value of 37.8 units/mL. Vitamin D deficiency: She previously completed a 12 week prescription of Ergocalciferol 50,000 units weekly. At this time I will check her vitamin D level. She was advised to begin taking an oral vitamin D and calcium supplement once daily. Sickle cell disease, SS: Clinically the patient is relatively stable at this time she has not had any recent pain crises. I have recommended that the patient continued to remain well-hydrated and to keep her body warm doing these winter months to minimize the onset of vaso-occlusive pain crises. Thrombocytopenia: I have explained to the patient that her CBC from today shows that her platelet count is currently 106,000. Therapeutic intervention is not warranted unless her platelets decline below 15,000. Iron deficiency anemia: CBC will sent out for interpretation. Her most recent ferritin level  was 2479 ng/mL, from 11/2/2018.   Her iron saturation was 50% and his circulating iron level was 126 mcg/dL. The current CBC shows that her hemoglobin is 10.4 g/dL with hematocrit of 28.8%. I will recheck her iron profile and ferritin levels at this time. The patient was advised to continue to hold off on using oral iron supplementation. Iron overload (new problem): At this time I am recommending starting the patient on Jadenu to reduce the ferritin level below 1000. We will submit a prescription to the specialty pharmacy for the medication. Lymphocytosis: The total WBC count is currently 6.2 with 63 % neutrophils and 33 % lymphocytes. I will continue to see her in clinic in 4 months.   Orders Placed This Encounter    COMPLETE CBC & AUTO DIFF WBC    InHouse CBC ([facility])     Standing Status:   Future     Number of Occurrences:   1     Standing Expiration Date:   2/49/3011    METABOLIC PANEL, COMPREHENSIVE     Standing Status:   Future     Standing Expiration Date:   1/15/2020    IRON PROFILE     Standing Status:   Future     Standing Expiration Date:   1/15/2020    VITAMIN D, 25 HYDROXY     Standing Status:   Future     Standing Expiration Date:   1/15/2020    FERRITIN     Standing Status:   Future     Standing Expiration Date:   1/15/2020    CA 27.29     Standing Status:   Future     Standing Expiration Date:   1/15/2020       Marcia Sharp MD  6/19/2018

## 2019-01-16 RX ORDER — DEFERASIROX 360 MG/1
14 TABLET, FILM COATED ORAL DAILY
Qty: 120 TAB | Refills: 1 | Status: SHIPPED | OUTPATIENT
Start: 2019-01-16 | End: 2019-03-18 | Stop reason: SDUPTHER

## 2019-01-28 ENCOUNTER — TELEPHONE (OUTPATIENT)
Dept: ONCOLOGY | Age: 54
End: 2019-01-28

## 2019-01-28 NOTE — TELEPHONE ENCOUNTER
Patient said she has received the medication Graciella Des in the mail from [facility] and was told to call the doctor's office before taking this med. Please call patient back at [phone] with instructions.

## 2019-03-08 ENCOUNTER — TELEPHONE (OUTPATIENT)
Dept: ONCOLOGY | Age: 54
End: 2019-03-08

## 2019-03-08 NOTE — TELEPHONE ENCOUNTER
Patient said she feels the signs that she needs an Outpatient Blood Transfusion, and she said Dr. Zeeshan Perez has told her to call us & let us know when she has a problem. Please call her to discuss.

## 2019-03-18 DIAGNOSIS — E83.19 IRON OVERLOAD: ICD-10-CM

## 2019-03-18 DIAGNOSIS — D57.1 HB-SS DISEASE WITHOUT CRISIS (HCC): ICD-10-CM

## 2019-03-19 RX ORDER — DEFERASIROX 360 MG/1
TABLET, FILM COATED ORAL
Qty: 120 TAB | Refills: 0 | Status: SHIPPED | OUTPATIENT
Start: 2019-03-19 | End: 2019-05-15

## 2019-04-01 DIAGNOSIS — C50.512 MALIGNANT NEOPLASM OF LOWER-OUTER QUADRANT OF LEFT BREAST OF FEMALE, ESTROGEN RECEPTOR POSITIVE (HCC): ICD-10-CM

## 2019-04-01 DIAGNOSIS — Z17.1 MALIGNANT NEOPLASM OF BREAST IN FEMALE, ESTROGEN RECEPTOR NEGATIVE, UNSPECIFIED LATERALITY, UNSPECIFIED SITE OF BREAST (HCC): ICD-10-CM

## 2019-04-01 DIAGNOSIS — Z17.0 MALIGNANT NEOPLASM OF LOWER-OUTER QUADRANT OF LEFT BREAST OF FEMALE, ESTROGEN RECEPTOR POSITIVE (HCC): ICD-10-CM

## 2019-04-01 DIAGNOSIS — C50.919 MALIGNANT NEOPLASM OF BREAST IN FEMALE, ESTROGEN RECEPTOR NEGATIVE, UNSPECIFIED LATERALITY, UNSPECIFIED SITE OF BREAST (HCC): ICD-10-CM

## 2019-04-01 DIAGNOSIS — D57.1 HB-SS DISEASE WITHOUT CRISIS (HCC): ICD-10-CM

## 2019-04-01 RX ORDER — HYDROMORPHONE HYDROCHLORIDE 2 MG/1
2-4 TABLET ORAL
Qty: 40 TAB | Refills: 0 | Status: CANCELLED | OUTPATIENT
Start: 2019-04-01

## 2019-04-04 DIAGNOSIS — C50.919 MALIGNANT NEOPLASM OF BREAST IN FEMALE, ESTROGEN RECEPTOR NEGATIVE, UNSPECIFIED LATERALITY, UNSPECIFIED SITE OF BREAST (HCC): ICD-10-CM

## 2019-04-04 DIAGNOSIS — Z17.1 MALIGNANT NEOPLASM OF BREAST IN FEMALE, ESTROGEN RECEPTOR NEGATIVE, UNSPECIFIED LATERALITY, UNSPECIFIED SITE OF BREAST (HCC): ICD-10-CM

## 2019-04-04 DIAGNOSIS — Z17.0 MALIGNANT NEOPLASM OF LOWER-OUTER QUADRANT OF LEFT BREAST OF FEMALE, ESTROGEN RECEPTOR POSITIVE (HCC): ICD-10-CM

## 2019-04-04 DIAGNOSIS — C50.512 MALIGNANT NEOPLASM OF LOWER-OUTER QUADRANT OF LEFT BREAST OF FEMALE, ESTROGEN RECEPTOR POSITIVE (HCC): ICD-10-CM

## 2019-04-04 DIAGNOSIS — D57.1 HB-SS DISEASE WITHOUT CRISIS (HCC): ICD-10-CM

## 2019-04-04 RX ORDER — HYDROMORPHONE HYDROCHLORIDE 2 MG/1
2 TABLET ORAL
Qty: 40 TAB | Refills: 0 | Status: SHIPPED | OUTPATIENT
Start: 2019-04-04 | End: 2019-04-18

## 2019-04-05 ENCOUNTER — DOCUMENTATION ONLY (OUTPATIENT)
Dept: ONCOLOGY | Age: 54
End: 2019-04-05

## 2019-04-06 ENCOUNTER — HOSPITAL ENCOUNTER (INPATIENT)
Age: 54
LOS: 3 days | Discharge: HOME | DRG: 812 | End: 2019-04-10
Attending: EMERGENCY MEDICINE | Admitting: INTERNAL MEDICINE
Payer: COMMERCIAL

## 2019-04-06 ENCOUNTER — APPOINTMENT (OUTPATIENT)
Dept: GENERAL RADIOLOGY | Age: 54
DRG: 812 | End: 2019-04-06
Attending: EMERGENCY MEDICINE
Payer: COMMERCIAL

## 2019-04-06 DIAGNOSIS — D72.829 LEUKOCYTOSIS, UNSPECIFIED TYPE: ICD-10-CM

## 2019-04-06 DIAGNOSIS — R06.02 SOB (SHORTNESS OF BREATH): Primary | ICD-10-CM

## 2019-04-06 DIAGNOSIS — D64.9 ANEMIA, UNSPECIFIED TYPE: ICD-10-CM

## 2019-04-06 DIAGNOSIS — N17.9 ACUTE RENAL INJURY (HCC): ICD-10-CM

## 2019-04-06 DIAGNOSIS — D57.00 SICKLE CELL ANEMIA WITH CRISIS (HCC): ICD-10-CM

## 2019-04-06 LAB
ALBUMIN SERPL-MCNC: 4 G/DL (ref 3.4–5)
ALBUMIN/GLOB SERPL: 1 {RATIO} (ref 0.8–1.7)
ALP SERPL-CCNC: 183 U/L (ref 45–117)
ALT SERPL-CCNC: 32 U/L (ref 13–56)
ANION GAP SERPL CALC-SCNC: 7 MMOL/L (ref 3–18)
AST SERPL-CCNC: 55 U/L (ref 15–37)
BILIRUB DIRECT SERPL-MCNC: 2.6 MG/DL (ref 0–0.2)
BILIRUB SERPL-MCNC: 7 MG/DL (ref 0.2–1)
BUN SERPL-MCNC: 24 MG/DL (ref 7–18)
BUN/CREAT SERPL: 13 (ref 12–20)
CALCIUM SERPL-MCNC: 9.3 MG/DL (ref 8.5–10.1)
CHLORIDE SERPL-SCNC: 106 MMOL/L (ref 100–108)
CO2 SERPL-SCNC: 23 MMOL/L (ref 21–32)
CREAT SERPL-MCNC: 1.84 MG/DL (ref 0.6–1.3)
GLOBULIN SER CALC-MCNC: 3.9 G/DL (ref 2–4)
GLUCOSE SERPL-MCNC: 133 MG/DL (ref 74–99)
POTASSIUM SERPL-SCNC: 3.5 MMOL/L (ref 3.5–5.5)
PROT SERPL-MCNC: 7.9 G/DL (ref 6.4–8.2)
RETICS/RBC NFR AUTO: 24.5 % (ref 0.5–2.3)
SODIUM SERPL-SCNC: 136 MMOL/L (ref 136–145)

## 2019-04-06 PROCEDURE — 85025 COMPLETE CBC W/AUTO DIFF WBC: CPT

## 2019-04-06 PROCEDURE — 85045 AUTOMATED RETICULOCYTE COUNT: CPT

## 2019-04-06 PROCEDURE — 93005 ELECTROCARDIOGRAM TRACING: CPT

## 2019-04-06 PROCEDURE — 99284 EMERGENCY DEPT VISIT MOD MDM: CPT

## 2019-04-06 PROCEDURE — 80048 BASIC METABOLIC PNL TOTAL CA: CPT

## 2019-04-06 PROCEDURE — 82150 ASSAY OF AMYLASE: CPT

## 2019-04-06 PROCEDURE — 30233N1 TRANSFUSION OF NONAUTOLOGOUS RED BLOOD CELLS INTO PERIPHERAL VEIN, PERCUTANEOUS APPROACH: ICD-10-PCS | Performed by: EMERGENCY MEDICINE

## 2019-04-06 PROCEDURE — 84484 ASSAY OF TROPONIN QUANT: CPT

## 2019-04-06 PROCEDURE — 80076 HEPATIC FUNCTION PANEL: CPT

## 2019-04-06 PROCEDURE — 83690 ASSAY OF LIPASE: CPT

## 2019-04-06 RX ORDER — SODIUM CHLORIDE 9 MG/ML
250 INJECTION, SOLUTION INTRAVENOUS AS NEEDED
Status: DISCONTINUED | OUTPATIENT
Start: 2019-04-06 | End: 2019-04-10 | Stop reason: HOSPADM

## 2019-04-07 ENCOUNTER — APPOINTMENT (OUTPATIENT)
Dept: GENERAL RADIOLOGY | Age: 54
DRG: 812 | End: 2019-04-07
Attending: EMERGENCY MEDICINE
Payer: COMMERCIAL

## 2019-04-07 PROBLEM — D64.9 ANEMIA: Status: ACTIVE | Noted: 2019-04-07

## 2019-04-07 PROBLEM — D57.00 SICKLE CELL ANEMIA WITH CRISIS (HCC): Status: ACTIVE | Noted: 2019-04-07

## 2019-04-07 PROBLEM — N17.9 ACUTE RENAL INJURY (HCC): Status: ACTIVE | Noted: 2019-04-07

## 2019-04-07 LAB
AMYLASE SERPL-CCNC: 28 U/L (ref 25–115)
ANION GAP SERPL CALC-SCNC: 6 MMOL/L (ref 3–18)
APPEARANCE UR: ABNORMAL
ATRIAL RATE: 77 BPM
ATRIAL RATE: 99 BPM
BACTERIA URNS QL MICRO: ABNORMAL /HPF
BASOPHILS # BLD: 0.3 K/UL (ref 0–0.06)
BASOPHILS NFR BLD: 1 % (ref 0–3)
BILIRUB UR QL: ABNORMAL
BUN SERPL-MCNC: 17 MG/DL (ref 7–18)
BUN/CREAT SERPL: 13 (ref 12–20)
CALCIUM SERPL-MCNC: 8 MG/DL (ref 8.5–10.1)
CALCULATED P AXIS, ECG09: 40 DEGREES
CALCULATED P AXIS, ECG09: 41 DEGREES
CALCULATED R AXIS, ECG10: 13 DEGREES
CALCULATED R AXIS, ECG10: 17 DEGREES
CALCULATED T AXIS, ECG11: 13 DEGREES
CALCULATED T AXIS, ECG11: 9 DEGREES
CHLORIDE SERPL-SCNC: 113 MMOL/L (ref 100–108)
CO2 SERPL-SCNC: 22 MMOL/L (ref 21–32)
COLOR UR: ABNORMAL
CREAT SERPL-MCNC: 1.33 MG/DL (ref 0.6–1.3)
DIAGNOSIS, 93000: NORMAL
DIAGNOSIS, 93000: NORMAL
DIFFERENTIAL METHOD BLD: ABNORMAL
EOSINOPHIL # BLD: 1.1 K/UL (ref 0–0.4)
EOSINOPHIL NFR BLD: 4 % (ref 0–5)
EPITH CASTS URNS QL MICRO: ABNORMAL /LPF (ref 0–5)
ERYTHROCYTE [DISTWIDTH] IN BLOOD BY AUTOMATED COUNT: 16.2 % (ref 11.6–14.5)
GLUCOSE SERPL-MCNC: 105 MG/DL (ref 74–99)
GLUCOSE UR STRIP.AUTO-MCNC: NEGATIVE MG/DL
GRAN CASTS URNS QL MICRO: ABNORMAL /LPF
HCT VFR BLD AUTO: 17.1 % (ref 35–45)
HGB BLD-MCNC: 6 G/DL (ref 12–16)
HGB UR QL STRIP: ABNORMAL
KETONES UR QL STRIP.AUTO: NEGATIVE MG/DL
LACTATE BLD-SCNC: 0.93 MMOL/L (ref 0.4–2)
LEUKOCYTE ESTERASE UR QL STRIP.AUTO: NEGATIVE
LIPASE SERPL-CCNC: 36 U/L (ref 73–393)
LYMPHOCYTES # BLD: 5.8 K/UL (ref 0.8–3.5)
LYMPHOCYTES NFR BLD: 21 % (ref 20–51)
MCH RBC QN AUTO: 30 PG (ref 24–34)
MCHC RBC AUTO-ENTMCNC: 35.1 G/DL (ref 31–37)
MCV RBC AUTO: 85.5 FL (ref 74–97)
MONOCYTES # BLD: 3 K/UL (ref 0–1)
MONOCYTES NFR BLD: 11 % (ref 2–9)
NEUTS BAND NFR BLD MANUAL: 3 % (ref 0–5)
NEUTS SEG # BLD: 17.4 K/UL (ref 1.8–8)
NEUTS SEG NFR BLD: 60 % (ref 42–75)
NITRITE UR QL STRIP.AUTO: NEGATIVE
NRBC BLD-RTO: 20 PER 100 WBC
P-R INTERVAL, ECG05: 136 MS
P-R INTERVAL, ECG05: 152 MS
PH UR STRIP: 5.5 [PH] (ref 5–8)
PLATELET # BLD AUTO: 76 K/UL (ref 135–420)
PLATELET COMMENTS,PCOM: ABNORMAL
PMV BLD AUTO: 11.1 FL (ref 9.2–11.8)
POTASSIUM SERPL-SCNC: 3.8 MMOL/L (ref 3.5–5.5)
PROT UR STRIP-MCNC: 30 MG/DL
Q-T INTERVAL, ECG07: 348 MS
Q-T INTERVAL, ECG07: 392 MS
QRS DURATION, ECG06: 80 MS
QRS DURATION, ECG06: 84 MS
QTC CALCULATION (BEZET), ECG08: 443 MS
QTC CALCULATION (BEZET), ECG08: 446 MS
RBC # BLD AUTO: 2 M/UL (ref 4.2–5.3)
RBC #/AREA URNS HPF: ABNORMAL /HPF (ref 0–5)
RBC MORPH BLD: ABNORMAL
SODIUM SERPL-SCNC: 141 MMOL/L (ref 136–145)
SP GR UR REFRACTOMETRY: 1.01 (ref 1–1.03)
TROPONIN I SERPL-MCNC: <0.02 NG/ML (ref 0–0.04)
UROBILINOGEN UR QL STRIP.AUTO: 2 EU/DL (ref 0.2–1)
VENTRICULAR RATE, ECG03: 77 BPM
VENTRICULAR RATE, ECG03: 99 BPM
WBC # BLD AUTO: 27.6 K/UL (ref 4.6–13.2)
WBC URNS QL MICRO: ABNORMAL /HPF (ref 0–5)

## 2019-04-07 PROCEDURE — 86920 COMPATIBILITY TEST SPIN: CPT

## 2019-04-07 PROCEDURE — 36415 COLL VENOUS BLD VENIPUNCTURE: CPT

## 2019-04-07 PROCEDURE — 83021 HEMOGLOBIN CHROMOTOGRAPHY: CPT

## 2019-04-07 PROCEDURE — 74011250636 HC RX REV CODE- 250/636: Performed by: NURSE PRACTITIONER

## 2019-04-07 PROCEDURE — 86870 RBC ANTIBODY IDENTIFICATION: CPT

## 2019-04-07 PROCEDURE — 74011250636 HC RX REV CODE- 250/636

## 2019-04-07 PROCEDURE — 65270000029 HC RM PRIVATE

## 2019-04-07 PROCEDURE — 86850 RBC ANTIBODY SCREEN: CPT

## 2019-04-07 PROCEDURE — 80048 BASIC METABOLIC PNL TOTAL CA: CPT

## 2019-04-07 PROCEDURE — 86902 BLOOD TYPE ANTIGEN DONOR EA: CPT

## 2019-04-07 PROCEDURE — 74011250636 HC RX REV CODE- 250/636: Performed by: INTERNAL MEDICINE

## 2019-04-07 PROCEDURE — 81001 URINALYSIS AUTO W/SCOPE: CPT

## 2019-04-07 PROCEDURE — 74011000250 HC RX REV CODE- 250: Performed by: HOSPITALIST

## 2019-04-07 PROCEDURE — 87086 URINE CULTURE/COLONY COUNT: CPT

## 2019-04-07 PROCEDURE — 74011250637 HC RX REV CODE- 250/637: Performed by: INTERNAL MEDICINE

## 2019-04-07 PROCEDURE — 86900 BLOOD TYPING SEROLOGIC ABO: CPT

## 2019-04-07 PROCEDURE — 74011000258 HC RX REV CODE- 258: Performed by: EMERGENCY MEDICINE

## 2019-04-07 PROCEDURE — 86880 COOMBS TEST DIRECT: CPT

## 2019-04-07 PROCEDURE — 74011250636 HC RX REV CODE- 250/636: Performed by: EMERGENCY MEDICINE

## 2019-04-07 PROCEDURE — 83605 ASSAY OF LACTIC ACID: CPT

## 2019-04-07 PROCEDURE — 93005 ELECTROCARDIOGRAM TRACING: CPT

## 2019-04-07 PROCEDURE — 87040 BLOOD CULTURE FOR BACTERIA: CPT

## 2019-04-07 PROCEDURE — 87076 CULTURE ANAEROBE IDENT EACH: CPT

## 2019-04-07 PROCEDURE — 74011000258 HC RX REV CODE- 258

## 2019-04-07 PROCEDURE — 86901 BLOOD TYPING SEROLOGIC RH(D): CPT

## 2019-04-07 PROCEDURE — 86970 RBC PRETX INCUBATJ W/CHEMICL: CPT

## 2019-04-07 PROCEDURE — 86978 RBC PRETREATMENT SERUM: CPT

## 2019-04-07 PROCEDURE — 71045 X-RAY EXAM CHEST 1 VIEW: CPT

## 2019-04-07 PROCEDURE — 74011250636 HC RX REV CODE- 250/636: Performed by: HOSPITALIST

## 2019-04-07 RX ORDER — LEVOFLOXACIN 5 MG/ML
750 INJECTION, SOLUTION INTRAVENOUS EVERY 24 HOURS
Status: DISCONTINUED | OUTPATIENT
Start: 2019-04-07 | End: 2019-04-07

## 2019-04-07 RX ORDER — LORAZEPAM 2 MG/ML
0.5 INJECTION INTRAMUSCULAR ONCE
Status: COMPLETED | OUTPATIENT
Start: 2019-04-07 | End: 2019-04-07

## 2019-04-07 RX ORDER — ACETAMINOPHEN 325 MG/1
650 TABLET ORAL
Status: DISCONTINUED | OUTPATIENT
Start: 2019-04-07 | End: 2019-04-10 | Stop reason: HOSPADM

## 2019-04-07 RX ORDER — HEPARIN SODIUM 5000 [USP'U]/ML
5000 INJECTION, SOLUTION INTRAVENOUS; SUBCUTANEOUS EVERY 8 HOURS
Status: DISCONTINUED | OUTPATIENT
Start: 2019-04-07 | End: 2019-04-10 | Stop reason: HOSPADM

## 2019-04-07 RX ORDER — DEXAMETHASONE 4 MG/1
10 TABLET ORAL ONCE
Status: ACTIVE | OUTPATIENT
Start: 2019-04-07 | End: 2019-04-07

## 2019-04-07 RX ORDER — POTASSIUM CHLORIDE 20 MEQ/1
20 TABLET, EXTENDED RELEASE ORAL DAILY
Status: DISCONTINUED | OUTPATIENT
Start: 2019-04-07 | End: 2019-04-10 | Stop reason: HOSPADM

## 2019-04-07 RX ORDER — SODIUM CHLORIDE 9 MG/ML
125 INJECTION, SOLUTION INTRAVENOUS CONTINUOUS
Status: DISCONTINUED | OUTPATIENT
Start: 2019-04-07 | End: 2019-04-09

## 2019-04-07 RX ORDER — DEFERASIROX 360 MG/1
1440 TABLET, FILM COATED ORAL DAILY
Status: DISCONTINUED | OUTPATIENT
Start: 2019-04-07 | End: 2019-04-10 | Stop reason: HOSPADM

## 2019-04-07 RX ORDER — AZITHROMYCIN 250 MG/1
500 TABLET, FILM COATED ORAL
Status: DISCONTINUED | OUTPATIENT
Start: 2019-04-08 | End: 2019-04-07

## 2019-04-07 RX ORDER — MORPHINE SULFATE 4 MG/ML
4 INJECTION INTRAVENOUS
Status: DISCONTINUED | OUTPATIENT
Start: 2019-04-07 | End: 2019-04-10 | Stop reason: HOSPADM

## 2019-04-07 RX ORDER — LORAZEPAM 1 MG/1
0.5 TABLET ORAL ONCE
Status: DISCONTINUED | OUTPATIENT
Start: 2019-04-07 | End: 2019-04-07 | Stop reason: SDUPTHER

## 2019-04-07 RX ORDER — AZITHROMYCIN 250 MG/1
500 TABLET, FILM COATED ORAL DAILY
Status: DISCONTINUED | OUTPATIENT
Start: 2019-04-08 | End: 2019-04-09

## 2019-04-07 RX ORDER — ACETAMINOPHEN 325 MG/1
650 TABLET ORAL ONCE
Status: ACTIVE | OUTPATIENT
Start: 2019-04-07 | End: 2019-04-07

## 2019-04-07 RX ORDER — SODIUM CHLORIDE 0.9 % (FLUSH) 0.9 %
5-10 SYRINGE (ML) INJECTION AS NEEDED
Status: DISCONTINUED | OUTPATIENT
Start: 2019-04-07 | End: 2019-04-10 | Stop reason: HOSPADM

## 2019-04-07 RX ORDER — LEVOTHYROXINE AND LIOTHYRONINE 19; 4.5 UG/1; UG/1
30 TABLET ORAL DAILY
Status: DISCONTINUED | OUTPATIENT
Start: 2019-04-07 | End: 2019-04-10 | Stop reason: HOSPADM

## 2019-04-07 RX ORDER — IBUPROFEN 800 MG/1
800 TABLET ORAL
COMMUNITY
End: 2019-05-15

## 2019-04-07 RX ORDER — VANCOMYCIN/0.9 % SOD CHLORIDE 1 G/100 ML
1000 PLASTIC BAG, INJECTION (ML) INTRAVENOUS ONCE
Status: DISCONTINUED | OUTPATIENT
Start: 2019-04-07 | End: 2019-04-07 | Stop reason: DRUGHIGH

## 2019-04-07 RX ORDER — LORAZEPAM 2 MG/ML
0.5 INJECTION INTRAMUSCULAR
Status: DISCONTINUED | OUTPATIENT
Start: 2019-04-07 | End: 2019-04-10 | Stop reason: HOSPADM

## 2019-04-07 RX ORDER — METOPROLOL TARTRATE 50 MG/1
50 TABLET ORAL 2 TIMES DAILY
Status: DISCONTINUED | OUTPATIENT
Start: 2019-04-07 | End: 2019-04-10 | Stop reason: HOSPADM

## 2019-04-07 RX ORDER — ERGOCALCIFEROL 1.25 MG/1
50000 CAPSULE ORAL
Status: DISCONTINUED | OUTPATIENT
Start: 2019-04-07 | End: 2019-04-10 | Stop reason: HOSPADM

## 2019-04-07 RX ORDER — CEFTRIAXONE 1 G/1
1 INJECTION, POWDER, FOR SOLUTION INTRAMUSCULAR; INTRAVENOUS EVERY 24 HOURS
Status: DISCONTINUED | OUTPATIENT
Start: 2019-04-08 | End: 2019-04-07

## 2019-04-07 RX ORDER — FOLIC ACID 1 MG/1
1 TABLET ORAL DAILY
Status: DISCONTINUED | OUTPATIENT
Start: 2019-04-07 | End: 2019-04-10 | Stop reason: HOSPADM

## 2019-04-07 RX ORDER — DIPHENHYDRAMINE HCL 25 MG
25 CAPSULE ORAL ONCE
Status: ACTIVE | OUTPATIENT
Start: 2019-04-07 | End: 2019-04-07

## 2019-04-07 RX ORDER — SODIUM CHLORIDE 9 MG/ML
250 INJECTION, SOLUTION INTRAVENOUS AS NEEDED
Status: DISCONTINUED | OUTPATIENT
Start: 2019-04-07 | End: 2019-04-10 | Stop reason: HOSPADM

## 2019-04-07 RX ORDER — OXYCODONE HCL 20 MG/1
20 TABLET, FILM COATED, EXTENDED RELEASE ORAL 2 TIMES DAILY
Status: DISCONTINUED | OUTPATIENT
Start: 2019-04-07 | End: 2019-04-10 | Stop reason: HOSPADM

## 2019-04-07 RX ORDER — LEVOFLOXACIN 5 MG/ML
750 INJECTION, SOLUTION INTRAVENOUS
Status: DISCONTINUED | OUTPATIENT
Start: 2019-04-09 | End: 2019-04-07

## 2019-04-07 RX ADMIN — OXYCODONE HYDROCHLORIDE 20 MG: 20 TABLET, FILM COATED, EXTENDED RELEASE ORAL at 09:50

## 2019-04-07 RX ADMIN — LORAZEPAM 0.5 MG: 2 INJECTION INTRAMUSCULAR; INTRAVENOUS at 18:35

## 2019-04-07 RX ADMIN — FOLIC ACID 1 MG: 1 TABLET ORAL at 09:49

## 2019-04-07 RX ADMIN — PIPERACILLIN SODIUM AND TAZOBACTAM SODIUM 4.5 G: 4; .5 INJECTION, POWDER, LYOPHILIZED, FOR SOLUTION INTRAVENOUS at 03:40

## 2019-04-07 RX ADMIN — METOPROLOL TARTRATE 50 MG: 50 TABLET ORAL at 09:49

## 2019-04-07 RX ADMIN — OXYCODONE HYDROCHLORIDE 20 MG: 20 TABLET, FILM COATED, EXTENDED RELEASE ORAL at 17:31

## 2019-04-07 RX ADMIN — MORPHINE SULFATE 4 MG: 4 INJECTION INTRAVENOUS at 21:22

## 2019-04-07 RX ADMIN — METOPROLOL TARTRATE 50 MG: 50 TABLET ORAL at 17:31

## 2019-04-07 RX ADMIN — LEVOTHYROXINE, LIOTHYRONINE 30 MG: 19; 4.5 TABLET ORAL at 09:49

## 2019-04-07 RX ADMIN — ERGOCALCIFEROL 50000 UNITS: 1.25 CAPSULE ORAL at 09:50

## 2019-04-07 RX ADMIN — NALOXEGOL OXALATE 12.5 MG: 12.5 TABLET, FILM COATED ORAL at 09:49

## 2019-04-07 RX ADMIN — SODIUM CHLORIDE 100 ML/HR: 900 INJECTION, SOLUTION INTRAVENOUS at 06:33

## 2019-04-07 RX ADMIN — CEFTRIAXONE SODIUM 1 G: 1 INJECTION, POWDER, FOR SOLUTION INTRAMUSCULAR; INTRAVENOUS at 12:41

## 2019-04-07 RX ADMIN — VANCOMYCIN HYDROCHLORIDE 2250 MG: 10 INJECTION, POWDER, LYOPHILIZED, FOR SOLUTION INTRAVENOUS at 06:57

## 2019-04-07 RX ADMIN — SODIUM CHLORIDE 1000 ML: 900 INJECTION, SOLUTION INTRAVENOUS at 00:12

## 2019-04-07 RX ADMIN — POTASSIUM CHLORIDE 20 MEQ: 20 TABLET, EXTENDED RELEASE ORAL at 09:50

## 2019-04-07 RX ADMIN — LEVOFLOXACIN 750 MG: 750 INJECTION, SOLUTION INTRAVENOUS at 05:20

## 2019-04-07 RX ADMIN — LORAZEPAM 0.5 MG: 2 INJECTION INTRAMUSCULAR; INTRAVENOUS at 08:44

## 2019-04-07 RX ADMIN — SODIUM CHLORIDE 1000 ML: 900 INJECTION, SOLUTION INTRAVENOUS at 00:42

## 2019-04-07 RX ADMIN — LEVOFLOXACIN 750 MG: 750 INJECTION, SOLUTION INTRAVENOUS at 03:45

## 2019-04-07 RX ADMIN — SODIUM CHLORIDE 125 ML/HR: 900 INJECTION, SOLUTION INTRAVENOUS at 21:23

## 2019-04-07 RX ADMIN — SODIUM CHLORIDE 125 ML/HR: 900 INJECTION, SOLUTION INTRAVENOUS at 14:21

## 2019-04-07 RX ADMIN — MORPHINE SULFATE 4 MG: 4 INJECTION INTRAVENOUS at 05:58

## 2019-04-07 NOTE — PROGRESS NOTES
Spoke with patients RN - Lactic acid completed in ED, results not crossing over.   Level was WNL per RN

## 2019-04-07 NOTE — ED TRIAGE NOTES
Patient was feeling sick since Sunday with a sickle cell crisis which was treated at home with PO meds and she was closely followed by her doctor she felt better by Thursday but developed shortness of breath yesterday with shortness of breath since then she states that this normally happens when she is anemic.

## 2019-04-07 NOTE — PROGRESS NOTES
Problem: General Medical Care Plan  Goal: *Vital signs within specified parameters  Outcome: Progressing Towards Goal  Goal: *Labs within defined limits  Outcome: Progressing Towards Goal  Goal: *Absence of infection signs and symptoms  Outcome: Progressing Towards Goal  Goal: *Optimal pain control at patient's stated goal  Outcome: Progressing Towards Goal  Goal: *Skin integrity maintained  Outcome: Progressing Towards Goal  Goal: *Fluid volume balance  Outcome: Progressing Towards Goal  Goal: *Optimize nutritional status  Outcome: Progressing Towards Goal  Goal: *Anxiety reduced or absent  Outcome: Progressing Towards Goal  Goal: *Progressive mobility and function (eg: ADL's)  Outcome: Progressing Towards Goal     Problem: Pain  Goal: *Control of Pain  Outcome: Progressing Towards Goal  Goal: *PALLIATIVE CARE:  Alleviation of Pain  Outcome: Progressing Towards Goal     Problem: Patient Education: Go to Patient Education Activity  Goal: Patient/Family Education  Outcome: Progressing Towards Goal     Problem: Anxiety  Goal: *Alleviation of anxiety  Outcome: Progressing Towards Goal     Problem: Nutritional concerns  Goal: *STG: EXHIBITS IMPROVED NUTRITIONAL INTAKE  Outcome: Progressing Towards Goal

## 2019-04-07 NOTE — PROGRESS NOTES
Bedside and Verbal shift change report received from  [address] (off going nurse). Report included the following information SBAR, Kardex, MAR and Recent Results. Patient had a reaction on vancomycin night nurse informed MD, antibiotics was stopped. A unit of PRBS was ordered for a Hgb of 7.6. Assumed care patient in bed awake. Fall prevention program maintained,call bell and bedside table within reach. No problems identified at this time,no complaints made by patient ,will continue care. - Blood bank called informing that no available blood that matched the patients needs. It ma not be available til 6 pm onwards until [employer] be able to find a match. Will inform MD.      0800- Oncology NP informed regarding blood unavailability situation. 1958- Bedside and Verbal shift change report given to Pete Llanos RN (oncoming nurse) by Gina Herrera RN (offgoing nurse). Report included the following information SBAR, Kardex, MAR and Recent Results.  Including the issue on the blood that needed to be transfuse.

## 2019-04-07 NOTE — ED PROVIDER NOTES
EMERGENCY DEPARTMENT HISTORY AND PHYSICAL EXAM    Date: 4/6/2019  Patient Name: Shahla Alford    History of Presenting Illness     Chief Complaint   Patient presents with    Chest Pain         History Provided By: Patient, Patient's Brother and sister-in-law        Additional History (Context): Shahla Alford is a 48 y.o. female with sickle cell disease who presents with complaint of shortness of breath. Patient says she went into a pain crisis for her sickle cell last week 6 days ago. Kika Calderon that has improved but she said typical for her she had her pain in her legs and in her arms. Denied any chest pain or abdominal pain. Says that after the pain crisis started to improve she began to get short of breath which for her typically means that she be is anemic. Denies wheezing cough or fever. Has had a transfusion previously but not since October 2018. Denies melena hematochezia.     PCP: Vannesa Prakash MD    Current Facility-Administered Medications   Medication Dose Route Frequency Provider Last Rate Last Dose    sodium chloride (NS) flush 5-10 mL  5-10 mL IntraVENous PRN Jacqueline Atwood PA        sodium chloride 0.9 % bolus infusion 859 mL  859 mL IntraVENous ONCE Jacqueline Atwood PA        piperacillin-tazobactam (ZOSYN) 4.5 g in 0.9% sodium chloride (MBP/ADV) 100 mL MBP  4.5 g IntraVENous ONCE Jacqueline Atwood PA        Followed by   Rosa Thomas ON 4/13/2019] piperacillin-tazobactam (ZOSYN) 4.5 g in 0.9% sodium chloride (MBP/ADV) 100 mL MBP  4.5 g IntraVENous Q6H Jacqueline Atwood PA        vancomycin (VANCOCIN) 1000 mg in  ml infusion  1,000 mg IntraVENous ONCE Jacqueline Atwood PA        levoFLOXacin (LEVAQUIN) 750 mg in D5W IVPB  750 mg IntraVENous Q24H Jacqueline Atwood PA        0.9% sodium chloride infusion 250 mL  250 mL IntraVENous PRN Jacqueline Atwood PA         Current Outpatient Medications   Medication Sig Dispense Refill    HYDROmorphone (DILAUDID) 2 mg tablet Take 1 Tab by mouth every four (4) hours as needed for Pain for up to 14 days. Max Daily Amount: 12 mg. 40 Tab 0    JADENU 360 mg tablet Use as directed by prescriber. Take 4 tablets (1440 mg TOTAL) by mouth at approximately the same time once daily on an empty stomach or wit 120 Tab 0    potassium chloride (K-DUR, KLOR-CON) 20 mEq tablet Take 1 Tab by mouth daily. 5 Tab 0    OTHER CBC, BMP in 1 week  Dx: sickle call disease   Fax results to Dr. Duc Flores and Dr. Vargas Loose 1 Each 0    metoprolol tartrate (LOPRESSOR) 50 mg tablet 50 mg.      thyroid, Pork, (ARMOUR THYROID) 30 mg tablet Take 30 mg by mouth daily.  ergocalciferol (ERGOCALCIFEROL) 50,000 unit capsule Take 1 Cap by mouth every seven (7) days. 12 Cap 0    oxyCODONE ER (OXYCONTIN) 20 mg ER tablet Take 1 Tab by mouth two (2) times a day. Max Daily Amount: 40 mg. (Patient taking differently: Take 20 mg by mouth two (2) times a day. 0600 & 1800) 60 Tab 0    naloxegol (MOVANTIK) 12.5 mg tab tablet Take 12.5 mg by mouth daily.  folic acid (FOLVITE) 1 mg tablet Take 1 mg by mouth daily.          Past History     Past Medical History:  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    Fatigue     Fibromyalgia     GERD (gastroesophageal reflux disease)     Hx of endometriosis     Hypertension 2011    Ill-defined condition 2018    Sickle cell crisis    Osteoarthritis of left hip 2016    Osteoarthritis of right hip 1/3/2017    Osteoarthritis of right knee 2018    Sickle cell anemia ([address]     Sleep apnea     Does not use CPAP    Thyroid disease     hypo       Past Surgical History:  Past Surgical History:   Procedure Laterality Date    HX BREAST BIOPSY Left 2016    HX  SECTION      HX HERNIA REPAIR      HX LAP CHOLECYSTECTOMY      HX MASTECTOMY Left 2012    with axillary lymph node dissection    TOTAL HIP ARTHROPLASTY Bilateral  & 2017       Family History:  Family History   Problem Relation Age of Onset   LeeLax Cancer Mother         breast    Diabetes Mother     Heart Disease Father     Diabetes Father     Sickle Cell Anemia Brother        Social History:  Social History     Tobacco Use    Smoking status: Former Smoker     Packs/day: 0.25     Years: 30.00     Pack years: 7.50     Last attempt to quit: 2011     Years since quittin.2    Smokeless tobacco: Former User   Substance Use Topics    Alcohol use: Yes     Comment: 2 times yearly    Drug use: No       Allergies: Allergies   Allergen Reactions    Neulasta [Pegfilgrastim] Other (comments)     \"Put me in a comma for 3 months\"         Review of Systems   Review of Systems   Constitutional: Positive for fatigue. HENT: Negative. Eyes: Negative. Respiratory: Positive for shortness of breath. Cardiovascular: Negative for chest pain and leg swelling. Endocrine: Negative. Genitourinary: Negative. Musculoskeletal: Positive for myalgias. Allergic/Immunologic: Negative. Neurological: Negative. Hematological: Negative. Psychiatric/Behavioral: Negative. All Other Systems Negative  Physical Exam     Vitals:    19 0017 19 0018 19 0055 19 0100   BP:   134/77 139/70   Pulse: (!) 104 (!) 103 97 94   Resp: 18 17 14 11   Temp:       SpO2: 100% 100%     Weight:       Height:         Physical Exam   Constitutional: She is oriented to person, place, and time. She appears well-developed. No distress. HENT:   Head: Normocephalic and atraumatic. Eyes: Pupils are equal, round, and reactive to light. Neck: No JVD present. No tracheal deviation present. No thyromegaly present. Cardiovascular: Normal rate, regular rhythm and normal heart sounds. Exam reveals no gallop and no friction rub. No murmur heard. Pulmonary/Chest: Effort normal and breath sounds normal. No stridor. No respiratory distress. She has no wheezes. She has no rales.  She exhibits no tenderness. Abdominal: Soft. She exhibits no distension and no mass. There is no tenderness. There is no rebound and no guarding. Musculoskeletal: She exhibits no edema or tenderness. Lymphadenopathy:     She has no cervical adenopathy. Neurological: She is alert and oriented to person, place, and time. Skin: Skin is warm and dry. No rash noted. No erythema. No pallor. Psychiatric: She has a normal mood and affect. Her behavior is normal. Thought content normal.   Nursing note and vitals reviewed. Diagnostic Study Results     Labs -     Recent Results (from the past 12 hour(s))   CBC WITH AUTOMATED DIFF    Collection Time: 04/06/19 11:26 PM   Result Value Ref Range    WBC 27.6 (H) 4.6 - 13.2 K/uL    RBC 2.00 (L) 4.20 - 5.30 M/uL    HGB 6.0 (L) 12.0 - 16.0 g/dL    HCT 17.1 (LL) 35.0 - 45.0 %    MCV 85.5 74.0 - 97.0 FL    MCH 30.0 24.0 - 34.0 PG    MCHC 35.1 31.0 - 37.0 g/dL    RDW 16.2 (H) 11.6 - 14.5 %    PLATELET 76 (L) 920 - 420 K/uL    MPV 11.1 9.2 - 11.8 FL    NEUTROPHILS 60 42 - 75 %    BAND NEUTROPHILS 3 0 - 5 %    LYMPHOCYTES 21 20 - 51 %    MONOCYTES 11 (H) 2 - 9 %    EOSINOPHILS 4 0 - 5 %    BASOPHILS 1 0 - 3 %    NRBC 20.0 (H) 0  WBC    ABS. NEUTROPHILS 17.4 (H) 1.8 - 8.0 K/UL    ABS. LYMPHOCYTES 5.8 (H) 0.8 - 3.5 K/UL    ABS. MONOCYTES 3.0 (H) 0 - 1.0 K/UL    ABS. EOSINOPHILS 1.1 (H) 0.0 - 0.4 K/UL    ABS.  BASOPHILS 0.3 (H) 0.0 - 0.06 K/UL    DF MANUAL      PLATELET COMMENTS DECREASED PLATELETS      RBC COMMENTS ANISOCYTOSIS  1+        RBC COMMENTS POLYCHROMASIA  2+        RBC COMMENTS TARGET CELLS  2+        RBC COMMENTS STOMATOCYTES  1+        RBC COMMENTS SICKLE CELLS  FEW       METABOLIC PANEL, BASIC    Collection Time: 04/06/19 11:26 PM   Result Value Ref Range    Sodium 136 136 - 145 mmol/L    Potassium 3.5 3.5 - 5.5 mmol/L    Chloride 106 100 - 108 mmol/L    CO2 23 21 - 32 mmol/L    Anion gap 7 3.0 - 18 mmol/L    Glucose 133 (H) 74 - 99 mg/dL    BUN 24 (H) 7.0 - 18 MG/DL Creatinine 1.84 (H) 0.6 - 1.3 MG/DL    BUN/Creatinine ratio 13 12 - 20      GFR est AA 35 (L) >60 ml/min/1.73m2    GFR est non-AA 29 (L) >60 ml/min/1.73m2    Calcium 9.3 8.5 - 10.1 MG/DL   RETICULOCYTE COUNT    Collection Time: 04/06/19 11:26 PM   Result Value Ref Range    Reticulocyte count 24.5 (H) 0.5 - 2.3 %   AMYLASE    Collection Time: 04/06/19 11:26 PM   Result Value Ref Range    Amylase 28 25 - 115 U/L   LIPASE    Collection Time: 04/06/19 11:26 PM   Result Value Ref Range    Lipase 36 (L) 73 - 393 U/L   TROPONIN I    Collection Time: 04/06/19 11:26 PM   Result Value Ref Range    Troponin-I, QT <0.02 0.0 - 0.045 NG/ML   HEPATIC FUNCTION PANEL    Collection Time: 04/06/19 11:26 PM   Result Value Ref Range    Protein, total 7.9 6.4 - 8.2 g/dL    Albumin 4.0 3.4 - 5.0 g/dL    Globulin 3.9 2.0 - 4.0 g/dL    A-G Ratio 1.0 0.8 - 1.7      Bilirubin, total 7.0 (H) 0.2 - 1.0 MG/DL    Bilirubin, direct 2.6 (H) 0.0 - 0.2 MG/DL    Alk. phosphatase 183 (H) 45 - 117 U/L    AST (SGOT) 55 (H) 15 - 37 U/L    ALT (SGPT) 32 13 - 56 U/L   TYPE + CROSSMATCH    Collection Time: 04/07/19 12:17 AM   Result Value Ref Range    Crossmatch Expiration 04/10/2019     ABO/Rh(D) Vernona Palomo POSITIVE     Antibody screen POS     Physician instructions Irradiation  SICKLEDEX NEGATIVE       Comment       NOTIFIED Jose VELAZCO AT 0130 ON 04/07/2019 FOR DELAY ON PRBCS FOR WORKUP AT [facility]       Radiologic Studies -   XR CHEST PORT    (Results Pending)     CT Results  (Last 48 hours)    None        CXR Results  (Last 48 hours)    None            Medical Decision Making   I am the first provider for this patient. I reviewed the vital signs, available nursing notes, past medical history, past surgical history, family history and social history. Vital Signs-Reviewed the patient's vital signs. Records Reviewed: Nursing Notes    Procedures:  Procedures    Provider Notes (Medical Decision Making):   1 admit to hospitalist.  Transfuse 1 unit. Leukocytosis, renal injury, anemia all contributing but for reasons for her admission tonight. Nothing acute on chest x-ray. MED RECONCILIATION:  Current Facility-Administered Medications   Medication Dose Route Frequency    sodium chloride (NS) flush 5-10 mL  5-10 mL IntraVENous PRN    sodium chloride 0.9 % bolus infusion 859 mL  859 mL IntraVENous ONCE    piperacillin-tazobactam (ZOSYN) 4.5 g in 0.9% sodium chloride (MBP/ADV) 100 mL MBP  4.5 g IntraVENous ONCE    Followed by   Anjel Ojeda ON 4/13/2019] piperacillin-tazobactam (ZOSYN) 4.5 g in 0.9% sodium chloride (MBP/ADV) 100 mL MBP  4.5 g IntraVENous Q6H    vancomycin (VANCOCIN) 1000 mg in  ml infusion  1,000 mg IntraVENous ONCE    levoFLOXacin (LEVAQUIN) 750 mg in D5W IVPB  750 mg IntraVENous Q24H    0.9% sodium chloride infusion 250 mL  250 mL IntraVENous PRN     Current Outpatient Medications   Medication Sig    HYDROmorphone (DILAUDID) 2 mg tablet Take 1 Tab by mouth every four (4) hours as needed for Pain for up to 14 days. Max Daily Amount: 12 mg.  JADENU 360 mg tablet Use as directed by prescriber. Take 4 tablets (1440 mg TOTAL) by mouth at approximately the same time once daily on an empty stomach or wit    potassium chloride (K-DUR, KLOR-CON) 20 mEq tablet Take 1 Tab by mouth daily.  OTHER CBC, BMP in 1 week  Dx: sickle call disease   Fax results to Dr. London Dunn and Dr. Diana Fink    metoprolol tartrate (LOPRESSOR) 50 mg tablet 50 mg.    thyroid, Pork, (ARMOUR THYROID) 30 mg tablet Take 30 mg by mouth daily.  ergocalciferol (ERGOCALCIFEROL) 50,000 unit capsule Take 1 Cap by mouth every seven (7) days.  oxyCODONE ER (OXYCONTIN) 20 mg ER tablet Take 1 Tab by mouth two (2) times a day. Max Daily Amount: 40 mg. (Patient taking differently: Take 20 mg by mouth two (2) times a day. 0600 & 1800)    naloxegol (MOVANTIK) 12.5 mg tab tablet Take 12.5 mg by mouth daily.  folic acid (FOLVITE) 1 mg tablet Take 1 mg by mouth daily. Disposition:  admit      Follow-up Information    None         Current Discharge Medication List            Core Measures:    Critical Care Time:   Critical Care Time:   I have spent 35 minutes of critical care time involved in lab review, consultations with specialist, family decision-making, and documentation. During this entire length of time I was immediately available to the patient.     Critical Care: The reason for providing this level of medical care for this critically ill patient was due a critical illness that impaired one or more vital organ systems such that there was a high probability of imminent or life threatening deterioration in the patients condition. This care involved high complexity decision making to assess, manipulate, and support vital system functions, to treat this degreee vital organ system failure and to prevent further life threatening deterioration of the patients condition. Diagnosis     Clinical Impression:   1. SOB (shortness of breath)    2. Anemia, unspecified type    3. Acute renal injury ([address]    4.  Sickle cell anemia with crisis (HCC)    5. Leukocytosis, unspecified type

## 2019-04-07 NOTE — PROGRESS NOTES
[facility] Hospitalist Group  Progress Note    Patient: Raymon Domínguez Age: 48 y.o. : 1965 MR#: [id number] SSN: [id number]  Date: 2019     Subjective:   Alert and oriented X 3. NAD. Resting comfortably in bed, reports easily fatigued, SOB with ADLs / minimal exertion. Also reporting \"panic attack\" in early AM, states required ativan IV given by oncology NP. Patient states relief of symptoms with ativan. States she suspects \"panic attack\" brought on by IV abx administered early morning. She also received benadryl at that time. No respiratory compromise, no facial / tongue / lips swelling, no rash. Assessment/Plan:     1. SIRS vs Sepsis. Unclear. 2. Sickle cell crisis in patient with sickle cell followed by Dr. Pepe Swanson. 3. Acute on chronic anemia due to sickle cell crisis  4. Acute bronchitis   5. MARIA ALEJANDRA    6. Dehydration   7. Thrombocytopenia   8. hyperglycemia   9. HTN-stable   10. Thyroid disease -armour     PLAN  1. Sepsis protocol initiated in ED. Blood cultures NGTD. CXR with diffuse prominence of the bronchovascular markings, possibly pulmonary vascular congestion versus nonspecific bronchitis. Urinalysis otherwise negative, no urinary symptoms. Will de-escalate IV abx to bronchitis treatment for now. 2. hematology following. Awaiting PRBC from [place], difficult match due to her Ab's. I received a phone call  From the blood bank notifying me that the [facility] states that they have another critical patient with a similar hb and asks which patient I would like to work up first. I explained that I am not the provider for the other patient so I can only speak for my patient who requires the blood transfusion now rather than later, blood bank to proceed with blood matching process. Continue IVF, pain management, oxygen support. 3. Continue IV abx, PRN nebs, incentive spirometer, bronchial hygiene protocol.    4. Continue IVF, trend renal function, avoid nephrotoxic agents. 5. Discussed thrombocytopenia with hematology- continue close monitor. No interventions warranted at this time. 6. SSI, A1c pending. Additional Notes:      Case discussed with:  [x]Patient  []Family  [x]Nursing  [x]Case Management  DVT Prophylaxis:  []Lovenox  [x]Hep SQ  []SCDs  []Coumadin   []On Heparin gtt    Objective:   VS:   Visit Vitals  /72 (BP 1 Location: Right arm, BP Patient Position: At rest)   Pulse 76   Temp 98 °F (36.7 °C)   Resp 18   Ht 5' 6\" (1.676 m)   Wt 94.7 kg (208 lb 12.8 oz)   SpO2 100%   BMI 33.70 kg/m²      Tmax/24hrs: Temp (24hrs), Av.2 °F (37.3 °C), Min:98 °F (36.7 °C), Max:100 °F (37.8 °C)      Intake/Output Summary (Last 24 hours) at 2019 1226  Last data filed at 2019 0659  Gross per 24 hour   Intake 410 ml   Output 400 ml   Net 10 ml       General:  Alert, NAD  Cardiovascular:  RRR  Pulmonary:  LSC throughout; respiratory effort WNL  GI:  +BS in all four quadrants, soft, non-tender  Extremities:  No edema; 2+ dorsalis pedis pulses bilaterally  Neuro: alert and oriented X 3. Labs:    Recent Results (from the past 24 hour(s))   CBC WITH AUTOMATED DIFF    Collection Time: 19 11:26 PM   Result Value Ref Range    WBC 27.6 (H) 4.6 - 13.2 K/uL    RBC 2.00 (L) 4.20 - 5.30 M/uL    HGB 6.0 (L) 12.0 - 16.0 g/dL    HCT 17.1 (LL) 35.0 - 45.0 %    MCV 85.5 74.0 - 97.0 FL    MCH 30.0 24.0 - 34.0 PG    MCHC 35.1 31.0 - 37.0 g/dL    RDW 16.2 (H) 11.6 - 14.5 %    PLATELET 76 (L) 579 - 420 K/uL    MPV 11.1 9.2 - 11.8 FL    NEUTROPHILS 60 42 - 75 %    BAND NEUTROPHILS 3 0 - 5 %    LYMPHOCYTES 21 20 - 51 %    MONOCYTES 11 (H) 2 - 9 %    EOSINOPHILS 4 0 - 5 %    BASOPHILS 1 0 - 3 %    NRBC 20.0 (H) 0  WBC    ABS. NEUTROPHILS 17.4 (H) 1.8 - 8.0 K/UL    ABS. LYMPHOCYTES 5.8 (H) 0.8 - 3.5 K/UL    ABS. MONOCYTES 3.0 (H) 0 - 1.0 K/UL    ABS. EOSINOPHILS 1.1 (H) 0.0 - 0.4 K/UL    ABS.  BASOPHILS 0.3 (H) 0.0 - 0.06 K/UL    DF MANUAL      PLATELET COMMENTS DECREASED PLATELETS      RBC COMMENTS ANISOCYTOSIS  1+        RBC COMMENTS POLYCHROMASIA  2+        RBC COMMENTS TARGET CELLS  2+        RBC COMMENTS STOMATOCYTES  1+        RBC COMMENTS SICKLE CELLS  FEW       METABOLIC PANEL, BASIC    Collection Time: 04/06/19 11:26 PM   Result Value Ref Range    Sodium 136 136 - 145 mmol/L    Potassium 3.5 3.5 - 5.5 mmol/L    Chloride 106 100 - 108 mmol/L    CO2 23 21 - 32 mmol/L    Anion gap 7 3.0 - 18 mmol/L    Glucose 133 (H) 74 - 99 mg/dL    BUN 24 (H) 7.0 - 18 MG/DL    Creatinine 1.84 (H) 0.6 - 1.3 MG/DL    BUN/Creatinine ratio 13 12 - 20      GFR est AA 35 (L) >60 ml/min/1.73m2    GFR est non-AA 29 (L) >60 ml/min/1.73m2    Calcium 9.3 8.5 - 10.1 MG/DL   RETICULOCYTE COUNT    Collection Time: 04/06/19 11:26 PM   Result Value Ref Range    Reticulocyte count 24.5 (H) 0.5 - 2.3 %   AMYLASE    Collection Time: 04/06/19 11:26 PM   Result Value Ref Range    Amylase 28 25 - 115 U/L   LIPASE    Collection Time: 04/06/19 11:26 PM   Result Value Ref Range    Lipase 36 (L) 73 - 393 U/L   TROPONIN I    Collection Time: 04/06/19 11:26 PM   Result Value Ref Range    Troponin-I, QT <0.02 0.0 - 0.045 NG/ML   HEPATIC FUNCTION PANEL    Collection Time: 04/06/19 11:26 PM   Result Value Ref Range    Protein, total 7.9 6.4 - 8.2 g/dL    Albumin 4.0 3.4 - 5.0 g/dL    Globulin 3.9 2.0 - 4.0 g/dL    A-G Ratio 1.0 0.8 - 1.7      Bilirubin, total 7.0 (H) 0.2 - 1.0 MG/DL    Bilirubin, direct 2.6 (H) 0.0 - 0.2 MG/DL    Alk.  phosphatase 183 (H) 45 - 117 U/L    AST (SGOT) 55 (H) 15 - 37 U/L    ALT (SGPT) 32 13 - 56 U/L   EKG, 12 LEAD, INITIAL    Collection Time: 04/06/19 11:33 PM   Result Value Ref Range    Ventricular Rate 99 BPM    Atrial Rate 99 BPM    P-R Interval 136 ms    QRS Duration 80 ms    Q-T Interval 348 ms    QTC Calculation (Bezet) 446 ms    Calculated P Axis 40 degrees    Calculated R Axis 13 degrees    Calculated T Axis 9 degrees    Diagnosis       Normal sinus rhythm  Minimal voltage criteria for LVH, may be normal variant  Borderline ECG  When compared with ECG of 18-OCT-2018 02:09,  No significant change was found     CULTURE, BLOOD    Collection Time: 04/07/19 12:15 AM   Result Value Ref Range    Special Requests: NO SPECIAL REQUESTS      Culture result: NO GROWTH AFTER 4 HOURS     TYPE + CROSSMATCH    Collection Time: 04/07/19 12:17 AM   Result Value Ref Range    Crossmatch Expiration 04/10/2019     ABO/Rh(D) O POSITIVE     Antibody screen POS     Physician instructions Irradiation  SICKLEDEX NEGATIVE       Comment       NOTIFIED Gema Cori VELAZCO AT 0130 ON 04/07/2019 FOR DELAY ON PRBCS FOR WORKUP AT [facility]   POC LACTIC ACID    Collection Time: 04/07/19 12:18 AM   Result Value Ref Range    Lactic Acid (POC) 0.93 0.40 - 2.00 mmol/L   CULTURE, BLOOD    Collection Time: 04/07/19 12:30 AM   Result Value Ref Range    Special Requests: NO SPECIAL REQUESTS      Culture result: NO GROWTH AFTER 4 HOURS     URINALYSIS W/ RFLX MICROSCOPIC    Collection Time: 04/07/19  3:04 AM   Result Value Ref Range    Color DARK YELLOW      Appearance CLOUDY      Specific gravity 1.014 1.005 - 1.030      pH (UA) 5.5 5.0 - 8.0      Protein 30 (A) NEG mg/dL    Glucose NEGATIVE  NEG mg/dL    Ketone NEGATIVE  NEG mg/dL    Bilirubin SMALL (A) NEG      Blood SMALL (A) NEG      Urobilinogen 2.0 (H) 0.2 - 1.0 EU/dL    Nitrites NEGATIVE  NEG      Leukocyte Esterase NEGATIVE  NEG     URINE MICROSCOPIC ONLY    Collection Time: 04/07/19  3:04 AM   Result Value Ref Range    WBC 4 to 10 0 - 5 /hpf    RBC 0 to 3 0 - 5 /hpf    Epithelial cells 2+ 0 - 5 /lpf    Bacteria 1+ (A) NEG /hpf    Granular cast 4 to 10 NEG /lpf   EKG, 12 LEAD, INITIAL    Collection Time: 04/07/19 10:46 AM   Result Value Ref Range    Ventricular Rate 77 BPM    Atrial Rate 77 BPM    P-R Interval 152 ms    QRS Duration 84 ms    Q-T Interval 392 ms    QTC Calculation (Bezet) 443 ms    Calculated P Axis 41 degrees    Calculated R Axis 17 degrees    Calculated T Axis 13 degrees    Diagnosis       Normal sinus rhythm  Normal ECG  When compared with ECG of 06-APR-2019 23:33,  No significant change was found         Signed By: Pedro Swartz NP     April 7, 2019

## 2019-04-07 NOTE — PROGRESS NOTES
Hematology / Oncology Consult Note      Reason for Consultation:  Sugar Siegel is a 48 y.o. female who I've been asked to consult for assistance with the management of her vaso-occlusive sickle cell pain crisis. Subjective:     HPI:     Ms. Edith Cantu is a 48year old female who Dr. 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 Currently she is under our active surveillance; the patient has previously completed systemic chemotherapy and radiation treatment. In addition she has a PMH of Hb-SS Disease, Thrombocytopenia, Vitamin D Deficiency as well as iron overload. The patient was admitted on 4/7/19 with chief c/o shortness of breath. The patient states that she went into a pain crisis-pain in her legs and in her arms- during the crisis last week 6 days ago. In the ED she was found to have Leukocytosis cx's were drawn and IV abx were started. Her Hgb 6.0 was and 1 unit PRBC has been ordered;unfortunately due to her Ab's the [facility] cross has not been able to find a match thus far. On admission: WBC 27.6, Hgb 6.0, Hct 17.1, Platelet count 23,110 Ret count 24.5  Sodium 136, Potassium 3.5, Cl 106, Creatinine 1.84, Calcium 9.3, Troponin<0.02, Iron 59, TIBC 216, Iron % sat 27. Ferritin 2.474. CXR 4/7/19  Impression:    Diffuse prominence of the bronchovascular markings, possibly pulmonary vascular  congestion versus nonspecific bronchitis. Today the patient states that her pain well controlled with her present pain medication regimen. During my assessment the patient began to tell me about a reaction she had in the past with Neulasta and that she began to have similar symptoms this morning while receiving Vancomycin. As she explained to me why the vancomycin was stopped by the nurse this morning she began to say she felt more short of breath and felt like her heart was racing, she denied chest pain.  As I talked her through, she began to calm and stated that her symptoms were better. Review of Systems:   Constitutional: Positive for fatigue,decreased appetite. Negative for fever, chills, diaphoresis. HENT: Negative for nosebleeds, congestion, facial swelling, mouth sores, trouble swallowing, neck pain, neck stiffness, voice change and postnasal drip. Eyes: Negative for photophobia, pain, discharge and itching. Respiratory: Negative for apnea, cough, choking, chest tightness, wheezing and stridor. Cardiovascular: Negative for chest pain, palpitations and leg swelling. Gastrointestinal: Negative for abdominal pain. Negative for nausea, diarrhea, constipation, blood in stool and rectal pain. Genitourinary: Negative for dysuria, urgency, hematuria, flank pain and difficulty urinating. Musculoskeletal: Positive for joint pain in arms and legs. Skin: Negative for color change, pallor, rash and wound. Neurological:  Negative for dizziness, facial asymmetry, speech difficulty, light-headedness and headaches. Hematological: Negative for adenopathy. Does not bruise/bleed easily. Psychiatric/Behavioral: positive for anxiety. Physical Assessment:     Visit Vitals  /74 (BP 1 Location: Right arm, BP Patient Position: At rest)   Pulse 94   Temp 99.8 °F (37.7 °C)   Resp 16   Ht 5' 6\" (1.676 m)   Wt 94.7 kg (208 lb 12.8 oz)   SpO2 98%   BMI 33.70 kg/m²       Temp (24hrs), Av.5 °F (37.5 °C), Min:99.1 °F (37.3 °C), Max:100 °F (37.8 °C)      Physical Exam:    General: Appears comfortable, has 02 via NC in place.   HEENT:  Anicteric sclerae; pink palpebral conjunctivae; mucosa moist  Resp:  Symmetrical chest expansion, no accessory muscle use; good airway entry; no rales/ wheezing/ rhonchi noted  CV:  S1, S2 present; regular rate and rhythm  GI:  Abdomen soft, non-tender; (+) active bowel sounds  Extremities:  +2 pulses on all extremities; no edema/ cyanosis/ clubbing noted  Skin:  Warm; no rashes/ lesions noted  Neurologic: Non-focal        Assessment:   · Vaso-Occlusive Sickle Cell Anemia Pain crisis  · Hb- SS disease  · Malignant neoplasm of lower-outer quadrant of left breast of female, estrogen receptor positive (HCC)   · Iron Overload        Plan:   · Sickle Cell Anemia: Continue Folvite 1mg daily, continue IVF's, I have increased from 100ml/h to 125ml/h, 02 supplementation as needed. The patient has not been on Hydrea, we will address at discharge. I will check her Hgb Fractionation today. Encourage IS. Awaiting PRBC from [employer] presently due to her Ab's they have been unable to find a match. I received a phone call  From the blood bank notifying me that the [facility] states that they have another critical patient with a similar hb and asks which patient I would like to work up first. I explained that I am not priivy to the \"other patient\" and am not able to make that call. I have recommended that they speak with the primary. I will add premeds to her PRBC order. · Continue oxycodone ER 20mg Q12h, Mrophine 4mg prn. · I ordered Ativan 0.5mg now to help alleviate anxiety. As well as Stat EKG, as her leads were off. Also I ordered 02 to be placed, STAT. · [id number], Tachy 107, WBC 27.6: Blood Cx no growth X 4 hours, Urine cx results are pending. Pt is presently on broad spectrum antibx per primary. · Iron Overload: The patient has presently been taking Jadenu to reduce the ferritin level below 1000. Her Iron is 59, TIBC 216, Iron % sat 27 and ferritin is 2,374 and we recommending to continue her Jadenu (home med) while in patient. The patients  will bring in today. · Invasive ductal carcinoma of the left breast:Her most recent CA-27-29 level  was 28.4 from 11/2/2018 showed a value of 37.8 units/mL. · Continue current care.      Past Medical History:   Diagnosis Date    Anemia NEC     Arthritis     Chronic pain     Ductal carcinoma [facility] [address] 2011    left breast    Fatigue     Fibromyalgia     GERD (gastroesophageal reflux disease)     Hx of endometriosis     Hypertension 2011    Ill-defined condition 2018    Sickle cell crisis    Osteoarthritis of left hip 2016    Osteoarthritis of right hip 1/3/2017    Osteoarthritis of right knee 2018    Sickle cell anemia (HCC)     Sleep apnea     Does not use CPAP    Thyroid disease     hypo     Past Surgical History:   Procedure Laterality Date    HX BREAST BIOPSY Left 2016    HX  SECTION      HX HERNIA REPAIR      HX LAP CHOLECYSTECTOMY      HX MASTECTOMY Left 2012    with axillary lymph node dissection    TOTAL HIP ARTHROPLASTY Bilateral  &        Family History   Problem Relation Age of Onset    Cancer Mother         breast    Diabetes Mother     Heart Disease Father     Diabetes Father     Sickle Cell Anemia Brother      Allergies   Allergen Reactions    Neulasta [Pegfilgrastim] Other (comments)     \"Put me in a comma for 3 months\"       Home Medications:   Home Meds reviewed with patient    Hospital Medications:   Current hospital medications reviewed    Labs:  Recent Labs     19  2326   WBC 27.6*   RBC 2.00*   HCT 17.1*   MCV 85.5   MCH 30.0   MCHC 35.1   RDW 16.2*       Recent Labs     19  2326   CO2 23   BUN 24*       No results for input(s): ALBUMIN in the last 72 hours. No lab exists for component: TOTMT, [facility]    Thank you for requesting us to consult on your patient. We appreciate the opportunity to participate in your patient's care. Please call if you have questions. Aminta Elliott NP     HEMATOLOGY/ONCOLOGY ATTENDING's NOTE  Patient was seen and examined today. I review clinical, laboratory and imaging data. Agree with above note and assessment and plan from Prince Liz NP.   *Transfuse pRBCs when available (Has alloantibodies)  *Add Hydrea at least 500 mg PO daily (Before discharge when counts stable) to be titrated up by primary hematologist . Thank you for the consult.   Parmjit Hitchcock will follow tomorrow.

## 2019-04-08 LAB
ALBUMIN SERPL-MCNC: 3.3 G/DL (ref 3.4–5)
ALBUMIN/GLOB SERPL: 1.1 {RATIO} (ref 0.8–1.7)
ALP SERPL-CCNC: 129 U/L (ref 45–117)
ALT SERPL-CCNC: 20 U/L (ref 13–56)
ANION GAP SERPL CALC-SCNC: 7 MMOL/L (ref 3–18)
AST SERPL-CCNC: 38 U/L (ref 15–37)
BACTERIA SPEC CULT: ABNORMAL
BACTERIA SPEC CULT: ABNORMAL
BASOPHILS # BLD: 0 K/UL (ref 0–0.06)
BASOPHILS NFR BLD: 0 % (ref 0–3)
BILIRUB SERPL-MCNC: 4.3 MG/DL (ref 0.2–1)
BUN SERPL-MCNC: 14 MG/DL (ref 7–18)
BUN/CREAT SERPL: 10 (ref 12–20)
CALCIUM SERPL-MCNC: 8.6 MG/DL (ref 8.5–10.1)
CHLORIDE SERPL-SCNC: 111 MMOL/L (ref 100–108)
CO2 SERPL-SCNC: 22 MMOL/L (ref 21–32)
CREAT SERPL-MCNC: 1.41 MG/DL (ref 0.6–1.3)
DIFFERENTIAL METHOD BLD: ABNORMAL
EOSINOPHIL # BLD: 0.2 K/UL (ref 0–0.4)
EOSINOPHIL NFR BLD: 1 % (ref 0–5)
ERYTHROCYTE [DISTWIDTH] IN BLOOD BY AUTOMATED COUNT: 18.1 % (ref 11.6–14.5)
GLOBULIN SER CALC-MCNC: 3.1 G/DL (ref 2–4)
GLUCOSE SERPL-MCNC: 84 MG/DL (ref 74–99)
HCT VFR BLD AUTO: 13.3 % (ref 35–45)
HCT VFR BLD AUTO: 13.6 % (ref 35–45)
HGB BLD-MCNC: 4.5 G/DL (ref 12–16)
HGB BLD-MCNC: 4.6 G/DL (ref 12–16)
LYMPHOCYTES # BLD: 5.3 K/UL (ref 0.8–3.5)
LYMPHOCYTES NFR BLD: 28 % (ref 20–51)
MCH RBC QN AUTO: 29.6 PG (ref 24–34)
MCHC RBC AUTO-ENTMCNC: 33.8 G/DL (ref 31–37)
MCV RBC AUTO: 87.5 FL (ref 74–97)
MONOCYTES # BLD: 1.5 K/UL (ref 0–1)
MONOCYTES NFR BLD: 8 % (ref 2–9)
NEUTS BAND NFR BLD MANUAL: 10 % (ref 0–5)
NEUTS SEG # BLD: 11.9 K/UL (ref 1.8–8)
NEUTS SEG NFR BLD: 53 % (ref 42–75)
NRBC BLD-RTO: 51 PER 100 WBC
PLATELET # BLD AUTO: 89 K/UL (ref 135–420)
PLATELET COMMENTS,PCOM: ABNORMAL
PMV BLD AUTO: 11.2 FL (ref 9.2–11.8)
POTASSIUM SERPL-SCNC: 4 MMOL/L (ref 3.5–5.5)
PROT SERPL-MCNC: 6.4 G/DL (ref 6.4–8.2)
RBC # BLD AUTO: 1.52 M/UL (ref 4.2–5.3)
RBC MORPH BLD: ABNORMAL
SERVICE CMNT-IMP: ABNORMAL
SODIUM SERPL-SCNC: 140 MMOL/L (ref 136–145)
WBC # BLD AUTO: 18.9 K/UL (ref 4.6–13.2)

## 2019-04-08 PROCEDURE — 74011250637 HC RX REV CODE- 250/637: Performed by: NURSE PRACTITIONER

## 2019-04-08 PROCEDURE — 74011250636 HC RX REV CODE- 250/636: Performed by: NURSE PRACTITIONER

## 2019-04-08 PROCEDURE — 36430 TRANSFUSION BLD/BLD COMPNT: CPT

## 2019-04-08 PROCEDURE — 77030027138 HC INCENT SPIROMETER -A

## 2019-04-08 PROCEDURE — P9040 RBC LEUKOREDUCED IRRADIATED: HCPCS

## 2019-04-08 PROCEDURE — 74011250637 HC RX REV CODE- 250/637: Performed by: INTERNAL MEDICINE

## 2019-04-08 PROCEDURE — 74011000250 HC RX REV CODE- 250: Performed by: HOSPITALIST

## 2019-04-08 PROCEDURE — 85660 RBC SICKLE CELL TEST: CPT

## 2019-04-08 PROCEDURE — 85025 COMPLETE CBC W/AUTO DIFF WBC: CPT

## 2019-04-08 PROCEDURE — 74011250636 HC RX REV CODE- 250/636: Performed by: HOSPITALIST

## 2019-04-08 PROCEDURE — 85018 HEMOGLOBIN: CPT

## 2019-04-08 PROCEDURE — 74011250636 HC RX REV CODE- 250/636: Performed by: INTERNAL MEDICINE

## 2019-04-08 PROCEDURE — 80053 COMPREHEN METABOLIC PANEL: CPT

## 2019-04-08 PROCEDURE — 36415 COLL VENOUS BLD VENIPUNCTURE: CPT

## 2019-04-08 PROCEDURE — 65270000029 HC RM PRIVATE

## 2019-04-08 PROCEDURE — 74011250637 HC RX REV CODE- 250/637: Performed by: HOSPITALIST

## 2019-04-08 RX ORDER — DIPHENHYDRAMINE HCL 25 MG
25 CAPSULE ORAL
Status: DISCONTINUED | OUTPATIENT
Start: 2019-04-08 | End: 2019-04-10 | Stop reason: HOSPADM

## 2019-04-08 RX ORDER — DEXAMETHASONE 4 MG/1
4 TABLET ORAL ONCE
Status: COMPLETED | OUTPATIENT
Start: 2019-04-08 | End: 2019-04-08

## 2019-04-08 RX ADMIN — METOPROLOL TARTRATE 50 MG: 50 TABLET ORAL at 08:37

## 2019-04-08 RX ADMIN — CEFTRIAXONE SODIUM 1 G: 1 INJECTION, POWDER, FOR SOLUTION INTRAMUSCULAR; INTRAVENOUS at 13:49

## 2019-04-08 RX ADMIN — ACETAMINOPHEN 650 MG: 325 TABLET ORAL at 08:16

## 2019-04-08 RX ADMIN — DIPHENHYDRAMINE HYDROCHLORIDE 25 MG: 25 CAPSULE ORAL at 16:47

## 2019-04-08 RX ADMIN — SODIUM CHLORIDE 125 ML/HR: 900 INJECTION, SOLUTION INTRAVENOUS at 05:27

## 2019-04-08 RX ADMIN — NALOXEGOL OXALATE 12.5 MG: 12.5 TABLET, FILM COATED ORAL at 08:38

## 2019-04-08 RX ADMIN — AZITHROMYCIN 500 MG: 250 TABLET, FILM COATED ORAL at 08:38

## 2019-04-08 RX ADMIN — MORPHINE SULFATE 4 MG: 4 INJECTION INTRAVENOUS at 17:03

## 2019-04-08 RX ADMIN — DEXAMETHASONE 4 MG: 4 TABLET ORAL at 16:47

## 2019-04-08 RX ADMIN — LEVOTHYROXINE, LIOTHYRONINE 30 MG: 19; 4.5 TABLET ORAL at 08:38

## 2019-04-08 RX ADMIN — LORAZEPAM 0.5 MG: 2 INJECTION INTRAMUSCULAR; INTRAVENOUS at 13:50

## 2019-04-08 RX ADMIN — FOLIC ACID 1 MG: 1 TABLET ORAL at 08:37

## 2019-04-08 RX ADMIN — MORPHINE SULFATE 4 MG: 4 INJECTION INTRAVENOUS at 05:30

## 2019-04-08 RX ADMIN — METOPROLOL TARTRATE 50 MG: 50 TABLET ORAL at 19:59

## 2019-04-08 RX ADMIN — ACETAMINOPHEN 650 MG: 325 TABLET ORAL at 16:47

## 2019-04-08 RX ADMIN — OXYCODONE HYDROCHLORIDE 20 MG: 20 TABLET, FILM COATED, EXTENDED RELEASE ORAL at 08:37

## 2019-04-08 RX ADMIN — OXYCODONE HYDROCHLORIDE 20 MG: 20 TABLET, FILM COATED, EXTENDED RELEASE ORAL at 21:41

## 2019-04-08 RX ADMIN — POTASSIUM CHLORIDE 20 MEQ: 20 TABLET, EXTENDED RELEASE ORAL at 08:37

## 2019-04-08 RX ADMIN — LORAZEPAM 0.5 MG: 2 INJECTION INTRAMUSCULAR; INTRAVENOUS at 20:11

## 2019-04-08 RX ADMIN — SODIUM CHLORIDE 125 ML/HR: 900 INJECTION, SOLUTION INTRAVENOUS at 21:41

## 2019-04-08 NOTE — PROGRESS NOTES
Hematology/Medical Oncology Progress Note             Name: Rebecca Chandler   : 1965   MRN: [id number]   Date: 2019 7:46 AM     [x]I have reviewed the flowsheet and previous days notes. Events overnight reviewed and discussed with nursing staff. Vital signs and records reviewed. Ms. Cano is a 48year old female who Dr. Oz Damian follows for the management of her triple-negative invasive ductal adenocarcinoma, left breast. Currently she is under our active surveillance; This admissionis due to her Vaso-Occlusive Sickle Cell Pain crisis. Pt states that her pain is well controlled. No new c/o              ROS:  Constitutional: Positive for fatigue. Negative for fever, chills, diaphoresis, activity change, appetite change and unexpected weight change. HENT: Negative for nosebleeds, congestion, facial swelling, mouth sores, trouble swallowing, neck pain, neck stiffness, voice change and postnasal drip. Eyes: Negative for photophobia, pain, discharge and itching. Respiratory: Negative for apnea, cough, choking, chest tightness, wheezing and stridor. Cardiovascular: Negative for chest pain, palpitations and leg swelling. Gastrointestinal: Negative for abdominal pain. Negative for nausea, diarrhea, constipation, blood in stool and rectal pain. Genitourinary: Negative for dysuria, urgency, hematuria, flank pain and difficulty urinating. Musculoskeletal: pain in joints at all exts. Skin: Negative for color change, pallor, rash and wound. Neurological:  Negative for dizziness, facial asymmetry, speech difficulty, light-headedness and headaches. Hematological: Negative for adenopathy. Does not bruise/bleed easily. Psychiatric/Behavioral: Negative for hallucinations, confusion, disturbed wake/sleep cycle and agitation.        Vital Signs:    Visit Vitals  /76 (BP 1 Location: Right arm, BP Patient Position: At rest)   Pulse (!) 101   Temp 97.8 °F (36.6 °C)   Resp 18   Ht 5' 6\" (1.676 m) Wt 86.2 kg (190 lb)   SpO2 92%   Breastfeeding? No   BMI 30.67 kg/m²       O2 Device: Room air       Temp (24hrs), Av.6 °F (37 °C), Min:97.8 °F (36.6 °C), Max:99.3 °F (37.4 °C)       Intake/Output:   Last shift:      No intake/output data recorded. Last 3 shifts:  1901 -  0700  In: 2247.5 [P.O.:480; I.V.:1767.5]  Out: 1950 [Urine:1950]    Intake/Output Summary (Last 24 hours) at 2019 0746  Last data filed at 2019 0543  Gross per 24 hour   Intake 1837.5 ml   Output 1550 ml   Net 287.5 ml       Physical Exam:  General: Appears comfortable, NAD.   HEENT:  Anicteric sclerae; pink palpebral conjunctivae; mucosa moist  Resp:  Symmetrical chest expansion, no accessory muscle use; good airway entry; no rales/ wheezing/ rhonchi noted  CV:  S1, S2 present; tachy rate and regular rhythm  GI:  Abdomen soft, non-tender; (+) active bowel sounds  Extremities:  +2 pulses on all extremities; no edema/ cyanosis/ clubbing noted  Skin:  Warm; no rashes/ lesions noted  Neurologic:  Non-focal            DATA:   Current Facility-Administered Medications   Medication Dose Route Frequency    sodium chloride (NS) flush 5-10 mL  5-10 mL IntraVENous PRN    folic acid (FOLVITE) tablet 1 mg  1 mg Oral DAILY    naloxegol (MOVANTIK) tablet 12.5 mg  12.5 mg Oral DAILY    oxyCODONE ER (OxyCONTIN) tablet 20 mg  20 mg Oral BID    ergocalciferol capsule 50,000 Units  50,000 Units Oral Q7D    thyroid (Pork) (ARMOUR) tablet 30 mg  30 mg Oral DAILY    metoprolol tartrate (LOPRESSOR) tablet 50 mg  50 mg Oral BID    potassium chloride (K-DUR, KLOR-CON) SR tablet 20 mEq  20 mEq Oral DAILY    deferasirox (JADENU) tablet 1,440 mg (Patient Supplied)  1,440 mg Oral DAILY    acetaminophen (TYLENOL) tablet 650 mg  650 mg Oral Q4H PRN    morphine injection 4 mg  4 mg IntraVENous Q4H PRN    heparin (porcine) injection 5,000 Units  5,000 Units SubCUTAneous Q8H    0.9% sodium chloride infusion  125 mL/hr IntraVENous CONTINUOUS    0.9% sodium chloride infusion 250 mL  250 mL IntraVENous PRN    LORazepam (ATIVAN) injection 0.5 mg  0.5 mg IntraVENous Q6H PRN    azithromycin (ZITHROMAX) tablet 500 mg  500 mg Oral DAILY    cefTRIAXone (ROCEPHIN) 1 g in sterile water (preservative free) 10 mL IV syringe  1 g IntraVENous Q24H    0.9% sodium chloride infusion 250 mL  250 mL IntraVENous PRN                    Labs:  Recent Labs     04/08/19  0320 04/06/19  2326   WBC 18.9* 27.6*   HGB 4.5* 6.0*   HCT 13.3* 17.1*   PLT 89* 76*     Recent Labs     04/08/19  0320 04/07/19  1350 04/06/19  2326    141 136   K 4.0 3.8 3.5   * 113* 106   CO2 22 22 23   GLU 84 105* 133*   BUN 14 17 24*   CREA 1.41* 1.33* 1.84*   CA 8.6 8.0* 9.3   ALB 3.3*  --  4.0   SGOT 38*  --  55*   ALT 20  --  32     No results for input(s): PH, PCO2, PO2, HCO3, FIO2 in the last 72 hours. IMPRESSION:   · Vaso-Occlusive Sickle Cell Anemia Pain crisis  · Hb- SS disease  · Malignant neoplasm of lower-outer quadrant of left breast of female, estrogen receptor positive (HCC)   · Iron Overload          PLAN:   · Continue IVF's, Folvite 1mg daily. 02 supplementation as needed. · Continue present pain management regime  · Hgb critical at 4.5, still awaiting PRBC from [place]. Delay 2/2 Ab's. I now recommend 2 units. · Blood Cx neg X 1 day, Urine Cxs pending. · Platelet count 28,052, no intervention warranted at this time.  ·        The patient is: [x] acutely ill Risk of deterioration: [] moderate    [] critically ill  [] high         My assessment/plan was discussed with:  [x]nursing []PT/OT    []respiratory therapy [x]Dr.Lloyd Henson Officer, MD      []family []       Gladis Talley NP

## 2019-04-08 NOTE — PROGRESS NOTES
Bedside shift change report given to Stella Dawn (oncoming nurse) by Jose Sanchez (offgoing nurse). Report included the following information SBAR, Kardex and MAR.   0800  Received report that patients h and h is critically low. Nursing supervisor notified that patient is critical and needs to be moved off [address] 0830  Pt has sob and dizziness when attempting to get oob. Instructed pt not to get oob .   0900  Mews score is due to increased HR. Tylenol given for low grade fever. 1040  VSS at this time. Pain 6/10 after pain medication. 1300  Pt requesting ativan for anxiety. Will discuss with md. Platelets 89. Holding heparin  1400  [facility]ed [facility] about blood. They stated it will be here in an hour by . 1500  Fluids running. Awaiting blood. Spoke to nursing supervisor and pt will remain on [address] as long as she remains stable. 1700  Patient premedicated for transfusion   Patient tolerating well. VSS. Will continue to monitor. Bedside shift change report given to Jose Sanchez (oncoming nurse) by Stella Dawn (offgoing nurse). Report included the following information SBAR, Kardex and MAR.

## 2019-04-08 NOTE — PROGRESS NOTES
Problem: General Medical Care Plan  Goal: *Vital signs within specified parameters  Outcome: Progressing Towards Goal  Goal: *Labs within defined limits  Outcome: Progressing Towards Goal  Goal: *Absence of infection signs and symptoms  Outcome: Progressing Towards Goal  Goal: *Optimal pain control at patient's stated goal  Outcome: Progressing Towards Goal  Goal: *Skin integrity maintained  Outcome: Progressing Towards Goal  Goal: *Fluid volume balance  Outcome: Progressing Towards Goal  Goal: *Optimize nutritional status  Outcome: Progressing Towards Goal  Goal: *Anxiety reduced or absent  Outcome: Progressing Towards Goal  Goal: *Progressive mobility and function (eg: ADL's)  Outcome: Progressing Towards Goal     Problem: Pain  Goal: *Control of Pain  Outcome: Progressing Towards Goal  Goal: *PALLIATIVE CARE:  Alleviation of Pain  Outcome: Progressing Towards Goal     Problem: Patient Education: Go to Patient Education Activity  Goal: Patient/Family Education  Outcome: Progressing Towards Goal     Problem: Anxiety  Goal: *Alleviation of anxiety  Outcome: Progressing Towards Goal     Problem: Nutritional concerns  Goal: *STG: EXHIBITS IMPROVED NUTRITIONAL INTAKE  Outcome: Progressing Towards Goal     Problem: Pressure Injury - Risk of  Goal: *Prevention of pressure injury  Description  Document Aguilar Scale and appropriate interventions in the flowsheet. Outcome: Progressing Towards Goal     Problem: Patient Education: Go to Patient Education Activity  Goal: Patient/Family Education  Outcome: Progressing Towards Goal     Problem: Falls - Risk of  Goal: *Absence of Falls  Description  Document Guillermina Kalata Fall Risk and appropriate interventions in the flowsheet.   Outcome: Progressing Towards Goal     Problem: Patient Education: Go to Patient Education Activity  Goal: Patient/Family Education  Outcome: Progressing Towards Goal

## 2019-04-08 NOTE — PROGRESS NOTES
Reason for Admission:  Anemia [D64.9]  Sickle cell anemia with crisis ([facility] [address] [D57.00]  Acute renal injury ([facility] [address] [N17.9]  Leukocytosis [D72.829]  Sickle cell crisis ([facility] [address] [D57.00]                 RRAT Score:   16           Plan for utilizing home health:    Patient has no home health orders in place at this time. This writer will continue to closely monitor for potential home health needs and orders. Likelihood of Readmission:   Moderate                         Do you (patient/family) have any concerns for transition/discharge? Patient has no concerns regarding her discharge. Transition of Care Plan:   Patient will return home with help from her family. Patient's  will transport home at the time of discharge. Initial assessment completed with patient. Cognitive status of patient: Alert and oriented. Face sheet information confirmed:  yes. The patient designates her  Anabel Mack to participate in her discharge plan and to receive any needed information. This patient lives in a house with her  and her daughter. Patient is able to navigate steps as needed. Prior to hospitalization, patient was considered to be independent with ADLs/IADLS : yes . Patient has a current ACP document on file: no     Patient's  Anabel Galaviz) will be available to transport patient home upon discharge. The patient already has a raised toilet seat and a walker available in the home. Patient is not currently active with home health. Patient has stayed in a skilled nursing facility or rehab. Was  stay within last 60 days : no. Currently, the discharge plan is for patient to return home with help from his family. Family will assist with care, as needed, post discharge. Patient's  will transport home at the time of discharge. Patient's  is AZAR Byrd#[phone] and [employer]#[phone]). Patient's PCP is [facility] [employer].  Patient is insured through [employer] and she also has [employer] PPO. The patient states that she can obtain her medications from the pharmacy, and take her medications as directed. This writer will continue to closely monitor for discharge planning to ensure a safe discharge home from [place]. Care Management Interventions  PCP Verified by CM: Yes(Dr. Chester Curtis)  Palliative Care Criteria Met (RRAT>21 & CHF Dx)?: No  Mode of Transport at Discharge: Other (see comment)( will transport home. )  Transition of Care Consult (CM Consult): Discharge Planning  Current Support Network: Lives with Spouse  Confirm Follow Up Transport: Family  Plan discussed with Pt/Family/Caregiver: Yes  Discharge Location  Discharge Placement: Home with family assistance        Quincy Quiroga MSW  Care Manager  Pager#: [phone]

## 2019-04-08 NOTE — PROGRESS NOTES
conducted an initial consultation and Spiritual Assessment for Thien Lemon, who is a 48 y.o.,female. Patients Primary Language is: [place]. According to the patients EMR Worship Affiliation is: [facility].     The reason the Patient came to the hospital is:   Patient Active Problem List    Diagnosis Date Noted    Vitamin D deficiency 05/19/2015     Priority: 1 - One    Sickle cell anemia with crisis ([address] 04/07/2019    Anemia 04/07/2019    Acute renal injury ([address] 04/07/2019    Shortness of breath 10/18/2018    Osteoarthritis of right knee 06/28/2018    Leukocytosis 02/20/2017    Osteoarthritis of right hip 01/03/2017    Choledocholithiasis 11/07/2016    Cholecystitis with cholelithiasis 11/07/2016    Thrombocytopenia ([address] 08/22/2016    Acute blood loss anemia 08/22/2016    Osteoarthritis of left hip 08/17/2016    Breast cancer of lower-outer quadrant of left female breast ([address] 07/01/2016    Iron deficiency anemia due to dietary causes 07/01/2016    Osteoarthritis 04/08/2016    Sickle cell crisis ([address] 08/24/2013    Fibromyalgia     Hx of endometriosis     Dehydration 11/16/2012    Symptomatic anemia 08/20/2012    Breast cancer ([address] 08/20/2012    Sickle cell anemia ([address] 08/20/2012        The  provided the following Interventions:  Initiated a relationship of care and support. Provided information about Spiritual Care Services. Chart reviewed. The following outcomes where achieved:  Patient expressed gratitude for 's visit. Assessment:  Patient does not have any Hindu/cultural needs that will affect patients preferences in health care. There are no spiritual or Hindu issues which require intervention at this time. Plan:  Chaplains will continue to follow and will provide pastoral care on an as needed/requested basis.    recommends bedside caregivers page  on duty if patient shows signs of acute spiritual or emotional distress.     [address]  ([phone])

## 2019-04-08 NOTE — PROGRESS NOTES
04/08/19 0640   Critical Result Types   Type of Critical Result Laboratory   Critical Lab Result Types   Critical Lab Value Hemoglobin & Hematocrit   Hemoglobin & Hematocrit Value 4.5/13.3   Notification Information   Notified By (Name) Jose G Pitt in lab  (info given to patient's primary nurse, Desean Perkins RN)   Time of Critical Result Notification 7184   Verbal Readback Provided Yes     1495 Primary nurse, Desean Perkins RN, paged hospitalist. Awaiting reply. [facility], notified nursing supervisor, Chago Ross, of possible need for step down bed. Primary nurse reported patient gets very short of breath on exertion. 2 L via NC on.  Sats 92%    Patient Vitals for the past 12 hrs:   Temp Pulse Resp BP SpO2   04/08/19 0401 97.8 °F (36.6 °C) (!) 101 18 132/76 92 %   04/07/19 2308 99.2 °F (37.3 °C) 98 18 119/71 --   04/07/19 2030 99.3 °F (37.4 °C) 80 18 105/68 99 %

## 2019-04-08 NOTE — ROUTINE PROCESS
Bedside and Verbal shift change report given to Ginger Garcia RN   (oncoming nurse) by Apurva Avilez RN (offgoing nurse). Report included the following information SBAR, Kardex, Intake/Output, MAR and Recent Results.

## 2019-04-08 NOTE — PROGRESS NOTES
[facility] Hospitalist Group  Progress Note    Patient: Dinesh Abbott Age: 48 y.o. : 1965 MR#: [id number]: [id number]  Date: 2019     Subjective:   Symptoms of light headedness from lying to sitting. Bedrest for now. Blood products are in the hospital blood bank and approximately another hour before it can be administered. NAD. No complaints. Assessment/Plan:   1. SIRS vs Sepsis. Unclear. 2. Sickle cell crisis in patient with sickle cell followed by Dr. Dieudonne Kelly. 3. Acute on chronic anemia due to sickle cell crisis  4. Acute bronchitis   5. MARIA ALEJANDRA    6. Dehydration   7. Thrombocytopenia   8. hyperglycemia   9. HTN-stable   10. Thyroid disease -armour      PLAN  1. Sepsis protocol initiated in ED. Blood cultures NGTD. CXR with diffuse prominence of the bronchovascular markings, possibly pulmonary vascular congestion versus nonspecific bronchitis. Urinalysis otherwise negative, no urinary symptoms. 2. hematology following. Awaiting PRBC from [place], difficult match due to her Ab's. Blood products are in the hospital blood bank. Will transfuse this evening. 3. Continue IV abx, PRN nebs, incentive spirometer, bronchial hygiene protocol. 4. Continue to monitor CBC  5. No interventions warranted at this time for thrombocytopenia. 6. SSI       Case discussed with:  [x]Patient  [x]Family  []Nursing  []Case Management  DVT Prophylaxis:  []Lovenox  []Hep SQ  []SCDs  []Coumadin   []On Heparin gtt    Objective:   VS:   Visit Vitals  BP 99/64 (BP 1 Location: Right arm, BP Patient Position: At rest)   Pulse 75   Temp 98.8 °F (37.1 °C)   Resp 19   Ht 5' 6\" (1.676 m)   Wt 86.2 kg (190 lb)   SpO2 99%   Breastfeeding?  No   BMI 30.67 kg/m²      Tmax/24hrs: Temp (24hrs), Av.2 °F (37.3 °C), Min:97.8 °F (36.6 °C), Max:101.6 °F (38.7 °C)      Intake/Output Summary (Last 24 hours) at 2019 1538  Last data filed at 2019 0807  Gross per 24 hour   Intake 1837.5 ml   Output 1950 ml   Net -112.5 ml       General:  Alert, NAD  Cardiovascular:  RRR  Pulmonary:  LSC throughout; respiratory effort WNL  GI:  +BS in all four quadrants, soft, non-tender  Extremities:  No edema; 2+ dorsalis pedis pulses bilaterally  Neuro: alert and oriented    Family contact: mother at bedside    Labs:    Recent Results (from the past 24 hour(s))   METABOLIC PANEL, COMPREHENSIVE    Collection Time: 04/08/19  3:20 AM   Result Value Ref Range    Sodium 140 136 - 145 mmol/L    Potassium 4.0 3.5 - 5.5 mmol/L    Chloride 111 (H) 100 - 108 mmol/L    CO2 22 21 - 32 mmol/L    Anion gap 7 3.0 - 18 mmol/L    Glucose 84 74 - 99 mg/dL    BUN 14 7.0 - 18 MG/DL    Creatinine 1.41 (H) 0.6 - 1.3 MG/DL    BUN/Creatinine ratio 10 (L) 12 - 20      GFR est AA 47 (L) >60 ml/min/1.73m2    GFR est non-AA 39 (L) >60 ml/min/1.73m2    Calcium 8.6 8.5 - 10.1 MG/DL    Bilirubin, total 4.3 (H) 0.2 - 1.0 MG/DL    ALT (SGPT) 20 13 - 56 U/L    AST (SGOT) 38 (H) 15 - 37 U/L    Alk. phosphatase 129 (H) 45 - 117 U/L    Protein, total 6.4 6.4 - 8.2 g/dL    Albumin 3.3 (L) 3.4 - 5.0 g/dL    Globulin 3.1 2.0 - 4.0 g/dL    A-G Ratio 1.1 0.8 - 1.7     CBC WITH AUTOMATED DIFF    Collection Time: 04/08/19  3:20 AM   Result Value Ref Range    WBC 18.9 (H) 4.6 - 13.2 K/uL    RBC 1.52 (L) 4.20 - 5.30 M/uL    HGB 4.5 (LL) 12.0 - 16.0 g/dL    HCT 13.3 (LL) 35.0 - 45.0 %    MCV 87.5 74.0 - 97.0 FL    MCH 29.6 24.0 - 34.0 PG    MCHC 33.8 31.0 - 37.0 g/dL    RDW 18.1 (H) 11.6 - 14.5 %    PLATELET 89 (L) 310 - 420 K/uL    MPV 11.2 9.2 - 11.8 FL    NEUTROPHILS 53 42 - 75 %    BAND NEUTROPHILS 10 (H) 0 - 5 %    LYMPHOCYTES 28 20 - 51 %    MONOCYTES 8 2 - 9 %    EOSINOPHILS 1 0 - 5 %    BASOPHILS 0 0 - 3 %    NRBC 51.0 (H) 0  WBC    ABS. NEUTROPHILS 11.9 (H) 1.8 - 8.0 K/UL    ABS. LYMPHOCYTES 5.3 (H) 0.8 - 3.5 K/UL    ABS. MONOCYTES 1.5 (H) 0 - 1.0 K/UL    ABS. EOSINOPHILS 0.2 0.0 - 0.4 K/UL    ABS.  BASOPHILS 0.0 0.0 - 0.06 K/UL    DF MANUAL      PLATELET COMMENTS DECREASED PLATELETS      RBC COMMENTS POLYCHROMASIA  2+        RBC COMMENTS SCHISTOCYTES  2+        RBC COMMENTS TARGET CELLS  1+        RBC COMMENTS ANISOCYTOSIS  1+        RBC COMMENTS SICKLE CELLS  OCCASIONAL       HGB & HCT    Collection Time: 04/08/19  9:30 AM   Result Value Ref Range    HGB 4.6 (LL) 12.0 - 16.0 g/dL    HCT 13.6 (LL) 35.0 - 45.0 %       Signed By: Lauren Paula NP     April 8, 2019

## 2019-04-08 NOTE — HOME CARE
Rounded on this \"[facility]\" patient, explained about [facility] services offered and left patient a brochure on [facility]. ANTHONY AUGUSTIN.

## 2019-04-09 LAB
BASOPHILS # BLD: 0 K/UL (ref 0–0.06)
BASOPHILS NFR BLD: 0 % (ref 0–3)
DIFFERENTIAL METHOD BLD: ABNORMAL
EOSINOPHIL # BLD: 0.2 K/UL (ref 0–0.4)
EOSINOPHIL NFR BLD: 1 % (ref 0–5)
ERYTHROCYTE [DISTWIDTH] IN BLOOD BY AUTOMATED COUNT: 17.4 % (ref 11.6–14.5)
HCT VFR BLD AUTO: 21.5 % (ref 35–45)
HGB BLD-MCNC: 7.3 G/DL (ref 12–16)
LYMPHOCYTES # BLD: 3.7 K/UL (ref 0.8–3.5)
LYMPHOCYTES NFR BLD: 16 % (ref 20–51)
MCH RBC QN AUTO: 29 PG (ref 24–34)
MCHC RBC AUTO-ENTMCNC: 34 G/DL (ref 31–37)
MCV RBC AUTO: 85.3 FL (ref 74–97)
MONOCYTES # BLD: 0.7 K/UL (ref 0–1)
MONOCYTES NFR BLD: 3 % (ref 2–9)
NEUTS BAND NFR BLD MANUAL: 5 % (ref 0–5)
NEUTS SEG # BLD: 18.8 K/UL (ref 1.8–8)
NEUTS SEG NFR BLD: 75 % (ref 42–75)
NRBC BLD-RTO: 41 PER 100 WBC
PLATELET # BLD AUTO: 104 K/UL (ref 135–420)
PLATELET COMMENTS,PCOM: ABNORMAL
PMV BLD AUTO: 11.3 FL (ref 9.2–11.8)
RBC # BLD AUTO: 2.52 M/UL (ref 4.2–5.3)
RBC MORPH BLD: ABNORMAL
WBC # BLD AUTO: 23.4 K/UL (ref 4.6–13.2)

## 2019-04-09 PROCEDURE — 74011250637 HC RX REV CODE- 250/637: Performed by: NURSE PRACTITIONER

## 2019-04-09 PROCEDURE — 65270000029 HC RM PRIVATE

## 2019-04-09 PROCEDURE — 74011000250 HC RX REV CODE- 250: Performed by: HOSPITALIST

## 2019-04-09 PROCEDURE — 74011250636 HC RX REV CODE- 250/636: Performed by: NURSE PRACTITIONER

## 2019-04-09 PROCEDURE — 74011250636 HC RX REV CODE- 250/636: Performed by: HOSPITALIST

## 2019-04-09 PROCEDURE — 85025 COMPLETE CBC W/AUTO DIFF WBC: CPT

## 2019-04-09 PROCEDURE — 74011250637 HC RX REV CODE- 250/637: Performed by: HOSPITALIST

## 2019-04-09 PROCEDURE — 74011250636 HC RX REV CODE- 250/636: Performed by: INTERNAL MEDICINE

## 2019-04-09 PROCEDURE — 86902 BLOOD TYPE ANTIGEN DONOR EA: CPT

## 2019-04-09 PROCEDURE — 36415 COLL VENOUS BLD VENIPUNCTURE: CPT

## 2019-04-09 PROCEDURE — 87040 BLOOD CULTURE FOR BACTERIA: CPT

## 2019-04-09 PROCEDURE — P9040 RBC LEUKOREDUCED IRRADIATED: HCPCS

## 2019-04-09 PROCEDURE — 74011250637 HC RX REV CODE- 250/637: Performed by: INTERNAL MEDICINE

## 2019-04-09 PROCEDURE — 36430 TRANSFUSION BLD/BLD COMPNT: CPT

## 2019-04-09 RX ADMIN — FOLIC ACID 1 MG: 1 TABLET ORAL at 10:35

## 2019-04-09 RX ADMIN — NALOXEGOL OXALATE 12.5 MG: 12.5 TABLET, FILM COATED ORAL at 10:34

## 2019-04-09 RX ADMIN — METOPROLOL TARTRATE 50 MG: 50 TABLET ORAL at 18:55

## 2019-04-09 RX ADMIN — OXYCODONE HYDROCHLORIDE 20 MG: 20 TABLET, FILM COATED, EXTENDED RELEASE ORAL at 10:35

## 2019-04-09 RX ADMIN — POTASSIUM CHLORIDE 20 MEQ: 20 TABLET, EXTENDED RELEASE ORAL at 10:34

## 2019-04-09 RX ADMIN — DEFERASIROX 1440 MG: 360 TABLET, FILM COATED ORAL at 10:35

## 2019-04-09 RX ADMIN — LORAZEPAM 0.5 MG: 2 INJECTION INTRAMUSCULAR; INTRAVENOUS at 19:02

## 2019-04-09 RX ADMIN — ACETAMINOPHEN 650 MG: 325 TABLET ORAL at 00:58

## 2019-04-09 RX ADMIN — LEVOTHYROXINE, LIOTHYRONINE 30 MG: 19; 4.5 TABLET ORAL at 10:35

## 2019-04-09 RX ADMIN — AZITHROMYCIN 500 MG: 250 TABLET, FILM COATED ORAL at 10:35

## 2019-04-09 RX ADMIN — SODIUM CHLORIDE 125 ML/HR: 900 INJECTION, SOLUTION INTRAVENOUS at 15:14

## 2019-04-09 RX ADMIN — OXYCODONE HYDROCHLORIDE 20 MG: 20 TABLET, FILM COATED, EXTENDED RELEASE ORAL at 21:12

## 2019-04-09 RX ADMIN — SODIUM CHLORIDE 125 ML/HR: 900 INJECTION, SOLUTION INTRAVENOUS at 06:25

## 2019-04-09 RX ADMIN — CEFTRIAXONE SODIUM 1 G: 1 INJECTION, POWDER, FOR SOLUTION INTRAMUSCULAR; INTRAVENOUS at 10:36

## 2019-04-09 RX ADMIN — DIPHENHYDRAMINE HYDROCHLORIDE 25 MG: 25 CAPSULE ORAL at 00:58

## 2019-04-09 RX ADMIN — MORPHINE SULFATE 4 MG: 4 INJECTION INTRAVENOUS at 15:55

## 2019-04-09 RX ADMIN — METOPROLOL TARTRATE 50 MG: 50 TABLET ORAL at 10:35

## 2019-04-09 NOTE — PROGRESS NOTES
Hematology/Medical Oncology Progress Note             Name: Marilin Lemon   : 1965   MRN: [id number]   Date: 2019 7:46 AM     [x]I have reviewed the flowsheet and previous days notes. Events overnight reviewed and discussed with nursing staff. Vital signs and records reviewed. Ms. 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 Currently she is under our active surveillance; This admissionis due to her Vaso-Occlusive Sickle Cell Pain crisis. Pt states that her pain is well controlled. No new c/o              ROS:  Constitutional: Positive for fatigue. Negative for fever, chills, diaphoresis, activity change, appetite change and unexpected weight change. HENT: Negative for nosebleeds, congestion, facial swelling, mouth sores, trouble swallowing, neck pain, neck stiffness, voice change and postnasal drip. Eyes: Negative for photophobia, pain, discharge and itching. Respiratory: Negative for apnea, cough, choking, chest tightness, wheezing and stridor. Cardiovascular: Negative for chest pain, palpitations and leg swelling. Gastrointestinal: Negative for abdominal pain. Negative for nausea, diarrhea, constipation, blood in stool and rectal pain. Genitourinary: Negative for dysuria, urgency, hematuria, flank pain and difficulty urinating. Musculoskeletal: pain in joints at all exts. Skin: Negative for color change, pallor, rash and wound. Neurological:  Negative for dizziness, facial asymmetry, speech difficulty, light-headedness and headaches. Hematological: Negative for adenopathy. Does not bruise/bleed easily. Psychiatric/Behavioral: Negative for hallucinations, confusion, disturbed wake/sleep cycle and agitation.        Vital Signs:    Visit Vitals  /77 (BP 1 Location: Right arm, BP Patient Position: Sitting)   Pulse 72   Temp 97.8 °F (36.6 °C)   Resp 16   Ht 5' 6\" (1.676 m)   Wt 88 kg (194 lb)   SpO2 97%   Breastfeeding? No   BMI 31.31 kg/m²       O2 Device: Nasal cannula   O2 Flow Rate (L/min): 2 l/min   Temp (24hrs), Av.2 °F (36.8 °C), Min:97.3 °F (36.3 °C), Max:99.3 °F (37.4 °C)       Intake/Output:   Last shift:      701 - 1900  In: -   Out: 400 [Urine:400]  Last 3 shifts: 1901 - 700  In: 5574.6 [P.O.:240; I.V.:4718.3]  Out: 3550 [Urine:3550]    Intake/Output Summary (Last 24 hours) at 2019 0846  Last data filed at 2019 0753  Gross per 24 hour   Intake 3737.13 ml   Output 2000 ml   Net 1737.13 ml       Physical Exam:  General: Appears comfortable, NAD.   HEENT:  Anicteric sclerae; pink palpebral conjunctivae; mucosa moist  Resp:  Symmetrical chest expansion, no accessory muscle use; good airway entry; no rales/ wheezing/ rhonchi noted  CV:  S1, S2 present; tachy rate and regular rhythm  GI:  Abdomen soft, non-tender; (+) active bowel sounds  Extremities:  +2 pulses on all extremities; no edema/ cyanosis/ clubbing noted  Skin:  Warm; no rashes/ lesions noted  Neurologic:  Non-focal            DATA:   Current Facility-Administered Medications   Medication Dose Route Frequency    diphenhydrAMINE (BENADRYL) capsule 25 mg  25 mg Oral Q6H PRN    sodium chloride (NS) flush 5-10 mL  5-10 mL IntraVENous PRN    folic acid (FOLVITE) tablet 1 mg  1 mg Oral DAILY    naloxegol (MOVANTIK) tablet 12.5 mg  12.5 mg Oral DAILY    oxyCODONE ER (OxyCONTIN) tablet 20 mg  20 mg Oral BID    ergocalciferol capsule 50,000 Units  50,000 Units Oral Q7D    thyroid (Pork) (ARMOUR) tablet 30 mg  30 mg Oral DAILY    metoprolol tartrate (LOPRESSOR) tablet 50 mg  50 mg Oral BID    potassium chloride (K-DUR, KLOR-CON) SR tablet 20 mEq  20 mEq Oral DAILY    deferasirox (JADENU) tablet 1,440 mg (Patient Supplied)  1,440 mg Oral DAILY    acetaminophen (TYLENOL) tablet 650 mg  650 mg Oral Q4H PRN    morphine injection 4 mg  4 mg IntraVENous Q4H PRN    heparin (porcine) injection 5,000 Units  5,000 Units SubCUTAneous Q8H    0.9% sodium chloride infusion  125 mL/hr IntraVENous CONTINUOUS    0.9% sodium chloride infusion 250 mL  250 mL IntraVENous PRN    LORazepam (ATIVAN) injection 0.5 mg  0.5 mg IntraVENous Q6H PRN    azithromycin (ZITHROMAX) tablet 500 mg  500 mg Oral DAILY    cefTRIAXone (ROCEPHIN) 1 g in sterile water (preservative free) 10 mL IV syringe  1 g IntraVENous Q24H    0.9% sodium chloride infusion 250 mL  250 mL IntraVENous PRN                    Labs:  Recent Labs     04/08/19  0930 04/08/19  0320 04/06/19  2326   WBC  --  18.9* 27.6*   HGB 4.6* 4.5* 6.0*   HCT 13.6* 13.3* 17.1*   PLT  --  89* 76*     Recent Labs     04/08/19  0320 04/07/19  1350 04/06/19  2326    141 136   K 4.0 3.8 3.5   * 113* 106   CO2 22 22 23   GLU 84 105* 133*   BUN 14 17 24*   CREA 1.41* 1.33* 1.84*   CA 8.6 8.0* 9.3   ALB 3.3*  --  4.0   SGOT 38*  --  55*   ALT 20  --  32     No results for input(s): PH, PCO2, PO2, HCO3, FIO2 in the last 72 hours. IMPRESSION:   · Vaso-Occlusive Sickle Cell Anemia Pain crisis  · Hb- SS disease  · Malignant neoplasm of lower-outer quadrant of left breast of female, estrogen receptor positive (HCC)   · Iron Overload          PLAN:   · Continue IVF's, Folvite 1mg daily. 02 supplementation as needed. · Continue present pain management regime  · Hgb critical at 4.6, still awaiting PRBC from [place]. Delay 2/2 Ab's. I now recommend 2 units. · Blood Cx neg X 1 day, Urine Cxs pending. · Platelet count 11,021, no intervention warranted at this time.  ·        The patient is: [x] acutely ill Risk of deterioration: [] moderate    [] critically ill  [] high         My assessment/plan was discussed with:  [x]nursing []PT/OT    []respiratory therapy []     []family []       Oz Toussaint MD

## 2019-04-09 NOTE — ROUTINE PROCESS
Bedside and Verbal shift change report given to Deb Rapp RN   (oncoming nurse) by Timothy Hernandez RN (offgoing nurse). Report included the following information SBAR, Kardex, Intake/Output, MAR and Recent Results.

## 2019-04-09 NOTE — CONSULTS
Infectious Disease Consultation Note    Requested by: dr. Dasha Mccoy    Reason: sepsis, gram positive bacteremia    Current abx Prior abx   Ceftriaxone since 4/7  Azithromycin since 4/8 Pip/tazo, vancomycin 4/7     Lines:       Assessment :    48 y.o. female with sickle cell disease who presented to ed on 4/7/19 with complaint of shortness of breath. Now with gram positive bacteremia, leukocytosis    Clinical presentation seems most c/w SIRS due to vaso occlusive sickle cell pain crisis, anemia    No clinical or radiological evidence to suggest pneumonia. Also, single positive blood culture for gpccl on 4/7 likely contaminant. Patient states that she is a hard stick and multiple attempts were made in ed to get blood. Leukocytosis likely leukemoid reaction to sickle cell crisis/anemia. Risk of giving antibiotics outweigh the benefit. Also subjective sense of well being argues against sepsis. Hence, will d/c abx and monitor clinically. Recommendations:    1. D/c ceftriaxone, azithromycin  2. Repeat blood cultures  3. F/u ID of gpc in blood culture 4/7  4. F/u hematology recommendations    Advance Care planning: full code: discussed  with patient/surrogate decision maker: judi Lind: [phone]. Thank you for consultation request. Above plan was discussed in details with patient, will d/w dr Dasha Mccoy. Please call me if any further questions or concerns. Will continue to participate in the care of this patient. HPI:    48 y.o. female with sickle cell disease who presented to ed on 4/7/19 with complaint of shortness of breath. Patient says she went into a pain crisis for her sickle cell last week . Rishabh Madden that has improved but she said typical for her she had her pain in her legs and in her arms. Denied any chest pain or abdominal pain. Says that after the pain crisis started to improve she began to get short of breath which for her typically means that she be is anemic.   Denies wheezing cough or fever. Has had a transfusion previously but not since 2018. She had wbc count of 27k on . Was started on multiple abx. Blood cultures from admission now reveal gpc in clusters. I have been consulted for further recommendations. Patient had temp of 101.6 on . Has been afebrile other than that throughout this hospitalization. Also, has had fluctuating leukocytosis with wbc count 27 k on , 18k on , 23k today. Urine culture 80,000 colonies of mixed gram positive kendall. Patient states that she had fever around the time of receiving blood transfusion. Feels good. Wants to go home. No further bodyaches. No subjective sensation of fever/chills. Patient denies headaches, visual disturbances, sore throat, runny nose, earaches, cp, sob, chills, cough, abdominal pain, diarrhea, burning micturition, pain or weakness in extremities. denies back pain/flank pain. denies recent sick contacts. No h/o recent travel.  No known h/o MRSA colonization or infection in the past.      Past Medical History:   Diagnosis Date    Anemia NEC     Arthritis     Chronic pain     Ductal carcinoma ([facility]     left breast    Fatigue     Fibromyalgia     GERD (gastroesophageal reflux disease)     Hx of endometriosis     Hypertension 2011    Ill-defined condition 2018    Sickle cell crisis    Osteoarthritis of left hip 2016    Osteoarthritis of right hip 1/3/2017    Osteoarthritis of right knee 2018    Sickle cell anemia ([facility]     Sleep apnea     Does not use CPAP    Thyroid disease     hypo       Past Surgical History:   Procedure Laterality Date    HX BREAST BIOPSY Left     HX  SECTION      HX HERNIA REPAIR      HX LAP CHOLECYSTECTOMY      HX MASTECTOMY Left 2012    with axillary lymph node dissection    TOTAL HIP ARTHROPLASTY Bilateral  & 2017       home Medication List    Details   ibuprofen (MOTRIN) 800 mg tablet Take 800 mg by mouth three (3) times daily as needed for Pain. HYDROmorphone (DILAUDID) 2 mg tablet Take 1 Tab by mouth every four (4) hours as needed for Pain for up to 14 days. Max Daily Amount: 12 mg.  Qty: 40 Tab, Refills: 0    Associated Diagnoses: Malignant neoplasm of lower-outer quadrant of left breast of female, estrogen receptor positive ([facility] Malignant neoplasm of breast in female, estrogen receptor negative, unspecified laterality, unspecified site of breast ([facility] Hb-SS disease without crisis ([facility]      JADENU 360 mg tablet Use as directed by prescriber. Take 4 tablets (1440 mg TOTAL) by mouth at approximately the same time once daily on an empty stomach or wit  Qty: 120 Tab, Refills: 0    Associated Diagnoses: Hb-SS disease without crisis ([facility] Iron overload      potassium chloride (K-DUR, KLOR-CON) 20 mEq tablet Take 1 Tab by mouth daily. Qty: 5 Tab, Refills: 0      OTHER CBC, BMP in 1 week  Dx: sickle call disease   Fax results to Dr. London Dunn and Dr. Zaragoza Press: 1 Each, Refills: 0      metoprolol tartrate (LOPRESSOR) 50 mg tablet 50 mg.      thyroid, Pork, (ARMOUR THYROID) 30 mg tablet Take 30 mg by mouth daily. ergocalciferol (ERGOCALCIFEROL) 50,000 unit capsule Take 1 Cap by mouth every seven (7) days. Qty: 12 Cap, Refills: 0    Associated Diagnoses: Vitamin D deficiency      oxyCODONE ER (OXYCONTIN) 20 mg ER tablet Take 1 Tab by mouth two (2) times a day. Max Daily Amount: 40 mg.  Qty: 60 Tab, Refills: 0      naloxegol (MOVANTIK) 12.5 mg tab tablet Take 12.5 mg by mouth daily. folic acid (FOLVITE) 1 mg tablet Take 1 mg by mouth daily.              Current Facility-Administered Medications   Medication Dose Route Frequency    diphenhydrAMINE (BENADRYL) capsule 25 mg  25 mg Oral Q6H PRN    sodium chloride (NS) flush 5-10 mL  5-10 mL IntraVENous PRN    folic acid (FOLVITE) tablet 1 mg  1 mg Oral DAILY    naloxegol (MOVANTIK) tablet 12.5 mg  12.5 mg Oral DAILY    oxyCODONE ER (OxyCONTIN) tablet 20 mg  20 mg Oral BID    ergocalciferol capsule 50,000 Units  50,000 Units Oral Q7D    thyroid (Pork) (ARMOUR) tablet 30 mg  30 mg Oral DAILY    metoprolol tartrate (LOPRESSOR) tablet 50 mg  50 mg Oral BID    potassium chloride (K-DUR, KLOR-CON) SR tablet 20 mEq  20 mEq Oral DAILY    deferasirox (JADENU) tablet 1,440 mg (Patient Supplied)  1,440 mg Oral DAILY    acetaminophen (TYLENOL) tablet 650 mg  650 mg Oral Q4H PRN    morphine injection 4 mg  4 mg IntraVENous Q4H PRN    heparin (porcine) injection 5,000 Units  5,000 Units SubCUTAneous Q8H    0.9% sodium chloride infusion  125 mL/hr IntraVENous CONTINUOUS    0.9% sodium chloride infusion 250 mL  250 mL IntraVENous PRN    LORazepam (ATIVAN) injection 0.5 mg  0.5 mg IntraVENous Q6H PRN    azithromycin (ZITHROMAX) tablet 500 mg  500 mg Oral DAILY    cefTRIAXone (ROCEPHIN) 1 g in sterile water (preservative free) 10 mL IV syringe  1 g IntraVENous Q24H    0.9% sodium chloride infusion 250 mL  250 mL IntraVENous PRN       Allergies: Neulasta [pegfilgrastim]    Family History   Problem Relation Age of Onset    Cancer Mother         breast    Diabetes Mother     Heart Disease Father     Diabetes Father     Sickle Cell Anemia Brother      Social History     Socioeconomic History    Marital status:      Spouse name: Not on file    Number of children: Not on file    Years of education: Not on file    Highest education level: Not on file   Occupational History    Not on file   Social Needs    Financial resource strain: Not on file    Food insecurity:     Worry: Not on file     Inability: Not on file    Transportation needs:     Medical: Not on file     Non-medical: Not on file   Tobacco Use    Smoking status: Former Smoker     Packs/day: 0.25     Years: 30.00     Pack years: 7.50     Last attempt to quit: 2011     Years since quittin.2    Smokeless tobacco: Former User   Substance and Sexual Activity    Alcohol use: Yes     Comment: 2 times yearly   [facility] Drug use: No    Sexual activity: Not Currently   Lifestyle    Physical activity:     Days per week: Not on file     Minutes per session: Not on file    Stress: Not on file   Relationships    Social connections:     Talks on phone: Not on file     Gets together: Not on file     Attends Sabianism service: Not on file     Active member of club or organization: Not on file     Attends meetings of clubs or organizations: Not on file     Relationship status: Not on file    Intimate partner violence:     Fear of current or ex partner: Not on file     Emotionally abused: Not on file     Physically abused: Not on file     Forced sexual activity: Not on file   Other Topics Concern    Not on file   Social History Narrative    Not on file     Social History     Tobacco Use   Smoking Status Former Smoker    Packs/day: 0.25    Years: 30.00    Pack years: 7.50    Last attempt to quit: 2011    Years since quittin.2   Smokeless Tobacco Former User        Temp (24hrs), Av.1 °F (36.7 °C), Min:97.3 °F (36.3 °C), Max:99.3 °F (37.4 °C)    Visit Vitals  /76 (BP 1 Location: Right arm, BP Patient Position: At rest)   Pulse 65   Temp 98.2 °F (36.8 °C)   Resp 16   Ht 5' 6\" (1.676 m)   Wt 88 kg (194 lb)   SpO2 100%   Breastfeeding? No   BMI 31.31 kg/m²       ROS: 12 point ROS obtained in details. Pertinent positives as mentioned in HPI,   otherwise negative    Physical Exam:    Constitutional: She is oriented to person, place, and time. She appears well-developed. No distress. HENT:   Head: Normocephalic and atraumatic. Eyes: Pupils are equal, round, and reactive to light. Neck: No JVD present. No tracheal deviation present. No thyromegaly present. Cardiovascular: Normal rate, regular rhythm and normal heart sounds. Exam reveals no gallop and no friction rub. No murmur heard. Pulmonary/Chest: Effort normal and breath sounds normal. No stridor. No respiratory distress. She has no wheezes.  She has no rales. She exhibits no tenderness. Abdominal: Soft. She exhibits no distension and no mass. There is no tenderness. There is no rebound and no guarding. Musculoskeletal: She exhibits no edema or tenderness. Lymphadenopathy:     She has no cervical adenopathy. Neurological: She is alert and oriented to person, place, and time. Skin: Skin is warm and dry. No rash noted. No erythema. No pallor. Psychiatric: She has a normal mood and affect. Her behavior is normal. Thought content normal.   Nursing note and vitals reviewed.                Labs: Results:   Chemistry Recent Labs     04/08/19  0320 04/07/19  1350 04/06/19  2326   GLU 84 105* 133*    141 136   K 4.0 3.8 3.5   * 113* 106   CO2 22 22 23   BUN 14 17 24*   CREA 1.41* 1.33* 1.84*   CA 8.6 8.0* 9.3   AGAP 7 6 7   BUCR 10* 13 13   *  --  183*   TP 6.4  --  7.9   ALB 3.3*  --  4.0   GLOB 3.1  --  3.9   AGRAT 1.1  --  1.0      CBC w/Diff Recent Labs     04/09/19  0930 04/08/19  0930 04/08/19  0320 04/06/19  2326   WBC 23.4*  --  18.9* 27.6*   RBC 2.52*  --  1.52* 2.00*   HGB 7.3* 4.6* 4.5* 6.0*   HCT 21.5* 13.6* 13.3* 17.1*   *  --  89* 76*   GRANS PENDING  --  53 60   LYMPH PENDING  --  28 21   EOS PENDING  --  1 4      Microbiology Recent Labs     04/07/19  0304 04/07/19  0030 04/07/19  0015   CULT <10,000 COLONIES/mL GRAM NEGATIVE RODS*  36454  COLONIES/mL  MIXED GRAM POSITIVE AURORA, PROBABLE SKIN/GENITAL CONTAMINATION.    NO GROWTH 2 DAYS CULTURE IN PROGRESS,FURTHER UPDATES TO FOLLOW          RADIOLOGY:    All available imaging studies/reports in [facility] for this admission were reviewed    Dr. Baron Conley, Infectious Disease Specialist  [phone]  April 9, 2019  11:49 AM

## 2019-04-09 NOTE — PROGRESS NOTES
[facility] Hospitalist Group  Progress Note    Patient: Stacie Roth Age: 48 y.o. : 1965 MR#: [id number] SSN: [id number]  Date: 2019     Subjective:   No complaints or concerns. NAD. Aware of plan of care. Assessment/Plan:   1.  SIRS. Sepsis ruled out  2. Sickle cell crisis in patient with sickle cell followed by Dr. Valentino Maxcy. 3. Acute on chronic anemia due to sickle cell crisis  4. Acute bronchitis   5. MARIA ALEJANDRA  improving  6. Dehydration resolved  7. Thrombocytopenia improving  8. leukocytosis   9. HTN-stable   10. Thyroid disease -armour   11. Breast adenocarcinoma     PLAN  1. Sepsis protocol initiated in ED. CXR with diffuse prominence of the bronchovascular markings, possibly pulmonary vascular congestion versus nonspecific bronchitis. Urinalysis otherwise negative, no urinary symptoms. Blood cultures gram positive bacteremia Repeat blood cultures. ID consulted. 2. Hematology consult. Continue folvite. No interventions warranted at this time for thrombocytopenia. 3. ID discontinue IV abx. 4. PRN nebs, incentive spirometer, bronchial hygiene protocol. 5. Continue to monitor CBC  6. 6. SSI      Additional Notes:      Case discussed with:  [x]Patient  []Family  [x]Nursing  []Case Management  DVT Prophylaxis:  []Lovenox  []Hep SQ  []SCDs  []Coumadin   []On Heparin gtt    Objective:   VS:   Visit Vitals  /76 (BP 1 Location: Right arm, BP Patient Position: At rest)   Pulse 65   Temp 98.2 °F (36.8 °C)   Resp 16   Ht 5' 6\" (1.676 m)   Wt 88 kg (194 lb)   SpO2 100%   Breastfeeding?  No   BMI 31.31 kg/m²      Tmax/24hrs: Temp (24hrs), Av.1 °F (36.7 °C), Min:97.3 °F (36.3 °C), Max:99.3 °F (37.4 °C)      Intake/Output Summary (Last 24 hours) at 2019 1333  Last data filed at 2019 1115  Gross per 24 hour   Intake 3737.13 ml   Output 2300 ml   Net 1437.13 ml       General:  Alert, NAD  Cardiovascular:  RRR  Pulmonary:  LSC throughout; respiratory effort WNL  GI:  +BS in all four quadrants, soft, non-tender  Extremities:  No edema; 2+ dorsalis pedis pulses bilaterally  Neuro: flat affect, alert and oriented        Labs:    Recent Results (from the past 24 hour(s))   CBC WITH AUTOMATED DIFF    Collection Time: 04/09/19  9:30 AM   Result Value Ref Range    WBC 23.4 (H) 4.6 - 13.2 K/uL    RBC 2.52 (L) 4.20 - 5.30 M/uL    HGB 7.3 (L) 12.0 - 16.0 g/dL    HCT 21.5 (L) 35.0 - 45.0 %    MCV 85.3 74.0 - 97.0 FL    MCH 29.0 24.0 - 34.0 PG    MCHC 34.0 31.0 - 37.0 g/dL    RDW 17.4 (H) 11.6 - 14.5 %    PLATELET 344 (L) 621 - 420 K/uL    MPV 11.3 9.2 - 11.8 FL    NEUTROPHILS 75 42 - 75 %    BAND NEUTROPHILS 5 0 - 5 %    LYMPHOCYTES 16 (L) 20 - 51 %    MONOCYTES 3 2 - 9 %    EOSINOPHILS 1 0 - 5 %    BASOPHILS 0 0 - 3 %    NRBC 41.0 (H) 0  WBC    ABS. NEUTROPHILS 18.8 (H) 1.8 - 8.0 K/UL    ABS. LYMPHOCYTES 3.7 (H) 0.8 - 3.5 K/UL    ABS. MONOCYTES 0.7 0 - 1.0 K/UL    ABS. EOSINOPHILS 0.2 0.0 - 0.4 K/UL    ABS.  BASOPHILS 0.0 0.0 - 0.06 K/UL    DF MANUAL      PLATELET COMMENTS DECREASED PLATELETS      RBC COMMENTS ANISOCYTOSIS  1+        RBC COMMENTS POLYCHROMASIA  1+        RBC COMMENTS TARGET CELLS  1+        RBC COMMENTS SICKLE CELLS  FEW  POIKILOCYTOSIS  1+        RBC COMMENTS SCHISTOCYTES  FEW           Signed By: Anabel Mabry NP     April 9, 2019

## 2019-04-10 VITALS
HEART RATE: 65 BPM | HEIGHT: 66 IN | DIASTOLIC BLOOD PRESSURE: 73 MMHG | SYSTOLIC BLOOD PRESSURE: 107 MMHG | RESPIRATION RATE: 18 BRPM | WEIGHT: 223.3 LBS | TEMPERATURE: 98 F | OXYGEN SATURATION: 97 % | BODY MASS INDEX: 35.89 KG/M2

## 2019-04-10 LAB
ABO + RH BLD: NORMAL
ANTIGENS PRESENT RBC DONR: NORMAL
ANTIGENS PRESENT RBC DONR: NORMAL
BASOPHILS # BLD: 0 K/UL (ref 0–0.06)
BASOPHILS NFR BLD: 0 % (ref 0–3)
BLD PROD TYP BPU: NORMAL
BLD PROD TYP BPU: NORMAL
BLOOD BANK CMNT PATIENT-IMP: NORMAL
BLOOD GROUP ANTIBODIES SERPL: NORMAL
BLOOD GROUP ANTIBODIES SERPL: NORMAL
BPU ID: NORMAL
BPU ID: NORMAL
CROSSMATCH RESULT,%XM: NORMAL
CROSSMATCH RESULT,%XM: NORMAL
DIFFERENTIAL METHOD BLD: ABNORMAL
EOSINOPHIL # BLD: 0.9 K/UL (ref 0–0.4)
EOSINOPHIL NFR BLD: 5 % (ref 0–5)
ERYTHROCYTE [DISTWIDTH] IN BLOOD BY AUTOMATED COUNT: 18.1 % (ref 11.6–14.5)
HCT VFR BLD AUTO: 23.5 % (ref 35–45)
HGB BLD-MCNC: 8.1 G/DL (ref 12–16)
LYMPHOCYTES # BLD: 4.6 K/UL (ref 0.8–3.5)
LYMPHOCYTES NFR BLD: 25 % (ref 20–51)
MCH RBC QN AUTO: 29.6 PG (ref 24–34)
MCHC RBC AUTO-ENTMCNC: 34.5 G/DL (ref 31–37)
MCV RBC AUTO: 85.8 FL (ref 74–97)
MONOCYTES # BLD: 0.7 K/UL (ref 0–1)
MONOCYTES NFR BLD: 4 % (ref 2–9)
NEUTS SEG # BLD: 12.3 K/UL (ref 1.8–8)
NEUTS SEG NFR BLD: 66 % (ref 42–75)
NRBC BLD-RTO: 26 PER 100 WBC
PHYSICIAN INSTRUCTIO,%PI: NORMAL
PLATELET # BLD AUTO: 102 K/UL (ref 135–420)
PLATELET COMMENTS,PCOM: ABNORMAL
PMV BLD AUTO: 11.2 FL (ref 9.2–11.8)
RBC # BLD AUTO: 2.74 M/UL (ref 4.2–5.3)
RBC MORPH BLD: ABNORMAL
SPECIMEN EXP DATE BLD: NORMAL
STATUS OF UNIT,%ST: NORMAL
STATUS OF UNIT,%ST: NORMAL
UNIT DIVISION, %UDIV: 0
UNIT DIVISION, %UDIV: 0
WBC # BLD AUTO: 18.5 K/UL (ref 4.6–13.2)

## 2019-04-10 PROCEDURE — 85025 COMPLETE CBC W/AUTO DIFF WBC: CPT

## 2019-04-10 PROCEDURE — 74011250636 HC RX REV CODE- 250/636: Performed by: INTERNAL MEDICINE

## 2019-04-10 PROCEDURE — 36415 COLL VENOUS BLD VENIPUNCTURE: CPT

## 2019-04-10 PROCEDURE — 74011250637 HC RX REV CODE- 250/637: Performed by: INTERNAL MEDICINE

## 2019-04-10 RX ADMIN — LEVOTHYROXINE, LIOTHYRONINE 30 MG: 19; 4.5 TABLET ORAL at 08:05

## 2019-04-10 RX ADMIN — DEFERASIROX 1440 MG: 360 TABLET, FILM COATED ORAL at 08:17

## 2019-04-10 RX ADMIN — OXYCODONE HYDROCHLORIDE 20 MG: 20 TABLET, FILM COATED, EXTENDED RELEASE ORAL at 09:59

## 2019-04-10 RX ADMIN — NALOXEGOL OXALATE 12.5 MG: 12.5 TABLET, FILM COATED ORAL at 08:03

## 2019-04-10 RX ADMIN — METOPROLOL TARTRATE 50 MG: 50 TABLET ORAL at 09:58

## 2019-04-10 RX ADMIN — FOLIC ACID 1 MG: 1 TABLET ORAL at 08:04

## 2019-04-10 RX ADMIN — POTASSIUM CHLORIDE 20 MEQ: 20 TABLET, EXTENDED RELEASE ORAL at 08:04

## 2019-04-10 NOTE — PROGRESS NOTES
Infectious Disease progress Note    Requested by: dr. Manjeet Vela    Reason: sepsis, gram positive bacteremia    Current abx Prior abx   Ceftriaxone since 4/7  Azithromycin since 4/8 Pip/tazo, vancomycin 4/7     Lines:       Assessment :    48 y.o. female with sickle cell disease who presented to ed on 4/7/19 with complaint of shortness of breath. Now with gram positive bacteremia, leukocytosis    Clinical presentation seems most c/w SIRS due to vaso occlusive sickle cell pain crisis, anemia    No clinical or radiological evidence to suggest pneumonia. Also, single positive blood culture for anaerobic gpccl on 4/7 likely contaminant. Patient states that she is a hard stick and multiple attempts were made in ed to get blood. Leukocytosis likely leukemoid reaction to sickle cell crisis/anemia. Risk of giving antibiotics outweigh the benefit. Also subjective sense of well being argues against sepsis. Hence, will d/c abx and monitor clinically. Recommendations:    1. Hold off abx  2. Ok to d/c patient from [address] [place] [address]: full code: discussed  with patient/surrogate decision maker: judi kenney: [phone]. Above plan was discussed in details with patient, dr Manjeet Vela. Please call me if any further questions or concerns. Will continue to participate in the care of this patient. HPI:    Feels good. Wants to go home. Patient denies headaches, visual disturbances, sore throat, runny nose, earaches, cp, sob, chills, cough, abdominal pain, diarrhea, burning micturition, pain or weakness in extremities. denies back pain/flank pain. home Medication List    Details   ibuprofen (MOTRIN) 800 mg tablet Take 800 mg by mouth three (3) times daily as needed for Pain. HYDROmorphone (DILAUDID) 2 mg tablet Take 1 Tab by mouth every four (4) hours as needed for Pain for up to 14 days.  Max Daily Amount: 12 mg.  Qty: 40 Tab, Refills: 0    Associated Diagnoses: Malignant neoplasm of lower-outer quadrant of left breast of female, estrogen receptor positive ([facility] Malignant neoplasm of breast in female, estrogen receptor negative, unspecified laterality, unspecified site of breast ([facility] Hb-SS disease without crisis ([facility]      JADENU 360 mg tablet Use as directed by prescriber. Take 4 tablets (1440 mg TOTAL) by mouth at approximately the same time once daily on an empty stomach or wit  Qty: 120 Tab, Refills: 0    Associated Diagnoses: Hb-SS disease without crisis ([facility] Iron overload      potassium chloride (K-DUR, KLOR-CON) 20 mEq tablet Take 1 Tab by mouth daily. Qty: 5 Tab, Refills: 0      OTHER CBC, BMP in 1 week  Dx: sickle call disease   Fax results to Dr. Raphael Clement and Dr. Keith Line: 1 Each, Refills: 0      metoprolol tartrate (LOPRESSOR) 50 mg tablet 50 mg.      thyroid, Pork, (ARMOUR THYROID) 30 mg tablet Take 30 mg by mouth daily. ergocalciferol (ERGOCALCIFEROL) 50,000 unit capsule Take 1 Cap by mouth every seven (7) days. Qty: 12 Cap, Refills: 0    Associated Diagnoses: Vitamin D deficiency      oxyCODONE ER (OXYCONTIN) 20 mg ER tablet Take 1 Tab by mouth two (2) times a day. Max Daily Amount: 40 mg.  Qty: 60 Tab, Refills: 0      naloxegol (MOVANTIK) 12.5 mg tab tablet Take 12.5 mg by mouth daily. folic acid (FOLVITE) 1 mg tablet Take 1 mg by mouth daily.              Current Facility-Administered Medications   Medication Dose Route Frequency    diphenhydrAMINE (BENADRYL) capsule 25 mg  25 mg Oral Q6H PRN    sodium chloride (NS) flush 5-10 mL  5-10 mL IntraVENous PRN    folic acid (FOLVITE) tablet 1 mg  1 mg Oral DAILY    naloxegol (MOVANTIK) tablet 12.5 mg  12.5 mg Oral DAILY    oxyCODONE ER (OxyCONTIN) tablet 20 mg  20 mg Oral BID    ergocalciferol capsule 50,000 Units  50,000 Units Oral Q7D    thyroid (Pork) (ARMOUR) tablet 30 mg  30 mg Oral DAILY    metoprolol tartrate (LOPRESSOR) tablet 50 mg  50 mg Oral BID    potassium chloride (K-DUR, KLOR-CON) SR tablet 20 mEq  20 mEq Oral DAILY    deferasirox (JADENU) tablet 1,440 mg (Patient Supplied)  1,440 mg Oral DAILY    acetaminophen (TYLENOL) tablet 650 mg  650 mg Oral Q4H PRN    morphine injection 4 mg  4 mg IntraVENous Q4H PRN    heparin (porcine) injection 5,000 Units  5,000 Units SubCUTAneous Q8H    0.9% sodium chloride infusion 250 mL  250 mL IntraVENous PRN    LORazepam (ATIVAN) injection 0.5 mg  0.5 mg IntraVENous Q6H PRN    0.9% sodium chloride infusion 250 mL  250 mL IntraVENous PRN       Allergies: Neulasta [pegfilgrastim]    Temp (24hrs), Av.1 °F (36.7 °C), Min:97.8 °F (36.6 °C), Max:98.3 °F (36.8 °C)    Visit Vitals  /71 (BP 1 Location: Right arm, BP Patient Position: Head of bed elevated (Comment degrees)) Comment (BP Patient Position): 35 degrees   Pulse 82   Temp 98 °F (36.7 °C)   Resp 20   Ht 5' 6\" (1.676 m)   Wt 101.3 kg (223 lb 4.8 oz)   SpO2 97%   Breastfeeding? No   BMI 36.04 kg/m²       ROS: 12 point ROS obtained in details. Pertinent positives as mentioned in HPI,   otherwise negative    Physical Exam:    Constitutional: She is oriented to person, place, and time. She appears well-developed. No distress. HENT:   Head: Normocephalic and atraumatic. Eyes: Pupils are equal, round, and reactive to light. Neck: No JVD present. No tracheal deviation present. No thyromegaly present. Cardiovascular: Normal rate, regular rhythm and normal heart sounds. Exam reveals no gallop and no friction rub. No murmur heard. Pulmonary/Chest: Effort normal and breath sounds normal. No stridor. No respiratory distress. She has no wheezes. She has no rales. She exhibits no tenderness. Abdominal: Soft. She exhibits no distension and no mass. There is no tenderness. There is no rebound and no guarding. Musculoskeletal: She exhibits no edema or tenderness. Lymphadenopathy:     She has no cervical adenopathy. Neurological: She is alert and oriented to person, place, and time.    Skin: Skin is warm and dry. No rash noted. No erythema. No pallor. Psychiatric: She has a normal mood and affect.  Her behavior is normal. Thought content normal.   Nursing note and vitals reviewed.                Labs: Results:   Chemistry Recent Labs     04/08/19  0320 04/07/19  1350   GLU 84 105*    141   K 4.0 3.8   * 113*   CO2 22 22   BUN 14 17   CREA 1.41* 1.33*   CA 8.6 8.0*   AGAP 7 6   BUCR 10* 13   *  --    TP 6.4  --    ALB 3.3*  --    GLOB 3.1  --    AGRAT 1.1  --       CBC w/Diff Recent Labs     04/10/19  0451 04/09/19  0930 04/08/19  0930 04/08/19  0320   WBC 18.5* 23.4*  --  18.9*   RBC 2.74* 2.52*  --  1.52*   HGB 8.1* 7.3* 4.6* 4.5*   HCT 23.5* 21.5* 13.6* 13.3*   * 104*  --  89*   GRANS 66 75  --  53   LYMPH 25 16*  --  28   EOS 5 1  --  1      Microbiology Recent Labs     04/09/19  1345   CULT NO GROWTH AFTER 16 HOURS          RADIOLOGY:    All available imaging studies/reports in [facility] for this admission were reviewed    Dr. Naun Barros, Infectious Disease Specialist  [phone]  April 10, 2019  11:49 AM

## 2019-04-10 NOTE — PROGRESS NOTES
Problem: Pressure Injury - Risk of  Goal: *Prevention of pressure injury  Description  Document Aguilar Scale and appropriate interventions in the flowsheet. Outcome: Progressing Towards Goal     Problem: Falls - Risk of  Goal: *Absence of Falls  Description  Document [place] Fall Risk and appropriate interventions in the flowsheet.   Outcome: Progressing Towards Goal

## 2019-04-10 NOTE — PROGRESS NOTES
Pt given and educated on discharge paperwork and returned pt home meds from patient bin in med room. IV d/c'd.

## 2019-04-10 NOTE — DISCHARGE SUMMARY
[facility] Hospitalist [facility]  Discharge Summary       Patient: Norma Span Age: 48 y.o. : 1965 MR#: [id number] SSN: [id number]  PCP on record: Wendy Fraire MD  Admit date: 2019  Discharge date: 4/10/2019    Disposition:    [x]Home   []Home with Home Health   []SNF/NH   []Rehab   []Home with family   []Alternate Facility:____________________    Discharge Diagnoses:                             1.  SIRS vs Sepsis ruled out  2. Sickle cell crisis in patient with sickle cell followed by Dr. Brock Trinidad. 3. Acute on chronic anemia due to sickle cell crisis  4. Acute bronchitis   5. MARIA ALEJANDRA    6. Thrombocytopenia   7. leukocytosis   8. HTN  9. Thyroid disease   10. Breast adenocarcinoma        Discharge Medications:     Current Discharge Medication List      CONTINUE these medications which have NOT CHANGED    Details   ibuprofen (MOTRIN) 800 mg tablet Take 800 mg by mouth three (3) times daily as needed for Pain. HYDROmorphone (DILAUDID) 2 mg tablet Take 1 Tab by mouth every four (4) hours as needed for Pain for up to 14 days. Max Daily Amount: 12 mg.  Qty: 40 Tab, Refills: 0    Associated Diagnoses: Malignant neoplasm of lower-outer quadrant of left breast of female, estrogen receptor positive ([address] Malignant neoplasm of breast in female, estrogen receptor negative, unspecified laterality, unspecified site of breast ([address] Hb-SS disease without crisis ([address]      JADENU 360 mg tablet Use as directed by prescriber. Take 4 tablets (1440 mg TOTAL) by mouth at approximately the same time once daily on an empty stomach or wit  Qty: 120 Tab, Refills: 0    Associated Diagnoses: Hb-SS disease without crisis ([address] Iron overload      potassium chloride (K-DUR, KLOR-CON) 20 mEq tablet Take 1 Tab by mouth daily. Qty: 5 Tab, Refills: 0      metoprolol tartrate (LOPRESSOR) 50 mg tablet 50 mg.      thyroid, Pork, (ARMOUR THYROID) 30 mg tablet Take 30 mg by mouth daily.       ergocalciferol (ERGOCALCIFEROL) 50,000 unit capsule Take 1 Cap by mouth every seven (7) days. Qty: 12 Cap, Refills: 0    Associated Diagnoses: Vitamin D deficiency      naloxegol (MOVANTIK) 12.5 mg tab tablet Take 12.5 mg by mouth daily. folic acid (FOLVITE) 1 mg tablet Take 1 mg by mouth daily. STOP taking these medications       OTHER Comments:   Reason for Stopping:         oxyCODONE ER (OXYCONTIN) 20 mg ER tablet Comments:   Reason for Stopping:               Consults:    - hematology  ID    Significant Diagnostic Studies:   -    Xr Chest Port    Result Date: 4/7/2019  Impression:  Diffuse prominence of the bronchovascular markings, possibly pulmonary vascular congestion versus nonspecific bronchitis. Hospital Course by Problem   Per H&P 48 y.o. female with sickle cell disease who presents with complaint of shortness of breath.  Patient says she went into a pain crisis for her sickle cell last week 6 days ago. It is typical for her to have pain in her legs and in her arms during the crisis. Denied any chest pain or abdominal pain. She knows that whenever she feels this way, she knows that her red cell counts are low. Denies wheezing cough or fever.  Has had a transfusion previously but not since October 2018.  Denies melena hematochezia. She has not been eating well for the last few days, has had constipation also. 1.  SIRS. Sepsis ruled out. SIRS resolved. ID consulted. Blood culture likely contaminant. ID recommendations to hold all antibiotics. Repeat blood cultures NGTD. 2. Sickle cell crisis in patient with sickle cell followed by Dr. Caesar Catalan. Dr. Caesar Catalan notified and updated of inpatient admission and treatment. Follow up with Dr. Caesar Catalan in 7-10 days. 3. Acute on chronic anemia due to sickle cell crisis. H/H on admission 4.5/13.3. 2 units PRBC this admission. H/H on discharge 8.1/23. 5. Continue folvite and Jadenu  4. Acute bronchitis   5. MARIA ALEJANDRA  improved with IVF.    6. Dehydration resolved with IVF  7. Thrombocytopenia improving  8. leukocytosis likely leukemoid reaction to sickle cell crisis/anemia. 9. HTN-stable   10. Thyroid disease -armour   11. Breast adenocarcinoma      Today's examination of the patient revealed:     Subjective: All 10 systems reviewed and negative  Objective:   VS:   Visit Vitals  /71 (BP 1 Location: Right arm, BP Patient Position: Head of bed elevated (Comment degrees)) Comment (BP Patient Position): 35 degrees   Pulse 82   Temp 98 °F (36.7 °C)   Resp 20   Ht 5' 6\" (1.676 m)   Wt 101.3 kg (223 lb 4.8 oz)   SpO2 97%   Breastfeeding? No   BMI 36.04 kg/m²      Tmax/24hrs: Temp (24hrs), Av.1 °F (36.7 °C), Min:97.8 °F (36.6 °C), Max:98.3 °F (36.8 °C)     Input/Output:     Intake/Output Summary (Last 24 hours) at 4/10/2019 1104  Last data filed at 4/10/2019 0334  Gross per 24 hour   Intake 720 ml   Output 1400 ml   Net -680 ml       General:  Alert, NAD  Cardiovascular:  RRR  Pulmonary:  LSC throughout; respiratory effort WNL  GI:  +BS in all four quadrants, soft, non-tender  Extremities:  No edema; 2+ dorsalis pedis pulses bilaterally        Labs:    Recent Results (from the past 24 hour(s))   CULTURE, BLOOD    Collection Time: 19  1:45 PM   Result Value Ref Range    Special Requests: NO SPECIAL REQUESTS      Culture result: NO GROWTH AFTER 16 HOURS     CBC WITH AUTOMATED DIFF    Collection Time: 04/10/19  4:51 AM   Result Value Ref Range    WBC 18.5 (H) 4.6 - 13.2 K/uL    RBC 2.74 (L) 4.20 - 5.30 M/uL    HGB 8.1 (L) 12.0 - 16.0 g/dL    HCT 23.5 (L) 35.0 - 45.0 %    MCV 85.8 74.0 - 97.0 FL    MCH 29.6 24.0 - 34.0 PG    MCHC 34.5 31.0 - 37.0 g/dL    RDW 18.1 (H) 11.6 - 14.5 %    PLATELET 060 (L) 260 - 420 K/uL    MPV 11.2 9.2 - 11.8 FL    NEUTROPHILS 66 42 - 75 %    LYMPHOCYTES 25 20 - 51 %    MONOCYTES 4 2 - 9 %    EOSINOPHILS 5 0 - 5 %    BASOPHILS 0 0 - 3 %    NRBC 26.0 (H) 0  WBC    ABS. NEUTROPHILS 12.3 (H) 1.8 - 8.0 K/UL    ABS.  LYMPHOCYTES 4.6 (H) 0.8 - 3.5 K/UL    ABS. MONOCYTES 0.7 0 - 1.0 K/UL    ABS. EOSINOPHILS 0.9 (H) 0.0 - 0.4 K/UL    ABS. BASOPHILS 0.0 0.0 - 0.06 K/UL    DF MANUAL      PLATELET COMMENTS DECREASED PLATELETS      RBC COMMENTS POLYCHROMASIA  2+        RBC COMMENTS ANISOCYTOSIS  1+        RBC COMMENTS TARGET CELLS  1+        RBC COMMENTS SICKLE CELLS  1+         Additional Data Reviewed:     Condition: Stable  Follow-up Appointments:   1. Your PCP: Dawood Wick MD, within 3-5 days  2.  Follow up with hematology Dr. Melba Laird in one week      >30 minutes spent coordinating this discharge (review instructions/follow-up, prescriptions, preparing report for sign off)    Signed:  Anabel Mabry NP  4/10/2019  11:04 AM

## 2019-04-10 NOTE — DISCHARGE INSTRUCTIONS
DISCHARGE SUMMARY from Nurse    PATIENT INSTRUCTIONS:    After general anesthesia or intravenous sedation, for 24 hours or while taking prescription Narcotics:  · Limit your activities  · Do not drive and operate hazardous machinery  · Do not make important personal or business decisions  · Do  not drink alcoholic beverages  · If you have not urinated within 8 hours after discharge, please contact your surgeon on call. Report the following to your surgeon:  · Excessive pain, swelling, redness or odor of or around the surgical area  · Temperature over 100.5  · Nausea and vomiting lasting longer than 4 hours or if unable to take medications  · Any signs of decreased circulation or nerve impairment to extremity: change in color, persistent  numbness, tingling, coldness or increase pain  · Any questions    What to do at Home:  Recommended activity: Activity as tolerated. If you experience any of the following symptoms fever, chills, joint pain or swelling, swelling and or pain in either one of both of your arms or legs, vomiting lasting longer than 4 hours or unable to keep down medications, please follow up with primary care physician or if unable to reach PCP go to your nearest emergency room. *  Please give a list of your current medications to your Primary Care Provider. *  Please update this list whenever your medications are discontinued, doses are      changed, or new medications (including over-the-counter products) are added. *  Please carry medication information at all times in case of emergency situations. These are general instructions for a healthy lifestyle:    No smoking/ No tobacco products/ Avoid exposure to second hand smoke  Surgeon General's Warning:  Quitting smoking now greatly reduces serious risk to your health.     Obesity, smoking, and sedentary lifestyle greatly increases your risk for illness    A healthy diet, regular physical exercise & weight monitoring are important for maintaining a healthy lifestyle    You may be retaining fluid if you have a history of heart failure or if you experience any of the following symptoms:  Weight gain of 3 pounds or more overnight or 5 pounds in a week, increased swelling in our hands or feet or shortness of breath while lying flat in bed. Please call your doctor as soon as you notice any of these symptoms; do not wait until your next office visit. Recognize signs and symptoms of STROKE:    F-face looks uneven    A-arms unable to move or move unevenly    S-speech slurred or non-existent    T-time-call 911 as soon as signs and symptoms begin-DO NOT go       Back to bed or wait to see if you get better-TIME IS BRAIN. Warning Signs of HEART ATTACK     Call 911 if you have these symptoms:   Chest discomfort. Most heart attacks involve discomfort in the center of the chest that lasts more than a few minutes, or that goes away and comes back. It can feel like uncomfortable pressure, squeezing, fullness, or pain.  Discomfort in other areas of the upper body. Symptoms can include pain or discomfort in one or both arms, the back, neck, jaw, or stomach.  Shortness of breath with or without chest discomfort.  Other signs may include breaking out in a cold sweat, nausea, or lightheadedness. Don't wait more than five minutes to call 911 - MINUTES MATTER! Fast action can save your life. Calling 911 is almost always the fastest way to get lifesaving treatment. Emergency Medical Services staff can begin treatment when they arrive -- up to an hour sooner than if someone gets to the hospital by car. The discharge information has been reviewed with the patient. The patient verbalized understanding.   Discharge medications reviewed with the patient and appropriate educational materials and side effects teaching were provided. ___________________________________________________________________________________________________________________________________    Patient Education        Sickle Cell Crisis: Care Instructions  Your Care Instructions    Sickle cell crisis is a painful episode that may begin suddenly in a person with sickle cell disease. Sickle cell disease turns normal, round red blood cells into cells that look like viki or crescent moons. The sickle-shaped cells can get stuck in blood vessels, blocking blood flow and causing severe pain. The pain can occur in the bones of the spine, the arms and legs, the chest, and the abdomen. An episode may be called a \"painful event\" or \"painful crisis. \" Some people who have sickle cell disease have many painful events, while others have few or none. Treatment depends on the level of pain and how long it lasts. Sometimes taking nonprescription pain relievers can help. Or you may need stronger pain relief medicine that is prescribed or given by a doctor. You may need to be treated in the hospital.  It isn't always possible to know what sets off a painful event. But triggers include being dehydrated, cold temperatures, infection, stress, and not getting enough oxygen. Follow-up care is a key part of your treatment and safety. Be sure to make and go to all appointments, and call your doctor if you are having problems. It's also a good idea to know your test results and keep a list of the medicines you take. How can you care for yourself at home? · Create a pain management plan with your doctor. This plan should include the types of medicines you can take and other actions you can take at home to relieve pain. · Drink plenty of fluids, enough so that your urine is light yellow or clear like water. If you have kidney, heart, or liver disease and have to limit fluids, talk with your doctor before you increase the amount of fluids you drink.   · Take your medicines exactly as prescribed. Call your doctor if you think you are having a problem with your medicine. · Take pain medicines exactly as directed. ? If the doctor gave you a prescription medicine for pain, take it as prescribed. ? If you are not taking a prescription pain medicine, ask your doctor if you can take an over-the-counter medicine. · Avoid alcohol. It can make you dehydrated. · Dress warmly in cold weather. The cold and windy weather can lead to severe pain. · Do not smoke. Smoking can reduce the amount of oxygen in your blood. · Get plenty of sleep. When should you call for help? Call 911 anytime you think you may need emergency care. For example, call if:    · You have symptoms of a severe problem from sickle cell.     · You have symptoms of a stroke. These may include:  ? Sudden numbness, tingling, weakness, or loss of movement in your face, arm, or leg, especially on only one side of your body. ? Sudden vision changes. ? Sudden trouble speaking. ? Sudden confusion or trouble understanding simple statements. ? Sudden problems with walking or balance. ? A sudden, severe headache that is different from past headaches.     · You are in severe pain.     · You have symptoms of a heart attack. These may include:  ? Chest pain or pressure, or a strange feeling in the chest.  ? Sweating. ? Shortness of breath. ? Nausea or vomiting. ? Pain, pressure, or a strange feeling in the back, neck, jaw, or upper belly or in one or both shoulders or arms. ? Lightheadedness or sudden weakness. ? A fast or irregular heartbeat. After you call 911, the  may tell you to chew 1 adult-strength or 2 to 4 low-dose aspirin. Wait for an ambulance. Do not try to drive yourself.    Call your doctor now or seek immediate medical care if:    · You have a fever.    Watch closely for changes in your health, and be sure to contact your doctor if you have any problems. Where can you learn more?   Go to http://beth-roman.info/. Enter F104 in the search box to learn more about \"Sickle Cell Crisis: Care Instructions. \"  Current as of: May 6, 2018  Content Version: 11.9  © 8894-3769 [employer]. Care instructions adapted under license by [employer] (which disclaims liability or warranty for this information). If you have questions about a medical condition or this instruction, always ask your healthcare professional. [facility] [address] any warranty or liability for your use of this information. Patient Education        Learning About How to Prevent Blood Clots  What is a blood clot? A blood clot is a clump of blood that forms in a blood vessel, such as a vein or an artery. If a clot gets stuck in a blood vessel, it can cause serious problems like a deep vein thrombosis (DVT) or a pulmonary embolism. A DVT is a blood clot in certain veins of the legs, pelvis, or arms. It most often occurs in the legs. Blood clots in these veins need to be treated, because they can get bigger, break loose, and travel through the bloodstream to the heart and then to the lungs. This causes a pulmonary embolism. A pulmonary embolism is a sudden blockage of an artery in the lung. Blood clots in the deep veins of the leg are the most common cause of a pulmonary embolism. In many cases, the clots are small. They may damage the lung. But if the clot is large and stops blood flow to the lung, it can be deadly. What increases your risk for blood clots? Some of the things that can increase your risk for a blood clot include:  Slowed blood flow  When blood doesn't flow normally, clots are more likely to develop. Reduced blood flow may result from long-term bed rest, such as after a surgery, injury, or serious illness. Or it may result from sitting for a long time, especially when traveling long distances.   Abnormal clotting  Some people have blood that clots too easily or too quickly. Problems that may cause increased clotting include:  · Having certain blood problems that make blood clot too easily. This is a problem that may run in families. · Having certain health problems, such as cancer, heart failure, stroke, or severe infection. · Being pregnant. A woman's risk of getting blood clots increases both during pregnancy and shortly after delivery or after a  section. · Using hormonal forms of birth control or hormone therapy. · Smoking. Injury to the blood vessel wall  Blood is more likely to clot in veins and arteries shortly after they are injured. Injury can be caused by a recent medical procedure or surgery that involved your legs, hips, belly, or brain. Or it can be caused by an injury, such as a broken hip. What can you do to prevent blood clots? After any procedure or event that increases your risk  · Take a blood-thinning medicine (called an anticoagulant) as directed if your doctor prescribes one. · Exercise your lower leg muscles to help keep the blood moving through your legs. Point your toes up toward your head so the calves of your legs are stretched, then relax. Repeat. This is a good exercise to do when you are sitting for long periods of time. · Get up out of bed as soon as you safely can or as soon as your doctor says it's okay after an illness or surgery. If you can't get out of bed, you can do the leg exercise described above. Try to do this leg exercise every hour when you are awake. This will help keep the blood moving through your legs. If you are in the hospital and need to stay in bed, your doctor may have you use a special device that inflates and deflates knee-high boots to help keep blood from pooling in your legs. · Use compression stockings if your doctor prescribes them. These are specially fitted stockings that may prevent blood clots by keeping blood from pooling in your legs. When you travel  · Take breaks when you travel.  On long car trips, stop the car and walk around every hour or so. On long bus or train rides or plane flights, get out of your seat and walk up and down the aisle every hour or so. · Do leg exercises while you are seated. For example, pump your feet up and down by pulling your toes up toward your knees and then pointing them down. If you already have a risk of blood clots, talk to your doctor before taking a long trip. Your doctor may want you to wear compression stockings or take blood-thinning medicine. Take care of your body  · Be active. Try to get 30 minutes or more of activity on most days of the week. · Don't smoke. Smoking can increase your risk of blood clots. If you need help quitting, talk to your doctor about stop-smoking programs and medicines. · Check with your doctor about whether you should use hormonal forms of birth control or hormone therapy. These may increase your risk of blood clots. When should you call for help? Call 911 anytime you think you may need emergency care. For example, call if:  · You have symptoms of a blood clot in your lung (called a pulmonary embolism). These may include:  ? Sudden chest pain. ? Trouble breathing. ? Coughing up blood. Call your doctor now or seek immediate medical care if:  · You have symptoms of a blood clot in your arm or leg (called a deep vein thrombosis). These may include:  ? Pain in the arm, calf, back of the knee, thigh, or groin. ? Redness and swelling in the arm, leg, or groin. Where can you learn more? Go to http://beth-roman.info/. Enter F229 in the search box to learn more about \"Learning About How to Prevent Blood Clots. \"  Current as of: September 26, 2018  Content Version: 11.9  © 1911-3187 [employer]. Care instructions adapted under license by [employer] (which disclaims liability or warranty for this information).  If you have questions about a medical condition or this instruction, always ask your healthcare professional. [address] any warranty or liability for your use of this information. Patient armband removed and shredded    MyCharServiceFrame Activation    Thank you for requesting access to [facility] [facility] Please follow the instructions below to securely access and download your online medical record. [facility] [facility] allows you to send messages to your doctor, view your test results, renew your prescriptions, schedule appointments, and more. How Do I Sign Up? 1. In your internet browser, go to [email or web address]  2. Click on the First Time User? Click Here link in the Sign In box. You will be redirect to the New Member Sign Up page. 3. Enter your [facility] Access Code exactly as it appears below. You will not need to use this code after youve completed the sign-up process. If you do not sign up before the expiration date, you must request a new code. [facility] Access Code: Activation code not generated  Current [facility] Status: Active (This is the date your [facility] access code will )    4. Enter the last four digits of your Social Security Number (xxxx) and Date of Birth (mm/dd/yyyy) as indicated and click Submit. You will be taken to the next sign-up page. 5. Create a [facility] ID. This will be your [facility] login ID and cannot be changed, so think of one that is secure and easy to remember. 6. Create a [facility] password. You can change your password at any time. 7. Enter your Password Reset Question and Answer. This can be used at a later time if you forget your password. 8. Enter your e-mail address. You will receive e-mail notification when new information is available in [address] 9. Click Sign Up. You can now view and download portions of your medical record. 10. Click the Download Summary menu link to download a portable copy of your medical information.     Additional Information    If you have questions, please visit the Frequently Asked Questions section of the [facility] website at [email or web address] Remember, [employer] is NOT to be used for urgent needs. For medical emergencies, dial 911.

## 2019-04-10 NOTE — CDMP QUERY
Pt admitted with Sickle Cell Crisis. Pt noted to have continued documentation of SIRS vs Sepsis. Please clarify if:   
 
? Sepsis Ruled Out, (non-infectious) SIRS due to sickle cell pain crisis ? Sepsis Ruled In 
? Other, please specify ? Clinically unable to determine The medical record reflects the following: 
   Risk Factors: 47 yo with sickle cell crisis, SOB, leukocytosis Clinical Indicators:  
4/9 ID consult:  ID:  Clinical presentation seems most c/w SIRS due to vaso occlusive sickle cell pain crisis, anemia No clinical or radiological evidence to suggest pneumonia. 4/9 PN 1. SIRS Vs Sepsis -Resolved - ID consulted , Blood cx likely contaminant - discussed with ID , stopped all Abx Treatment: IV abx DC'd Thank you. Salvatore Ramirez RN BSN CCDS [phone]

## 2019-04-10 NOTE — PROGRESS NOTES
Hematology/Medical Oncology Progress Note             Name: Richie Delarosa   : 1965   MRN: [id number]   Date: 4/10/2019 7:46 AM     [x]I have reviewed the flowsheet and previous days notes. Events overnight reviewed and discussed with nursing staff. Vital signs and records reviewed. Ms. Cano is a 48year old female who Dr. 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 Currently she is under our active surveillance; This admissionis was due to her Vaso-Occlusive Sickle Cell Pain crisis. The patient states she feels much better today and is looking forward to a possible discharge on today. ROS:  Constitutional:  Negative for fever, chills, diaphoresis, activity change, appetite change and unexpected weight change. HENT: Negative for nosebleeds, congestion, facial swelling, mouth sores, trouble swallowing, neck pain, neck stiffness, voice change and postnasal drip. Eyes: Negative for photophobia, pain, discharge and itching. Respiratory: Negative for apnea, cough, choking, chest tightness, wheezing and stridor. Cardiovascular: Negative for chest pain, palpitations and leg swelling. Gastrointestinal: Negative for abdominal pain. Negative for nausea, diarrhea, constipation, blood in stool and rectal pain. Genitourinary: Negative for dysuria, urgency, hematuria, flank pain and difficulty urinating. Musculoskeletal: pain in joints at all exts. Skin: Negative for color change, pallor, rash and wound. Neurological:  Negative for dizziness, facial asymmetry, speech difficulty, light-headedness and headaches. Hematological: Negative for adenopathy. Does not bruise/bleed easily. Psychiatric/Behavioral: Negative for hallucinations, confusion, disturbed wake/sleep cycle and agitation.        Vital Signs:    Visit Vitals  /71 (BP 1 Location: Right arm, BP Patient Position: At rest)   Pulse 73   Temp 98 °F (36.7 °C)   Resp 18   Ht 5' 6\" (1.676 m)   Wt 101.3 kg (223 lb 4.8 oz)   SpO2 97%   Breastfeeding? No   BMI 36.04 kg/m²       O2 Device: Nasal cannula   O2 Flow Rate (L/min): 2 l/min   Temp (24hrs), Av.1 °F (36.7 °C), Min:97.8 °F (36.6 °C), Max:98.3 °F (36.8 °C)       Intake/Output:   Last shift:      No intake/output data recorded. Last 3 shifts:  1901 - 04/10 0700  In: 4457.1 [P.O.:720; I.V.:3120.8]  Out: 2700 [Urine:2700]    Intake/Output Summary (Last 24 hours) at 4/10/2019 0943  Last data filed at 4/10/2019 0334  Gross per 24 hour   Intake 720 ml   Output 1400 ml   Net -680 ml       Physical Exam:  General: Appears comfortable, NAD.   HEENT:  Anicteric sclerae; pink palpebral conjunctivae; mucosa moist  Resp:  Symmetrical chest expansion, no accessory muscle use; good airway entry; no rales/ wheezing/ rhonchi noted  CV:  S1, S2 present; regular rate and regular rhythm  GI:  Abdomen soft, non-tender; (+) active bowel sounds  Extremities:  +2 pulses on all extremities; no edema/ cyanosis/ clubbing noted  Skin:  Warm; no rashes/ lesions noted  Neurologic:  Non-focal            DATA:   Current Facility-Administered Medications   Medication Dose Route Frequency    diphenhydrAMINE (BENADRYL) capsule 25 mg  25 mg Oral Q6H PRN    sodium chloride (NS) flush 5-10 mL  5-10 mL IntraVENous PRN    folic acid (FOLVITE) tablet 1 mg  1 mg Oral DAILY    naloxegol (MOVANTIK) tablet 12.5 mg  12.5 mg Oral DAILY    oxyCODONE ER (OxyCONTIN) tablet 20 mg  20 mg Oral BID    ergocalciferol capsule 50,000 Units  50,000 Units Oral Q7D    thyroid (Pork) (ARMOUR) tablet 30 mg  30 mg Oral DAILY    metoprolol tartrate (LOPRESSOR) tablet 50 mg  50 mg Oral BID    potassium chloride (K-DUR, KLOR-CON) SR tablet 20 mEq  20 mEq Oral DAILY    deferasirox (JADENU) tablet 1,440 mg (Patient Supplied)  1,440 mg Oral DAILY    acetaminophen (TYLENOL) tablet 650 mg  650 mg Oral Q4H PRN    morphine injection 4 mg  4 mg IntraVENous Q4H PRN    heparin (porcine) injection 5,000 Units  5,000 Units SubCUTAneous Q8H    0.9% sodium chloride infusion 250 mL  250 mL IntraVENous PRN    LORazepam (ATIVAN) injection 0.5 mg  0.5 mg IntraVENous Q6H PRN    0.9% sodium chloride infusion 250 mL  250 mL IntraVENous PRN                    Labs:  Recent Labs     04/10/19  0451 04/09/19  0930 04/08/19  0930 04/08/19  0320   WBC 18.5* 23.4*  --  18.9*   HGB 8.1* 7.3* 4.6* 4.5*   HCT 23.5* 21.5* 13.6* 13.3*   * 104*  --  89*     Recent Labs     04/08/19  0320 04/07/19  1350    141   K 4.0 3.8   * 113*   CO2 22 22   GLU 84 105*   BUN 14 17   CREA 1.41* 1.33*   CA 8.6 8.0*   ALB 3.3*  --    SGOT 38*  --    ALT 20  --      No results for input(s): PH, PCO2, PO2, HCO3, FIO2 in the last 72 hours. IMPRESSION:   · Vaso-Occlusive Sickle Cell Anemia Pain crisis  · Hb- SS disease  · Malignant neoplasm of lower-outer quadrant of left breast of female, estrogen receptor positive (HCC)   · Iron Overload          PLAN:   · Continue IVF's, Folvite 1mg daily. 02 supplementation as needed. · Continue present pain management regime  · H/H 8.1/23.5 s/p 2 units PRBC   · SIRS Vs Sepsis -Resolved   · Platelet count 349,765  · Pt is cleared from Hematology perspective for discharge. Follow up with Dr. Jamilah Disla within 7-10 days after discharge.  ·        The patient is: [x] acutely ill Risk of deterioration: [] moderate    [] critically ill  [] high         My assessment/plan was discussed with:  [x]nursing []PT/OT    []respiratory therapy [x]Dr.Lloyd Pearl Gramajo MD      []family []       Henry Rizo NP

## 2019-04-10 NOTE — ROUTINE PROCESS
1956 Assumed care of patient from off going nurse. Patient resting in bed. No distress noted. Spouse at bedside. Patient asleep. Easily aroused to verbal stimuli. Call bell within reach, siderails up x 3, bed in lowest position, and patient instructed to use call bell for assistance. Will continue to monitor. 9175 Bedside and Verbal shift change report given to Gideon Chaudhry (oncoming nurse) by Shimon Gutierrez RN(offgoing nurse). Report included the following information Kardex, Intake/Output, MAR, Recent Results and Cardiac Rhythm SR tele box# [id number].

## 2019-04-10 NOTE — PROGRESS NOTES
Discharge:    Patient will discharge home today (4/10/2019). She has no home health orders in place at this time. Patient's family will transport home at the time of discharge. There are no other care manager concerns regarding this discharge. This writer will continue to closely monitor for discharge planning to ensure a safe discharge home from [place]. Quincy Shultz MSW  Care Manager  Pager#: [phone]

## 2019-04-11 ENCOUNTER — PATIENT OUTREACH (OUTPATIENT)
Dept: CASE MANAGEMENT | Age: 54
End: 2019-04-11

## 2019-04-11 LAB
BACTERIA SPEC CULT: ABNORMAL
DEPRECATED HGB OTHER BLD-IMP: 0 %
GRAM STN SPEC: ABNORMAL
GRAM STN SPEC: ABNORMAL
HGB A MFR BLD: 0 % (ref 96.4–98.8)
HGB A2 MFR BLD COLUMN CHROM: 3.7 % (ref 1.8–3.2)
HGB C MFR BLD: 44.1 %
HGB F MFR BLD: 2.9 % (ref 0–2)
HGB FRACT BLD-IMP: ABNORMAL
HGB S BLD QL SOLY: POSITIVE
HGB S MFR BLD: 49.3 %
SERVICE CMNT-IMP: ABNORMAL

## 2019-04-11 NOTE — PROGRESS NOTES
Hospital Discharge Follow-Up      Date/Time:  2019 1:36 PM    [facility] and discharged on 4/10/19 for sickle cell crisis pain, shortness of breath. The physician discharge summary was available at the time of outreach. Patient was contacted within 1 business days of discharge. Inpatient RRAT score: 12  Was this a readmission? no   Patient stated reason for the readmission: NA    Nurse Navigator (NN) contacted the patient by telephone to perform post hospital discharge assessment. Verified name and  with patient as identifiers. Provided introduction to self, and explanation of the Nurse Navigator role. Patient  verbalized understanding of signs/symptoms; how to care for self. . Patient given an opportunity to ask questions and does not have any further questions or concerns at this time. The patient agrees to contact the PCP office for questions related to their healthcare. NN provided contact information for future reference. Disease Specific:   N/A    Summary of patient's top problems:  1. Sickle cell disease- states she is tired and does not feel like talking at this time. Home Health orders at discharge: not ordered    Barriers to care? None noted    Advance Care Planning:   Does patient have an Advance Directive:  not on file     Medication(s):   New Medications at Discharge: no  Changed Medications at Discharge: no  Discontinued Medications at Discharge: yes  Oxycodone    Medication reconciliation was not performed with patient. Current Outpatient Medications   Medication Sig    ibuprofen (MOTRIN) 800 mg tablet Take 800 mg by mouth three (3) times daily as needed for Pain.  HYDROmorphone (DILAUDID) 2 mg tablet Take 1 Tab by mouth every four (4) hours as needed for Pain for up to 14 days. Max Daily Amount: 12 mg.  JADENU 360 mg tablet Use as directed by prescriber.   Take 4 tablets (1440 mg TOTAL) by mouth at approximately the same time once daily on an empty stomach or wit    potassium chloride (K-DUR, KLOR-CON) 20 mEq tablet Take 1 Tab by mouth daily.  metoprolol tartrate (LOPRESSOR) 50 mg tablet 50 mg.    thyroid, Pork, (ARMOUR THYROID) 30 mg tablet Take 30 mg by mouth daily.  ergocalciferol (ERGOCALCIFEROL) 50,000 unit capsule Take 1 Cap by mouth every seven (7) days.  naloxegol (MOVANTIK) 12.5 mg tab tablet Take 12.5 mg by mouth daily.  folic acid (FOLVITE) 1 mg tablet Take 1 mg by mouth daily. No current facility-administered medications for this visit. There are no discontinued medications. [facility] follow up appointment(s):   Future Appointments   Date Time Provider Lg Ofelia   7/15/2019 10:00 AM [facility]      Non-[facility] follow up appointment(s): Dr. Kaitlyn Rodriguez on 4/17/19    Patient states that she does not need any further calls from NN, that she is aware of what she needs to do and how to take her medications. NN will resolve this episode.

## 2019-04-13 LAB
BACTERIA SPEC CULT: NORMAL
SERVICE CMNT-IMP: NORMAL

## 2019-04-15 LAB
BACTERIA SPEC CULT: NORMAL
SERVICE CMNT-IMP: NORMAL

## 2019-04-18 ENCOUNTER — TELEPHONE (OUTPATIENT)
Dept: ONCOLOGY | Age: 54
End: 2019-04-18

## 2019-04-18 NOTE — TELEPHONE ENCOUNTER
Julian Suarez with [address] [phone] needs clarification on prescription Jadenu 360 mg. Use reference # [id number] when calling.

## 2019-05-03 ENCOUNTER — HOSPITAL ENCOUNTER (INPATIENT)
Age: 54
LOS: 12 days | Discharge: HOME HEALTH | DRG: 682 | End: 2019-05-15
Attending: EMERGENCY MEDICINE | Admitting: INTERNAL MEDICINE
Payer: COMMERCIAL

## 2019-05-03 ENCOUNTER — APPOINTMENT (OUTPATIENT)
Dept: GENERAL RADIOLOGY | Age: 54
DRG: 682 | End: 2019-05-03
Attending: EMERGENCY MEDICINE
Payer: COMMERCIAL

## 2019-05-03 ENCOUNTER — APPOINTMENT (OUTPATIENT)
Dept: CT IMAGING | Age: 54
DRG: 682 | End: 2019-05-03
Attending: EMERGENCY MEDICINE
Payer: COMMERCIAL

## 2019-05-03 DIAGNOSIS — D72.825 BANDEMIA: ICD-10-CM

## 2019-05-03 DIAGNOSIS — M16.12 OSTEOARTHRITIS OF LEFT HIP, UNSPECIFIED OSTEOARTHRITIS TYPE: Chronic | ICD-10-CM

## 2019-05-03 DIAGNOSIS — N17.9 ACUTE RENAL FAILURE, UNSPECIFIED ACUTE RENAL FAILURE TYPE (HCC): Primary | ICD-10-CM

## 2019-05-03 DIAGNOSIS — D57.00 HB-SS DISEASE WITH CRISIS (HCC): ICD-10-CM

## 2019-05-03 DIAGNOSIS — D72.829 LEUKOCYTOSIS, UNSPECIFIED TYPE: ICD-10-CM

## 2019-05-03 DIAGNOSIS — D69.6 THROMBOCYTOPENIA (HCC): ICD-10-CM

## 2019-05-03 PROBLEM — R07.9 CHEST PAIN: Status: ACTIVE | Noted: 2019-05-03

## 2019-05-03 PROBLEM — R10.9 ABDOMINAL PAIN: Status: ACTIVE | Noted: 2019-05-03

## 2019-05-03 LAB
ALBUMIN SERPL-MCNC: 3.8 G/DL (ref 3.4–5)
ALBUMIN/GLOB SERPL: 1.1 {RATIO} (ref 0.8–1.7)
ALP SERPL-CCNC: 166 U/L (ref 45–117)
ALT SERPL-CCNC: 46 U/L (ref 13–56)
ANION GAP SERPL CALC-SCNC: 14 MMOL/L (ref 3–18)
APPEARANCE UR: ABNORMAL
AST SERPL-CCNC: 90 U/L (ref 15–37)
ATRIAL RATE: 89 BPM
BACTERIA URNS QL MICRO: NEGATIVE /HPF
BASOPHILS # BLD: 0 K/UL (ref 0–0.06)
BASOPHILS NFR BLD: 0 % (ref 0–3)
BILIRUB SERPL-MCNC: 4.5 MG/DL (ref 0.2–1)
BILIRUB UR QL: ABNORMAL
BUN SERPL-MCNC: 52 MG/DL (ref 7–18)
BUN/CREAT SERPL: 10 (ref 12–20)
CALCIUM SERPL-MCNC: 8.9 MG/DL (ref 8.5–10.1)
CALCULATED P AXIS, ECG09: 49 DEGREES
CALCULATED R AXIS, ECG10: 38 DEGREES
CALCULATED T AXIS, ECG11: 35 DEGREES
CHLORIDE SERPL-SCNC: 106 MMOL/L (ref 100–108)
CK MB CFR SERPL CALC: 1.7 % (ref 0–4)
CK MB SERPL-MCNC: 3.2 NG/ML (ref 5–25)
CK SERPL-CCNC: 188 U/L (ref 26–192)
CK SERPL-CCNC: 200 U/L (ref 26–192)
CO2 SERPL-SCNC: 16 MMOL/L (ref 21–32)
COLOR UR: ABNORMAL
CREAT SERPL-MCNC: 5.36 MG/DL (ref 0.6–1.3)
CREAT UR-MCNC: 132 MG/DL (ref 30–125)
CREAT UR-MCNC: 136 MG/DL (ref 30–125)
DIAGNOSIS, 93000: NORMAL
DIFFERENTIAL METHOD BLD: ABNORMAL
EOSINOPHIL # BLD: 1 K/UL (ref 0–0.4)
EOSINOPHIL #/AREA URNS HPF: NORMAL /[HPF]
EOSINOPHIL NFR BLD: 4 % (ref 0–5)
EPITH CASTS URNS QL MICRO: ABNORMAL /LPF (ref 0–5)
ERYTHROCYTE [DISTWIDTH] IN BLOOD BY AUTOMATED COUNT: 15.5 % (ref 11.6–14.5)
GLOBULIN SER CALC-MCNC: 3.6 G/DL (ref 2–4)
GLUCOSE SERPL-MCNC: 107 MG/DL (ref 74–99)
GLUCOSE UR STRIP.AUTO-MCNC: NEGATIVE MG/DL
GRAN CASTS URNS QL MICRO: ABNORMAL /LPF
HCT VFR BLD AUTO: 19.7 % (ref 35–45)
HGB BLD-MCNC: 7 G/DL (ref 12–16)
HGB UR QL STRIP: ABNORMAL
KETONES UR QL STRIP.AUTO: ABNORMAL MG/DL
LACTATE BLD-SCNC: 1.76 MMOL/L (ref 0.4–2)
LEUKOCYTE ESTERASE UR QL STRIP.AUTO: NEGATIVE
LIPASE SERPL-CCNC: 29 U/L (ref 73–393)
LYMPHOCYTES # BLD: 1.9 K/UL (ref 0.8–3.5)
LYMPHOCYTES NFR BLD: 8 % (ref 20–51)
MCH RBC QN AUTO: 28.1 PG (ref 24–34)
MCHC RBC AUTO-ENTMCNC: 35.5 G/DL (ref 31–37)
MCV RBC AUTO: 79.1 FL (ref 74–97)
MONOCYTES # BLD: 0.7 K/UL (ref 0–1)
MONOCYTES NFR BLD: 3 % (ref 2–9)
NEUTS BAND NFR BLD MANUAL: 29 % (ref 0–5)
NEUTS SEG # BLD: 20.6 K/UL (ref 1.8–8)
NEUTS SEG NFR BLD: 56 % (ref 42–75)
NITRITE UR QL STRIP.AUTO: NEGATIVE
NRBC BLD-RTO: 10 PER 100 WBC
P-R INTERVAL, ECG05: 136 MS
PH UR STRIP: 5 [PH] (ref 5–8)
PLATELET # BLD AUTO: 34 K/UL (ref 135–420)
PLATELET COMMENTS,PCOM: ABNORMAL
POTASSIUM SERPL-SCNC: 3.9 MMOL/L (ref 3.5–5.5)
PROT SERPL-MCNC: 7.4 G/DL (ref 6.4–8.2)
PROT UR STRIP-MCNC: 100 MG/DL
PROT UR-MCNC: 246 MG/DL
PROT/CREAT UR-RTO: 1.8
Q-T INTERVAL, ECG07: 386 MS
QRS DURATION, ECG06: 82 MS
QTC CALCULATION (BEZET), ECG08: 469 MS
RBC # BLD AUTO: 2.49 M/UL (ref 4.2–5.3)
RBC #/AREA URNS HPF: ABNORMAL /HPF (ref 0–5)
RBC MORPH BLD: ABNORMAL
RETICS/RBC NFR AUTO: 1.5 % (ref 0.5–2.3)
SODIUM SERPL-SCNC: 136 MMOL/L (ref 136–145)
SODIUM UR-SCNC: 31 MMOL/L (ref 20–110)
SP GR UR REFRACTOMETRY: 1.01 (ref 1–1.03)
TROPONIN I SERPL-MCNC: <0.02 NG/ML (ref 0–0.04)
UROBILINOGEN UR QL STRIP.AUTO: 0.2 EU/DL (ref 0.2–1)
VENTRICULAR RATE, ECG03: 89 BPM
WBC # BLD AUTO: 24.2 K/UL (ref 4.6–13.2)
WBC URNS QL MICRO: ABNORMAL /HPF (ref 0–4)

## 2019-05-03 PROCEDURE — 96368 THER/DIAG CONCURRENT INF: CPT

## 2019-05-03 PROCEDURE — 71250 CT THORAX DX C-: CPT

## 2019-05-03 PROCEDURE — 80053 COMPREHEN METABOLIC PANEL: CPT

## 2019-05-03 PROCEDURE — 65660000004 HC RM CVT STEPDOWN

## 2019-05-03 PROCEDURE — 74011000250 HC RX REV CODE- 250: Performed by: EMERGENCY MEDICINE

## 2019-05-03 PROCEDURE — 82550 ASSAY OF CK (CPK): CPT

## 2019-05-03 PROCEDURE — 96376 TX/PRO/DX INJ SAME DRUG ADON: CPT

## 2019-05-03 PROCEDURE — 96375 TX/PRO/DX INJ NEW DRUG ADDON: CPT

## 2019-05-03 PROCEDURE — 74011250636 HC RX REV CODE- 250/636: Performed by: EMERGENCY MEDICINE

## 2019-05-03 PROCEDURE — 81001 URINALYSIS AUTO W/SCOPE: CPT

## 2019-05-03 PROCEDURE — 74011250636 HC RX REV CODE- 250/636: Performed by: INTERNAL MEDICINE

## 2019-05-03 PROCEDURE — 82570 ASSAY OF URINE CREATININE: CPT

## 2019-05-03 PROCEDURE — 84300 ASSAY OF URINE SODIUM: CPT

## 2019-05-03 PROCEDURE — 85045 AUTOMATED RETICULOCYTE COUNT: CPT

## 2019-05-03 PROCEDURE — 85025 COMPLETE CBC W/AUTO DIFF WBC: CPT

## 2019-05-03 PROCEDURE — 96365 THER/PROPH/DIAG IV INF INIT: CPT

## 2019-05-03 PROCEDURE — 30233M1 TRANSFUSION OF NONAUTOLOGOUS PLASMA CRYOPRECIPITATE INTO PERIPHERAL VEIN, PERCUTANEOUS APPROACH: ICD-10-PCS | Performed by: EMERGENCY MEDICINE

## 2019-05-03 PROCEDURE — 83605 ASSAY OF LACTIC ACID: CPT

## 2019-05-03 PROCEDURE — 84156 ASSAY OF PROTEIN URINE: CPT

## 2019-05-03 PROCEDURE — 93005 ELECTROCARDIOGRAM TRACING: CPT

## 2019-05-03 PROCEDURE — 87205 SMEAR GRAM STAIN: CPT

## 2019-05-03 PROCEDURE — 71045 X-RAY EXAM CHEST 1 VIEW: CPT

## 2019-05-03 PROCEDURE — 30233N1 TRANSFUSION OF NONAUTOLOGOUS RED BLOOD CELLS INTO PERIPHERAL VEIN, PERCUTANEOUS APPROACH: ICD-10-PCS | Performed by: EMERGENCY MEDICINE

## 2019-05-03 PROCEDURE — 74011000258 HC RX REV CODE- 258: Performed by: INTERNAL MEDICINE

## 2019-05-03 PROCEDURE — 99285 EMERGENCY DEPT VISIT HI MDM: CPT

## 2019-05-03 PROCEDURE — 74011000250 HC RX REV CODE- 250: Performed by: INTERNAL MEDICINE

## 2019-05-03 PROCEDURE — 87040 BLOOD CULTURE FOR BACTERIA: CPT

## 2019-05-03 PROCEDURE — 83690 ASSAY OF LIPASE: CPT

## 2019-05-03 RX ORDER — HYDROMORPHONE HYDROCHLORIDE 1 MG/ML
1 INJECTION, SOLUTION INTRAMUSCULAR; INTRAVENOUS; SUBCUTANEOUS ONCE
Status: COMPLETED | OUTPATIENT
Start: 2019-05-03 | End: 2019-05-03

## 2019-05-03 RX ORDER — SODIUM CHLORIDE 0.9 % (FLUSH) 0.9 %
5-10 SYRINGE (ML) INJECTION AS NEEDED
Status: DISCONTINUED | OUTPATIENT
Start: 2019-05-03 | End: 2019-05-15 | Stop reason: HOSPADM

## 2019-05-03 RX ORDER — MORPHINE SULFATE 4 MG/ML
4 INJECTION INTRAVENOUS
Status: COMPLETED | OUTPATIENT
Start: 2019-05-03 | End: 2019-05-03

## 2019-05-03 RX ORDER — HYDROMORPHONE HYDROCHLORIDE 1 MG/ML
1 INJECTION, SOLUTION INTRAMUSCULAR; INTRAVENOUS; SUBCUTANEOUS
Status: COMPLETED | OUTPATIENT
Start: 2019-05-03 | End: 2019-05-03

## 2019-05-03 RX ORDER — FOLIC ACID 1 MG/1
1 TABLET ORAL DAILY
Status: DISCONTINUED | OUTPATIENT
Start: 2019-05-04 | End: 2019-05-15 | Stop reason: HOSPADM

## 2019-05-03 RX ORDER — VANCOMYCIN 2 GRAM/500 ML IN 0.9 % SODIUM CHLORIDE INTRAVENOUS
2000 ONCE
Status: COMPLETED | OUTPATIENT
Start: 2019-05-03 | End: 2019-05-03

## 2019-05-03 RX ORDER — METOPROLOL TARTRATE 50 MG/1
50 TABLET ORAL 2 TIMES DAILY
Status: DISCONTINUED | OUTPATIENT
Start: 2019-05-03 | End: 2019-05-03

## 2019-05-03 RX ORDER — ONDANSETRON 2 MG/ML
4 INJECTION INTRAMUSCULAR; INTRAVENOUS
Status: COMPLETED | OUTPATIENT
Start: 2019-05-03 | End: 2019-05-03

## 2019-05-03 RX ORDER — HYDROMORPHONE HYDROCHLORIDE 2 MG/ML
1 INJECTION, SOLUTION INTRAMUSCULAR; INTRAVENOUS; SUBCUTANEOUS ONCE
Status: COMPLETED | OUTPATIENT
Start: 2019-05-03 | End: 2019-05-03

## 2019-05-03 RX ORDER — DIPHENHYDRAMINE HYDROCHLORIDE 50 MG/ML
12.5 INJECTION, SOLUTION INTRAMUSCULAR; INTRAVENOUS
Status: COMPLETED | OUTPATIENT
Start: 2019-05-03 | End: 2019-05-03

## 2019-05-03 RX ORDER — VANCOMYCIN/0.9 % SOD CHLORIDE 1 G/100 ML
1000 PLASTIC BAG, INJECTION (ML) INTRAVENOUS ONCE
Status: DISCONTINUED | OUTPATIENT
Start: 2019-05-03 | End: 2019-05-03

## 2019-05-03 RX ORDER — ACETAMINOPHEN 325 MG/1
650 TABLET ORAL
Status: DISCONTINUED | OUTPATIENT
Start: 2019-05-03 | End: 2019-05-15 | Stop reason: HOSPADM

## 2019-05-03 RX ORDER — DIPHENHYDRAMINE HYDROCHLORIDE 50 MG/ML
25 INJECTION, SOLUTION INTRAMUSCULAR; INTRAVENOUS
Status: COMPLETED | OUTPATIENT
Start: 2019-05-03 | End: 2019-05-03

## 2019-05-03 RX ORDER — ONDANSETRON 2 MG/ML
4 INJECTION INTRAMUSCULAR; INTRAVENOUS
Status: DISCONTINUED | OUTPATIENT
Start: 2019-05-03 | End: 2019-05-15 | Stop reason: HOSPADM

## 2019-05-03 RX ORDER — LEVOTHYROXINE AND LIOTHYRONINE 19; 4.5 UG/1; UG/1
30 TABLET ORAL DAILY
Status: DISCONTINUED | OUTPATIENT
Start: 2019-05-04 | End: 2019-05-15 | Stop reason: HOSPADM

## 2019-05-03 RX ORDER — METOPROLOL TARTRATE 50 MG/1
50 TABLET ORAL EVERY 12 HOURS
Status: DISCONTINUED | OUTPATIENT
Start: 2019-05-03 | End: 2019-05-04

## 2019-05-03 RX ADMIN — MORPHINE SULFATE 4 MG: 4 INJECTION INTRAVENOUS at 11:51

## 2019-05-03 RX ADMIN — SODIUM CHLORIDE 1000 ML: 900 INJECTION, SOLUTION INTRAVENOUS at 13:21

## 2019-05-03 RX ADMIN — ONDANSETRON 4 MG: 2 INJECTION INTRAMUSCULAR; INTRAVENOUS at 11:51

## 2019-05-03 RX ADMIN — HYDROMORPHONE HYDROCHLORIDE 1 MG: 2 INJECTION INTRAMUSCULAR; INTRAVENOUS; SUBCUTANEOUS at 16:22

## 2019-05-03 RX ADMIN — VANCOMYCIN HYDROCHLORIDE 2000 MG: 10 INJECTION, POWDER, LYOPHILIZED, FOR SOLUTION INTRAVENOUS at 15:50

## 2019-05-03 RX ADMIN — DIPHENHYDRAMINE HYDROCHLORIDE 25 MG: 50 INJECTION, SOLUTION INTRAMUSCULAR; INTRAVENOUS at 22:10

## 2019-05-03 RX ADMIN — CEFEPIME HYDROCHLORIDE 1 G: 1 INJECTION, POWDER, FOR SOLUTION INTRAMUSCULAR; INTRAVENOUS at 14:53

## 2019-05-03 RX ADMIN — HYDROMORPHONE HYDROCHLORIDE 1 MG: 1 INJECTION, SOLUTION INTRAMUSCULAR; INTRAVENOUS; SUBCUTANEOUS at 21:36

## 2019-05-03 RX ADMIN — DIPHENHYDRAMINE HYDROCHLORIDE 12.5 MG: 50 INJECTION INTRAMUSCULAR; INTRAVENOUS at 16:29

## 2019-05-03 RX ADMIN — SODIUM CHLORIDE 1000 ML: 900 INJECTION, SOLUTION INTRAVENOUS at 15:54

## 2019-05-03 RX ADMIN — SODIUM CHLORIDE: 450 INJECTION, SOLUTION INTRAVENOUS at 15:55

## 2019-05-03 RX ADMIN — SODIUM CHLORIDE 1000 ML: 900 INJECTION, SOLUTION INTRAVENOUS at 17:47

## 2019-05-03 RX ADMIN — ONDANSETRON 4 MG: 2 INJECTION INTRAMUSCULAR; INTRAVENOUS at 21:33

## 2019-05-03 RX ADMIN — HYDROMORPHONE HYDROCHLORIDE 1 MG: 1 INJECTION, SOLUTION INTRAMUSCULAR; INTRAVENOUS; SUBCUTANEOUS at 13:42

## 2019-05-03 RX ADMIN — HYDROMORPHONE HYDROCHLORIDE 1 MG: 1 INJECTION, SOLUTION INTRAMUSCULAR; INTRAVENOUS; SUBCUTANEOUS at 13:03

## 2019-05-03 NOTE — H&P
History and Physical      NAME:  Juan Castillo   :   1965   MRN:   [id number]     Date/Time:  5/3/2019     Chief Complaint: abdominal pain       History of Present Illness:        Ms. Erlin Garcia is a 48 y.o.   female with a PMH of sickle cell disease, HTN, Hypothyroid, Breast cancer and fibromyalgia who presents with c/c of abdominal pain. Patient said that she had pain for the past few days. She denies nausea or vomiting but not eating or drinking for the past 3 days with very poor oral intake. She denies fever or chills. No cough or shortness of breathing. No urinary complaint. Here in ED her labs showed creatinine of 5.36. WBC is chronically elevated. ED physician has d/w Dr Irma Miller and no concern for infection. Nephrology was also consulted and patient admitted for further evalautaion and management.          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    Fatigue     Fibromyalgia     GERD (gastroesophageal reflux disease)     Hx of endometriosis     Hypertension     Ill-defined condition 2018    Sickle cell crisis    Osteoarthritis of left hip 2016    Osteoarthritis of right hip 1/3/2017    Osteoarthritis of right knee 2018    Sickle [facility] [address] [address]     Sleep apnea     Does not use CPAP    Thyroid disease     hypo        Past Surgical History:   Procedure Laterality Date    HX BREAST BIOPSY Left     HX  SECTION      HX HERNIA REPAIR      HX LAP CHOLECYSTECTOMY      HX MASTECTOMY Left 2012    with axillary lymph node dissection    TOTAL HIP ARTHROPLASTY Bilateral  &        Social History     Tobacco Use    Smoking status: Former Smoker     Packs/day: 0.25     Years: 30.00     Pack years: 7.50     Last attempt to quit: 2011     Years since quittin.3    Smokeless tobacco: Former User   Substance Use Topics  Alcohol use: Yes     Comment: 2 times yearly        Family History   Problem Relation Age of Onset    Cancer Mother         breast    Diabetes Mother     Heart Disease Father     Diabetes Father     Sickle Cell Anemia Brother         Allergies   Allergen Reactions    Neulasta [Pegfilgrastim] Other (comments)     \"Put me in a comma for 3 months\"        Prior to Admission medications    Medication Sig Start Date End Date Taking? Authorizing Provider   ibuprofen (MOTRIN) 800 mg tablet Take 800 mg by mouth three (3) times daily as needed for Pain. Other, MD MAGALY Fitzpatrick 360 mg tablet Use as directed by prescriber. Take 4 tablets (1440 mg TOTAL) by mouth at approximately the same time once daily on an empty stomach or wit 3/19/19   Byron Cabrera MD   potassium chloride (K-DUR, KLOR-CON) 20 mEq tablet Take 1 Tab by mouth daily. 10/22/18   Sunday Sparks MD   metoprolol tartrate (LOPRESSOR) 50 mg tablet 50 mg.    Provider, Historical   thyroid, Pork, (ARMOUR THYROID) 30 mg tablet Take 30 mg by mouth daily. Provider, Historical   ergocalciferol (ERGOCALCIFEROL) 50,000 unit capsule Take 1 Cap by mouth every seven (7) days. 3/8/18   Raphael ADAMS NP   naloxegol (MOVANTIK) 12.5 mg tab tablet Take 12.5 mg by mouth daily. Provider, Historical   folic acid (FOLVITE) 1 mg tablet Take 1 mg by mouth daily.     Provider, Historical          Review of systems:     CONSTITUTIONAL: No Fever, No chills, No weight loss, No Night sweats  HEENT:  No epistaxis, No diff in swallowing  CVS: No chest pain, No palpitations, No syncope, No peripheral edema, No PND, No orthopnea  RS: No shortness of breath, No cough, No hemoptysis, No pleuritic chest pain  GI:  No hematemesis, No rectal bleeding, No acid reflux or heartburn  NEURO: No focal weakness, No headaches, No seizures  PSYCH: No anxiety, No depression  MUSCULOSKLETAL: No joint pain or swelling  : No hematuria or dysuria  SKIN: No rash      Physical Exam:       VITALS:    Vital signs reviewed; most recent are:    Visit Vitals  /63 (BP 1 Location: Right arm, BP Patient Position: At rest)   Pulse 89   Temp 97.6 °F (36.4 °C)   Resp 16   Wt 93 kg (205 lb)   SpO2 93%   BMI 33.09 kg/m²     SpO2 Readings from Last 6 Encounters:   05/03/19 93%   04/10/19 97%   01/14/19 96%   11/02/18 100%   10/22/18 99%   07/20/18 100%        No intake or output data in the 24 hours ending 05/03/19 1840     GENERAL: Not in acute distress  HEENT: pink conjunctiva, un icteric sclera,   NECK: No lymphadenopthy or thyroid swelling, JVD not seen  LYMPH: No supraclavicular or cervical or axillary nodes on both sides  CVS: S1S2, No murmurs, No gallop or rub  RS: CTA, No wheezing or crackles  Abd: Soft, non tender, not distended, No guarding, No rigidity  NEURO:  No focal neurologic deficits   Extrm: no leg edema or swelling   Skin: No rash      Labs:     Recent Results (from the past 24 hour(s))   CBC WITH AUTOMATED DIFF    Collection Time: 05/03/19 11:44 AM   Result Value Ref Range    WBC 24.2 (H) 4.6 - 13.2 K/uL    RBC 2.49 (L) 4.20 - 5.30 M/uL    HGB 7.0 (L) 12.0 - 16.0 g/dL    HCT 19.7 (L) 35.0 - 45.0 %    MCV 79.1 74.0 - 97.0 FL    MCH 28.1 24.0 - 34.0 PG    MCHC 35.5 31.0 - 37.0 g/dL    RDW 15.5 (H) 11.6 - 14.5 %    PLATELET 34 (L) 446 - 420 K/uL    NEUTROPHILS 56 42 - 75 %    BAND NEUTROPHILS 29 (H) 0 - 5 %    LYMPHOCYTES 8 (L) 20 - 51 %    MONOCYTES 3 2 - 9 %    EOSINOPHILS 4 0 - 5 %    BASOPHILS 0 0 - 3 %    NRBC 10.0 (H) 0  WBC    ABS. NEUTROPHILS 20.6 (H) 1.8 - 8.0 K/UL    ABS. LYMPHOCYTES 1.9 0.8 - 3.5 K/UL    ABS. MONOCYTES 0.7 0 - 1.0 K/UL    ABS. EOSINOPHILS 1.0 (H) 0.0 - 0.4 K/UL    ABS.  BASOPHILS 0.0 0.0 - 0.06 K/UL    DF MANUAL      PLATELET COMMENTS DECREASED PLATELETS      RBC COMMENTS TARGET CELLS  1+        RBC COMMENTS POLYCHROMASIA  1+        RBC COMMENTS HYPOCHROMIA  1+       METABOLIC PANEL, COMPREHENSIVE    Collection Time: 05/03/19 11:44 AM   Result Value Ref Range    Sodium 136 136 - 145 mmol/L    Potassium 3.9 3.5 - 5.5 mmol/L    Chloride 106 100 - 108 mmol/L    CO2 16 (L) 21 - 32 mmol/L    Anion gap 14 3.0 - 18 mmol/L    Glucose 107 (H) 74 - 99 mg/dL    BUN 52 (H) 7.0 - 18 MG/DL    Creatinine 5.36 (H) 0.6 - 1.3 MG/DL    BUN/Creatinine ratio 10 (L) 12 - 20      GFR est AA 10 (L) >60 ml/min/1.73m2    GFR est non-AA 8 (L) >60 ml/min/1.73m2    Calcium 8.9 8.5 - 10.1 MG/DL    Bilirubin, total 4.5 (H) 0.2 - 1.0 MG/DL    ALT (SGPT) 46 13 - 56 U/L    AST (SGOT) 90 (H) 15 - 37 U/L    Alk.  phosphatase 166 (H) 45 - 117 U/L    Protein, total 7.4 6.4 - 8.2 g/dL    Albumin 3.8 3.4 - 5.0 g/dL    Globulin 3.6 2.0 - 4.0 g/dL    A-G Ratio 1.1 0.8 - 1.7     CARDIAC PANEL,(CK, CKMB & TROPONIN)    Collection Time: 05/03/19 11:44 AM   Result Value Ref Range     26 - 192 U/L    CK - MB 3.2 <3.6 ng/ml    CK-MB Index 1.7 0.0 - 4.0 %    Troponin-I, QT <0.02 0.0 - 0.045 NG/ML   RETICULOCYTE COUNT    Collection Time: 05/03/19 11:44 AM   Result Value Ref Range    Reticulocyte count 1.5 0.5 - 2.3 %   LIPASE    Collection Time: 05/03/19 11:44 AM   Result Value Ref Range    Lipase 29 (L) 73 - 393 U/L   CK    Collection Time: 05/03/19 11:44 AM   Result Value Ref Range     (H) 26 - 192 U/L   EKG, 12 LEAD, INITIAL    Collection Time: 05/03/19 11:51 AM   Result Value Ref Range    Ventricular Rate 89 BPM    Atrial Rate 89 BPM    P-R Interval 136 ms    QRS Duration 82 ms    Q-T Interval 386 ms    QTC Calculation (Bezet) 469 ms    Calculated P Axis 49 degrees    Calculated R Axis 38 degrees    Calculated T Axis 35 degrees    Diagnosis       Normal sinus rhythm  Septal infarct , age undetermined  Abnormal ECG  When compared with ECG of 07-APR-2019 10:46,  No significant change was found  Confirmed by Binu Ma MD, Bairon Schmidt ([id number]) on 5/3/2019 12:54:19 PM     POC LACTIC ACID    Collection Time: 05/03/19  1:44 PM   Result Value Ref Range    Lactic Acid (POC) 1.76 0.40 - 2.00 mmol/L   PROTEIN/CREATININE RATIO, URINE    Collection Time: 05/03/19  3:40 PM   Result Value Ref Range    Protein, urine random 246 (H) <11.9 mg/dL    Creatinine, urine 136.00 (H) 30 - 125 mg/dL    Protein/Creat. urine Ratio 1.8     URINALYSIS W/MICROSCOPIC    Collection Time: 05/03/19  3:40 PM   Result Value Ref Range    Color DARK YELLOW      Appearance TURBID      Specific gravity 1.015 1.005 - 1.030      pH (UA) 5.0 5.0 - 8.0      Protein 100 (A) NEG mg/dL    Glucose NEGATIVE  NEG mg/dL    Ketone TRACE (A) NEG mg/dL    Bilirubin SMALL (A) NEG      Blood LARGE (A) NEG      Urobilinogen 0.2 0.2 - 1.0 EU/dL    Nitrites NEGATIVE  NEG      Leukocyte Esterase NEGATIVE  NEG      WBC 3 to 5 0 - 4 /hpf    RBC 0 to 2 0 - 5 /hpf    Epithelial cells 1+ 0 - 5 /lpf    Bacteria NEGATIVE  NEG /hpf    Granular cast 10 to 20 NEG /lpf   SODIUM, UR, RANDOM    Collection Time: 05/03/19  3:40 PM   Result Value Ref Range    Sodium,urine random 31 20 - 110 MMOL/L   CREATININE, UR, RANDOM    Collection Time: 05/03/19  3:40 PM   Result Value Ref Range    Creatinine, urine 132.00 (H) 30 - 125 mg/dL         Active Problems: Thrombocytopenia ([address] (8/22/2016)      Leukocytosis (2/20/2017)      Anemia (4/7/2019)      Chest pain (5/3/2019)      ARF (acute renal failure) ([address] (5/3/2019)      Abdominal pain (5/3/2019)      Bandemia (5/3/2019)        Assessment:       1. MARIA ALEJANDRA,    2. Dehydration due to poor oral intake  3. Abdominal pain  4. Sickle cell disease,  5. Leukocytosis, chronic    6. Thrombocytopenia   7. HTN,   8. Hypothyroid,   9. H/o Breast cancer   10. Fibromyalgia     Plan:       · Patient admitted   · Nephrology and Hem/onc consulted  · Continue IVF hydration per renal recommendation  · Monitor renal function, I/O  · Avoid nephrotoxic agents  · Monitor CBC  · No evidence of infection.   · Patient has chronic leukocytosis and hematology is following  · Resume home meds  · D/w patient    Total time:  59 minutes        _______________________________________________________________________        Attending Physician:  Al Matias MD       Copies: Margarita Llamas MD

## 2019-05-03 NOTE — ED PROVIDER NOTES
EMERGENCY DEPARTMENT HISTORY AND PHYSICAL EXAM  This was created with voice recognition software and transcription errors may be present.     1:01 PM  Date: 5/3/2019  Patient Name: Kevin Chance    History of Presenting Illness     Chief Complaint:    History Provided By:   Patient  HPI: Kevin Chance is a 48 y.o. female with a past medical history of anemia, chronic pain, ductal carcinoma of the left breast, fatigue, fibromyalgia, endometriosis, hypertension, sickle cell disease who presents with abdominal pain. Patient has been having 3 to 4 days of not eating and drinking well this then increased abdominal pain that sort of diffuse began today. This is followed by some chest pain. Patient states that it does not feel similar to prior sickle cell disease.   PCP: Margarita Llamas MD      Past History     Past Medical History:  Past Medical History:   Diagnosis Date    Anemia NEC     Arthritis     Chronic pain     Ductal carcinoma ([address]     left breast    Fatigue     Fibromyalgia     GERD (gastroesophageal reflux disease)     Hx of endometriosis     Hypertension     Ill-defined condition 2018    Sickle cell crisis    Osteoarthritis of left hip 2016    Osteoarthritis of right hip 1/3/2017    Osteoarthritis of right knee 2018    Sickle cell anemia (HCC)     Sleep apnea     Does not use CPAP    Thyroid disease     hypo       Past Surgical History:  Past Surgical History:   Procedure Laterality Date    HX BREAST BIOPSY Left 2016    HX  SECTION      HX HERNIA REPAIR      HX LAP CHOLECYSTECTOMY      HX MASTECTOMY Left 2012    with axillary lymph node dissection    TOTAL HIP ARTHROPLASTY Bilateral  &        Family History:  Family History   Problem Relation Age of Onset    Cancer Mother         breast    Diabetes Mother     Heart Disease Father     Diabetes Father     Sickle Cell Anemia Brother        Social History:  Social History     Tobacco Use    Smoking status: Former Smoker     Packs/day: 0.25     Years: 30.00     Pack years: 7.50     Last attempt to quit: 2011     Years since quittin.3    Smokeless tobacco: Former User   Substance Use Topics    Alcohol use: Yes     Comment: 2 times yearly    Drug use: No       Allergies: Allergies   Allergen Reactions    Neulasta [Pegfilgrastim] Other (comments)     \"Put me in a comma for 3 months\"       Review of Systems     Review of Systems   Constitutional: Negative for fatigue and fever. Respiratory: Positive for chest tightness. Cardiovascular: Positive for chest pain. Gastrointestinal: Positive for abdominal pain. All other systems reviewed and are negative. 10 point review of systems otherwise negative unless noted in HPI. Physical Exam       Physical Exam   Constitutional: She is oriented to person, place, and time. She appears well-developed. HENT:   Head: Normocephalic and atraumatic. Eyes: Pupils are equal, round, and reactive to light. EOM are normal.   Neck: Normal range of motion. Neck supple. Cardiovascular: Normal rate, regular rhythm and normal heart sounds. Exam reveals no friction rub. No murmur heard. Pulmonary/Chest: Effort normal and breath sounds normal. No respiratory distress. She has no wheezes. Abdominal: Soft. She exhibits distension. There is no tenderness. There is no rebound and no guarding. Diffuse tenderness   Musculoskeletal: Normal range of motion. Neurological: She is alert and oriented to person, place, and time. Skin: Skin is warm and dry. Psychiatric: She has a normal mood and affect.  Her behavior is normal. Thought content normal.       Diagnostic Study Results     Vital Signs  EKG: EKG shows sinus at 89 with a normal axis normal there is no ST elevation or depression and no hypertrophy   creatinine 5  Labs:   Imaging:     Medical Decision Making     ED Course: Progress Notes, Reevaluation, and Consults:      Provider Notes (Medical Decision Making): Fairly complex patient with a history of sickle cell with chest and abdominal pain x3 to 4 days. She is noted to have a leukocytosis with a significant bandemia. She was recently admitted for similar and felt that the blood cultures were contaminant. And is some sort of leukemoid reaction. Additionally she is in acute renal failure baseline creatinine 1 and today it is 5. We will continue with CT of the chest abdomen pelvis for further evaluation and likely admission pending either stepdown or ICU. Called CT to change the CTA to a CT without contrast given the creatinine     Patient with a chronic leukocytosis recorded there is a leukemoid reaction however she appears to also now be anemic and thrombocytopenic with platelets of 34 she is not actively bleeding I overall think she is very sick and may need the ICU. D/w Dr Arnold Dave consult. Notes leukocytosis is chronic as is anemia  Thrombocytopenia is new    Dw/ dr Mitzy Cervantes; will consult   Dw/ dr Alley Estes for admission    Diagnosis     Clinical Impression: No diagnosis found. Disposition:    Patient's Medications   Start Taking    No medications on file   Continue Taking    ERGOCALCIFEROL (ERGOCALCIFEROL) 50,000 UNIT CAPSULE    Take 1 Cap by mouth every seven (7) days. FOLIC ACID (FOLVITE) 1 MG TABLET    Take 1 mg by mouth daily. IBUPROFEN (MOTRIN) 800 MG TABLET    Take 800 mg by mouth three (3) times daily as needed for Pain. JADENU 360 MG TABLET    Use as directed by prescriber. Take 4 tablets (1440 mg TOTAL) by mouth at approximately the same time once daily on an empty stomach or wit    METOPROLOL TARTRATE (LOPRESSOR) 50 MG TABLET    50 mg. NALOXEGOL (MOVANTIK) 12.5 MG TAB TABLET    Take 12.5 mg by mouth daily. POTASSIUM CHLORIDE (K-DUR, KLOR-CON) 20 MEQ TABLET    Take 1 Tab by mouth daily. THYROID, PORK, (ARMOUR THYROID) 30 MG TABLET    Take 30 mg by mouth daily.    These Medications have changed    No medications on file   Stop Taking    No medications on file

## 2019-05-03 NOTE — ED TRIAGE NOTES
Pt arrived to ED via POV with CO generalized weakness, SOB, nausea, and vomiting x3 days. Pt appears pale and lethargic.

## 2019-05-03 NOTE — CONSULTS
Consult Note    Consult requested by: Darline Dukes MD    ADMIT DATE: 5/3/2019    CONSULT DATE: May 3, 2019             Admission diagnosis: Abdominal pain , SIRS  Reason for Nephrology Consultation: MARIA ALEJANDRA    Assessment:   #1 acute kidney injury on chronic kidney disease stage III(baseline creatinine 1.3) due to prerenal azotemia versus ATN in the setting of sepsis/hypotension/SIRS  Recent h/o NSAID intake ( 800 mg ibuprofen )may have contributed in this setting with ATN/AIN , does have mild peripheral eosinophilia , Looks dry ,. IVF with bicarb at 125 cc/hrs   Bull in place , urine studies ordered, order CK   CT abdomen with no hydro , does show renal cyst   #2 chronic kidney disease stage III, patient does have minimal proteinuria, suspect due to hypertension nephrosclerosis versus FSGS secondary to sickle cell disease  #3 acute non gap metabolic acidosis,adding bicarb to fluids   #4 transaminitis, may be secondary to sepsis and shock  #5 sickle cell disease with possible acute crisis, hydration, pain management per primary  #6 anemia/thrombocytopenia: Likely secondary to sepsis , hem/onc consulted  #7 leukocytosis/Sirs: Sepsis work-up per primary  #8 splenomegaly d/t sickle disease    Please call with questions,    Sharia Angelucci, MD [facility]  Cell [phone]  Pager: [phone]      HPI:   Patient is a 80-year-old female with past medical history of sickle cell disease, history of ductal carcinoma left breast, fibromyalgia, endometriosis, long-standing hypertension who presented to the emergency room with abdominal pain. Patient mentions that she has had symptoms for the last several days, with poor intake, not able to keep down fluids especially with her abdominal pain. She also started having some chest pains. She also noted her urine output had decreased and was dark in color.   On presentation to the emergency room patient was afebrile blood pressure was 114/63, with a pulse of 89 saturating 93% on room air. Labs suggested that patient had elevated white count of 24,000, low platelets of 38,830 hemoglobin of 7. Chemistry suggestive of bicarb of 16 with anion gap of 14, creatinine elevated at 5.36 up from 1.4 albumin of 3.8 elevated liver enzymes AST and ALT being 46 and 90. Chest x-ray with no acute findings.   CT abdomen pelvis without contrast has been ordered and pending       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    Fatigue     Fibromyalgia     GERD (gastroesophageal reflux disease)     Hx of endometriosis     Hypertension 2011    Ill-defined condition 2018    Sickle cell crisis    Osteoarthritis of left hip 2016    Osteoarthritis of right hip 1/3/2017    Osteoarthritis of right knee 2018    Sickle cell anemia ([address]     Sleep apnea     Does not use CPAP    Thyroid disease     hypo      Past Surgical History:   Procedure Laterality Date    HX BREAST BIOPSY Left 2016    HX  SECTION      HX HERNIA REPAIR      HX LAP CHOLECYSTECTOMY      HX MASTECTOMY Left 2012    with axillary lymph node dissection    TOTAL HIP ARTHROPLASTY Bilateral  &        Social History     Socioeconomic History    Marital status:      Spouse name: Not on file    Number of children: Not on file    Years of education: Not on file    Highest education level: Not on file   Occupational History    Not on file   Social Needs    Financial resource strain: Not on file    Food insecurity:     Worry: Not on file     Inability: Not on file    Transportation needs:     Medical: Not on file     Non-medical: Not on file   Tobacco Use    Smoking status: Former Smoker     Packs/day: 0.25     Years: 30.00     Pack years: 7.50     Last attempt to quit: 2011     Years since quittin.3    Smokeless tobacco: Former User   Substance and Sexual Activity    Alcohol use: Yes     Comment: 2 times yearly    Drug use: No    Sexual activity: Not Currently   Lifestyle    Physical activity:     Days per week: Not on file     Minutes per session: Not on file    Stress: Not on file   Relationships    Social connections:     Talks on phone: Not on file     Gets together: Not on file     Attends Shinto service: Not on file     Active member of club or organization: Not on file     Attends meetings of clubs or organizations: Not on file     Relationship status: Not on file    Intimate partner violence:     Fear of current or ex partner: Not on file     Emotionally abused: Not on file     Physically abused: Not on file     Forced sexual activity: Not on file   Other Topics Concern    Not on file   Social History Narrative    Not on file       Family History   Problem Relation Age of Onset    Cancer Mother         breast    Diabetes Mother     Heart Disease Father     Diabetes Father     Sickle Cell Anemia Brother      Allergies   Allergen Reactions    Neulasta [Pegfilgrastim] Other (comments)     \"Put me in a comma for 3 months\"        Home Medications:     (Not in a hospital admission)    Current Inpatient Medications:     Current Facility-Administered Medications   Medication Dose Route Frequency    sodium chloride (NS) flush 5-10 mL  5-10 mL IntraVENous PRN    sodium chloride 0.9 % bolus infusion 1,000 mL  1,000 mL IntraVENous ONCE    Followed by    sodium chloride 0.9 % bolus infusion 1,000 mL  1,000 mL IntraVENous ONCE    piperacillin-tazobactam (ZOSYN) 2.25 g in 0.9% sodium chloride (MBP/ADV) 50 mL MBP  2.25 g IntraVENous Q8H    vancomycin (VANCOCIN) 2000 mg in  ml infusion  2,000 mg IntraVENous ONCE     Please enter the patient's weight into [facility]. Thanks! 1 Each Other ONCE     Current Outpatient Medications   Medication Sig    ibuprofen (MOTRIN) 800 mg tablet Take 800 mg by mouth three (3) times daily as needed for Pain.     JADENU 360 mg tablet Use as directed by prescriber. Take 4 tablets (1440 mg TOTAL) by mouth at approximately the same time once daily on an empty stomach or wit    potassium chloride (K-DUR, KLOR-CON) 20 mEq tablet Take 1 Tab by mouth daily.  metoprolol tartrate (LOPRESSOR) 50 mg tablet 50 mg.    thyroid, Pork, (ARMOUR THYROID) 30 mg tablet Take 30 mg by mouth daily.  ergocalciferol (ERGOCALCIFEROL) 50,000 unit capsule Take 1 Cap by mouth every seven (7) days.  naloxegol (MOVANTIK) 12.5 mg tab tablet Take 12.5 mg by mouth daily.  folic acid (FOLVITE) 1 mg tablet Take 1 mg by mouth daily. Review of Systems:   No fever or chills. No sore throat. No cough or hemoptysis. No shortness of breath or chest pain. No orthopnea or paroxysmal nocturnal dyspnea. No dysuria, no gross hematuria of voiding difficulties. No ankle swelling, no joint paints. No muscle aches. No skin changes. No dizziness or lightheadedness. No headaches. Physical Assessment:     Vitals:    05/03/19 1146 05/03/19 1420   BP: 114/63    Pulse: 89    Resp: 16    Temp: 97.6 °F (36.4 °C)    SpO2: 93%    Weight:  93 kg (205 lb)     Last 3 Recorded Weights in this Encounter    05/03/19 1420   Weight: 93 kg (205 lb)     Admission weight: Weight: 93 kg (205 lb) (05/03/19 1420)    No intake or output data in the 24 hours ending 05/03/19 1445    Patient is in no apparent distress. HEENT: dry mucosa  Neck: no cervical lymphadenopathy or thyromegaly. Lungs: good air entry, clear to auscultation  Cardiovascular system: S1, S2, regular rate and rhythm. No JVD  Abdomen: soft, non tender, non distended BS +  Extremities: no clubbing, cyanosis or edema. Neurologic: Alert, oriented time three.    Skin no rashes     Data Review:    Labs: Results:       Chemistry Recent Labs     05/03/19  1144   *      K 3.9      CO2 16*   BUN 52*   CREA 5.36*   CA 8.9   AGAP 14   BUCR 10*   *   TP 7.4   ALB 3.8   GLOB 3.6   AGRAT 1.1         CBC w/Diff Recent Labs 05/03/19  1144   WBC 24.2*   RBC 2.49*   HGB 7.0*   HCT 19.7*   PLT 34*   GRANS 56   LYMPH 8*   EOS 4         Iron/Ferritin No results for input(s): IRON in the last 72 hours. No lab exists for component: TIBCCALC   PTH/VIT D No results for input(s): PTH in the last 72 hours.     No lab exists for component: VITD           Clyde Martinez MD  5/3/2019  2:45 PM      May 3, 2019

## 2019-05-04 ENCOUNTER — APPOINTMENT (OUTPATIENT)
Dept: GENERAL RADIOLOGY | Age: 54
DRG: 682 | End: 2019-05-04
Attending: HOSPITALIST
Payer: COMMERCIAL

## 2019-05-04 LAB
AMMONIA PLAS-SCNC: 47 UMOL/L (ref 11–32)
ANION GAP SERPL CALC-SCNC: 11 MMOL/L (ref 3–18)
BASOPHILS # BLD: 0 K/UL (ref 0–0.06)
BASOPHILS # BLD: 0 K/UL (ref 0–0.06)
BASOPHILS NFR BLD: 0 % (ref 0–3)
BASOPHILS NFR BLD: 0 % (ref 0–3)
BUN SERPL-MCNC: 56 MG/DL (ref 7–18)
BUN/CREAT SERPL: 11 (ref 12–20)
CALCIUM SERPL-MCNC: 7.4 MG/DL (ref 8.5–10.1)
CHLORIDE SERPL-SCNC: 112 MMOL/L (ref 100–108)
CO2 SERPL-SCNC: 17 MMOL/L (ref 21–32)
CREAT SERPL-MCNC: 5.21 MG/DL (ref 0.6–1.3)
DIFFERENTIAL METHOD BLD: ABNORMAL
DIFFERENTIAL METHOD BLD: ABNORMAL
EOSINOPHIL # BLD: 1.6 K/UL (ref 0–0.4)
EOSINOPHIL # BLD: 2.4 K/UL (ref 0–0.4)
EOSINOPHIL NFR BLD: 4 % (ref 0–5)
EOSINOPHIL NFR BLD: 5 % (ref 0–5)
ERYTHROCYTE [DISTWIDTH] IN BLOOD BY AUTOMATED COUNT: 15.9 % (ref 11.6–14.5)
ERYTHROCYTE [DISTWIDTH] IN BLOOD BY AUTOMATED COUNT: 16.1 % (ref 11.6–14.5)
GLUCOSE SERPL-MCNC: 70 MG/DL (ref 74–99)
HCT VFR BLD AUTO: 15.3 % (ref 35–45)
HCT VFR BLD AUTO: 15.3 % (ref 35–45)
HCT VFR BLD AUTO: 16 % (ref 35–45)
HGB BLD-MCNC: 5.5 G/DL (ref 12–16)
HGB BLD-MCNC: 5.5 G/DL (ref 12–16)
HGB BLD-MCNC: 5.8 G/DL (ref 12–16)
LACTATE SERPL-SCNC: 2.7 MMOL/L (ref 0.4–2)
LACTATE SERPL-SCNC: 5.3 MMOL/L (ref 0.4–2)
LYMPHOCYTES # BLD: 2.5 K/UL (ref 0.8–3.5)
LYMPHOCYTES # BLD: 3.4 K/UL (ref 0.8–3.5)
LYMPHOCYTES NFR BLD: 6 % (ref 20–51)
LYMPHOCYTES NFR BLD: 7 % (ref 20–51)
MCH RBC QN AUTO: 28.4 PG (ref 24–34)
MCH RBC QN AUTO: 28.9 PG (ref 24–34)
MCHC RBC AUTO-ENTMCNC: 36.3 G/DL (ref 31–37)
MCHC RBC AUTO-ENTMCNC: 36.4 G/DL (ref 31–37)
MCV RBC AUTO: 78.4 FL (ref 74–97)
MCV RBC AUTO: 79.4 FL (ref 74–97)
METAMYELOCYTES NFR BLD MANUAL: 6 %
METAMYELOCYTES NFR BLD MANUAL: 8 %
MONOCYTES # BLD: 2.1 K/UL (ref 0–1)
MONOCYTES # BLD: 3.4 K/UL (ref 0–1)
MONOCYTES NFR BLD: 5 % (ref 2–9)
MONOCYTES NFR BLD: 7 % (ref 2–9)
MYELOCYTES NFR BLD MANUAL: 1 %
NEUTS BAND NFR BLD MANUAL: 34 % (ref 0–5)
NEUTS BAND NFR BLD MANUAL: 37 % (ref 0–5)
NEUTS SEG # BLD: 32 K/UL (ref 1.8–8)
NEUTS SEG # BLD: 35.6 K/UL (ref 1.8–8)
NEUTS SEG NFR BLD: 36 % (ref 42–75)
NEUTS SEG NFR BLD: 44 % (ref 42–75)
NRBC BLD-RTO: 13 PER 100 WBC
NRBC BLD-RTO: 14 PER 100 WBC
PLATELET # BLD AUTO: 35 K/UL (ref 135–420)
PLATELET # BLD AUTO: 40 K/UL (ref 135–420)
PLATELET COMMENTS,PCOM: ABNORMAL
PLATELET COMMENTS,PCOM: ABNORMAL
POTASSIUM SERPL-SCNC: 3.5 MMOL/L (ref 3.5–5.5)
RBC # BLD AUTO: 1.94 M/UL (ref 4.2–5.3)
RBC # BLD AUTO: 2.04 M/UL (ref 4.2–5.3)
RBC MORPH BLD: ABNORMAL
SODIUM SERPL-SCNC: 140 MMOL/L (ref 136–145)
VANCOMYCIN SERPL-MCNC: 22.7 UG/ML (ref 5–40)
WBC # BLD AUTO: 41 K/UL (ref 4.6–13.2)
WBC # BLD AUTO: 48.8 K/UL (ref 4.6–13.2)
WBC MORPH BLD: ABNORMAL

## 2019-05-04 PROCEDURE — 86900 BLOOD TYPING SEROLOGIC ABO: CPT

## 2019-05-04 PROCEDURE — C9113 INJ PANTOPRAZOLE SODIUM, VIA: HCPCS | Performed by: INTERNAL MEDICINE

## 2019-05-04 PROCEDURE — 86870 RBC ANTIBODY IDENTIFICATION: CPT

## 2019-05-04 PROCEDURE — 80202 ASSAY OF VANCOMYCIN: CPT

## 2019-05-04 PROCEDURE — 74011000250 HC RX REV CODE- 250: Performed by: INTERNAL MEDICINE

## 2019-05-04 PROCEDURE — 74011250637 HC RX REV CODE- 250/637: Performed by: INTERNAL MEDICINE

## 2019-05-04 PROCEDURE — 87040 BLOOD CULTURE FOR BACTERIA: CPT

## 2019-05-04 PROCEDURE — 36415 COLL VENOUS BLD VENIPUNCTURE: CPT

## 2019-05-04 PROCEDURE — 83605 ASSAY OF LACTIC ACID: CPT

## 2019-05-04 PROCEDURE — 86920 COMPATIBILITY TEST SPIN: CPT

## 2019-05-04 PROCEDURE — 86880 COOMBS TEST DIRECT: CPT

## 2019-05-04 PROCEDURE — 74011250636 HC RX REV CODE- 250/636: Performed by: INTERNAL MEDICINE

## 2019-05-04 PROCEDURE — 71045 X-RAY EXAM CHEST 1 VIEW: CPT

## 2019-05-04 PROCEDURE — 74011000258 HC RX REV CODE- 258: Performed by: INTERNAL MEDICINE

## 2019-05-04 PROCEDURE — 74011250637 HC RX REV CODE- 250/637: Performed by: HOSPITALIST

## 2019-05-04 PROCEDURE — 86904 BLOOD TYPING PATIENT SERUM: CPT

## 2019-05-04 PROCEDURE — 65660000004 HC RM CVT STEPDOWN

## 2019-05-04 PROCEDURE — 85014 HEMATOCRIT: CPT

## 2019-05-04 PROCEDURE — 82140 ASSAY OF AMMONIA: CPT

## 2019-05-04 PROCEDURE — 86901 BLOOD TYPING SEROLOGIC RH(D): CPT

## 2019-05-04 PROCEDURE — 85025 COMPLETE CBC W/AUTO DIFF WBC: CPT

## 2019-05-04 PROCEDURE — 80048 BASIC METABOLIC PNL TOTAL CA: CPT

## 2019-05-04 PROCEDURE — 74011250636 HC RX REV CODE- 250/636: Performed by: HOSPITALIST

## 2019-05-04 RX ORDER — SODIUM CHLORIDE 9 MG/ML
999 INJECTION, SOLUTION INTRAVENOUS ONCE
Status: COMPLETED | OUTPATIENT
Start: 2019-05-04 | End: 2019-05-04

## 2019-05-04 RX ORDER — MIDODRINE HYDROCHLORIDE 5 MG/1
5 TABLET ORAL
Status: DISCONTINUED | OUTPATIENT
Start: 2019-05-04 | End: 2019-05-06

## 2019-05-04 RX ORDER — HYDROCODONE BITARTRATE AND ACETAMINOPHEN 5; 325 MG/1; MG/1
1-2 TABLET ORAL
Status: DISCONTINUED | OUTPATIENT
Start: 2019-05-04 | End: 2019-05-15 | Stop reason: HOSPADM

## 2019-05-04 RX ORDER — LEVOFLOXACIN 5 MG/ML
500 INJECTION, SOLUTION INTRAVENOUS
Status: DISCONTINUED | OUTPATIENT
Start: 2019-05-06 | End: 2019-05-10

## 2019-05-04 RX ORDER — FAMOTIDINE 20 MG/1
20 TABLET, FILM COATED ORAL 2 TIMES DAILY
Status: DISCONTINUED | OUTPATIENT
Start: 2019-05-04 | End: 2019-05-04

## 2019-05-04 RX ORDER — DRONABINOL 2.5 MG/1
5 CAPSULE ORAL 2 TIMES DAILY
Status: DISCONTINUED | OUTPATIENT
Start: 2019-05-04 | End: 2019-05-15 | Stop reason: HOSPADM

## 2019-05-04 RX ORDER — ACETAMINOPHEN 325 MG/1
650 TABLET ORAL ONCE
Status: COMPLETED | OUTPATIENT
Start: 2019-05-04 | End: 2019-05-04

## 2019-05-04 RX ORDER — DEXAMETHASONE SODIUM PHOSPHATE 100 MG/10ML
10 INJECTION INTRAMUSCULAR; INTRAVENOUS ONCE
Status: DISCONTINUED | OUTPATIENT
Start: 2019-05-04 | End: 2019-05-04

## 2019-05-04 RX ORDER — LEVOFLOXACIN 5 MG/ML
750 INJECTION, SOLUTION INTRAVENOUS ONCE
Status: COMPLETED | OUTPATIENT
Start: 2019-05-04 | End: 2019-05-04

## 2019-05-04 RX ORDER — SODIUM CHLORIDE 9 MG/ML
250 INJECTION, SOLUTION INTRAVENOUS CONTINUOUS
Status: DISPENSED | OUTPATIENT
Start: 2019-05-04 | End: 2019-05-04

## 2019-05-04 RX ORDER — DIPHENHYDRAMINE HCL 25 MG
25 CAPSULE ORAL
Status: DISCONTINUED | OUTPATIENT
Start: 2019-05-04 | End: 2019-05-15 | Stop reason: HOSPADM

## 2019-05-04 RX ORDER — SODIUM CHLORIDE 9 MG/ML
250 INJECTION, SOLUTION INTRAVENOUS AS NEEDED
Status: DISCONTINUED | OUTPATIENT
Start: 2019-05-04 | End: 2019-05-06

## 2019-05-04 RX ORDER — FAMOTIDINE 20 MG/1
20 TABLET, FILM COATED ORAL DAILY
Status: DISCONTINUED | OUTPATIENT
Start: 2019-05-04 | End: 2019-05-15 | Stop reason: HOSPADM

## 2019-05-04 RX ORDER — DIPHENHYDRAMINE HYDROCHLORIDE 50 MG/ML
25 INJECTION, SOLUTION INTRAMUSCULAR; INTRAVENOUS ONCE
Status: ACTIVE | OUTPATIENT
Start: 2019-05-04 | End: 2019-05-04

## 2019-05-04 RX ADMIN — DRONABINOL 5 MG: 5 CAPSULE ORAL at 10:34

## 2019-05-04 RX ADMIN — DRONABINOL 5 MG: 5 CAPSULE ORAL at 20:55

## 2019-05-04 RX ADMIN — PANTOPRAZOLE SODIUM 40 MG: 40 INJECTION, POWDER, FOR SOLUTION INTRAVENOUS at 00:36

## 2019-05-04 RX ADMIN — SODIUM CHLORIDE 250 ML/HR: 900 INJECTION, SOLUTION INTRAVENOUS at 15:00

## 2019-05-04 RX ADMIN — SODIUM CHLORIDE: 450 INJECTION, SOLUTION INTRAVENOUS at 20:57

## 2019-05-04 RX ADMIN — FAMOTIDINE 20 MG: 20 TABLET ORAL at 16:37

## 2019-05-04 RX ADMIN — SODIUM CHLORIDE 999 ML/HR: 900 INJECTION, SOLUTION INTRAVENOUS at 01:45

## 2019-05-04 RX ADMIN — HYDROCODONE BITARTRATE AND ACETAMINOPHEN 1 TABLET: 5; 325 TABLET ORAL at 10:55

## 2019-05-04 RX ADMIN — LEVOTHYROXINE, LIOTHYRONINE 30 MG: 19; 4.5 TABLET ORAL at 09:05

## 2019-05-04 RX ADMIN — MIDODRINE HYDROCHLORIDE 5 MG: 5 TABLET ORAL at 16:37

## 2019-05-04 RX ADMIN — ACETAMINOPHEN 650 MG: 325 TABLET ORAL at 10:00

## 2019-05-04 RX ADMIN — LEVOFLOXACIN 750 MG: 750 INJECTION, SOLUTION INTRAVENOUS at 16:39

## 2019-05-04 RX ADMIN — SODIUM CHLORIDE: 450 INJECTION, SOLUTION INTRAVENOUS at 00:54

## 2019-05-04 RX ADMIN — SODIUM CHLORIDE, SODIUM ACETATE ANHYDROUS, SODIUM GLUCONATE, POTASSIUM CHLORIDE, AND MAGNESIUM CHLORIDE 500 ML: 526; 222; 502; 37; 30 INJECTION, SOLUTION INTRAVENOUS at 13:00

## 2019-05-04 RX ADMIN — FOLIC ACID 1 MG: 1 TABLET ORAL at 09:08

## 2019-05-04 RX ADMIN — DIPHENHYDRAMINE HYDROCHLORIDE 25 MG: 25 CAPSULE ORAL at 18:46

## 2019-05-04 RX ADMIN — SODIUM CHLORIDE: 450 INJECTION, SOLUTION INTRAVENOUS at 13:17

## 2019-05-04 RX ADMIN — MIDODRINE HYDROCHLORIDE 5 MG: 5 TABLET ORAL at 13:00

## 2019-05-04 NOTE — ED NOTES
Patient requesting more pain medication due to cramping to abdomen. Will administer benadryl as ordered by provider.

## 2019-05-04 NOTE — PROGRESS NOTES
[facility] Hospitalist Group  Progress Note    Patient: Rebecca Chandler Age: 48 y.o. : 1965 MR#: [id number] SSN: [id number]  Date/Time: 2019     Subjective: pt c/o pain all over body. 6/10 severity scale. No N/V. Passing flatus, says she doesn't have appetite to eat and drink      Assessment/Plan:   1. MARIA ALEJANDRA    2. Dehydration due to poor oral intake  3. Diffuse body pain ? Sickle cell crisis   4. Sever anemia with drop in H/H due to # 3  5. Sickle cell disease,  6. Leukocytosis, chronic    7. Thrombocytopenia   8. HTN,   9. Hypothyroid,   10. H/o Breast cancer   11. Fibromyalgia      Plan:         · Will start PO pain med's with holding parameters for BP  · encouraged PO intake   · Nephrology and Hem/onc in put noted   · Agree with transfusion per hematology    · Continue IVF hydration per renal recommendation  · Monitor renal function, I/O  · Avoid nephrotoxic agents  · Monitor CBC  · No evidence of infection, CT C/A/P neg for infection, LA normal, Patient has chronic leukocytosis and hematology is following  · D/w patient and  at bedside in detail, agree with above plan       Addendum:  LA >5.0, WBC trending up and tachy. Known Chronic leucocytosis   No clear source of infection. CT C/A/P neg yesterday. Pt doesn't have any cough or SOB or dysuria. No fevers. concern for sepsis low, ? Sickle cell crisis  Pt has gotten >4.5 L IVF bolus since admission. <12 hrs   She is getting 1 L bolus now per renal, will order 500 ml IVF bolus   Repeat LA and start empiric Abx. All Cx drawn less than 12 hrs neg  Will get CXR  D/w renal ok with IVF bolus, she will get 1.5 L bolus now and cont maintenance IVF     Addendum:  Repeat H/H still low, dilutional vs SS crisis. Agree with transfusion per hematology. Addendum:  LA trending down, will cont IVF and repeat LA.      I spent 40 minutes with the patient in face-to-face consultation, of which greater than 50% was spent in counseling and coordination of care as described above. Case discussed with:  [x]Patient  [x]Family  [x]Nursing  []Case Management  DVT Prophylaxis:  []Lovenox  []Hep SQ  [x]SCDs  []Coumadin   []On Heparin gtt    Objective:   VS:   Visit Vitals  BP 92/58   Pulse (!) 107   Temp 97.8 °F (36.6 °C)   Resp 18   Wt 98.7 kg (217 lb 9.6 oz)   SpO2 97%   Breastfeeding? No   BMI 35.12 kg/m²      Tmax/24hrs: Temp (24hrs), Av.6 °F (36.4 °C), Min:97 °F (36.1 °C), Max:98 °F (36.7 °C)  IOBRIEF    Intake/Output Summary (Last 24 hours) at 2019 1041  Last data filed at 2019 0530  Gross per 24 hour   Intake 4317.92 ml   Output 275 ml   Net 4042.92 ml       General:  Alert, cooperative, no acute distress    HEENT: PERRLA, icteric sclerae. Pulmonary:  CTA Bilaterally. No Wheezing/Rales. Cardiovascular: Regular rate and Rhythm. GI:  Soft, Non distended, Non tender. + Bowel sounds. Extremities:  No edema, cyanosis, clubbing. No calf tenderness. Psych: Good insight. Not anxious or agitated. Neurologic: Alert and oriented X 3. No acute neuro deficits.   Additional:    Medications:   Current Facility-Administered Medications   Medication Dose Route Frequency    0.9% sodium chloride infusion 250 mL  250 mL IntraVENous PRN    diphenhydrAMINE (BENADRYL) injection 25 mg  25 mg IntraVENous ONCE    dronabinol (MARINOL) capsule 5 mg  5 mg Oral BID    dexamethasone (DECADRON) 10 mg in 0.9% sodium chloride 50 mL IVPB  10 mg IntraVENous ONCE    HYDROcodone-acetaminophen (NORCO) 5-325 mg per tablet 1-2 Tab  1-2 Tab Oral Q4H PRN    sodium chloride (NS) flush 5-10 mL  5-10 mL IntraVENous PRN    sodium bicarbonate (8.4%) 75 mEq in 0.45% sodium chloride 1,000 mL infusion   IntraVENous CONTINUOUS    folic acid (FOLVITE) tablet 1 mg  1 mg Oral DAILY    thyroid (Pork) (ARMOUR) tablet 30 mg  30 mg Oral DAILY    acetaminophen (TYLENOL) tablet 650 mg  650 mg Oral Q4H PRN    ondansetron (ZOFRAN) injection 4 mg  4 mg IntraVENous Q4H PRN Labs:    Recent Results (from the past 24 hour(s))   CBC WITH AUTOMATED DIFF    Collection Time: 05/03/19 11:44 AM   Result Value Ref Range    WBC 24.2 (H) 4.6 - 13.2 K/uL    RBC 2.49 (L) 4.20 - 5.30 M/uL    HGB 7.0 (L) 12.0 - 16.0 g/dL    HCT 19.7 (L) 35.0 - 45.0 %    MCV 79.1 74.0 - 97.0 FL    MCH 28.1 24.0 - 34.0 PG    MCHC 35.5 31.0 - 37.0 g/dL    RDW 15.5 (H) 11.6 - 14.5 %    PLATELET 34 (L) 151 - 420 K/uL    NEUTROPHILS 56 42 - 75 %    BAND NEUTROPHILS 29 (H) 0 - 5 %    LYMPHOCYTES 8 (L) 20 - 51 %    MONOCYTES 3 2 - 9 %    EOSINOPHILS 4 0 - 5 %    BASOPHILS 0 0 - 3 %    NRBC 10.0 (H) 0  WBC    ABS. NEUTROPHILS 20.6 (H) 1.8 - 8.0 K/UL    ABS. LYMPHOCYTES 1.9 0.8 - 3.5 K/UL    ABS. MONOCYTES 0.7 0 - 1.0 K/UL    ABS. EOSINOPHILS 1.0 (H) 0.0 - 0.4 K/UL    ABS. BASOPHILS 0.0 0.0 - 0.06 K/UL    DF MANUAL      PLATELET COMMENTS DECREASED PLATELETS      RBC COMMENTS TARGET CELLS  1+        RBC COMMENTS POLYCHROMASIA  1+        RBC COMMENTS HYPOCHROMIA  1+       METABOLIC PANEL, COMPREHENSIVE    Collection Time: 05/03/19 11:44 AM   Result Value Ref Range    Sodium 136 136 - 145 mmol/L    Potassium 3.9 3.5 - 5.5 mmol/L    Chloride 106 100 - 108 mmol/L    CO2 16 (L) 21 - 32 mmol/L    Anion gap 14 3.0 - 18 mmol/L    Glucose 107 (H) 74 - 99 mg/dL    BUN 52 (H) 7.0 - 18 MG/DL    Creatinine 5.36 (H) 0.6 - 1.3 MG/DL    BUN/Creatinine ratio 10 (L) 12 - 20      GFR est AA 10 (L) >60 ml/min/1.73m2    GFR est non-AA 8 (L) >60 ml/min/1.73m2    Calcium 8.9 8.5 - 10.1 MG/DL    Bilirubin, total 4.5 (H) 0.2 - 1.0 MG/DL    ALT (SGPT) 46 13 - 56 U/L    AST (SGOT) 90 (H) 15 - 37 U/L    Alk.  phosphatase 166 (H) 45 - 117 U/L    Protein, total 7.4 6.4 - 8.2 g/dL    Albumin 3.8 3.4 - 5.0 g/dL    Globulin 3.6 2.0 - 4.0 g/dL    A-G Ratio 1.1 0.8 - 1.7     CARDIAC PANEL,(CK, CKMB & TROPONIN)    Collection Time: 05/03/19 11:44 AM   Result Value Ref Range     26 - 192 U/L    CK - MB 3.2 <3.6 ng/ml    CK-MB Index 1.7 0.0 - 4.0 % Troponin-I, QT <0.02 0.0 - 0.045 NG/ML   RETICULOCYTE COUNT    Collection Time: 05/03/19 11:44 AM   Result Value Ref Range    Reticulocyte count 1.5 0.5 - 2.3 %   LIPASE    Collection Time: 05/03/19 11:44 AM   Result Value Ref Range    Lipase 29 (L) 73 - 393 U/L   CK    Collection Time: 05/03/19 11:44 AM   Result Value Ref Range     (H) 26 - 192 U/L   EKG, 12 LEAD, INITIAL    Collection Time: 05/03/19 11:51 AM   Result Value Ref Range    Ventricular Rate 89 BPM    Atrial Rate 89 BPM    P-R Interval 136 ms    QRS Duration 82 ms    Q-T Interval 386 ms    QTC Calculation (Bezet) 469 ms    Calculated P Axis 49 degrees    Calculated R Axis 38 degrees    Calculated T Axis 35 degrees    Diagnosis       Normal sinus rhythm  Septal infarct , age undetermined  Abnormal ECG  When compared with ECG of 07-APR-2019 10:46,  No significant change was found  Confirmed by Galindo Dick MD, Nicolasa Tanner ([id number]) on 5/3/2019 12:54:19 PM     CULTURE, BLOOD    Collection Time: 05/03/19  1:40 PM   Result Value Ref Range    Special Requests: NO SPECIAL REQUESTS      Culture result: NO GROWTH AFTER 15 HOURS     POC LACTIC ACID    Collection Time: 05/03/19  1:44 PM   Result Value Ref Range    Lactic Acid (POC) 1.76 0.40 - 2.00 mmol/L   CULTURE, BLOOD    Collection Time: 05/03/19  1:55 PM   Result Value Ref Range    Special Requests: NO SPECIAL REQUESTS      Culture result: NO GROWTH AFTER 15 HOURS     PROTEIN/CREATININE RATIO, URINE    Collection Time: 05/03/19  3:40 PM   Result Value Ref Range    Protein, urine random 246 (H) <11.9 mg/dL    Creatinine, urine 136.00 (H) 30 - 125 mg/dL    Protein/Creat.  urine Ratio 1.8     URINALYSIS W/MICROSCOPIC    Collection Time: 05/03/19  3:40 PM   Result Value Ref Range    Color DARK YELLOW      Appearance TURBID      Specific gravity 1.015 1.005 - 1.030      pH (UA) 5.0 5.0 - 8.0      Protein 100 (A) NEG mg/dL    Glucose NEGATIVE  NEG mg/dL    Ketone TRACE (A) NEG mg/dL    Bilirubin SMALL (A) NEG      Blood LARGE (A) NEG      Urobilinogen 0.2 0.2 - 1.0 EU/dL    Nitrites NEGATIVE  NEG      Leukocyte Esterase NEGATIVE  NEG      WBC 3 to 5 0 - 4 /hpf    RBC 0 to 2 0 - 5 /hpf    Epithelial cells 1+ 0 - 5 /lpf    Bacteria NEGATIVE  NEG /hpf    Granular cast 10 to 20 NEG /lpf   SODIUM, UR, RANDOM    Collection Time: 05/03/19  3:40 PM   Result Value Ref Range    Sodium,urine random 31 20 - 110 MMOL/L   CREATININE, UR, RANDOM    Collection Time: 05/03/19  3:40 PM   Result Value Ref Range    Creatinine, urine 132.00 (H) 30 - 125 mg/dL   EOSINOPHILS, URINE    Collection Time: 05/03/19  3:40 PM   Result Value Ref Range    Eosinophils,urine NO EOSINOPHILS SEEN     METABOLIC PANEL, BASIC    Collection Time: 05/04/19  6:25 AM   Result Value Ref Range    Sodium 140 136 - 145 mmol/L    Potassium 3.5 3.5 - 5.5 mmol/L    Chloride 112 (H) 100 - 108 mmol/L    CO2 17 (L) 21 - 32 mmol/L    Anion gap 11 3.0 - 18 mmol/L    Glucose 70 (L) 74 - 99 mg/dL    BUN 56 (H) 7.0 - 18 MG/DL    Creatinine 5.21 (H) 0.6 - 1.3 MG/DL    BUN/Creatinine ratio 11 (L) 12 - 20      GFR est AA 10 (L) >60 ml/min/1.73m2    GFR est non-AA 9 (L) >60 ml/min/1.73m2    Calcium 7.4 (L) 8.5 - 10.1 MG/DL   CBC WITH AUTOMATED DIFF    Collection Time: 05/04/19  6:25 AM   Result Value Ref Range    WBC 41.0 (H) 4.6 - 13.2 K/uL    RBC 2.04 (L) 4.20 - 5.30 M/uL    HGB 5.8 (LL) 12.0 - 16.0 g/dL    HCT 16.0 (LL) 35.0 - 45.0 %    MCV 78.4 74.0 - 97.0 FL    MCH 28.4 24.0 - 34.0 PG    MCHC 36.3 31.0 - 37.0 g/dL    RDW 15.9 (H) 11.6 - 14.5 %    PLATELET 35 (L) 789 - 420 K/uL    NEUTROPHILS 44 42 - 75 %    BAND NEUTROPHILS 34 (H) 0 - 5 %    LYMPHOCYTES 6 (L) 20 - 51 %    MONOCYTES 5 2 - 9 %    EOSINOPHILS 4 0 - 5 %    BASOPHILS 0 0 - 3 %    METAMYELOCYTES 6 (H) 0 %    MYELOCYTES 1 (H) 0 %    NRBC 13.0 (H) 0  WBC    ABS. NEUTROPHILS 32.0 (H) 1.8 - 8.0 K/UL    ABS. LYMPHOCYTES 2.5 0.8 - 3.5 K/UL    ABS. MONOCYTES 2.1 (H) 0 - 1.0 K/UL    ABS.  EOSINOPHILS 1.6 (H) 0.0 - 0.4 K/UL ABS. BASOPHILS 0.0 0.0 - 0.06 K/UL    DF MANUAL      PLATELET COMMENTS DECREASED PLATELETS      RBC COMMENTS ANISOCYTOSIS  1+        RBC COMMENTS POLYCHROMASIA  1+        RBC COMMENTS TARGET CELLS  1+        RBC COMMENTS SCHISTOCYTES  1+        RBC COMMENTS HYPOCHROMIA  1+           Signed By: Lety Bassett MD     May 4, 2019

## 2019-05-04 NOTE — PROGRESS NOTES
Bedside shift change report given to Malik Person RN (oncoming nurse) by Genesis Bai RN (offgoing nurse).  Report included the following information SBAR, Kardex, Intake/Output, Recent Results and Cardiac Rhythm ST.

## 2019-05-04 NOTE — ED NOTES
TRANSFER - OUT REPORT:    Verbal report given to Jana(name) on Rebecca Chandler  being transferred to CVT stepdown(unit) for routine progression of care       Report consisted of patients Situation, Background, Assessment and   Recommendations(SBAR). Information from the following report(s) SBAR was reviewed with the receiving nurse. Lines:   Peripheral IV 05/03/19 Right Forearm (Active)   Site Assessment Clean, dry, & intact 5/3/2019 11:47 AM   Phlebitis Assessment 0 5/3/2019 11:47 AM   Infiltration Assessment 0 5/3/2019 11:47 AM   Dressing Status Clean, dry, & intact 5/3/2019 11:47 AM   Dressing Type Tape;Transparent 5/3/2019 11:47 AM   Hub Color/Line Status Blue;Flushed;Patent 5/3/2019 11:47 AM   Action Taken Blood drawn 5/3/2019 11:47 AM       Peripheral IV 05/03/19 Right Wrist (Active)   Site Assessment Clean, dry, & intact 5/3/2019  4:44 PM   Phlebitis Assessment 0 5/3/2019  4:44 PM   Infiltration Assessment 0 5/3/2019  4:44 PM   Dressing Status Clean, dry, & intact 5/3/2019  4:44 PM   Dressing Type Transparent 5/3/2019  4:44 PM   Hub Color/Line Status Blue;Flushed;Patent 5/3/2019  4:44 PM        Opportunity for questions and clarification was provided.       Patient transported with:   Registered Nurse

## 2019-05-04 NOTE — CONSULTS
[address] [facility]    Name:  Saúl Ku  MR#:   [id number]  :  1965  ACCOUNT #:  [de-identified]  DATE OF SERVICE:  2019    REASON FOR CONSULTATION:  Sickle cell pain crisis with associated acute-on-chronic kidney injury and hypotension. CHIEF COMPLAINT ON ADMISSION:  Abdominal pain. HISTORY OF PRESENT ILLNESS:  The patient is a 43-year-old -American woman who has a lifelong history of sickle cell disease with associated anemia. She also has a history of invasive ductal carcinoma of the left breast, fibromyalgia, endometriosis, hypothyroidism, and hypertension. On presentation to the emergency room yesterday, she was complaining of abdominal pain. She reports that her pain had been present for about a week. Her  states that her appetite has been poor and she has been not able to keep down her foods or liquids; therefore, her fluid intake has been quite low as well. Lab tests on the day of admission showed that her hemoglobin was 7 g/dL with a hematocrit of 19.7%. Her WBC count was 24.2 and the platelet count was 69,735. The chemistry profile revealed that she had evidence of acute-on-chronic kidney injury. The serum creatinine was 5.36 on 2019, and on 2019, the creatinine was 1.41. This a.m. following some IV hydration, the sodium was 140, potassium was 3.5, chloride 112, CO2 17, BUN is 56, and the creatinine is now 5.21. The patient was started on IV fluids and ondansetron to control her nausea. Her white count was elevated, and therefore, blood cultures are pending. Her urinalysis revealed no evidence of bacteria in the urine. There were 3-5 wbc's per high-powered field and the urine specimen was negative for bacteria. I was called to assess the patient because of her ongoing sickle cell disease with anemia and associated pain.   Due to her low blood pressure, pain medication was placed on hold yesterday while she was receiving IV fluids in an effort to improve her creatinine and raise her blood pressure to an adequate level. PAST MEDICAL HISTORY:  ALLERGIES:  SHE IS ALLERGIC TO NEULASTA. MEDICATIONS ON ADMISSION:  The patient had been taking Motrin 800 mg three times daily for pain, Jadenu 360 mg four tablets daily for iron overload, K-Dur one tablet daily, [place] Thyroid 30 mg daily, vitamin D 50,000 units weekly, Movantik 12.5 mg p.o. daily, and folic acid 1 mg daily. MEDICAL DIAGNOSES:  Sickle cell disease, hypothyroidism, chronic hypertension, chronic anemia due to sickle cell disease, invasive carcinoma of the left breast, chronic fatigue/fibromyalgia, GERD, history of endometriosis, osteoarthritis of both hips. The patient is status post bilateral hip replacements. Sleep apnea. SURGICAL HISTORY:  Mastectomy and axillary lymph node dissection on the right, bilateral hip replacements, hernia repair,  section, and breast biopsy. SOCIAL HISTORY:  The patient is  and lives at home with her . She does not use tobacco or alcohol. She is a former tobacco user and quit smoking in 2011. FAMILY HISTORY:  Her mother has a history of breast cancer and anemia associated with chronic kidney disease. Her mother also has type 2 diabetes. Her father has heart disease and diabetes. Her brother has sickle cell disease/sickle cell anemia. REVIEW OF SYSTEMS:  The patient's primarily complaints of fatigue, weakness, abdominal pain, and some nausea and emesis. She reports that she has not been drinking very much and, therefore, she is having minimal urine output over the past several days. She has chronic pain from her sickle cell disease. She denies all other complaints on the multiple organ system review. Specifically, she denies having any cough or shortness of breath. PHYSICAL EXAMINATION:  GENERAL APPEARANCE:  The patient is resting in bed at this time.   VITAL SIGNS:  Show a blood pressure of 100/69 following a liter of IV fluid bolus. Pulse is 105, temperature 97.4, and respiratory rate is 18. Her oxygen saturation is 96%. SKIN:  Show no bruise or rash. HEENT:  Her head is normocephalic and atraumatic. Conjunctivae and sclerae are clear. Pupils are reactive to light and accommodation. Her EOMs are intact. ENT reveals no oral mucosal lesions or ulceration. NECK:  Supple. There is no lymphadenopathy or thyromegaly. CHEST WALL AND LUNGS:  There is symmetric chest wall expansion. The lungs are without wheeze or rhonchi. HEART:  The S1 and S2 heart sounds are normal.  There are no murmurs or gallops. ABDOMEN:  Soft and nontender. EXTREMITIES:  There is no edema, cyanosis, or clubbing. NEUROLOGIC:  No focal neurologic deficits. AM LABORATORY DATA:  The CBC from today shows a WBC count of 41, hemoglobin 5.8, hematocrit 16%, and the platelet count was 39,859. The reticulocyte count from 05/03/2019 was 1.5. The chemistry panel from 05/04/2019 shows a sodium of 140, potassium 3.5, chloride 112, CO2 17, BUN 56, creatinine 5.21, calcium 7.4. AST 90, ALT 46, and alkaline phosphatase 166. IMAGING DATA:  CT of the chest, abdomen, and pelvis from 05/03/2019 showed splenomegaly. There were no distinct suspicious lung masses. No airway opacities. There were no acute findings along the GI tract. The patient has diffuse osteosclerosis. ASSESSMENT AND PLAN:  The patient is a 27-year-old woman with sickle cell disease/sickle cell anemia. She presented with acute-on-chronic kidney disease consistent with dehydration and possibly sepsis/systemic inflammatory response syndrome/hypotension. She is currently receiving intravenous hydration. The patient had been taking nonsteroidal anti-inflammatory drugs at home and this may have contributed to her acute-on-chronic kidney injury.   She is now being followed by the nephrologist.  The CT scan of the chest, abdomen, and pelvis did not show any acute findings. The patient has longstanding sickle cell disease/sickle cell anemia. Her current hemoglobin is about 5.8 g/dL. I will schedule the patient to receive transfusion of two units of packed red blood cells. Premedications will be ordered as well. Due to her hypotension when she was admitted, the pain medicine was on hold. As her blood pressure improves, we will initiate medication to control her pain. The patient has very poor appetite and has experienced some nausea. She is on ondansetron. I will add Marinol to her regimen to help stimulate appetite and this will further help control the nausea as well. The metabolic panel and CBC will be monitored daily. Blood cultures are pending. Thanks for notifying me of this patient's admission.       Candy Mendoza MD      [id number]  D:  05/04/2019 9:24  T:  05/04/2019 13:25  JOB #:  [id number]

## 2019-05-04 NOTE — PROGRESS NOTES
0015 Pt c/o severe stomach cramps states she wants medication to help with it. Paged hospitalist and spoke with Dr. James Caro, orders received for 40mg IVP protonix. 0120 Pt c/o pain in her abd and back rating it 9/10, last dose of dilaudid IV was 2136 last night. Per pt she takes 10 mg oxycontin 2 times daily and 5 mg oxycodone 4 times daily for fibromyalgia pain. Explained to pt that her BP is in the low side right now and giving her narcotics will decrease her BP more but pt still insisting she wants her pain medication and wants to leave AMA if not given to her, also observed pt is drowsy and sleeps in  care but when awake she keeps calling for pain medication . Spoke to Dr. James Caro and explained above, per MD will talk to pt.    Codi Caro at bedside and talk to pt regarding pain medication that he cannot place orders for now d/t low BP and explained to pt that if he gives pain medication she can end up being intubated d/t hypotension. MD placed 1L bolus instead to increase BP. MD states ones BP is resolved to call him.    0200 Pt called and wanted to know what I gave her through IV just now because she is having panic attacks, showed pt I'm giving her 1L normal saline to increase her BP, she said she thinks she is going to have panic attacks soon, offered to turn light on and leave door open but pt just asked for several blankets on to of her provided blanket and told her to close her eyes and try to think of good memories and to relax.     0210 Pt called again and states she is having panic attacks and asked to stop fluids because she thinks it gives her anxiety attacks, explained to pt the fluids we are giving will help her increase BP for her to get pain medication pt thinks we are not helping her at all and thinks we are placing her on suicide precaution because we are withholding pain medication explained to pt for several times again that giving pain medication will decrease her BP even more and can end up reviving her. Pt also crying and comforted her with the charge nurse at bedside. Door is open for pt to be watched.

## 2019-05-04 NOTE — ED NOTES
Dr. Clive Grider made aware of patient's pain and nausea, received RBVO from zofran 4 mg IV, dilaudid 1 mg IV. Will order and administer.

## 2019-05-04 NOTE — PROGRESS NOTES
In Patient Progress note      Admit Date: 5/3/2019          Impression:     #1 acute kidney injury on chronic kidney disease stage III(baseline creatinine 1.3) from Ischaemic ATN in setting of   sepsis/hypotension/SIRS, h/o NSAID intake ( 800 mg ibuprofen )may have contributed in this setting with ATN/AIN , does have mild peripheral eosinophilia , UA with granular casts indicate ATN .  Volume status still looks dry , continue IVF @ 125 cc/hrs   BP is soft , give a bolus of 500cc of normosol, looks dry IVF with bicarb at 125 cc/hrs,   Bull in place , CT abdomen with no hydro , does show renal cyst   Add midodrine 5 mg TID to augment BP   #2 chronic kidney disease stage III, patient does have minimal proteinuria, suspect due to hypertension nephrosclerosis versus FSGS secondary to sickle cell disease  #3 Ac non gap  metabolic acidosis,continue bicarb gtt , check lactic acid   #4 transaminitis, may be secondary to sepsis and shock  #5 sickle cell disease with possible acute crisis, hydration, pain management per primary  #6 anemia/thrombocytopenia: Likely secondary to sepsis , hem/onc consulted  #7 leukocytosis/Sirs: Sepsis work-up per primary  #8 splenomegaly d/t sickle disease  #9 AMS : metabolic encephalopathy , check ammonia , judicious pain management      Please call with questions,     Sharri Rob MD FAS  Cell [phone]  Pager: [phone]    Subjective:     Denies CP/SOB   Abdominal pain +      Current Facility-Administered Medications:     0.9% sodium chloride infusion 250 mL, 250 mL, IntraVENous, PRN, Alexis Haq MD    diphenhydrAMINE (BENADRYL) injection 25 mg, 25 mg, IntraVENous, ONCE, Alexis Haq MD, Stopped at 05/04/19 1034    dronabinol (MARINOL) capsule 5 mg, 5 mg, Oral, BID, Alexis Haq MD, 5 mg at 05/04/19 1034    dexamethasone (DECADRON) 10 mg in 0.9% sodium chloride 50 mL IVPB, 10 mg, IntraVENous, ONCE, Alexis Haq MD    HYDROcodone-acetaminophen [facility]) 5-325 mg per tablet 1-2 Tab, 1-2 Tab, Oral, Q4H PRN, Elisabeth Castellon MD, 1 Tab at 05/04/19 1055    sodium chloride (NS) flush 5-10 mL, 5-10 mL, IntraVENous, PRN, Valerie Mckee MD    sodium bicarbonate (8.4%) 75 mEq in 0.45% sodium chloride 1,000 mL infusion, , IntraVENous, CONTINUOUS, Valerie Burns MD, Last Rate: 125 mL/hr at 35/20/76 3161    folic acid (FOLVITE) tablet 1 mg, 1 mg, Oral, DAILY, Valerie Burns MD, 1 mg at 05/04/19 0908    thyroid (Pork) (ARMOUR) tablet 30 mg, 30 mg, Oral, DAILY, Valerie Burns MD, 30 mg at 05/04/19 0905    acetaminophen (TYLENOL) tablet 650 mg, 650 mg, Oral, Q4H PRN, Sherrie Kennedy MD, Stopped at 05/04/19 1034    ondansetron [facility]) injection 4 mg, 4 mg, IntraVENous, Q4H PRN, Sherrie Kennedy MD, 4 mg at 05/03/19 2133        Objective:     Visit Vitals  BP 92/64 (BP 1 Location: Right arm, BP Patient Position: At rest)   Pulse (!) 108   Temp 97.3 °F (36.3 °C)   Resp 18   Wt 98.7 kg (217 lb 9.6 oz)   SpO2 97%   Breastfeeding?  No   BMI 35.12 kg/m²         Intake/Output Summary (Last 24 hours) at 5/4/2019 1234  Last data filed at 5/4/2019 0733  Gross per 24 hour   Intake 4317.92 ml   Output 375 ml   Net 3942.92 ml       Physical Exam:     abd pain   Dry mucosa  CVS s1 s2 wnl no JVD  GI soft , distended , BS +  Ext no edema     Data Review:    Recent Labs     05/04/19  1045 05/04/19  0625   WBC  --  41.0*   RBC  --  2.04*   HCT 15.3* 16.0*   MCV  --  78.4   MCH  --  28.4   MCHC  --  36.3   RDW  --  15.9*     Recent Labs     05/04/19  0625 05/03/19  1144   BUN 56* 52*   CREA 5.21* 5.36*   CA 7.4* 8.9   ALB  --  3.8   K 3.5 3.9    136   * 106   CO2 17* 16*   GLU 70* 107*       Elie Martinez MD

## 2019-05-04 NOTE — PROGRESS NOTES
0103 pt on call bell, stated \"I am in severe pain\", I need my medication I take at home, walked straight to her room and patient is sleeping

## 2019-05-04 NOTE — PROGRESS NOTES
2253 Pt on call bell, stated \"I haven't had any  Medicine for pain\", looked at her [facility] and she recently  Had pain medication, her blood pressure is very low  2319 patient's  came out of her room and stated \"She has to have standing orders for pain medication, when will she be able to have  More pain medicine\"?

## 2019-05-04 NOTE — PROGRESS NOTES
05/03/19 2230   Vitals   Temp 97.4 °F (36.3 °C)   Temp Source Oral   Pulse (Heart Rate) (!) 106   Resp Rate 20   O2 Sat (%) 98 %   Level of Consciousness Alert   BP 98/54   MAP (Calculated) 69   BP 1 Location Right arm   BP 1 Method Automatic   BP Patient Position At rest   MEWS Score 3   Patient Observation   Patient Turned Turns self   Observations pt arrived to floor   Activity In bed   Height/Weight   Weight 98.7 kg (217 lb 9.6 oz)   Weight Source Bed  (zeroed before)     2230 Received pt from ED to rM 2306 via stretcher, pt A&Ox4, VSS, , c/o abd pain 9/10 but pt seemed drowsy due to pain medication she just received at 2136. Oriented pt to surroundings/bed in lowest position/wheels locked. Hospitalist aware that pt already in the unit, pt MEWS 3 d.t JAVON MCMAHON are aware. Skin assessment completed with KENNY Campbell.

## 2019-05-04 NOTE — ED NOTES
Received nursing report from [facility], [facility]. Patient sleeping comfortably on stretcher, chest rise and fall noted. Spouse at bedside. Awaiting bed assignment, no acute distress noted.

## 2019-05-04 NOTE — PROGRESS NOTES
Confirmed with pt's primary RN, Minoo Chance, need for lactic order. MD noted that infection is not suspected, pt's has chronic leukocytosis, however lactic has increased from 1.76 (5/3) to 5.3. Minoo Chance confirms lactic order is still active and will be drawn.

## 2019-05-05 LAB
ALBUMIN SERPL-MCNC: 2.1 G/DL (ref 3.4–5)
ALBUMIN/GLOB SERPL: 0.7 {RATIO} (ref 0.8–1.7)
ALP SERPL-CCNC: 209 U/L (ref 45–117)
ALT SERPL-CCNC: 40 U/L (ref 13–56)
AMMONIA PLAS-SCNC: 100 UMOL/L (ref 11–32)
ANION GAP SERPL CALC-SCNC: 15 MMOL/L (ref 3–18)
ANION GAP SERPL CALC-SCNC: 16 MMOL/L (ref 3–18)
AST SERPL-CCNC: 137 U/L (ref 15–37)
BASOPHILS # BLD: 0 K/UL (ref 0–0.1)
BASOPHILS # BLD: 0 K/UL (ref 0–0.1)
BASOPHILS NFR BLD: 0 % (ref 0–3)
BASOPHILS NFR BLD: 0 % (ref 0–3)
BILIRUB SERPL-MCNC: 4.2 MG/DL (ref 0.2–1)
BUN SERPL-MCNC: 61 MG/DL (ref 7–18)
BUN SERPL-MCNC: 64 MG/DL (ref 7–18)
BUN/CREAT SERPL: 11 (ref 12–20)
BUN/CREAT SERPL: 11 (ref 12–20)
CALCIUM SERPL-MCNC: 6.9 MG/DL (ref 8.5–10.1)
CALCIUM SERPL-MCNC: 7.1 MG/DL (ref 8.5–10.1)
CHLORIDE SERPL-SCNC: 107 MMOL/L (ref 100–108)
CHLORIDE SERPL-SCNC: 107 MMOL/L (ref 100–108)
CO2 SERPL-SCNC: 10 MMOL/L (ref 21–32)
CO2 SERPL-SCNC: 16 MMOL/L (ref 21–32)
CREAT SERPL-MCNC: 5.57 MG/DL (ref 0.6–1.3)
CREAT SERPL-MCNC: 5.87 MG/DL (ref 0.6–1.3)
DIFFERENTIAL METHOD BLD: ABNORMAL
DIFFERENTIAL METHOD BLD: ABNORMAL
EOSINOPHIL # BLD: 1.4 K/UL (ref 0–0.4)
EOSINOPHIL # BLD: 2.9 K/UL (ref 0–0.4)
EOSINOPHIL NFR BLD: 3 % (ref 0–5)
EOSINOPHIL NFR BLD: 5 % (ref 0–5)
ERYTHROCYTE [DISTWIDTH] IN BLOOD BY AUTOMATED COUNT: 16.4 % (ref 11.6–14.5)
ERYTHROCYTE [DISTWIDTH] IN BLOOD BY AUTOMATED COUNT: 16.4 % (ref 11.6–14.5)
GLOBULIN SER CALC-MCNC: 3.2 G/DL (ref 2–4)
GLUCOSE BLD STRIP.AUTO-MCNC: 134 MG/DL (ref 70–110)
GLUCOSE BLD STRIP.AUTO-MCNC: 134 MG/DL (ref 70–110)
GLUCOSE BLD STRIP.AUTO-MCNC: 75 MG/DL (ref 70–110)
GLUCOSE SERPL-MCNC: 40 MG/DL (ref 74–99)
GLUCOSE SERPL-MCNC: 73 MG/DL (ref 74–99)
HCT VFR BLD AUTO: 10.3 % (ref 35–45)
HCT VFR BLD AUTO: 9.6 % (ref 35–45)
HGB BLD-MCNC: 3.6 G/DL (ref 12–16)
HGB BLD-MCNC: 3.9 G/DL (ref 12–16)
INR PPP: 1.6 (ref 0.8–1.2)
LACTATE SERPL-SCNC: 1 MMOL/L (ref 0.4–2)
LACTATE SERPL-SCNC: 2.2 MMOL/L (ref 0.4–2)
LACTATE SERPL-SCNC: 2.2 MMOL/L (ref 0.4–2)
LYMPHOCYTES # BLD: 2.4 K/UL (ref 0.8–3.5)
LYMPHOCYTES # BLD: 3.5 K/UL (ref 0.8–3.5)
LYMPHOCYTES NFR BLD: 5 % (ref 20–51)
LYMPHOCYTES NFR BLD: 6 % (ref 20–51)
MAGNESIUM SERPL-MCNC: 1.9 MG/DL (ref 1.6–2.6)
MCH RBC QN AUTO: 28.7 PG (ref 24–34)
MCH RBC QN AUTO: 28.9 PG (ref 24–34)
MCHC RBC AUTO-ENTMCNC: 37 G/DL (ref 31–37)
MCHC RBC AUTO-ENTMCNC: 37.9 G/DL (ref 31–37)
MCV RBC AUTO: 75.7 FL (ref 74–97)
MCV RBC AUTO: 78.1 FL (ref 74–97)
MONOCYTES # BLD: 0 K/UL (ref 0–1)
MONOCYTES # BLD: 0.6 K/UL (ref 0–1)
MONOCYTES NFR BLD: 0 % (ref 2–9)
MONOCYTES NFR BLD: 1 % (ref 2–9)
MYELOCYTES NFR BLD MANUAL: 1 %
MYELOCYTES NFR BLD MANUAL: 2 %
NEUTS BAND NFR BLD MANUAL: 38 % (ref 0–5)
NEUTS BAND NFR BLD MANUAL: 56 % (ref 0–5)
NEUTS SEG # BLD: 16.1 K/UL (ref 1.8–8)
NEUTS SEG # BLD: 28.1 K/UL (ref 1.8–8)
NEUTS SEG NFR BLD: 34 % (ref 42–75)
NEUTS SEG NFR BLD: 48 % (ref 42–75)
PH BLDV: 7.2 [PH] (ref 7.32–7.42)
PHOSPHATE SERPL-MCNC: 3.9 MG/DL (ref 2.5–4.9)
PLATELET # BLD AUTO: 50 K/UL (ref 135–420)
PLATELET # BLD AUTO: 74 K/UL (ref 135–420)
PLATELET COMMENTS,PCOM: ABNORMAL
PLATELET COMMENTS,PCOM: ABNORMAL
PMV BLD AUTO: 11.1 FL (ref 9.2–11.8)
POTASSIUM SERPL-SCNC: 4.2 MMOL/L (ref 3.5–5.5)
POTASSIUM SERPL-SCNC: 5 MMOL/L (ref 3.5–5.5)
PROMYELOCYTES NFR BLD MANUAL: 1 %
PROT SERPL-MCNC: 5.3 G/DL (ref 6.4–8.2)
PROTHROMBIN TIME: 18.8 SEC (ref 11.5–15.2)
RBC # BLD AUTO: 1.28 M/UL (ref 4.2–5.3)
RBC # BLD AUTO: 1.36 M/UL (ref 4.2–5.3)
RBC MORPH BLD: ABNORMAL
SODIUM SERPL-SCNC: 133 MMOL/L (ref 136–145)
SODIUM SERPL-SCNC: 138 MMOL/L (ref 136–145)
VANCOMYCIN SERPL-MCNC: 21 UG/ML (ref 5–40)
WBC # BLD AUTO: 47.3 K/UL (ref 4.6–13.2)
WBC # BLD AUTO: 58.5 K/UL (ref 4.6–13.2)

## 2019-05-05 PROCEDURE — 86902 BLOOD TYPE ANTIGEN DONOR EA: CPT

## 2019-05-05 PROCEDURE — 84100 ASSAY OF PHOSPHORUS: CPT

## 2019-05-05 PROCEDURE — 36415 COLL VENOUS BLD VENIPUNCTURE: CPT

## 2019-05-05 PROCEDURE — 74011250636 HC RX REV CODE- 250/636: Performed by: INTERNAL MEDICINE

## 2019-05-05 PROCEDURE — 82140 ASSAY OF AMMONIA: CPT

## 2019-05-05 PROCEDURE — 36430 TRANSFUSION BLD/BLD COMPNT: CPT

## 2019-05-05 PROCEDURE — 85025 COMPLETE CBC W/AUTO DIFF WBC: CPT

## 2019-05-05 PROCEDURE — 83735 ASSAY OF MAGNESIUM: CPT

## 2019-05-05 PROCEDURE — 80202 ASSAY OF VANCOMYCIN: CPT

## 2019-05-05 PROCEDURE — 82800 BLOOD PH: CPT

## 2019-05-05 PROCEDURE — 83605 ASSAY OF LACTIC ACID: CPT

## 2019-05-05 PROCEDURE — 74011250637 HC RX REV CODE- 250/637: Performed by: INTERNAL MEDICINE

## 2019-05-05 PROCEDURE — 80053 COMPREHEN METABOLIC PANEL: CPT

## 2019-05-05 PROCEDURE — 86922 COMPATIBILITY TEST ANTIGLOB: CPT

## 2019-05-05 PROCEDURE — 65610000006 HC RM INTENSIVE CARE

## 2019-05-05 PROCEDURE — 80074 ACUTE HEPATITIS PANEL: CPT

## 2019-05-05 PROCEDURE — 74011000250 HC RX REV CODE- 250: Performed by: INTERNAL MEDICINE

## 2019-05-05 PROCEDURE — 86921 COMPATIBILITY TEST INCUBATE: CPT

## 2019-05-05 PROCEDURE — 82962 GLUCOSE BLOOD TEST: CPT

## 2019-05-05 PROCEDURE — 74011250637 HC RX REV CODE- 250/637: Performed by: HOSPITALIST

## 2019-05-05 PROCEDURE — 85660 RBC SICKLE CELL TEST: CPT

## 2019-05-05 PROCEDURE — 74011000250 HC RX REV CODE- 250

## 2019-05-05 PROCEDURE — 85610 PROTHROMBIN TIME: CPT

## 2019-05-05 PROCEDURE — P9047 ALBUMIN (HUMAN), 25%, 50ML: HCPCS | Performed by: INTERNAL MEDICINE

## 2019-05-05 PROCEDURE — P9040 RBC LEUKOREDUCED IRRADIATED: HCPCS

## 2019-05-05 RX ORDER — MAGNESIUM SULFATE 100 %
4 CRYSTALS MISCELLANEOUS AS NEEDED
Status: DISCONTINUED | OUTPATIENT
Start: 2019-05-05 | End: 2019-05-15 | Stop reason: HOSPADM

## 2019-05-05 RX ORDER — DEXTROSE 50 % IN WATER (D50W) INTRAVENOUS SYRINGE
25 ONCE
Status: COMPLETED | OUTPATIENT
Start: 2019-05-05 | End: 2019-05-05

## 2019-05-05 RX ORDER — DEXAMETHASONE SODIUM PHOSPHATE 4 MG/ML
10 INJECTION, SOLUTION INTRA-ARTICULAR; INTRALESIONAL; INTRAMUSCULAR; INTRAVENOUS; SOFT TISSUE ONCE
Status: COMPLETED | OUTPATIENT
Start: 2019-05-05 | End: 2019-05-05

## 2019-05-05 RX ORDER — DEXTROSE 50 % IN WATER (D50W) INTRAVENOUS SYRINGE
25-50 AS NEEDED
Status: DISCONTINUED | OUTPATIENT
Start: 2019-05-05 | End: 2019-05-15 | Stop reason: HOSPADM

## 2019-05-05 RX ORDER — DEXTROSE 50 % IN WATER (D50W) INTRAVENOUS SYRINGE
Status: COMPLETED
Start: 2019-05-05 | End: 2019-05-05

## 2019-05-05 RX ORDER — DIPHENHYDRAMINE HYDROCHLORIDE 50 MG/ML
25 INJECTION, SOLUTION INTRAMUSCULAR; INTRAVENOUS ONCE
Status: COMPLETED | OUTPATIENT
Start: 2019-05-05 | End: 2019-05-05

## 2019-05-05 RX ORDER — DIPHENHYDRAMINE HYDROCHLORIDE 50 MG/ML
25 INJECTION, SOLUTION INTRAMUSCULAR; INTRAVENOUS
Status: DISCONTINUED | OUTPATIENT
Start: 2019-05-05 | End: 2019-05-08

## 2019-05-05 RX ORDER — ALBUMIN HUMAN 250 G/1000ML
25 SOLUTION INTRAVENOUS 3 TIMES DAILY
Status: DISCONTINUED | OUTPATIENT
Start: 2019-05-05 | End: 2019-05-06

## 2019-05-05 RX ORDER — SODIUM CHLORIDE 9 MG/ML
250 INJECTION, SOLUTION INTRAVENOUS AS NEEDED
Status: DISCONTINUED | OUTPATIENT
Start: 2019-05-05 | End: 2019-05-06

## 2019-05-05 RX ORDER — DEXAMETHASONE SODIUM PHOSPHATE 10 MG/ML
10 INJECTION INTRAMUSCULAR; INTRAVENOUS
Status: DISCONTINUED | OUTPATIENT
Start: 2019-05-05 | End: 2019-05-05 | Stop reason: RX

## 2019-05-05 RX ADMIN — MIDODRINE HYDROCHLORIDE 5 MG: 5 TABLET ORAL at 10:53

## 2019-05-05 RX ADMIN — FOLIC ACID 1 MG: 1 TABLET ORAL at 10:53

## 2019-05-05 RX ADMIN — LEVOTHYROXINE, LIOTHYRONINE 30 MG: 19; 4.5 TABLET ORAL at 10:53

## 2019-05-05 RX ADMIN — DRONABINOL 5 MG: 5 CAPSULE ORAL at 22:02

## 2019-05-05 RX ADMIN — MIDODRINE HYDROCHLORIDE 5 MG: 5 TABLET ORAL at 16:21

## 2019-05-05 RX ADMIN — DRONABINOL 5 MG: 5 CAPSULE ORAL at 10:53

## 2019-05-05 RX ADMIN — ACETAMINOPHEN 650 MG: 325 TABLET ORAL at 16:21

## 2019-05-05 RX ADMIN — LACTULOSE 30 ML: 20 SOLUTION ORAL at 12:35

## 2019-05-05 RX ADMIN — DEXTROSE MONOHYDRATE 12.5 G: 25 INJECTION, SOLUTION INTRAVENOUS at 06:29

## 2019-05-05 RX ADMIN — DEXTROSE MONOHYDRATE: 5 INJECTION, SOLUTION INTRAVENOUS at 16:21

## 2019-05-05 RX ADMIN — ALBUMIN (HUMAN) 25 G: 0.25 INJECTION, SOLUTION INTRAVENOUS at 15:55

## 2019-05-05 RX ADMIN — DIPHENHYDRAMINE HYDROCHLORIDE 25 MG: 50 INJECTION INTRAMUSCULAR; INTRAVENOUS at 16:04

## 2019-05-05 RX ADMIN — MIDODRINE HYDROCHLORIDE 5 MG: 5 TABLET ORAL at 12:35

## 2019-05-05 RX ADMIN — FAMOTIDINE 20 MG: 20 TABLET ORAL at 10:53

## 2019-05-05 RX ADMIN — DEXTROSE 50 % IN WATER (D50W) INTRAVENOUS SYRINGE 12.5 G: at 06:29

## 2019-05-05 RX ADMIN — DEXAMETHASONE SODIUM PHOSPHATE 10 MG: 4 INJECTION, SOLUTION INTRAMUSCULAR; INTRAVENOUS at 16:00

## 2019-05-05 RX ADMIN — LACTULOSE 30 ML: 20 SOLUTION ORAL at 15:54

## 2019-05-05 RX ADMIN — ALBUMIN (HUMAN) 25 G: 0.25 INJECTION, SOLUTION INTRAVENOUS at 12:35

## 2019-05-05 NOTE — PROGRESS NOTES
In Patient Progress note      Admit Date: 5/3/2019          Impression:     #1 Acute kidney injury on chronic kidney disease stage III(baseline creatinine 1.3) from Ischaemic ATN in setting of   Hypotension/dehydration/severe anemia ,  h/o NSAID intake ( 800 mg ibuprofen ) may have contributed in this setting with ATN/AIN , does have mild peripheral eosinophilia which goes along with AIN , UA with granular casts indicate ATN . Volume status still looks dry , continue IVF @ 75 cc/hrs   BP is soft , added midodrine , also albumin 25gm q8hrs   Bull in place , CT abdomen with no hydro , does show renal cyst   #2 chronic kidney disease stage III, patient does have minimal proteinuria, suspect due to hypertension nephrosclerosis versus FSGS secondary to sickle cell disease  #3 Ac non gap  metabolic acidosis,continue bicarb gtt , lactate trending down  #4 Transaminitis, may be secondary to sepsis and shock  #5 Sickle cell disease ,acute crisis, pain management per primary Tx per IM  #6 Anemia/thrombocytopenia:hem/onc consulted  #7 leukocytosis/Sirs: work-up per primary  #8 splenomegaly d/t sickle disease  #9 AMS : metabolic encephalopathy v/s hyperammonemia  , judicious pain management   #10 transaminitis/hyperammonemia , evaluation per primary     Guarded prognosis . At risk for needing dialysis , discussed with patient and family.    Continue to assess closely.      Please call with questions,     Jese Mckinney MD [facility]  Cell [phone]  Pager: [phone]    Subjective:     Denies CP/SOB   Awake and alert      Current Facility-Administered Medications:     lactulose (CHRONULAC) 10 gram/15 mL solution 30 mL, 20 g, Oral, TID, Jerald Stauffer MD, 30 mL at 05/05/19 1235    albumin human 25% (BUMINATE) solution 25 g, 25 g, IntraVENous, TID, BicHarvey velásquez MD, 25 g at 05/05/19 1235    sodium bicarbonate (8.4%) 150 mEq in dextrose 5% 1,000 mL infusion, , IntraVENous, CONTINUOUS, Bichu, MD Jackie Cash.Armando 0.9% sodium chloride infusion 250 mL, 250 mL, IntraVENous, PRN, Madai Carl MD    dronabinol (MARINOL) capsule 5 mg, 5 mg, Oral, BID, Madai Carl MD, 5 mg at 05/05/19 1053    HYDROcodone-acetaminophen (NORCO) 5-325 mg per tablet 1-2 Tab, 1-2 Tab, Oral, Q4H PRN, Stephanie Scruggs MD, 1 Tab at 05/04/19 1055    midodrine (PROAMITINE) tablet 5 mg, 5 mg, Oral, TID WITH MEALS, Courtney, Harvey KAUR MD, 5 mg at 05/05/19 1235    famotidine (PEPCID) tablet 20 mg, 20 mg, Oral, DAILY, Stephanie Scruggs MD, 20 mg at 05/05/19 1053    VANCOMYCIN INFORMATION NOTE, , Other, Rx Dosing/Monitoring, Stephanie Scruggs MD    [START ON 5/6/2019] levoFLOXacin (LEVAQUIN) 500 mg in D5W IVPB, 500 mg, IntraVENous, Q48H, Glenn Caldwell MD    diphenhydrAMINE (BENADRYL) capsule 25 mg, 25 mg, Oral, Q6H PRN, Stephanie Scruggs MD, 25 mg at 05/04/19 1846    VANCOMYCIN, RANDOM, , , ONE TIME **AND** Vancomycin Lab Information, 1 Each, Other, ONCE, Glenn Caldwell MD    sodium chloride (NS) flush 5-10 mL, 5-10 mL, IntraVENous, PRN, Arthur Burns MD    folic acid (FOLVITE) tablet 1 mg, 1 mg, Oral, DAILY, Arthur Burns MD, 1 mg at 05/05/19 1053    thyroid (Pork) (ARMOUR) tablet 30 mg, 30 mg, Oral, DAILY, Arthur Burns MD, 30 mg at 05/05/19 1053    acetaminophen (TYLENOL) tablet 650 mg, 650 mg, Oral, Q4H PRN, Alyssa Bryant MD, Stopped at 05/04/19 1034    ondansetron (ZOFRAN) injection 4 mg, 4 mg, IntraVENous, Q4H PRN, Alyssa Bryant MD, 4 mg at 05/03/19 2133        Objective:     Visit Vitals  /67   Pulse (!) 114   Temp 98.2 °F (36.8 °C)   Resp 18   Wt 97.7 kg (215 lb 4.8 oz)   SpO2 96%   Breastfeeding?  No   BMI 34.75 kg/m²         Intake/Output Summary (Last 24 hours) at 5/5/2019 1509  Last data filed at 5/5/2019 1040  Gross per 24 hour   Intake 535 ml   Output 425 ml   Net 110 ml       Physical Exam:     abd pain   Dry mucosa  CVS s1 s2 wnl no JVD  GI soft , distended , BS +  Ext trace edema     Data Review:    Recent Labs     05/05/19  1046   WBC 58.5*   RBC 1.36*   HCT 10.3*   MCV 75.7   MCH 28.7   MCHC 37.9*   RDW 16.4*     Recent Labs     05/05/19  1046 05/05/19  0430 05/04/19  0625 05/03/19  1144   BUN 64* 61* 56* 52*   CREA 5.87* 5.57* 5.21* 5.36*   CA 7.1* 6.9* 7.4* 8.9   ALB  --  2.1*  --  3.8   K 4.2 5.0 3.5 3.9    133* 140 136    107 112* 106   CO2 16* 10* 17* 16*   PHOS 3.9  --   --   --    GLU 73* 40* 70* 107*       Roberta Rogers MD

## 2019-05-05 NOTE — PROGRESS NOTES
05/05/19 0344   Vitals   Temp 98.2 °F (36.8 °C)   Temp Source Oral   Pulse (Heart Rate) (!) 115   Heart Rate Source Monitor   Resp Rate 16   O2 Sat (%) 97 %   Level of Consciousness Alert   /80   MAP (Calculated) 88   BP 1 Location Right arm   BP 1 Method Automatic   BP Patient Position At rest;Head of bed elevated (Comment degrees)   Cardiac Rhythm Sinus Tach   MEWS Score 3     Patient A/Ox4,on room air, not complaining of pain, SOB or difficulty breathing but keep saying \"I'm not feeling good\". Hemoglobin of 5.5 and Lactic acid of 2.7 from 5.3. Dr Claritza White notified of patient's V/S and lab results. Unable to do Q4H Lactic Acid and the rest of morning labs. Multiple nurses and Phlebotomist attempted to draw blood but failed. MD made aware. Type & Screen that was drawn yesterday  still in process, verified with Lab. Lab will call when PRBC is ready. 0600: Lab called, Patient Glucose level is 40. Rechecked it with POC, it's 75. Juice and snacks offered, patient refused. Patient only take a sip of cold water. Patient on continuous Sodium Bicard at 75ml/hr. Dr Claritza White made aware and ordered 12.5mg of D50 one time. Rechecked sugar and it's 134.     0650: Phlebotomist able to draw morning labs of the patient. 0700: Patient laying in bed, on room air, sleep most of the time all night, keep saying she's not feeling good. Will monitor closely.

## 2019-05-05 NOTE — PROGRESS NOTES
1315 received patient from [facility]. Assessed pt. Pt drowsy but easily arousable. IV sites patent. Dr Samantha Canchola in to see pt shortly after receiving patient and clarified that patient was to receive 2 units of prbcs  Total.  Called blood bank to verify that they were readying another unit of blood and they confirmed that the second unit will be here around 2100. Dr Malina Villegas made aware.

## 2019-05-05 NOTE — CONSULTS
[facility]  Pulmonary, Critical Care, and Sleep Medicine    Name: Lubna Velasquez MRN: [id number]   : 1965 Hospital: [address] [place] [address]   Date: 2019        Critical Care History and Physical      IMPRESSION:   · Sickle cell crisis with symptomatic anemia - plan for transfusion of 2 units pRBCs  · Acute on chronic renal injury likely secondary to hypotension, anemia and ibuprofen use  · Hypotension improved with midodrine  · Chronic leukocytosis     Patient Active Problem List   Diagnosis Code    Symptomatic anemia D64.9    Breast cancer ([facility]) C50.919    Sickle cell anemia ([facility]) D57.1    Dehydration E86.0    Fibromyalgia M79.7    Hx of endometriosis Z87.42    Sickle cell crisis ([facility] [address] D57.00    Vitamin D deficiency E55.9    Osteoarthritis M19.90    Breast cancer of lower-outer quadrant of left female breast ([facility] [address] C50.512    Iron deficiency anemia due to dietary causes D50.8    Osteoarthritis of left hip M16.12    Thrombocytopenia ([facility]) D69.6    Acute blood loss anemia D62    Choledocholithiasis K80.50    Cholecystitis with cholelithiasis K80.10    Osteoarthritis of right hip M16.11    Leukocytosis D72.829    Osteoarthritis of right knee M17.11    Shortness of breath R06.02    Sickle cell anemia with crisis ([facility]) D57.00    Anemia D64.9    Acute renal injury ([facility] [address] N17.9    Chest pain R07.9    ARF (acute renal failure) ([facility]) N17.9    Abdominal pain R10.9    Bandemia D72.825        RECOMMENDATIONS:   · Resp: Titrate FiO2/ supp O2 for SpO2 >90%; CXR clear. · I/D:  No evidence for infection with imaging. On levaquin and vancomycin empirically. Recommend de-escalation in next 24-48 hours if cultures negative. · Hem/Onc: Sickle cell crisis - plan for transfusion 2 units. Hematology following.   · CVS: HD stable on midodrine; goal MAP > 65 - fluid boluses if needed and consider pressors but would need to place CVL  · Metabolic: Daily BMP; monitor e-lytes; replace PRN  · Renal: Acute on chronic renal injury; nephrology following. Renal dose meds; continued bicarb drip for acidosis. Consider dialysis   · Endocrine: POC Glucose q6; Check TSH level  · GI: SUP, Trend LFTs, Zofran PRN for N/V   · Musc/Skin: No acute issues, wound care  · Neuro: PRN pain medications,  · Fluids: Bicarb drip at 75 ml/hr  · Code Status: Full  · Further recommendations based on response to therapy. ·     Subjective/History: This patient has been seen and evaluated at the request of Dr. Leeann Duenas for sickle cell crisis requesting transfer to ICU for symptomatic anemia with Hgb < 5. Patient admitted yesterday after presenting to the ED with abd pain and found to have sickle cell crisis and acute on chronic renal failure. Patient has been evaluated by both hematology and nephrology with plans for simple transfusion; on bicarb drip. Consideration for dialysis. Hypotension improved with midodrine and lactate has cleared. Patient has no complaints at this time and specifically denies pain, CP, dyspnea or light headedness.       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    Fatigue     Fibromyalgia     GERD (gastroesophageal reflux disease)     Hx of endometriosis     Hypertension     Ill-defined condition 2018    Sickle cell crisis    Osteoarthritis of left hip 2016    Osteoarthritis of right hip 1/3/2017    Osteoarthritis of right knee 2018    Sickle cell anemia ([address]     Sleep apnea     Does not use CPAP    Thyroid disease     hypo        Past Surgical History:   Procedure Laterality Date    HX BREAST BIOPSY Left 2016    HX  SECTION      HX HERNIA REPAIR      HX LAP CHOLECYSTECTOMY      HX MASTECTOMY Left 2012    with axillary lymph node dissection    TOTAL HIP ARTHROPLASTY Bilateral  &         Prior to Admission medications    Medication Sig Start Date End Date Taking? Authorizing Provider   ibuprofen (MOTRIN) 800 mg tablet Take 800 mg by mouth three (3) times daily as needed for Pain. Other, MD MAGALY Fitzpatrick 360 mg tablet Use as directed by prescriber. Take 4 tablets (1440 mg TOTAL) by mouth at approximately the same time once daily on an empty stomach or wit 3/19/19   Sonny Gomez MD   potassium chloride (K-DUR, KLOR-CON) 20 mEq tablet Take 1 Tab by mouth daily. 10/22/18   Nalini García MD   metoprolol tartrate (LOPRESSOR) 50 mg tablet 50 mg.    Provider, Historical   thyroid, Pork, (ARMOUR THYROID) 30 mg tablet Take 30 mg by mouth daily. Provider, Historical   ergocalciferol (ERGOCALCIFEROL) 50,000 unit capsule Take 1 Cap by mouth every seven (7) days. 3/8/18   Elma ADAMS NP   naloxegol (MOVANTIK) 12.5 mg tab tablet Take 12.5 mg by mouth daily. Provider, Historical   folic acid (FOLVITE) 1 mg tablet Take 1 mg by mouth daily.     Provider, Historical       Current Facility-Administered Medications   Medication Dose Route Frequency    lactulose (CHRONULAC) 10 gram/15 mL solution 30 mL  20 g Oral TID    albumin human 25% (BUMINATE) solution 25 g  25 g IntraVENous TID    sodium bicarbonate (8.4%) 150 mEq in dextrose 5% 1,000 mL infusion   IntraVENous CONTINUOUS    diphenhydrAMINE (BENADRYL) injection 25 mg  25 mg IntraVENous ONCE    dexamethasone (DECADRON) 4 mg/mL injection 10 mg  10 mg IntraVENous ONCE    dronabinol (MARINOL) capsule 5 mg  5 mg Oral BID    midodrine (PROAMITINE) tablet 5 mg  5 mg Oral TID WITH MEALS    famotidine (PEPCID) tablet 20 mg  20 mg Oral DAILY    VANCOMYCIN INFORMATION NOTE   Other Rx Dosing/Monitoring    [START ON 5/6/2019] levoFLOXacin (LEVAQUIN) 500 mg in D5W IVPB  500 mg IntraVENous Q48H    Vancomycin Lab Information  1 Each Other ONCE    folic acid (FOLVITE) tablet 1 mg  1 mg Oral DAILY    thyroid (Pork) (ARMOUR) tablet 30 mg  30 mg Oral DAILY       Allergies   Allergen Reactions    Neulasta [Pegfilgrastim] Other (comments)     \"Put me in a comma for 3 months\"        Social History     Tobacco Use    Smoking status: Former Smoker     Packs/day: 0.25     Years: 30.00     Pack years: 7.50     Last attempt to quit: 2011     Years since quittin.3    Smokeless tobacco: Former User   Substance Use Topics    Alcohol use: Yes     Comment: 2 times yearly        Family History   Problem Relation Age of Onset    Cancer Mother         breast    Diabetes Mother     Heart Disease Father     Diabetes Father     Sickle Cell Anemia Brother           Review of Systems:  Review of systems not obtained due to patient factors. Objective:   Vital Signs:    Visit Vitals  /67   Pulse (!) 114   Temp 98.2 °F (36.8 °C)   Resp 18   Wt 97.7 kg (215 lb 4.8 oz)   SpO2 96%   Breastfeeding? No   BMI 34.75 kg/m²       O2 Device: Room air       Temp (24hrs), Av.8 °F (36.6 °C), Min:97.3 °F (36.3 °C), Max:98.2 °F (36.8 °C)       Intake/Output:   Last shift:      701 - 1900  In: -   Out: 75 [Urine:75]  Last 3 shifts: 1901 -  0700  In: 4852.9 [P.O.:290; I.V.:4562.9]  Out: 725 [Urine:725]    Intake/Output Summary (Last 24 hours) at 2019 1557  Last data filed at 2019 1040  Gross per 24 hour   Intake 535 ml   Output 425 ml   Net 110 ml       General:   Alert, cooperative, NAD   Head:  Normocephalic, without obvious abnormality, atraumatic. Eyes:  Conjunctivae/corneas clear. PERRL,   Nose: Nares normal. Septum midline. Mucosa normal. No drainage or sinus tenderness. Throat: Lips, mucosa, and tongue normal. Teeth and gums normal.   Neck: Supple, symmetrical, trachea midline, no adenopathy, no carotid bruit and no JVD. Lungs:   Symmetrical chest rise; good AE bilat; CTAB; no wheezes/rhonchi/rales noted.     Heart:   Tachycardic but no murmurs   Abdomen:    soft, mild distension without guarding or tenderness   Extremities: Extremities normal, atraumatic, no cyanosis or edema. Pulses: 2+ and symmetric all extremities. Skin: Skin color, texture, turgor normal. No rashes or lesions   Neurologic: Grossly nonfocal   Devices:        Data:     Recent Results (from the past 24 hour(s))   LACTIC ACID    Collection Time: 05/04/19  6:29 PM   Result Value Ref Range    Lactic acid 2.7 (HH) 0.4 - 2.0 MMOL/L   CULTURE, BLOOD    Collection Time: 05/04/19  6:29 PM   Result Value Ref Range    Special Requests: NO SPECIAL REQUESTS      Culture result: NO GROWTH AFTER 7 HOURS     VANCOMYCIN, RANDOM    Collection Time: 05/04/19  6:29 PM   Result Value Ref Range    Vancomycin, random 22.7 5.0 - 82.3 UG/ML   METABOLIC PANEL, COMPREHENSIVE    Collection Time: 05/05/19  4:30 AM   Result Value Ref Range    Sodium 133 (L) 136 - 145 mmol/L    Potassium 5.0 3.5 - 5.5 mmol/L    Chloride 107 100 - 108 mmol/L    CO2 10 (L) 21 - 32 mmol/L    Anion gap 16 3.0 - 18 mmol/L    Glucose 40 (LL) 74 - 99 mg/dL    BUN 61 (H) 7.0 - 18 MG/DL    Creatinine 5.57 (H) 0.6 - 1.3 MG/DL    BUN/Creatinine ratio 11 (L) 12 - 20      GFR est AA 10 (L) >60 ml/min/1.73m2    GFR est non-AA 8 (L) >60 ml/min/1.73m2    Calcium 6.9 (L) 8.5 - 10.1 MG/DL    Bilirubin, total 4.2 (H) 0.2 - 1.0 MG/DL    ALT (SGPT) 40 13 - 56 U/L    AST (SGOT) 137 (H) 15 - 37 U/L    Alk.  phosphatase 209 (H) 45 - 117 U/L    Protein, total 5.3 (L) 6.4 - 8.2 g/dL    Albumin 2.1 (L) 3.4 - 5.0 g/dL    Globulin 3.2 2.0 - 4.0 g/dL    A-G Ratio 0.7 (L) 0.8 - 1.7     AMMONIA    Collection Time: 05/05/19  4:30 AM   Result Value Ref Range    Ammonia 100 (H) 11 - 32 UMOL/L   GLUCOSE, POC    Collection Time: 05/05/19  6:05 AM   Result Value Ref Range    Glucose (POC) 75 70 - 110 mg/dL   CBC WITH AUTOMATED DIFF    Collection Time: 05/05/19  6:30 AM   Result Value Ref Range    WBC 47.3 (H) 4.6 - 13.2 K/uL    RBC 1.28 (L) 4.20 - 5.30 M/uL    HGB 3.6 (LL) 12.0 - 16.0 g/dL    HCT 9.6 (LL) 35.0 - 45.0 %    MCV 78.1 74.0 - 97.0 FL    MCH 28.9 24.0 - 34.0 PG    MCHC 37.0 31.0 - 37.0 g/dL    RDW 16.4 (H) 11.6 - 14.5 %    PLATELET 50 (L) 340 - 420 K/uL    NEUTROPHILS 34 (L) 42 - 75 %    BAND NEUTROPHILS 56 (H) 0 - 5 %    LYMPHOCYTES 5 (L) 20 - 51 %    MONOCYTES 0 (L) 2 - 9 %    EOSINOPHILS 3 0 - 5 %    BASOPHILS 0 0 - 3 %    MYELOCYTES 1 (H) 0 %    PROMYELOCYTES 1 (H) 0 %    ABS. NEUTROPHILS 16.1 (H) 1.8 - 8.0 K/UL    ABS. LYMPHOCYTES 2.4 0.8 - 3.5 K/UL    ABS. MONOCYTES 0.0 0 - 1.0 K/UL    ABS. EOSINOPHILS 1.4 (H) 0.0 - 0.4 K/UL    ABS. BASOPHILS 0.0 0.0 - 0.1 K/UL    PLATELET COMMENTS LARGE PLATELETS      RBC COMMENTS ANISOCYTOSIS  1+        RBC COMMENTS POIKILOCYTOSIS  1+        RBC COMMENTS RBC FRAGMENTS  TARGET CELLS  1+        RBC COMMENTS HYPOCHROMIA  1+        DF MANUAL     LACTIC ACID    Collection Time: 05/05/19  6:30 AM   Result Value Ref Range    Lactic acid 2.2 (HH) 0.4 - 2.0 MMOL/L   GLUCOSE, POC    Collection Time: 05/05/19  6:52 AM   Result Value Ref Range    Glucose (POC) 134 (H) 70 - 110 mg/dL   CBC WITH AUTOMATED DIFF    Collection Time: 05/05/19 10:46 AM   Result Value Ref Range    WBC 58.5 (HH) 4.6 - 13.2 K/uL    RBC 1.36 (L) 4.20 - 5.30 M/uL    HGB 3.9 (LL) 12.0 - 16.0 g/dL    HCT 10.3 (LL) 35.0 - 45.0 %    MCV 75.7 74.0 - 97.0 FL    MCH 28.7 24.0 - 34.0 PG    MCHC 37.9 (H) 31.0 - 37.0 g/dL    RDW 16.4 (H) 11.6 - 14.5 %    PLATELET 74 (L) 519 - 420 K/uL    MPV 11.1 9.2 - 11.8 FL    NEUTROPHILS 48 42 - 75 %    BAND NEUTROPHILS 38 (H) 0 - 5 %    LYMPHOCYTES 6 (L) 20 - 51 %    MONOCYTES 1 (L) 2 - 9 %    EOSINOPHILS 5 0 - 5 %    BASOPHILS 0 0 - 3 %    MYELOCYTES 2 (H) 0 %    ABS. NEUTROPHILS 28.1 (H) 1.8 - 8.0 K/UL    ABS. LYMPHOCYTES 3.5 0.8 - 3.5 K/UL    ABS. MONOCYTES 0.6 0 - 1.0 K/UL    ABS. EOSINOPHILS 2.9 (H) 0.0 - 0.4 K/UL    ABS.  BASOPHILS 0.0 0.0 - 0.1 K/UL    PLATELET COMMENTS DECREASED PLATELETS      RBC COMMENTS ANISOCYTOSIS  1+        RBC COMMENTS POIKILOCYTOSIS  1+        RBC COMMENTS RBC FRAGMENTS  TARGET CELLS  1+        RBC COMMENTS HYPOCHROMIA  1+        DF MANUAL     METABOLIC PANEL, BASIC    Collection Time: 05/05/19 10:46 AM   Result Value Ref Range    Sodium 138 136 - 145 mmol/L    Potassium 4.2 3.5 - 5.5 mmol/L    Chloride 107 100 - 108 mmol/L    CO2 16 (L) 21 - 32 mmol/L    Anion gap 15 3.0 - 18 mmol/L    Glucose 73 (L) 74 - 99 mg/dL    BUN 64 (H) 7.0 - 18 MG/DL    Creatinine 5.87 (H) 0.6 - 1.3 MG/DL    BUN/Creatinine ratio 11 (L) 12 - 20      GFR est AA 9 (L) >60 ml/min/1.73m2    GFR est non-AA 8 (L) >60 ml/min/1.73m2    Calcium 7.1 (L) 8.5 - 10.1 MG/DL   LACTIC ACID    Collection Time: 05/05/19 10:46 AM   Result Value Ref Range    Lactic acid 2.2 (HH) 0.4 - 2.0 MMOL/L   PH, VENOUS    Collection Time: 05/05/19 10:46 AM   Result Value Ref Range    VENOUS PH 7.20 (L) 7.32 - 7.42     PROTHROMBIN TIME + INR    Collection Time: 05/05/19 10:46 AM   Result Value Ref Range    Prothrombin time 18.8 (H) 11.5 - 15.2 sec    INR 1.6 (H) 0.8 - 1.2     MAGNESIUM    Collection Time: 05/05/19 10:46 AM   Result Value Ref Range    Magnesium 1.9 1.6 - 2.6 mg/dL   PHOSPHORUS    Collection Time: 05/05/19 10:46 AM   Result Value Ref Range    Phosphorus 3.9 2.5 - 4.9 MG/DL           No results for input(s): FIO2I, IFO2, HCO3I, IHCO3, HCOPOC, PCO2I, PCOPOC, IPHI, PHI, PHPOC, PO2I, PO2POC in the last 72 hours. No lab exists for component: IPOC2    Telemetry:normal sinus rhythm    Imaging:  I have personally reviewed the patients radiographs and have reviewed the reports:    CXR [date]: 5/4/2019: No acute findings. CT HEAD/CHEST/ABD/PELVIS [date]: Total of  60   min critical care time spent at bedside during the course of care providing evaluation,management and care decisions and ordering appropriate treatment related to critical care problems exclusive of procedures.   The reason for providing this level of medical care for this critically ill patient was due a critical illness that impaired one or more vital organ systems such that there was a high probability of imminent or life threatening deterioration in the patients condition. This care involved high complexity decision making to assess, manipulate, and support vital system functions, to treat this degree vital organ system failure and to prevent further life threatening deterioration of the patients condition.     Chiara Flower MD

## 2019-05-05 NOTE — PROGRESS NOTES
[facility]ist [facility]  Progress Note    Patient: Marilin Lemon Age: 48 y.o. : 1965 MR#: [id number] SSN: [id number]  Date/Time: 2019     Subjective: pt feels ok, denies any pain now. Pt AAOx3, slow but very attentive. No N/V. Passing flatus, says she doesn't have appetite to eat and drink. No diarrhea   Lab called Hb 3.6. Assessment/Plan:   1. MARIA ALEJANDRA and ATN due to # 3 and # 5 and NSAID use, crt worsening, low urine out pt    2. Acute blood loss anemia due to # 3 and dilutional effect   3. Acute Sickle cell crisis  4. SIRS due to # 3, no clear source of infection   5. Lactic acidosis   6. Dehydration due to poor oral intake  7. Sever Metabolic acidosis due to # 1  8. Elevated ammonia, no signs of hep encephalopathy   9. Sickle cell disease  10. Leukocytosis, chronic but worsened     11. Thrombocytopenia   12. HTN   15. Hypothyroid   14. H/o Breast cancer   15. Fibromyalgia      Plan:         · Pt currently hemodynamically stable. D/w blood bank, they got 1 unit least Ab's and will be available for transfusion by 12 noon and they will arrange 2nd unit by evening. Per pt its always hard to obtain blood for her. D/w Hem/onc Dr. Elena Noble, agrees with plan for transfusion, no need for any other interventions now. Pt agree with transfusion per hematology. · D/w Nephrology Dr. Agusto Blankenship, worsening Crt and acidosis, may need HD. Pt currently not in favor for HD. Will get repeat labs and venous Ph. Continue IVF hydration per renal recommendation. · Will start lactulose and get hep panel, given elevated ammonia. · Labs drawn from side when IVF were going, ? Dilutional. Will repeat labs   · No clear evidence of infection, CT C/A/P neg for infection, repeat CXR and Cx neg, LA trending down, Patient has chronic leukocytosis. Will cont empiric Abx for now. · D/w patient and  [id number] in detail, agree with above plan.      Addendum:  Repeat labs noted, Hb still 3.9, WBC worsened. Bicarb improved 16, Crt worsening   Pt still AAOx3, feels ok  BP lower side  Pt high risk for deterioration, will transfer to ICU. D/w Dr. Genesis Noriega, ICU team, accepts pt to ICU. D/w RN supervisor, awaiting ICU beds to open up. D/w RN in floor  D/w pt and family, updated about labs and transfer       Addendum:  Per Dr. Edie Cervantes, pt will be transferred to CVT ICU, recommends ICU consult to Dr. Cecy Chavira  D/w Dr. Cecy Chavira, he agrees to see pt  D/w RN, pt still did not start blood transfusion yet, still waiting for [facility] to get blood. I spent 40 minutes with the patient in face-to-face consultation, of which greater than 50% was spent in counseling and coordination of care as described above. Case discussed with:  [x]Patient  [x]Family  [x]Nursing  []Case Management  DVT Prophylaxis:  []Lovenox  []Hep SQ  [x]SCDs  []Coumadin   []On Heparin gtt    Objective:   VS:   Visit Vitals  /67   Pulse (!) 114   Temp 98.2 °F (36.8 °C)   Resp 18   Wt 97.7 kg (215 lb 4.8 oz)   SpO2 96%   Breastfeeding? No   BMI 34.75 kg/m²      Tmax/24hrs: Temp (24hrs), Av.7 °F (36.5 °C), Min:97.3 °F (36.3 °C), Max:98.2 °F (36.8 °C)  IOBRIEF    Intake/Output Summary (Last 24 hours) at 2019 1001  Last data filed at 2019 0149  Gross per 24 hour   Intake 535 ml   Output 350 ml   Net 185 ml       General:  Alert, cooperative, no acute distress    HEENT: PERRLA, icteric sclerae. Pulmonary:  CTA Bilaterally. No Wheezing/Rales. Cardiovascular: Regular rate and Rhythm. GI:  Soft, Non distended, Non tender. + Bowel sounds. Extremities:  No edema. No calf tenderness. Neurologic: Alert and oriented X 3. No acute neuro deficits.       Medications:   Current Facility-Administered Medications   Medication Dose Route Frequency    0.9% sodium chloride infusion 250 mL  250 mL IntraVENous PRN    dronabinol (MARINOL) capsule 5 mg  5 mg Oral BID    HYDROcodone-acetaminophen (NORCO) 5-325 mg per tablet 1-2 Tab  1-2 Tab Oral Q4H PRN    midodrine (PROAMITINE) tablet 5 mg  5 mg Oral TID WITH MEALS    famotidine (PEPCID) tablet 20 mg  20 mg Oral DAILY    VANCOMYCIN INFORMATION NOTE   Other Rx Dosing/Monitoring    [START ON 5/6/2019] levoFLOXacin (LEVAQUIN) 500 mg in D5W IVPB  500 mg IntraVENous Q48H    sodium bicarbonate (8.4%) 75 mEq in 0.45% sodium chloride 1,000 mL infusion   IntraVENous CONTINUOUS    diphenhydrAMINE (BENADRYL) capsule 25 mg  25 mg Oral Q6H PRN    Vancomycin Lab Information  1 Each Other ONCE    sodium chloride (NS) flush 5-10 mL  5-10 mL IntraVENous PRN    folic acid (FOLVITE) tablet 1 mg  1 mg Oral DAILY    thyroid (Pork) (ARMOUR) tablet 30 mg  30 mg Oral DAILY    acetaminophen (TYLENOL) tablet 650 mg  650 mg Oral Q4H PRN    ondansetron (ZOFRAN) injection 4 mg  4 mg IntraVENous Q4H PRN       Labs:    Recent Results (from the past 24 hour(s))   HGB & HCT    Collection Time: 05/04/19 10:45 AM   Result Value Ref Range    HGB 5.5 (LL) 12.0 - 16.0 g/dL    HCT 15.3 (LL) 35.0 - 45.0 %   TYPE + CROSSMATCH    Collection Time: 05/04/19 10:45 AM   Result Value Ref Range    Crossmatch Expiration 05/07/2019     ABO/Rh(D) [place] POSITIVE     Antibody screen POS     Physician instructions Irradiation  SICKLEDEX NEGATIVE       Antibody ID NONSPECIFIC ANTIBODY     Comment       SPECIMEN  SENT TO [facility], NOTIFIED IZZY VALERA [place], [id number] AT 1400 BY MICHAEL.    CBC WITH AUTOMATED DIFF    Collection Time: 05/04/19 10:45 AM   Result Value Ref Range    WBC 48.8 (H) 4.6 - 13.2 K/uL    RBC 1.94 (L) 4.20 - 5.30 M/uL    HGB 5.5 (LL) 12.0 - 16.0 g/dL    HCT 15.3 (LL) 35.0 - 45.0 %    MCV 79.4 74.0 - 97.0 FL    MCH 28.9 24.0 - 34.0 PG    MCHC 36.4 31.0 - 37.0 g/dL    RDW 16.1 (H) 11.6 - 14.5 %    PLATELET 40 (L) 666 - 420 K/uL    NEUTROPHILS 36 (L) 42 - 75 %    BAND NEUTROPHILS 37 (H) 0 - 5 %    LYMPHOCYTES 7 (L) 20 - 51 %    MONOCYTES 7 2 - 9 %    EOSINOPHILS 5 0 - 5 %    BASOPHILS 0 0 - 3 %    METAMYELOCYTES 8 (H) 0 %    NRBC 14.0 (H) 0  WBC ABS. NEUTROPHILS 35.6 (H) 1.8 - 8.0 K/UL    ABS. LYMPHOCYTES 3.4 0.8 - 3.5 K/UL    ABS. MONOCYTES 3.4 (H) 0 - 1.0 K/UL    ABS. EOSINOPHILS 2.4 (H) 0.0 - 0.4 K/UL    ABS. BASOPHILS 0.0 0.0 - 0.06 K/UL    DF MANUAL      PLATELET COMMENTS DECREASED PLATELETS      RBC COMMENTS ANISOCYTOSIS  1+        RBC COMMENTS POLYCHROMASIA  1+        RBC COMMENTS HYPOCHROMIA  1+        RBC COMMENTS SCHISTOCYTES  1+        RBC COMMENTS TARGET CELLS  1+        WBC COMMENTS TOXIC GRANULATION     LACTIC ACID    Collection Time: 05/04/19  1:33 PM   Result Value Ref Range    Lactic acid 5.3 (HH) 0.4 - 2.0 MMOL/L   AMMONIA    Collection Time: 05/04/19  1:33 PM   Result Value Ref Range    Ammonia 47 (H) 11 - 32 UMOL/L   LACTIC ACID    Collection Time: 05/04/19  6:29 PM   Result Value Ref Range    Lactic acid 2.7 (HH) 0.4 - 2.0 MMOL/L   CULTURE, BLOOD    Collection Time: 05/04/19  6:29 PM   Result Value Ref Range    Special Requests: NO SPECIAL REQUESTS      Culture result: NO GROWTH AFTER 7 HOURS     VANCOMYCIN, RANDOM    Collection Time: 05/04/19  6:29 PM   Result Value Ref Range    Vancomycin, random 22.7 5.0 - 14.6 UG/ML   METABOLIC PANEL, COMPREHENSIVE    Collection Time: 05/05/19  4:30 AM   Result Value Ref Range    Sodium 133 (L) 136 - 145 mmol/L    Potassium 5.0 3.5 - 5.5 mmol/L    Chloride 107 100 - 108 mmol/L    CO2 10 (L) 21 - 32 mmol/L    Anion gap 16 3.0 - 18 mmol/L    Glucose 40 (LL) 74 - 99 mg/dL    BUN 61 (H) 7.0 - 18 MG/DL    Creatinine 5.57 (H) 0.6 - 1.3 MG/DL    BUN/Creatinine ratio 11 (L) 12 - 20      GFR est AA 10 (L) >60 ml/min/1.73m2    GFR est non-AA 8 (L) >60 ml/min/1.73m2    Calcium 6.9 (L) 8.5 - 10.1 MG/DL    Bilirubin, total 4.2 (H) 0.2 - 1.0 MG/DL    ALT (SGPT) 40 13 - 56 U/L    AST (SGOT) 137 (H) 15 - 37 U/L    Alk.  phosphatase 209 (H) 45 - 117 U/L    Protein, total 5.3 (L) 6.4 - 8.2 g/dL    Albumin 2.1 (L) 3.4 - 5.0 g/dL    Globulin 3.2 2.0 - 4.0 g/dL    A-G Ratio 0.7 (L) 0.8 - 1.7     AMMONIA    Collection Time: 05/05/19  4:30 AM   Result Value Ref Range    Ammonia 100 (H) 11 - 32 UMOL/L   GLUCOSE, POC    Collection Time: 05/05/19  6:05 AM   Result Value Ref Range    Glucose (POC) 75 70 - 110 mg/dL   CBC WITH AUTOMATED DIFF    Collection Time: 05/05/19  6:30 AM   Result Value Ref Range    WBC 47.3 (H) 4.6 - 13.2 K/uL    RBC 1.28 (L) 4.20 - 5.30 M/uL    HGB 3.6 (LL) 12.0 - 16.0 g/dL    HCT 9.6 (LL) 35.0 - 45.0 %    MCV 78.1 74.0 - 97.0 FL    MCH 28.9 24.0 - 34.0 PG    MCHC 37.0 31.0 - 37.0 g/dL    RDW 16.4 (H) 11.6 - 14.5 %    PLATELET 50 (L) 789 - 420 K/uL    NEUTROPHILS 34 (L) 42 - 75 %    BAND NEUTROPHILS 56 (H) 0 - 5 %    LYMPHOCYTES 5 (L) 20 - 51 %    MONOCYTES 0 (L) 2 - 9 %    EOSINOPHILS 3 0 - 5 %    BASOPHILS 0 0 - 3 %    MYELOCYTES 1 (H) 0 %    PROMYELOCYTES 1 (H) 0 %    ABS. NEUTROPHILS 16.1 (H) 1.8 - 8.0 K/UL    ABS. LYMPHOCYTES 2.4 0.8 - 3.5 K/UL    ABS. MONOCYTES 0.0 0 - 1.0 K/UL    ABS. EOSINOPHILS 1.4 (H) 0.0 - 0.4 K/UL    ABS.  BASOPHILS 0.0 0.0 - 0.1 K/UL    PLATELET COMMENTS LARGE PLATELETS      RBC COMMENTS ANISOCYTOSIS  1+        RBC COMMENTS POIKILOCYTOSIS  1+        RBC COMMENTS RBC FRAGMENTS  TARGET CELLS  1+        RBC COMMENTS HYPOCHROMIA  1+        DF MANUAL     LACTIC ACID    Collection Time: 05/05/19  6:30 AM   Result Value Ref Range    Lactic acid 2.2 (HH) 0.4 - 2.0 MMOL/L   GLUCOSE, POC    Collection Time: 05/05/19  6:52 AM   Result Value Ref Range    Glucose (POC) 134 (H) 70 - 110 mg/dL       Signed By: Seun Johnson MD     May 5, 2019

## 2019-05-05 NOTE — PROGRESS NOTES
Bedside shift change report given to [employer] (oncoming nurse) by Nagi Irvin (offgoing nurse). Report included the following information SBAR, Kardex, Intake/Output, MAR and Recent Results.

## 2019-05-05 NOTE — PROGRESS NOTES
Hematology/Medical Oncology Progress Note      NAME: Stacie Roth   :  1965  MRM:  [id number]    Date/Time: 2019  8:41 AM         Subjective:     Ms. Alfredo Fernandez is a 47 y/o woman with sickle cell disease. She was admitted with complaints of progressive weakness and was found to have acute on chronic renal failure. She was also very dehydrated and complained of pain. Nephrology is seeing the patient and she is scheduled to receive a transfusion of PRBC later today. Currently she is not complaining of pain. Her wbc count remains elevated and blood cultures are pending. Past Medical History reviewed and unchanged from Admission History and Physical    Review of Systems   Constitutional: Positive for fatigue. Negative for activity change, appetite change, chills, diaphoresis, fever and unexpected weight change. HENT: Negative for congestion, dental problem, drooling, ear discharge, ear pain, facial swelling, hearing loss, mouth sores, nosebleeds, postnasal drip, rhinorrhea, sinus pressure, sneezing, sore throat, tinnitus, trouble swallowing and voice change. Eyes: Negative for photophobia, pain, discharge, redness, itching and visual disturbance. Respiratory: Negative for apnea, cough, choking, chest tightness, shortness of breath and wheezing. Cardiovascular: Negative for chest pain, palpitations and leg swelling. Gastrointestinal: Negative for abdominal distention, abdominal pain, anal bleeding, blood in stool, constipation, diarrhea, nausea, rectal pain and vomiting. Genitourinary: Negative for decreased urine volume, difficulty urinating, dyspareunia, dysuria, flank pain, frequency, genital sores, hematuria, menstrual problem, pelvic pain, urgency, vaginal bleeding, vaginal discharge and vaginal pain. Musculoskeletal: Positive for arthralgias and myalgias. Negative for back pain, gait problem, joint swelling, neck pain and neck stiffness.    Skin: Negative for color change, pallor, rash and wound.   Neurological: Negative for dizziness, tremors, seizures, syncope, facial asymmetry, speech difficulty, weakness, light-headedness, numbness and headaches. Hematological: Negative for adenopathy. Does not bruise/bleed easily. Psychiatric/Behavioral: Negative for agitation, behavioral problems, confusion, decreased concentration, dysphoric mood, hallucinations, self-injury, sleep disturbance and suicidal ideas. The patient is not nervous/anxious and is not hyperactive. Objective:       Vitals:      Last 24hrs VS reviewed since prior progress note. Most recent are:    Visit Vitals  /52 (BP 1 Location: Right arm, BP Patient Position: At rest)   Pulse (!) 116   Temp 97.5 °F (36.4 °C)   Resp 17   Wt 97.7 kg (215 lb 4.8 oz)   SpO2 96%   Breastfeeding? No   BMI 34.75 kg/m²     SpO2 Readings from Last 6 Encounters:   05/05/19 96%   04/10/19 97%   01/14/19 96%   11/02/18 100%   10/22/18 99%   07/20/18 100%            Intake/Output Summary (Last 24 hours) at 5/5/2019 0841  Last data filed at 5/5/2019 0149  Gross per 24 hour   Intake 535 ml   Output 350 ml   Net 185 ml          Exam:      Physical Exam   Nursing note and vitals reviewed. Constitutional: She is oriented to person, place, and time. She appears well-developed and well-nourished. No distress. HENT:   Head: Normocephalic and atraumatic. Mouth/Throat: No oropharyngeal exudate. Eyes: Conjunctivae and EOM are normal. Pupils are equal, round, and reactive to light. Right eye exhibits no discharge. Left eye exhibits no discharge. No scleral icterus. Neck: Normal range of motion. Neck supple. No tracheal deviation present. No thyromegaly present. Cardiovascular: Normal rate and regular rhythm. Exam reveals no gallop and no friction rub. No murmur heard. Pulmonary/Chest: Effort normal and breath sounds normal. No apnea. No respiratory distress. She has no wheezes. She has no rales. Chest wall is not dull to percussion.  She exhibits no mass, no tenderness, no bony tenderness, no laceration, no crepitus, no edema, no deformity, no swelling and no retraction. Right breast exhibits no inverted nipple, no mass, no nipple discharge, no skin change and no tenderness. Left breast exhibits no inverted nipple, no mass, no nipple discharge, no skin change and no tenderness. Breasts are symmetrical.   Abdominal: Soft. Bowel sounds are normal. She exhibits no distension. There is no tenderness. There is no rebound and no guarding. Musculoskeletal: Normal range of motion. She exhibits no edema or tenderness. Lymphadenopathy:     She has no cervical adenopathy. Neurological: She is alert and oriented to person, place, and time. Coordination normal.   Skin: Skin is warm and dry. No rash noted. She is not diaphoretic. No erythema. No pallor. Psychiatric: She has a normal mood and affect.  Her behavior is normal. Thought content normal.       Telemetry reviewed:   normal sinus rhythm  Tubes:   Bull      Lab Data Reviewed: (see below)      Medications:  Current Facility-Administered Medications   Medication Dose Route Frequency    0.9% sodium chloride infusion 250 mL  250 mL IntraVENous PRN    dronabinol (MARINOL) capsule 5 mg  5 mg Oral BID    HYDROcodone-acetaminophen (NORCO) 5-325 mg per tablet 1-2 Tab  1-2 Tab Oral Q4H PRN    midodrine (PROAMITINE) tablet 5 mg  5 mg Oral TID WITH MEALS    famotidine (PEPCID) tablet 20 mg  20 mg Oral DAILY    VANCOMYCIN INFORMATION NOTE   Other Rx Dosing/Monitoring    [START ON 5/6/2019] levoFLOXacin (LEVAQUIN) 500 mg in D5W IVPB  500 mg IntraVENous Q48H    sodium bicarbonate (8.4%) 75 mEq in 0.45% sodium chloride 1,000 mL infusion   IntraVENous CONTINUOUS    diphenhydrAMINE (BENADRYL) capsule 25 mg  25 mg Oral Q6H PRN    Vancomycin Lab Information  1 Each Other ONCE    sodium chloride (NS) flush 5-10 mL  5-10 mL IntraVENous PRN    folic acid (FOLVITE) tablet 1 mg  1 mg Oral DAILY    thyroid (Pork) (ARMOUR) tablet 30 mg  30 mg Oral DAILY    acetaminophen (TYLENOL) tablet 650 mg  650 mg Oral Q4H PRN    ondansetron (ZOFRAN) injection 4 mg  4 mg IntraVENous Q4H PRN       ______________________________________________________________________      Lab Review:     Recent Labs     05/04/19  1045 05/04/19  0625 05/03/19  1144   WBC 48.8* 41.0* 24.2*   HGB 5.5*  5.5* 5.8* 7.0*   HCT 15.3*  15.3* 16.0* 19.7*   PLT 40* 35* 34*     Recent Labs     05/05/19  0430 05/04/19  0625 05/03/19  1144   * 140 136   K 5.0 3.5 3.9    112* 106   CO2 10* 17* 16*   GLU 40* 70* 107*   BUN 61* 56* 52*   CREA 5.57* 5.21* 5.36*   CA 6.9* 7.4* 8.9   ALB 2.1*  --  3.8   SGOT 137*  --  90*   ALT 40  --  46     No components found for: GLPOC  No results for input(s): PH, PCO2, PO2, HCO3, FIO2 in the last 72 hours. No results for input(s): INR in the last 72 hours. No lab exists for component: INREXT, INREXT    Other pertinent lab:          Assessment:     Active Problems: Thrombocytopenia ([facility] (8/22/2016)      Leukocytosis (2/20/2017)      Anemia (4/7/2019)      Chest pain (5/3/2019)      ARF (acute renal failure) ([facility] (5/3/2019)      Abdominal pain (5/3/2019)      Bandemia (5/3/2019)           Plan:     Risk of deterioration: medium             1. Sickle cell anemia/Sickle cell disease: a transfusion of 2 units of PRBC have been ordered and will be transfused when available. 2. Thrombocytopenia: slowly improving; continue to monitor daily. 3. Leukocytosis: blood cultures are pending. 4. Acute on chronic renal disease: continue IV hydration and management per nephrology. Total time spent with patient: Total time 25 minutes, greater than 50% of the time was in   counseling and coordination of care.                  Care Plan discussed with: Patient, Family, Nursing Staff and Consultant/Specialist    Discussed:  Care Plan    Prophylaxis:  H2B/PPI    Disposition:  Home w/Family           ___________________________________________________    Attending Physician: Jesi Jim MD

## 2019-05-06 LAB
ALBUMIN SERPL-MCNC: 3.4 G/DL (ref 3.4–5)
ALBUMIN/GLOB SERPL: 1.5 {RATIO} (ref 0.8–1.7)
ALP SERPL-CCNC: 353 U/L (ref 45–117)
ALT SERPL-CCNC: 35 U/L (ref 13–56)
AMMONIA PLAS-SCNC: 33 UMOL/L (ref 11–32)
ANION GAP SERPL CALC-SCNC: 13 MMOL/L (ref 3–18)
ANION GAP SERPL CALC-SCNC: 13 MMOL/L (ref 3–18)
AST SERPL-CCNC: 127 U/L (ref 15–37)
BASOPHILS # BLD: 0 K/UL (ref 0–0.06)
BASOPHILS NFR BLD: 0 % (ref 0–3)
BILIRUB SERPL-MCNC: 4.4 MG/DL (ref 0.2–1)
BUN SERPL-MCNC: 72 MG/DL (ref 7–18)
BUN SERPL-MCNC: 75 MG/DL (ref 7–18)
BUN/CREAT SERPL: 11 (ref 12–20)
BUN/CREAT SERPL: 11 (ref 12–20)
CA-I SERPL-SCNC: 0.94 MMOL/L (ref 1.12–1.32)
CA-I SERPL-SCNC: 1.01 MMOL/L (ref 1.12–1.32)
CALCIUM SERPL-MCNC: 7.2 MG/DL (ref 8.5–10.1)
CALCIUM SERPL-MCNC: 7.7 MG/DL (ref 8.5–10.1)
CHLORIDE SERPL-SCNC: 108 MMOL/L (ref 100–108)
CHLORIDE SERPL-SCNC: 108 MMOL/L (ref 100–108)
CO2 SERPL-SCNC: 18 MMOL/L (ref 21–32)
CO2 SERPL-SCNC: 18 MMOL/L (ref 21–32)
CREAT SERPL-MCNC: 6.35 MG/DL (ref 0.6–1.3)
CREAT SERPL-MCNC: 6.68 MG/DL (ref 0.6–1.3)
DIFFERENTIAL METHOD BLD: ABNORMAL
EOSINOPHIL # BLD: 0 K/UL (ref 0–0.4)
EOSINOPHIL NFR BLD: 0 % (ref 0–5)
ERYTHROCYTE [DISTWIDTH] IN BLOOD BY AUTOMATED COUNT: 18.1 % (ref 11.6–14.5)
GLOBULIN SER CALC-MCNC: 2.2 G/DL (ref 2–4)
GLUCOSE BLD STRIP.AUTO-MCNC: 136 MG/DL (ref 70–110)
GLUCOSE BLD STRIP.AUTO-MCNC: 166 MG/DL (ref 70–110)
GLUCOSE BLD STRIP.AUTO-MCNC: 171 MG/DL (ref 70–110)
GLUCOSE SERPL-MCNC: 123 MG/DL (ref 74–99)
GLUCOSE SERPL-MCNC: 154 MG/DL (ref 74–99)
HAV IGM SER QL: NEGATIVE
HBV CORE IGM SER QL: NEGATIVE
HBV SURFACE AG SER QL: <0.1 INDEX
HBV SURFACE AG SER QL: NEGATIVE
HCT VFR BLD AUTO: 16.5 % (ref 35–45)
HCV AB SER IA-ACNC: 0.05 INDEX
HCV AB SERPL QL IA: NEGATIVE
HCV COMMENT,HCGAC: NORMAL
HGB BLD-MCNC: 5.9 G/DL (ref 12–16)
LYMPHOCYTES # BLD: 2.1 K/UL (ref 0.8–3.5)
LYMPHOCYTES NFR BLD: 5 % (ref 20–51)
MAGNESIUM SERPL-MCNC: 2.3 MG/DL (ref 1.6–2.6)
MCH RBC QN AUTO: 28.8 PG (ref 24–34)
MCHC RBC AUTO-ENTMCNC: 35.8 G/DL (ref 31–37)
MCV RBC AUTO: 80.5 FL (ref 74–97)
MONOCYTES # BLD: 0.4 K/UL (ref 0–1)
MONOCYTES NFR BLD: 1 % (ref 2–9)
NEUTS BAND NFR BLD MANUAL: 15 % (ref 0–5)
NEUTS SEG # BLD: 38.8 K/UL (ref 1.8–8)
NEUTS SEG NFR BLD: 79 % (ref 42–75)
NRBC BLD-RTO: 5 PER 100 WBC
PHOSPHATE SERPL-MCNC: 4.3 MG/DL (ref 2.5–4.9)
PLATELET # BLD AUTO: 87 K/UL (ref 135–420)
PLATELET COMMENTS,PCOM: ABNORMAL
PMV BLD AUTO: 10.5 FL (ref 9.2–11.8)
POTASSIUM SERPL-SCNC: 4.1 MMOL/L (ref 3.5–5.5)
POTASSIUM SERPL-SCNC: 4.7 MMOL/L (ref 3.5–5.5)
PROT SERPL-MCNC: 5.6 G/DL (ref 6.4–8.2)
RBC # BLD AUTO: 2.05 M/UL (ref 4.2–5.3)
RBC MORPH BLD: ABNORMAL
SODIUM SERPL-SCNC: 139 MMOL/L (ref 136–145)
SODIUM SERPL-SCNC: 139 MMOL/L (ref 136–145)
SP1: NORMAL
SP2: NORMAL
SP3: NORMAL
VANCOMYCIN SERPL-MCNC: 19.9 UG/ML (ref 5–40)
WBC # BLD AUTO: 41.3 K/UL (ref 4.6–13.2)

## 2019-05-06 PROCEDURE — 82140 ASSAY OF AMMONIA: CPT

## 2019-05-06 PROCEDURE — 82962 GLUCOSE BLOOD TEST: CPT

## 2019-05-06 PROCEDURE — 74011000250 HC RX REV CODE- 250: Performed by: INTERNAL MEDICINE

## 2019-05-06 PROCEDURE — 36415 COLL VENOUS BLD VENIPUNCTURE: CPT

## 2019-05-06 PROCEDURE — 74011250637 HC RX REV CODE- 250/637: Performed by: INTERNAL MEDICINE

## 2019-05-06 PROCEDURE — 74011250636 HC RX REV CODE- 250/636: Performed by: PHYSICIAN ASSISTANT

## 2019-05-06 PROCEDURE — 36591 DRAW BLOOD OFF VENOUS DEVICE: CPT

## 2019-05-06 PROCEDURE — 85025 COMPLETE CBC W/AUTO DIFF WBC: CPT

## 2019-05-06 PROCEDURE — 86922 COMPATIBILITY TEST ANTIGLOB: CPT

## 2019-05-06 PROCEDURE — 77010033678 HC OXYGEN DAILY

## 2019-05-06 PROCEDURE — P9047 ALBUMIN (HUMAN), 25%, 50ML: HCPCS | Performed by: INTERNAL MEDICINE

## 2019-05-06 PROCEDURE — 82330 ASSAY OF CALCIUM: CPT

## 2019-05-06 PROCEDURE — 36430 TRANSFUSION BLD/BLD COMPNT: CPT

## 2019-05-06 PROCEDURE — 74011250636 HC RX REV CODE- 250/636: Performed by: INTERNAL MEDICINE

## 2019-05-06 PROCEDURE — 74011250636 HC RX REV CODE- 250/636: Performed by: HOSPITALIST

## 2019-05-06 PROCEDURE — 74011000258 HC RX REV CODE- 258: Performed by: INTERNAL MEDICINE

## 2019-05-06 PROCEDURE — 83735 ASSAY OF MAGNESIUM: CPT

## 2019-05-06 PROCEDURE — 74011250636 HC RX REV CODE- 250/636

## 2019-05-06 PROCEDURE — 74011000258 HC RX REV CODE- 258: Performed by: PHYSICIAN ASSISTANT

## 2019-05-06 PROCEDURE — 86921 COMPATIBILITY TEST INCUBATE: CPT

## 2019-05-06 PROCEDURE — 80053 COMPREHEN METABOLIC PANEL: CPT

## 2019-05-06 PROCEDURE — 74011250637 HC RX REV CODE- 250/637: Performed by: HOSPITALIST

## 2019-05-06 PROCEDURE — 80202 ASSAY OF VANCOMYCIN: CPT

## 2019-05-06 PROCEDURE — 84100 ASSAY OF PHOSPHORUS: CPT

## 2019-05-06 PROCEDURE — 94762 N-INVAS EAR/PLS OXIMTRY CONT: CPT

## 2019-05-06 PROCEDURE — 65610000006 HC RM INTENSIVE CARE

## 2019-05-06 RX ORDER — FUROSEMIDE 10 MG/ML
100 INJECTION INTRAMUSCULAR; INTRAVENOUS ONCE
Status: COMPLETED | OUTPATIENT
Start: 2019-05-06 | End: 2019-05-06

## 2019-05-06 RX ORDER — ACETAMINOPHEN 325 MG/1
650 TABLET ORAL ONCE
Status: ACTIVE | OUTPATIENT
Start: 2019-05-06 | End: 2019-05-06

## 2019-05-06 RX ORDER — FUROSEMIDE 10 MG/ML
20 INJECTION INTRAMUSCULAR; INTRAVENOUS ONCE
Status: DISCONTINUED | OUTPATIENT
Start: 2019-05-06 | End: 2019-05-06

## 2019-05-06 RX ORDER — SODIUM CHLORIDE 9 MG/ML
250 INJECTION, SOLUTION INTRAVENOUS AS NEEDED
Status: DISCONTINUED | OUTPATIENT
Start: 2019-05-06 | End: 2019-05-07 | Stop reason: ALTCHOICE

## 2019-05-06 RX ORDER — DIPHENHYDRAMINE HYDROCHLORIDE 50 MG/ML
25 INJECTION, SOLUTION INTRAMUSCULAR; INTRAVENOUS ONCE
Status: ACTIVE | OUTPATIENT
Start: 2019-05-06 | End: 2019-05-06

## 2019-05-06 RX ORDER — FUROSEMIDE 10 MG/ML
40 INJECTION INTRAMUSCULAR; INTRAVENOUS ONCE
Status: COMPLETED | OUTPATIENT
Start: 2019-05-06 | End: 2019-05-06

## 2019-05-06 RX ORDER — FUROSEMIDE 10 MG/ML
INJECTION INTRAMUSCULAR; INTRAVENOUS
Status: COMPLETED
Start: 2019-05-06 | End: 2019-05-06

## 2019-05-06 RX ORDER — DEXAMETHASONE SODIUM PHOSPHATE 100 MG/10ML
10 INJECTION INTRAMUSCULAR; INTRAVENOUS ONCE
Status: DISCONTINUED | OUTPATIENT
Start: 2019-05-06 | End: 2019-05-06 | Stop reason: CLARIF

## 2019-05-06 RX ORDER — SODIUM CHLORIDE 9 MG/ML
250 INJECTION, SOLUTION INTRAVENOUS AS NEEDED
Status: DISCONTINUED | OUTPATIENT
Start: 2019-05-06 | End: 2019-05-06

## 2019-05-06 RX ORDER — SODIUM BICARBONATE 650 MG/1
650 TABLET ORAL 2 TIMES DAILY
Status: DISCONTINUED | OUTPATIENT
Start: 2019-05-06 | End: 2019-05-07

## 2019-05-06 RX ORDER — MIDODRINE HYDROCHLORIDE 5 MG/1
5 TABLET ORAL 2 TIMES DAILY WITH MEALS
Status: DISCONTINUED | OUTPATIENT
Start: 2019-05-06 | End: 2019-05-06

## 2019-05-06 RX ADMIN — SODIUM BICARBONATE TAB 650 MG 650 MG: 650 TAB at 10:00

## 2019-05-06 RX ADMIN — FOLIC ACID 1 MG: 1 TABLET ORAL at 08:00

## 2019-05-06 RX ADMIN — FUROSEMIDE 40 MG: 10 INJECTION, SOLUTION INTRAMUSCULAR; INTRAVENOUS at 09:00

## 2019-05-06 RX ADMIN — DEXAMETHASONE SODIUM PHOSPHATE 10 MG: 4 INJECTION, SOLUTION INTRAMUSCULAR; INTRAVENOUS at 09:00

## 2019-05-06 RX ADMIN — FUROSEMIDE 40 MG: 10 INJECTION INTRAMUSCULAR; INTRAVENOUS at 09:00

## 2019-05-06 RX ADMIN — DEXTROSE MONOHYDRATE: 5 INJECTION, SOLUTION INTRAVENOUS at 05:00

## 2019-05-06 RX ADMIN — FAMOTIDINE 20 MG: 20 TABLET ORAL at 08:00

## 2019-05-06 RX ADMIN — LEVOFLOXACIN 500 MG: 5 INJECTION, SOLUTION INTRAVENOUS at 00:20

## 2019-05-06 RX ADMIN — FUROSEMIDE 100 MG: 10 INJECTION, SOLUTION INTRAMUSCULAR; INTRAVENOUS at 15:00

## 2019-05-06 RX ADMIN — LEVOTHYROXINE, LIOTHYRONINE 30 MG: 19; 4.5 TABLET ORAL at 08:00

## 2019-05-06 RX ADMIN — LACTULOSE 30 ML: 20 SOLUTION ORAL at 08:00

## 2019-05-06 RX ADMIN — CALCIUM GLUCONATE 2 G: 98 INJECTION, SOLUTION INTRAVENOUS at 15:00

## 2019-05-06 RX ADMIN — MIDODRINE HYDROCHLORIDE 5 MG: 5 TABLET ORAL at 08:00

## 2019-05-06 RX ADMIN — CALCIUM GLUCONATE 2 G: 98 INJECTION, SOLUTION INTRAVENOUS at 09:00

## 2019-05-06 RX ADMIN — ALBUMIN (HUMAN) 25 G: 0.25 INJECTION, SOLUTION INTRAVENOUS at 00:20

## 2019-05-06 RX ADMIN — LACTULOSE 30 ML: 20 SOLUTION ORAL at 00:21

## 2019-05-06 NOTE — PROGRESS NOTES
Received report, Naheed Allison. Assumed care of patient. Initial assessment completed. Denies pain on assessment. In bed resting, eyes closed easily aroused. howerver appears lethargic. O2 Nasal prong 8l/min nasal prong, weaned to 4l/min, intermittent periods of apneic-like WOB  RR increased with stimulation, SPO2 reamins 100%. Update Laureen Arthur PA-C. PMH sleep apnea, patient non compliant  CPAP use x5 years. Continuous monitoring remains in progress. 1 of 2 units PRBC's completed @ 1945.

## 2019-05-06 NOTE — PROGRESS NOTES
Patient has made progress this shift. Decline in apneic-like WOB, post completion of 2nd unit of 2 PRBCs. karla without transfusion reaction. Denies pain or discomfort. Hemodynamics improved, send VS trends. UOP improved. Labs obtained by this nurse via [facility], results pending.

## 2019-05-06 NOTE — PROGRESS NOTES
In Patient Progress note      Admit Date: 5/3/2019          Impression:     #1 oliguric ---> nonoliguric ,Acute kidney injury on chronic kidney disease stage III(baseline creatinine 1.3) from Ischaemic ATN in setting of   Hypotension/dehydration/severe anemia ,  h/o NSAID intake ( 800 mg ibuprofen ) may have contributed in this setting  with ATN/AIN , does have mild peripheral eosinophilia which goes along with AIN , UA with granular casts indicate ATN . Volume status replete , slightly overloaded   D/c bicarb drip , will give a dose of Lasix 20 mg IV today   BP in good range , wean off midodrine and d/c albumin  Bull in place , CT abdomen with no hydro , does show renal cyst   #2 chronic kidney disease stage III, patient does have minimal proteinuria, suspect due to hypertension nephrosclerosis   versus FSGS secondary to sickle cell disease  #3 Ac non gap  metabolic acidosis,improved , transition to PO Bicarb   #4 Transaminitis, may be secondary to sepsis and shock  #5 Sickle cell disease ,acute crisis, pain management per primary Tx per IM  #6 Anemia/thrombocytopenia:hem/onc following , overall improved  #7 leukocytosis/Sirs: work-up per primary, likely leukemoid reaction  #8 splenomegaly d/t sickle disease   #9 AMS : improved , judicious pain management   #10 transaminitis/hyperammonemia , does have secondary hemochromatoses d/t blood Tx  Hem/onc following     Hemodynamically more stable today.  Trial of diuretics this AM  Dense ATN v/s AIN d/t NSAIDs /sickle crisis   No acute need for RRT , continue to follow closely       Please call with questions,     Jese Mckinney MD FASN  Cell [phone]  Pager: [phone]    Subjective:     Improved mentation this AM  Breathing better      Current Facility-Administered Medications:     calcium gluconate 2 g in 0.9% sodium chloride 100 mL IVPB, 2 g, IntraVENous, ONCE, Lily Lyles PA-C    0.9% sodium chloride infusion 250 mL, 250 mL, IntraVENous, PRN, Thomas Canales, Brigid Caballero MD    acetaminophen (TYLENOL) tablet 650 mg, 650 mg, Oral, ONCE, Saad Irwin MD    diphenhydrAMINE (BENADRYL) injection 25 mg, 25 mg, IntraVENous, ONCE, Saad Irwin MD    lactulose (CHRONULAC) 10 gram/15 mL solution 30 mL, 20 g, Oral, TID, Mauricio Caldwell MD, 30 mL at 05/06/19 0800    sodium bicarbonate (8.4%) 150 mEq in dextrose 5% 1,000 mL infusion, , IntraVENous, CONTINUOUS, Harvey Valdez MD, Last Rate: 75 mL/hr at 05/06/19 0700    [CANCELED] VANCOMYCIN, RANDOM, , , ONE TIME **AND** [START ON 5/7/2019] Vancomycin Lab Information, 1 Each, Other, ONCE, Mauricio Caldwell MD    diphenhydrAMINE (BENADRYL) injection 25 mg, 25 mg, IntraVENous, ON CALL, Saad Irwin MD    dexamethasone (DECADRON) 10 mg in 0.9% sodium chloride 50 mL IVPB, 10 mg, IntraVENous, ON CALL, Saad Iwrin MD    glucose chewable tablet 16 g, 4 Tab, Oral, PRN, Lily Larson PA-C    glucagon (GLUCAGEN) injection 1 mg, 1 mg, IntraMUSCular, PRN, Lily Lyles PA-C    dextrose (D50W) injection syrg 12.5-25 g, 25-50 mL, IntraVENous, PRN, Lily Lyles PA-C    dronabinol (MARINOL) capsule 5 mg, 5 mg, Oral, BID, Saad Irwin MD, Stopped at 05/06/19 0800    HYDROcodone-acetaminophen (NORCO) 5-325 mg per tablet 1-2 Tab, 1-2 Tab, Oral, Q4H PRN, Missy Marin MD, 1 Tab at 05/04/19 1055    midodrine (PROAMITINE) tablet 5 mg, 5 mg, Oral, TID WITH MEALS, Harvey Valdez MD, 5 mg at 05/06/19 0800    famotidine (PEPCID) tablet 20 mg, 20 mg, Oral, DAILY, Missy Marin MD, 20 mg at 05/06/19 0800    VANCOMYCIN INFORMATION NOTE, , Other, Rx Dosing/Monitoring, Mauricio Caldwell MD    levoFLOXacin (LEVAQUIN) 500 mg in D5W IVPB, 500 mg, IntraVENous, Q48H, Luis Caldwell MD, Last Rate: 100 mL/hr at 05/06/19 0020, 500 mg at 05/06/19 0020    diphenhydrAMINE (BENADRYL) capsule 25 mg, 25 mg, Oral, Q6H PRN, Missy Marin MD, 25 mg at 05/04/19 1846    sodium chloride (NS) flush 5-10 mL, 5-10 mL, IntraVENous, PRN, Marcella Melton MD    folic acid (FOLVITE) tablet 1 mg, 1 mg, Oral, DAILY, Marcella Burns MD, 1 mg at 05/06/19 0800    thyroid (Pork) (ARMOUR) tablet 30 mg, 30 mg, Oral, DAILY, Marcella Burns MD, 30 mg at 05/06/19 0800    acetaminophen (TYLENOL) tablet 650 mg, 650 mg, Oral, Q4H PRN, Aly Brooks MD, 650 mg at 05/05/19 1621    ondansetron (ZOFRAN) injection 4 mg, 4 mg, IntraVENous, Q4H PRN, Aly Brooks MD, 4 mg at 05/03/19 2133        Objective:     Visit Vitals  /57   Pulse 74   Temp 97.9 °F (36.6 °C)   Resp 10   Wt 103.7 kg (228 lb 9.9 oz)   SpO2 100%   Breastfeeding?  No   BMI 36.90 kg/m²         Intake/Output Summary (Last 24 hours) at 5/6/2019 0825  Last data filed at 5/6/2019 2860  Gross per 24 hour   Intake 2143.75 ml   Output 700 ml   Net 1443.75 ml       Physical Exam:     Awake alert  Mucosa moist  CVS s1 s2 wnl no JVD  RS chava coarse BS+  GI soft , distended , BS +  Ext 1+ LE edema     Data Review:    Recent Labs     05/06/19  0456   WBC 41.3*   RBC 2.05*   HCT 16.5*   MCV 80.5   MCH 28.8   MCHC 35.8   RDW 18.1*     Recent Labs     05/06/19  0456 05/05/19  1046 05/05/19  0430 05/04/19  0625 05/03/19  1144   BUN 72* 64* 61* 56* 52*   CREA 6.35* 5.87* 5.57* 5.21* 5.36*   CA 7.2* 7.1* 6.9* 7.4* 8.9   ALB 3.4  --  2.1*  --  3.8   K 4.7 4.2 5.0 3.5 3.9    138 133* 140 136    107 107 112* 106   CO2 18* 16* 10* 17* 16*   PHOS 4.3 3.9  --   --   --    * 73* 40* 70* 107*       Ankur Branch MD

## 2019-05-06 NOTE — PROGRESS NOTES
Hematology/Medical Oncology Progress Note      NAME: Kerri Verdugo   :  1965  MRM:  [id number]    Date/Time: 2019  8:00 AM         Subjective:     Ms. Pearl Hatfield is a 49 y/o woman with sickle cell disease. She was admitted with complaints of progressive weakness and was found to have acute on chronic renal failure. She was also very dehydrated and complained of pain. Nephrology is seeing the patient and she is scheduled to receive a transfusion of PRBC later today. Currently she is not complaining of pain. Her wbc count remains elevated and blood cultures are pending. Platelets are improving. She was transferred to the ICU overnight due to respiratory distress. Her renal function is continuing to decline. Past Medical History reviewed and unchanged from Admission History and Physical    Review of Systems   Constitutional: Positive for fatigue. Negative for activity change, appetite change, chills, diaphoresis, fever and unexpected weight change. HENT: Negative for congestion, dental problem, drooling, ear discharge, ear pain, facial swelling, hearing loss, mouth sores, nosebleeds, postnasal drip, rhinorrhea, sinus pressure, sneezing, sore throat, tinnitus, trouble swallowing and voice change. Eyes: Negative for photophobia, pain, discharge, redness, itching and visual disturbance. Respiratory: Negative for apnea, cough, choking, chest tightness, shortness of breath and wheezing. Cardiovascular: Negative for chest pain, palpitations and leg swelling. Gastrointestinal: Negative for abdominal distention, abdominal pain, anal bleeding, blood in stool, constipation, diarrhea, nausea, rectal pain and vomiting. Genitourinary: Negative for decreased urine volume, difficulty urinating, dyspareunia, dysuria, flank pain, frequency, genital sores, hematuria, menstrual problem, pelvic pain, urgency, vaginal bleeding, vaginal discharge and vaginal pain.    Musculoskeletal: Positive for arthralgias and myalgias. Negative for back pain, gait problem, joint swelling, neck pain and neck stiffness. Skin: Negative for color change, pallor, rash and wound. Neurological: Negative for dizziness, tremors, seizures, syncope, facial asymmetry, speech difficulty, weakness, light-headedness, numbness and headaches. Hematological: Negative for adenopathy. Does not bruise/bleed easily. Psychiatric/Behavioral: Negative for agitation, behavioral problems, confusion, decreased concentration, dysphoric mood, hallucinations, self-injury, sleep disturbance and suicidal ideas. The patient is not nervous/anxious and is not hyperactive. Objective:       Vitals:      Last 24hrs VS reviewed since prior progress note. Most recent are:    Visit Vitals  /63   Pulse 78   Temp 97.9 °F (36.6 °C)   Resp 10   Wt 103.7 kg (228 lb 9.9 oz)   SpO2 98%   Breastfeeding? No   BMI 36.90 kg/m²     SpO2 Readings from Last 6 Encounters:   05/06/19 98%   04/10/19 97%   01/14/19 96%   11/02/18 100%   10/22/18 99%   07/20/18 100%    O2 Flow Rate (L/min): 2 l/min       Intake/Output Summary (Last 24 hours) at 5/6/2019 0800  Last data filed at 5/6/2019 7414  Gross per 24 hour   Intake 2143.75 ml   Output 700 ml   Net 1443.75 ml          Exam:      Physical Exam   Nursing note and vitals reviewed. Constitutional: She is oriented to person, place, and time. She appears well-developed and well-nourished. No distress. HENT:   Head: Normocephalic and atraumatic. Mouth/Throat: No oropharyngeal exudate. Eyes: Conjunctivae and EOM are normal. Pupils are equal, round, and reactive to light. Right eye exhibits no discharge. Left eye exhibits no discharge. No scleral icterus. Neck: Normal range of motion. Neck supple. No tracheal deviation present. No thyromegaly present. Cardiovascular: Normal rate and regular rhythm. Exam reveals no gallop and no friction rub. No murmur heard.   Pulmonary/Chest: Effort normal and breath sounds normal. No apnea. No respiratory distress. She has no wheezes. She has no rales. Chest wall is not dull to percussion. She exhibits no mass, no tenderness, no bony tenderness, no laceration, no crepitus, no edema, no deformity, no swelling and no retraction. Right breast exhibits no inverted nipple, no mass, no nipple discharge, no skin change and no tenderness. Left breast exhibits no inverted nipple, no mass, no nipple discharge, no skin change and no tenderness. Breasts are symmetrical.   Abdominal: Soft. Bowel sounds are normal. She exhibits no distension. There is no tenderness. There is no rebound and no guarding. Musculoskeletal: Normal range of motion. She exhibits no edema or tenderness. Lymphadenopathy:     She has no cervical adenopathy. Neurological: She is alert and oriented to person, place, and time. Coordination normal.   Skin: Skin is warm and dry. No rash noted. She is not diaphoretic. No erythema. No pallor. Psychiatric: She has a normal mood and affect.  Her behavior is normal. Thought content normal.       Telemetry reviewed:   normal sinus rhythm  Tubes:   Bull      Lab Data Reviewed: (see below)      Medications:  Current Facility-Administered Medications   Medication Dose Route Frequency    calcium gluconate 2 g in 0.9% sodium chloride 100 mL IVPB  2 g IntraVENous ONCE    0.9% sodium chloride infusion 250 mL  250 mL IntraVENous PRN    lactulose (CHRONULAC) 10 gram/15 mL solution 30 mL  20 g Oral TID    sodium bicarbonate (8.4%) 150 mEq in dextrose 5% 1,000 mL infusion   IntraVENous CONTINUOUS    0.9% sodium chloride infusion 250 mL  250 mL IntraVENous PRN    [START ON 5/7/2019] Vancomycin Lab Information  1 Each Other ONCE    diphenhydrAMINE (BENADRYL) injection 25 mg  25 mg IntraVENous ON CALL    dexamethasone (DECADRON) 10 mg in 0.9% sodium chloride 50 mL IVPB  10 mg IntraVENous ON CALL    glucose chewable tablet 16 g  4 Tab Oral PRN    glucagon (GLUCAGEN) injection 1 mg  1 mg IntraMUSCular PRN    dextrose (D50W) injection syrg 12.5-25 g  25-50 mL IntraVENous PRN    0.9% sodium chloride infusion 250 mL  250 mL IntraVENous PRN    dronabinol (MARINOL) capsule 5 mg  5 mg Oral BID    HYDROcodone-acetaminophen (NORCO) 5-325 mg per tablet 1-2 Tab  1-2 Tab Oral Q4H PRN    midodrine (PROAMITINE) tablet 5 mg  5 mg Oral TID WITH MEALS    famotidine (PEPCID) tablet 20 mg  20 mg Oral DAILY    VANCOMYCIN INFORMATION NOTE   Other Rx Dosing/Monitoring    levoFLOXacin (LEVAQUIN) 500 mg in D5W IVPB  500 mg IntraVENous Q48H    diphenhydrAMINE (BENADRYL) capsule 25 mg  25 mg Oral Q6H PRN    sodium chloride (NS) flush 5-10 mL  5-10 mL IntraVENous PRN    folic acid (FOLVITE) tablet 1 mg  1 mg Oral DAILY    thyroid (Pork) (ARMOUR) tablet 30 mg  30 mg Oral DAILY    acetaminophen (TYLENOL) tablet 650 mg  650 mg Oral Q4H PRN    ondansetron (ZOFRAN) injection 4 mg  4 mg IntraVENous Q4H PRN       ______________________________________________________________________      Lab Review:     Recent Labs     05/06/19 0456 05/05/19  1046 05/05/19  0630   WBC 41.3* 58.5* 47.3*   HGB 5.9* 3.9* 3.6*   HCT 16.5* 10.3* 9.6*   PLT 87* 74* 50*     Recent Labs     05/06/19  0456 05/05/19  1046 05/05/19  0430  05/03/19  1144    138 133*   < > 136   K 4.7 4.2 5.0   < > 3.9    107 107   < > 106   CO2 18* 16* 10*   < > 16*   * 73* 40*   < > 107*   BUN 72* 64* 61*   < > 52*   CREA 6.35* 5.87* 5.57*   < > 5.36*   CA 7.2* 7.1* 6.9*   < > 8.9   MG 2.3 1.9  --   --   --    PHOS 4.3 3.9  --   --   --    ALB 3.4  --  2.1*  --  3.8   SGOT 127*  --  137*  --  90*   ALT 35  --  40  --  46   INR  --  1.6*  --   --   --     < > = values in this interval not displayed. No components found for: GLPOC  No results for input(s): PH, PCO2, PO2, HCO3, FIO2 in the last 72 hours. Recent Labs     05/05/19  1046   INR 1.6*       Other pertinent lab:          Assessment:     Active Problems:     Thrombocytopenia ([address] (8/22/2016)      Leukocytosis (2/20/2017)      Anemia (4/7/2019)      Chest pain (5/3/2019)      ARF (acute renal failure) ([facility] (5/3/2019)      Abdominal pain (5/3/2019)      Bandemia (5/3/2019)           Plan:     Risk of deterioration: medium             1. Sickle cell anemia/Sickle cell disease: The patient was transfused with 2 units of PRBC yesterday. The am hgb is 5.9 gm/dl and the hematocrit is 16.5 %. I will order 2 additional units or PRBC today. 2. Thrombocytopenia: slowly improving; continue to monitor daily. The am platelet count is now 87,000. 3. Leukocytosis: blood cultures are pending. 4. Acute on chronic renal disease: Renal function is continuing to decline. The am cr is now 6.35 mg/dl/. I have discussed the issue with Nephrology and dialysis is being considered at this time. Total time spent with patient: Total time 25 minutes, greater than 50% of the time was in   counseling and coordination of care.                  Care Plan discussed with: Patient, Family, Nursing Staff and Consultant/Specialist    Discussed:  Care Plan    Prophylaxis:  H2B/PPI    Disposition:  Home w/Family           ___________________________________________________    Attending Physician: Oz Toussaint MD

## 2019-05-06 NOTE — PROGRESS NOTES
[facility]. Pulmonary, Critical Care, and Sleep Medicine    Name: Richie Delarosa MRN: [id number]   : 1965 Hospital: [facility]   Date: 2019  Admission Date: 5/3/2019     Chart and notes reviewed. Data reviewed. I have evaluated all findings. [x]I have reviewed the flowsheet and previous days notes. []The patient is unable to give any meaningful history or review of systems because the patient is:  []Intubated []Sedated   []Unresponsive      []The patient is critically ill on      []Mechanical ventilation []Pressors   []BiPAP []       19   Federica Slaughter is a 48yof with history of sickle cell disease. Presented to the ED with decreased PO intake, abdominal pain, and chest pain. She was found to be leukocytotic with bands, thrombocytopenic, and in ARF. It was felt that anemia and leukocytosis were chronic, but that thrombocytopenia was acute. No obvious source of infection. Initially admitted to med/surg for crisis and ARF, upgraded to ICU for symptomatic anemia with Hgb <5 and hypotension. She has received 2u PBRCs with some improvement in h/h, but still remains anemic. She was placed on midodrine for pressure support with improvement. Today, she has no complaints. Denies pain. Denies SOB. Vitals have stabilized. Nephro and Heme/Onc still closely following.      Mentation: alert and oriented, answering questions appropriately  Respiratory/ Secretions: no complaints; no increased respiratory effort  Hemodynamics: WNL and stable  Urine output: decreased  Need for procedures: will need midline access              ROS:Constitutional: positive for fatigue, negative for fevers, chills and sweats  Eyes: negative for visual disturbance, irritation and redness  Ears, nose, mouth, throat, and face: negative for nasal congestion, epistaxis and hoarseness  Respiratory: negative for cough, sputum, pleurisy/chest pain, asthma or wheezing  Cardiovascular: negative for chest pressure/discomfort, palpitations, irregular heart beats, syncope, orthopnea, paroxysmal nocturnal dyspnea, exertional chest pressure/discomfort, lower extremity edema  Gastrointestinal: negative for nausea, vomiting, melena and abdominal pain  Integument/breast: negative for rash, skin lesion(s), pruritus, dryness and skin color change  Hematologic/lymphatic: negative for easy bruising, bleeding and epistaxis  Musculoskeletal:negative for myalgias, arthralgias, neck pain and back pain    Events and notes from last 24 hours reviewed. Care plan discussed on multidisciplinary rounds. Patient Active Problem List   Diagnosis Code    Symptomatic anemia D64.9    Breast cancer ([facility] C50.919    Sickle cell anemia (HCC) D57.1    Dehydration E86.0    Fibromyalgia M79.7    Hx of endometriosis Z87.42    Sickle cell crisis ([facility] D57.00    Vitamin D deficiency E55.9    Osteoarthritis M19.90    Breast cancer of lower-outer quadrant of left female breast ([facility] C50.512    Iron deficiency anemia due to dietary causes D50.8    Osteoarthritis of left hip M16.12    Thrombocytopenia (HCC) D69.6    Acute blood loss anemia D62    Choledocholithiasis K80.50    Cholecystitis with cholelithiasis K80.10    Osteoarthritis of right hip M16.11    Leukocytosis D72.829    Osteoarthritis of right knee M17.11    Shortness of breath R06.02    Sickle cell anemia with crisis (HCC) D57.00    Anemia D64.9    Acute renal injury (HCC) N17.9    Chest pain R07.9    ARF (acute renal failure) (HCC) N17.9    Abdominal pain R10.9    Bandemia D72.825       Vital Signs:  Visit Vitals  /57   Pulse 74   Temp 97.8 °F (36.6 °C)   Resp 10   Wt 103.7 kg (228 lb 9.9 oz)   SpO2 100%   Breastfeeding?  No   BMI 36.90 kg/m²       O2 Device: Nasal cannula   O2 Flow Rate (L/min): 4 l/min   Temp (24hrs), Av.4 °F (36.9 °C), Min:97.8 °F (36.6 °C), Max:98.8 °F (37.1 °C)       Intake/Output:   Last shift:       07 -  1900  In: - Out: 65 [Urine:65]  Last 3 shifts: 05/04 1901 - 05/06 0700  In: 2678.8 [P.O.:290; I.V.:1768.8]  Out: 950 [Urine:950]    Intake/Output Summary (Last 24 hours) at 5/6/2019 1350  Last data filed at 5/6/2019 0800  Gross per 24 hour   Intake 2143.75 ml   Output 690 ml   Net 1453.75 ml                 Current Facility-Administered Medications   Medication Dose Route Frequency    acetaminophen (TYLENOL) tablet 650 mg  650 mg Oral ONCE    diphenhydrAMINE (BENADRYL) injection 25 mg  25 mg IntraVENous ONCE    dexamethasone (DECADRON) 10 mg in 0.9% sodium chloride 50 mL IVPB  10 mg IntraVENous ONCE    sodium bicarbonate tablet 650 mg  650 mg Oral BID    furosemide (LASIX) injection 100 mg  100 mg IntraVENous ONCE    lactulose (CHRONULAC) 10 gram/15 mL solution 30 mL  20 g Oral TID    diphenhydrAMINE (BENADRYL) injection 25 mg  25 mg IntraVENous ON CALL    dronabinol (MARINOL) capsule 5 mg  5 mg Oral BID    famotidine (PEPCID) tablet 20 mg  20 mg Oral DAILY    levoFLOXacin (LEVAQUIN) 500 mg in D5W IVPB  500 mg IntraVENous G83Y    folic acid (FOLVITE) tablet 1 mg  1 mg Oral DAILY    thyroid (Pork) (ARMOUR) tablet 30 mg  30 mg Oral DAILY       Telemetry: NSR    Physical Exam:    General: in no apparent distress, well developed and well nourished, non-toxic, alert, oriented times 3, afebrile, normal vitals and anicteric   HEENT: NCAT, EOMI, PERRL   Neck: No abnormally enlarged lymph nodes.    Chest: normal   Lungs: normal air entry, no dullness/ tenderness/ rash   Heart: Regular rate and rhythm, S1S2 present or without murmur or extra heart sounds   Abdomen: non distended, bowel sounds normoactive, tympanic, abdomen is soft without significant tenderness, masses, organomegaly or guarding   Extremity: negative   Neuro: alert   Skin: Skin color, texture, turgor normal. No rashes or lesions       DATA:  MAR reviewed and pertinent medications noted or modified as needed    Labs:  Recent Labs     05/06/19  1928 05/05/19  1046 05/05/19  0630   WBC 41.3* 58.5* 47.3*   HGB 5.9* 3.9* 3.6*   HCT 16.5* 10.3* 9.6*   PLT 87* 74* 50*     Recent Labs     05/06/19  0456 05/05/19  1046 05/05/19  0430    138 133*   K 4.7 4.2 5.0    107 107   CO2 18* 16* 10*   * 73* 40*   BUN 72* 64* 61*   CREA 6.35* 5.87* 5.57*   CA 7.2* 7.1* 6.9*   MG 2.3 1.9  --    PHOS 4.3 3.9  --    ALB 3.4  --  2.1*   SGOT 127*  --  137*   ALT 35  --  40   INR  --  1.6*  --      No results for input(s): PH, PCO2, PO2, HCO3, FIO2 in the last 72 hours. No results for input(s): FIO2I, IFO2, HCO3I, IHCO3, HCOPOC, PCO2I, PCOPOC, IPHI, PHI, PHPOC, PO2I, PO2POC in the last 72 hours. No lab exists for component: IPOC2    Imaging:  [x]   I have personally reviewed the patients radiographs and reports  XR Results (most recent):  Results from Hospital Encounter encounter on 05/03/19   XR CHEST PORT    Narrative INDICATION: Leukocytosis, chest pain    COMPARISON:  Recent prior    FINDINGS: A portable AP radiograph of the chest was obtained at 1520 hours. Hypoinflation exaggerating bronchovascular markings. No confluent consolidation. Cardiac silhouette an osseous structures are stable. Impression IMPRESSION: No confluent consolidation       CT Results (most recent):  Results from Hospital Encounter encounter on 05/03/19   CT CHEST ABD PELV WO CONT    Narrative EXAM:  CT Chest-Abdomen-Pelvis without Contrast.    CLINICAL INDICATION:  Diffuse abdominal pain with associated chest pain. Not  eating for the past 3-4 days. History of ductal carcinoma of the left breast.   Fibromyalgia. Endometriosis. Anemia. COMPARISON:      - CTA chest 10/18/18.  - CT abdomen-pelvis 11/07/16.  - CT chest 03/28/16. TECHNIQUE:    - Helical volumetric CT imaging of the abdomen and pelvis is performed without  IV or oral contrast administration.   Coronal and sagittal multiplanar  reconstruction images are generated for improved anatomic delineation.  - All CT scans at this facility are performed using dose optimization technique  as appropriate to the performed exam, to include automated exposure control,  adjustment of the mA and/or kV according to patient's size (Including  appropriate matching for site-specific examinations), or use of iterative  reconstruction technique. FINDINGS:    CT Chest.    Lungs:    - Multiple foci of bandlike densities or cordlike densities bilaterally,  suggestive of scarring vs. subsegmental atelectatic changes. A nodular focus is  identified in the left posterior sulcus (axial #40). However this appears to be  closely associated with a cordlike or bandlike density in the left posterior  lung base and is therefore most likely an artifact from margin of the scar  tissue simulating a nodule. - No dominant lung mass is detected. - Nonspecific subtle patchy groundglass densities, most pronounced in the left  apical region (axial #11). Mediastinum:  No adenopathy. Calcific density in the right paramedian  subcarinal region adjacent to the mainstem bronchus (axial #23). Most likely a  partially calcified node, i.e secondary changes from old granulomatous disease. Priya:  No definite hilar fullness. Base of Neck:  No adenopathy or mass. Chest Wall, Axillae:  Status post left mastectomy. Left axillary rahul  dissection. No mass or adenopathy. Esophagus:  Grossly unremarkable. CT Abdomen. Liver, Gallbladder, Common Bile Duct:  Gallbladder is absent by surgical  removal.  Along the inferior medial aspect of the liver, multiple foci of  calcific densities are noted (axial #80-83). Most likely from passage of  gallstones into the peritoneal cavity during surgical removal of the gallbladder  vs. calcified nodes. No discrete hepatic mass. No significant common bile duct  dilation. Spleen:  Splenomegaly. The spleen measures 14.6 x 7.5 x 15.4 cm. The spleen  demonstrates lobulated irregular contour.   Additionally, there are subtle  irregular curvilinear bandlike hypodensities interspersed in the spleen. Similar curvilinear densities were present on the 10/18/18 study. Other prior  studies were performed with contrast only. Pancreas, Adrenal Glands:  Unremarkable. Kidneys:  Hypodensity at the left kidney lower pole region measuring 3.8 x 2.4  cm (axial #1 4) similar hypodensity was present previously. This left kidney  lower pole region hypodensity probably corresponds to a cyst.    Gastrointestinal Tract:    - Unremarkable stomach. - No acute findings along the small bowel. No small bowel obstruction.    - Normal appendix.    - No acute diverticulitis or colitis. Nodes:  No mesenteric or retroperitoneal adenopathy. Adenopathy. Vascular:  Non-aneurysmal aorta. CT Pelvis    Technical Note:  Status post bilateral hip joint replacement. Pronounced beam  scatter artifact is produced by the hip replacement prosthetic components  bilaterally. This reduces the ability to visualize pelvic structures at the  involved levels. Urinary Bladder:  Partially contracted. Obscured by the beam scatter artifact. Uterus, Adnexa: Atrophied uterus. Partially obscured by the beam scatter  artifact. The ovaries appear grossly unremarkable bilaterally. Rectum:  Unremarkable. Pelvic sidewall:  No adenopathy. No free fluid. Bones:  Diffuse osteosclerotic changes. Presumably related to renal  osteodystrophy. No compression deformities. Impression IMPRESSION:    1. Splenomegaly. 2.  No distinct suspicious lung mass. No airspace opacities. 3.  No acute findings along the GI tract. 4.  Diffuse osteosclerosis.               IMPRESSION:   · Sickle Cell Anemia/Sickle Cell Disease: improving h/h but still low at 5.9; heme/onc following  · Acute on chronic renal injury: creat trending up; likely secondary to hypotension, hypovolemia, anemia and NSAID use; nephrology following  · Leukocytosis: h/o chronic leukocytosis; has been afebrile, BC pending, placed on empiric broad spectrum ABX  · Thrombocytopenia: trending upward     Patient Active Problem List   Diagnosis Code    Symptomatic anemia D64.9    Breast cancer (HCC) C50.919    Sickle cell anemia (HCC) D57.1    Dehydration E86.0    Fibromyalgia M79.7    Hx of endometriosis Z87.42    Sickle cell crisis ([facility] D57.00    Vitamin D deficiency E55.9    Osteoarthritis M19.90    Breast cancer of lower-outer quadrant of left female breast ([facility] C50.512    Iron deficiency anemia due to dietary causes D50.8    Osteoarthritis of left hip M16.12    Thrombocytopenia ([facility]) D69.6    Acute blood loss anemia D62    Choledocholithiasis K80.50    Cholecystitis with cholelithiasis K80.10    Osteoarthritis of right hip M16.11    Leukocytosis D72.829    Osteoarthritis of right knee M17.11    Shortness of breath R06.02    Sickle cell anemia with crisis ([facility]) D57.00    Anemia D64.9    Acute renal injury ([facility]) N17.9    Chest pain R07.9    ARF (acute renal failure) ([facility]) N17.9    Abdominal pain R10.9    Bandemia D72.825        RECOMMENDATIONS:   · Neuro: pain control PRN, routine neuro checks  · Pulm: monitor respiratory status closely, intervene as needed  · CVS: dc midodrine; maintain goal MAP>65  · GI: renal regular diet; scheduled marinol, PRN zofran for N/V, SUP  · Renal: Diuretic challenge, trend UO, stat CMP, trend kidney function, consider dialysis only if refractory to diuresis; renal dose meds  · Hem/Onc: transfuse 1u PRBCs, daily CBC  · I/D: continue levaquin as ordered, dc vanc  · Endocrine: POC Glucose q6  · Metabolic: bicarb drip, daily MP, replace e-lytes per protocol  · Musc/Skin: No acute issues, wound care  · Code Status: FULL     Best practice :    Glycemic control  Sress ulcer prophylaxis. DVT prophylaxis: SCDs; no pharmacologic  Need for Lines, grijalva assessed.   Restraints not indicated        High complexity decision making was performed during this consultation and evaluation. [x]       Pt is at high risk for further organ failure and dysfunction. Critical care time spent:  25  minutes with patient exclusive of procedures.       MARTIN Caceres

## 2019-05-06 NOTE — PROGRESS NOTES
Pulmonary: consulted RT Sugar Baca, re: patient intermittent periods of apneic-like WOB ,since arrival to unit and on initial  Assessment by this nurse. Plan: offer CPA/BIPAP  overnight. Discussion with patient. Voices understanding and agrees with pulmonary treatment regimen @ this time. Pending arrival of primary RT Gabino Hubbard.

## 2019-05-06 NOTE — PROGRESS NOTES
Approx. 2215 Blake Mccallum RN, called to place pt on CPAP w/ no order noted by the provider. I stated I would consult MARTIN Bautista, the need for ABG and CPAP. Both of us went to the pts rm to visualize pt breathing as the monitor read 0. PA Patrici Ganser stated there was no indication at this time, which I agreed. 1.5 hrs previously,Quang Ordaz RN. Had some  Apneic concerns; thus, a visual examination of the pt's resp status was done by myself & MARTIN Bautista. Pt presented w/ chest rising breaths w/ stable SaO2 @ 100% good HR and no signs of distress noted. Monitor is not picking up breaths and is intermittently reading 0 to 27 RR. MARTIN Bautista & I assured Blake Mccallum RN that the pt is breathing normally w/ stable vitals the monitor is just not recording more shallow breaths. Pt will be monitored for any decr in oxygen or ventilation status.

## 2019-05-06 NOTE — PROGRESS NOTES
Spoke with Quita Gaytan RT, primary  RT. Discussion current patient RR trends documented , intermittent periods of apneic-like WOB. Plan: PASTORA.

## 2019-05-06 NOTE — CDMP QUERY
Patient admitted with ATN; sickle cell crisis. Patient noted to have shock per Nephrology. If possible, please document in the progress notes and d/c summary if you concur with the diagnosis of shock and specify type; or state dx has been ruled out ? Neurogenic Shock ? Septic Shock ? Hypovolemic Shock ? Other Shock, Please specify ? Clinically unable to determine ? No Shock 5/4  Nephrology notes: transaminitis, may be secondary to sepsis and shock Volume status still looks dry , continue IVF @ 125 cc/hrs  
BP is soft , give a bolus of 500cc of normosol, looks dry IVF with bicarb at 125 cc/hrs, Bull in place , CT abdomen with no hydro , does show renal cyst  
Add midodrine 5 mg TID to augment BP If you DECLINE this query or would like to communicate with [facility], please utilize the \"[employer] message box\" at the TOP of the Progress Note on the right. Thank you, Gaetano Harrison RN/CCDS 
[phone]

## 2019-05-06 NOTE — PROGRESS NOTES
[facility]  Progress Note    Patient: Ailyn Enriquez Age: 48 y.o. : 1965 MR#: [id number] SSN: [id number]  Date/Time: 2019     Subjective: pt feels ok, mild SOB and worse with exertion. Pt AAOx3. No N/V. Over night events noted   Got 2 units PRBC yesterday      Assessment/Plan:   1. MARIA ALEJANDRA and ATN due to # 3 and # 5 and NSAID use, crt worsening, low urine out pt    2. Acute blood loss anemia due to # 3 and dilutional effect. Hb trending up    3. Acute Sickle cell crisis  4. SIRS due to # 3, no clear source of infection   5. Lactic acidosis   6. Mild fluid overload due to ARF and fluid resuscitation   7. Sever Metabolic acidosis due to # 1  8. Elevated ammonia, no signs of hep encephalopathy, improving    9. Sickle cell disease  10. Leukocytosis, chronic but worsened     11. Thrombocytopenia   12. HTN   15. Hypothyroid   14. H/o Breast cancer   15. Fibromyalgia  16. No shock      Plan:         · Agree with lasix per ICU and renal team   · D/c IVF  · Agree with transfusion per Hem/onc Dr. Komal Quinones    · D/w Nephrology Dr. Natalie Car, worsening Crt, no need for HD. Cont to monitor   · Will cont lactulose. hep panel neg. · No clear evidence of infection, all Cx neg, LA normal, Patient has chronic leukocytosis. Agree with d/c vanco and cont levaquin      · D/w patient and mother at bedside in detail  · D/w ICU team Dr. Donnie Benítez  ·  [id number]. · Hospitalist will sign off, please call us when pt is transferred out of ICU      Case discussed with:  [x]Patient  [x]Family  [x]Nursing  []Case Management  DVT Prophylaxis:  []Lovenox  []Hep SQ  [x]SCDs  []Coumadin   []On Heparin gtt    Objective:   VS:   Visit Vitals  /62   Pulse 70   Temp 97.8 °F (36.6 °C)   Resp 12   Wt 103.7 kg (228 lb 9.9 oz)   SpO2 97%   Breastfeeding?  No   BMI 36.90 kg/m²      Tmax/24hrs: Temp (24hrs), Av.4 °F (36.9 °C), Min:97.8 °F (36.6 °C), Max:98.8 °F (37.1 °C)  IOBRIEF    Intake/Output Summary (Last 24 hours) at 5/6/2019 1504  Last data filed at 5/6/2019 1400  Gross per 24 hour   Intake 2143.75 ml   Output 975 ml   Net 1168.75 ml       General:  Alert, cooperative, no acute distress    HEENT: PERRLA, icteric sclerae. Oral mucosa dry   Pulmonary: Bilaterally basal rales L>R. No Wheezing. Cardiovascular: Regular rate and Rhythm. GI:  Soft, Non distended, Non tender. + Bowel sounds. Extremities:  No edema. Neurologic: Alert and oriented X 3. No acute neuro deficits.       Medications:   Current Facility-Administered Medications   Medication Dose Route Frequency    0.9% sodium chloride infusion 250 mL  250 mL IntraVENous PRN    acetaminophen (TYLENOL) tablet 650 mg  650 mg Oral ONCE    diphenhydrAMINE (BENADRYL) injection 25 mg  25 mg IntraVENous ONCE    dexamethasone (DECADRON) 10 mg in 0.9% sodium chloride 50 mL IVPB  10 mg IntraVENous ONCE    sodium bicarbonate tablet 650 mg  650 mg Oral BID    furosemide (LASIX) injection 100 mg  100 mg IntraVENous ONCE    calcium gluconate 2 g in 0.9% sodium chloride 100 mL IVPB  2 g IntraVENous ONCE    lactulose (CHRONULAC) 10 gram/15 mL solution 30 mL  20 g Oral TID    diphenhydrAMINE (BENADRYL) injection 25 mg  25 mg IntraVENous ON CALL    glucose chewable tablet 16 g  4 Tab Oral PRN    glucagon (GLUCAGEN) injection 1 mg  1 mg IntraMUSCular PRN    dextrose (D50W) injection syrg 12.5-25 g  25-50 mL IntraVENous PRN    dronabinol (MARINOL) capsule 5 mg  5 mg Oral BID    HYDROcodone-acetaminophen (NORCO) 5-325 mg per tablet 1-2 Tab  1-2 Tab Oral Q4H PRN    famotidine (PEPCID) tablet 20 mg  20 mg Oral DAILY    levoFLOXacin (LEVAQUIN) 500 mg in D5W IVPB  500 mg IntraVENous Q48H    diphenhydrAMINE (BENADRYL) capsule 25 mg  25 mg Oral Q6H PRN    sodium chloride (NS) flush 5-10 mL  5-10 mL IntraVENous PRN    folic acid (FOLVITE) tablet 1 mg  1 mg Oral DAILY    thyroid (Pork) (ARMOUR) tablet 30 mg  30 mg Oral DAILY    acetaminophen (TYLENOL) tablet 650 mg  650 mg Oral Q4H PRN    ondansetron (ZOFRAN) injection 4 mg  4 mg IntraVENous Q4H PRN       Labs:    Recent Results (from the past 24 hour(s))   VANCOMYCIN, RANDOM    Collection Time: 05/05/19  4:55 PM   Result Value Ref Range    Vancomycin, random 21.0 5.0 - 40.0 UG/ML   LACTIC ACID    Collection Time: 05/05/19  8:04 PM   Result Value Ref Range    Lactic acid 1.0 0.4 - 2.0 MMOL/L   GLUCOSE, POC    Collection Time: 05/05/19 11:20 PM   Result Value Ref Range    Glucose (POC) 134 (H) 70 - 257 mg/dL   METABOLIC PANEL, COMPREHENSIVE    Collection Time: 05/06/19  4:56 AM   Result Value Ref Range    Sodium 139 136 - 145 mmol/L    Potassium 4.7 3.5 - 5.5 mmol/L    Chloride 108 100 - 108 mmol/L    CO2 18 (L) 21 - 32 mmol/L    Anion gap 13 3.0 - 18 mmol/L    Glucose 123 (H) 74 - 99 mg/dL    BUN 72 (H) 7.0 - 18 MG/DL    Creatinine 6.35 (H) 0.6 - 1.3 MG/DL    BUN/Creatinine ratio 11 (L) 12 - 20      GFR est AA 8 (L) >60 ml/min/1.73m2    GFR est non-AA 7 (L) >60 ml/min/1.73m2    Calcium 7.2 (L) 8.5 - 10.1 MG/DL    Bilirubin, total 4.4 (H) 0.2 - 1.0 MG/DL    ALT (SGPT) 35 13 - 56 U/L    AST (SGOT) 127 (H) 15 - 37 U/L    Alk. phosphatase 353 (H) 45 - 117 U/L    Protein, total 5.6 (L) 6.4 - 8.2 g/dL    Albumin 3.4 3.4 - 5.0 g/dL    Globulin 2.2 2.0 - 4.0 g/dL    A-G Ratio 1.5 0.8 - 1.7     CBC WITH AUTOMATED DIFF    Collection Time: 05/06/19  4:56 AM   Result Value Ref Range    WBC 41.3 (H) 4.6 - 13.2 K/uL    RBC 2.05 (L) 4.20 - 5.30 M/uL    HGB 5.9 (LL) 12.0 - 16.0 g/dL    HCT 16.5 (LL) 35.0 - 45.0 %    MCV 80.5 74.0 - 97.0 FL    MCH 28.8 24.0 - 34.0 PG    MCHC 35.8 31.0 - 37.0 g/dL    RDW 18.1 (H) 11.6 - 14.5 %    PLATELET 87 (L) 610 - 420 K/uL    MPV 10.5 9.2 - 11.8 FL    NEUTROPHILS 79 (H) 42 - 75 %    BAND NEUTROPHILS 15 (H) 0 - 5 %    LYMPHOCYTES 5 (L) 20 - 51 %    MONOCYTES 1 (L) 2 - 9 %    EOSINOPHILS 0 0 - 5 %    BASOPHILS 0 0 - 3 %    NRBC 5.0 (H) 0  WBC    ABS. NEUTROPHILS 38.8 (H) 1.8 - 8.0 K/UL    ABS. LYMPHOCYTES 2.1 0.8 - 3.5 K/UL    ABS. MONOCYTES 0.4 0 - 1.0 K/UL    ABS. EOSINOPHILS 0.0 0.0 - 0.4 K/UL    ABS.  BASOPHILS 0.0 0.0 - 0.06 K/UL    DF AUTOMATED      PLATELET COMMENTS Platelet Estimate, Decreased      RBC COMMENTS ANISOCYTOSIS  1+       AMMONIA    Collection Time: 05/06/19  4:56 AM   Result Value Ref Range    Ammonia 33 (H) 11 - 32 UMOL/L   MAGNESIUM    Collection Time: 05/06/19  4:56 AM   Result Value Ref Range    Magnesium 2.3 1.6 - 2.6 mg/dL   PHOSPHORUS    Collection Time: 05/06/19  4:56 AM   Result Value Ref Range    Phosphorus 4.3 2.5 - 4.9 MG/DL   CALCIUM, IONIZED    Collection Time: 05/06/19  4:56 AM   Result Value Ref Range    Ionized Calcium 0.94 (L) 1.12 - 1.32 MMOL/L   GLUCOSE, POC    Collection Time: 05/06/19  6:13 AM   Result Value Ref Range    Glucose (POC) 136 (H) 70 - 110 mg/dL   VANCOMYCIN, RANDOM    Collection Time: 05/06/19  9:25 AM   Result Value Ref Range    Vancomycin, random 19.9 5.0 - 40.0 UG/ML   GLUCOSE, POC    Collection Time: 05/06/19 11:38 AM   Result Value Ref Range    Glucose (POC) 171 (H) 70 - 110 mg/dL   CALCIUM, IONIZED    Collection Time: 05/06/19  1:00 PM   Result Value Ref Range    Ionized Calcium 1.01 (L) 1.12 - 8.56 MMOL/L   METABOLIC PANEL, BASIC    Collection Time: 05/06/19  1:00 PM   Result Value Ref Range    Sodium 139 136 - 145 mmol/L    Potassium 4.1 3.5 - 5.5 mmol/L    Chloride 108 100 - 108 mmol/L    CO2 18 (L) 21 - 32 mmol/L    Anion gap 13 3.0 - 18 mmol/L    Glucose 154 (H) 74 - 99 mg/dL    BUN 75 (H) 7.0 - 18 MG/DL    Creatinine 6.68 (H) 0.6 - 1.3 MG/DL    BUN/Creatinine ratio 11 (L) 12 - 20      GFR est AA 8 (L) >60 ml/min/1.73m2    GFR est non-AA 6 (L) >60 ml/min/1.73m2    Calcium 7.7 (L) 8.5 - 10.1 MG/DL       Signed By: Britni Sears MD     May 6, 2019

## 2019-05-06 NOTE — PROGRESS NOTES
Pt's apnea alarm triggering intermittently while asleep without desaturation on 2 LPM NC and nurse Bibi Lundberg has notified me on her concern. Pt has history of KINSEY, but has not worn CPAP in 5 years and unaware of settings. This is likely mild hypopnea due to hx obstructive process/KINSEY, pt continues to breath with rise and fall of chest while the apnea alarm is being triggered with normal HR, SBP 130s, and oxygen saturation 100% on 2 LPM without signs of distress. When awakened pt takes deeper breaths and correct respiratory rate is recorded. Pt is A&O x 4 at this time, lungs CTAB, and there is no acute indication for ABG or BIPAP at this time. There is no acute critical care need to place pt on CPAP in the setting of chronic non-compliance because we do not have current titration settings. Discussed opinion with Nurse and RT. Suggest follow-up with sleep medicine as outpatient. Will continue to monitor patient closely. Discussed with Dr. Andrea Armando.      Signed By: Erika Liu PA-C     [phone]

## 2019-05-06 NOTE — PROGRESS NOTES
attended the interdisciplinary rounds for Jessica Plata, who is a 48 y.o.,female. Patients Primary Language is: [place]. According to the patients EMR Buddhism Affiliation is: [facility].     The reason the Patient came to the hospital is:   Patient Active Problem List    Diagnosis Date Noted    Vitamin D deficiency 05/19/2015     Priority: 1 - One    Chest pain 05/03/2019    ARF (acute renal failure) ([address] 05/03/2019    Abdominal pain 05/03/2019    Bandemia 05/03/2019    Sickle cell anemia with crisis ([address] 04/07/2019    Anemia 04/07/2019    Acute renal injury ([address] 04/07/2019    Shortness of breath 10/18/2018    Osteoarthritis of right knee 06/28/2018    Leukocytosis 02/20/2017    Osteoarthritis of right hip 01/03/2017    Choledocholithiasis 11/07/2016    Cholecystitis with cholelithiasis 11/07/2016    Thrombocytopenia ([address] 08/22/2016    Acute blood loss anemia 08/22/2016    Osteoarthritis of left hip 08/17/2016    Breast cancer of lower-outer quadrant of left female breast ([address] 07/01/2016    Iron deficiency anemia due to dietary causes 07/01/2016    Osteoarthritis 04/08/2016    Sickle cell crisis ([address] 08/24/2013    Fibromyalgia     Hx of endometriosis     Dehydration 11/16/2012    Symptomatic anemia 08/20/2012    Breast cancer ([address] 08/20/2012    Sickle cell anemia ([address] 08/20/2012      Plan:  Chaplains will continue to follow and will provide pastoral care on an as needed/requested basis.  recommends bedside caregivers page  on duty if patient shows signs of acute spiritual or emotional distress.     [address] [facility] [employer]  Board Certified [address] [facility]   [phone]

## 2019-05-06 NOTE — PROGRESS NOTES
NUTRITION    Nursing Referral: [facility]     RECOMMENDATIONS / PLAN:     - Add supplements: Magic Cup TID.   - Monitor po intake and diet tolerance. - Continue RD inpatient monitoring and evaluation. NUTRITION INTERVENTIONS & DIAGNOSIS:     [x] Meals/snacks: modified composition  [x] Medical food supplement therapy: initiate   [x] Collaboration and referral of nutrition care: interdisciplinary rounds     Nutrition Diagnosis: Inadequate oral intake related to decreased appetite, lethargy as evidenced by pt consuming 50% or less of recent meals and poor meal intake x week PTA. ASSESSMENT:     Pt reports decreased appetite with poor meal intake and altered mentation, lethargy- did not eat breakfast this morning. Average po intake adequate to meet patients estimated nutritional needs:   [] Yes     [x] No   [] Unable to determine at this time    Diet: DIET RENAL Regular      Food Allergies: NKFA  Current Appetite:   [] Good     [] Fair     [x] Poor     [] Other:  Appetite/meal intake prior to admission:   [] Good     [] Fair     [x] Poor     [x] Other: not eating x 6 days PTA, fair meal intake prior to that per pt  Feeding Limitations:  [] Swallowing difficulty    [] Chewing difficulty    [x] Other: lethargy   Current Meal Intake: No data found.     BM: 5/5  Skin Integrity: chest stage I pressure injury  Edema:   [] No     [x] Yes   Pertinent Medications: Reviewed: pepcid, lactulose, zofran, folic acid, 2 gm Ca, lasix, steroid, marinol    Recent Labs     05/06/19  0456 05/05/19  1046 05/05/19  0430    138 133*   K 4.7 4.2 5.0    107 107   CO2 18* 16* 10*   * 73* 40*   BUN 72* 64* 61*   CREA 6.35* 5.87* 5.57*   CA 7.2* 7.1* 6.9*   MG 2.3 1.9  --    PHOS 4.3 3.9  --    ALB 3.4  --  2.1*   SGOT 127*  --  137*   ALT 35  --  40       Intake/Output Summary (Last 24 hours) at 5/6/2019 1342  Last data filed at 5/6/2019 0800  Gross per 24 hour   Intake 2143.75 ml   Output 690 ml   Net 1453.75 ml Anthropometrics:  Ht Readings from Last 1 Encounters:   04/06/19 5' 6\" (1.676 m)     Last 3 Recorded Weights in this Encounter    05/04/19 0400 05/05/19 0656 05/06/19 0600   Weight: 98.7 kg (217 lb 9.6 oz) 97.7 kg (215 lb 4.8 oz) 103.7 kg (228 lb 9.9 oz)     Body mass index is 36.9 kg/m². Weight History: patient reports usual weight of 218 lb and unable to report change in weight PTA- could not remember weight hx      Weight Metrics 5/6/2019 4/10/2019 1/14/2019 11/2/2018 10/21/2018 7/19/2018 6/27/2018   Weight 228 lb 9.9 oz 223 lb 4.8 oz 221 lb 220 lb 220 lb 3.2 oz 224 lb 220 lb   BMI 36.9 kg/m2 36.04 kg/m2 35.14 kg/m2 34.98 kg/m2 35.39 kg/m2 35.61 kg/m2 34.98 kg/m2        Admitting Diagnosis: ARF (acute renal failure) (HCC) [N17.9]  Leukocytosis [D72.829]  Bandemia [D72.825]  Thrombocytopenia (HCC) [D69.6]  Chest pain [R07.9]  Abdominal pain [R10.9]  Anemia [D64.9]  Pertinent PMHx: sickle cell disease, HTN, hypothyroid, breast cancer, fibromyalgia, GERD     Education Needs:        [x] None identified  [] Identified - Not appropriate at this time  []  Identified and addressed - refer to education log  Learning Limitations:   [] None identified  [x] Identified: altered mentation/lethargy     Cultural, Mosque & ethnic food preferences:  [x] None identified    [] Identified and addressed     ESTIMATED NUTRITION NEEDS:     Calories: [address] kcal (20-30 kcal/kg) based on  [] Actual BW      [x] SBW 75 kg   Protein: 60-90 gm (0.8-1.2 gm/kg) based on  [] Actual BW      [x] SBW   Fluid: 1 mL/kcal     MONITORING & EVALUATION:     Nutrition Goal(s):   1. Po intake of meals will meet >75% of patient estimated nutritional needs within the next 7 days.   Outcome:  [] Met/Ongoing    [] Progressing towards goal    [] Not progressing towards goal    [x] New/Initial goal     Monitoring:   [x] Food and beverage intake   [x] Diet order   [x] Nutrition-focused physical findings   [x] Treatment/therapy   [] Weight   [] Enteral nutrition intake        Previous Recommendations (for follow-up assessments only):     []   Implemented       []   Not Implemented (RD to address)      [] No Longer Appropriate     [] No Recommendation Made     Discharge Planning: cardiac diet   [x] Participated in care planning, discharge planning, & interdisciplinary rounds as appropriate      Alina Moscoso, [address] [place] [address]    Pager: [phone]

## 2019-05-06 NOTE — PROGRESS NOTES
Bedside and Verbal shift change report given to Sam Buckner RN  (oncoming nurse) by Robyn Engel RN (offgoing nurse). Report included the following information SBAR, Kardex, ED Summary, MAR, Recent Results, Med Rec Status, Cardiac Rhythm NSR,ST and Quality Measures.

## 2019-05-06 NOTE — PROGRESS NOTES
Telephone call to blood bank, pending 2nd of 2 units PRBCs, from [facility], ETA was 2000 per Clyde Bryant, will follow up x1 hour @ 2200.

## 2019-05-07 ENCOUNTER — APPOINTMENT (OUTPATIENT)
Dept: GENERAL RADIOLOGY | Age: 54
DRG: 682 | End: 2019-05-07
Attending: INTERNAL MEDICINE
Payer: COMMERCIAL

## 2019-05-07 LAB
ABO + RH BLD: NORMAL
ALBUMIN SERPL-MCNC: 3.4 G/DL (ref 3.4–5)
ALBUMIN/GLOB SERPL: 1.1 {RATIO} (ref 0.8–1.7)
ALP SERPL-CCNC: 268 U/L (ref 45–117)
ALT SERPL-CCNC: 35 U/L (ref 13–56)
ANION GAP SERPL CALC-SCNC: 12 MMOL/L (ref 3–18)
ANTIGENS PRESENT RBC DONR: NORMAL
AST SERPL-CCNC: 75 U/L (ref 15–37)
BASOPHILS # BLD: 0 K/UL (ref 0–0.06)
BASOPHILS # BLD: 0 K/UL (ref 0–0.06)
BASOPHILS NFR BLD: 0 % (ref 0–3)
BASOPHILS NFR BLD: 0 % (ref 0–3)
BILIRUB SERPL-MCNC: 2.3 MG/DL (ref 0.2–1)
BLD PROD TYP BPU: NORMAL
BLOOD BANK CMNT PATIENT-IMP: NORMAL
BLOOD GROUP ANTIBODIES SERPL: NORMAL
BLOOD GROUP ANTIBODIES SERPL: NORMAL
BPU ID: NORMAL
BUN SERPL-MCNC: 88 MG/DL (ref 7–18)
BUN/CREAT SERPL: 12 (ref 12–20)
CA-I SERPL-SCNC: 1.15 MMOL/L (ref 1.12–1.32)
CA-I SERPL-SCNC: 1.16 MMOL/L (ref 1.12–1.32)
CALCIUM SERPL-MCNC: 8.4 MG/DL (ref 8.5–10.1)
CALLED TO:,BCALL2: NORMAL
CALLED TO:,BCALL3: NORMAL
CHLORIDE SERPL-SCNC: 107 MMOL/L (ref 100–108)
CO2 SERPL-SCNC: 21 MMOL/L (ref 21–32)
CREAT SERPL-MCNC: 7.31 MG/DL (ref 0.6–1.3)
CROSSMATCH RESULT,%XM: NORMAL
DIFFERENTIAL METHOD BLD: ABNORMAL
DIFFERENTIAL METHOD BLD: ABNORMAL
EOSINOPHIL # BLD: 0 K/UL (ref 0–0.4)
EOSINOPHIL # BLD: 0 K/UL (ref 0–0.4)
EOSINOPHIL NFR BLD: 0 % (ref 0–5)
EOSINOPHIL NFR BLD: 0 % (ref 0–5)
ERYTHROCYTE [DISTWIDTH] IN BLOOD BY AUTOMATED COUNT: 17.2 % (ref 11.6–14.5)
ERYTHROCYTE [DISTWIDTH] IN BLOOD BY AUTOMATED COUNT: 17.6 % (ref 11.6–14.5)
FIBRINOGEN PPP-MCNC: 266 MG/DL (ref 210–451)
GLOBULIN SER CALC-MCNC: 3 G/DL (ref 2–4)
GLUCOSE BLD STRIP.AUTO-MCNC: 146 MG/DL (ref 70–110)
GLUCOSE BLD STRIP.AUTO-MCNC: 152 MG/DL (ref 70–110)
GLUCOSE BLD STRIP.AUTO-MCNC: 204 MG/DL (ref 70–110)
GLUCOSE SERPL-MCNC: 130 MG/DL (ref 74–99)
HCT VFR BLD AUTO: 18.6 % (ref 35–45)
HCT VFR BLD AUTO: 21.1 % (ref 35–45)
HGB BLD-MCNC: 6.8 G/DL (ref 12–16)
HGB BLD-MCNC: 7.6 G/DL (ref 12–16)
LDH SERPL L TO P-CCNC: 842 U/L (ref 81–234)
LYMPHOCYTES # BLD: 3.6 K/UL (ref 0.8–3.5)
LYMPHOCYTES # BLD: 4 K/UL (ref 0.8–3.5)
LYMPHOCYTES NFR BLD: 7 % (ref 20–51)
LYMPHOCYTES NFR BLD: 7 % (ref 20–51)
MAGNESIUM SERPL-MCNC: 2.5 MG/DL (ref 1.6–2.6)
MCH RBC QN AUTO: 29.1 PG (ref 24–34)
MCH RBC QN AUTO: 29.4 PG (ref 24–34)
MCHC RBC AUTO-ENTMCNC: 36 G/DL (ref 31–37)
MCHC RBC AUTO-ENTMCNC: 36.6 G/DL (ref 31–37)
MCV RBC AUTO: 80.5 FL (ref 74–97)
MCV RBC AUTO: 80.8 FL (ref 74–97)
METAMYELOCYTES NFR BLD MANUAL: 1 %
METAMYELOCYTES NFR BLD MANUAL: 2 %
MONOCYTES # BLD: 1 K/UL (ref 0–1)
MONOCYTES # BLD: 2.3 K/UL (ref 0–1)
MONOCYTES NFR BLD: 2 % (ref 2–9)
MONOCYTES NFR BLD: 4 % (ref 2–9)
MYELOCYTES NFR BLD MANUAL: 1 %
MYELOCYTES NFR BLD MANUAL: 2 %
NEUTS BAND NFR BLD MANUAL: 7 % (ref 0–5)
NEUTS BAND NFR BLD MANUAL: 9 % (ref 0–5)
NEUTS SEG # BLD: 45.8 K/UL (ref 1.8–8)
NEUTS SEG # BLD: 48.9 K/UL (ref 1.8–8)
NEUTS SEG NFR BLD: 77 % (ref 42–75)
NEUTS SEG NFR BLD: 81 % (ref 42–75)
NRBC BLD-RTO: 10 PER 100 WBC
NRBC BLD-RTO: 7 PER 100 WBC
PHOSPHATE SERPL-MCNC: 3.7 MG/DL (ref 2.5–4.9)
PHYSICIAN INSTRUCTIO,%PI: NORMAL
PLATELET # BLD AUTO: 106 K/UL (ref 135–420)
PLATELET # BLD AUTO: 89 K/UL (ref 135–420)
PLATELET COMMENTS,PCOM: ABNORMAL
PLATELET COMMENTS,PCOM: ABNORMAL
PMV BLD AUTO: 11.1 FL (ref 9.2–11.8)
PMV BLD AUTO: 11.1 FL (ref 9.2–11.8)
POTASSIUM SERPL-SCNC: 3.5 MMOL/L (ref 3.5–5.5)
PROT SERPL-MCNC: 6.4 G/DL (ref 6.4–8.2)
RBC # BLD AUTO: 2.31 M/UL (ref 4.2–5.3)
RBC # BLD AUTO: 2.61 M/UL (ref 4.2–5.3)
RBC MORPH BLD: ABNORMAL
SODIUM SERPL-SCNC: 140 MMOL/L (ref 136–145)
SPECIMEN EXP DATE BLD: NORMAL
STATUS OF UNIT,%ST: NORMAL
UNIT DIVISION, %UDIV: 0
WBC # BLD AUTO: 52 K/UL (ref 4.6–13.2)
WBC # BLD AUTO: 56.9 K/UL (ref 4.6–13.2)

## 2019-05-07 PROCEDURE — 02H633Z INSERTION OF INFUSION DEVICE INTO RIGHT ATRIUM, PERCUTANEOUS APPROACH: ICD-10-PCS | Performed by: INTERNAL MEDICINE

## 2019-05-07 PROCEDURE — 77030011256 HC DRSG MEPILEX <16IN NO BORD MOLN -A

## 2019-05-07 PROCEDURE — 74011250637 HC RX REV CODE- 250/637: Performed by: HOSPITALIST

## 2019-05-07 PROCEDURE — 36415 COLL VENOUS BLD VENIPUNCTURE: CPT

## 2019-05-07 PROCEDURE — 74011250636 HC RX REV CODE- 250/636: Performed by: INTERNAL MEDICINE

## 2019-05-07 PROCEDURE — 85025 COMPLETE CBC W/AUTO DIFF WBC: CPT

## 2019-05-07 PROCEDURE — 74011250636 HC RX REV CODE- 250/636: Performed by: HOSPITALIST

## 2019-05-07 PROCEDURE — 86901 BLOOD TYPING SEROLOGIC RH(D): CPT

## 2019-05-07 PROCEDURE — 86921 COMPATIBILITY TEST INCUBATE: CPT

## 2019-05-07 PROCEDURE — 82962 GLUCOSE BLOOD TEST: CPT

## 2019-05-07 PROCEDURE — 65660000000 HC RM CCU STEPDOWN

## 2019-05-07 PROCEDURE — 36592 COLLECT BLOOD FROM PICC: CPT

## 2019-05-07 PROCEDURE — 86860 RBC ANTIBODY ELUTION: CPT

## 2019-05-07 PROCEDURE — 74011250637 HC RX REV CODE- 250/637: Performed by: PHYSICIAN ASSISTANT

## 2019-05-07 PROCEDURE — 86978 RBC PRETREATMENT SERUM: CPT

## 2019-05-07 PROCEDURE — 74011250637 HC RX REV CODE- 250/637: Performed by: INTERNAL MEDICINE

## 2019-05-07 PROCEDURE — 85660 RBC SICKLE CELL TEST: CPT

## 2019-05-07 PROCEDURE — 86870 RBC ANTIBODY IDENTIFICATION: CPT

## 2019-05-07 PROCEDURE — 80053 COMPREHEN METABOLIC PANEL: CPT

## 2019-05-07 PROCEDURE — 86880 COOMBS TEST DIRECT: CPT

## 2019-05-07 PROCEDURE — P9059 PLASMA, FRZ BETWEEN 8-24HOUR: HCPCS

## 2019-05-07 PROCEDURE — 86900 BLOOD TYPING SEROLOGIC ABO: CPT

## 2019-05-07 PROCEDURE — 85384 FIBRINOGEN ACTIVITY: CPT

## 2019-05-07 PROCEDURE — 36430 TRANSFUSION BLD/BLD COMPNT: CPT

## 2019-05-07 PROCEDURE — 83735 ASSAY OF MAGNESIUM: CPT

## 2019-05-07 PROCEDURE — 86922 COMPATIBILITY TEST ANTIGLOB: CPT

## 2019-05-07 PROCEDURE — 74011250636 HC RX REV CODE- 250/636: Performed by: PHYSICIAN ASSISTANT

## 2019-05-07 PROCEDURE — 86920 COMPATIBILITY TEST SPIN: CPT

## 2019-05-07 PROCEDURE — 77030037875 HC DRSG MEPILEX <16IN BORD MOLN -A

## 2019-05-07 PROCEDURE — 71045 X-RAY EXAM CHEST 1 VIEW: CPT

## 2019-05-07 PROCEDURE — 74011000258 HC RX REV CODE- 258: Performed by: INTERNAL MEDICINE

## 2019-05-07 PROCEDURE — 84100 ASSAY OF PHOSPHORUS: CPT

## 2019-05-07 PROCEDURE — 83615 LACTATE (LD) (LDH) ENZYME: CPT

## 2019-05-07 PROCEDURE — 85397 CLOTTING FUNCT ACTIVITY: CPT

## 2019-05-07 PROCEDURE — 86904 BLOOD TYPING PATIENT SERUM: CPT

## 2019-05-07 PROCEDURE — 82330 ASSAY OF CALCIUM: CPT

## 2019-05-07 PROCEDURE — 74011250636 HC RX REV CODE- 250/636

## 2019-05-07 PROCEDURE — 83010 ASSAY OF HAPTOGLOBIN QUANT: CPT

## 2019-05-07 PROCEDURE — 77030037878 HC DRSG MEPILEX >48IN BORD MOLN -B

## 2019-05-07 RX ORDER — CALCIUM CARBONATE 200(500)MG
200 TABLET,CHEWABLE ORAL
Status: DISCONTINUED | OUTPATIENT
Start: 2019-05-07 | End: 2019-05-15 | Stop reason: HOSPADM

## 2019-05-07 RX ORDER — HYDRALAZINE HYDROCHLORIDE 20 MG/ML
20 INJECTION INTRAMUSCULAR; INTRAVENOUS
Status: DISCONTINUED | OUTPATIENT
Start: 2019-05-07 | End: 2019-05-15 | Stop reason: HOSPADM

## 2019-05-07 RX ORDER — PREDNISONE 20 MG/1
60 TABLET ORAL DAILY
Status: DISCONTINUED | OUTPATIENT
Start: 2019-05-07 | End: 2019-05-07

## 2019-05-07 RX ORDER — HYDRALAZINE HYDROCHLORIDE 20 MG/ML
10 INJECTION INTRAMUSCULAR; INTRAVENOUS
Status: DISCONTINUED | OUTPATIENT
Start: 2019-05-07 | End: 2019-05-07

## 2019-05-07 RX ORDER — FUROSEMIDE 10 MG/ML
80 INJECTION INTRAMUSCULAR; INTRAVENOUS ONCE
Status: COMPLETED | OUTPATIENT
Start: 2019-05-07 | End: 2019-05-07

## 2019-05-07 RX ORDER — SODIUM CHLORIDE 9 MG/ML
250 INJECTION, SOLUTION INTRAVENOUS AS NEEDED
Status: DISCONTINUED | OUTPATIENT
Start: 2019-05-07 | End: 2019-05-15 | Stop reason: HOSPADM

## 2019-05-07 RX ORDER — SODIUM BICARBONATE 650 MG/1
650 TABLET ORAL 3 TIMES DAILY
Status: DISCONTINUED | OUTPATIENT
Start: 2019-05-07 | End: 2019-05-15 | Stop reason: HOSPADM

## 2019-05-07 RX ORDER — HYDRALAZINE HYDROCHLORIDE 20 MG/ML
INJECTION INTRAMUSCULAR; INTRAVENOUS
Status: COMPLETED
Start: 2019-05-07 | End: 2019-05-07

## 2019-05-07 RX ORDER — AMLODIPINE BESYLATE 5 MG/1
5 TABLET ORAL DAILY
Status: DISCONTINUED | OUTPATIENT
Start: 2019-05-07 | End: 2019-05-09

## 2019-05-07 RX ADMIN — HYDRALAZINE HYDROCHLORIDE 20 MG: 20 INJECTION, SOLUTION INTRAMUSCULAR; INTRAVENOUS at 22:18

## 2019-05-07 RX ADMIN — HYDRALAZINE HYDROCHLORIDE 20 MG: 20 INJECTION, SOLUTION INTRAMUSCULAR; INTRAVENOUS at 05:59

## 2019-05-07 RX ADMIN — FOLIC ACID 1 MG: 1 TABLET ORAL at 09:02

## 2019-05-07 RX ADMIN — LEVOFLOXACIN 500 MG: 5 INJECTION, SOLUTION INTRAVENOUS at 23:09

## 2019-05-07 RX ADMIN — HYDRALAZINE HYDROCHLORIDE 10 MG: 20 INJECTION, SOLUTION INTRAMUSCULAR; INTRAVENOUS at 01:07

## 2019-05-07 RX ADMIN — FUROSEMIDE 80 MG: 10 INJECTION, SOLUTION INTRAMUSCULAR; INTRAVENOUS at 22:36

## 2019-05-07 RX ADMIN — DRONABINOL 5 MG: 5 CAPSULE ORAL at 09:02

## 2019-05-07 RX ADMIN — SODIUM CHLORIDE 1000 MG: 900 INJECTION, SOLUTION INTRAVENOUS at 20:43

## 2019-05-07 RX ADMIN — FAMOTIDINE 20 MG: 20 TABLET ORAL at 09:02

## 2019-05-07 RX ADMIN — SODIUM BICARBONATE 650 MG TABLET 650 MG: at 22:18

## 2019-05-07 RX ADMIN — LACTULOSE 30 ML: 20 SOLUTION ORAL at 09:10

## 2019-05-07 RX ADMIN — SODIUM BICARBONATE 650 MG TABLET 650 MG: at 09:02

## 2019-05-07 RX ADMIN — CALCIUM GLUCONATE 1 G: 98 INJECTION, SOLUTION INTRAVENOUS at 20:43

## 2019-05-07 RX ADMIN — DRONABINOL 5 MG: 5 CAPSULE ORAL at 20:44

## 2019-05-07 RX ADMIN — SODIUM BICARBONATE 650 MG TABLET 650 MG: at 16:33

## 2019-05-07 RX ADMIN — AMLODIPINE BESYLATE 5 MG: 5 TABLET ORAL at 02:30

## 2019-05-07 RX ADMIN — LEVOTHYROXINE, LIOTHYRONINE 30 MG: 19; 4.5 TABLET ORAL at 09:02

## 2019-05-07 NOTE — CDMP QUERY
Patient admitted with ATN and sickle cell crisis. If possible, please document in progress notes and d/c summary if you are evaluating and /or treating any of the following:     Mild protein calorie malnutrition -started on PO supplements, RD following   Other (please specify)   Unable to determine    The medical record reflects the following:    Risk Factors: Acute illness    Clinical Indicators: Inadequate oral intake related to decreased appetite, lethargy as evidenced by pt consuming 50% or less of recent meals and poor meal intake x week PTA. Treatment: - Add supplements: Magic Cup TID.   - Monitor po intake and diet tolerance. - Continue RD inpatient monitoring and evaluation    If you DECLINE this query or would like to communicate with CDIS, please utilize the \"[employer] message box\" at the TOP of the Progress Note on the right.       Thank you,  Basia Parmar RN/CCDS  [phone]

## 2019-05-07 NOTE — PROGRESS NOTES
conducted a Follow up consultation and Spiritual Assessment for Dinesh Abbott, who is a 48 y.o.,female. The  provided the following Interventions:  Continued the relationship of care and support. Listened empathically. Chart reviewed. Assessment:  There are no further spiritual or Sikhism issues which require Spiritual Care Services interventions at this time. Plan:  Chaplains will continue to follow and will provide pastoral care on an as needed/requested basis.  recommends bedside caregivers page  on duty if patient shows signs of acute spiritual or emotional distress.      [address] [facility]   Board Certified [address] [facility]   [phone]

## 2019-05-07 NOTE — PROGRESS NOTES
In Patient Progress note    Admit Date: 5/3/2019    Impression:     #1 oliguric --> nonoliguric ,Acute kidney injury on chronic kidney disease stage III(baseline creatinine 1.3) from Ischaemic ATN in setting of Hypotension/dehydration/severe anemia ,  h/o NSAID intake ( 800 mg ibuprofen ) may have contributed in this setting with ATN/AIN , does have mild peripheral eosinophilia which goes along with AIN , UA with granular casts indicate ATN. Volume status has improved , putting out ~ 50+ cc/hrs , will let her autodiurese  Bull in place , CT abdomen with no hydro, does show renal cyst   #2 chronic kidney disease stage III, patient does have minimal proteinuria, suspect due to hypertension nephrosclerosis   versus FSGS secondary to sickle cell disease  #3 Ac non gap  metabolic acidosis,improved ,increase PO bicarb   #6 Sickle cell disease ,acute crisis, pain management per primary Tx per IM  #7 Anemia/thrombocytopenia:hem/onc following , s/p PRBC Tx  #8 leukocytosis/Sirs: work-up per primary, likely leukemoid reaction  #9 splenomegaly d/t sickle disease   #10 AMS : improved , judicious pain management   #11 transaminitis/hyperammonemia , does have secondary hemochromatoses   Hem/onc following   #12 HTN : avoid too tight control , ~ systolic [id number]  , on amlodipine      Improvement in urine output , did respond to Lasix yesterday . Presently auto diuresing ,  Labs pending from today , may need another dose of lasix if urine output drops off , will monitor  Defer PRBC TX to hem/onc/PCCM .  No acute indication for RRT , hopefully should recover        Please call with questions,     Sharri Rob MD FASN  Cell [phone]  Pager: [phone]         Subjective:     Denies SOB/CP    Current Facility-Administered Medications:     amLODIPine (NORVASC) tablet 5 mg, 5 mg, Oral, DAILY, Lily Lyles PA-C, 5 mg at 05/07/19 0230    hydrALAZINE (APRESOLINE) 20 mg/mL injection 20 mg, 20 mg, IntraVENous, Q6H PRN, Mahnaz Webb, ROBERTO Bautista, 20 mg at 05/07/19 0559    sodium bicarbonate tablet 650 mg, 650 mg, Oral, TID, Harvey Valdez MD, 650 mg at 05/07/19 0902    0.9% sodium chloride infusion 250 mL, 250 mL, IntraVENous, PRN, Casey Guerra MD    lactulose (CHRONULAC) 10 gram/15 mL solution 30 mL, 20 g, Oral, TID, Misha Herrera MD, 30 mL at 05/07/19 0910    diphenhydrAMINE (BENADRYL) injection 25 mg, 25 mg, IntraVENous, ON CALL, Casey Guerra MD    glucose chewable tablet 16 g, 4 Tab, Oral, PRN, Lily Cruz PA-C    glucagon (GLUCAGEN) injection 1 mg, 1 mg, IntraMUSCular, PRN, Lily Lyles PA-C    dextrose (D50W) injection syrg 12.5-25 g, 25-50 mL, IntraVENous, PRN, Lily Lyles PA-C    dronabinol (MARINOL) capsule 5 mg, 5 mg, Oral, BID, Casey Guerra MD, 5 mg at 05/07/19 0902    HYDROcodone-acetaminophen (NORCO) 5-325 mg per tablet 1-2 Tab, 1-2 Tab, Oral, Q4H PRN, Misha Herrera MD, 1 Tab at 05/04/19 1055    famotidine (PEPCID) tablet 20 mg, 20 mg, Oral, DAILY, Misha Herrera MD, 20 mg at 05/07/19 0902    levoFLOXacin (LEVAQUIN) 500 mg in D5W IVPB, 500 mg, IntraVENous, Q48H, Isha Caldwell MD, Stopped at 05/06/19 0120    diphenhydrAMINE (BENADRYL) capsule 25 mg, 25 mg, Oral, Q6H PRN, Misha Herrera MD, 25 mg at 05/04/19 1846    sodium chloride (NS) flush 5-10 mL, 5-10 mL, IntraVENous, PRN, Terefe, Charmayne Deer, MD    folic acid (FOLVITE) tablet 1 mg, 1 mg, Oral, DAILY, Terefe, Charmayne Deer, MD, 1 mg at 05/07/19 0902    thyroid (Pork) (ARMOUR) tablet 30 mg, 30 mg, Oral, DAILY, Terefe, Charmayne Deer, MD, 30 mg at 05/07/19 0902    acetaminophen (TYLENOL) tablet 650 mg, 650 mg, Oral, Q4H PRN, Daljit Mauricio MD, 650 mg at 05/05/19 1621    ondansetron (ZOFRAN) injection 4 mg, 4 mg, IntraVENous, Q4H PRN, Daljit Mauricio MD, 4 mg at 05/03/19 2133        Objective:     Visit Vitals  /66   Pulse 66   Temp 98 °F (36.7 °C)   Resp 14   Wt 103.7 kg (228 lb 9.9 oz)   SpO2 97%   Breastfeeding?  No   BMI 36.90 kg/m²         Intake/Output Summary (Last 24 hours) at 5/7/2019 1036  Last data filed at 5/7/2019 0700  Gross per 24 hour   Intake 370 ml   Output 975 ml   Net -605 ml       Physical Exam:     Gen awake alert, NAD   Moist mucosa  CVS s1 s2 wnl no JVD  RS AEBE clear   GI soft BS +  Ext : trace edema     Data Review:    Recent Labs     05/06/19  0456   WBC 41.3*   RBC 2.05*   HCT 16.5*   MCV 80.5   MCH 28.8   MCHC 35.8   RDW 18.1*     Recent Labs     05/06/19  1300 05/06/19  0456 05/05/19  1046 05/05/19  0430   BUN 75* 72* 64* 61*   CREA 6.68* 6.35* 5.87* 5.57*   CA 7.7* 7.2* 7.1* 6.9*   ALB  --  3.4  --  2.1*   K 4.1 4.7 4.2 5.0    139 138 133*    108 107 107   CO2 18* 18* 16* 10*   PHOS  --  4.3 3.9  --    * 123* 73* 40*       Devyn Jimenez MD

## 2019-05-07 NOTE — PROGRESS NOTES
Labs reviewed from today   BUN/creat trending up slightly   Now nonoliguric   Elecrtrolytes/acid base improved  Volume status ok   Still guarded renal prognosis ,   no indication for RRT  Continue to follow closely    Please call with questions    Tereso Catherine MD FASN  Cell [phone]  Pager: [phone]

## 2019-05-07 NOTE — PROCEDURES
[facility]   DIALYSIS CATH (NON-TUNNELED) Procedure Note with US guidance     Indication: Acute renal failure, suspicion for TTP/HUS requiring plasmapharesis     Risks, benefits, alternatives explained and consent obtained from patient. Patient positioned in trendlenberg position. Full sterile barrier precautions used. Sterility Protocol followed. (cap, mask sterile gown, sterile gloves, large sterile sheet, hand hygiene, 2% chlorhexidine for cutaneous antisepsis, sterile probe cover). Site cleaned with chloroprep total of 5 times. Local anesthetic with Lidocaine 1% - about 9 cc. CATH: [facility] Triple lumen 12 Fr, 20 cm   Using ultrasound guidance, 13 Fr triple lumen cath placed and secured at 18 cm. RIGHT INTERNAL JUGULAR VEIN cannulated x 1 attempt(s) utilizing the Seldinger technique. Position of wire confirmed in vein using US before dilating. Wire was removed. No immediate complications. Good non pulsatile blood return, carefully flushed. Catheter secured & Biopatch applied. Sterile Tegaderm placed. CXR ordered -RIJ dialysis catheter in appropriate place. No PTX. May use line.         Donald Borrero PA-C

## 2019-05-07 NOTE — PROGRESS NOTES
[facility]  Pulmonary, Critical Care, and Sleep Medicine    Critical Care Progress Note/Transfer Summary       New Admitting MD: Dr. Parish Lawson    Name: Sugar Siegel MRN: [id number]   : 1965 Hospital: [address] [place] [address]    Date: 2019  Admission Date: 5/3/2019     Chart and notes reviewed. Data reviewed. I have evaluated all findings. Brief Summary of ICU care: Demetrius Cerda is a 48yof with history of sickle cell disease, ductal breast carcinoma, . Presented on 5/3/19 to the ED with decreased PO intake, abdominal pain, and chest pain. She was found to be leukocytotic with bands, thrombocytopenic, and in ARF. It was felt that anemia and leukocytosis were chronic, but that thrombocytopenia was acute. No obvious source of infection. Initially admitted to floor for SC crisis and MARIA ALEJANDRA, upgraded to ICU on 19 for symptomatic anemia with Hgb <5 and hypotension. She was placed on midodrine for pressure support with improvement. Midodrine was discontinued  and she has remained normotensive since. She has received a total of 3u PBRCs. Hgb is now 7.6. Patient has been afebrile and BC prelims are negative for growth, but pt is high risk for infection and leukocytosis was empirically treated with vancomycin (d/c ) and levaquin. The patient's MARIA ALEJANDRA appeared to be worsening on  and dialysis had been considered she was refractory to treatment, however she was diuresed with 140mg of lasix  and had a subsequent increased and reassuring urine output of 1L. Her creatinine is still trending upward and is now 7.31 from 6.68. Denies pain. Denies SOB. Nephro and Heme/Onc have been closely following. Pt has been stabilized and no longer requires ICU level of management. Discussed with Dr. Maribell Parker and feel that transfer to [facility] is reasonable at this time.       Procedures: NA     Consultants:  PCCM: Dr. Genny Lucio  Heme/Onc: Dr. German Guzmán  Nephrology: Dr. Alberto Machado      [x]I have reviewed the flowsheet and previous days notes. 05/07/19   No events overnight  Mentation: alert and oriented, answering questions appropriately  Respiratory/ Secretions: no complaints; no increased respiratory effort  Hemodynamics: WNL and stable  Urine output: decreased  Need for procedures: NA                         ROS:Constitutional: positive for fatigue, negative for fevers, chills and sweats  Eyes: negative for visual disturbance, irritation and redness  Ears, nose, mouth, throat, and face: negative for nasal congestion, epistaxis and hoarseness  Respiratory: negative for cough, sputum, pleurisy/chest pain, asthma or wheezing  Cardiovascular: negative for chest pressure/discomfort, palpitations, irregular heart beats, syncope, orthopnea, paroxysmal nocturnal dyspnea, exertional chest pressure/discomfort, lower extremity edema  Gastrointestinal: negative for nausea, vomiting, melena and abdominal pain  Integument/breast: negative for rash, skin lesion(s), pruritus, dryness and skin color change  Hematologic/lymphatic: negative for easy bruising, bleeding and epistaxis  Musculoskeletal:negative for myalgias, arthralgias, neck pain and back pain      Events and notes from last 24 hours reviewed. Care plan discussed on multidisciplinary rounds.   Patient Active Problem List   Diagnosis Code    Symptomatic anemia D64.9    Breast cancer ([facility] C50.919    Sickle cell anemia (HCC) D57.1    Dehydration E86.0    Fibromyalgia M79.7    Hx of endometriosis Z87.42    Sickle cell crisis ([facility] D57.00    Vitamin D deficiency E55.9    Osteoarthritis M19.90    Breast cancer of lower-outer quadrant of left female breast ([facility] C50.512    Iron deficiency anemia due to dietary causes D50.8    Osteoarthritis of left hip M16.12    Thrombocytopenia (HCC) D69.6    Acute blood loss anemia D62    Choledocholithiasis K80.50    Cholecystitis with cholelithiasis K80.10    Osteoarthritis of right hip M16.11    Leukocytosis D72.829  Osteoarthritis of right knee M17.11    Shortness of breath R06.02    Sickle cell anemia with crisis (HCC) D57.00    Anemia D64.9    Acute renal injury ([address] N17.9    Chest pain R07.9    ARF (acute renal failure) (HCC) N17.9    Abdominal pain R10.9    Bandemia D72.825       Vital Signs:  Visit Vitals  /66   Pulse 66   Temp 98 °F (36.7 °C)   Resp 14   Ht 5' 5.98\" (1.676 m) Comment: previous encounter   Wt 103.7 kg (228 lb 9.9 oz)   SpO2 97%   Breastfeeding? No   BMI 36.92 kg/m²       O2 Device: Room air   O2 Flow Rate (L/min): 2 l/min   Temp (24hrs), Av.5 °F (36.9 °C), Min:98 °F (36.7 °C), Max:98.9 °F (37.2 °C)       Intake/Output:   Last shift:      No intake/output data recorded. Last 3 shifts:  1901 -  0700  In: 2513.8 [P.O.:180; I.V.:1403.8]  Out: 1750 [Urine:1750]    Intake/Output Summary (Last 24 hours) at 2019 1248  Last data filed at 2019 0700  Gross per 24 hour   Intake 370 ml   Output 875 ml   Net -505 ml                 Current Facility-Administered Medications   Medication Dose Route Frequency    amLODIPine (NORVASC) tablet 5 mg  5 mg Oral DAILY    sodium bicarbonate tablet 650 mg  650 mg Oral TID    lactulose (CHRONULAC) 10 gram/15 mL solution 30 mL  20 g Oral TID    diphenhydrAMINE (BENADRYL) injection 25 mg  25 mg IntraVENous ON CALL    dronabinol (MARINOL) capsule 5 mg  5 mg Oral BID    famotidine (PEPCID) tablet 20 mg  20 mg Oral DAILY    levoFLOXacin (LEVAQUIN) 500 mg in D5W IVPB  500 mg IntraVENous D91H    folic acid (FOLVITE) tablet 1 mg  1 mg Oral DAILY    thyroid (Pork) (ARMOUR) tablet 30 mg  30 mg Oral DAILY       Telemetry: NSR  General: in no apparent distress, well developed and well nourished, non-toxic, alert, oriented times 3, afebrile, normal vitals and anicteric              HEENT: NCAT, EOMI, PERRL              Neck: No abnormally enlarged lymph nodes.               Chest: normal              Lungs: normal air entry, no dullness/ tenderness/ rash              Heart: Regular rate and rhythm, S1S2 present or without murmur or extra heart sounds              Abdomen: non distended, bowel sounds normoactive, tympanic, abdomen is soft without significant tenderness, masses, organomegaly or guarding              Extremity: negative              Neuro: alert              Skin: Skin color, texture, turgor normal. No rashes or lesions         Labs:  Serum Creatinine Lab Results   Component Value Date/Time    Creatinine 7.31 (H) 05/07/2019 09:40 AM    Creatinine, POC 0.7 03/03/2012 11:42 AM      Creatinine Clearance Estimated Creatinine Clearance: 10.8 mL/min (A) (based on SCr of 7.31 mg/dL (H)). BUN Lab Results   Component Value Date/Time    BUN 88 (H) 05/07/2019 09:40 AM       WBC Lab Results   Component Value Date/Time    WBC 56.9 (HH) 05/07/2019 09:40 AM       H/H Lab Results   Component Value Date/Time    HGB 7.6 (L) 05/07/2019 09:40 AM      Platelets Lab Results   Component Value Date/Time    PLATELET 401 (L) 81/31/8087 09:40 AM      INR Lab Results   Component Value Date/Time    INR 1.6 (H) 05/05/2019 10:46 AM      Temp @[facility]@       Imaging:  [x] I have personally reviewed the patients radiographs and reports  XR Results (most recent):  Results from Hospital Encounter encounter on 05/03/19   XR CHEST PORT    Narrative INDICATION: Leukocytosis, chest pain    COMPARISON:  Recent prior    FINDINGS: A portable AP radiograph of the chest was obtained at 1520 hours. Hypoinflation exaggerating bronchovascular markings. No confluent consolidation. Cardiac silhouette an osseous structures are stable. Impression IMPRESSION: No confluent consolidation       CT Results (most recent):  Results from Hospital Encounter encounter on 05/03/19   CT CHEST ABD PELV WO CONT    Narrative EXAM:  CT Chest-Abdomen-Pelvis without Contrast.    CLINICAL INDICATION:  Diffuse abdominal pain with associated chest pain. Not  eating for the past 3-4 days. History of ductal carcinoma of the left breast.   Fibromyalgia. Endometriosis. Anemia. COMPARISON:      - CTA chest 10/18/18.  - CT abdomen-pelvis 11/07/16.  - CT chest 03/28/16. TECHNIQUE:    - Helical volumetric CT imaging of the abdomen and pelvis is performed without  IV or oral contrast administration. Coronal and sagittal multiplanar  reconstruction images are generated for improved anatomic delineation.  - All CT scans at this facility are performed using dose optimization technique  as appropriate to the performed exam, to include automated exposure control,  adjustment of the mA and/or kV according to patient's size (Including  appropriate matching for site-specific examinations), or use of iterative  reconstruction technique. FINDINGS:    CT Chest.    Lungs:    - Multiple foci of bandlike densities or cordlike densities bilaterally,  suggestive of scarring vs. subsegmental atelectatic changes. A nodular focus is  identified in the left posterior sulcus (axial #40). However this appears to be  closely associated with a cordlike or bandlike density in the left posterior  lung base and is therefore most likely an artifact from margin of the scar  tissue simulating a nodule. - No dominant lung mass is detected. - Nonspecific subtle patchy groundglass densities, most pronounced in the left  apical region (axial #11). Mediastinum:  No adenopathy. Calcific density in the right paramedian  subcarinal region adjacent to the mainstem bronchus (axial #23). Most likely a  partially calcified node, i.e secondary changes from old granulomatous disease. Priya:  No definite hilar fullness. Base of Neck:  No adenopathy or mass. Chest Wall, Axillae:  Status post left mastectomy. Left axillary rahul  dissection. No mass or adenopathy. Esophagus:  Grossly unremarkable. CT Abdomen.     Liver, Gallbladder, Common Bile Duct:  Gallbladder is absent by surgical  removal.  Along the inferior medial aspect of the liver, multiple foci of  calcific densities are noted (axial #80-83). Most likely from passage of  gallstones into the peritoneal cavity during surgical removal of the gallbladder  vs. calcified nodes. No discrete hepatic mass. No significant common bile duct  dilation. Spleen:  Splenomegaly. The spleen measures 14.6 x 7.5 x 15.4 cm. The spleen  demonstrates lobulated irregular contour. Additionally, there are subtle  irregular curvilinear bandlike hypodensities interspersed in the spleen. Similar curvilinear densities were present on the 10/18/18 study. Other prior  studies were performed with contrast only. Pancreas, Adrenal Glands:  Unremarkable. Kidneys:  Hypodensity at the left kidney lower pole region measuring 3.8 x 2.4  cm (axial #1 4) similar hypodensity was present previously. This left kidney  lower pole region hypodensity probably corresponds to a cyst.    Gastrointestinal Tract:    - Unremarkable stomach. - No acute findings along the small bowel. No small bowel obstruction.    - Normal appendix.    - No acute diverticulitis or colitis. Nodes:  No mesenteric or retroperitoneal adenopathy. Adenopathy. Vascular:  Non-aneurysmal aorta. CT Pelvis    Technical Note:  Status post bilateral hip joint replacement. Pronounced beam  scatter artifact is produced by the hip replacement prosthetic components  bilaterally. This reduces the ability to visualize pelvic structures at the  involved levels. Urinary Bladder:  Partially contracted. Obscured by the beam scatter artifact. Uterus, Adnexa: Atrophied uterus. Partially obscured by the beam scatter  artifact. The ovaries appear grossly unremarkable bilaterally. Rectum:  Unremarkable. Pelvic sidewall:  No adenopathy. No free fluid. Bones:  Diffuse osteosclerotic changes. Presumably related to renal  osteodystrophy. No compression deformities. Impression IMPRESSION:    1. Splenomegaly. 2.  No distinct suspicious lung mass. No airspace opacities. 3.  No acute findings along the GI tract. 4.  Diffuse osteosclerosis.                 Problem List :  IMPRESSION:   · Sickle Cell Anemia/Sickle Cell Disease:  S/p 3u; hgb 7.6  · Acute on chronic renal injury: urine output responsive to diuresis but creat still trending up, now 7.31 from 6.68; likely secondary to hypotension, hypovolemia, anemia and NSAID use; nephrology following  · Leukocytosis: h/o chronic leukocytosis; has been afebrile, BC pending but prelim neg, day 4 of levaquin  · Thrombocytopenia: trending upward         Patient Active Problem List   Diagnosis Code    Symptomatic anemia D64.9    Breast cancer (HCC) C50.919    Sickle cell anemia ([facility]) D57.1    Dehydration E86.0    Fibromyalgia M79.7    Hx of endometriosis Z87.42    Sickle cell crisis ([facility] D57.00    Vitamin D deficiency E55.9    Osteoarthritis M19.90    Breast cancer of lower-outer quadrant of left female breast ([facility] C50.512    Iron deficiency anemia due to dietary causes D50.8    Osteoarthritis of left hip M16.12    Thrombocytopenia ([facility]) D69.6    Acute blood loss anemia D62    Choledocholithiasis K80.50    Cholecystitis with cholelithiasis K80.10    Osteoarthritis of right hip M16.11    Leukocytosis D72.829    Osteoarthritis of right knee M17.11    Shortness of breath R06.02    Sickle cell anemia with crisis ([facility]) D57.00    Anemia D64.9    Acute renal injury ([facility] N17.9    Chest pain R07.9    ARF (acute renal failure) ([facility]) N17.9    Abdominal pain R10.9    Bandemia D72.825        RECOMMENDATIONS:   · Neuro: pain control PRN, routine neuro checks  · Pulm: monitor respiratory status closely, intervene as needed  · CVS: norvasc scheduled, PRN hydral for SBP>170, maintain goal MAP>65  · GI: renal regular diet; scheduled marinol, PRN zofran for N/V, SUP  · Renal: strict tracking of Urine Output, trend kidney function, consider dialysis only if refractory to diuresis; renal dose meds  · Hem/Onc: daily CBC  · I/D: continue levaquin as ordered, rec duration for cap cov 7-10  · Endocrine: POC Glucose q6  · Metabolic:  daily MP, replace e-lytes per protocol  · Musc/Skin: No acute issues, wound care  · Code Status: FULL        Review:     Rationale  Recommendations    DVT Proph:   Is DVT prophylaxis still   required? Ulcer Prophylaxis  PPI / H2 required after transfer? Reason? SCDs  Famotidine PO, hx of GERD     Renal/  hepatic Medications need adjustment:  NA   Cardiac Pressors DC from [facility]? AMI: (ASA, ACEI/ARB, Statin, B-blocker):  Norvasc 5mg scheduled  Hydralazine PRN for SBP>170   ID Antibiotics listed:          Coverage Appropriate? Candidate for Streamlining? Recommendations for duration of therapy: Levaquin (day 4) CAP coverage rec duration 7-14 days       Endocrine  Insulin coverage/thyroid appropriate? [place] 30mg   Pain  medications  Are current pain medications appropriate for the floor? yes   Home   Medications  Recommendations for revision: Will need to be reviewed prior to discharge. Central lines NA    [place] for strict tracking of UO    Other Devices Midline        High complexity decision making was performed during this consultation and evaluation. [x]       Pt is at high risk for further organ failure and dysfunction.          MARTIN Dorantes

## 2019-05-07 NOTE — ROUTINE PROCESS
Bedside and Verbal shift change report given to Negra Vicente RN (oncoming nurse) by Yamileth Gonzalez RN (offgoing nurse). Report included the following information SBAR, Intake/Output, MAR, Recent Results and Cardiac Rhythm SB/SR.

## 2019-05-07 NOTE — PROGRESS NOTES
[facility] HEMATOLOGY ONCOLOGY       Assessment/ Plan:     Patient Active Problem List   Diagnosis Code    Symptomatic anemia D64.9    Breast cancer ([facility] C50.919    Sickle cell anemia (HCC) D57.1    Dehydration E86.0    Fibromyalgia M79.7    Hx of endometriosis Z87.42    Sickle cell crisis ([facility] [facility] D57.00    Vitamin D deficiency E55.9    Osteoarthritis M19.90    Breast cancer of lower-outer quadrant of left female breast ([facility] [address] C50.512    Iron deficiency anemia due to dietary causes D50.8    Osteoarthritis of left hip M16.12    Thrombocytopenia (HCC) D69.6    Acute blood loss anemia D62    Choledocholithiasis K80.50    Cholecystitis with cholelithiasis K80.10    Osteoarthritis of right hip M16.11    Leukocytosis D72.829    Osteoarthritis of right knee M17.11    Shortness of breath R06.02    Sickle cell anemia with crisis (HCC) D57.00    Anemia D64.9    Acute renal injury (HCC) N17.9    Chest pain R07.9    ARF (acute renal failure) (HCC) N17.9    Abdominal pain R10.9    Bandemia D72.825     1. Acute kidney injury: From dehydration versus shock versus TTP-HUS versus other  I discussed with nephrologist . There are schistocytes on the peripheral smear which is either from sepsis or TTP/HUS especially in the setting of anemia with acute kidney injury and AMS (confusion). Although the constellation of these points to TTP/HUS, the improvement of platelets goes against it. I reviewed patient's labs again after discussing with  who called me and told me that patient's platelets were improving and they were actually 106 this morning. This is not consistent with TTP. I called  from the blood bank and asked her to cancel the the plasma exchange. I called also her  to and left him a message. I am awaiting for him to call me back. *Cancelled plasmapheresis  *Discontinue solumedrol  *CBC in Am    2.  Thrombocytosis: Improving. Likely from sepsis and medications. 3.  Anemia: Status post packed red blood cells. 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 Checking LDH and haptoglobin. Unfortunately haptoglobin can be decreased after blood transfusion. 4.  Leukocytosis: No real infectious source however patient had bandemia. Cultures pending. 5.  Sickle cell anemia/sickle cell disease: Status post 2 units of packed red blood. cells. Sometimes old transfuse blood might also lead to rare schistocytes. Thank you I will follow. Thank you for the opportunity to participate in the care, please do not hesitate to call for any questions or concerns. I have discussed the diagnosis with the patient and the intended plan. The patient verbalized understanding and agrees with the plan.       History:   Tegan Neal is a 48 y.o. female presenting today for Shortness of Breath      Current Facility-Administered Medications   Medication Dose Route Frequency Provider Last Rate Last Dose    amLODIPine (NORVASC) tablet 5 mg  5 mg Oral DAILY Lily Lyles PA-C   5 mg at 05/07/19 0230    hydrALAZINE (APRESOLINE) 20 mg/mL injection 20 mg  20 mg IntraVENous Q6H PRN Jenni Spurling, PA-C   20 mg at 05/07/19 0559    sodium bicarbonate tablet 650 mg  650 mg Oral TID Jacque Nelson MD   650 mg at 05/07/19 0902    lactulose (CHRONULAC) 10 gram/15 mL solution 30 mL  20 g Oral TID Dwaine Rodriguez MD   30 mL at 05/07/19 0910    diphenhydrAMINE (BENADRYL) injection 25 mg  25 mg IntraVENous ON Akua Macias MD        glucose chewable tablet 16 g  4 Tab Oral PRN Jenni Spurling, PA-C        glucagon (GLUCAGEN) injection 1 mg  1 mg IntraMUSCular PRN Jenni Spurling, PA-C        dextrose (D50W) injection syrg 12.5-25 g  25-50 mL IntraVENous PRN Jenni Spurling, PA-C        dronabinol (MARINOL) capsule 5 mg  5 mg Oral BID Katherine Arenas MD 5 mg at 05/07/19 0902    HYDROcodone-acetaminophen (NORCO) 5-325 mg per tablet 1-2 Tab  1-2 Tab Oral Q4H PRN Elisabeth Castellon MD   1 Tab at 05/04/19 1055    famotidine (PEPCID) tablet 20 mg  20 mg Oral DAILY Elisabeth Castellon MD   20 mg at 05/07/19 0902    levoFLOXacin (LEVAQUIN) 500 mg in D5W IVPB  500 mg IntraVENous Q48H Elisabeth Castellon MD   Stopped at 05/06/19 0120    diphenhydrAMINE (BENADRYL) capsule 25 mg  25 mg Oral Q6H PRN Elisabeth Castellon MD   25 mg at 05/04/19 1846    sodium chloride (NS) flush 5-10 mL  5-10 mL IntraVENous PRN Sherrie Kennedy MD        folic acid (FOLVITE) tablet 1 mg  1 mg Oral DAILY Sherrie Kennedy MD   1 mg at 05/07/19 0902    thyroid (Pork) (ARMOUR) tablet 30 mg  30 mg Oral DAILY Sherrie Kennedy MD   30 mg at 05/07/19 0902    acetaminophen (TYLENOL) tablet 650 mg  650 mg Oral Q4H PRN Sherrie Kennedy MD   650 mg at 05/05/19 1621    ondansetron (ZOFRAN) injection 4 mg  4 mg IntraVENous Q4H PRN Sherrie Kennedy MD   4 mg at 05/03/19 2133       Objective:   VS:    Visit Vitals  /65   Pulse (!) 58   Temp 98 °F (36.7 °C)   Resp 10   Ht 5' 5.98\" (1.676 m) Comment: previous encounter   Wt 103.7 kg (228 lb 9.9 oz)   SpO2 97%   Breastfeeding? No   BMI 36.92 kg/m²        Physical Exam:     Constitutional:  well developed, well nourished, confused at time   HENT:  Oral mucosa pink and moist, no cyanosis observed and no pallor observed.    Eyes:  conjunctiva normal, upper and lower eyelid normal bilaterally.    Neck:  No jugular venous distension present.    Cardiovascular:  heart sounds normal, normal rate and regular rhythm, no murmurs or rubs, no thrills, no S3 or S4 palpable,and no palpable opening snaps.  Point of maximal impulse normal. Carotid arteries normal. BP in 2+ extremities not indicated.    Pulmonary/Chest Wall:  breath sounds are clear bilaterally and no use of accessory muscles in breathing.    Abdominal:  Non tender, no palpable masses, no hepatosplenomegaly, and no abdominal bruits.    Genitourinary/Anorectal:  Occult blood testing is not indicated for this patient.    Musculoskeletal:  No digital clubbing or cyanosis. Normal muscle strength and tone.    Skin:  Normal and no rashes or lesions    Peripheral Vascular:  No edema present in extremities. Femoral pulse normal bilaterally. Dorsalis pedis pulse normal bilaterally. Review of Systems  All 12 point review of system are negative except for complaints of stool incontinence, weakness and fatigue. Recent Results (from the past 168 hour(s))   CBC WITH AUTOMATED DIFF    Collection Time: 05/03/19 11:44 AM   Result Value Ref Range    WBC 24.2 (H) 4.6 - 13.2 K/uL    RBC 2.49 (L) 4.20 - 5.30 M/uL    HGB 7.0 (L) 12.0 - 16.0 g/dL    HCT 19.7 (L) 35.0 - 45.0 %    MCV 79.1 74.0 - 97.0 FL    MCH 28.1 24.0 - 34.0 PG    MCHC 35.5 31.0 - 37.0 g/dL    RDW 15.5 (H) 11.6 - 14.5 %    PLATELET 34 (L) 477 - 420 K/uL    NEUTROPHILS 56 42 - 75 %    BAND NEUTROPHILS 29 (H) 0 - 5 %    LYMPHOCYTES 8 (L) 20 - 51 %    MONOCYTES 3 2 - 9 %    EOSINOPHILS 4 0 - 5 %    BASOPHILS 0 0 - 3 %    NRBC 10.0 (H) 0  WBC    ABS. NEUTROPHILS 20.6 (H) 1.8 - 8.0 K/UL    ABS. LYMPHOCYTES 1.9 0.8 - 3.5 K/UL    ABS. MONOCYTES 0.7 0 - 1.0 K/UL    ABS. EOSINOPHILS 1.0 (H) 0.0 - 0.4 K/UL    ABS.  BASOPHILS 0.0 0.0 - 0.06 K/UL    DF MANUAL      PLATELET COMMENTS DECREASED PLATELETS      RBC COMMENTS TARGET CELLS  1+        RBC COMMENTS POLYCHROMASIA  1+        RBC COMMENTS HYPOCHROMIA  1+       METABOLIC PANEL, COMPREHENSIVE    Collection Time: 05/03/19 11:44 AM   Result Value Ref Range    Sodium 136 136 - 145 mmol/L    Potassium 3.9 3.5 - 5.5 mmol/L    Chloride 106 100 - 108 mmol/L    CO2 16 (L) 21 - 32 mmol/L    Anion gap 14 3.0 - 18 mmol/L    Glucose 107 (H) 74 - 99 mg/dL    BUN 52 (H) 7.0 - 18 MG/DL    Creatinine 5.36 (H) 0.6 - 1.3 MG/DL    BUN/Creatinine ratio 10 (L) 12 - 20      GFR est AA 10 (L) >60 ml/min/1.73m2    GFR est non-AA 8 (L) >60 ml/min/1.73m2    Calcium 8.9 8.5 - 10.1 MG/DL    Bilirubin, total 4.5 (H) 0.2 - 1.0 MG/DL    ALT (SGPT) 46 13 - 56 U/L    AST (SGOT) 90 (H) 15 - 37 U/L    Alk.  phosphatase 166 (H) 45 - 117 U/L    Protein, total 7.4 6.4 - 8.2 g/dL    Albumin 3.8 3.4 - 5.0 g/dL    Globulin 3.6 2.0 - 4.0 g/dL    A-G Ratio 1.1 0.8 - 1.7     CARDIAC PANEL,(CK, CKMB & TROPONIN)    Collection Time: 05/03/19 11:44 AM   Result Value Ref Range     26 - 192 U/L    CK - MB 3.2 <3.6 ng/ml    CK-MB Index 1.7 0.0 - 4.0 %    Troponin-I, QT <0.02 0.0 - 0.045 NG/ML   RETICULOCYTE COUNT    Collection Time: 05/03/19 11:44 AM   Result Value Ref Range    Reticulocyte count 1.5 0.5 - 2.3 %   LIPASE    Collection Time: 05/03/19 11:44 AM   Result Value Ref Range    Lipase 29 (L) 73 - 393 U/L   CK    Collection Time: 05/03/19 11:44 AM   Result Value Ref Range     (H) 26 - 192 U/L   EKG, 12 LEAD, INITIAL    Collection Time: 05/03/19 11:51 AM   Result Value Ref Range    Ventricular Rate 89 BPM    Atrial Rate 89 BPM    P-R Interval 136 ms    QRS Duration 82 ms    Q-T Interval 386 ms    QTC Calculation (Bezet) 469 ms    Calculated P Axis 49 degrees    Calculated R Axis 38 degrees    Calculated T Axis 35 degrees    Diagnosis       Normal sinus rhythm  Septal infarct , age undetermined  Abnormal ECG  When compared with ECG of 07-APR-2019 10:46,  No significant change was found  Confirmed by Edelmira Perla MD, Mahnaz Jimenez ([id number]) on 5/3/2019 12:54:19 PM     CULTURE, BLOOD    Collection Time: 05/03/19  1:40 PM   Result Value Ref Range    Special Requests: NO SPECIAL REQUESTS      Culture result: NO GROWTH 4 DAYS     POC LACTIC ACID    Collection Time: 05/03/19  1:44 PM   Result Value Ref Range    Lactic Acid (POC) 1.76 0.40 - 2.00 mmol/L   CULTURE, BLOOD    Collection Time: 05/03/19  1:55 PM   Result Value Ref Range    Special Requests: NO SPECIAL REQUESTS      Culture result: NO GROWTH 4 DAYS     PROTEIN/CREATININE RATIO, URINE    Collection Time: 05/03/19  3:40 PM   Result Value Ref Range    Protein, urine random 246 (H) <11.9 mg/dL    Creatinine, urine 136.00 (H) 30 - 125 mg/dL    Protein/Creat.  urine Ratio 1.8     URINALYSIS W/MICROSCOPIC    Collection Time: 05/03/19  3:40 PM   Result Value Ref Range    Color DARK YELLOW      Appearance TURBID      Specific gravity 1.015 1.005 - 1.030      pH (UA) 5.0 5.0 - 8.0      Protein 100 (A) NEG mg/dL    Glucose NEGATIVE  NEG mg/dL    Ketone TRACE (A) NEG mg/dL    Bilirubin SMALL (A) NEG      Blood LARGE (A) NEG      Urobilinogen 0.2 0.2 - 1.0 EU/dL    Nitrites NEGATIVE  NEG      Leukocyte Esterase NEGATIVE  NEG      WBC 3 to 5 0 - 4 /hpf    RBC 0 to 2 0 - 5 /hpf    Epithelial cells 1+ 0 - 5 /lpf    Bacteria NEGATIVE  NEG /hpf    Granular cast 10 to 20 NEG /lpf   SODIUM, UR, RANDOM    Collection Time: 05/03/19  3:40 PM   Result Value Ref Range    Sodium,urine random 31 20 - 110 MMOL/L   CREATININE, UR, RANDOM    Collection Time: 05/03/19  3:40 PM   Result Value Ref Range    Creatinine, urine 132.00 (H) 30 - 125 mg/dL   EOSINOPHILS, URINE    Collection Time: 05/03/19  3:40 PM   Result Value Ref Range    Eosinophils,urine NO EOSINOPHILS SEEN     METABOLIC PANEL, BASIC    Collection Time: 05/04/19  6:25 AM   Result Value Ref Range    Sodium 140 136 - 145 mmol/L    Potassium 3.5 3.5 - 5.5 mmol/L    Chloride 112 (H) 100 - 108 mmol/L    CO2 17 (L) 21 - 32 mmol/L    Anion gap 11 3.0 - 18 mmol/L    Glucose 70 (L) 74 - 99 mg/dL    BUN 56 (H) 7.0 - 18 MG/DL    Creatinine 5.21 (H) 0.6 - 1.3 MG/DL    BUN/Creatinine ratio 11 (L) 12 - 20      GFR est AA 10 (L) >60 ml/min/1.73m2    GFR est non-AA 9 (L) >60 ml/min/1.73m2    Calcium 7.4 (L) 8.5 - 10.1 MG/DL   CBC WITH AUTOMATED DIFF    Collection Time: 05/04/19  6:25 AM   Result Value Ref Range    WBC 41.0 (H) 4.6 - 13.2 K/uL    RBC 2.04 (L) 4.20 - 5.30 M/uL    HGB 5.8 (LL) 12.0 - 16.0 g/dL    HCT 16.0 (LL) 35.0 - 45.0 %    MCV 78.4 74.0 - 97.0 FL    MCH 28.4 24.0 - 34.0 PG    MCHC 36.3 31.0 - 37.0 g/dL    RDW 15.9 (H) 11.6 - 14.5 %    PLATELET 35 (L) 614 - 420 K/uL    NEUTROPHILS 44 42 - 75 %    BAND NEUTROPHILS 34 (H) 0 - 5 %    LYMPHOCYTES 6 (L) 20 - 51 %    MONOCYTES 5 2 - 9 %    EOSINOPHILS 4 0 - 5 %    BASOPHILS 0 0 - 3 %    METAMYELOCYTES 6 (H) 0 %    MYELOCYTES 1 (H) 0 %    NRBC 13.0 (H) 0  WBC    ABS. NEUTROPHILS 32.0 (H) 1.8 - 8.0 K/UL    ABS. LYMPHOCYTES 2.5 0.8 - 3.5 K/UL    ABS. MONOCYTES 2.1 (H) 0 - 1.0 K/UL    ABS. EOSINOPHILS 1.6 (H) 0.0 - 0.4 K/UL    ABS. BASOPHILS 0.0 0.0 - 0.06 K/UL    DF MANUAL      PLATELET COMMENTS DECREASED PLATELETS      RBC COMMENTS ANISOCYTOSIS  1+        RBC COMMENTS POLYCHROMASIA  1+        RBC COMMENTS TARGET CELLS  1+        RBC COMMENTS SCHISTOCYTES  1+        RBC COMMENTS HYPOCHROMIA  1+       HGB & HCT    Collection Time: 05/04/19 10:45 AM   Result Value Ref Range    HGB 5.5 (LL) 12.0 - 16.0 g/dL    HCT 15.3 (LL) 35.0 - 45.0 %   TYPE + CROSSMATCH    Collection Time: 05/04/19 10:45 AM   Result Value Ref Range    Crossmatch Expiration 05/07/2019     ABO/Rh(D) Tiajuana Dessert POSITIVE     Antibody screen POS     Physician instructions Irradiation  SICKLEDEX NEGATIVE       Antibody ID NONSPECIFIC ANTIBODY     Comment       SPECIMEN  SENT TO [facility], NOTIFIED IZZY CVT SD, [id number] AT 1400 BY JNP.     CALLED TO: BRYAN GÓMEZ CVT SD, 5/5/19 AT 1551 BY [facility]     CALLED TO: BRANDO DIAZ, ICU, 5/5/19 AT 2230 BY [facility]     Unit number [id number]     [facility]     [facility]     Status of unit TRANSFUSED     Crossmatch result LEAST INCOMPATIBLE     ANTIGEN/ANTIBODY INFO       C NEGATIVE,  RHIANNA NEGATIVE,  FY(A) NEGATIVE,  Jk(B) NEGATIVE,  S NEGATIVE,      Unit number [id number]     [facility]     [facility] 00     Status of unit TRANSFUSED     ANTIGEN/ANTIBODY INFO       C NEGATIVE,  RHIANNA NEGATIVE,  Jk(B) NEGATIVE,  FY(A) NEGATIVE,  S NEGATIVE,      Crossmatch result LEAST INCOMPATIBLE     Unit number [id number]     Blood component type  Yifan     Unit division 00     Status of unit TRANSFUSED     Crossmatch result LEAST INCOMPATIBLE     ANTIGEN/ANTIBODY INFO       C NEGATIVE,  E NEGATIVE,  RHIANNA NEGATIVE,  FY(A) NEGATIVE,  FY(B) NEGATIVE,  Jk(B) NEGATIVE,  S NEGATIVE,  V NEGATIVE,     CBC WITH AUTOMATED DIFF    Collection Time: 05/04/19 10:45 AM   Result Value Ref Range    WBC 48.8 (H) 4.6 - 13.2 K/uL    RBC 1.94 (L) 4.20 - 5.30 M/uL    HGB 5.5 (LL) 12.0 - 16.0 g/dL    HCT 15.3 (LL) 35.0 - 45.0 %    MCV 79.4 74.0 - 97.0 FL    MCH 28.9 24.0 - 34.0 PG    MCHC 36.4 31.0 - 37.0 g/dL    RDW 16.1 (H) 11.6 - 14.5 %    PLATELET 40 (L) 821 - 420 K/uL    NEUTROPHILS 36 (L) 42 - 75 %    BAND NEUTROPHILS 37 (H) 0 - 5 %    LYMPHOCYTES 7 (L) 20 - 51 %    MONOCYTES 7 2 - 9 %    EOSINOPHILS 5 0 - 5 %    BASOPHILS 0 0 - 3 %    METAMYELOCYTES 8 (H) 0 %    NRBC 14.0 (H) 0  WBC    ABS. NEUTROPHILS 35.6 (H) 1.8 - 8.0 K/UL    ABS. LYMPHOCYTES 3.4 0.8 - 3.5 K/UL    ABS. MONOCYTES 3.4 (H) 0 - 1.0 K/UL    ABS. EOSINOPHILS 2.4 (H) 0.0 - 0.4 K/UL    ABS.  BASOPHILS 0.0 0.0 - 0.06 K/UL    DF MANUAL      PLATELET COMMENTS DECREASED PLATELETS      RBC COMMENTS ANISOCYTOSIS  1+        RBC COMMENTS POLYCHROMASIA  1+        RBC COMMENTS HYPOCHROMIA  1+        RBC COMMENTS SCHISTOCYTES  1+        RBC COMMENTS TARGET CELLS  1+        WBC COMMENTS TOXIC GRANULATION     LACTIC ACID    Collection Time: 05/04/19  1:33 PM   Result Value Ref Range    Lactic acid 5.3 (HH) 0.4 - 2.0 MMOL/L   AMMONIA    Collection Time: 05/04/19  1:33 PM   Result Value Ref Range    Ammonia 47 (H) 11 - 32 UMOL/L   LACTIC ACID    Collection Time: 05/04/19  6:29 PM   Result Value Ref Range    Lactic acid 2.7 (HH) 0.4 - 2.0 MMOL/L   CULTURE, BLOOD    Collection Time: 05/04/19  6:29 PM   Result Value Ref Range    Special Requests: NO SPECIAL REQUESTS      Culture result: NO GROWTH 3 DAYS     VANCOMYCIN, RANDOM    Collection Time: 05/04/19  6:29 PM   Result Value Ref Range    Vancomycin, random 22.7 5.0 - 87.8 UG/ML   METABOLIC PANEL, COMPREHENSIVE    Collection Time: 05/05/19  4:30 AM   Result Value Ref Range    Sodium 133 (L) 136 - 145 mmol/L    Potassium 5.0 3.5 - 5.5 mmol/L    Chloride 107 100 - 108 mmol/L    CO2 10 (L) 21 - 32 mmol/L    Anion gap 16 3.0 - 18 mmol/L    Glucose 40 (LL) 74 - 99 mg/dL    BUN 61 (H) 7.0 - 18 MG/DL    Creatinine 5.57 (H) 0.6 - 1.3 MG/DL    BUN/Creatinine ratio 11 (L) 12 - 20      GFR est AA 10 (L) >60 ml/min/1.73m2    GFR est non-AA 8 (L) >60 ml/min/1.73m2    Calcium 6.9 (L) 8.5 - 10.1 MG/DL    Bilirubin, total 4.2 (H) 0.2 - 1.0 MG/DL    ALT (SGPT) 40 13 - 56 U/L    AST (SGOT) 137 (H) 15 - 37 U/L    Alk. phosphatase 209 (H) 45 - 117 U/L    Protein, total 5.3 (L) 6.4 - 8.2 g/dL    Albumin 2.1 (L) 3.4 - 5.0 g/dL    Globulin 3.2 2.0 - 4.0 g/dL    A-G Ratio 0.7 (L) 0.8 - 1.7     AMMONIA    Collection Time: 05/05/19  4:30 AM   Result Value Ref Range    Ammonia 100 (H) 11 - 32 UMOL/L   GLUCOSE, POC    Collection Time: 05/05/19  6:05 AM   Result Value Ref Range    Glucose (POC) 75 70 - 110 mg/dL   CBC WITH AUTOMATED DIFF    Collection Time: 05/05/19  6:30 AM   Result Value Ref Range    WBC 47.3 (H) 4.6 - 13.2 K/uL    RBC 1.28 (L) 4.20 - 5.30 M/uL    HGB 3.6 (LL) 12.0 - 16.0 g/dL    HCT 9.6 (LL) 35.0 - 45.0 %    MCV 78.1 74.0 - 97.0 FL    MCH 28.9 24.0 - 34.0 PG    MCHC 37.0 31.0 - 37.0 g/dL    RDW 16.4 (H) 11.6 - 14.5 %    PLATELET 50 (L) 083 - 420 K/uL    NEUTROPHILS 34 (L) 42 - 75 %    BAND NEUTROPHILS 56 (H) 0 - 5 %    LYMPHOCYTES 5 (L) 20 - 51 %    MONOCYTES 0 (L) 2 - 9 %    EOSINOPHILS 3 0 - 5 %    BASOPHILS 0 0 - 3 %    MYELOCYTES 1 (H) 0 %    PROMYELOCYTES 1 (H) 0 %    ABS. NEUTROPHILS 16.1 (H) 1.8 - 8.0 K/UL    ABS. LYMPHOCYTES 2.4 0.8 - 3.5 K/UL    ABS. MONOCYTES 0.0 0 - 1.0 K/UL    ABS. EOSINOPHILS 1.4 (H) 0.0 - 0.4 K/UL    ABS.  BASOPHILS 0.0 0.0 - 0.1 K/UL    PLATELET COMMENTS LARGE PLATELETS      RBC COMMENTS ANISOCYTOSIS  1+        RBC COMMENTS POIKILOCYTOSIS  1+        RBC COMMENTS RBC FRAGMENTS  TARGET CELLS  1+        RBC COMMENTS HYPOCHROMIA  1+        DF MANUAL     LACTIC ACID    Collection Time: 05/05/19  6:30 AM   Result Value Ref Range    Lactic acid 2.2 (HH) 0.4 - 2.0 MMOL/L   GLUCOSE, POC    Collection Time: 05/05/19  6:52 AM   Result Value Ref Range    Glucose (POC) 134 (H) 70 - 110 mg/dL   CBC WITH AUTOMATED DIFF    Collection Time: 05/05/19 10:46 AM   Result Value Ref Range    WBC 58.5 (HH) 4.6 - 13.2 K/uL    RBC 1.36 (L) 4.20 - 5.30 M/uL    HGB 3.9 (LL) 12.0 - 16.0 g/dL    HCT 10.3 (LL) 35.0 - 45.0 %    MCV 75.7 74.0 - 97.0 FL    MCH 28.7 24.0 - 34.0 PG    MCHC 37.9 (H) 31.0 - 37.0 g/dL    RDW 16.4 (H) 11.6 - 14.5 %    PLATELET 74 (L) 377 - 420 K/uL    MPV 11.1 9.2 - 11.8 FL    NEUTROPHILS 48 42 - 75 %    BAND NEUTROPHILS 38 (H) 0 - 5 %    LYMPHOCYTES 6 (L) 20 - 51 %    MONOCYTES 1 (L) 2 - 9 %    EOSINOPHILS 5 0 - 5 %    BASOPHILS 0 0 - 3 %    MYELOCYTES 2 (H) 0 %    ABS. NEUTROPHILS 28.1 (H) 1.8 - 8.0 K/UL    ABS. LYMPHOCYTES 3.5 0.8 - 3.5 K/UL    ABS. MONOCYTES 0.6 0 - 1.0 K/UL    ABS. EOSINOPHILS 2.9 (H) 0.0 - 0.4 K/UL    ABS.  BASOPHILS 0.0 0.0 - 0.1 K/UL    PLATELET COMMENTS DECREASED PLATELETS      RBC COMMENTS ANISOCYTOSIS  1+        RBC COMMENTS POIKILOCYTOSIS  1+        RBC COMMENTS RBC FRAGMENTS  TARGET CELLS  1+        RBC COMMENTS HYPOCHROMIA  1+        DF MANUAL     METABOLIC PANEL, BASIC    Collection Time: 05/05/19 10:46 AM   Result Value Ref Range    Sodium 138 136 - 145 mmol/L    Potassium 4.2 3.5 - 5.5 mmol/L    Chloride 107 100 - 108 mmol/L    CO2 16 (L) 21 - 32 mmol/L    Anion gap 15 3.0 - 18 mmol/L    Glucose 73 (L) 74 - 99 mg/dL    BUN 64 (H) 7.0 - 18 MG/DL    Creatinine 5.87 (H) 0.6 - 1.3 MG/DL    BUN/Creatinine ratio 11 (L) 12 - 20      GFR est AA 9 (L) >60 ml/min/1.73m2    GFR est non-AA 8 (L) >60 ml/min/1.73m2    Calcium 7.1 (L) 8.5 - 10.1 MG/DL   LACTIC ACID    Collection Time: 05/05/19 10:46 AM   Result Value Ref Range    Lactic acid 2.2 (HH) 0.4 - 2.0 MMOL/L   PH, VENOUS    Collection Time: 05/05/19 10:46 AM   Result Value Ref Range    VENOUS PH 7.20 (L) 7.32 - 7.42     PROTHROMBIN TIME + INR    Collection Time: 05/05/19 10:46 AM   Result Value Ref Range    Prothrombin time 18.8 (H) 11.5 - 15.2 sec    INR 1.6 (H) 0.8 - 1.2     HEPATITIS PANEL, ACUTE    Collection Time: 05/05/19 10:46 AM   Result Value Ref Range    Hepatitis A, IgM NEGATIVE  NEG      __          Hepatitis B surface Ag <0.10 <1.00 Index    Hep B surface Ag Interp. NEGATIVE  NEG      __          Hepatitis B core, IgM NEGATIVE  NEG      __          Hepatitis C virus Ab 0.05 <0.80 Index    Hep C  virus Ab Interp. NEGATIVE  NEG      Hep C  virus Ab comment         MAGNESIUM    Collection Time: 05/05/19 10:46 AM   Result Value Ref Range    Magnesium 1.9 1.6 - 2.6 mg/dL   PHOSPHORUS    Collection Time: 05/05/19 10:46 AM   Result Value Ref Range    Phosphorus 3.9 2.5 - 4.9 MG/DL   VANCOMYCIN, RANDOM    Collection Time: 05/05/19  4:55 PM   Result Value Ref Range    Vancomycin, random 21.0 5.0 - 40.0 UG/ML   LACTIC ACID    Collection Time: 05/05/19  8:04 PM   Result Value Ref Range    Lactic acid 1.0 0.4 - 2.0 MMOL/L   GLUCOSE, POC    Collection Time: 05/05/19 11:20 PM   Result Value Ref Range    Glucose (POC) 134 (H) 70 - 611 mg/dL   METABOLIC PANEL, COMPREHENSIVE    Collection Time: 05/06/19  4:56 AM   Result Value Ref Range    Sodium 139 136 - 145 mmol/L    Potassium 4.7 3.5 - 5.5 mmol/L    Chloride 108 100 - 108 mmol/L    CO2 18 (L) 21 - 32 mmol/L    Anion gap 13 3.0 - 18 mmol/L    Glucose 123 (H) 74 - 99 mg/dL    BUN 72 (H) 7.0 - 18 MG/DL    Creatinine 6.35 (H) 0.6 - 1.3 MG/DL    BUN/Creatinine ratio 11 (L) 12 - 20      GFR est AA 8 (L) >60 ml/min/1.73m2    GFR est non-AA 7 (L) >60 ml/min/1.73m2    Calcium 7.2 (L) 8.5 - 10.1 MG/DL    Bilirubin, total 4.4 (H) 0.2 - 1.0 MG/DL    ALT (SGPT) 35 13 - 56 U/L    AST (SGOT) 127 (H) 15 - 37 U/L    Alk. phosphatase 353 (H) 45 - 117 U/L    Protein, total 5.6 (L) 6.4 - 8.2 g/dL    Albumin 3.4 3.4 - 5.0 g/dL    Globulin 2.2 2.0 - 4.0 g/dL    A-G Ratio 1.5 0.8 - 1.7     CBC WITH AUTOMATED DIFF    Collection Time: 05/06/19  4:56 AM   Result Value Ref Range    WBC 41.3 (H) 4.6 - 13.2 K/uL    RBC 2.05 (L) 4.20 - 5.30 M/uL    HGB 5.9 (LL) 12.0 - 16.0 g/dL    HCT 16.5 (LL) 35.0 - 45.0 %    MCV 80.5 74.0 - 97.0 FL    MCH 28.8 24.0 - 34.0 PG    MCHC 35.8 31.0 - 37.0 g/dL    RDW 18.1 (H) 11.6 - 14.5 %    PLATELET 87 (L) 930 - 420 K/uL    MPV 10.5 9.2 - 11.8 FL    NEUTROPHILS 79 (H) 42 - 75 %    BAND NEUTROPHILS 15 (H) 0 - 5 %    LYMPHOCYTES 5 (L) 20 - 51 %    MONOCYTES 1 (L) 2 - 9 %    EOSINOPHILS 0 0 - 5 %    BASOPHILS 0 0 - 3 %    NRBC 5.0 (H) 0  WBC    ABS. NEUTROPHILS 38.8 (H) 1.8 - 8.0 K/UL    ABS. LYMPHOCYTES 2.1 0.8 - 3.5 K/UL    ABS. MONOCYTES 0.4 0 - 1.0 K/UL    ABS. EOSINOPHILS 0.0 0.0 - 0.4 K/UL    ABS.  BASOPHILS 0.0 0.0 - 0.06 K/UL    DF AUTOMATED      PLATELET COMMENTS Platelet Estimate, Decreased      RBC COMMENTS ANISOCYTOSIS  1+       AMMONIA    Collection Time: 05/06/19  4:56 AM   Result Value Ref Range    Ammonia 33 (H) 11 - 32 UMOL/L   MAGNESIUM    Collection Time: 05/06/19  4:56 AM   Result Value Ref Range    Magnesium 2.3 1.6 - 2.6 mg/dL   PHOSPHORUS    Collection Time: 05/06/19  4:56 AM   Result Value Ref Range    Phosphorus 4.3 2.5 - 4.9 MG/DL   CALCIUM, IONIZED    Collection Time: 05/06/19  4:56 AM   Result Value Ref Range    Ionized Calcium 0.94 (L) 1.12 - 1.32 MMOL/L   GLUCOSE, POC    Collection Time: 05/06/19  6:13 AM   Result Value Ref Range    Glucose (POC) 136 (H) 70 - 110 mg/dL   VANCOMYCIN, RANDOM    Collection Time: 05/06/19  9:25 AM   Result Value Ref Range    Vancomycin, random 19.9 5.0 - 40.0 UG/ML   GLUCOSE, POC    Collection Time: 05/06/19 11:38 AM   Result Value Ref Range    Glucose (POC) 171 (H) 70 - 110 mg/dL   CALCIUM, IONIZED    Collection Time: 05/06/19  1:00 PM   Result Value Ref Range    Ionized Calcium 1.01 (L) 1.12 - 2.35 MMOL/L   METABOLIC PANEL, BASIC    Collection Time: 05/06/19  1:00 PM   Result Value Ref Range    Sodium 139 136 - 145 mmol/L    Potassium 4.1 3.5 - 5.5 mmol/L    Chloride 108 100 - 108 mmol/L    CO2 18 (L) 21 - 32 mmol/L    Anion gap 13 3.0 - 18 mmol/L    Glucose 154 (H) 74 - 99 mg/dL    BUN 75 (H) 7.0 - 18 MG/DL    Creatinine 6.68 (H) 0.6 - 1.3 MG/DL    BUN/Creatinine ratio 11 (L) 12 - 20      GFR est AA 8 (L) >60 ml/min/1.73m2    GFR est non-AA 6 (L) >60 ml/min/1.73m2    Calcium 7.7 (L) 8.5 - 10.1 MG/DL   GLUCOSE, POC    Collection Time: 05/06/19  8:25 PM   Result Value Ref Range    Glucose (POC) 166 (H) 70 - 110 mg/dL   GLUCOSE, POC    Collection Time: 05/07/19  7:26 AM   Result Value Ref Range    Glucose (POC) 146 (H) 70 - 110 mg/dL   CBC WITH AUTOMATED DIFF    Collection Time: 05/07/19  9:40 AM   Result Value Ref Range    WBC 56.9 (HH) 4.6 - 13.2 K/uL    RBC 2.61 (L) 4.20 - 5.30 M/uL    HGB 7.6 (L) 12.0 - 16.0 g/dL    HCT 21.1 (L) 35.0 - 45.0 %    MCV 80.8 74.0 - 97.0 FL    MCH 29.1 24.0 - 34.0 PG    MCHC 36.0 31.0 - 37.0 g/dL    RDW 17.6 (H) 11.6 - 14.5 %    PLATELET 344 (L) 799 - 420 K/uL    MPV 11.1 9.2 - 11.8 FL    NEUTROPHILS 77 (H) 42 - 75 %    BAND NEUTROPHILS 9 (H) 0 - 5 %    LYMPHOCYTES 7 (L) 20 - 51 %    MONOCYTES 4 2 - 9 %    EOSINOPHILS 0 0 - 5 %    BASOPHILS 0 0 - 3 %    METAMYELOCYTES 2 (H) 0 %    MYELOCYTES 1 (H) 0 %    NRBC 7.0 (H) 0  WBC    ABS. NEUTROPHILS 48.9 (H) 1.8 - 8.0 K/UL    ABS. LYMPHOCYTES 4.0 (H) 0.8 - 3.5 K/UL    ABS. MONOCYTES 2.3 (H) 0 - 1.0 K/UL    ABS. EOSINOPHILS 0.0 0.0 - 0.4 K/UL    ABS.  BASOPHILS 0.0 0.0 - 0.06 K/UL    DF MANUAL      PLATELET COMMENTS DECREASED PLATELETS      RBC COMMENTS ANISOCYTOSIS  1+        RBC COMMENTS POIKILOCYTOSIS  2+        RBC COMMENTS OVALOCYTES  1+        RBC COMMENTS SCHISTOCYTES  1+       METABOLIC PANEL, COMPREHENSIVE    Collection Time: 05/07/19  9:40 AM   Result Value Ref Range Sodium 140 136 - 145 mmol/L    Potassium 3.5 3.5 - 5.5 mmol/L    Chloride 107 100 - 108 mmol/L    CO2 21 21 - 32 mmol/L    Anion gap 12 3.0 - 18 mmol/L    Glucose 130 (H) 74 - 99 mg/dL    BUN 88 (H) 7.0 - 18 MG/DL    Creatinine 7.31 (H) 0.6 - 1.3 MG/DL    BUN/Creatinine ratio 12 12 - 20      GFR est AA 7 (L) >60 ml/min/1.73m2    GFR est non-AA 6 (L) >60 ml/min/1.73m2    Calcium 8.4 (L) 8.5 - 10.1 MG/DL    Bilirubin, total 2.3 (H) 0.2 - 1.0 MG/DL    ALT (SGPT) 35 13 - 56 U/L    AST (SGOT) 75 (H) 15 - 37 U/L    Alk.  phosphatase 268 (H) 45 - 117 U/L    Protein, total 6.4 6.4 - 8.2 g/dL    Albumin 3.4 3.4 - 5.0 g/dL    Globulin 3.0 2.0 - 4.0 g/dL    A-G Ratio 1.1 0.8 - 1.7     PHOSPHORUS    Collection Time: 19  9:40 AM   Result Value Ref Range    Phosphorus 3.7 2.5 - 4.9 MG/DL   MAGNESIUM    Collection Time: 19  9:40 AM   Result Value Ref Range    Magnesium 2.5 1.6 - 2.6 mg/dL   GLUCOSE, POC    Collection Time: 19 11:23 AM   Result Value Ref Range    Glucose (POC) 152 (H) 70 - 110 mg/dL   CALCIUM, IONIZED    Collection Time: 19 11:49 AM   Result Value Ref Range    Ionized Calcium 1.15 1.12 - 1.32 MMOL/L       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    Fatigue     Fibromyalgia     GERD (gastroesophageal reflux disease)     Hx of endometriosis     Hypertension 2011    Ill-defined condition 2018    Sickle cell crisis    Osteoarthritis of left hip 2016    Osteoarthritis of right hip 1/3/2017    Osteoarthritis of right knee 2018    [facility]     Sleep apnea     Does not use CPAP    Thyroid disease     hypo       Past Surgical History:   Procedure Laterality Date    HX BREAST BIOPSY Left 2016    HX  SECTION      HX HERNIA REPAIR      HX LAP CHOLECYSTECTOMY      HX MASTECTOMY Left 2012    with axillary lymph node dissection    TOTAL HIP ARTHROPLASTY Bilateral 2016 & 2017       Social History     Socioeconomic History    Marital status:      Spouse name: Not on file    Number of children: Not on file    Years of education: Not on file    Highest education level: Not on file   Occupational History    Not on file   Social Needs    Financial resource strain: Not on file    Food insecurity:     Worry: Not on file     Inability: Not on file    Transportation needs:     Medical: Not on file     Non-medical: Not on file   Tobacco Use    Smoking status: Former Smoker     Packs/day: 0.25     Years: 30.00     Pack years: 7.50     Last attempt to quit: 2011     Years since quittin.3    Smokeless tobacco: Former User   Substance and Sexual Activity    Alcohol use: Yes     Comment: 2 times yearly    Drug use: No    Sexual activity: Not Currently   Lifestyle    Physical activity:     Days per week: Not on file     Minutes per session: Not on file    Stress: Not on file   Relationships    Social connections:     Talks on phone: Not on file     Gets together: Not on file     Attends [employer] service: Not on file     Active member of club or organization: Not on file     Attends meetings of clubs or organizations: Not on file     Relationship status: Not on file    Intimate partner violence:     Fear of current or ex partner: Not on file     Emotionally abused: Not on file     Physically abused: Not on file     Forced sexual activity: Not on file   Other Topics Concern    Not on file   Social History Narrative    Not on file       Family History   Problem Relation Age of Onset    Cancer Mother         breast    Diabetes Mother     Heart Disease Father     Diabetes Father     Sickle Cell Anemia Brother        Allergies   Allergen Reactions    Neulasta [Pegfilgrastim] Other (comments)     \"Put me in a comma for 3 months\"           Bacilio Patrick MD

## 2019-05-07 NOTE — PROGRESS NOTES
NUTRITION    Nursing Referral: [facility]     RECOMMENDATIONS / PLAN:     - Liberalize to regular diet and add food preferences to promote adequate meal intake. - Continue RD inpatient monitoring and evaluation. NUTRITION INTERVENTIONS & DIAGNOSIS:     [x] Meals/snacks: modify composition, liberalize diet and encourage intake   [x] Medical food supplement therapy: Magic Cup TID  [x] Collaboration and referral of nutrition care: interdisciplinary rounds, referred by nursing for very poor meal intake- pt not eating; discussed changing from renal to regular diet with Dr Lara Giordano MD agreeable to liberalizing diet     Nutrition Diagnosis: Inadequate oral intake related to decreased appetite, lethargy as evidenced by pt consuming 50% or less of recent meals and poor meal intake x week PTA. ASSESSMENT:     5/7: Mentation improved today, pt more alert this afternoon but reports very poor appetite, not eating recent meals and disliked lunch option on renal restricted diet today. Discussed food preferences and encouraged po intake with pt and spouse, reinforced importance of nutrition for recovery and food's impact on renal function- pt concerned about worsening renal function. 5/6: Pt reports decreased appetite with poor meal intake and altered mentation, lethargy- did not eat breakfast this morning. Average po intake adequate to meet patients estimated nutritional needs:   [] Yes     [x] No   [] Unable to determine at this time    Diet: DIET NUTRITIONAL SUPPLEMENTS Breakfast, Lunch, Dinner; [employer] CUPS  DIET REGULAR      Food Allergies: NKFA  Current Appetite:   [] Good     [] Fair     [x] Poor     [] Other:  Appetite/meal intake prior to admission:   [] Good     [] Fair     [x] Poor     [x] Other: not eating x 6 days PTA, fair meal intake prior to that per pt  Feeding Limitations:  [] Swallowing difficulty    [] Chewing difficulty    [] Other:   Current Meal Intake: No data found.     BM: 5/6  Skin Integrity: chest stage I pressure injury  Edema:   [] No     [x] Yes   Pertinent Medications: Reviewed: pepcid, lactulose, zofran, folic acid, marinol    Recent Labs     05/07/19  0940 05/06/19  1300 05/06/19  0456 05/05/19  1046 05/05/19  0430    139 139 138 133*   K 3.5 4.1 4.7 4.2 5.0    108 108 107 107   CO2 21 18* 18* 16* 10*   * 154* 123* 73* 40*   BUN 88* 75* 72* 64* 61*   CREA 7.31* 6.68* 6.35* 5.87* 5.57*   CA 8.4* 7.7* 7.2* 7.1* 6.9*   MG 2.5  --  2.3 1.9  --    PHOS 3.7  --  4.3 3.9  --    ALB 3.4  --  3.4  --  2.1*   SGOT 75*  --  127*  --  137*   ALT 35  --  35  --  40       Intake/Output Summary (Last 24 hours) at 5/7/2019 1421  Last data filed at 5/7/2019 0700  Gross per 24 hour   Intake 370 ml   Output 775 ml   Net -405 ml       Anthropometrics:  Ht Readings from Last 1 Encounters:   05/07/19 5' 5.98\" (1.676 m)     Last 3 Recorded Weights in this Encounter    05/04/19 0400 05/05/19 0656 05/06/19 0600   Weight: 98.7 kg (217 lb 9.6 oz) 97.7 kg (215 lb 4.8 oz) 103.7 kg (228 lb 9.9 oz)     Body mass index is 36.92 kg/m².   Obese, Class II (weight gain noted, likely related to edema)    Weight History: patient reports usual weight of 218 lb and unable to report change in weight PTA- could not remember weight hx      Weight Metrics 5/6/2019 4/10/2019 1/14/2019 11/2/2018 10/21/2018 7/19/2018 6/27/2018   Weight 228 lb 9.9 oz 223 lb 4.8 oz 221 lb 220 lb 220 lb 3.2 oz 224 lb 220 lb   BMI 36.92 kg/m2 36.04 kg/m2 35.14 kg/m2 34.98 kg/m2 35.39 kg/m2 35.61 kg/m2 34.98 kg/m2        Admitting Diagnosis: ARF (acute renal failure) (HCC) [N17.9]  Leukocytosis [D72.829]  Bandemia [D72.825]  Thrombocytopenia (HCC) [D69.6]  Chest pain [R07.9]  Abdominal pain [R10.9]  Anemia [D64.9]  Pertinent PMHx: sickle cell disease, HTN, hypothyroid, breast cancer, fibromyalgia, GERD     Education Needs:        [x] None identified  [] Identified - Not appropriate at this time  []  Identified and addressed - refer to education log  Learning Limitations:   [x] None identified  [] Identified:   Cultural, Anglican & ethnic food preferences:  [x] None identified    [] Identified and addressed     ESTIMATED NUTRITION NEEDS:     Calories: [address] kcal (20-30 kcal/kg) based on  [] Actual BW      [x] SBW 75 kg   Protein: 60-90 gm (0.8-1.2 gm/kg) based on  [] Actual BW      [x] SBW   Fluid: 1 mL/kcal     MONITORING & EVALUATION:     Nutrition Goal(s):   1. Po intake of meals will meet >75% of patient estimated nutritional needs within the next 7 days.   Outcome:  [] Met/Ongoing    [x] Progressing towards goal    [] Not progressing towards goal    [] New/Initial goal     Monitoring:   [x] Food and beverage intake   [x] Diet order   [x] Nutrition-focused physical findings   [x] Treatment/therapy   [] Weight   [] Enteral nutrition intake        Previous Recommendations (for follow-up assessments only):     [x]   Implemented       []   Not Implemented (RD to address)      [] No Longer Appropriate     [] No Recommendation Made     Discharge Planning: cardiac, renal diet as tolerated (regular diet until adequacy of meal intake improved)   [x] Participated in care planning, discharge planning, & interdisciplinary rounds as appropriate      Marleny Giraldo, [address] [place] [address]    Pager: [phone]

## 2019-05-08 LAB
AMMONIA PLAS-SCNC: 35 UMOL/L (ref 11–32)
ANION GAP SERPL CALC-SCNC: 11 MMOL/L (ref 3–18)
BASOPHILS # BLD: 0 K/UL (ref 0–0.06)
BASOPHILS NFR BLD: 0 % (ref 0–3)
BUN SERPL-MCNC: 94 MG/DL (ref 7–18)
BUN/CREAT SERPL: 13 (ref 12–20)
CA-I SERPL-SCNC: 1.19 MMOL/L (ref 1.12–1.32)
CALCIUM SERPL-MCNC: 8.8 MG/DL (ref 8.5–10.1)
CHLORIDE SERPL-SCNC: 110 MMOL/L (ref 100–108)
CO2 SERPL-SCNC: 20 MMOL/L (ref 21–32)
CREAT SERPL-MCNC: 7.48 MG/DL (ref 0.6–1.3)
DIFFERENTIAL METHOD BLD: ABNORMAL
EOSINOPHIL # BLD: 0 K/UL (ref 0–0.4)
EOSINOPHIL NFR BLD: 0 % (ref 0–5)
ERYTHROCYTE [DISTWIDTH] IN BLOOD BY AUTOMATED COUNT: 16.9 % (ref 11.6–14.5)
FERRITIN SERPL-MCNC: 3678 NG/ML (ref 8–388)
FIBRINOGEN PPP-MCNC: 255 MG/DL (ref 210–451)
GLUCOSE SERPL-MCNC: 177 MG/DL (ref 74–99)
HCT VFR BLD AUTO: 19.8 % (ref 35–45)
HGB BLD-MCNC: 7.2 G/DL (ref 12–16)
LYMPHOCYTES # BLD: 3.6 K/UL (ref 0.8–3.5)
LYMPHOCYTES NFR BLD: 8 % (ref 20–51)
MCH RBC QN AUTO: 29.1 PG (ref 24–34)
MCHC RBC AUTO-ENTMCNC: 36.4 G/DL (ref 31–37)
MCV RBC AUTO: 80.2 FL (ref 74–97)
MONOCYTES # BLD: 0.9 K/UL (ref 0–1)
MONOCYTES NFR BLD: 2 % (ref 2–9)
MYELOCYTES NFR BLD MANUAL: 2 %
NEUTS BAND NFR BLD MANUAL: 5 % (ref 0–5)
NEUTS SEG # BLD: 39.2 K/UL (ref 1.8–8)
NEUTS SEG NFR BLD: 83 % (ref 42–75)
NRBC BLD-RTO: 4 PER 100 WBC
PERIPHERAL SMEAR,PSM: NORMAL
PLATELET # BLD AUTO: 111 K/UL (ref 135–420)
PLATELET COMMENTS,PCOM: ABNORMAL
PMV BLD AUTO: 11.4 FL (ref 9.2–11.8)
POTASSIUM SERPL-SCNC: 3.7 MMOL/L (ref 3.5–5.5)
RBC # BLD AUTO: 2.47 M/UL (ref 4.2–5.3)
RBC MORPH BLD: ABNORMAL
RBC MORPH BLD: ABNORMAL
SODIUM SERPL-SCNC: 141 MMOL/L (ref 136–145)
WBC # BLD AUTO: 44.5 K/UL (ref 4.6–13.2)

## 2019-05-08 PROCEDURE — 82330 ASSAY OF CALCIUM: CPT

## 2019-05-08 PROCEDURE — 74011250637 HC RX REV CODE- 250/637: Performed by: HOSPITALIST

## 2019-05-08 PROCEDURE — 85384 FIBRINOGEN ACTIVITY: CPT

## 2019-05-08 PROCEDURE — 82728 ASSAY OF FERRITIN: CPT

## 2019-05-08 PROCEDURE — 80048 BASIC METABOLIC PNL TOTAL CA: CPT

## 2019-05-08 PROCEDURE — 74011250637 HC RX REV CODE- 250/637: Performed by: INTERNAL MEDICINE

## 2019-05-08 PROCEDURE — 65660000000 HC RM CCU STEPDOWN

## 2019-05-08 PROCEDURE — 74011250637 HC RX REV CODE- 250/637: Performed by: PHYSICIAN ASSISTANT

## 2019-05-08 PROCEDURE — 85025 COMPLETE CBC W/AUTO DIFF WBC: CPT

## 2019-05-08 PROCEDURE — 82140 ASSAY OF AMMONIA: CPT

## 2019-05-08 PROCEDURE — 36591 DRAW BLOOD OFF VENOUS DEVICE: CPT

## 2019-05-08 RX ADMIN — AMLODIPINE BESYLATE 5 MG: 5 TABLET ORAL at 08:58

## 2019-05-08 RX ADMIN — FAMOTIDINE 20 MG: 20 TABLET ORAL at 09:10

## 2019-05-08 RX ADMIN — SODIUM BICARBONATE 650 MG TABLET 650 MG: at 22:40

## 2019-05-08 RX ADMIN — SODIUM BICARBONATE 650 MG TABLET 650 MG: at 08:58

## 2019-05-08 RX ADMIN — LACTULOSE 30 ML: 20 SOLUTION ORAL at 17:54

## 2019-05-08 RX ADMIN — FOLIC ACID 1 MG: 1 TABLET ORAL at 09:10

## 2019-05-08 RX ADMIN — SODIUM BICARBONATE 650 MG TABLET 650 MG: at 17:54

## 2019-05-08 RX ADMIN — LACTULOSE 30 ML: 20 SOLUTION ORAL at 22:40

## 2019-05-08 RX ADMIN — LACTULOSE 30 ML: 20 SOLUTION ORAL at 08:57

## 2019-05-08 RX ADMIN — DRONABINOL 5 MG: 5 CAPSULE ORAL at 08:59

## 2019-05-08 RX ADMIN — LEVOTHYROXINE, LIOTHYRONINE 30 MG: 19; 4.5 TABLET ORAL at 08:58

## 2019-05-08 RX ADMIN — DRONABINOL 5 MG: 5 CAPSULE ORAL at 22:40

## 2019-05-08 NOTE — PROGRESS NOTES
Bedside shift change report given to Negra Vicente RN (oncoming nurse) by Jeffery Zamudio RN (offgoing nurse). Report included the following information SBAR, Kardex, Intake/Output, MAR, Accordion, Recent Results, Med Rec Status, Cardiac Rhythm NSR and Alarm Parameters .

## 2019-05-08 NOTE — PROGRESS NOTES
In Patient Progress note    Admit Date: 5/3/2019     Impression:      #1 oliguric --> nonoliguric ,Acute kidney injury on chronic kidney disease stage III(baseline creatinine 1.3) from Ischaemic ATN in setting of Hypotension/dehydration/severe anemia ,  h/o NSAID intake ( 800 mg ibuprofen ) may have contributed in this setting with ATN/AIN , does have mild peripheral eosinophilia which goes along with AIN , UA with granular casts indicate ATN. Papillary necrosis d/t NSAID and sickle crisis  is in differential but no significant hematuria seen on UA on presentation  Hem/Onc was considering TTP as possibility but with improving Plt , Hb stable, improved mental status, nonoliguric renal failure , unlikely  Volume status has improved quiet well , diuresed well with Lasix 80 mg last night   More lucid , says her appetite is back , more active today. Bull in place , CT abdomen with no hydro, does show renal cyst   #2 chronic kidney disease stage III, patient does have minimal proteinuria, suspect due to hypertension nephrosclerosis   versus FSGS secondary to sickle cell disease  #3 Ac non gap  metabolic acidosis,improved ,increase PO bicarb   #6 Sickle cell disease ,acute crisis, pain management per primary team/Hem/onc  #7 Anemia/thrombocytopenia:hem/onc following , s/p PRBC Tx, stable   #8 leukocytosis/Sirs: work-up per primary, likely leukemoid reaction   #9 splenomegaly d/t sickle disease , discussed with Hematology   #10 AMS: improved , judicious pain management   #11 transaminitis/hyperammonemia, --> better , does have secondary hemochromatoses   Hem/onc following   #12 HTN : avoid too tight control , ~ systolic [id number]  , on amlodipine          Now nonoliguric , clinically looking better interm of mental status /volume status , electrolytes   Acid base looks good , all encouraging factors. No indication for RRT.  IV lasix as needed based on   Her volume status, continue to follow closely    I have called and discussed this with patients  , also discussed in detail with patient and   Hematologist Dr Nitaz Caro.      Please call with questions,     Praveena Zamora MD FASN  Cell [phone]  Pager: [phone]      Subjective:     Denies SOB, awake and alerty     Current Facility-Administered Medications:     amLODIPine (NORVASC) tablet 5 mg, 5 mg, Oral, DAILY, VeLily garcia PA-C, 5 mg at 05/07/19 0230    hydrALAZINE (APRESOLINE) 20 mg/mL injection 20 mg, 20 mg, IntraVENous, Q6H PRN, Lily Tan PA-C, 20 mg at 05/07/19 2218    sodium bicarbonate tablet 650 mg, 650 mg, Oral, TID, Harvey Valdez MD, 650 mg at 05/07/19 2218    calcium gluconate 1 g in 0.9% sodium chloride 100 mL IVPB, 1 g, IntraVENous, DIALYSIS PRN, Courtney, Malcom Brunner, MD    calcium carbonate (TUMS) chewable tablet 200 mg [elemental], 200 mg, Oral, DIALYSIS PRN, Courtney, Malcom Brunner, MD    0.9% sodium chloride infusion 250 mL, 250 mL, IntraVENous, PRN, Bichu, Malcom Brunner, MD    lactulose (CHRONULAC) 10 gram/15 mL solution 30 mL, 20 g, Oral, TID, Sam aMrie MD, Stopped at 05/07/19 1600    diphenhydrAMINE (BENADRYL) injection 25 mg, 25 mg, IntraVENous, ON CALL, Cody Del Castillo MD    glucose chewable tablet 16 g, 4 Tab, Oral, PRN, Lily Tan PA-C    glucagon (GLUCAGEN) injection 1 mg, 1 mg, IntraMUSCular, PRN, Lily Lyles PA-C    dextrose (D50W) injection syrg 12.5-25 g, 25-50 mL, IntraVENous, PRN, Lily Lyels PA-C    dronabinol (MARINOL) capsule 5 mg, 5 mg, Oral, BID, Cody Del Castillo MD, 5 mg at 05/07/19 2044    HYDROcodone-acetaminophen (NORCO) 5-325 mg per tablet 1-2 Tab, 1-2 Tab, Oral, Q4H PRN, Sam Marie MD, 1 Tab at 05/04/19 1055    famotidine (PEPCID) tablet 20 mg, 20 mg, Oral, DAILY, Luis Caldwell MD, 20 mg at 05/07/19 0902    levoFLOXacin (LEVAQUIN) 500 mg in D5W IVPB, 500 mg, IntraVENous, Q48H, Luis Caldwell MD, Last Rate: 100 mL/hr at 05/07/19 2309, 500 mg at 05/07/19 2309    diphenhydrAMINE (BENADRYL) capsule 25 mg, 25 mg, Oral, Q6H PRN, Wallace Catalan MD, 25 mg at 05/04/19 1846    sodium chloride (NS) flush 5-10 mL, 5-10 mL, IntraVENous, PRN, José Miguel Burns MD    folic acid (FOLVITE) tablet 1 mg, 1 mg, Oral, DAILY, José Miguel Burns MD, 1 mg at 05/07/19 0902    thyroid (Pork) (ARMOUR) tablet 30 mg, 30 mg, Oral, DAILY, José Miguel Burns MD, 30 mg at 05/07/19 0902    acetaminophen (TYLENOL) tablet 650 mg, 650 mg, Oral, Q4H PRN, Mona Reinoso MD, 650 mg at 05/05/19 1621    ondansetron (ZOFRAN) injection 4 mg, 4 mg, IntraVENous, Q4H PRN, Mona Reinoso MD, 4 mg at 05/03/19 2133        Objective:     Visit Vitals  /82   Pulse (!) 59   Temp 97.5 °F (36.4 °C)   Resp 15   Ht 5' 6\" (1.676 m)   Wt 105.8 kg (233 lb 4 oz)   SpO2 96%   Breastfeeding?  No   BMI 37.65 kg/m²         Intake/Output Summary (Last 24 hours) at 5/8/2019 0904  Last data filed at 5/8/2019 0600  Gross per 24 hour   Intake 620 ml   Output 1690 ml   Net -1070 ml       Physical Exam:     Gen NAD  HENT dry  RS AEBE clear   CVS s1 s2 wnl   GI soft BS +  Ext 1+ LE edema   Access : rt SC dialysis catheter +  Data Review:    Recent Labs     05/08/19  0345   WBC 44.5*   RBC 2.47*   HCT 19.8*   MCV 80.2   MCH 29.1   MCHC 36.4   RDW 16.9*     Recent Labs     05/08/19  0550 05/07/19  0940 05/06/19  1300 05/06/19  0456 05/05/19  1046   BUN 94* 88* 75* 72* 64*   CREA 7.48* 7.31* 6.68* 6.35* 5.87*   CA 8.8 8.4* 7.7* 7.2* 7.1*   ALB  --  3.4  --  3.4  --    K 3.7 3.5 4.1 4.7 4.2    140 139 139 138   * 107 108 108 107   CO2 20* 21 18* 18* 16*   PHOS  --  3.7  --  4.3 3.9   * 130* 154* 123* 73*       Adrián Luciano MD

## 2019-05-08 NOTE — PROGRESS NOTES
Hospitalist Progress Note    Patient: Jessica Plata MRN: [id number]  CSN: [id number]    YOB: 1965  Age: 48 y.o. Sex: female    DOA: 5/3/2019 LOS:  LOS: 4 days            Assessment/Plan    Patient is a 49 yo female admitted on 5/3/19 for abdominal pain. She does have a h/o sickle cell disease. She is being transferred out of ICU. She feels tired, no more abdominal pain. Her CT abdomen was normal.     Active Problems: Thrombocytopenia ([address] (8/22/2016)  As per hematology. Leukocytosis (2/20/2017)  - possible stress reaction   - need to keep an eye on possibility of acute leukemia as patient has a h/o breast cancer, chemo used in it can cause acute leukemia. Anemia (4/7/2019)  - due to sickle cell disease.  -Transfusion PRN      Chest pain (5/3/2019)  - resolved. ARF (acute renal failure) ([facility] (5/3/2019)  - as per Nephrology. Abdominal pain (5/3/2019)  - resolved. Bandemia (5/3/2019)  - on IV Levaquin. Cultures negative. Case discussed with:  [x]Patient  [x]Family  [x]Nursing  []Case Management  DVT Prophylaxis:  []Lovenox  []Hep SQ  []SCDs  []Coumadin   []On Heparin gtt    Physical Exam:  General: WD, WN. Alert, cooperative, no acute distress    HEENT: NC, Atraumatic. PERRLA, anicteric sclerae. Lungs: CTA Bilaterally. No Wheezing/Rhonchi/Rales. Heart:  Regular  rhythm,  No murmur, No Rubs, No Gallops  Abdomen: Soft, Non distended, Non tender.  +Bowel sounds, no HSM  Extremities: No c/c/e  Psych:   Good insight. Not anxious or agitated. Neurologic:  Alert and oriented X 3. No acute neurological deficit. Vital signs/Intake and Output:  Visit Vitals  /59   Pulse 60   Temp 98.4 °F (36.9 °C)   Resp 11   Ht 5' 5.98\" (1.676 m)   Wt 105.8 kg (233 lb 4 oz)   SpO2 96%   Breastfeeding?  No   BMI 37.67 kg/m²     Current Shift:  05/07 1901 - 05/08 0700  In: -   Out: 250 [Urine:150; Drains:100]  Last three shifts:  05/06 0701 - 05/07 1900  In: 490 [P.O.:180]  Out: 1625 [Urine:1325; Drains:300]      Medications Reviewed      Labs: Results:       Chemistry Recent Labs     05/07/19  0940 05/06/19  1300 05/06/19  0456  05/05/19  0430   * 154* 123*   < > 40*    139 139   < > 133*   K 3.5 4.1 4.7   < > 5.0    108 108   < > 107   CO2 21 18* 18*   < > 10*   BUN 88* 75* 72*   < > 61*   CREA 7.31* 6.68* 6.35*   < > 5.57*   CA 8.4* 7.7* 7.2*   < > 6.9*   AGAP 12 13 13   < > 16   BUCR 12 11* 11*   < > 11*   *  --  353*  --  209*   TP 6.4  --  5.6*  --  5.3*   ALB 3.4  --  3.4  --  2.1*   GLOB 3.0  --  2.2  --  3.2   AGRAT 1.1  --  1.5  --  0.7*    < > = values in this interval not displayed. CBC w/Diff Recent Labs     05/07/19  1900 05/07/19  0940 05/06/19  0456   WBC 52.0* 56.9* 41.3*   RBC 2.31* 2.61* 2.05*   HGB 6.8* 7.6* 5.9*   HCT 18.6* 21.1* 16.5*   PLT 89* 106* 87*   GRANS 81* 77* 79*   LYMPH 7* 7* 5*   EOS 0 0 0      Cardiac Enzymes No results for input(s): CPK, CKND1, BRANDI in the last 72 hours. No lab exists for component: CKRMB, TROIP   Coagulation Recent Labs     05/05/19  1046   PTP 18.8*   INR 1.6*       Lipid Panel Lab Results   Component Value Date/Time    Cholesterol, total 197 10/20/2011 02:20 AM    HDL Cholesterol 31 (L) 10/20/2011 02:20 AM    LDL, calculated  10/20/2011 02:20 AM     LDL AND VLDL CHOLESTEROL NOT CALCULATED WHEN TRIGLYCERIDES >400 MG/DL OR HDL CHOLESTEROL <20 MG/DL    VLDL, calculated  10/20/2011 02:20 AM     Calculation not valid with this patient's other Lipid values. Triglyceride 418 (H) 10/20/2011 02:20 AM    CHOL/HDL Ratio 6.4 (H) 10/20/2011 02:20 AM      BNP No results for input(s): BNPP in the last 72 hours.    Liver Enzymes Recent Labs     05/07/19  0940   TP 6.4   ALB 3.4   *   SGOT 75*      Thyroid Studies Lab Results   Component Value Date/Time    TSH 2.35 02/21/2017 04:56 AM        Procedures/imaging: see electronic medical records for all procedures/Xrays and details which were not copied into this note but were reviewed prior to creation of Plan

## 2019-05-08 NOTE — PROGRESS NOTES
Reviewed patients labs and followed up  with patients nurse this evening. Still hypertensive 749X systolic, urine output in last 12 hrs 750 ml , will give a dose of IV Lasix 80 mg IV X 1 , use hydralazine PRN if SBP > 180. Continue to follow closely.     Please call with questions,    Nigel Nunez MD FASN  Cell [phone]  Pager: [phone]

## 2019-05-08 NOTE — WOUND CARE
Physical Exam   Room 302: wound assess  Wound Leg upper Anterior;Right Bull [place] removed from this area 05/07/19 (Active)   Dressing Type Open to air 5/8/2019  3:30 PM   Non-staged Wound Description Partial thickness 5/8/2019  3:30 PM   Shape irregular 5/8/2019  3:30 PM   Wound Length (cm) 8 cm 5/8/2019  3:30 PM   Wound Width (cm) 9 cm 5/8/2019  3:30 PM   Wound Depth (cm) 0.1 cm 5/8/2019  3:30 PM   Wound Volume (cm^3) 7.2 cm^3 5/8/2019  3:30 PM   Condition of Base [place] 5/8/2019  3:30 PM   Condition of Edges Closed 5/8/2019  3:30 PM   Epithelialization (%) 0 5/8/2019  3:30 PM   Assessment [place] 5/8/2019  3:30 PM   Tissue Type Percent Pink 100 5/8/2019  3:30 PM   Drainage Amount Scant 5/8/2019  3:30 PM   Drainage Color Serosanguinous 5/8/2019  3:30 PM   Wound Odor None 5/8/2019  3:30 PM   Kimberly-wound Assessment Intact 5/8/2019  3:30 PM   Margins Attached edges; Defined edges 5/8/2019  3:30 PM   Dressing Type Applied Open to air 5/8/2019  3:30 PM   Procedure Tolerated Well 5/8/2019  3:30 PM   Number of days: 1   Pt agreeable to recommended treatment of vaseline gauze. Wound care education provided to pt at this time. Will turn over care to nursing staff at this time.   Davis MICHELEN, RN, [facility], [address]

## 2019-05-08 NOTE — PROGRESS NOTES
Hematology/Medical Oncology Progress Note             Name: Darrin Search   : 1965   MRN: [id number]   Date: 2019 8:31 AM     [x]I have reviewed the flowsheet and previous days notes. Events overnight reviewed and discussed with nursing staff. Vital signs and records reviewed. Ms. Michael Oliver is a 47 y/o woman with sickle cell disease. She was admitted with complaints of progressive weakness and was found to have acute on chronic renal failure. She was also very dehydrated and complained of pain. She was transferred to the ICU due to respiratory distress. Today the patient states that she feels better. She states that she did not sleep well last night due to worrying. ROS:  Constitutional:  Negative for fever, chills, diaphoresis, activity change, appetite change and unexpected weight change. HENT: Negative for nosebleeds, congestion, facial swelling, mouth sores, trouble swallowing, neck pain, neck stiffness, voice change and postnasal drip. Eyes: Negative for photophobia, pain, discharge and itching. Respiratory: Negative for apnea, cough, choking, chest tightness, wheezing and stridor. Cardiovascular: Negative for chest pain, palpitations and leg swelling. Gastrointestinal: Negative for abdominal pain. Negative for nausea, diarrhea, constipation, blood in stool and rectal pain. Genitourinary: Negative for dysuria, urgency, hematuria, flank pain and difficulty urinating. Musculoskeletal: Negative for back pain, joint swelling, arthralgias and gait problem. Skin: Negative for color change, pallor, rash and wound. Neurological:  Negative for dizziness, facial asymmetry, speech difficulty, light-headedness and headaches. Hematological: Negative for adenopathy. Does not bruise/bleed easily. Psychiatric/Behavioral: Positive for occasionally has trouble remembering certain events or names. Negative for hallucinations.       Vital Signs:    Visit Vitals  /82 Pulse (!) 59   Temp 97.5 °F (36.4 °C)   Resp 15   Ht 5' 5.98\" (1.676 m) Comment: previous encounter   Wt 105.8 kg (233 lb 4 oz)   SpO2 96%   Breastfeeding? No   BMI 37.67 kg/m²       O2 Device: Room air   O2 Flow Rate (L/min): 2 l/min   Temp (24hrs), Av.3 °F (36.8 °C), Min:97.5 °F (36.4 °C), Max:98.9 °F (37.2 °C)       Intake/Output:   Last shift:      No intake/output data recorded. Last 3 shifts:  1901 -  0700  In: 1050 [P.O.:640; I.V.:100]  Out: 2465 [Urine:1965; Drains:500]    Intake/Output Summary (Last 24 hours) at 2019 0831  Last data filed at 2019 0600  Gross per 24 hour   Intake 680 ml   Output 1690 ml   Net -1010 ml       Physical Exam:    Constitutional:Appears comfortable, NAD. HENT: Head: Normocephalic and atraumatic. Mouth/Throat: No oropharyngeal exudate. Eyes: Conjunctivae and EOM are normal. Pupils are equal, round, and reactive to light. No scleral icterus. Neck: Normal range of motion. Neck supple. No tracheal deviation present. No thyromegaly present. Cardiovascular: bradyrate and regular rhythm. Exam reveals no gallop and no friction rub. No murmur heard. Pulmonary/Chest:Symmetrical chest expansion, no accessory muscle use; good airway entry; no rales/ wheezing/ rhonchi noted  Abdominal: Soft. Bowel sounds are normal. No distension. No tenderness. There is no rebound and no guarding. Musculoskeletal: Normal range of motion. No edema and no tenderness. Lymphadenopathy:   No cervical adenopathy. Neurological:Alert and oriented to person, place, and time. Coordination normal.   Skin: Skin is dry. Psychiatric: Normal mood and affect. Normal behavior.           DATA:   Current Facility-Administered Medications   Medication Dose Route Frequency    amLODIPine (NORVASC) tablet 5 mg  5 mg Oral DAILY    hydrALAZINE (APRESOLINE) 20 mg/mL injection 20 mg  20 mg IntraVENous Q6H PRN    sodium bicarbonate tablet 650 mg  650 mg Oral TID    calcium gluconate 1 g in 0.9% sodium chloride 100 mL IVPB  1 g IntraVENous DIALYSIS PRN    calcium carbonate (TUMS) chewable tablet 200 mg [elemental]  200 mg Oral DIALYSIS PRN    0.9% sodium chloride infusion 250 mL  250 mL IntraVENous PRN    lactulose (CHRONULAC) 10 gram/15 mL solution 30 mL  20 g Oral TID    diphenhydrAMINE (BENADRYL) injection 25 mg  25 mg IntraVENous ON CALL    glucose chewable tablet 16 g  4 Tab Oral PRN    glucagon (GLUCAGEN) injection 1 mg  1 mg IntraMUSCular PRN    dextrose (D50W) injection syrg 12.5-25 g  25-50 mL IntraVENous PRN    dronabinol (MARINOL) capsule 5 mg  5 mg Oral BID    HYDROcodone-acetaminophen (NORCO) 5-325 mg per tablet 1-2 Tab  1-2 Tab Oral Q4H PRN    famotidine (PEPCID) tablet 20 mg  20 mg Oral DAILY    levoFLOXacin (LEVAQUIN) 500 mg in D5W IVPB  500 mg IntraVENous Q48H    diphenhydrAMINE (BENADRYL) capsule 25 mg  25 mg Oral Q6H PRN    sodium chloride (NS) flush 5-10 mL  5-10 mL IntraVENous PRN    folic acid (FOLVITE) tablet 1 mg  1 mg Oral DAILY    thyroid (Pork) (ARMOUR) tablet 30 mg  30 mg Oral DAILY    acetaminophen (TYLENOL) tablet 650 mg  650 mg Oral Q4H PRN    ondansetron (ZOFRAN) injection 4 mg  4 mg IntraVENous Q4H PRN                    Labs:  Recent Labs     05/08/19  0345 05/07/19  1900 05/07/19  0940   WBC 44.5* 52.0* 56.9*   HGB 7.2* 6.8* 7.6*   HCT 19.8* 18.6* 21.1*   * 89* 106*     Recent Labs     05/08/19  0550 05/07/19  0940 05/06/19  1300 05/06/19  0456 05/05/19  1046    140 139 139 138   K 3.7 3.5 4.1 4.7 4.2   * 107 108 108 107   CO2 20* 21 18* 18* 16*   * 130* 154* 123* 73*   BUN 94* 88* 75* 72* 64*   CREA 7.48* 7.31* 6.68* 6.35* 5.87*   CA 8.8 8.4* 7.7* 7.2* 7.1*   MG  --  2.5  --  2.3 1.9   PHOS  --  3.7  --  4.3 3.9   ALB  --  3.4  --  3.4  --    SGOT  --  75*  --  127*  --    ALT  --  35  --  35  --    INR  --   --   --   --  1.6*     No results for input(s): PH, PCO2, PO2, HCO3, FIO2 in the last 72 hours.      IMPRESSION: · Acute kidney injury -From dehydration versus shock   · Thrombocytosis- Improving. Likely from sepsis and medications  · Anemia  · Leukocytosis  · Sickle cell anemia/sickle cell disease        PLAN:   · Anemia: today H/H 7.2/19.8, recommend transfusion of PRBC for hgb < 7.0 g/dl. LDH elevated at 494, Haptoglobin <10  · Sickle Cell Anemia: Continue Folvite 1mg dialy  · Thrombocytopenia:Platelet count today is 111,000, no intervention warranted unless her platelets decline below 15,000   · Leukocytosis:WBC today is 44.5 decreased from 5/7/52.0. Blood Cx neg. Urine Cx: <10,000 COLONIES/mL Gram Negative Rods   · This patient current treatment for iron overload is Jadenu to help keep her ferritin level below 1000. I will recheck Ferritin at this time.  ·        The patient is: [] acutely ill Risk of deterioration: [] moderate    [x] critically ill  [] high         My assessment/plan was discussed with:  [x]nursing []PT/OT    []respiratory therapy [x]Dr.Lloyd Antione Angelo MD      []family []       Eris Rashid NP

## 2019-05-08 NOTE — ROUTINE PROCESS
Bedside and Verbal shift change report given to [id number] [address] (oncoming nurse) by  Juan Francisco Schwarz (offgoing nurse). Report included the following information SBAR, Kardex, Intake/Output, MAR and Recent Results.

## 2019-05-09 LAB
ADAMTS13 COMMENT, ADAM2T: NORMAL
AMMONIA PLAS-SCNC: 32 UMOL/L (ref 11–32)
ANION GAP SERPL CALC-SCNC: 12 MMOL/L (ref 3–18)
BACTERIA SPEC CULT: NORMAL
BACTERIA SPEC CULT: NORMAL
BASOPHILS # BLD: 0 K/UL (ref 0–0.06)
BASOPHILS NFR BLD: 0 % (ref 0–3)
BUN SERPL-MCNC: 100 MG/DL (ref 7–18)
BUN/CREAT SERPL: 13 (ref 12–20)
CALCIUM SERPL-MCNC: 9.2 MG/DL (ref 8.5–10.1)
CHLORIDE SERPL-SCNC: 109 MMOL/L (ref 100–108)
CO2 SERPL-SCNC: 21 MMOL/L (ref 21–32)
CREAT SERPL-MCNC: 7.69 MG/DL (ref 0.6–1.3)
DIFFERENTIAL METHOD BLD: ABNORMAL
EOSINOPHIL # BLD: 0 K/UL (ref 0–0.4)
EOSINOPHIL NFR BLD: 0 % (ref 0–5)
ERYTHROCYTE [DISTWIDTH] IN BLOOD BY AUTOMATED COUNT: 17.5 % (ref 11.6–14.5)
FIBRINOGEN PPP-MCNC: 219 MG/DL (ref 210–451)
GLUCOSE BLD STRIP.AUTO-MCNC: 182 MG/DL (ref 70–110)
GLUCOSE BLD STRIP.AUTO-MCNC: 189 MG/DL (ref 70–110)
GLUCOSE BLD STRIP.AUTO-MCNC: 237 MG/DL (ref 70–110)
GLUCOSE SERPL-MCNC: 188 MG/DL (ref 74–99)
HAPTOGLOB SERPL-MCNC: <10 MG/DL (ref 34–200)
HCT VFR BLD AUTO: 19.1 % (ref 35–45)
HGB BLD-MCNC: 6.9 G/DL (ref 12–16)
LYMPHOCYTES # BLD: 5.4 K/UL (ref 0.8–3.5)
LYMPHOCYTES NFR BLD: 11 % (ref 20–51)
MCH RBC QN AUTO: 28.9 PG (ref 24–34)
MCHC RBC AUTO-ENTMCNC: 36.1 G/DL (ref 31–37)
MCV RBC AUTO: 79.9 FL (ref 74–97)
METAMYELOCYTES NFR BLD MANUAL: 3 %
MONOCYTES # BLD: 0.5 K/UL (ref 0–1)
MONOCYTES NFR BLD: 1 % (ref 2–9)
MYELOCYTES NFR BLD MANUAL: 1 %
NEUTS BAND NFR BLD MANUAL: 5 % (ref 0–5)
NEUTS SEG # BLD: 41.1 K/UL (ref 1.8–8)
NEUTS SEG NFR BLD: 79 % (ref 42–75)
NRBC BLD-RTO: 9 PER 100 WBC
PLATELET # BLD AUTO: 105 K/UL (ref 135–420)
PLATELET COMMENTS,PCOM: ABNORMAL
POTASSIUM SERPL-SCNC: 3.6 MMOL/L (ref 3.5–5.5)
RBC # BLD AUTO: 2.39 M/UL (ref 4.2–5.3)
RBC MORPH BLD: ABNORMAL
SERVICE CMNT-IMP: NORMAL
SERVICE CMNT-IMP: NORMAL
SODIUM SERPL-SCNC: 142 MMOL/L (ref 136–145)
VWF CP ACT/NOR PPP CHRO: 40.8 %
WBC # BLD AUTO: 48.9 K/UL (ref 4.6–13.2)

## 2019-05-09 PROCEDURE — 82962 GLUCOSE BLOOD TEST: CPT

## 2019-05-09 PROCEDURE — 82140 ASSAY OF AMMONIA: CPT

## 2019-05-09 PROCEDURE — 65660000000 HC RM CCU STEPDOWN

## 2019-05-09 PROCEDURE — 86902 BLOOD TYPE ANTIGEN DONOR EA: CPT

## 2019-05-09 PROCEDURE — P9040 RBC LEUKOREDUCED IRRADIATED: HCPCS

## 2019-05-09 PROCEDURE — 85025 COMPLETE CBC W/AUTO DIFF WBC: CPT

## 2019-05-09 PROCEDURE — 74011250636 HC RX REV CODE- 250/636: Performed by: NURSE PRACTITIONER

## 2019-05-09 PROCEDURE — 80048 BASIC METABOLIC PNL TOTAL CA: CPT

## 2019-05-09 PROCEDURE — 36430 TRANSFUSION BLD/BLD COMPNT: CPT

## 2019-05-09 PROCEDURE — 74011250637 HC RX REV CODE- 250/637: Performed by: NURSE PRACTITIONER

## 2019-05-09 PROCEDURE — 74011250637 HC RX REV CODE- 250/637: Performed by: INTERNAL MEDICINE

## 2019-05-09 PROCEDURE — 74011250637 HC RX REV CODE- 250/637: Performed by: HOSPITALIST

## 2019-05-09 PROCEDURE — 85384 FIBRINOGEN ACTIVITY: CPT

## 2019-05-09 PROCEDURE — 77030037870 HC GLD SHT PREVALON SAGE -B

## 2019-05-09 RX ORDER — DEFERASIROX 360 MG/1
1444 TABLET, FILM COATED ORAL DAILY
Status: DISCONTINUED | OUTPATIENT
Start: 2019-05-09 | End: 2019-05-12

## 2019-05-09 RX ORDER — SODIUM CHLORIDE 9 MG/ML
250 INJECTION, SOLUTION INTRAVENOUS AS NEEDED
Status: DISCONTINUED | OUTPATIENT
Start: 2019-05-09 | End: 2019-05-15 | Stop reason: HOSPADM

## 2019-05-09 RX ORDER — ACETAMINOPHEN 325 MG/1
650 TABLET ORAL
Status: COMPLETED | OUTPATIENT
Start: 2019-05-09 | End: 2019-05-09

## 2019-05-09 RX ORDER — DIPHENHYDRAMINE HCL 25 MG
25 CAPSULE ORAL
Status: COMPLETED | OUTPATIENT
Start: 2019-05-09 | End: 2019-05-09

## 2019-05-09 RX ADMIN — DIPHENHYDRAMINE HYDROCHLORIDE 25 MG: 25 CAPSULE ORAL at 11:15

## 2019-05-09 RX ADMIN — DRONABINOL 5 MG: 5 CAPSULE ORAL at 09:02

## 2019-05-09 RX ADMIN — LACTULOSE 30 ML: 20 SOLUTION ORAL at 18:15

## 2019-05-09 RX ADMIN — LACTULOSE 30 ML: 20 SOLUTION ORAL at 21:38

## 2019-05-09 RX ADMIN — ACETAMINOPHEN 650 MG: 325 TABLET ORAL at 11:15

## 2019-05-09 RX ADMIN — SODIUM BICARBONATE 650 MG TABLET 650 MG: at 18:15

## 2019-05-09 RX ADMIN — LACTULOSE 30 ML: 20 SOLUTION ORAL at 09:02

## 2019-05-09 RX ADMIN — SODIUM BICARBONATE 650 MG TABLET 650 MG: at 09:01

## 2019-05-09 RX ADMIN — DRONABINOL 5 MG: 5 CAPSULE ORAL at 21:38

## 2019-05-09 RX ADMIN — FAMOTIDINE 20 MG: 20 TABLET ORAL at 09:02

## 2019-05-09 RX ADMIN — SODIUM CHLORIDE 250 ML: 900 INJECTION, SOLUTION INTRAVENOUS at 13:30

## 2019-05-09 RX ADMIN — FOLIC ACID 1 MG: 1 TABLET ORAL at 09:02

## 2019-05-09 RX ADMIN — SODIUM BICARBONATE 650 MG TABLET 650 MG: at 21:38

## 2019-05-09 RX ADMIN — LEVOTHYROXINE, LIOTHYRONINE 30 MG: 19; 4.5 TABLET ORAL at 09:02

## 2019-05-09 NOTE — ROUTINE PROCESS
Bedside and Verbal shift change report given to [address] [place] [address] (oncoming nurse) by  Maryann Lima RN(offgoing nurse).  Report included the following information SBAR, Kardex, Intake/Output, MAR, Recent Results and Cardiac Rhythm SR.

## 2019-05-09 NOTE — PROGRESS NOTES
[facility]  Progress Note    Patient: Ailyn Enriquez Age: 48 y.o. : 1965 MR#: [id number] SSN: [id number]  Date/Time: 2019     Subjective:     No new events overnight , sitting up in a chair             Review of systems  General: No fevers or chills. Cardiovascular: No chest pain or pressure. No palpitations. Pulmonary: No shortness of breath, cough or wheeze. Gastrointestinal: No abdominal pain, nausea, vomiting or diarrhea. Genitourinary: No urinary frequency, urgency, hesitancy or dysuria. Musculoskeletal: No joint or muscle pain, no back pain, no recent trauma. Neurologic: No headache, numbness, tingling or weakness. Assessment/Plan:     1. Thrombocytopenia - monitor counts - improving - Hematology on board    2. Leukocytosis - monitor counts - ? Acute leukemia   3. Chronic anemia 2y to baseline sickle cell disease   4. ARF - Nephrology on board , IV Lasix prn , will follow   5. HTN - resumed BP meds, monitor BP   6. HypoT4 - continue armor thyroid       Case discussed with:  [x]Patient  [x]Family  []Nursing  []Case Management  DVT Prophylaxis:  []Lovenox  [x]Hep SQ  []SCDs  []Coumadin   []On Heparin gtt    Objective:   VS:   Visit Vitals  /85   Pulse 71   Temp 97.9 °F (36.6 °C)   Resp 14   Ht 5' 6\" (1.676 m)   Wt 105.8 kg (233 lb 4 oz)   SpO2 99%   Breastfeeding? No   BMI 37.65 kg/m²      Tmax/24hrs: Temp (24hrs), Av.1 °F (36.7 °C), Min:97.5 °F (36.4 °C), Max:98.9 °F (37.2 °C)  IOBRIEF    Intake/Output Summary (Last 24 hours) at 2019 2140  Last data filed at 2019 1900  Gross per 24 hour   Intake 700 ml   Output 1760 ml   Net -1060 ml       General:  Alert, cooperative, no acute distress    HEENT: PERRLA, anicteric sclerae. Pulmonary:  CTA Bilaterally. No Wheezing/Rhonchi/Rales. Cardiovascular: Regular rate and Rhythm. GI:  Soft, Non distended, Non tender. + Bowel sounds. Extremities:  No edema, cyanosis, clubbing.  No calf tenderness. Psych: Good insight. Not anxious or agitated. Neurologic: Alert and oriented X 3. No acute neuro deficits.   Additional:    Medications:   Current Facility-Administered Medications   Medication Dose Route Frequency    amLODIPine (NORVASC) tablet 5 mg  5 mg Oral DAILY    hydrALAZINE (APRESOLINE) 20 mg/mL injection 20 mg  20 mg IntraVENous Q6H PRN    sodium bicarbonate tablet 650 mg  650 mg Oral TID    calcium gluconate 1 g in 0.9% sodium chloride 100 mL IVPB  1 g IntraVENous DIALYSIS PRN    calcium carbonate (TUMS) chewable tablet 200 mg [elemental]  200 mg Oral DIALYSIS PRN    0.9% sodium chloride infusion 250 mL  250 mL IntraVENous PRN    lactulose (CHRONULAC) 10 gram/15 mL solution 30 mL  20 g Oral TID    glucose chewable tablet 16 g  4 Tab Oral PRN    glucagon (GLUCAGEN) injection 1 mg  1 mg IntraMUSCular PRN    dextrose (D50W) injection syrg 12.5-25 g  25-50 mL IntraVENous PRN    dronabinol (MARINOL) capsule 5 mg  5 mg Oral BID    HYDROcodone-acetaminophen (NORCO) 5-325 mg per tablet 1-2 Tab  1-2 Tab Oral Q4H PRN    famotidine (PEPCID) tablet 20 mg  20 mg Oral DAILY    levoFLOXacin (LEVAQUIN) 500 mg in D5W IVPB  500 mg IntraVENous Q48H    diphenhydrAMINE (BENADRYL) capsule 25 mg  25 mg Oral Q6H PRN    sodium chloride (NS) flush 5-10 mL  5-10 mL IntraVENous PRN    folic acid (FOLVITE) tablet 1 mg  1 mg Oral DAILY    thyroid (Pork) (ARMOUR) tablet 30 mg  30 mg Oral DAILY    acetaminophen (TYLENOL) tablet 650 mg  650 mg Oral Q4H PRN    ondansetron (ZOFRAN) injection 4 mg  4 mg IntraVENous Q4H PRN       Labs:    Recent Results (from the past 24 hour(s))   CBC WITH AUTOMATED DIFF    Collection Time: 05/08/19  3:45 AM   Result Value Ref Range    WBC 44.5 (H) 4.6 - 13.2 K/uL    RBC 2.47 (L) 4.20 - 5.30 M/uL    HGB 7.2 (L) 12.0 - 16.0 g/dL    HCT 19.8 (L) 35.0 - 45.0 %    MCV 80.2 74.0 - 97.0 FL    MCH 29.1 24.0 - 34.0 PG    MCHC 36.4 31.0 - 37.0 g/dL    RDW 16.9 (H) 11.6 - 14.5 % PLATELET 231 (L) 084 - 420 K/uL    MPV 11.4 9.2 - 11.8 FL    NEUTROPHILS 83 (H) 42 - 75 %    BAND NEUTROPHILS 5 0 - 5 %    LYMPHOCYTES 8 (L) 20 - 51 %    MONOCYTES 2 2 - 9 %    EOSINOPHILS 0 0 - 5 %    BASOPHILS 0 0 - 3 %    MYELOCYTES 2 (H) 0 %    NRBC 4.0 (H) 0  WBC    ABS. NEUTROPHILS 39.2 (H) 1.8 - 8.0 K/UL    ABS. LYMPHOCYTES 3.6 (H) 0.8 - 3.5 K/UL    ABS. MONOCYTES 0.9 0 - 1.0 K/UL    ABS. EOSINOPHILS 0.0 0.0 - 0.4 K/UL    ABS.  BASOPHILS 0.0 0.0 - 0.06 K/UL    DF MANUAL      PLATELET COMMENTS DECREASED PLATELETS      RBC COMMENTS ANISOCYTOSIS  1+        RBC COMMENTS TARGET CELLS  FEW  SICKLE CELLS  1+       AMMONIA    Collection Time: 05/08/19  3:45 AM   Result Value Ref Range    Ammonia 35 (H) 11 - 32 UMOL/L   CALCIUM, IONIZED    Collection Time: 05/08/19  3:45 AM   Result Value Ref Range    Ionized Calcium 1.19 1.12 - 1.32 MMOL/L   FIBRINOGEN    Collection Time: 05/08/19  3:45 AM   Result Value Ref Range    Fibrinogen 255 210 - 108 mg/dL   METABOLIC PANEL, BASIC    Collection Time: 05/08/19  5:50 AM   Result Value Ref Range    Sodium 141 136 - 145 mmol/L    Potassium 3.7 3.5 - 5.5 mmol/L    Chloride 110 (H) 100 - 108 mmol/L    CO2 20 (L) 21 - 32 mmol/L    Anion gap 11 3.0 - 18 mmol/L    Glucose 177 (H) 74 - 99 mg/dL    BUN 94 (H) 7.0 - 18 MG/DL    Creatinine 7.48 (H) 0.6 - 1.3 MG/DL    BUN/Creatinine ratio 13 12 - 20      GFR est AA 7 (L) >60 ml/min/1.73m2    GFR est non-AA 6 (L) >60 ml/min/1.73m2    Calcium 8.8 8.5 - 10.1 MG/DL   FERRITIN    Collection Time: 05/08/19  5:50 AM   Result Value Ref Range    Ferritin 3,678 (H) 8 - 388 NG/ML       Signed By: Collette Sousa, MD     May 8, 2019

## 2019-05-09 NOTE — PROGRESS NOTES
Hematology/Medical Oncology Progress Note             Name: Annika Looney   : 1965   MRN: [id number]   Date: 2019 8:31 AM     [x]I have reviewed the flowsheet and previous days notes. Events overnight reviewed and discussed with nursing staff. Vital signs and records reviewed. Ms. Rome Lainez is a 47 y/o woman with sickle cell disease. She was admitted with complaints of progressive weakness and was found to have acute on chronic renal failure. She was also very dehydrated and complained of pain. She was transferred to the ICU due to respiratory distress. Today the patient states that she is feeling better, and feels as though her memory is improving. ROS:  Constitutional:  Negative for fever, chills, diaphoresis, activity change, appetite change and unexpected weight change. HENT: Negative for nosebleeds, congestion, facial swelling, mouth sores, trouble swallowing, neck pain, neck stiffness, voice change and postnasal drip. Eyes: Negative for photophobia, pain, discharge and itching. Respiratory: Negative for apnea, cough, choking, chest tightness, wheezing and stridor. Cardiovascular: Negative for chest pain, palpitations and leg swelling. Gastrointestinal: Negative for abdominal pain. Negative for nausea, diarrhea, constipation, blood in stool and rectal pain. Genitourinary: Negative for dysuria, urgency, hematuria, flank pain and difficulty urinating. Musculoskeletal: Negative for back pain, joint swelling, arthralgias and gait problem. Skin: Negative for color change, pallor, rash and wound. Neurological:  Negative for dizziness, facial asymmetry, speech difficulty, light-headedness and headaches. Hematological: Negative for adenopathy. Does not bruise/bleed easily. Psychiatric/Behavioral: Positive for occasionally has trouble remembering certain events or names. Negative for hallucinations.       Vital Signs:    Visit Vitals  /55 (BP 1 Location: Right arm, BP Patient Position: At rest)   Pulse (!) 54   Temp 98.5 °F (36.9 °C)   Resp 13   Ht 5' 6\" (1.676 m)   Wt 122.6 kg (270 lb 4.5 oz)   SpO2 97%   Breastfeeding? No   BMI 43.62 kg/m²       O2 Device: Room air   O2 Flow Rate (L/min): 2 l/min   Temp (24hrs), Av.1 °F (36.7 °C), Min:97.8 °F (36.6 °C), Max:98.5 °F (36.9 °C)       Intake/Output:   Last shift:      No intake/output data recorded. Last 3 shifts:  1901 -  0700  In: 800 [P.O.:700; I.V.:100]  Out: 2040 [Urine:1740; Drains:300]    Intake/Output Summary (Last 24 hours) at 2019 0733  Last data filed at 2019 1900  Gross per 24 hour   Intake 240 ml   Output 850 ml   Net -610 ml       Physical Exam:    Constitutional:Appears comfortable, NAD. HENT: Head: Normocephalic and atraumatic. Mouth/Throat: No oropharyngeal exudate. Eyes: Conjunctivae and EOM are normal. Pupils are equal, round, and reactive to light. No scleral icterus. Neck: Normal range of motion. Neck supple. No tracheal deviation present. No thyromegaly present. Cardiovascular: yosef rate and regular rhythm. Exam reveals no gallop and no friction rub. No murmur heard. Pulmonary/Chest:Symmetrical chest expansion, no accessory muscle use; good airway entry; no rales/ wheezing/ rhonchi noted  Abdominal: Soft. Bowel sounds are normal. No distension. No tenderness. There is no rebound and no guarding. Musculoskeletal: Normal range of motion. No edema and no tenderness. Lymphadenopathy:   No cervical adenopathy. Neurological:Alert and oriented to person, place, and time. Coordination normal.   Skin: Skin is dry. Psychiatric: Normal mood and affect. Normal behavior.           DATA:   Current Facility-Administered Medications   Medication Dose Route Frequency    amLODIPine (NORVASC) tablet 5 mg  5 mg Oral DAILY    hydrALAZINE (APRESOLINE) 20 mg/mL injection 20 mg  20 mg IntraVENous Q6H PRN    sodium bicarbonate tablet 650 mg  650 mg Oral TID    calcium gluconate 1 g in 0.9% sodium chloride 100 mL IVPB  1 g IntraVENous DIALYSIS PRN    calcium carbonate (TUMS) chewable tablet 200 mg [elemental]  200 mg Oral DIALYSIS PRN    0.9% sodium chloride infusion 250 mL  250 mL IntraVENous PRN    lactulose (CHRONULAC) 10 gram/15 mL solution 30 mL  20 g Oral TID    glucose chewable tablet 16 g  4 Tab Oral PRN    glucagon (GLUCAGEN) injection 1 mg  1 mg IntraMUSCular PRN    dextrose (D50W) injection syrg 12.5-25 g  25-50 mL IntraVENous PRN    dronabinol (MARINOL) capsule 5 mg  5 mg Oral BID    HYDROcodone-acetaminophen (NORCO) 5-325 mg per tablet 1-2 Tab  1-2 Tab Oral Q4H PRN    famotidine (PEPCID) tablet 20 mg  20 mg Oral DAILY    levoFLOXacin (LEVAQUIN) 500 mg in D5W IVPB  500 mg IntraVENous Q48H    diphenhydrAMINE (BENADRYL) capsule 25 mg  25 mg Oral Q6H PRN    sodium chloride (NS) flush 5-10 mL  5-10 mL IntraVENous PRN    folic acid (FOLVITE) tablet 1 mg  1 mg Oral DAILY    thyroid (Pork) (ARMOUR) tablet 30 mg  30 mg Oral DAILY    acetaminophen (TYLENOL) tablet 650 mg  650 mg Oral Q4H PRN    ondansetron (ZOFRAN) injection 4 mg  4 mg IntraVENous Q4H PRN                    Labs:  Recent Labs     05/08/19  0345 05/07/19  1900 05/07/19  0940   WBC 44.5* 52.0* 56.9*   HGB 7.2* 6.8* 7.6*   HCT 19.8* 18.6* 21.1*   * 89* 106*     Recent Labs     05/08/19  0550 05/07/19  0940 05/06/19  1300    140 139   K 3.7 3.5 4.1   * 107 108   CO2 20* 21 18*   * 130* 154*   BUN 94* 88* 75*   CREA 7.48* 7.31* 6.68*   CA 8.8 8.4* 7.7*   MG  --  2.5  --    PHOS  --  3.7  --    ALB  --  3.4  --    SGOT  --  75*  --    ALT  --  35  --      No results for input(s): PH, PCO2, PO2, HCO3, FIO2 in the last 72 hours. IMPRESSION:   · Acute kidney injury -From dehydration versus shock   · Thrombocytosis- Improving.  Likely from sepsis and medications  · Anemia  · Leukocytosis  · Sickle cell anemia/sickle cell disease        PLAN:   · Anemia: AM labs pending, recommend transfusion of PRBC for hgb < 7.0 g/dl. · Sickle Cell Anemia: Continue Folvite 1mg dialy  · Thrombocytopenia:Platelet count yesterday was 111,000, no intervention warranted unless her platelets decline below 15,000   · Leukocytosis:WBC on yesterday appears to be trending down at 44.5 decreased from 5/7/52.0. Awaiting labs from today. Blood Cx neg. Urine Cx: <10,000 COLONIES/mL Gram Negative Rods   · This patient current treatment for iron overload is Jadenu to help keep her ferritin level below 1000. Ferritin is 3,678 and [place] will continue while inpatient. Pt will have her  bring from home. I will discuss with Nephrology.  ·        The patient is: [] acutely ill Risk of deterioration: [] moderate    [x] critically ill  [] high         My assessment/plan was discussed with:  [x]nursing []PT/OT    []respiratory therapy [x]Dr.Lloyd Lolis Aleman MD      []family []       Douglas Irwin NP

## 2019-05-09 NOTE — PROGRESS NOTES
Reason for Admission:  ARF (acute renal failure) (HCC) [N17.9]  Leukocytosis [D72.829]  Bandemia [D72.825]  Thrombocytopenia ([address] [D69.6]  Chest pain [R07.9]  Abdominal pain [R10.9]  Anemia [D64.9]                 RRAT Score:    17           Plan for utilizing home health:    NO.  Pt stated she does not need home health or PT/OT                      Likelihood of Readmission:   Moderate                         Do you (patient/family) have any concerns for transition/discharge?  yes. Pt stated she wants a new cpap for home use. She has had one for the past 7 years. Transition of Care Plan:       Initial assessment completed with patient. Cognitive status of patient: oriented to time, place, person and situation. Face sheet information confirmed:  yes. The patient designates her  Angelika Guthrie to participate in her discharge plan and to receive any needed information. This patient lives in a home with spouse. Patient is able to navigate steps as needed. Prior to hospitalization, patient was considered to be independent with ADLs/IADLS : yes . If not independent,  patient needs assist with : bathing, food preparation and cooking. Pt stated she needed help with ADLs sometimes and her  helps her. Patient has a current ACP document on file: no  The spouse will be available to transport patient home upon discharge. The patient already has Walker, and CPAP medical equipment available in the home. Patient is not currently active with home health. Patient has not stayed in a skilled nursing facility or rehab. Was  stay within last 60 days : no. This patient is on dialysis :no   Currently, the discharge plan is home. The patient states that she can obtain her medications from the pharmacy, and take her medications as directed.     Patient's current insurance is [employer] [facility]      Care Management Interventions  PCP Verified by CM: Yes(per pt she saw her pcp Dr. Ritu Stevens 3 weeks ago)  Palliative Care Criteria Met (RRAT>21 & CHF Dx)?: No  Mode of Transport at Discharge:  Other (see comment)(family)  Transition of Care Consult (CM Consult): Discharge Planning  Physical Therapy Consult: No  Occupational Therapy Consult: No  Speech Therapy Consult: No  Current Support Network: Lives with Spouse  Confirm Follow Up Transport: Family  Plan discussed with Pt/Family/Caregiver: Yes  Discharge Location  Discharge Placement: Home      MACI Muñoz RN  Care Management  Pager: [phone]

## 2019-05-09 NOTE — PROGRESS NOTES
[facility]  [facility]    Patient: Aden Lezama Age: 48 y.o. : 1965 MR#: [id number] SSN: [id number]  Date/Time: 2019     Subjective:     No new events overnight , lying in bed   Says she is getting a unit of PRBC today             Review of systems  General: No fevers or chills. Cardiovascular: No chest pain or pressure. No palpitations. Pulmonary: No shortness of breath, cough or wheeze. Gastrointestinal: No abdominal pain, nausea, vomiting or diarrhea. Genitourinary: No urinary frequency, urgency, hesitancy or dysuria. Musculoskeletal: No joint or muscle pain, no back pain, no recent trauma. Neurologic: No headache, numbness, tingling or weakness. Assessment/Plan:     1. Thrombocytopenia - monitor counts -- Hematology on board    2. Anemia - will get 1 unit PRBC today   3. Leukocytosis - monitor counts - ? Acute leukemia   4. Chronic anemia 2y to baseline sickle cell disease   5. ARF - Nephrology on board , IV Lasix prn , will follow  creatinine elevated 7.6   6. HTN - resumed BP meds, monitor BP   7. HypoT4 - continue armor thyroid       Case discussed with:  [x]Patient  [x]Family  []Nursing  []Case Management  DVT Prophylaxis:  []Lovenox  []Hep SQ  [x]SCDs  []Coumadin   []On Heparin gtt    Objective:   VS:   Visit Vitals  /71   Pulse (!) 56   Temp 96.9 °F (36.1 °C)   Resp 18   Ht 5' 6\" (1.676 m)   Wt 122.6 kg (270 lb 4.5 oz)   SpO2 99%   Breastfeeding? No   BMI 43.62 kg/m²      Tmax/24hrs: Temp (24hrs), Av.7 °F (36.5 °C), Min:96.9 °F (36.1 °C), Max:98.5 °F (36.9 °C)  IOBRIEF    Intake/Output Summary (Last 24 hours) at 2019 8339  Last data filed at 2019 1100  Gross per 24 hour   Intake 480 ml   Output 2450 ml   Net -1970 ml       General:  Alert, cooperative, no acute distress    HEENT: PERRLA, anicteric sclerae. Pulmonary:  CTA Bilaterally. No Wheezing/Rhonchi/Rales. Cardiovascular: Regular rate and Rhythm.   GI:  Soft, Non distended, Non tender. + Bowel sounds. Extremities:  No edema, cyanosis, clubbing. No calf tenderness. Psych: Good insight. Not anxious or agitated. Neurologic: Alert and oriented X 3. No acute neuro deficits.   Additional:    Medications:   Current Facility-Administered Medications   Medication Dose Route Frequency    deferasirox (JADENU) tablet 1,444 mg (Patient Supplied)  1,444 mg Oral DAILY    0.9% sodium chloride infusion 250 mL  250 mL IntraVENous PRN    hydrALAZINE (APRESOLINE) 20 mg/mL injection 20 mg  20 mg IntraVENous Q6H PRN    sodium bicarbonate tablet 650 mg  650 mg Oral TID    calcium gluconate 1 g in 0.9% sodium chloride 100 mL IVPB  1 g IntraVENous DIALYSIS PRN    calcium carbonate (TUMS) chewable tablet 200 mg [elemental]  200 mg Oral DIALYSIS PRN    0.9% sodium chloride infusion 250 mL  250 mL IntraVENous PRN    lactulose (CHRONULAC) 10 gram/15 mL solution 30 mL  20 g Oral TID    glucose chewable tablet 16 g  4 Tab Oral PRN    glucagon (GLUCAGEN) injection 1 mg  1 mg IntraMUSCular PRN    dextrose (D50W) injection syrg 12.5-25 g  25-50 mL IntraVENous PRN    dronabinol (MARINOL) capsule 5 mg  5 mg Oral BID    HYDROcodone-acetaminophen (NORCO) 5-325 mg per tablet 1-2 Tab  1-2 Tab Oral Q4H PRN    famotidine (PEPCID) tablet 20 mg  20 mg Oral DAILY    levoFLOXacin (LEVAQUIN) 500 mg in D5W IVPB  500 mg IntraVENous Q48H    diphenhydrAMINE (BENADRYL) capsule 25 mg  25 mg Oral Q6H PRN    sodium chloride (NS) flush 5-10 mL  5-10 mL IntraVENous PRN    folic acid (FOLVITE) tablet 1 mg  1 mg Oral DAILY    thyroid (Pork) (ARMOUR) tablet 30 mg  30 mg Oral DAILY    acetaminophen (TYLENOL) tablet 650 mg  650 mg Oral Q4H PRN    ondansetron (ZOFRAN) injection 4 mg  4 mg IntraVENous Q4H PRN       Labs:    Recent Results (from the past 24 hour(s))   GLUCOSE, POC    Collection Time: 05/09/19  7:34 AM   Result Value Ref Range    Glucose (POC) 189 (H) 70 - 110 mg/dL   CBC WITH AUTOMATED DIFF Collection Time: 05/09/19  9:00 AM   Result Value Ref Range    WBC 48.9 (H) 4.6 - 13.2 K/uL    RBC 2.39 (L) 4.20 - 5.30 M/uL    HGB 6.9 (L) 12.0 - 16.0 g/dL    HCT 19.1 (L) 35.0 - 45.0 %    MCV 79.9 74.0 - 97.0 FL    MCH 28.9 24.0 - 34.0 PG    MCHC 36.1 31.0 - 37.0 g/dL    RDW 17.5 (H) 11.6 - 14.5 %    PLATELET 866 (L) 777 - 420 K/uL    NEUTROPHILS 79 (H) 42 - 75 %    BAND NEUTROPHILS 5 0 - 5 %    LYMPHOCYTES 11 (L) 20 - 51 %    MONOCYTES 1 (L) 2 - 9 %    EOSINOPHILS 0 0 - 5 %    BASOPHILS 0 0 - 3 %    METAMYELOCYTES 3 (H) 0 %    MYELOCYTES 1 (H) 0 %    NRBC 9.0 (H) 0  WBC    ABS. NEUTROPHILS 41.1 (H) 1.8 - 8.0 K/UL    ABS. LYMPHOCYTES 5.4 (H) 0.8 - 3.5 K/UL    ABS. MONOCYTES 0.5 0 - 1.0 K/UL    ABS. EOSINOPHILS 0.0 0.0 - 0.4 K/UL    ABS.  BASOPHILS 0.0 0.0 - 0.06 K/UL    DF MANUAL      PLATELET COMMENTS DECREASED PLATELETS      RBC COMMENTS ANISOCYTOSIS  1+        RBC COMMENTS TARGET CELLS  1+        RBC COMMENTS OVALOCYTES  FEW  STOMATOCYTES  1+        RBC COMMENTS POLYCHROMASIA  1+        RBC COMMENTS POIKILOCYTOSIS  1+        RBC COMMENTS HYPOCHROMIA  1+       FIBRINOGEN    Collection Time: 05/09/19  9:00 AM   Result Value Ref Range    Fibrinogen 219 210 - 491 mg/dL   METABOLIC PANEL, BASIC    Collection Time: 05/09/19  9:00 AM   Result Value Ref Range    Sodium 142 136 - 145 mmol/L    Potassium 3.6 3.5 - 5.5 mmol/L    Chloride 109 (H) 100 - 108 mmol/L    CO2 21 21 - 32 mmol/L    Anion gap 12 3.0 - 18 mmol/L    Glucose 188 (H) 74 - 99 mg/dL     (H) 7.0 - 18 MG/DL    Creatinine 7.69 (H) 0.6 - 1.3 MG/DL    BUN/Creatinine ratio 13 12 - 20      GFR est AA 7 (L) >60 ml/min/1.73m2    GFR est non-AA 6 (L) >60 ml/min/1.73m2    Calcium 9.2 8.5 - 10.1 MG/DL   AMMONIA    Collection Time: 05/09/19  9:00 AM   Result Value Ref Range    Ammonia 32 11 - 32 UMOL/L       Signed By: Sondra Willams MD     May 9, 2019

## 2019-05-09 NOTE — ROUTINE PROCESS
Primary Nurse Urmila Muse RN and Kenzie Perez RN performed a dual skin assessment on this patient Impairment noted- see wound doc flow sheet  Aguilar score is 13. Pt's buttocks is excoriated, she has a skin tear on her left thigh from cath secure, skin tear on right chest from tegraderm, and skin tear on right ac areas from tegraderm. The patient's skin is dry and scaley.

## 2019-05-09 NOTE — PROGRESS NOTES
In Patient Progress note    Admit Date: 5/3/2019     Impression:      #1 oliguric --> nonoliguric ,Acute kidney injury on chronic kidney disease stage III(baseline creatinine 1.3) from Ischaemic ATN   in setting of Hypotension/dehydration/severe anemia ,  h/o NSAID intake ( 800 mg ibuprofen ) may have contributed in this setting   with ATN/AIN , does have mild peripheral eosinophilia which goes along with AIN , UA with granular casts indicate ATN. Papillary necrosis d/t NSAID and sickle crisis  is in differential but no significant hematuria seen on UA on presentation  Volume status has improved quiet well , making good urine   Appetite improved , hemodynamically stable , d/c amlodipine as BP in good range   Bull in place , CT abdomen with no hydro, does show renal cyst   #2 chronic kidney disease stage III, patient does have minimal proteinuria, suspect due to hypertension nephrosclerosis   versus FSGS secondary to sickle cell disease  #3 Ac non gap  metabolic acidosis,improved ,continue PO bicarb   #6 Sickle cell disease ,acute crisis, pain management per primary team/Hem/onc  #7 Anemia/thrombocytopenia: hem/onc following , s/p PRBC Tx, stable   #8 leukocytosis/Sirs: work-up per primary, likely leukemoid reaction   #9 splenomegaly d/t sickle disease , discussed with Hematology   #10 AMS: improved , judicious pain management   #11 transaminitis/hyperammonemia, --> better , does have secondary hemochromatoses   Hem/onc following   #12 HTN : avoid too tight control , ~ systolic [id number]  , on amlodipine      Now nonoliguric , clinically looking better mental status /volume status , electrolytes   Acid base looks good , all encouraging factors. No indication for RRT. IV lasix as needed based on   Her volume status, hopefully her creatinine will start trending down in 24-48 hrs . WIll keep uldall in  Until we see a trend.  Continue to follow closely     Please call with questions,     MD NASIR Palma  Cell [phone]  Pager: [phone]      Subjective:     Denies SOB, awake and alerty     Current Facility-Administered Medications:     deferasirox (JADENU) tablet 1,444 mg (Patient Supplied), 1,444 mg, Oral, DAILY, Michael Hartmann NP, Stopped at 05/09/19 1100    0.9% sodium chloride infusion 250 mL, 250 mL, IntraVENous, PRN, Michael Hartmann NP    hydrALAZINE (APRESOLINE) 20 mg/mL injection 20 mg, 20 mg, IntraVENous, Q6H PRN, Lily Nelson PA-C, 20 mg at 05/07/19 2218    sodium bicarbonate tablet 650 mg, 650 mg, Oral, TID, Harvey Valdez MD, 650 mg at 05/09/19 0901    calcium gluconate 1 g in 0.9% sodium chloride 100 mL IVPB, 1 g, IntraVENous, DIALYSIS PRN, Peace Valdez MD    calcium carbonate (TUMS) chewable tablet 200 mg [elemental], 200 mg, Oral, DIALYSIS PRN, Peace Valdez MD    0.9% sodium chloride infusion 250 mL, 250 mL, IntraVENous, PRN, Peace Valdez MD    lactulose (CHRONULAC) 10 gram/15 mL solution 30 mL, 20 g, Oral, TID, Jonathan Jean MD, 30 mL at 05/09/19 0902    glucose chewable tablet 16 g, 4 Tab, Oral, PRN, Lily Nelson PA-C    glucagon (GLUCAGEN) injection 1 mg, 1 mg, IntraMUSCular, PRN, Lily Lyles PA-C    dextrose (D50W) injection syrg 12.5-25 g, 25-50 mL, IntraVENous, PRN, Lily Lyles PA-C    dronabinol (MARINOL) capsule 5 mg, 5 mg, Oral, BID, Boubacar Muñoz MD, 5 mg at 05/09/19 0902    HYDROcodone-acetaminophen (NORCO) 5-325 mg per tablet 1-2 Tab, 1-2 Tab, Oral, Q4H PRN, Jonathan Jean MD, 1 Tab at 05/04/19 1055    famotidine (PEPCID) tablet 20 mg, 20 mg, Oral, DAILY, Luis Caldwell MD, 20 mg at 05/09/19 0902    levoFLOXacin (LEVAQUIN) 500 mg in D5W IVPB, 500 mg, IntraVENous, Q48H, Luis Caldwell MD, Last Rate: 100 mL/hr at 05/07/19 2309, 500 mg at 05/07/19 2309    diphenhydrAMINE (BENADRYL) capsule 25 mg, 25 mg, Oral, Q6H PRN, Jonathan Jean MD, 25 mg at 05/04/19 1846    sodium chloride (NS) flush 5-10 mL, 5-10 mL, IntraVENous, PRN, Benson Burns MD    folic acid (FOLVITE) tablet 1 mg, 1 mg, Oral, DAILY, Benson Burns MD, 1 mg at 05/09/19 0902    thyroid (Pork) (ARMOUR) tablet 30 mg, 30 mg, Oral, DAILY, Benson Burns MD, 30 mg at 05/09/19 0902    acetaminophen (TYLENOL) tablet 650 mg, 650 mg, Oral, Q4H PRN, Lien Hinkle MD, 650 mg at 05/05/19 1621    ondansetron (ZOFRAN) injection 4 mg, 4 mg, IntraVENous, Q4H PRN, Lien Hinkle MD, 4 mg at 05/03/19 2133        Objective:     Visit Vitals  /61   Pulse (!) 56   Temp 97.7 °F (36.5 °C)   Resp 13   Ht 5' 6\" (1.676 m)   Wt 122.6 kg (270 lb 4.5 oz)   SpO2 100%   Breastfeeding?  No   BMI 43.62 kg/m²         Intake/Output Summary (Last 24 hours) at 5/9/2019 1142  Last data filed at 5/9/2019 0800  Gross per 24 hour   Intake 480 ml   Output 2150 ml   Net -1670 ml       Physical Exam:     Gen NAD  HENT dry  RS AEBE clear   CVS s1 s2 wnl   GI soft BS +  Ext 1+ LE edema   Access : rt SC dialysis catheter +  Data Review:    Recent Labs     05/09/19  0900   WBC 48.9*   RBC 2.39*   HCT 19.1*   MCV 79.9   MCH 28.9   MCHC 36.1   RDW 17.5*     Recent Labs     05/09/19  0900 05/08/19  0550 05/07/19  0940 05/06/19  1300   * 94* 88* 75*   CREA 7.69* 7.48* 7.31* 6.68*   CA 9.2 8.8 8.4* 7.7*   ALB  --   --  3.4  --    K 3.6 3.7 3.5 4.1    141 140 139   * 110* 107 108   CO2 21 20* 21 18*   PHOS  --   --  3.7  --    * 177* 130* 154*       Alhaji Bustos MD

## 2019-05-09 NOTE — ROUTINE PROCESS
TRANSFER - IN REPORT:    Verbal report received from Gary Go on [facility]  being received from ICU(unit) for routine progression of care      Report consisted of patients Situation, Background, Assessment and   Recommendations(SBAR). Information from the following report(s) SBAR, Kardex and Recent Results was reviewed with the receiving nurse. Opportunity for questions and clarification was provided. Assessment completed upon patients arrival to unit and care assumed.

## 2019-05-09 NOTE — PROGRESS NOTES
TRANSFER - OUT REPORT:    Verbal report given to Selam Lofton (name) on Charlotte Villalba  being transferred to  (unit) for routine progression of care       Report consisted of patients Situation, Background, Assessment and   Recommendations(SBAR). Information from the following report(s) SBAR, Kardex, Intake/Output and MAR was reviewed with the receiving nurse. Lines:   Peripheral IV 05/05/19 Right Antecubital (Active)   Site Assessment Clean, dry, & intact 5/9/2019  8:00 AM   Phlebitis Assessment 0 5/9/2019  8:00 AM   Infiltration Assessment 0 5/9/2019  8:00 AM   Dressing Status Clean, dry, & intact 5/9/2019  8:00 AM   Dressing Type Transparent 5/9/2019  8:00 AM   Hub Color/Line Status Pink 5/9/2019  8:00 AM   Action Taken Open ports on tubing capped 5/9/2019  8:00 AM   Alcohol Cap Used Yes 5/9/2019  8:00 AM        Opportunity for questions and clarification was provided.       Patient transported with:   transport, telemetry box

## 2019-05-09 NOTE — ROUTINE PROCESS
Bedside and Verbal shift change report given to Elia Bone RN (oncoming nurse) by Matteo Jonas RN (offgoing nurse). Report included the following information SBAR, Kardex and Recent Results.

## 2019-05-09 NOTE — PROGRESS NOTES
[facility] ANTIMICROBIAL STEWARDSHIP TEAM  PRESCRIBER COMMUNICATION FORM      We have briefly reviewed the antimicrobials  currently prescribed for your patient along with  the clinical indications and would like to Make the following recommendations:    DISCONTINUE ANTIBIOTICS      Rationale:    (  )  No evidence of bacterial infection    (  )  Microbiology report does not support use    (  )  Narrowing broad-spectrum empiric coverage    (  )  Therapeutic duplication: overlapping antimicrobial spectrum    (  )  Clinical situation dictates change    (  )  Prolonged duration of therapy not needed    (  )  Allergy/toxicity/drug interaction present    (  ) More clinically/cost effective therapy available  ADD ANTIBIOTICS  Rationale:        (  ) Microbiology report supports use    (  ) Expanded coverage needed: gm positive /negative / anaerobic /                               atypical    ( ) More clinicaly/cost effective therapy than current regimen    ( ) Needed for synergy    ( ) Double coverage needed    ( ) Less toxic therapy    ( ) Antifungal therapy needed  ADDITIONAL SUGGESTIONS    ( ) continue current therapy with the following change    ( ) additional laboratory testing    ( ) consider infectious disease consultation    ( ) consider outpatient IV therapy    ( ) other    COMMENTS: discontinue levofloxacin if no clinical evidence of infection. Patient has h/o leukocytosis with sickle cell crisis in the past. Consider infectious disease consultation if difficult making decision about duration of antibiotic therapy. thanks       This communication is not to be considered a consultation. You are under no obligation to follow these suggestions. A physician-patient relationship has not been established between the physician Completing this form and your patient. If a thorough analysis of the case is desired, please request an ID consultation.

## 2019-05-10 LAB
ANION GAP SERPL CALC-SCNC: 11 MMOL/L (ref 3–18)
BACTERIA SPEC CULT: NORMAL
BASOPHILS # BLD: 0 K/UL (ref 0–0.06)
BASOPHILS NFR BLD: 0 % (ref 0–3)
BUN SERPL-MCNC: 97 MG/DL (ref 7–18)
BUN/CREAT SERPL: 14 (ref 12–20)
CA-I SERPL-SCNC: 1.23 MMOL/L (ref 1.12–1.32)
CALCIUM SERPL-MCNC: 8.8 MG/DL (ref 8.5–10.1)
CHLORIDE SERPL-SCNC: 106 MMOL/L (ref 100–108)
CO2 SERPL-SCNC: 22 MMOL/L (ref 21–32)
CREAT SERPL-MCNC: 7.02 MG/DL (ref 0.6–1.3)
DIFFERENTIAL METHOD BLD: ABNORMAL
EOSINOPHIL # BLD: 0 K/UL (ref 0–0.4)
EOSINOPHIL NFR BLD: 0 % (ref 0–5)
ERYTHROCYTE [DISTWIDTH] IN BLOOD BY AUTOMATED COUNT: 17.2 % (ref 11.6–14.5)
FIBRINOGEN PPP-MCNC: 173 MG/DL (ref 210–451)
GLUCOSE BLD STRIP.AUTO-MCNC: 129 MG/DL (ref 70–110)
GLUCOSE BLD STRIP.AUTO-MCNC: 151 MG/DL (ref 70–110)
GLUCOSE BLD STRIP.AUTO-MCNC: 154 MG/DL (ref 70–110)
GLUCOSE BLD STRIP.AUTO-MCNC: 172 MG/DL (ref 70–110)
GLUCOSE SERPL-MCNC: 159 MG/DL (ref 74–99)
HCT VFR BLD AUTO: 21.6 % (ref 35–45)
HGB BLD-MCNC: 7.5 G/DL (ref 12–16)
LYMPHOCYTES # BLD: 2.7 K/UL (ref 0.8–3.5)
LYMPHOCYTES NFR BLD: 6 % (ref 20–51)
MCH RBC QN AUTO: 28 PG (ref 24–34)
MCHC RBC AUTO-ENTMCNC: 34.7 G/DL (ref 31–37)
MCV RBC AUTO: 80.6 FL (ref 74–97)
METAMYELOCYTES NFR BLD MANUAL: 2 %
MONOCYTES # BLD: 5 K/UL (ref 0–1)
MONOCYTES NFR BLD: 11 % (ref 2–9)
NEUTS BAND NFR BLD MANUAL: 3 % (ref 0–5)
NEUTS SEG # BLD: 36.5 K/UL (ref 1.8–8)
NEUTS SEG NFR BLD: 78 % (ref 42–75)
NRBC BLD-RTO: 7 PER 100 WBC
PLATELET # BLD AUTO: 98 K/UL (ref 135–420)
PLATELET COMMENTS,PCOM: ABNORMAL
POTASSIUM SERPL-SCNC: 3.2 MMOL/L (ref 3.5–5.5)
RBC # BLD AUTO: 2.68 M/UL (ref 4.2–5.3)
RBC MORPH BLD: ABNORMAL
SERVICE CMNT-IMP: NORMAL
SODIUM SERPL-SCNC: 139 MMOL/L (ref 136–145)
WBC # BLD AUTO: 45 K/UL (ref 4.6–13.2)

## 2019-05-10 PROCEDURE — 85025 COMPLETE CBC W/AUTO DIFF WBC: CPT

## 2019-05-10 PROCEDURE — 36415 COLL VENOUS BLD VENIPUNCTURE: CPT

## 2019-05-10 PROCEDURE — 74011250637 HC RX REV CODE- 250/637: Performed by: INTERNAL MEDICINE

## 2019-05-10 PROCEDURE — 82962 GLUCOSE BLOOD TEST: CPT

## 2019-05-10 PROCEDURE — 82330 ASSAY OF CALCIUM: CPT

## 2019-05-10 PROCEDURE — 80048 BASIC METABOLIC PNL TOTAL CA: CPT

## 2019-05-10 PROCEDURE — 85384 FIBRINOGEN ACTIVITY: CPT

## 2019-05-10 PROCEDURE — 77030011251 HC DRSG HYDRCOIL S&N -A

## 2019-05-10 PROCEDURE — 36591 DRAW BLOOD OFF VENOUS DEVICE: CPT

## 2019-05-10 PROCEDURE — 74011250637 HC RX REV CODE- 250/637: Performed by: HOSPITALIST

## 2019-05-10 PROCEDURE — 74011250636 HC RX REV CODE- 250/636: Performed by: HOSPITALIST

## 2019-05-10 PROCEDURE — 65660000000 HC RM CCU STEPDOWN

## 2019-05-10 RX ORDER — HYDRALAZINE HYDROCHLORIDE 25 MG/1
25 TABLET, FILM COATED ORAL 3 TIMES DAILY
Status: DISCONTINUED | OUTPATIENT
Start: 2019-05-10 | End: 2019-05-15 | Stop reason: HOSPADM

## 2019-05-10 RX ADMIN — SODIUM BICARBONATE 650 MG TABLET 650 MG: at 15:52

## 2019-05-10 RX ADMIN — SODIUM BICARBONATE 650 MG TABLET 650 MG: at 09:00

## 2019-05-10 RX ADMIN — DRONABINOL 5 MG: 5 CAPSULE ORAL at 08:59

## 2019-05-10 RX ADMIN — SODIUM BICARBONATE 650 MG TABLET 650 MG: at 21:36

## 2019-05-10 RX ADMIN — LACTULOSE 30 ML: 20 SOLUTION ORAL at 08:59

## 2019-05-10 RX ADMIN — HYDROCODONE BITARTRATE AND ACETAMINOPHEN 2 TABLET: 5; 325 TABLET ORAL at 10:05

## 2019-05-10 RX ADMIN — LEVOTHYROXINE, LIOTHYRONINE 30 MG: 19; 4.5 TABLET ORAL at 09:00

## 2019-05-10 RX ADMIN — HYDROCODONE BITARTRATE AND ACETAMINOPHEN 2 TABLET: 5; 325 TABLET ORAL at 21:36

## 2019-05-10 RX ADMIN — LEVOFLOXACIN 500 MG: 5 INJECTION, SOLUTION INTRAVENOUS at 00:08

## 2019-05-10 RX ADMIN — FOLIC ACID 1 MG: 1 TABLET ORAL at 09:00

## 2019-05-10 RX ADMIN — HYDRALAZINE HYDROCHLORIDE 25 MG: 25 TABLET, FILM COATED ORAL at 15:52

## 2019-05-10 RX ADMIN — FAMOTIDINE 20 MG: 20 TABLET ORAL at 08:59

## 2019-05-10 RX ADMIN — HYDRALAZINE HYDROCHLORIDE 25 MG: 25 TABLET, FILM COATED ORAL at 21:36

## 2019-05-10 RX ADMIN — DRONABINOL 5 MG: 5 CAPSULE ORAL at 21:36

## 2019-05-10 NOTE — PROGRESS NOTES
Hematology/Medical Oncology Progress Note             Name: Allie Ferrari   : 1965   MRN: [id number]   Date: 5/10/2019 8:31 AM     [x]I have reviewed the flowsheet and previous days notes. Events overnight reviewed and discussed with nursing staff. Vital signs and records reviewed. Ms. Janes Coronel is a 49 y/o woman with sickle cell disease. She was admitted with complaints of progressive weakness and was found to have acute on chronic renal failure. She was also very dehydrated and complained of pain. She was transferred to the ICU due to respiratory distress, pt has now been transferred to floor. Voices no c/o today. Pt states her  will bring her Jadenu today. ROS:  Constitutional:  Negative for fever, chills, diaphoresis, activity change, appetite change and unexpected weight change. HENT: Negative for nosebleeds, congestion, facial swelling, mouth sores, trouble swallowing, neck pain, neck stiffness, voice change and postnasal drip. Eyes: Negative for photophobia, pain, discharge and itching. Respiratory: Negative for apnea, cough, choking, chest tightness, wheezing and stridor. Cardiovascular: Negative for chest pain, palpitations and leg swelling. Gastrointestinal: Negative for abdominal pain. Negative for nausea, diarrhea, constipation, blood in stool and rectal pain. Genitourinary: Negative for dysuria, urgency, hematuria, flank pain and difficulty urinating. Musculoskeletal: Negative for back pain, joint swelling, arthralgias and gait problem. Skin: Positive for dry skin. Neurological:  Negative for dizziness, facial asymmetry, speech difficulty, light-headedness and headaches. Hematological: Negative for adenopathy. Does not bruise/bleed easily. Psychiatric/Behavioral: Positive for occasionally has trouble remembering certain events or names. Negative for hallucinations.       Vital Signs:    Visit Vitals  /80 (BP 1 Location: Right arm, BP Patient Position: Supine)   Pulse 64   Temp 97.5 °F (36.4 °C)   Resp 17   Ht 5' 6\" (1.676 m)   Wt 104.3 kg (230 lb)   SpO2 99%   Breastfeeding? No   BMI 37.12 kg/m²       O2 Device: Room air   O2 Flow Rate (L/min): 2 l/min   Temp (24hrs), Av.5 °F (36.4 °C), Min:96 °F (35.6 °C), Max:98 °F (36.7 °C)       Intake/Output:   Last shift:      No intake/output data recorded. Last 3 shifts:  1901 - 05/10 0700  In: 1330 [P.O.:960; I.V.:100]  Out: 4250 [Urine:3550; Drains:700]    Intake/Output Summary (Last 24 hours) at 5/10/2019 0805  Last data filed at 5/10/2019 0645  Gross per 24 hour   Intake 850 ml   Output 2800 ml   Net -1950 ml       Physical Exam:    Constitutional:Appears comfortable, NAD. HENT: Head: Normocephalic and atraumatic. Mouth/Throat: No oropharyngeal exudate. Eyes: Conjunctivae and EOM are normal. Pupils are equal, round, and reactive to light. No scleral icterus. Neck: Normal range of motion. Neck supple. No tracheal deviation present. No thyromegaly present. Cardiovascular: yosef rate and regular rhythm. Exam reveals no gallop and no friction rub. No murmur heard. Pulmonary/Chest:Symmetrical chest expansion, no accessory muscle use; good airway entry; no rales/ wheezing/ rhonchi noted  Abdominal: Soft. Bowel sounds are normal. No distension. No tenderness. There is no rebound and no guarding. Musculoskeletal: Normal range of motion. No edema and no tenderness. Lymphadenopathy:   No cervical adenopathy. Neurological:Alert and oriented to person, place, and time. Coordination normal.   Skin: Skin is dry. Psychiatric: Normal mood and affect. Normal behavior.           DATA:   Current Facility-Administered Medications   Medication Dose Route Frequency    deferasirox (JADENU) tablet 1,444 mg (Patient Supplied)  1,444 mg Oral DAILY    0.9% sodium chloride infusion 250 mL  250 mL IntraVENous PRN    hydrALAZINE (APRESOLINE) 20 mg/mL injection 20 mg  20 mg IntraVENous Q6H PRN    sodium bicarbonate tablet 650 mg  650 mg Oral TID    calcium gluconate 1 g in 0.9% sodium chloride 100 mL IVPB  1 g IntraVENous DIALYSIS PRN    calcium carbonate (TUMS) chewable tablet 200 mg [elemental]  200 mg Oral DIALYSIS PRN    0.9% sodium chloride infusion 250 mL  250 mL IntraVENous PRN    lactulose (CHRONULAC) 10 gram/15 mL solution 30 mL  20 g Oral TID    glucose chewable tablet 16 g  4 Tab Oral PRN    glucagon (GLUCAGEN) injection 1 mg  1 mg IntraMUSCular PRN    dextrose (D50W) injection syrg 12.5-25 g  25-50 mL IntraVENous PRN    dronabinol (MARINOL) capsule 5 mg  5 mg Oral BID    HYDROcodone-acetaminophen (NORCO) 5-325 mg per tablet 1-2 Tab  1-2 Tab Oral Q4H PRN    famotidine (PEPCID) tablet 20 mg  20 mg Oral DAILY    levoFLOXacin (LEVAQUIN) 500 mg in D5W IVPB  500 mg IntraVENous Q48H    diphenhydrAMINE (BENADRYL) capsule 25 mg  25 mg Oral Q6H PRN    sodium chloride (NS) flush 5-10 mL  5-10 mL IntraVENous PRN    folic acid (FOLVITE) tablet 1 mg  1 mg Oral DAILY    thyroid (Pork) (ARMOUR) tablet 30 mg  30 mg Oral DAILY    acetaminophen (TYLENOL) tablet 650 mg  650 mg Oral Q4H PRN    ondansetron (ZOFRAN) injection 4 mg  4 mg IntraVENous Q4H PRN                    Labs:  Recent Labs     05/10/19  0440 05/09/19  0900 05/08/19  0345   WBC 45.0* 48.9* 44.5*   HGB 7.5* 6.9* 7.2*   HCT 21.6* 19.1* 19.8*   PLT 98* 105* 111*     Recent Labs     05/09/19  0900 05/08/19  0550 05/07/19  0940    141 140   K 3.6 3.7 3.5   * 110* 107   CO2 21 20* 21   * 177* 130*   * 94* 88*   CREA 7.69* 7.48* 7.31*   CA 9.2 8.8 8.4*   MG  --   --  2.5   PHOS  --   --  3.7   ALB  --   --  3.4   SGOT  --   --  75*   ALT  --   --  35     No results for input(s): PH, PCO2, PO2, HCO3, FIO2 in the last 72 hours. IMPRESSION:   · Acute kidney injury -From dehydration versus shock, urine improving  · Thrombocytosis- Improving.  Likely from sepsis and medications  · Anemia  · Leukocytosis  · Sickle cell anemia/sickle cell disease        PLAN:   · Anemia: Todays H/H is 7.5/21.6 s/p 1 unit PRBC transfusion on yesterday. Recommend transfusion of PRBC for hgb < 7.0 g/dl. · Sickle Cell Anemia: Continue Folvite 1mg dialy  · Thrombocytopenia:Platelet count 73,541, no intervention warranted unless her platelets decline below 15,000   · Leukocytosis:WBC 45.0  Blood Cx neg. Urine Cx: <10,000 COLONIES/mL Gram Negative Rods    · This patient current treatment for iron overload is Jadenu to help keep her ferritini level below 1000. Ferritin is 3,678. Pasqual Lime will continue while inpatient pending Nephrology clearance. I have discussed with in-patient pharmacy for in-house use of home meds.   ·        The patient is: [] acutely ill Risk of deterioration: [] moderate    [x] critically ill  [] high         My assessment/plan was discussed with:  [x]nursing []PT/OT    []respiratory therapy [x]Dr.Lloyd Rena Valdez MD      []family []       Mary Steward NP

## 2019-05-10 NOTE — PROGRESS NOTES
[facility] Hospitalist Group  Progress Note    Patient: Thien Lemon Age: 48 y.o. : 1965 MR#: [id number] SSN: [id number]  Date/Time: 5/10/2019     Subjective:     No new events overnight , lying in bed   Says she feels better today           Review of systems  General: No fevers or chills. Cardiovascular: No chest pain or pressure. No palpitations. Pulmonary: No shortness of breath, cough or wheeze. Gastrointestinal: No abdominal pain, nausea, vomiting or diarrhea. Genitourinary: No urinary frequency, urgency, hesitancy or dysuria. Musculoskeletal: No joint or muscle pain, no back pain, no recent trauma. Neurologic: No headache, numbness, tingling or weakness. Assessment/Plan:     1. Thrombocytopenia - monitor counts -- Hematology on board  - stable counts   2. Anemia - s/p 1 unit PRBC yesterday - stable   3. Leukocytosis - monitor counts - ? Acute leukemia   4. Chronic anemia 2y to baseline sickle cell disease   5. ARF - Nephrology on board , IV Lasix prn , will follow  creatinine improved - pt mentions she would like to defer grijalva removal to Nephrology  6. HTN - resumed BP meds, monitor BP   7. HypoT4 - continue armor thyroid  8. Ammonia levels improved - stop lactulose , remove FMS   9. Mild protein calorie malnutrition        Case discussed with:  [x]Patient  [x]Family  []Nursing  []Case Management  DVT Prophylaxis:  []Lovenox  []Hep SQ  [x]SCDs  []Coumadin   []On Heparin gtt    Objective:   VS:   Visit Vitals  /85 (BP 1 Location: Right arm, BP Patient Position: Supine)   Pulse 61   Temp 97.6 °F (36.4 °C)   Resp 17   Ht 5' 6\" (1.676 m)   Wt 104.3 kg (230 lb)   SpO2 99%   Breastfeeding?  No   BMI 37.12 kg/m²      Tmax/24hrs: Temp (24hrs), Av.6 °F (36.4 °C), Min:97.2 °F (36.2 °C), Max:98 °F (36.7 °C)  IOBRIEF    Intake/Output Summary (Last 24 hours) at 5/10/2019 1713  Last data filed at 5/10/2019 0645  Gross per 24 hour   Intake 580 ml   Output 2500 ml Net -1920 ml       General:  Alert, cooperative, no acute distress    HEENT: PERRLA, anicteric sclerae. Pulmonary:  CTA Bilaterally. No Wheezing/Rhonchi/Rales. Cardiovascular: Regular rate and Rhythm. GI:  Soft, Non distended, Non tender. + Bowel sounds. Extremities:  No edema, cyanosis, clubbing. No calf tenderness. Psych: Good insight. Not anxious or agitated. Neurologic: Alert and oriented X 3. No acute neuro deficits.   Additional:    Medications:   Current Facility-Administered Medications   Medication Dose Route Frequency    hydrALAZINE (APRESOLINE) tablet 25 mg  25 mg Oral TID    deferasirox (JADENU) tablet 1,444 mg (Patient Supplied)  1,444 mg Oral DAILY    0.9% sodium chloride infusion 250 mL  250 mL IntraVENous PRN    hydrALAZINE (APRESOLINE) 20 mg/mL injection 20 mg  20 mg IntraVENous Q6H PRN    sodium bicarbonate tablet 650 mg  650 mg Oral TID    calcium gluconate 1 g in 0.9% sodium chloride 100 mL IVPB  1 g IntraVENous DIALYSIS PRN    calcium carbonate (TUMS) chewable tablet 200 mg [elemental]  200 mg Oral DIALYSIS PRN    0.9% sodium chloride infusion 250 mL  250 mL IntraVENous PRN    glucose chewable tablet 16 g  4 Tab Oral PRN    glucagon (GLUCAGEN) injection 1 mg  1 mg IntraMUSCular PRN    dextrose (D50W) injection syrg 12.5-25 g  25-50 mL IntraVENous PRN    dronabinol (MARINOL) capsule 5 mg  5 mg Oral BID    HYDROcodone-acetaminophen (NORCO) 5-325 mg per tablet 1-2 Tab  1-2 Tab Oral Q4H PRN    famotidine (PEPCID) tablet 20 mg  20 mg Oral DAILY    diphenhydrAMINE (BENADRYL) capsule 25 mg  25 mg Oral Q6H PRN    sodium chloride (NS) flush 5-10 mL  5-10 mL IntraVENous PRN    folic acid (FOLVITE) tablet 1 mg  1 mg Oral DAILY    thyroid (Pork) (ARMOUR) tablet 30 mg  30 mg Oral DAILY    acetaminophen (TYLENOL) tablet 650 mg  650 mg Oral Q4H PRN    ondansetron (ZOFRAN) injection 4 mg  4 mg IntraVENous Q4H PRN       Labs:    Recent Results (from the past 24 hour(s))   GLUCOSE, POC Collection Time: 05/09/19  9:16 PM   Result Value Ref Range    Glucose (POC) 182 (H) 70 - 110 mg/dL   CBC WITH AUTOMATED DIFF    Collection Time: 05/10/19  4:40 AM   Result Value Ref Range    WBC 45.0 (H) 4.6 - 13.2 K/uL    RBC 2.68 (L) 4.20 - 5.30 M/uL    HGB 7.5 (L) 12.0 - 16.0 g/dL    HCT 21.6 (L) 35.0 - 45.0 %    MCV 80.6 74.0 - 97.0 FL    MCH 28.0 24.0 - 34.0 PG    MCHC 34.7 31.0 - 37.0 g/dL    RDW 17.2 (H) 11.6 - 14.5 %    PLATELET 98 (L) 037 - 420 K/uL    NEUTROPHILS 78 (H) 42 - 75 %    BAND NEUTROPHILS 3 0 - 5 %    LYMPHOCYTES 6 (L) 20 - 51 %    MONOCYTES 11 (H) 2 - 9 %    EOSINOPHILS 0 0 - 5 %    BASOPHILS 0 0 - 3 %    METAMYELOCYTES 2 (H) 0 %    NRBC 7.0 (H) 0  WBC    ABS. NEUTROPHILS 36.5 (H) 1.8 - 8.0 K/UL    ABS. LYMPHOCYTES 2.7 0.8 - 3.5 K/UL    ABS. MONOCYTES 5.0 (H) 0 - 1.0 K/UL    ABS. EOSINOPHILS 0.0 0.0 - 0.4 K/UL    ABS.  BASOPHILS 0.0 0.0 - 0.06 K/UL    DF AUTOMATED      PLATELET COMMENTS Platelet Estimate, Decreased      RBC COMMENTS ANISOCYTOSIS  1+       FIBRINOGEN    Collection Time: 05/10/19  4:40 AM   Result Value Ref Range    Fibrinogen 173 (L) 210 - 451 mg/dL   CALCIUM, IONIZED    Collection Time: 05/10/19  4:40 AM   Result Value Ref Range    Ionized Calcium 1.23 1.12 - 1.32 MMOL/L   GLUCOSE, POC    Collection Time: 05/10/19  7:00 AM   Result Value Ref Range    Glucose (POC) 172 (H) 70 - 151 mg/dL   METABOLIC PANEL, BASIC    Collection Time: 05/10/19  9:14 AM   Result Value Ref Range    Sodium 139 136 - 145 mmol/L    Potassium 3.2 (L) 3.5 - 5.5 mmol/L    Chloride 106 100 - 108 mmol/L    CO2 22 21 - 32 mmol/L    Anion gap 11 3.0 - 18 mmol/L    Glucose 159 (H) 74 - 99 mg/dL    BUN 97 (H) 7.0 - 18 MG/DL    Creatinine 7.02 (H) 0.6 - 1.3 MG/DL    BUN/Creatinine ratio 14 12 - 20      GFR est AA 7 (L) >60 ml/min/1.73m2    GFR est non-AA 6 (L) >60 ml/min/1.73m2    Calcium 8.8 8.5 - 10.1 MG/DL   GLUCOSE, POC    Collection Time: 05/10/19 11:15 AM   Result Value Ref Range    Glucose (POC) 151 (H) 70 - 110 mg/dL   GLUCOSE, POC    Collection Time: 05/10/19  3:36 PM   Result Value Ref Range    Glucose (POC) 154 (H) 70 - 110 mg/dL       Signed By: Elvia Essex, MD     May 10, 2019

## 2019-05-10 NOTE — ROUTINE PROCESS
Bedside and Verbal shift change report given to Srikanth Mabry (oncoming nurse) by Moira Velarde RN (offgoing nurse). Report given with SBAR, Kardex, Intake/Output, MAR, Accordion and Recent Results.

## 2019-05-10 NOTE — PROGRESS NOTES
In Patient Progress note    Admit Date: 5/3/2019     Impression:      #1Oliguric --> nonoliguric ,Acute kidney injury on chronic kidney disease stage III(baseline creatinine 1.3) from Ischaemic ATN   h/o NSAID intake ( 800 mg ibuprofen ) may have contributed , Papillary necrosis d/t NSAID and sickle crisis  Was a consideration  but no significant hematuria seen on UA on presentation  Volume status has improved quiet well , making good urine   Renal parameters are improved , creatinine lower and showing a trend   Appetite improved , hemodynamically stable   Bull in place , CT abdomen with no hydro, does show renal cyst   #2 chronic kidney disease stage III, patient does have minimal proteinuria, suspect due to hypertension nephrosclerosis   versus FSGS secondary to sickle cell disease  #3 Ac non gap  metabolic acidosis, improved, continue PO bicarb   #4 Sickle cell disease ,acute crisis, pain management per primary team/Hem/onc  #5 Anemia/thrombocytopenia: hem/onc following , s/p PRBC Tx, stable   #6 leukocytosis/Sirs: work-up per primary, likely leukemoid reaction   #7 splenomegaly d/t sickle disease , discussed with Hematology   #8 AMS: improved , judicious pain management   #9 transaminitis/hyperammonemia, --> better , does have secondary hemochromatoses   Hem/onc following   #10 HTN : avoid too tight control , ~ systolic 796-245L  , adding hydralazine with parameters     nonoliguric ,creatinine has started trending down ,  clinically looking better , volume status , electrolytes   Acid base looks good , all encouraging factors.  Will d/c uldall today     Please call with questions,     Narcisa Roy MD [facility]  Cell [phone]  Pager: [phone]      Subjective:     Denies SOB, awake and alerty   Improved appetite    Current Facility-Administered Medications:     hydrALAZINE (APRESOLINE) tablet 25 mg, 25 mg, Oral, TID, Ela Valdez MD    deferasirox (JADENU) tablet 1,444 mg (Patient Supplied), 1,444 mg, Oral, DAILY, Ke Obando, NP, Stopped at 05/09/19 1100    0.9% sodium chloride infusion 250 mL, 250 mL, IntraVENous, PRN, Ke Obando NP, Stopped at 05/10/19 0858    hydrALAZINE (APRESOLINE) 20 mg/mL injection 20 mg, 20 mg, IntraVENous, Q6H PRN, Lily aPge PA-C, 20 mg at 05/07/19 2218    sodium bicarbonate tablet 650 mg, 650 mg, Oral, TID, Harvey Valdez MD, 650 mg at 05/10/19 0900    calcium gluconate 1 g in 0.9% sodium chloride 100 mL IVPB, 1 g, IntraVENous, DIALYSIS PRN, Janice Valdez MD    calcium carbonate (TUMS) chewable tablet 200 mg [elemental], 200 mg, Oral, DIALYSIS PRN, Janice Valdez MD    0.9% sodium chloride infusion 250 mL, 250 mL, IntraVENous, PRN, Janice Valdez MD    lactulose (CHRONULAC) 10 gram/15 mL solution 30 mL, 20 g, Oral, TID, Wallace Catalan MD, 30 mL at 05/10/19 0859    glucose chewable tablet 16 g, 4 Tab, Oral, PRN, Lily Page PA-C    glucagon (GLUCAGEN) injection 1 mg, 1 mg, IntraMUSCular, PRN, Lily Lyles PA-C    dextrose (D50W) injection syrg 12.5-25 g, 25-50 mL, IntraVENous, PRN, Lily Lyles PA-C    dronabinol (MARINOL) capsule 5 mg, 5 mg, Oral, BID, Keagan Francis MD, 5 mg at 05/10/19 0859    HYDROcodone-acetaminophen (NORCO) 5-325 mg per tablet 1-2 Tab, 1-2 Tab, Oral, Q4H PRN, Wallace Catalan MD, 2 Tab at 05/10/19 1005    famotidine (PEPCID) tablet 20 mg, 20 mg, Oral, DAILY, Wallace Catalan MD, 20 mg at 05/10/19 0859    diphenhydrAMINE (BENADRYL) capsule 25 mg, 25 mg, Oral, Q6H PRN, Wallace Catalan MD, 25 mg at 05/04/19 1846    sodium chloride (NS) flush 5-10 mL, 5-10 mL, IntraVENous, PRN, José Miguel Burns MD    folic acid (FOLVITE) tablet 1 mg, 1 mg, Oral, DAILY, José Miguel Burns MD, 1 mg at 05/10/19 0900    thyroid (Pork) (ARMOUR) tablet 30 mg, 30 mg, Oral, DAILY, José Miguel Burns MD, 30 mg at 05/10/19 0900    acetaminophen (TYLENOL) tablet 650 mg, 650 mg, Oral, Q4H PRN, Lynette Navarro MD, 650 mg at 05/05/19 1621    ondansetron [facility] injection 4 mg, 4 mg, IntraVENous, Q4H PRN, Lynette Navarro MD, 4 mg at 05/03/19 2137        Objective:     Visit Vitals  /80 (BP 1 Location: Right arm, BP Patient Position: Supine)   Pulse 64   Temp 97.5 °F (36.4 °C)   Resp 17   Ht 5' 6\" (1.676 m)   Wt 104.3 kg (230 lb)   SpO2 99%   Breastfeeding?  No   BMI 37.12 kg/m²         Intake/Output Summary (Last 24 hours) at 5/10/2019 1059  Last data filed at 5/10/2019 0645  Gross per 24 hour   Intake 850 ml   Output 2800 ml   Net -1950 ml       Physical Exam:     Gen NAD  HENT dry  RS AEBE clear   CVS s1 s2 wnl   GI soft BS +  Ext 1+ LE edema   Access : rt SC dialysis catheter +    Data Review:    Recent Labs     05/10/19  0440   WBC 45.0*   RBC 2.68*   HCT 21.6*   MCV 80.6   MCH 28.0   MCHC 34.7   RDW 17.2*     Recent Labs     05/10/19  0914 05/09/19  0900 05/08/19  0550   BUN 97* 100* 94*   CREA 7.02* 7.69* 7.48*   CA 8.8 9.2 8.8   K 3.2* 3.6 3.7    142 141    109* 110*   CO2 22 21 20*   * 188* 177*       Jeferson Hung MD

## 2019-05-11 LAB
ANION GAP SERPL CALC-SCNC: 11 MMOL/L (ref 3–18)
BASOPHILS # BLD: 0 K/UL (ref 0–0.06)
BASOPHILS NFR BLD: 0 % (ref 0–3)
BUN SERPL-MCNC: 99 MG/DL (ref 7–18)
BUN/CREAT SERPL: 15 (ref 12–20)
CA-I SERPL-SCNC: 1.21 MMOL/L (ref 1.12–1.32)
CALCIUM SERPL-MCNC: 8.5 MG/DL (ref 8.5–10.1)
CHLORIDE SERPL-SCNC: 109 MMOL/L (ref 100–108)
CO2 SERPL-SCNC: 21 MMOL/L (ref 21–32)
CREAT SERPL-MCNC: 6.56 MG/DL (ref 0.6–1.3)
DIFFERENTIAL METHOD BLD: ABNORMAL
EOSINOPHIL # BLD: 0.4 K/UL (ref 0–0.4)
EOSINOPHIL NFR BLD: 1 % (ref 0–5)
ERYTHROCYTE [DISTWIDTH] IN BLOOD BY AUTOMATED COUNT: 17.3 % (ref 11.6–14.5)
FIBRINOGEN PPP-MCNC: 151 MG/DL (ref 210–451)
GLUCOSE BLD STRIP.AUTO-MCNC: 120 MG/DL (ref 70–110)
GLUCOSE BLD STRIP.AUTO-MCNC: 127 MG/DL (ref 70–110)
GLUCOSE SERPL-MCNC: 107 MG/DL (ref 74–99)
HCT VFR BLD AUTO: 21.7 % (ref 35–45)
HGB BLD-MCNC: 7.7 G/DL (ref 12–16)
LYMPHOCYTES # BLD: 10.7 K/UL (ref 0.8–3.5)
LYMPHOCYTES NFR BLD: 27 % (ref 20–51)
MCH RBC QN AUTO: 28.6 PG (ref 24–34)
MCHC RBC AUTO-ENTMCNC: 35.5 G/DL (ref 31–37)
MCV RBC AUTO: 80.7 FL (ref 74–97)
METAMYELOCYTES NFR BLD MANUAL: 1 %
MONOCYTES # BLD: 0.4 K/UL (ref 0–1)
MONOCYTES NFR BLD: 1 % (ref 2–9)
NEUTS SEG # BLD: 27.7 K/UL (ref 1.8–8)
NEUTS SEG NFR BLD: 70 % (ref 42–75)
NRBC BLD-RTO: 13 PER 100 WBC
PLATELET # BLD AUTO: 75 K/UL (ref 135–420)
PLATELET COMMENTS,PCOM: ABNORMAL
POTASSIUM SERPL-SCNC: 3 MMOL/L (ref 3.5–5.5)
RBC # BLD AUTO: 2.69 M/UL (ref 4.2–5.3)
RBC MORPH BLD: ABNORMAL
SODIUM SERPL-SCNC: 141 MMOL/L (ref 136–145)
WBC # BLD AUTO: 39.6 K/UL (ref 4.6–13.2)

## 2019-05-11 PROCEDURE — 74011250637 HC RX REV CODE- 250/637: Performed by: INTERNAL MEDICINE

## 2019-05-11 PROCEDURE — 74011250637 HC RX REV CODE- 250/637: Performed by: HOSPITALIST

## 2019-05-11 PROCEDURE — 82962 GLUCOSE BLOOD TEST: CPT

## 2019-05-11 PROCEDURE — 85025 COMPLETE CBC W/AUTO DIFF WBC: CPT

## 2019-05-11 PROCEDURE — 36415 COLL VENOUS BLD VENIPUNCTURE: CPT

## 2019-05-11 PROCEDURE — 80048 BASIC METABOLIC PNL TOTAL CA: CPT

## 2019-05-11 PROCEDURE — 85384 FIBRINOGEN ACTIVITY: CPT

## 2019-05-11 PROCEDURE — 82330 ASSAY OF CALCIUM: CPT

## 2019-05-11 PROCEDURE — 65660000000 HC RM CCU STEPDOWN

## 2019-05-11 RX ORDER — POTASSIUM CHLORIDE 20 MEQ/1
20 TABLET, EXTENDED RELEASE ORAL 2 TIMES DAILY
Status: COMPLETED | OUTPATIENT
Start: 2019-05-11 | End: 2019-05-12

## 2019-05-11 RX ADMIN — HYDROCODONE BITARTRATE AND ACETAMINOPHEN 2 TABLET: 5; 325 TABLET ORAL at 20:33

## 2019-05-11 RX ADMIN — FAMOTIDINE 20 MG: 20 TABLET ORAL at 08:50

## 2019-05-11 RX ADMIN — HYDROCODONE BITARTRATE AND ACETAMINOPHEN 2 TABLET: 5; 325 TABLET ORAL at 08:55

## 2019-05-11 RX ADMIN — SODIUM BICARBONATE 650 MG TABLET 650 MG: at 08:50

## 2019-05-11 RX ADMIN — HYDRALAZINE HYDROCHLORIDE 25 MG: 25 TABLET, FILM COATED ORAL at 17:31

## 2019-05-11 RX ADMIN — HYDRALAZINE HYDROCHLORIDE 25 MG: 25 TABLET, FILM COATED ORAL at 20:32

## 2019-05-11 RX ADMIN — DRONABINOL 5 MG: 5 CAPSULE ORAL at 08:50

## 2019-05-11 RX ADMIN — SODIUM BICARBONATE 650 MG TABLET 650 MG: at 20:32

## 2019-05-11 RX ADMIN — HYDRALAZINE HYDROCHLORIDE 25 MG: 25 TABLET, FILM COATED ORAL at 08:50

## 2019-05-11 RX ADMIN — SODIUM BICARBONATE 650 MG TABLET 650 MG: at 17:31

## 2019-05-11 RX ADMIN — LEVOTHYROXINE, LIOTHYRONINE 30 MG: 19; 4.5 TABLET ORAL at 08:51

## 2019-05-11 RX ADMIN — POTASSIUM CHLORIDE 20 MEQ: 20 TABLET, EXTENDED RELEASE ORAL at 20:32

## 2019-05-11 RX ADMIN — FOLIC ACID 1 MG: 1 TABLET ORAL at 08:50

## 2019-05-11 RX ADMIN — DRONABINOL 5 MG: 5 CAPSULE ORAL at 20:33

## 2019-05-11 NOTE — PROGRESS NOTES
Hematology/Medical Oncology Progress Note             Name: Jessica Plata   : 1965   MRN: [id number]   Date: 2019 8:31 AM     [x]I have reviewed the flowsheet and previous days notes. Events overnight reviewed and discussed with nursing staff. Vital signs and records reviewed. Ms. Juan Luis Horner is a 47 y/o woman with sickle cell disease. She was admitted with complaints of progressive weakness and was found to have acute on chronic renal failure. She was also very dehydrated and complained of pain. She was transferred to the ICU due to respiratory distress, pt has now been transferred to floor. Today the patient voices no new c/o              ROS:  Constitutional:  Negative for fever, chills, diaphoresis, activity change, appetite change and unexpected weight change. HENT: Negative for nosebleeds, congestion, facial swelling, mouth sores, trouble swallowing, neck pain, neck stiffness, voice change and postnasal drip. Eyes: Negative for photophobia, pain, discharge and itching. Respiratory: Negative for apnea, cough, choking, chest tightness, wheezing and stridor. Cardiovascular: Negative for chest pain, palpitations and leg swelling. Gastrointestinal: Negative for abdominal pain. Negative for nausea, diarrhea, constipation, blood in stool and rectal pain. Genitourinary: Negative for dysuria, urgency, hematuria, flank pain and difficulty urinating. Musculoskeletal: Negative for back pain, joint swelling, arthralgias and gait problem. Skin: Positive for dry skin. Neurological:  Negative for dizziness, facial asymmetry, speech difficulty, light-headedness and headaches. Hematological: Negative for adenopathy. Does not bruise/bleed easily. Psychiatric/Behavioral: Positive for occasionally has trouble remembering certain events or names. Negative for hallucinations.       Vital Signs:    Visit Vitals  BP (!) 144/91 (BP 1 Location: Right arm, BP Patient Position: Supine)   Pulse (!) 58 Temp 98.4 °F (36.9 °C)   Resp 16   Ht 5' 6\" (1.676 m)   Wt 103.1 kg (227 lb 6.4 oz)   SpO2 100%   Breastfeeding? No   BMI 36.70 kg/m²       O2 Device: Room air   O2 Flow Rate (L/min): 2 l/min   Temp (24hrs), Av.9 °F (36.6 °C), Min:97.6 °F (36.4 °C), Max:98.4 °F (36.9 °C)       Intake/Output:   Last shift:      No intake/output data recorded. Last 3 shifts:  1901 -  0700  In: 1060 [P.O.:960; I.V.:100]  Out: 3750 [Urine:3750]    Intake/Output Summary (Last 24 hours) at 2019 0819  Last data filed at 2019 0544  Gross per 24 hour   Intake 480 ml   Output 1250 ml   Net -770 ml       Physical Exam:    Constitutional:Appears comfortable, NAD. HENT: Head: Normocephalic and atraumatic. Mouth/Throat: No oropharyngeal exudate. Eyes: Conjunctivae and EOM are normal. Pupils are equal, round, and reactive to light. No scleral icterus. Neck: Normal range of motion. Neck supple. No tracheal deviation present. No thyromegaly present. Cardiovascular: yosef rate and regular rhythm. Exam reveals no gallop and no friction rub. No murmur heard. Pulmonary/Chest:Symmetrical chest expansion, no accessory muscle use; good airway entry; no rales/ wheezing/ rhonchi noted  Abdominal: Soft. Bowel sounds are normal. No distension. No tenderness. There is no rebound and no guarding. Musculoskeletal: Normal range of motion. No edema and no tenderness. Lymphadenopathy:   No cervical adenopathy. Neurological:Alert and oriented to person, place, and time. Coordination normal.   Skin: Skin is dry. Psychiatric: Normal mood and affect. Normal behavior.           DATA:   Current Facility-Administered Medications   Medication Dose Route Frequency    hydrALAZINE (APRESOLINE) tablet 25 mg  25 mg Oral TID    deferasirox (JADENU) tablet 1,444 mg (Patient Supplied)  1,444 mg Oral DAILY    0.9% sodium chloride infusion 250 mL  250 mL IntraVENous PRN    hydrALAZINE (APRESOLINE) 20 mg/mL injection 20 mg  20 mg IntraVENous Q6H PRN    sodium bicarbonate tablet 650 mg  650 mg Oral TID    calcium gluconate 1 g in 0.9% sodium chloride 100 mL IVPB  1 g IntraVENous DIALYSIS PRN    calcium carbonate (TUMS) chewable tablet 200 mg [elemental]  200 mg Oral DIALYSIS PRN    0.9% sodium chloride infusion 250 mL  250 mL IntraVENous PRN    glucose chewable tablet 16 g  4 Tab Oral PRN    glucagon (GLUCAGEN) injection 1 mg  1 mg IntraMUSCular PRN    dextrose (D50W) injection syrg 12.5-25 g  25-50 mL IntraVENous PRN    dronabinol (MARINOL) capsule 5 mg  5 mg Oral BID    HYDROcodone-acetaminophen (NORCO) 5-325 mg per tablet 1-2 Tab  1-2 Tab Oral Q4H PRN    famotidine (PEPCID) tablet 20 mg  20 mg Oral DAILY    diphenhydrAMINE (BENADRYL) capsule 25 mg  25 mg Oral Q6H PRN    sodium chloride (NS) flush 5-10 mL  5-10 mL IntraVENous PRN    folic acid (FOLVITE) tablet 1 mg  1 mg Oral DAILY    thyroid (Pork) (ARMOUR) tablet 30 mg  30 mg Oral DAILY    acetaminophen (TYLENOL) tablet 650 mg  650 mg Oral Q4H PRN    ondansetron (ZOFRAN) injection 4 mg  4 mg IntraVENous Q4H PRN                    Labs:  Recent Labs     05/11/19  0207 05/10/19  0440 05/09/19  0900   WBC 39.6* 45.0* 48.9*   HGB 7.7* 7.5* 6.9*   HCT 21.7* 21.6* 19.1*   PLT 75* 98* 105*     Recent Labs     05/11/19  0207 05/10/19  0914 05/09/19  0900    139 142   K 3.0* 3.2* 3.6   * 106 109*   CO2 21 22 21   * 159* 188*   BUN 99* 97* 100*   CREA 6.56* 7.02* 7.69*   CA 8.5 8.8 9.2     No results for input(s): PH, PCO2, PO2, HCO3, FIO2 in the last 72 hours. IMPRESSION:   · Acute kidney injury -secondary to NSAID intake?, hypertension                   nephrosclerosis?vs secondary to sickle cell disease? · Thrombocytosis- Improving. Likely from sepsis and medications  · Leukocytosis  · Sickle cell anemia/sickle cell disease        PLAN:   · Anemia: Todays H/H is 7.7/21.7  Recommend transfusion of PRBC for hgb < 7.0       g/dl.    · Sickle Cell Anemia: Continue Folvite 1mg dialy  · Thrombocytopenia:Platelet count 00,425, no intervention warranted unless her       platelets decline below 15,000   · Leukocytosis:WBC decreased today from 45.0 to 39.6  Blood Cx neg. Urine Cx:       <10,000 COLONIES/mL Gram Negative Rods    · This patient current treatment for iron overload is Jadenu to help keep her ferritin       level below 1000. Ferritin is 3,678. Jadenu to start as an in-patient per nephrology clearance. Urine OP improved. Creatinine baseline 1.3; today is 6.56- Nephrology following.  ·        The patient is: [x] acutely ill Risk of deterioration: [] moderate    [] critically ill  [] high         My assessment/plan was discussed with:  [x]nursing []PT/OT    []respiratory therapy [x]Dr.Lloyd Shanita Loza MD      []family []       Usama Welsh NP

## 2019-05-11 NOTE — PROGRESS NOTES
RENAL PROGRESS NOTE        Neilradha Hoyttaylor         Assessment/Plan:     · MARIA ALEJANDRA  (ischemic atn in a setting of sickle cell crisis/nsaid use). Renal function began to improve. No indications for acute dialysis. 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 · Hypokalemia. Replace. · HTN. Stable, continue current regimen. Subjective:  Patient complaints off: Feels better. No SOB/CP/N/V. Tolerates diet.        Patient Active Problem List   Diagnosis Code    Symptomatic anemia D64.9    Breast cancer ([facility] [address] C50.919    Sickle cell anemia (HCC) D57.1    Dehydration E86.0    Fibromyalgia M79.7    Hx of endometriosis Z87.42    Sickle cell crisis ([facility] [address] D57.00    Vitamin D deficiency E55.9    Osteoarthritis M19.90    Breast cancer of lower-outer quadrant of left female breast ([facility] [address] C50.512    Iron deficiency anemia due to dietary causes D50.8    Osteoarthritis of left hip M16.12    Thrombocytopenia (HCC) D69.6    Acute blood loss anemia D62    Choledocholithiasis K80.50    Cholecystitis with cholelithiasis K80.10    Osteoarthritis of right hip M16.11    Leukocytosis D72.829    Osteoarthritis of right knee M17.11    Shortness of breath R06.02    Sickle cell anemia with crisis (HCC) D57.00    Anemia D64.9    Acute renal injury (HCC) N17.9    Chest pain R07.9    ARF (acute renal failure) ([facility] N17.9    Abdominal pain R10.9    Bandemia D72.825       Current Facility-Administered Medications   Medication Dose Route Frequency Provider Last Rate Last Dose    hydrALAZINE (APRESOLINE) tablet 25 mg  25 mg Oral TID Ky KAUR MD   25 mg at 05/11/19 1731    deferasirox (JADENU) tablet 1,444 mg (Patient Supplied)  1,444 mg Oral DAILY Anish Chamorro NP   Stopped at 05/09/19 1100    0.9% sodium chloride infusion 250 mL  250 mL IntraVENous PRN Mercy Gonzalez NP   Stopped at 05/10/19 8635    hydrALAZINE (APRESOLINE) 20 mg/mL injection 20 mg  20 mg IntraVENous Q6H PRN Chris Galindo PA-C   20 mg at 05/07/19 2218    sodium bicarbonate tablet 650 mg  650 mg Oral TID Daniella Herndon MD   650 mg at 05/11/19 1731    calcium gluconate 1 g in 0.9% sodium chloride 100 mL IVPB  1 g IntraVENous DIALYSIS PRN Daniella Herndon MD        calcium carbonate (TUMS) chewable tablet 200 mg [elemental]  200 mg Oral DIALYSIS PRN Lauren Valdez MD        0.9% sodium chloride infusion 250 mL  250 mL IntraVENous PRN Lauren Valdez MD        glucose chewable tablet 16 g  4 Tab Oral PRN Chris Galindo PA-C        glucagon (GLUCAGEN) injection 1 mg  1 mg IntraMUSCular PRN Chris Galindo PA-C        dextrose (D50W) injection syrg 12.5-25 g  25-50 mL IntraVENous PRN Chris Galindo PA-C        dronabinol (MARINOL) capsule 5 mg  5 mg Oral BID Renetta Robin MD   5 mg at 05/11/19 0850    HYDROcodone-acetaminophen (NORCO) 5-325 mg per tablet 1-2 Tab  1-2 Tab Oral Q4H PRN Chava Celaya MD   2 Tab at 05/11/19 0855    famotidine (PEPCID) tablet 20 mg  20 mg Oral DAILY Chava Celaya MD   20 mg at 05/11/19 0850    diphenhydrAMINE (BENADRYL) capsule 25 mg  25 mg Oral Q6H PRN Chava Celaya MD   25 mg at 05/04/19 1846    sodium chloride (NS) flush 5-10 mL  5-10 mL IntraVENous PRN Jesika Bui MD        folic acid (FOLVITE) tablet 1 mg  1 mg Oral DAILY Jesika Bui MD   1 mg at 05/11/19 0850    thyroid (Pork) (ARMOUR) tablet 30 mg  30 mg Oral DAILY Jesika Bui MD   30 mg at 05/11/19 0851    acetaminophen (TYLENOL) tablet 650 mg  650 mg Oral Q4H PRN Jesika Bui MD   650 mg at 05/05/19 1621    ondansetron (ZOFRAN) injection 4 mg  4 mg IntraVENous Q4H PRN Jesika Bui MD   4 mg at 05/03/19 2133       Objective  Vitals:    05/11/19 0400 05/11/19 0742 05/11/19 1054 05/11/19 1622   BP: 132/71 (!) 144/91 128/75 134/70   Pulse: 74 (!) 58 89 63   Resp: 18 16 19 17   Temp: 97.8 °F (36.6 °C) 98.4 °F (36.9 °C) 98.2 °F (36.8 °C) 98.2 °F (36.8 °C)   SpO2: 99% 100% 100% 98%   Weight: 103.1 kg (227 lb 6.4 oz)      Height:             Intake/Output Summary (Last 24 hours) at 5/11/2019 1850  Last data filed at 5/11/2019 1622  Gross per 24 hour   Intake 600 ml   Output 2600 ml   Net -2000 ml           Admission weight: Weight: 93 kg (205 lb) (05/03/19 1420)  Last Weight Metrics:  Weight Loss Metrics 5/11/2019 4/10/2019 1/14/2019 11/2/2018 10/21/2018 7/19/2018 6/27/2018   Today's Wt 227 lb 6.4 oz 223 lb 4.8 oz 221 lb 220 lb 220 lb 3.2 oz 224 lb 220 lb   BMI 36.7 kg/m2 36.04 kg/m2 35.14 kg/m2 34.98 kg/m2 35.39 kg/m2 35.61 kg/m2 34.98 kg/m2             Physical Assessment:     General: NAD, alert and oriented. Neck: No jvd. LUNGS: Clear to Auscultation, No rales, rhonchi or wheezes. CVS EXM: S1, S2  RRR. Abdomen: soft, non tender. Lower Extremities:  trace edema. Lab    CBC w/Diff Recent Labs     05/11/19  0207 05/10/19  0440 05/09/19  0900   WBC 39.6* 45.0* 48.9*   RBC 2.69* 2.68* 2.39*   HGB 7.7* 7.5* 6.9*   HCT 21.7* 21.6* 19.1*   PLT 75* 98* 105*   GRANS 70 78* 79*   LYMPH 27 6* 11*   EOS 1 0 0        Chemistry Recent Labs     05/11/19  0207 05/10/19  0914 05/09/19  0900   * 159* 188*    139 142   K 3.0* 3.2* 3.6   * 106 109*   CO2 21 22 21   BUN 99* 97* 100*   CREA 6.56* 7.02* 7.69*   CA 8.5 8.8 9.2   AGAP 11 11 12   BUCR 15 14 13         Lab Results   Component Value Date/Time    Iron 59 11/02/2018 03:12 AM    TIBC 216 (L) 11/02/2018 03:12 AM    Iron % saturation 27 11/02/2018 03:12 AM    Ferritin 3,678 (H) 05/08/2019 05:50 AM      Lab Results   Component Value Date/Time    Calcium 8.5 05/11/2019 02:07 AM    Phosphorus 3.7 05/07/2019 09:40 AM        Chiara Agee M.D.   [facility]  Phone

## 2019-05-11 NOTE — PROGRESS NOTES
Problem: Pain  Goal: *Control of Pain  Outcome: Progressing Towards Goal     Problem: Pain  Goal: *PALLIATIVE CARE:  Alleviation of Pain  Outcome: Progressing Towards Goal     Problem: Patient Education: Go to Patient Education Activity  Goal: Patient/Family Education  Outcome: Progressing Towards Goal     Problem: Patient Education: Go to Patient Education Activity  Goal: Patient/Family Education  Outcome: Progressing Towards Goal     Problem: Nutrition Deficit  Goal: *Optimize nutritional status  Outcome: Progressing Towards Goal     Problem: Injury - Risk of, Adverse Drug Event  Goal: *Absence of adverse drug events  Outcome: Progressing Towards Goal     Problem: Injury - Risk of, Adverse Drug Event  Goal: *Absence of medication errors  Outcome: Progressing Towards Goal     Problem: Injury - Risk of, Adverse Drug Event  Goal: *Knowledge of prescribed medications  Outcome: Progressing Towards Goal     Problem: Patient Education: Go to Patient Education Activity  Goal: Patient/Family Education  Outcome: Progressing Towards Goal     Problem: Anemia Care Plan (Adult and Pediatric)  Goal: *Labs within defined limits  Outcome: Progressing Towards Goal     Problem: Anemia Care Plan (Adult and Pediatric)  Goal: *Tolerates increased activity  Outcome: Progressing Towards Goal     Problem: Patient Education: Go to Patient Education Activity  Goal: Patient/Family Education  Outcome: Progressing Towards Goal

## 2019-05-11 NOTE — ROUTINE PROCESS
Bedside and Verbal shift change report given to Micha Wellington  (oncoming nurse) by Hemant Lehman RN (offgoing nurse). Report given with SBAR, Kardex, Intake/Output, MAR, Accordion and Recent Results.

## 2019-05-11 NOTE — ROUTINE PROCESS
Bedside shift change report given to [address] [place] [address] Avenue (oncoming nurse) by Charo Araiza (offgoing nurse). Report included the following information SBAR, Kardex, MAR and Recent Results.

## 2019-05-11 NOTE — PROGRESS NOTES
[facility] Hospitalist Group  Progress Note    Patient: Dinesh Abbott Age: 48 y.o. : 1965 MR#: [id number] SSN: [id number]  Date/Time: 2019     Subjective:     Patient sitting in bed in NAD, awake, alert, follows commands    Assessment/Plan:     1. Thrombocytopenia -monitor , heme following  2. Anemia - monitor, transfuse as per guidelines, no overt bleeding  3. Leukocytosis - monitor counts- ? Leukemoid reaction, hematology on case  4. Chronic anemia 2y to baseline sickle cell disease   5. ARF on CKD3 - Nephrology following, monitor  6. HTN - resumed BP meds, monitor BP    7. Hypothyroidism - continue armor thyroid  8. Ammonia levels improved - off lactulose   9. Sickle cell anemia, not in crisis currently   10. ?secondary hemochromatosis- as per heme   PT, OT  Full code    Case discussed with:  [x]Patient  []Family  []Nursing  []Case Management  DVT Prophylaxis:  []Lovenox  []Hep SQ  [x]SCDs  []Coumadin   []On Heparin gtt    Objective:   VS:   Visit Vitals  /70 (BP 1 Location: Right leg, BP Patient Position: Sitting)   Pulse 63   Temp 98.2 °F (36.8 °C)   Resp 17   Ht 5' 6\" (1.676 m)   Wt 103.1 kg (227 lb 6.4 oz)   SpO2 98%   Breastfeeding?  No   BMI 36.70 kg/m²      Tmax/24hrs: Temp (24hrs), Av °F (36.7 °C), Min:97.7 °F (36.5 °C), Max:98.4 °F (36.9 °C)  IOBRIEF    Intake/Output Summary (Last 24 hours) at 2019 1647  Last data filed at 2019 1056  Gross per 24 hour   Intake 600 ml   Output 1975 ml   Net -1375 ml       General:  Awake, alert  Cardiovascular:  S1S2+, RRR  Pulmonary:  CTA b/l  GI:  Soft, BS+, NT, ND  Extremities:  trace edema      Medications:   Current Facility-Administered Medications   Medication Dose Route Frequency    hydrALAZINE (APRESOLINE) tablet 25 mg  25 mg Oral TID    deferasirox (JADENU) tablet 1,444 mg (Patient Supplied)  1,444 mg Oral DAILY    0.9% sodium chloride infusion 250 mL  250 mL IntraVENous PRN    hydrALAZINE (APRESOLINE) 20 mg/mL injection 20 mg  20 mg IntraVENous Q6H PRN    sodium bicarbonate tablet 650 mg  650 mg Oral TID    calcium gluconate 1 g in 0.9% sodium chloride 100 mL IVPB  1 g IntraVENous DIALYSIS PRN    calcium carbonate (TUMS) chewable tablet 200 mg [elemental]  200 mg Oral DIALYSIS PRN    0.9% sodium chloride infusion 250 mL  250 mL IntraVENous PRN    glucose chewable tablet 16 g  4 Tab Oral PRN    glucagon (GLUCAGEN) injection 1 mg  1 mg IntraMUSCular PRN    dextrose (D50W) injection syrg 12.5-25 g  25-50 mL IntraVENous PRN    dronabinol (MARINOL) capsule 5 mg  5 mg Oral BID    HYDROcodone-acetaminophen (NORCO) 5-325 mg per tablet 1-2 Tab  1-2 Tab Oral Q4H PRN    famotidine (PEPCID) tablet 20 mg  20 mg Oral DAILY    diphenhydrAMINE (BENADRYL) capsule 25 mg  25 mg Oral Q6H PRN    sodium chloride (NS) flush 5-10 mL  5-10 mL IntraVENous PRN    folic acid (FOLVITE) tablet 1 mg  1 mg Oral DAILY    thyroid (Pork) (ARMOUR) tablet 30 mg  30 mg Oral DAILY    acetaminophen (TYLENOL) tablet 650 mg  650 mg Oral Q4H PRN    ondansetron (ZOFRAN) injection 4 mg  4 mg IntraVENous Q4H PRN       Labs:    Recent Results (from the past 24 hour(s))   GLUCOSE, POC    Collection Time: 05/10/19  9:27 PM   Result Value Ref Range    Glucose (POC) 129 (H) 70 - 110 mg/dL   CBC WITH AUTOMATED DIFF    Collection Time: 05/11/19  2:07 AM   Result Value Ref Range    WBC 39.6 (H) 4.6 - 13.2 K/uL    RBC 2.69 (L) 4.20 - 5.30 M/uL    HGB 7.7 (L) 12.0 - 16.0 g/dL    HCT 21.7 (L) 35.0 - 45.0 %    MCV 80.7 74.0 - 97.0 FL    MCH 28.6 24.0 - 34.0 PG    MCHC 35.5 31.0 - 37.0 g/dL    RDW 17.3 (H) 11.6 - 14.5 %    PLATELET 75 (L) 187 - 420 K/uL    NEUTROPHILS 70 42 - 75 %    LYMPHOCYTES 27 20 - 51 %    MONOCYTES 1 (L) 2 - 9 %    EOSINOPHILS 1 0 - 5 %    BASOPHILS 0 0 - 3 %    METAMYELOCYTES 1 (H) 0 %    NRBC 13.0 (H) 0  WBC    ABS. NEUTROPHILS 27.7 (H) 1.8 - 8.0 K/UL    ABS. LYMPHOCYTES 10.7 (H) 0.8 - 3.5 K/UL    ABS.  MONOCYTES 0.4 0 - 1.0 K/UL    ABS. EOSINOPHILS 0.4 0.0 - 0.4 K/UL    ABS.  BASOPHILS 0.0 0.0 - 0.06 K/UL    DF MANUAL      PLATELET COMMENTS DECREASED PLATELETS      RBC COMMENTS ANISOCYTOSIS  2+        RBC COMMENTS MICROCYTOSIS  1+        RBC COMMENTS POLYCHROMASIA  1+        RBC COMMENTS TARGET CELLS  1+       FIBRINOGEN    Collection Time: 05/11/19  2:07 AM   Result Value Ref Range    Fibrinogen 151 (L) 210 - 451 mg/dL   CALCIUM, IONIZED    Collection Time: 05/11/19  2:07 AM   Result Value Ref Range    Ionized Calcium 1.21 1.12 - 2.57 MMOL/L   METABOLIC PANEL, BASIC    Collection Time: 05/11/19  2:07 AM   Result Value Ref Range    Sodium 141 136 - 145 mmol/L    Potassium 3.0 (L) 3.5 - 5.5 mmol/L    Chloride 109 (H) 100 - 108 mmol/L    CO2 21 21 - 32 mmol/L    Anion gap 11 3.0 - 18 mmol/L    Glucose 107 (H) 74 - 99 mg/dL    BUN 99 (H) 7.0 - 18 MG/DL    Creatinine 6.56 (H) 0.6 - 1.3 MG/DL    BUN/Creatinine ratio 15 12 - 20      GFR est AA 8 (L) >60 ml/min/1.73m2    GFR est non-AA 7 (L) >60 ml/min/1.73m2    Calcium 8.5 8.5 - 10.1 MG/DL   GLUCOSE, POC    Collection Time: 05/11/19  6:38 AM   Result Value Ref Range    Glucose (POC) 120 (H) 70 - 110 mg/dL       Signed By: Richie Pettit MD     May 11, 2019

## 2019-05-12 LAB
ANION GAP SERPL CALC-SCNC: 11 MMOL/L (ref 3–18)
BASOPHILS # BLD: 0 K/UL (ref 0–0.06)
BASOPHILS NFR BLD: 0 % (ref 0–3)
BUN SERPL-MCNC: 88 MG/DL (ref 7–18)
BUN/CREAT SERPL: 16 (ref 12–20)
CA-I SERPL-SCNC: 1.19 MMOL/L (ref 1.12–1.32)
CALCIUM SERPL-MCNC: 8.9 MG/DL (ref 8.5–10.1)
CHLORIDE SERPL-SCNC: 110 MMOL/L (ref 100–108)
CO2 SERPL-SCNC: 21 MMOL/L (ref 21–32)
CREAT SERPL-MCNC: 5.59 MG/DL (ref 0.6–1.3)
DIFFERENTIAL METHOD BLD: ABNORMAL
EOSINOPHIL # BLD: 0.6 K/UL (ref 0–0.4)
EOSINOPHIL NFR BLD: 2 % (ref 0–5)
ERYTHROCYTE [DISTWIDTH] IN BLOOD BY AUTOMATED COUNT: 17.5 % (ref 11.6–14.5)
FIBRINOGEN PPP-MCNC: 220 MG/DL (ref 210–451)
GLUCOSE BLD STRIP.AUTO-MCNC: 125 MG/DL (ref 70–110)
GLUCOSE BLD STRIP.AUTO-MCNC: 131 MG/DL (ref 70–110)
GLUCOSE BLD STRIP.AUTO-MCNC: 137 MG/DL (ref 70–110)
GLUCOSE SERPL-MCNC: 97 MG/DL (ref 74–99)
HCT VFR BLD AUTO: 26.5 % (ref 35–45)
HGB BLD-MCNC: 9.1 G/DL (ref 12–16)
LYMPHOCYTES # BLD: 3.5 K/UL (ref 0.8–3.5)
LYMPHOCYTES NFR BLD: 12 % (ref 20–51)
MAGNESIUM SERPL-MCNC: 2.1 MG/DL (ref 1.6–2.6)
MCH RBC QN AUTO: 28.1 PG (ref 24–34)
MCHC RBC AUTO-ENTMCNC: 34.3 G/DL (ref 31–37)
MCV RBC AUTO: 81.8 FL (ref 74–97)
METAMYELOCYTES NFR BLD MANUAL: 4 %
MONOCYTES # BLD: 0.9 K/UL (ref 0–1)
MONOCYTES NFR BLD: 3 % (ref 2–9)
NEUTS BAND NFR BLD MANUAL: 2 % (ref 0–5)
NEUTS SEG # BLD: 22.8 K/UL (ref 1.8–8)
NEUTS SEG NFR BLD: 77 % (ref 42–75)
NRBC BLD-RTO: 6 PER 100 WBC
PHOSPHATE SERPL-MCNC: 3.8 MG/DL (ref 2.5–4.9)
PLATELET # BLD AUTO: 70 K/UL (ref 135–420)
PLATELET COMMENTS,PCOM: ABNORMAL
POTASSIUM SERPL-SCNC: 3 MMOL/L (ref 3.5–5.5)
RBC # BLD AUTO: 3.24 M/UL (ref 4.2–5.3)
RBC MORPH BLD: ABNORMAL
SODIUM SERPL-SCNC: 142 MMOL/L (ref 136–145)
WBC # BLD AUTO: 28.9 K/UL (ref 4.6–13.2)

## 2019-05-12 PROCEDURE — 85025 COMPLETE CBC W/AUTO DIFF WBC: CPT

## 2019-05-12 PROCEDURE — 74011250637 HC RX REV CODE- 250/637: Performed by: INTERNAL MEDICINE

## 2019-05-12 PROCEDURE — 65660000000 HC RM CCU STEPDOWN

## 2019-05-12 PROCEDURE — 80048 BASIC METABOLIC PNL TOTAL CA: CPT

## 2019-05-12 PROCEDURE — 74011250637 HC RX REV CODE- 250/637: Performed by: HOSPITALIST

## 2019-05-12 PROCEDURE — 85384 FIBRINOGEN ACTIVITY: CPT

## 2019-05-12 PROCEDURE — 84100 ASSAY OF PHOSPHORUS: CPT

## 2019-05-12 PROCEDURE — 82330 ASSAY OF CALCIUM: CPT

## 2019-05-12 PROCEDURE — 36415 COLL VENOUS BLD VENIPUNCTURE: CPT

## 2019-05-12 PROCEDURE — 82962 GLUCOSE BLOOD TEST: CPT

## 2019-05-12 PROCEDURE — 83735 ASSAY OF MAGNESIUM: CPT

## 2019-05-12 RX ORDER — POTASSIUM CHLORIDE 20 MEQ/1
20 TABLET, EXTENDED RELEASE ORAL 3 TIMES DAILY
Status: COMPLETED | OUTPATIENT
Start: 2019-05-12 | End: 2019-05-14

## 2019-05-12 RX ADMIN — DRONABINOL 5 MG: 5 CAPSULE ORAL at 09:32

## 2019-05-12 RX ADMIN — HYDRALAZINE HYDROCHLORIDE 25 MG: 25 TABLET, FILM COATED ORAL at 09:25

## 2019-05-12 RX ADMIN — HYDRALAZINE HYDROCHLORIDE 25 MG: 25 TABLET, FILM COATED ORAL at 22:40

## 2019-05-12 RX ADMIN — POTASSIUM CHLORIDE 20 MEQ: 20 TABLET, EXTENDED RELEASE ORAL at 22:40

## 2019-05-12 RX ADMIN — HYDROCODONE BITARTRATE AND ACETAMINOPHEN 2 TABLET: 5; 325 TABLET ORAL at 19:00

## 2019-05-12 RX ADMIN — POTASSIUM CHLORIDE 20 MEQ: 20 TABLET, EXTENDED RELEASE ORAL at 16:54

## 2019-05-12 RX ADMIN — SODIUM BICARBONATE 650 MG TABLET 650 MG: at 22:40

## 2019-05-12 RX ADMIN — SODIUM BICARBONATE 650 MG TABLET 650 MG: at 16:53

## 2019-05-12 RX ADMIN — FOLIC ACID 1 MG: 1 TABLET ORAL at 09:24

## 2019-05-12 RX ADMIN — FAMOTIDINE 20 MG: 20 TABLET ORAL at 09:25

## 2019-05-12 RX ADMIN — DRONABINOL 5 MG: 5 CAPSULE ORAL at 22:40

## 2019-05-12 RX ADMIN — POTASSIUM CHLORIDE 20 MEQ: 20 TABLET, EXTENDED RELEASE ORAL at 09:24

## 2019-05-12 RX ADMIN — SODIUM BICARBONATE 650 MG TABLET 650 MG: at 09:23

## 2019-05-12 RX ADMIN — HYDROCODONE BITARTRATE AND ACETAMINOPHEN 2 TABLET: 5; 325 TABLET ORAL at 22:41

## 2019-05-12 RX ADMIN — LEVOTHYROXINE, LIOTHYRONINE 30 MG: 19; 4.5 TABLET ORAL at 09:25

## 2019-05-12 RX ADMIN — HYDROCODONE BITARTRATE AND ACETAMINOPHEN 2 TABLET: 5; 325 TABLET ORAL at 09:24

## 2019-05-12 NOTE — PROGRESS NOTES
Hematology/Medical Oncology Progress Note             Name: Kevin Chance   : 1965   MRN: [id number]   Date: 2019 8:31 AM     [x]I have reviewed the flowsheet and previous days notes. Events overnight reviewed and discussed with nursing staff. Vital signs and records reviewed. Ms. Dior Bronson is a 49 y/o woman with sickle cell disease. She was admitted with complaints of progressive weakness and was found to have acute on chronic renal failure. She was also very dehydrated and complained of pain. She was transferred to the ICU due to respiratory distress, pt has now been transferred to floor. Today the patient voices no new c/o. ROS:  Constitutional:  Negative for fever, chills, diaphoresis, activity change, appetite change and unexpected weight change. HENT: Negative for nosebleeds, congestion, facial swelling, mouth sores, trouble swallowing, neck pain, neck stiffness, voice change and postnasal drip. Eyes: Negative for photophobia, pain, discharge and itching. Respiratory: Negative for apnea, cough, choking, chest tightness, wheezing and stridor. Cardiovascular: Negative for chest pain, palpitations and leg swelling. Gastrointestinal: Negative for abdominal pain. Negative for nausea, diarrhea, constipation, blood in stool and rectal pain. Genitourinary: Negative for dysuria, urgency, hematuria, flank pain and difficulty urinating. Musculoskeletal: Negative for back pain, joint swelling, arthralgias and gait problem. Skin: Positive for dry skin. Neurological:  Negative for dizziness, facial asymmetry, speech difficulty, light-headedness and headaches. Hematological: Negative for adenopathy. Does not bruise/bleed easily. Psychiatric/Behavioral: Positive for occasionally has trouble remembering certain events or names. Negative for hallucinations.       Vital Signs:    Visit Vitals  /76 (BP 1 Location: Right arm, BP Patient Position: At rest)   Pulse 76   Temp 97.6 °F (36.4 °C)   Resp 18   Ht 5' 6\" (1.676 m)   Wt 99.7 kg (219 lb 12.8 oz)   SpO2 100%   Breastfeeding? No   BMI 35.48 kg/m²       O2 Device: Room air   O2 Flow Rate (L/min): 2 l/min   Temp (24hrs), Av.8 °F (36.6 °C), Min:97.2 °F (36.2 °C), Max:98.2 °F (36.8 °C)       Intake/Output:   Last shift:      No intake/output data recorded. Last 3 shifts: 05/10 1901 -  0700  In: 1100 [P.O.:1100]  Out: 4600 [Urine:4600]    Intake/Output Summary (Last 24 hours) at 2019 0855  Last data filed at 2019 0545  Gross per 24 hour   Intake 620 ml   Output 2800 ml   Net -2180 ml       Physical Exam:    Constitutional:Appears comfortable, NAD. HENT: Head: Normocephalic and atraumatic. Mouth/Throat: No oropharyngeal exudate. Eyes: Conjunctivae and EOM are normal. Pupils are equal, round, and reactive to light. No scleral icterus. Neck: Normal range of motion. Neck supple. No tracheal deviation present. No thyromegaly present. Cardiovascular: yosef rate and regular rhythm. Exam reveals no gallop and no friction rub. No murmur heard. Pulmonary/Chest:Symmetrical chest expansion, no accessory muscle use; good airway entry; no rales/ wheezing/ rhonchi noted  Abdominal: Soft. Bowel sounds are normal. No distension. No tenderness. There is no rebound and no guarding. Musculoskeletal: Normal range of motion. No edema and no tenderness. Lymphadenopathy:   No cervical adenopathy. Neurological:Alert and oriented to person, place, and time. Coordination normal.   Skin: Skin is dry. Psychiatric: Normal mood and affect. Normal behavior.           DATA:   Current Facility-Administered Medications   Medication Dose Route Frequency    potassium chloride (K-DUR, KLOR-CON) SR tablet 20 mEq  20 mEq Oral BID    hydrALAZINE (APRESOLINE) tablet 25 mg  25 mg Oral TID    deferasirox (JADENU) tablet 1,444 mg (Patient Supplied)  1,444 mg Oral DAILY    0.9% sodium chloride infusion 250 mL  250 mL IntraVENous PRN    hydrALAZINE (APRESOLINE) 20 mg/mL injection 20 mg  20 mg IntraVENous Q6H PRN    sodium bicarbonate tablet 650 mg  650 mg Oral TID    calcium gluconate 1 g in 0.9% sodium chloride 100 mL IVPB  1 g IntraVENous DIALYSIS PRN    calcium carbonate (TUMS) chewable tablet 200 mg [elemental]  200 mg Oral DIALYSIS PRN    0.9% sodium chloride infusion 250 mL  250 mL IntraVENous PRN    glucose chewable tablet 16 g  4 Tab Oral PRN    glucagon (GLUCAGEN) injection 1 mg  1 mg IntraMUSCular PRN    dextrose (D50W) injection syrg 12.5-25 g  25-50 mL IntraVENous PRN    dronabinol (MARINOL) capsule 5 mg  5 mg Oral BID    HYDROcodone-acetaminophen (NORCO) 5-325 mg per tablet 1-2 Tab  1-2 Tab Oral Q4H PRN    famotidine (PEPCID) tablet 20 mg  20 mg Oral DAILY    diphenhydrAMINE (BENADRYL) capsule 25 mg  25 mg Oral Q6H PRN    sodium chloride (NS) flush 5-10 mL  5-10 mL IntraVENous PRN    folic acid (FOLVITE) tablet 1 mg  1 mg Oral DAILY    thyroid (Pork) (ARMOUR) tablet 30 mg  30 mg Oral DAILY    acetaminophen (TYLENOL) tablet 650 mg  650 mg Oral Q4H PRN    ondansetron (ZOFRAN) injection 4 mg  4 mg IntraVENous Q4H PRN                    Labs:  Recent Labs     05/12/19  0506 05/11/19  0207 05/10/19  0440   WBC 28.9* 39.6* 45.0*   HGB 9.1* 7.7* 7.5*   HCT 26.5* 21.7* 21.6*   PLT 70* 75* 98*     Recent Labs     05/12/19  0506 05/11/19  0207 05/10/19  0914    141 139   K 3.0* 3.0* 3.2*   * 109* 106   CO2 21 21 22   GLU 97 107* 159*   BUN 88* 99* 97*   CREA 5.59* 6.56* 7.02*   CA 8.9 8.5 8.8   MG 2.1  --   --    PHOS 3.8  --   --      No results for input(s): PH, PCO2, PO2, HCO3, FIO2 in the last 72 hours. IMPRESSION:   · Acute kidney injury -secondary to NSAID intake?, hypertension                   nephrosclerosis?vs secondary to sickle cell disease? · Thrombocytosis- Improving.  Likely from sepsis and medications  · Leukocytosis  · Sickle cell anemia/sickle cell disease        PLAN:   · Anemia: Todays H/H is 9. 1/36.5  Recommend transfusion of PRBC for hgb < 7.0       g/dl. · Sickle Cell Anemia: Continue Folvite 1mg dialy  · Thrombocytopenia:Platelet count 29,241, no intervention warranted unless her       platelets decline below 15,000   · Leukocytosis:WBC trending down at 28.9 today. Blood Cx neg. Urine Cx:       <10,000 COLONIES/mL Gram Negative Rods    · This patient current treatment for iron overload is Jadenu to help keep her ferritin       level below 1000. Ferritin is 3,678. Jadenu to start as an in-patient per nephrology clearance. I have discussed with Nephrology. Urine OP improved. Creatinine baseline 1.3; today is trending down at 5.59- Nephrology following.  ·        The patient is: [x] acutely ill Risk of deterioration: [] moderate    [] critically ill  [] high         My assessment/plan was discussed with:  [x]nursing []PT/OT    []respiratory therapy [x]Dr.Lloyd Garrett Hsu MD      []family []       Florence Louis NP

## 2019-05-12 NOTE — PROGRESS NOTES
RENAL PROGRESS NOTE        Nel Piper         Assessment/Plan:     · MARIA ALEJANDRA  (ischemic atn in a setting of sickle cell crisis/nsaid use). Renal function began to improve. No indications for acute dialysis. 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 · Hypokalemia. Continue to replace. · HTN. Stable, continue current regimen. · Leucocytosis in a setting of sc disease. Azalia Marcie is not cleared by the kidneys but is contraindicated in gfr <40, for now continue to hold it. Subjective:  Patient complaints off: Feels better. No SOB/CP/N/V. Tolerates diet. Appetite is good.        Patient Active Problem List   Diagnosis Code    Symptomatic anemia D64.9    Breast cancer ([facility] C50.919    Sickle cell anemia (HCC) D57.1    Dehydration E86.0    Fibromyalgia M79.7    Hx of endometriosis Z87.42    Sickle cell crisis ([facility] D57.00    Vitamin D deficiency E55.9    Osteoarthritis M19.90    Breast cancer of lower-outer quadrant of left female breast ([facility] C50.512    Iron deficiency anemia due to dietary causes D50.8    Osteoarthritis of left hip M16.12    Thrombocytopenia (HCC) D69.6    Acute blood loss anemia D62    Choledocholithiasis K80.50    Cholecystitis with cholelithiasis K80.10    Osteoarthritis of right hip M16.11    Leukocytosis D72.829    Osteoarthritis of right knee M17.11    Shortness of breath R06.02    Sickle cell anemia with crisis (HCC) D57.00    Anemia D64.9    Acute renal injury (HCC) N17.9    Chest pain R07.9    ARF (acute renal failure) (HCC) N17.9    Abdominal pain R10.9    Bandemia D72.825       Current Facility-Administered Medications   Medication Dose Route Frequency Provider Last Rate Last Dose    hydrALAZINE (APRESOLINE) tablet 25 mg  25 mg Oral TID Deanne Merlin, MD   25 mg at 05/12/19 8875    deferasirox (JADENU) tablet 1,444 mg (Patient Supplied)  1,444 mg Oral DAILY Kenya Alatorre, RELL   Stopped at 05/09/19 1100    0.9% sodium chloride infusion 250 mL  250 mL IntraVENous PRN Kenya Alatorre NP   Stopped at 05/10/19 0858    hydrALAZINE (APRESOLINE) 20 mg/mL injection 20 mg  20 mg IntraVENous Q6H PRN Jose Henriquez PA-C   20 mg at 05/07/19 2218    sodium bicarbonate tablet 650 mg  650 mg Oral TID Nevaeh Verdugo MD   650 mg at 05/12/19 0615    calcium gluconate 1 g in 0.9% sodium chloride 100 mL IVPB  1 g IntraVENous DIALYSIS PRN Nevaeh Verdugo MD        calcium carbonate (TUMS) chewable tablet 200 mg [elemental]  200 mg Oral DIALYSIS PRN Svetlana Valdez MD        0.9% sodium chloride infusion 250 mL  250 mL IntraVENous PRN Svetlana Valdez MD        glucose chewable tablet 16 g  4 Tab Oral PRN Jose Henriquez PA-C        glucagon (GLUCAGEN) injection 1 mg  1 mg IntraMUSCular PRN Jose Henriquez PA-C        dextrose (D50W) injection syrg 12.5-25 g  25-50 mL IntraVENous PRN Jose Henriquez PA-C        dronabinol (MARINOL) capsule 5 mg  5 mg Oral BID Akhil Werner MD   5 mg at 05/12/19 0932    HYDROcodone-acetaminophen (NORCO) 5-325 mg per tablet 1-2 Tab  1-2 Tab Oral Q4H PRN Mirtha Henley MD   2 Tab at 05/12/19 0924    famotidine (PEPCID) tablet 20 mg  20 mg Oral DAILY Mirtha Henley MD   20 mg at 05/12/19 0925    diphenhydrAMINE (BENADRYL) capsule 25 mg  25 mg Oral Q6H PRN Mirtha Henley MD   25 mg at 05/04/19 1846    sodium chloride (NS) flush 5-10 mL  5-10 mL IntraVENous PRN Stephy Hardy MD        folic acid (FOLVITE) tablet 1 mg  1 mg Oral DAILY Stephy Hardy MD   1 mg at 05/12/19 4660    thyroid (Pork) (ARMOUR) tablet 30 mg  30 mg Oral DAILY Stephy Hardy MD   30 mg at 05/12/19 0925    acetaminophen (TYLENOL) tablet 650 mg  650 mg Oral Q4H PRN Stephy Hardy MD   650 mg at 05/05/19 1621    ondansetron (Tk [place]) injection 4 mg  4 mg IntraVENous Q4H PRN Hugo Hernandez MD   4 mg at 05/03/19 2133       Objective  Vitals:    05/12/19 0043 05/12/19 0415 05/12/19 0929 05/12/19 1215   BP: 152/82 134/76 119/73 134/73   Pulse: 77 76 70 66   Resp: 17 18 18 18   Temp: 97.6 °F (36.4 °C) 97.6 °F (36.4 °C) 98.4 °F (36.9 °C) 97.8 °F (36.6 °C)   SpO2: 100% 100% 98% 100%   Weight:  99.7 kg (219 lb 12.8 oz)     Height:             Intake/Output Summary (Last 24 hours) at 5/12/2019 1236  Last data filed at 5/12/2019 1220  Gross per 24 hour   Intake 740 ml   Output 3400 ml   Net -2660 ml           Admission weight: Weight: 93 kg (205 lb) (05/03/19 1420)  Last Weight Metrics:  Weight Loss Metrics 5/12/2019 4/10/2019 1/14/2019 11/2/2018 10/21/2018 7/19/2018 6/27/2018   Today's Wt 219 lb 12.8 oz 223 lb 4.8 oz 221 lb 220 lb 220 lb 3.2 oz 224 lb 220 lb   BMI 35.48 kg/m2 36.04 kg/m2 35.14 kg/m2 34.98 kg/m2 35.39 kg/m2 35.61 kg/m2 34.98 kg/m2             Physical Assessment:     General: NAD, alert and oriented. Neck: No jvd. LUNGS: Clear to Auscultation, No rales, rhonchi or wheezes. CVS EXM: S1, S2  RRR. Abdomen: soft, non tender. Lower Extremities: 1+ edema.        Lab    CBC w/Diff Recent Labs     05/12/19  0506 05/11/19  0207 05/10/19  0440   WBC 28.9* 39.6* 45.0*   RBC 3.24* 2.69* 2.68*   HGB 9.1* 7.7* 7.5*   HCT 26.5* 21.7* 21.6*   PLT 70* 75* 98*   GRANS 77* 70 78*   LYMPH 12* 27 6*   EOS 2 1 0        Chemistry Recent Labs     05/12/19  0506 05/11/19  0207 05/10/19  0914   GLU 97 107* 159*    141 139   K 3.0* 3.0* 3.2*   * 109* 106   CO2 21 21 22   BUN 88* 99* 97*   CREA 5.59* 6.56* 7.02*   CA 8.9 8.5 8.8   AGAP 11 11 11   BUCR 16 15 14   PHOS 3.8  --   --          Lab Results   Component Value Date/Time    Iron 59 11/02/2018 03:12 AM    TIBC 216 (L) 11/02/2018 03:12 AM    Iron % saturation 27 11/02/2018 03:12 AM    Ferritin 3,678 (H) 05/08/2019 05:50 AM      Lab Results   Component Value Date/Time    Calcium 8.9 05/12/2019 05:06 AM    Phosphorus 3.8 05/12/2019 05:06 AM        Edi Rodriguez M.D.   [facility]  Phone

## 2019-05-12 NOTE — ROUTINE PROCESS
Bedside and Verbal shift change report given to Bhakti Jin (oncoming nurse) by Minnie Avendaño (offgoing nurse). Report included the following information SBAR and Kardex.

## 2019-05-13 LAB
ABO + RH BLD: NORMAL
ANION GAP SERPL CALC-SCNC: 10 MMOL/L (ref 3–18)
ANTIGENS PRESENT RBC DONR: NORMAL
ANTIGENS PRESENT RBC DONR: NORMAL
BASOPHILS # BLD: 0 K/UL (ref 0–0.1)
BASOPHILS NFR BLD: 0 % (ref 0–3)
BLD GP AB SCN SERPL QL ELUTION: NORMAL
BLD PROD TYP BPU: NORMAL
BLD PROD TYP BPU: NORMAL
BLOOD GROUP ANTIBODIES SERPL: NORMAL
BLOOD GROUP ANTIBODIES SERPL: NORMAL
BPU ID: NORMAL
BPU ID: NORMAL
BUN SERPL-MCNC: 74 MG/DL (ref 7–18)
BUN/CREAT SERPL: 16 (ref 12–20)
CA-I SERPL-SCNC: 1.15 MMOL/L (ref 1.12–1.32)
CALCIUM SERPL-MCNC: 8.5 MG/DL (ref 8.5–10.1)
CALLED TO:,BCALL1: NORMAL
CHLORIDE SERPL-SCNC: 113 MMOL/L (ref 100–108)
CO2 SERPL-SCNC: 21 MMOL/L (ref 21–32)
CREAT SERPL-MCNC: 4.68 MG/DL (ref 0.6–1.3)
CROSSMATCH RESULT,%XM: NORMAL
CROSSMATCH RESULT,%XM: NORMAL
DIFFERENTIAL METHOD BLD: ABNORMAL
EOSINOPHIL # BLD: 0.2 K/UL (ref 0–0.4)
EOSINOPHIL NFR BLD: 1 % (ref 0–5)
ERYTHROCYTE [DISTWIDTH] IN BLOOD BY AUTOMATED COUNT: 17.7 % (ref 11.6–14.5)
FIBRINOGEN PPP-MCNC: 301 MG/DL (ref 210–451)
GLUCOSE BLD STRIP.AUTO-MCNC: 125 MG/DL (ref 70–110)
GLUCOSE BLD STRIP.AUTO-MCNC: 127 MG/DL (ref 70–110)
GLUCOSE BLD STRIP.AUTO-MCNC: 136 MG/DL (ref 70–110)
GLUCOSE BLD STRIP.AUTO-MCNC: 159 MG/DL (ref 70–110)
GLUCOSE SERPL-MCNC: 100 MG/DL (ref 74–99)
HCT VFR BLD AUTO: 22 % (ref 35–45)
HGB BLD-MCNC: 7.7 G/DL (ref 12–16)
LYMPHOCYTES # BLD: 2.8 K/UL (ref 0.8–3.5)
LYMPHOCYTES NFR BLD: 13 % (ref 20–51)
MCH RBC QN AUTO: 29.3 PG (ref 24–34)
MCHC RBC AUTO-ENTMCNC: 35 G/DL (ref 31–37)
MCV RBC AUTO: 83.7 FL (ref 74–97)
MONOCYTES # BLD: 0.4 K/UL (ref 0–1)
MONOCYTES NFR BLD: 2 % (ref 2–9)
MYELOCYTES NFR BLD MANUAL: 2 %
NEUTS BAND NFR BLD MANUAL: 6 % (ref 0–5)
NEUTS SEG # BLD: 16.2 K/UL (ref 1.8–8)
NEUTS SEG NFR BLD: 76 % (ref 42–75)
PLATELET # BLD AUTO: 37 K/UL (ref 135–420)
PLATELET COMMENTS,PCOM: ABNORMAL
POTASSIUM SERPL-SCNC: 3.5 MMOL/L (ref 3.5–5.5)
RBC # BLD AUTO: 2.63 M/UL (ref 4.2–5.3)
RBC MORPH BLD: ABNORMAL
SODIUM SERPL-SCNC: 144 MMOL/L (ref 136–145)
SPECIMEN EXP DATE BLD: NORMAL
STATUS OF UNIT,%ST: NORMAL
STATUS OF UNIT,%ST: NORMAL
UNIT DIVISION, %UDIV: 0
UNIT DIVISION, %UDIV: 0
WBC # BLD AUTO: 21.3 K/UL (ref 4.6–13.2)

## 2019-05-13 PROCEDURE — 85025 COMPLETE CBC W/AUTO DIFF WBC: CPT

## 2019-05-13 PROCEDURE — 97161 PT EVAL LOW COMPLEX 20 MIN: CPT

## 2019-05-13 PROCEDURE — 36415 COLL VENOUS BLD VENIPUNCTURE: CPT

## 2019-05-13 PROCEDURE — 97165 OT EVAL LOW COMPLEX 30 MIN: CPT

## 2019-05-13 PROCEDURE — 85384 FIBRINOGEN ACTIVITY: CPT

## 2019-05-13 PROCEDURE — 97116 GAIT TRAINING THERAPY: CPT

## 2019-05-13 PROCEDURE — 77030011256 HC DRSG MEPILEX <16IN NO BORD MOLN -A

## 2019-05-13 PROCEDURE — 80048 BASIC METABOLIC PNL TOTAL CA: CPT

## 2019-05-13 PROCEDURE — 74011250637 HC RX REV CODE- 250/637: Performed by: INTERNAL MEDICINE

## 2019-05-13 PROCEDURE — 65660000000 HC RM CCU STEPDOWN

## 2019-05-13 PROCEDURE — 74011250637 HC RX REV CODE- 250/637: Performed by: HOSPITALIST

## 2019-05-13 PROCEDURE — 82962 GLUCOSE BLOOD TEST: CPT

## 2019-05-13 PROCEDURE — 82330 ASSAY OF CALCIUM: CPT

## 2019-05-13 RX ADMIN — SODIUM BICARBONATE 650 MG TABLET 650 MG: at 16:34

## 2019-05-13 RX ADMIN — HYDROCODONE BITARTRATE AND ACETAMINOPHEN 2 TABLET: 5; 325 TABLET ORAL at 16:41

## 2019-05-13 RX ADMIN — DRONABINOL 5 MG: 5 CAPSULE ORAL at 09:37

## 2019-05-13 RX ADMIN — POTASSIUM CHLORIDE 20 MEQ: 20 TABLET, EXTENDED RELEASE ORAL at 21:30

## 2019-05-13 RX ADMIN — HYDROCODONE BITARTRATE AND ACETAMINOPHEN 2 TABLET: 5; 325 TABLET ORAL at 09:36

## 2019-05-13 RX ADMIN — POTASSIUM CHLORIDE 20 MEQ: 20 TABLET, EXTENDED RELEASE ORAL at 16:34

## 2019-05-13 RX ADMIN — SODIUM BICARBONATE 650 MG TABLET 650 MG: at 21:30

## 2019-05-13 RX ADMIN — POTASSIUM CHLORIDE 20 MEQ: 20 TABLET, EXTENDED RELEASE ORAL at 09:38

## 2019-05-13 RX ADMIN — FAMOTIDINE 20 MG: 20 TABLET ORAL at 09:39

## 2019-05-13 RX ADMIN — FOLIC ACID 1 MG: 1 TABLET ORAL at 09:37

## 2019-05-13 RX ADMIN — LEVOTHYROXINE, LIOTHYRONINE 30 MG: 19; 4.5 TABLET ORAL at 09:38

## 2019-05-13 RX ADMIN — HYDROCODONE BITARTRATE AND ACETAMINOPHEN 2 TABLET: 5; 325 TABLET ORAL at 21:30

## 2019-05-13 RX ADMIN — SODIUM BICARBONATE 650 MG TABLET 650 MG: at 09:38

## 2019-05-13 RX ADMIN — DRONABINOL 5 MG: 5 CAPSULE ORAL at 21:30

## 2019-05-13 RX ADMIN — HYDRALAZINE HYDROCHLORIDE 25 MG: 25 TABLET, FILM COATED ORAL at 21:30

## 2019-05-13 NOTE — PROGRESS NOTES
In Patient Progress note    Admit Date: 5/3/2019     Impression:     #1Oliguric --> nonoliguric ,Acute kidney injury on chronic kidney disease stage III(baseline creatinine 1.3) from Ischaemic ATN in setting of sickle crisis/NSAID use,  Volume status has improved quiet well, making good urine.   Renal parameters are improved, auto diuresing well   Appetite improved , hemodynamically stable   CT abdomen on admission with no hydro, does show renal cyst   #2 chronic kidney disease stage III, patient does have minimal proteinuria, suspect due to hypertension nephrosclerosis   versus FSGS secondary to sickle cell disease  #3 Ac non gap  metabolic acidosis, improved, d/c PO bicarb  #4 Sickle cell disease ,acute crisis, pain management per primary team/Hem/onc  #5 Anemia/thrombocytopenia: hem/onc following , plt count worse today , may need to recheck   #6 leukocytosis/Sirs: leukemoid reaction per Hem/Onc   #7 splenomegaly d/t sickle disease per Hem/onc  #8 AMS:resolved  #9 transaminitis/hyperammonemia,better , does have secondary hemochromatoses   Hem/onc following   #10 HTN : avoid too tight control , goal ~ systolic [id number]    #61 hypokalemia , continue Kcl replacement 20 meq daily     Please call with questions,     Edmund Enrique MD FASN  Cell [phone]  Pager: [phone]    Subjective:     Feels cold   Denies SOB/CP    Current Facility-Administered Medications:     potassium chloride (K-DUR, KLOR-CON) SR tablet 20 mEq, 20 mEq, Oral, TID, Carlos Enrique Yates MD, 20 mEq at 05/13/19 5083    hydrALAZINE (APRESOLINE) tablet 25 mg, 25 mg, Oral, TID, Cristofer Valdez MD, Stopped at 05/13/19 0937    0.9% sodium chloride infusion 250 mL, 250 mL, IntraVENous, PRN, Jelani Andres NP, Stopped at 05/10/19 0858    hydrALAZINE (APRESOLINE) 20 mg/mL injection 20 mg, 20 mg, IntraVENous, Q6H PRN, Lily Lyles PA-C, 20 mg at 05/07/19 2218    sodium bicarbonate tablet 650 mg, 650 mg, Oral, TID, Timoteo Stewart MD, 650 mg at 05/13/19 8887    calcium gluconate 1 g in 0.9% sodium chloride 100 mL IVPB, 1 g, IntraVENous, DIALYSIS PRN, Fatimah Valdez MD    calcium carbonate (TUMS) chewable tablet 200 mg [elemental], 200 mg, Oral, DIALYSIS PRN, Fatimah Valdez MD    0.9% sodium chloride infusion 250 mL, 250 mL, IntraVENous, PRN, Fatimah Valdez MD    glucose chewable tablet 16 g, 4 Tab, Oral, PRN, Lily Levy PA-C    glucagon (GLUCAGEN) injection 1 mg, 1 mg, IntraMUSCular, PRN, Lily Lyles PA-C    dextrose (D50W) injection syrg 12.5-25 g, 25-50 mL, IntraVENous, PRN, Lily Lyles PA-C    dronabinol (MARINOL) capsule 5 mg, 5 mg, Oral, BID, Rick Dewitt MD, 5 mg at 05/13/19 0937    HYDROcodone-acetaminophen (NORCO) 5-325 mg per tablet 1-2 Tab, 1-2 Tab, Oral, Q4H PRN, Lyric Begum MD, 2 Tab at 05/13/19 0936    famotidine (PEPCID) tablet 20 mg, 20 mg, Oral, DAILY, Paty Caldwell MD, 20 mg at 05/13/19 0939    diphenhydrAMINE (BENADRYL) capsule 25 mg, 25 mg, Oral, Q6H PRN, Lyric Begum MD, 25 mg at 05/04/19 1846    sodium chloride (NS) flush 5-10 mL, 5-10 mL, IntraVENous, PRN, Suzi Burns MD    folic acid (FOLVITE) tablet 1 mg, 1 mg, Oral, DAILY, Suzi Burns MD, 1 mg at 05/13/19 8466    thyroid (Pork) (ARMOUR) tablet 30 mg, 30 mg, Oral, DAILY, Suzi Burns MD, 30 mg at 05/13/19 6434    acetaminophen (TYLENOL) tablet 650 mg, 650 mg, Oral, Q4H PRN, Sushila Garcia MD, 650 mg at 05/05/19 1621    ondansetron (ZOFRAN) injection 4 mg, 4 mg, IntraVENous, Q4H PRN, Sushila Garcia MD, 4 mg at 05/03/19 5867        Objective:     Visit Vitals  /60 (BP 1 Location: Right arm, BP Patient Position: Supine)   Pulse 70   Temp 98 °F (36.7 °C)   Resp 19   Ht 5' 6\" (1.676 m)   Wt 99.3 kg (218 lb 14.4 oz)   SpO2 100%   Breastfeeding?  No   BMI 35.33 kg/m²         Intake/Output Summary (Last 24 hours) at 5/13/2019 1018  Last data filed at 5/13/2019 0900  Gross per 24 hour   Intake 960 ml   Output 2650 ml   Net -1690 ml       Physical Exam:     GEN NAD, feels cold   HENT mmm  CVS s1s2 wnl no JVD  RS AEBE clear   CVS s1 s2 wnl no JVD  GI soft BS +  Ext no edema     Data Review:    Recent Labs     05/13/19  0514   WBC 21.3*   RBC 2.63*   HCT 22.0*   MCV 83.7   MCH 29.3   MCHC 35.0   RDW 17.7*     Recent Labs     05/13/19  0514 05/12/19  0506 05/11/19  0207   BUN 74* 88* 99*   CREA 4.68* 5.59* 6.56*   CA 8.5 8.9 8.5   K 3.5 3.0* 3.0*    142 141   * 110* 109*   CO2 21 21 21   PHOS  --  3.8  --    * 97 107*       Alhaji Bustos MD

## 2019-05-13 NOTE — PROGRESS NOTES
Problem: Mobility Impaired (Adult and Pediatric)  Goal: *Acute Goals and Plan of Care (Insert Text)  Description  Physical Therapy Goals  Initiated 5/13/2019 and to be accomplished within 7 day(s)  1. Patient will move from supine to sit and sit to supine , scoot up and down and roll side to side in bed with supervision. 2.  Patient will transfer from bed to chair and chair to bed with supervision/set-up using the least restrictive device. 3.  Patient will perform sit to stand with supervision/set-up. 4.  Patient will ambulate with supervision/set-up for >100 feet with the least restrictive device. 5.  Patient will ascend/descend 4-12 stairs with 1 handrail(s) with minimal assistance/contact guard assist.    PLOF: Patient lives with  who is able to assist. They reside in 2 story home with possibility of staying on first level. Patient has AUBRIE with HR and was independent with mobility and I/ADL's PTA. Patient has BSC and RW at home. Outcome: Progressing Towards Goal   PHYSICAL THERAPY EVALUATION    Patient: Mary Acosta (34 y.o. female)  Date: 5/13/2019  Primary Diagnosis: ARF (acute renal failure) (HCC) [N17.9]  Leukocytosis [D72.829]  Bandemia [D72.825]  Thrombocytopenia (HCC) [D69.6]  Chest pain [R07.9]  Abdominal pain [R10.9]  Anemia [D64.9]  Precautions: Fall    ASSESSMENT :  Patient is 52yo F admitted to hospital for ARF and anemia, was in ICU, extubated, and transferred to tele unit. Patient presents today alert and agreeable to therapy and was requesting to use BSC. Therapist educated on role and goals of therapy and overall mobility goals in hospital to prepare for D/C home. Patient acknowledged understanding and ambulated into bathroom then into hallway for total 85ft with RW at . Patient demos steady gait with no LoB though required brief 5sec standing rest break due to fatigue and SOB.  Upon return to room, patient left resting in locked recliner with call bell by her side, chair alarm activated, and educated not to get up without assist. Patient denied further need for assist at this time. Patient will benefit from skilled intervention to address the above impairments. Patient's rehabilitation potential is considered to be Good  Factors which may influence rehabilitation potential include:   ? None noted  ? Mental ability/status  ? Medical condition  ? Home/family situation and support systems  ? Safety awareness  ? Pain tolerance/management  ? Other:      PLAN :  Recommendations and Planned Interventions:   ?           Bed Mobility Training             ? Neuromuscular Re-Education  ? Transfer Training                   ? Orthotic/Prosthetic Training  ? Gait Training                          ? Modalities  ? Therapeutic Exercises           ? Edema Management/Control  ? Therapeutic Activities            ? Family Training/Education  ? Patient Education  ? Other (comment):    Frequency/Duration: Patient will be followed by physical therapy 1-2 times per day to address goals. Discharge Recommendations: Home Health  Further Equipment Recommendations for Discharge: rolling walker     SUBJECTIVE:   Patient stated ? I am freezing cold. ?    OBJECTIVE DATA SUMMARY:     Past Medical History:   Diagnosis Date    Anemia NEC     Arthritis     Chronic pain     Ductal carcinoma ([facility] [address] 2011    left breast    Fatigue     Fibromyalgia     GERD (gastroesophageal reflux disease)     Hx of endometriosis     Hypertension 2011    Ill-defined condition 02/2018    Sickle cell crisis    Osteoarthritis of left hip 8/17/2016    Osteoarthritis of right hip 1/3/2017    Osteoarthritis of right knee 6/28/2018    Sickle cell anemia (HCC)     Sleep apnea     Does not use CPAP    Thyroid disease     hypo     Past Surgical History:   Procedure Laterality Date    HX BREAST BIOPSY Left 2016    HX  SECTION      HX HERNIA REPAIR      HX LAP CHOLECYSTECTOMY      HX MASTECTOMY Left 2012    with axillary lymph node dissection    TOTAL HIP ARTHROPLASTY Bilateral 2016 & 2017     Barriers to Learning/Limitations: None  Compensate with: N/A  Home Situation:  Home Situation  Home Environment: Private residence  # Steps to Enter: 10  One/Two Story Residence: One story  Living Alone: No  Support Systems: Family member(s)  Patient Expects to be Discharged to[de-identified] Private residence  Current DME Used/Available at Home: None  Critical Behavior:    A&Ox4  Strength:    Strength: Generally decreased, functional(BLE)   Tone & Sensation:   Tone: Normal(BLE)   Sensation: Intact(BLE)   Range Of Motion:  AROM: Within functional limits(BLE)   Functional Mobility:  Bed Mobility:   Scooting: Supervision  Transfers:  Sit to Stand: Supervision(From recliner, bed, and low commode with grab bars)  Stand to Sit: Supervision      Balance:   Sitting: Intact  Standing: Impaired; With support  Standing - Static: Good  Standing - Dynamic : Fair  Ambulation/Gait Training:  Distance (ft): 85 Feet (ft)  Assistive Device: Walker, rolling  Ambulation - Level of Assistance: Stand-by assistance;Contact guard assistance   Gait Abnormalities: Decreased step clearance   Base of Support: Widened   Speed/Yoana: Slow  Step Length: Left shortened;Right shortened  Pain:  Pain level pre-treatment: 8.5/10   Pain level post-treatment: 8.5/10   Pain Intervention(s) : Medication (see MAR); Rest, Repositioning  Response to intervention: Nurse notified (administered pain meds)    Activity Tolerance:   Patient tolerated activity fair; patient reports fatigue as greatest limitation in therapy at this time. Please refer to the flowsheet for vital signs taken during this treatment. After treatment:   ?         Patient left in no apparent distress sitting up in chair  ? Patient left in no apparent distress in bed  ?          Call bell left within reach  ?         Nursing notified  ? Caregiver present  ? Bed alarm activated  ? SCDs applied    COMMUNICATION/EDUCATION:   ?         Role of Physical Therapy in the acute care setting. ?         Fall prevention education was provided and the patient/caregiver indicated understanding. ? Patient/family have participated as able in goal setting and plan of care. ?         Patient/family agree to work toward stated goals and plan of care. ?         Patient understands intent and goals of therapy, but is neutral about his/her participation. ? Patient is unable to participate in goal setting/plan of care: ongoing with therapy staff. ?         Other:     Thank you for this referral.  Debbie Patel, PT   Time Calculation: 23 mins      Eval Complexity: History: MEDIUM  Complexity : 1-2 comorbidities / personal factors will impact the outcome/ POC Exam:LOW Complexity : 1-2 Standardized tests and measures addressing body structure, function, activity limitation and / or participation in recreation  Presentation: LOW Complexity : Stable, uncomplicated  Clinical Decision Making:Low Complexity    Overall Complexity:LOW

## 2019-05-13 NOTE — PROGRESS NOTES
Hematology/Medical Oncology Progress Note             Name: Kerri Verdugo   : 1965   MRN: [id number]   Date: 2019 8:31 AM     [x]I have reviewed the flowsheet and previous days notes. Events overnight reviewed and discussed with nursing staff. Vital signs and records reviewed. Ms. Pearl Hatfield is a 49 y/o woman with sickle cell disease. She was admitted with complaints of progressive weakness and was found to have acute on chronic renal failure. She was also very dehydrated and complained of pain. Today the patient voices no c/o with the exception of being cold. ROS:  Constitutional:  Negative for fever, chills, diaphoresis, activity change, appetite change and unexpected weight change. HENT: Negative for nosebleeds, congestion, facial swelling, mouth sores, trouble swallowing, neck pain, neck stiffness, voice change and postnasal drip. Eyes: Negative for photophobia, pain, discharge and itching. Respiratory: Negative for apnea, cough, choking, chest tightness, wheezing and stridor. Cardiovascular: Negative for chest pain, palpitations and leg swelling. Gastrointestinal: Negative for abdominal pain. Negative for nausea, diarrhea, constipation, blood in stool and rectal pain. Genitourinary: Negative for dysuria, urgency, hematuria, flank pain and difficulty urinating. Musculoskeletal: Negative for back pain, joint swelling, arthralgias and gait problem. Skin: Positive for dry skin. Neurological:  Negative for dizziness, facial asymmetry, speech difficulty, light-headedness and headaches. Hematological: Negative for adenopathy. Does not bruise/bleed easily. Psychiatric/Behavioral: Positive for occasionally has trouble remembering certain events or names. Negative for hallucinations.       Vital Signs:    Visit Vitals  /60 (BP 1 Location: Right arm, BP Patient Position: Supine)   Pulse 70   Temp 98 °F (36.7 °C)   Resp 19   Ht 5' 6\" (1.676 m)   Wt 99.3 kg (218 lb 14.4 oz)   SpO2 100%   Breastfeeding? No   BMI 35.33 kg/m²       O2 Device: Room air   O2 Flow Rate (L/min): 2 l/min   Temp (24hrs), Av.3 °F (36.8 °C), Min:97.8 °F (36.6 °C), Max:99.1 °F (37.3 °C)       Intake/Output:   Last shift:      701 - 1900  In: -   Out: 200 [Urine:200]  Last 3 shifts: 1901 - 700  In: 0067 [P.O.:1220]  Out: 4900 [Urine:4900]    Intake/Output Summary (Last 24 hours) at 2019  Last data filed at 2019  Gross per 24 hour   Intake 720 ml   Output 2700 ml   Net -1980 ml       Physical Exam:    Constitutional:Appears comfortable, NAD. Sitting at bedside bathing self. HENT: Head: Normocephalic and atraumatic. Mouth/Throat: No oropharyngeal exudate. Eyes: Conjunctivae and EOM are normal. Pupils are equal, round, and reactive to light. No scleral icterus. Neck: Normal range of motion. Neck supple. No tracheal deviation present. No thyromegaly present. Cardiovascular: yosef rate and regular rhythm. Exam reveals no gallop and no friction rub. No murmur heard. Pulmonary/Chest:Symmetrical chest expansion, no accessory muscle use; good airway entry; no rales/ wheezing/ rhonchi noted  Abdominal: Soft. Bowel sounds are normal. No distension. No tenderness. There is no rebound and no guarding. Musculoskeletal: Normal range of motion. No edema and no tenderness. Lymphadenopathy:   No cervical adenopathy. Neurological:Alert and oriented to person, place, and time. Coordination normal.   Skin: Skin is dry. Psychiatric: Normal mood and affect. Normal behavior.           DATA:   Current Facility-Administered Medications   Medication Dose Route Frequency    potassium chloride (K-DUR, KLOR-CON) SR tablet 20 mEq  20 mEq Oral TID    hydrALAZINE (APRESOLINE) tablet 25 mg  25 mg Oral TID    0.9% sodium chloride infusion 250 mL  250 mL IntraVENous PRN    hydrALAZINE (APRESOLINE) 20 mg/mL injection 20 mg  20 mg IntraVENous Q6H PRN    sodium bicarbonate tablet 650 mg  650 mg Oral TID    calcium gluconate 1 g in 0.9% sodium chloride 100 mL IVPB  1 g IntraVENous DIALYSIS PRN    calcium carbonate (TUMS) chewable tablet 200 mg [elemental]  200 mg Oral DIALYSIS PRN    0.9% sodium chloride infusion 250 mL  250 mL IntraVENous PRN    glucose chewable tablet 16 g  4 Tab Oral PRN    glucagon (GLUCAGEN) injection 1 mg  1 mg IntraMUSCular PRN    dextrose (D50W) injection syrg 12.5-25 g  25-50 mL IntraVENous PRN    dronabinol (MARINOL) capsule 5 mg  5 mg Oral BID    HYDROcodone-acetaminophen (NORCO) 5-325 mg per tablet 1-2 Tab  1-2 Tab Oral Q4H PRN    famotidine (PEPCID) tablet 20 mg  20 mg Oral DAILY    diphenhydrAMINE (BENADRYL) capsule 25 mg  25 mg Oral Q6H PRN    sodium chloride (NS) flush 5-10 mL  5-10 mL IntraVENous PRN    folic acid (FOLVITE) tablet 1 mg  1 mg Oral DAILY    thyroid (Pork) (ARMOUR) tablet 30 mg  30 mg Oral DAILY    acetaminophen (TYLENOL) tablet 650 mg  650 mg Oral Q4H PRN    ondansetron (ZOFRAN) injection 4 mg  4 mg IntraVENous Q4H PRN                    Labs:  Recent Labs     05/13/19 0514 05/12/19  0506 05/11/19  0207   WBC 21.3* 28.9* 39.6*   HGB 7.7* 9.1* 7.7*   HCT 22.0* 26.5* 21.7*   PLT 37* 70* 75*     Recent Labs     05/13/19 0514 05/12/19  0506 05/11/19  0207    142 141   K 3.5 3.0* 3.0*   * 110* 109*   CO2 21 21 21   * 97 107*   BUN 74* 88* 99*   CREA 4.68* 5.59* 6.56*   CA 8.5 8.9 8.5   MG  --  2.1  --    PHOS  --  3.8  --      No results for input(s): PH, PCO2, PO2, HCO3, FIO2 in the last 72 hours. IMPRESSION:   · Acute kidney injury -secondary to NSAID intake?, hypertension                   nephrosclerosis?vs secondary to sickle cell disease? · Thrombocytosis- Improving. Likely from sepsis and medications  · Leukocytosis  · Sickle cell anemia/sickle cell disease        PLAN:   · Anemia: Todays H/H is 7.7/22.0  Recommend transfusion of PRBC for hgb < 7.0       g/dl.    · Sickle Cell Anemia: Continue Folvite 1mg dialy  · Thrombocytopenia:Platelet count has decreased today 37,000; no intervention warranted unless her platelets decline below 20,000. · Leukocytosis:WBC continues to be trending down at 28.9 today. · This patients current treatment for iron overload is Jadenu to help keep her ferritin  level below 1000. Ferritin is 3,678. Jadenu to start as an in-patient per nephrology clearance. I have discussed with Nephrology-Jadenu still on hold for now. Creatinine baseline 1.3; today her creatinine continues to be trending down at 4.68- Nephrology following.  ·        The patient is: [x] acutely ill Risk of deterioration: [] moderate    [] critically ill  [] high         My assessment/plan was discussed with:  [x]nursing []PT/OT    []respiratory therapy [x]Dr.Lloyd David Arteaga MD      []family []       Aminta Elliott NP

## 2019-05-13 NOTE — PROGRESS NOTES
NUTRITION    Nursing Referral: [facility]     RECOMMENDATIONS / PLAN:     - Discontinue nutrition supplement, Magic Cup  - Continue RD inpatient monitoring and evaluation. NUTRITION INTERVENTIONS & DIAGNOSIS:     [x] Meals/snacks: general/healthful diet   [x] Medical food supplement therapy: Magic Cup TID; discontinue per pt request    Nutrition Diagnosis: Inadequate oral intake related to decreased appetite, lethargy as evidenced by pt consuming 50% or less of recent meals and poor meal intake x week PTA. ASSESSMENT:     5/13: Pt reported appetite and meal intake are good; stated consuming 70-75% of meals. Also eating food provided by family. Tolerating diet. Dislikes/ not consuming Magic Cup supplements. Discussed changing to other supplement; pt declined all options. 5/8: Appetite improved today, tolerating diet and ate 25% of breakfast. Encouraged meal intake- discussed food preferences and provided contact information to pt. Family planning to bring food in later today. Dialysis catheter placed, no plans for dialysis at this time. 5/7: Mentation improved today, pt more alert this afternoon but reports very poor appetite, not eating recent meals and disliked lunch option on renal restricted diet today. Discussed food preferences and encouraged po intake with pt and spouse, reinforced importance of nutrition for recovery and food's impact on renal function- pt concerned about worsening renal function. 5/6: Pt reports decreased appetite with poor meal intake and altered mentation, lethargy- did not eat breakfast this morning.      Average po intake adequate to meet patients estimated nutritional needs:   [] Yes     [x] No   [] Unable to determine at this time    Diet: DIET NUTRITIONAL SUPPLEMENTS Breakfast, Lunch, Dinner; [employer] CUPS  DIET REGULAR      Food Allergies: NKFA  Current Appetite:   [x] Good     [x] Fair     [] Poor    [] Other:  Appetite/meal intake prior to admission:   [] Good     [] Fair [x] Poor     [x] Other: not eating x 6 days PTA, fair meal intake prior to that per pt  Feeding Limitations:  [] Swallowing difficulty    [] Chewing difficulty    [] Other:   Current Meal Intake:   Patient Vitals for the past 100 hrs:   % Diet Eaten   05/13/19 0900 25 %   05/12/19 1220 50 %   05/11/19 1056 75 %   05/09/19 0800 10 %       BM: 5/11   Skin Integrity: chest stage I pressure injury  Edema:   [] No     [x] Yes   Pertinent Medications: Reviewed: tums, calcium gluconate, marinol, folic acid, zofran, KCl, sodium bicarbonate, armour    Recent Labs     05/13/19  0514 05/12/19  0506 05/11/19  0207    142 141   K 3.5 3.0* 3.0*   * 110* 109*   CO2 21 21 21   * 97 107*   BUN 74* 88* 99*   CREA 4.68* 5.59* 6.56*   CA 8.5 8.9 8.5   MG  --  2.1  --    PHOS  --  3.8  --        Intake/Output Summary (Last 24 hours) at 5/13/2019 1059  Last data filed at 5/13/2019 0900  Gross per 24 hour   Intake 960 ml   Output 2650 ml   Net -1690 ml       Anthropometrics:  Ht Readings from Last 1 Encounters:   05/07/19 5' 6\" (1.676 m)     Last 3 Recorded Weights in this Encounter    05/11/19 0400 05/12/19 0415 05/13/19 0418   Weight: 103.1 kg (227 lb 6.4 oz) 99.7 kg (219 lb 12.8 oz) 99.3 kg (218 lb 14.4 oz)     Body mass index is 35.33 kg/m².   Obese, Class II (weight gain noted, likely related to edema)    Weight History: patient reports usual weight of 218 lb and unable to report change in weight PTA- could not remember weight hx      Weight Metrics 5/13/2019 4/10/2019 1/14/2019 11/2/2018 10/21/2018 7/19/2018 6/27/2018   Weight 218 lb 14.4 oz 223 lb 4.8 oz 221 lb 220 lb 220 lb 3.2 oz 224 lb 220 lb   BMI 35.33 kg/m2 36.04 kg/m2 35.14 kg/m2 34.98 kg/m2 35.39 kg/m2 35.61 kg/m2 34.98 kg/m2        Admitting Diagnosis: ARF (acute renal failure) (HCC) [N17.9]  Leukocytosis [D72.829]  Bandemia [D72.825]  Thrombocytopenia ([address] [D69.6]  Chest pain [R07.9]  Abdominal pain [R10.9]  Anemia [D64.9]  Pertinent PMHx: sickle cell disease, HTN, hypothyroid, breast cancer, fibromyalgia, GERD     Education Needs:        [x] None identified  [] Identified - Not appropriate at this time  []  Identified and addressed - refer to education log  Learning Limitations:   [x] None identified  [] Identified:   Cultural, Quaker & ethnic food preferences:  [x] None identified    [] Identified and addressed     ESTIMATED NUTRITION NEEDS:     Calories: [address] kcal (20-30 kcal/kg) based on  [] Actual BW      [x] SBW 75 kg   Protein: 60-90 gm (0.8-1.2 gm/kg) based on  [] Actual BW      [x] SBW   Fluid: 1 mL/kcal     MONITORING & EVALUATION:     Nutrition Goal(s):   1. Po intake of meals will meet >75% of patient estimated nutritional needs within the next 7 days.   Outcome:  [] Met/Ongoing    [x] Progressing towards goal    [] Not progressing towards goal    [] New/Initial goal     Monitoring:   [x] Food and beverage intake   [x] Diet order   [x] Nutrition-focused physical findings   [x] Treatment/therapy   [] Weight   [] Enteral nutrition intake        Previous Recommendations (for follow-up assessments only):     [x]   Implemented       []   Not Implemented (RD to address)      [] No Longer Appropriate     [] No Recommendation Made     Discharge Planning: cardiac, renal diet as tolerated (regular diet until adequacy of meal intake improved)   [x] Participated in care planning, discharge planning, & interdisciplinary rounds as appropriate      Renaldo Doran, [address] [place] [address]   Pager: [phone]

## 2019-05-13 NOTE — ROUTINE PROCESS
Bedside and Verbal shift change report given to [address] [place] [address] (oncoming nurse) by Lynn Ramos RN (offgoing nurse). Report given with SBAR, Kardex, Intake/Output, MAR, Accordion and Recent Results.  \

## 2019-05-13 NOTE — ROUTINE PROCESS
Bedside shift change report given to [address] [place] [address] Avenue (oncoming nurse) by Gunjan Butler (offgoing nurse). Report included the following information SBAR, Kardex, MAR and Recent Results.

## 2019-05-13 NOTE — PROGRESS NOTES
Discharge Planning:  Chart reviewed. PT/OT recommending home health. Met with pt and gave pt list of home health agencies to chose from pending pt's progress. Case Management is following.     LENY OnealN RN  Care Management  Pager: [phone]

## 2019-05-13 NOTE — PROGRESS NOTES
Occupational Therapy Goals  Initiated 5/13/2019 within 7 day(s). 1.  Patient will perform grooming with independence standing at sink for 3-5 minutes with no c/o of fatigue. 2.  Patient will perform upper body dressing with independence. 3.  Patient will perform lower body dressing with modified independence using AE  4. Patient will perform toilet transfers with independence. 5.  Patient will perform all aspects of toileting with independence. 6.  Patient will participate in upper extremity therapeutic exercise/activities with independence for 5-7 minutes. 7.  Patient will utilize energy conservation techniques during functional activities with verbal cues. Prior Level of Function: Patient was independent with most self-care PTA. Pt  helped bath BLE and pt used AE (reacher and sock-aid) for LBD secondary to \"stiffness every since hip sx 7 years ago\". Pt independent with functional mobility PTA but has RW and cane at home prn. OCCUPATIONAL THERAPY EVALUATION    Patient: Josef Schwartz (27 y.o. female)  Date: 5/13/2019  Primary Diagnosis: ARF (acute renal failure) (HCC) [N17.9]  Leukocytosis [D72.829]  Bandemia [D72.825]  Thrombocytopenia (HCC) [D69.6]  Chest pain [R07.9]  Abdominal pain [R10.9]  Anemia [D64.9]    Precautions:  Aspiration, Fall    ASSESSMENT :  Pt cleared to participate in OT evaluation by RN. Upon entering the room, pt was seated in chair, alert, and agreeable to participate in OT evaluation. Pt admitted 5/11 with c/o progressive weakness and dx with ARF and anemia. Pt was in ICU, extubated, and transferred to tele unit. During the evaluation, the pt presented with limited LUE ROM and decreased BUE strength, however BUEs present to be still functional. Pt reports LUE has been that way for years and she has learned different ways to compensate. Pt also reports needing AE (reacher and sock-aid) PTA secondary to stiffness from hip surgery 7 years ago.  Pt also educated on long-handled sponge and long handled shoe-horn to assist with self-care. Based on the objective data described below, the patient presented with decreased strength, decreased LUE ROM, decreased endurance, decreased functional balance, and decreased functional mobility, which impede pt's function with basic self-care/ADL tasks. Patient will benefit from skilled intervention to address the above impairments. Patient's rehabilitation potential is considered to be Good  Factors which may influence rehabilitation potential include:   ? None noted  ? Mental ability/status  ? Medical condition  ? Home/family situation and support systems  ? Safety awareness  ? Pain tolerance/management  ? Other:      PLAN :  Recommendations and Planned Interventions:   ?               Self Care Training                  ? Therapeutic Activities  ? Functional Mobility Training   ? Cognitive Retraining  ? Therapeutic Exercises           ? Endurance Activities  ? Balance Training                    ? Neuromuscular Re-Education  ? Visual/Perceptual Training     ? Home Safety Training  ? Patient Education                   ? Family Training/Education  ? Other (comment):    Frequency/Duration: Patient will be followed by occupational therapy 3 times a week to address goals. Discharge Recommendations: Home Health  Further Equipment Recommendations for Discharge: Pt has all DME     SUBJECTIVE:   Patient stated ? My  helps me with anything I need?     OBJECTIVE DATA SUMMARY:     Past Medical History:   Diagnosis Date    Anemia NEC     Arthritis     Chronic pain     Ductal carcinoma ([facility] [address] 2011    left breast    Fatigue     Fibromyalgia     GERD (gastroesophageal reflux disease)     Hx of endometriosis     Hypertension 2011    Ill-defined condition 2018    Sickle cell crisis    Osteoarthritis of left hip 2016    Osteoarthritis of right hip 1/3/2017    Osteoarthritis of right knee 2018    Sickle cell anemia (HCC)     Sleep apnea     Does not use CPAP    Thyroid disease     hypo     Past Surgical History:   Procedure Laterality Date    HX BREAST BIOPSY Left 2016    HX  SECTION      HX HERNIA REPAIR      HX LAP CHOLECYSTECTOMY      HX MASTECTOMY Left 2012    with axillary lymph node dissection    TOTAL HIP ARTHROPLASTY Bilateral  & 2017     Barriers to Learning/Limitations: None  Compensate with: N/A    Home Situation:   Home Situation  Home Environment: Private residence  # Steps to Enter: 4  Rails to Enter: Yes  One/Two Story Residence: Two story  # of Interior Steps: 14  Living Alone: No  Support Systems: Family member(s), Spouse/Significant Other/Partner  Patient Expects to be Discharged to[de-identified] Private residence  Current DME Used/Available at Home: Cane, straight, Walker, rolling, Adaptive dressing aides, Shower chair, Raised toilet seat  ? Right hand dominant   ? Left hand dominant    Cognitive/Behavioral Status:  Neurologic State: Alert  Orientation Level: Oriented X4  Cognition: Appropriate decision making; Follows commands  Safety/Judgement: Awareness of environment    Skin: Intact  Edema: None noted    Vision/Perceptual:    Acuity: Within Defined Limits    Corrective Lenses: Glasses    Coordination: BUE  Coordination: Within functional limits  Fine Motor Skills-Upper: Left Intact; Right Intact    Gross Motor Skills-Upper: Left Intact; Right Intact    Balance:  Sitting: Intact  Standing: Impaired; With support  Standing - Static: Good  Standing - Dynamic : Fair    Strength: BUE  Strength: Generally decreased, functional    Tone & Sensation: BUE  Tone: Normal  Sensation: Intact      Range of Motion: BUE  AROM: Within functional limits(RUE >LUE, LUE elbow extension approx 150)    Functional Mobility and Transfers for ADLs:    Transfers:  Sit to Stand: Supervision  Stand to Sit: Supervision    ADL Assessment:   Feeding: Independent    Oral Facial Hygiene/Grooming: Independent    Bathing: Minimum assistance    Upper Body Dressing: Independent    Lower Body Dressing: Moderate assistance    Toileting: Stand by assistance    ADL Intervention:  Lower Body Dressing Assistance  Socks: Moderate assistance(Pt reports using AE PTA)  Position Performed: Seated in chair    Cognitive Retraining  Safety/Judgement: Awareness of environment      Pain:  Pain level pre-treatment: 0/10   Pain level post-treatment: 0/10   Pain Intervention(s): Medication (see MAR); Response to intervention: See doc flow    Activity Tolerance:   Patient demonstrated good activity tolerance during OT evaluation. Please refer to the flowsheet for vital signs taken during this treatment. After treatment:   ? Patient left in no apparent distress sitting up in chair  ? Patient left in no apparent distress in bed  ? Call bell left within reach  ? Nursing notified  ? Caregiver present  ? Bed alarm activated    COMMUNICATION/EDUCATION:   ? Role of Occupational Therapy in the acute care setting  ? Home safety education was provided and the patient/caregiver indicated understanding. ? Patient/family have participated as able in goal setting and plan of care. ? Patient/family agree to work toward stated goals and plan of care. ? Patient understands intent and goals of therapy, but is neutral about his/her participation. ? Patient is unable to participate in goal setting and plan of care. Thank you for this referral.  Javad Flannery OTR/L  Time Calculation: 15 mins    Eval Complexity: History: LOW Complexity : Brief history review ; Examination: MEDIUM Complexity : 3-5 performance deficits relating to physical, cognitive , or psychosocial skils that result in activity limitations and / or participation restrictions;    Decision Making:LOW Complexity : No comorbidities that affect functional and no verbal or physical assistance needed to complete eval tasks

## 2019-05-13 NOTE — PROGRESS NOTES
[facility] Hospitalist Group  Progress Note    Patient: Lubna Velasquez Age: 48 y.o. : 1965 MR#: [id number] SSN: [id number]  Date/Time: 2019     Subjective:     Patient sitting in bed in NAD, awake, alert, denies any distress    Assessment/Plan:     1. Thrombocytopenia -monitor, trending lower, monitor per heme. No bleeding  2. Anemia - monitor, transfuse as per guidelines, no overt bleeding  3. Leukocytosis - improving, ?leukemoid reaction  4. Chronic anemia 2y to baseline sickle cell disease   5. ARF on CKD3 - improving , monitor  6. HTN - monitor BP   7. Hypothyroidism - on thyroid armor  8. Ammonia levels improved - off lactulose   9. Sickle cell anemia, not in crisis currently   10. ?secondary hemochromatosis- heme following  PT, OT  D/w patient  Full code    Case discussed with:  [x]Patient  []Family  []Nursing  []Case Management  DVT Prophylaxis:  []Lovenox  []Hep SQ  [x]SCDs  []Coumadin   []On Heparin gtt    Objective:   VS:   Visit Vitals  /56 (BP 1 Location: Left arm, BP Patient Position: Sitting; At rest)   Pulse 83   Temp 98 °F (36.7 °C)   Resp 17   Ht 5' 6\" (1.676 m)   Wt 99.3 kg (218 lb 14.4 oz)   SpO2 98%   Breastfeeding?  No   BMI 35.33 kg/m²      Tmax/24hrs: Temp (24hrs), Av.3 °F (36.8 °C), Min:98 °F (36.7 °C), Max:99.1 °F (37.3 °C)  IOBRIEF    Intake/Output Summary (Last 24 hours) at 2019 1330  Last data filed at 2019 0900  Gross per 24 hour   Intake 720 ml   Output 2450 ml   Net -1730 ml       General:  Awake, alert  Cardiovascular:  S1S2+, RRR  Pulmonary:  CTA b/l  GI:  Soft, BS+, NT, ND  Extremities:  + edema          Medications:   Current Facility-Administered Medications   Medication Dose Route Frequency    potassium chloride (K-DUR, KLOR-CON) SR tablet 20 mEq  20 mEq Oral TID    hydrALAZINE (APRESOLINE) tablet 25 mg  25 mg Oral TID    0.9% sodium chloride infusion 250 mL  250 mL IntraVENous PRN    hydrALAZINE (APRESOLINE) 20 mg/mL injection 20 mg  20 mg IntraVENous Q6H PRN    sodium bicarbonate tablet 650 mg  650 mg Oral TID    calcium gluconate 1 g in 0.9% sodium chloride 100 mL IVPB  1 g IntraVENous DIALYSIS PRN    calcium carbonate (TUMS) chewable tablet 200 mg [elemental]  200 mg Oral DIALYSIS PRN    0.9% sodium chloride infusion 250 mL  250 mL IntraVENous PRN    glucose chewable tablet 16 g  4 Tab Oral PRN    glucagon (GLUCAGEN) injection 1 mg  1 mg IntraMUSCular PRN    dextrose (D50W) injection syrg 12.5-25 g  25-50 mL IntraVENous PRN    dronabinol (MARINOL) capsule 5 mg  5 mg Oral BID    HYDROcodone-acetaminophen (NORCO) 5-325 mg per tablet 1-2 Tab  1-2 Tab Oral Q4H PRN    famotidine (PEPCID) tablet 20 mg  20 mg Oral DAILY    diphenhydrAMINE (BENADRYL) capsule 25 mg  25 mg Oral Q6H PRN    sodium chloride (NS) flush 5-10 mL  5-10 mL IntraVENous PRN    folic acid (FOLVITE) tablet 1 mg  1 mg Oral DAILY    thyroid (Pork) (ARMOUR) tablet 30 mg  30 mg Oral DAILY    acetaminophen (TYLENOL) tablet 650 mg  650 mg Oral Q4H PRN    ondansetron (ZOFRAN) injection 4 mg  4 mg IntraVENous Q4H PRN       Labs:    Recent Results (from the past 24 hour(s))   GLUCOSE, POC    Collection Time: 05/12/19  3:30 PM   Result Value Ref Range    Glucose (POC) 137 (H) 70 - 110 mg/dL   GLUCOSE, POC    Collection Time: 05/12/19  8:59 PM   Result Value Ref Range    Glucose (POC) 131 (H) 70 - 110 mg/dL   CBC WITH AUTOMATED DIFF    Collection Time: 05/13/19  5:14 AM   Result Value Ref Range    WBC 21.3 (H) 4.6 - 13.2 K/uL    RBC 2.63 (L) 4.20 - 5.30 M/uL    HGB 7.7 (L) 12.0 - 16.0 g/dL    HCT 22.0 (L) 35.0 - 45.0 %    MCV 83.7 74.0 - 97.0 FL    MCH 29.3 24.0 - 34.0 PG    MCHC 35.0 31.0 - 37.0 g/dL    RDW 17.7 (H) 11.6 - 14.5 %    PLATELET 37 (L) 171 - 420 K/uL    NEUTROPHILS 76 (H) 42 - 75 %    BAND NEUTROPHILS 6 (H) 0 - 5 %    LYMPHOCYTES 13 (L) 20 - 51 %    MONOCYTES 2 2 - 9 %    EOSINOPHILS 1 0 - 5 %    BASOPHILS 0 0 - 3 %    MYELOCYTES 2 (H) 0 % ABS. NEUTROPHILS 16.2 (H) 1.8 - 8.0 K/UL    ABS. LYMPHOCYTES 2.8 0.8 - 3.5 K/UL    ABS. MONOCYTES 0.4 0 - 1.0 K/UL    ABS. EOSINOPHILS 0.2 0.0 - 0.4 K/UL    ABS.  BASOPHILS 0.0 0.0 - 0.1 K/UL    PLATELET COMMENTS DECREASED PLATELETS      RBC COMMENTS ANISOCYTOSIS  1+        RBC COMMENTS POIKILOCYTOSIS  1+        RBC COMMENTS RBC FRAGMENTS  OVALOCYTES  1+        RBC COMMENTS TARGET CELLS  1+        DF MANUAL     FIBRINOGEN    Collection Time: 05/13/19  5:14 AM   Result Value Ref Range    Fibrinogen 301 210 - 451 mg/dL   CALCIUM, IONIZED    Collection Time: 05/13/19  5:14 AM   Result Value Ref Range    Ionized Calcium 1.15 1.12 - 8.57 MMOL/L   METABOLIC PANEL, BASIC    Collection Time: 05/13/19  5:14 AM   Result Value Ref Range    Sodium 144 136 - 145 mmol/L    Potassium 3.5 3.5 - 5.5 mmol/L    Chloride 113 (H) 100 - 108 mmol/L    CO2 21 21 - 32 mmol/L    Anion gap 10 3.0 - 18 mmol/L    Glucose 100 (H) 74 - 99 mg/dL    BUN 74 (H) 7.0 - 18 MG/DL    Creatinine 4.68 (H) 0.6 - 1.3 MG/DL    BUN/Creatinine ratio 16 12 - 20      GFR est AA 12 (L) >60 ml/min/1.73m2    GFR est non-AA 10 (L) >60 ml/min/1.73m2    Calcium 8.5 8.5 - 10.1 MG/DL   GLUCOSE, POC    Collection Time: 05/13/19  6:29 AM   Result Value Ref Range    Glucose (POC) 125 (H) 70 - 110 mg/dL   GLUCOSE, POC    Collection Time: 05/13/19 11:32 AM   Result Value Ref Range    Glucose (POC) 127 (H) 70 - 110 mg/dL       Signed By: Grace Casas MD     May 13, 2019

## 2019-05-13 NOTE — HOME CARE
Rounded on this \"[facility]\" patient. I left a brochure on [facility] with the patient. [facility] will be available if needed.  Mirtha Logan LPN

## 2019-05-14 LAB
ANION GAP SERPL CALC-SCNC: 7 MMOL/L (ref 3–18)
BASOPHILS # BLD: 0 K/UL (ref 0–0.1)
BASOPHILS NFR BLD: 0 % (ref 0–2)
BUN SERPL-MCNC: 64 MG/DL (ref 7–18)
BUN/CREAT SERPL: 17 (ref 12–20)
CA-I SERPL-SCNC: 1.19 MMOL/L (ref 1.12–1.32)
CALCIUM SERPL-MCNC: 8.4 MG/DL (ref 8.5–10.1)
CHLORIDE SERPL-SCNC: 114 MMOL/L (ref 100–108)
CO2 SERPL-SCNC: 23 MMOL/L (ref 21–32)
CREAT SERPL-MCNC: 3.83 MG/DL (ref 0.6–1.3)
DIFFERENTIAL METHOD BLD: ABNORMAL
EOSINOPHIL # BLD: 0.2 K/UL (ref 0–0.4)
EOSINOPHIL NFR BLD: 1 % (ref 0–5)
ERYTHROCYTE [DISTWIDTH] IN BLOOD BY AUTOMATED COUNT: 17.8 % (ref 11.6–14.5)
FIBRINOGEN PPP-MCNC: 312 MG/DL (ref 210–451)
GLUCOSE BLD STRIP.AUTO-MCNC: 133 MG/DL (ref 70–110)
GLUCOSE BLD STRIP.AUTO-MCNC: 136 MG/DL (ref 70–110)
GLUCOSE BLD STRIP.AUTO-MCNC: 141 MG/DL (ref 70–110)
GLUCOSE BLD STRIP.AUTO-MCNC: 148 MG/DL (ref 70–110)
GLUCOSE SERPL-MCNC: 111 MG/DL (ref 74–99)
HCT VFR BLD AUTO: 19.8 % (ref 35–45)
HGB BLD-MCNC: 6.8 G/DL (ref 12–16)
LYMPHOCYTES # BLD: 1.7 K/UL (ref 0.9–3.6)
LYMPHOCYTES NFR BLD: 10 % (ref 21–52)
MCH RBC QN AUTO: 28.6 PG (ref 24–34)
MCHC RBC AUTO-ENTMCNC: 34.3 G/DL (ref 31–37)
MCV RBC AUTO: 83.2 FL (ref 74–97)
MONOCYTES # BLD: 0.8 K/UL (ref 0.05–1.2)
MONOCYTES NFR BLD: 5 % (ref 3–10)
NEUTS SEG # BLD: 14.2 K/UL (ref 1.8–8)
NEUTS SEG NFR BLD: 84 % (ref 40–73)
PLATELET # BLD AUTO: 44 K/UL (ref 135–420)
POTASSIUM SERPL-SCNC: 3.9 MMOL/L (ref 3.5–5.5)
RBC # BLD AUTO: 2.38 M/UL (ref 4.2–5.3)
SODIUM SERPL-SCNC: 144 MMOL/L (ref 136–145)
WBC # BLD AUTO: 17 K/UL (ref 4.6–13.2)

## 2019-05-14 PROCEDURE — 80048 BASIC METABOLIC PNL TOTAL CA: CPT

## 2019-05-14 PROCEDURE — 86900 BLOOD TYPING SEROLOGIC ABO: CPT

## 2019-05-14 PROCEDURE — 85025 COMPLETE CBC W/AUTO DIFF WBC: CPT

## 2019-05-14 PROCEDURE — 86870 RBC ANTIBODY IDENTIFICATION: CPT

## 2019-05-14 PROCEDURE — 86901 BLOOD TYPING SEROLOGIC RH(D): CPT

## 2019-05-14 PROCEDURE — 74011250637 HC RX REV CODE- 250/637: Performed by: INTERNAL MEDICINE

## 2019-05-14 PROCEDURE — 86860 RBC ANTIBODY ELUTION: CPT

## 2019-05-14 PROCEDURE — 82330 ASSAY OF CALCIUM: CPT

## 2019-05-14 PROCEDURE — 97530 THERAPEUTIC ACTIVITIES: CPT

## 2019-05-14 PROCEDURE — 86978 RBC PRETREATMENT SERUM: CPT

## 2019-05-14 PROCEDURE — 86850 RBC ANTIBODY SCREEN: CPT

## 2019-05-14 PROCEDURE — 82962 GLUCOSE BLOOD TEST: CPT

## 2019-05-14 PROCEDURE — 86920 COMPATIBILITY TEST SPIN: CPT

## 2019-05-14 PROCEDURE — 97535 SELF CARE MNGMENT TRAINING: CPT

## 2019-05-14 PROCEDURE — 97110 THERAPEUTIC EXERCISES: CPT

## 2019-05-14 PROCEDURE — 74011250637 HC RX REV CODE- 250/637: Performed by: HOSPITALIST

## 2019-05-14 PROCEDURE — 86880 COOMBS TEST DIRECT: CPT

## 2019-05-14 PROCEDURE — 97116 GAIT TRAINING THERAPY: CPT

## 2019-05-14 PROCEDURE — 36415 COLL VENOUS BLD VENIPUNCTURE: CPT

## 2019-05-14 PROCEDURE — 65660000000 HC RM CCU STEPDOWN

## 2019-05-14 PROCEDURE — 85384 FIBRINOGEN ACTIVITY: CPT

## 2019-05-14 RX ORDER — DIPHENHYDRAMINE HCL 25 MG
25 CAPSULE ORAL ONCE
Status: ACTIVE | OUTPATIENT
Start: 2019-05-14 | End: 2019-05-14

## 2019-05-14 RX ORDER — DEXAMETHASONE 4 MG/1
10 TABLET ORAL ONCE
Status: ACTIVE | OUTPATIENT
Start: 2019-05-14 | End: 2019-05-14

## 2019-05-14 RX ORDER — SODIUM CHLORIDE 9 MG/ML
250 INJECTION, SOLUTION INTRAVENOUS AS NEEDED
Status: DISCONTINUED | OUTPATIENT
Start: 2019-05-14 | End: 2019-05-15 | Stop reason: HOSPADM

## 2019-05-14 RX ORDER — ACETAMINOPHEN 325 MG/1
650 TABLET ORAL ONCE
Status: ACTIVE | OUTPATIENT
Start: 2019-05-14 | End: 2019-05-14

## 2019-05-14 RX ADMIN — FOLIC ACID 1 MG: 1 TABLET ORAL at 09:08

## 2019-05-14 RX ADMIN — SODIUM BICARBONATE 650 MG TABLET 650 MG: at 09:08

## 2019-05-14 RX ADMIN — HYDRALAZINE HYDROCHLORIDE 25 MG: 25 TABLET, FILM COATED ORAL at 09:09

## 2019-05-14 RX ADMIN — HYDRALAZINE HYDROCHLORIDE 25 MG: 25 TABLET, FILM COATED ORAL at 22:25

## 2019-05-14 RX ADMIN — DRONABINOL 5 MG: 5 CAPSULE ORAL at 09:08

## 2019-05-14 RX ADMIN — SODIUM BICARBONATE 650 MG TABLET 650 MG: at 22:25

## 2019-05-14 RX ADMIN — FAMOTIDINE 20 MG: 20 TABLET ORAL at 09:09

## 2019-05-14 RX ADMIN — HYDROCODONE BITARTRATE AND ACETAMINOPHEN 2 TABLET: 5; 325 TABLET ORAL at 22:25

## 2019-05-14 RX ADMIN — DRONABINOL 5 MG: 5 CAPSULE ORAL at 22:25

## 2019-05-14 RX ADMIN — HYDROCODONE BITARTRATE AND ACETAMINOPHEN 2 TABLET: 5; 325 TABLET ORAL at 09:22

## 2019-05-14 RX ADMIN — SODIUM BICARBONATE 650 MG TABLET 650 MG: at 16:25

## 2019-05-14 RX ADMIN — LEVOTHYROXINE, LIOTHYRONINE 30 MG: 19; 4.5 TABLET ORAL at 09:08

## 2019-05-14 RX ADMIN — HYDROCODONE BITARTRATE AND ACETAMINOPHEN 2 TABLET: 5; 325 TABLET ORAL at 18:14

## 2019-05-14 RX ADMIN — POTASSIUM CHLORIDE 20 MEQ: 20 TABLET, EXTENDED RELEASE ORAL at 09:08

## 2019-05-14 NOTE — ROUTINE PROCESS
Bedside shift change report given to [address] [place] [address] Avenue (oncoming nurse) by Yany Sanchez (offgoing nurse). Report included the following information SBAR, Kardex, MAR and Recent Results.

## 2019-05-14 NOTE — PROGRESS NOTES
Hematology/Medical Oncology Progress Note             Name: Kerri Verdugo   : 1965   MRN: [id number]   Date: 2019 8:31 AM     [x]I have reviewed the flowsheet and previous days notes. Events overnight reviewed and discussed with nursing staff. Vital signs and records reviewed. Ms. Pearl Hatfield is a 49 y/o woman with sickle cell disease. She was admitted with complaints of progressive weakness and was found to have acute on chronic renal failure. She was also very dehydrated and complained of pain. Today the patient denies pain, she says she david grateful that her kidney function is improving and is really looking forward to going home soon. ROS:  Constitutional:  Negative for fever, chills, diaphoresis, activity change, appetite change and unexpected weight change. HENT: Negative for nosebleeds, congestion, facial swelling, mouth sores, trouble swallowing, neck pain, neck stiffness, voice change and postnasal drip. Eyes: Negative for photophobia, pain, discharge and itching. Respiratory: Negative for apnea, cough, choking, chest tightness, wheezing and stridor. Cardiovascular: Negative for chest pain, palpitations and leg swelling. Gastrointestinal: Negative for abdominal pain. Negative for nausea, diarrhea, constipation, blood in stool and rectal pain. Genitourinary: Negative for dysuria, urgency, hematuria, flank pain and difficulty urinating. Musculoskeletal: Negative for back pain, joint swelling, arthralgias and gait problem. Skin: Positive for dry skin. Neurological:  Negative for dizziness, facial asymmetry, speech difficulty, light-headedness and headaches. Hematological: Negative for adenopathy. Does not bruise/bleed easily. Psychiatric/Behavioral: Negative for hallucinations.       Vital Signs:    Visit Vitals  /70 (BP 1 Location: Left arm)   Pulse 83   Temp 98.3 °F (36.8 °C)   Resp 20   Ht 5' 6\" (1.676 m)   Wt 99.3 kg (219 lb)   SpO2 98% Breastfeeding? No   BMI 35.35 kg/m²       O2 Device: Room air   O2 Flow Rate (L/min): 2 l/min   Temp (24hrs), Av.5 °F (36.9 °C), Min:98 °F (36.7 °C), Max:98.9 °F (37.2 °C)       Intake/Output:   Last shift:       07 - 1900  In: 260 [P.O.:260]  Out: -   Last 3 shifts: 1901 -  0700  In: 720 [P.O.:720]  Out: 5925 [Urine:5925]    Intake/Output Summary (Last 24 hours) at 2019 0824  Last data filed at 2019 3418  Gross per 24 hour   Intake 500 ml   Output 3725 ml   Net -3225 ml       Physical Exam:    Constitutional:Appears comfortable, NAD. HENT: Head: Normocephalic and atraumatic. Mouth/Throat: No oropharyngeal exudate. Eyes: Conjunctivae and EOM are normal. Pupils are equal, round, and reactive to light. No scleral icterus. Neck: Normal range of motion. Neck supple. No tracheal deviation present. No thyromegaly present. Cardiovascular: yosef rate and regular rhythm. Exam reveals no gallop and no friction rub. No murmur heard. Pulmonary/Chest:Symmetrical chest expansion, no accessory muscle use; good airway entry; no rales/ wheezing/ rhonchi noted  Abdominal: Soft. Bowel sounds are normal. No distension. No tenderness. There is no rebound and no guarding. Musculoskeletal: Normal range of motion. No edema and no tenderness. Lymphadenopathy:   No cervical adenopathy. Neurological:Alert and oriented to person, place, and time. Coordination normal.   Skin: Skin is dry. Psychiatric: Normal mood and affect. Normal behavior.           DATA:   Current Facility-Administered Medications   Medication Dose Route Frequency    potassium chloride (K-DUR, KLOR-CON) SR tablet 20 mEq  20 mEq Oral TID    hydrALAZINE (APRESOLINE) tablet 25 mg  25 mg Oral TID    0.9% sodium chloride infusion 250 mL  250 mL IntraVENous PRN    hydrALAZINE (APRESOLINE) 20 mg/mL injection 20 mg  20 mg IntraVENous Q6H PRN    sodium bicarbonate tablet 650 mg  650 mg Oral TID    calcium gluconate 1 g in 0.9% sodium chloride 100 mL IVPB  1 g IntraVENous DIALYSIS PRN    calcium carbonate (TUMS) chewable tablet 200 mg [elemental]  200 mg Oral DIALYSIS PRN    0.9% sodium chloride infusion 250 mL  250 mL IntraVENous PRN    glucose chewable tablet 16 g  4 Tab Oral PRN    glucagon (GLUCAGEN) injection 1 mg  1 mg IntraMUSCular PRN    dextrose (D50W) injection syrg 12.5-25 g  25-50 mL IntraVENous PRN    dronabinol (MARINOL) capsule 5 mg  5 mg Oral BID    HYDROcodone-acetaminophen (NORCO) 5-325 mg per tablet 1-2 Tab  1-2 Tab Oral Q4H PRN    famotidine (PEPCID) tablet 20 mg  20 mg Oral DAILY    diphenhydrAMINE (BENADRYL) capsule 25 mg  25 mg Oral Q6H PRN    sodium chloride (NS) flush 5-10 mL  5-10 mL IntraVENous PRN    folic acid (FOLVITE) tablet 1 mg  1 mg Oral DAILY    thyroid (Pork) (ARMOUR) tablet 30 mg  30 mg Oral DAILY    acetaminophen (TYLENOL) tablet 650 mg  650 mg Oral Q4H PRN    ondansetron (ZOFRAN) injection 4 mg  4 mg IntraVENous Q4H PRN                    Labs:  Recent Labs     05/14/19  0256 05/13/19  0514 05/12/19  0506   WBC 17.0* 21.3* 28.9*   HGB 6.8* 7.7* 9.1*   HCT 19.8* 22.0* 26.5*   PLT 44* 37* 70*     Recent Labs     05/14/19  0256 05/13/19  0514 05/12/19  0506    144 142   K 3.9 3.5 3.0*   * 113* 110*   CO2 23 21 21   * 100* 97   BUN 64* 74* 88*   CREA 3.83* 4.68* 5.59*   CA 8.4* 8.5 8.9   MG  --   --  2.1   PHOS  --   --  3.8     No results for input(s): PH, PCO2, PO2, HCO3, FIO2 in the last 72 hours. IMPRESSION:   · Acute kidney injury -secondary to NSAID intake?, hypertension                   nephrosclerosis?vs secondary to sickle cell disease? · Thrombocytosis- Improving. Likely from sepsis and medications  · Leukocytosis  · Sickle cell anemia/sickle cell disease        PLAN:   · Anemia: Todays H/H is 6.8/19.8  Today I will order 1 unit or PRBC.   · Sickle Cell Anemia: Continue Folvite 1mg dialy  · Thrombocytopenia:Platelet count has slightly increased today from 37,000 to 44.000; no intervention warranted unless her platelets decline below 20,000. · Leukocytosis:WBC continues to be trending down at 17.0 today. · This patients current treatment for iron overload is Jadenu to help keep her ferritin  level below 1000. Ferritin is 3,678. Jadenu to start as an in-patient per nephrology clearance. I have discussed with Nephrology-Jadenu still on hold for now. Creatinine baseline 1.3; jadenu is contraindicated in GFR <40-today her is slowly increasing but remains well below 40ml/min at 15 and her creatinine continues to be trending down at 3.83- Nephrology following.  ·        The patient is: [x] acutely ill Risk of deterioration: [] moderate    [] critically ill  [] high         My assessment/plan was discussed with:  [x]nursing []PT/OT    []respiratory therapy [x]Dr.Lloyd Antione Angelo MD      []family []       Eris Rashid NP

## 2019-05-14 NOTE — PROGRESS NOTES
Problem: Self Care Deficits Care Plan (Adult)  Goal: *Acute Goals and Plan of Care (Insert Text)  Description  Occupational Therapy Goals  Initiated 5/13/2019 within 7 day(s). 1.  Patient will perform grooming with independence standing at sink for 3-5 minutes with no c/o of fatigue. 2.  Patient will perform upper body dressing with independence. 3.  Patient will perform lower body dressing with modified independence using AE  4. Patient will perform toilet transfers with independence. 5.  Patient will perform all aspects of toileting with independence. 6.  Patient will participate in upper extremity therapeutic exercise/activities with independence for 5-7 minutes. 7.  Patient will utilize energy conservation techniques during functional activities with verbal cues. Prior Level of Function: Patient was independent with most self-care PTA. Pt  helped bath BLE and pt used AE (reacher and sock-aid) for LBD secondary to \"stiffness every since hip sx 7 years ago\". Pt independent with functional mobility PTA but has RW and cane at home prn. Outcome: Progressing Towards Goal   OCCUPATIONAL THERAPY TREATMENT    Patient: Jesus Matute (80 y.o. female)  Date: 5/14/2019  Diagnosis: ARF (acute renal failure) (HCC) [N17.9]  Leukocytosis [D72.829]  Bandemia [D72.825]  Thrombocytopenia (HCC) [D69.6]  Chest pain [R07.9]  Abdominal pain [R10.9]  Anemia [D64.9] <principal problem not specified>       Precautions: Aspiration, Fall  Chart, occupational therapy assessment, plan of care, and goals were reviewed. ASSESSMENT:  Pt is very motivated to participate in OT. Pt reports she is aware of her low H&H and due to her diagnosis of sickle cell anemia she is frequently requires blood transfusion. Pt reports she is asymptomatic and would like to have therapy. Pt's nurse Hemanth Dejesus cleared pt to participate in OT, and reports pt has ordered blood transfusion.  Pt is seen with PT to increase safety of the pt and staff during functional mobility and ADLs due to the above reasons. Pt maneuvered to EOB w/SBA for safety 2/2 pt is very close to EOB. Pt maneuvered to the BR w/FWW and performed toilet txfr w/SBA, and all aspects of toileting w/Mod Ind. Following toileting pt completed std ADL grooming task sinkside for ~ 5 min w/Supervision. Pt was able to perform functional mobility for ~ 3 min following which pt's HR increased to 140s, and pt required RB to recover. Pt reports no dizziness or HA. Pt maneuvered to the chair, reports she is using AE at home for LB ADLs, was able to verbalize appropriate use of AE. Issued long handled sponge for the pt, pt was able to simulate bathing w/set-up. Pt reports using thera-band at home for TherEx, issued Red resistive Thera-band for the pt. Pt was able to verbalize and demo understanding. Pt would benefit from HEP of UE TherEx to perform on her own to improve endurance and activity tolerance for carryover w/ADLs. Progression toward goals:  ?          Improving appropriately and progressing toward goals  ? Improving slowly and progressing toward goals  ? Not making progress toward goals and plan of care will be adjusted     PLAN:  Patient continues to benefit from skilled intervention to address the above impairments. Continue treatment per established plan of care. Discharge Recommendations:  Home Health w/Supervision  Further Equipment Recommendations for Discharge:  N/A     SUBJECTIVE:   Patient stated ? My blood count is always messed up. I want to go home. ?    OBJECTIVE DATA SUMMARY:   Cognitive/Behavioral Status:  Neurologic State: Alert  Orientation Level: Oriented X4  Cognition: Follows commands  Safety/Judgement: Awareness of environment    Functional Mobility and Transfers for ADLs:   Bed Mobility:     Supine to Sit: Stand-by assistance     Scooting: Supervision   Transfers:  Sit to Stand: Supervision    Toilet Transfer : Supervision(w/FWW)      Balance:  Sitting: Intact  Standing: Impaired; With support  Standing - Static: Good  Standing - Dynamic : Fair(plus)    ADL Intervention:   Grooming  Grooming Assistance: Supervision(std sinkside)  Washing Hands: Supervision  Brushing Teeth: Supervision    Lower Body Bathing  Lower Body : Supervision  Adaptive Equipment: Long handled sponge    Upper Body Dressing Assistance  Shirt simulation with hospital gown: Supervision     Toileting  Toileting Assistance: Modified independent  Bladder Hygiene: Modified independent  UE Therapeutic Exercises:   Issued red Thera-band and educated pt on BUE TherEx to improve UB strength and endurance for ADls carryover  Issued resistive ball for hand strengthening 2/2 decreased  strength  Pain:  Pain level pre-treatment: 0/10   Pain level post-treatment: 0/10    Activity Tolerance:    Fair  Please refer to the flowsheet for vital signs taken during this treatment. After treatment:   ?  Patient left in no apparent distress sitting up in chair  ? Patient left in no apparent distress in bed  ? Call bell left within reach  ? Nursing notified  ? Caregiver present  ? Chair alarm activated    COMMUNICATION/EDUCATION:   ? Role of Occupational Therapy in the acute care setting  ? Home safety education was provided and the patient/caregiver indicated understanding. ? Patient/family have participated as able in working towards goals and plan of care. ? Patient/family agree to work toward stated goals and plan of care. ? Patient understands intent and goals of therapy, but is neutral about his/her participation. ? Patient is unable to participate in goal setting and plan of care.       Thank you for this referral.  Claudeen Flack, COTA  Time Calculation: 33 mins

## 2019-05-14 NOTE — PROGRESS NOTES
Problem: Mobility Impaired (Adult and Pediatric)  Goal: *Acute Goals and Plan of Care (Insert Text)  Description  Physical Therapy Goals  Initiated 5/13/2019 and to be accomplished within 7 day(s)  1. Patient will move from supine to sit and sit to supine , scoot up and down and roll side to side in bed with supervision. 2.  Patient will transfer from bed to chair and chair to bed with supervision/set-up using the least restrictive device. 3.  Patient will perform sit to stand with supervision/set-up. 4.  Patient will ambulate with supervision/set-up for >100 feet with the least restrictive device. 5.  Patient will ascend/descend 4-12 stairs with 1 handrail(s) with minimal assistance/contact guard assist.    PLOF: Patient lives with  who is able to assist. They reside in 2 story home with possibility of staying on first level. Patient has AUBRIE with HR and was independent with mobility and I/ADL's PTA. Patient has BSC and RW at home. Outcome: Progressing Towards Goal   PHYSICAL THERAPY TREATMENT    Patient: Mary Acosta (56 y.o. female)  Date: 5/14/2019  Diagnosis: ARF (acute renal failure) (HCC) [N17.9]  Leukocytosis [D72.829]  Bandemia [D72.825]  Thrombocytopenia (HCC) [D69.6]  Chest pain [R07.9]  Abdominal pain [R10.9]  Anemia [D64.9]   Precautions: Aspiration, Fall   Chart, physical therapy assessment, plan of care and goals were reviewed. OBJECTIVE/ ASSESSMENT:  Patient found supine in bed willing to work with PT. Patient seen with OT to maximize safety of patient and staff and also due to pt's poor endurance. Pt sat at EOB then stood to RW. Pt ambulated into hallway increasing distance but requiring a seated rest break of about 2 minute before and after stair negotiation. Pt was able to negotiate 4 total stairs with SBA. Pt's HR post stair negotiation found to be 152 BPM. Pt returned to room and sat in b/s chair where she performed seated therex.  Pt reports she has a HEP that she still follow from her previous hip surgery. Pt was left sitting with needs in reach. Progression toward goals:  ?      Improving appropriately and progressing toward goals  ? Improving slowly and progressing toward goals  ? Not making progress toward goals and plan of care will be adjusted     PLAN:  Patient continues to benefit from skilled intervention to address the above impairments. Continue treatment per established plan of care. Discharge Recommendations:  Home Health  Further Equipment Recommendations for Discharge: Pt has Rollator     SUBJECTIVE:   Patient stated ? I've just been laying in bed so I get tired quick. ?    OBJECTIVE DATA SUMMARY:   Critical Behavior:  Neurologic State: Alert  Orientation Level: Oriented X4  Cognition: Follows commands  Safety/Judgement: Awareness of environment  Functional Mobility Training:  Bed Mobility:  Supine to Sit: Stand-by assistance  Scooting: Supervision  Transfers:  Sit to Stand: Supervision  Stand to Sit: Supervision  Balance:  Sitting: Intact  Standing: Impaired; With support  Standing - Static: Good  Standing - Dynamic : Fair(plus)  Ambulation/Gait Training:  Distance (ft): 195 Feet (ft)  Assistive Device: Walker, rolling  Ambulation - Level of Assistance: Stand-by assistance;Supervision  Gait Abnormalities: Decreased step clearance  Base of Support: Widened  Speed/Yoana: Slow  Step Length: Left shortened;Right shortened  Interventions: Verbal cues    Therapeutic Exercises:       EXERCISE   Sets   Reps   Active Active Assist   Passive Self- assited ROM   Comments   Ankle Pumps   ? ? ? ?    Quad Sets   ? ? ? ? Glut Sets   ? ? ? ? Short Arc Quads   ? ? ? ? Heel Slides   ? ? ? ? Straight Leg Raises   ? ? ? ? Hip Abd   ? ? ? ? Long Arc Quads 1 10 ? ? ? ? Bilaterally   Seated Marching 1 10 ? ? ? ? Bilaterally   Seated Knee Flexion   ? ? ? ? Standing Marching   ? ? ? ?       Pain:  Pain level pre-treatment: 0  Pain level post-treatment: 0    Activity Tolerance:   Fair  Please refer to the flowsheet for vital signs taken during this treatment. After treatment:   ? Patient left in no apparent distress sitting up in chair  ? Patient left in no apparent distress in bed  ? Call bell left within reach  ? Nursing notified  ? Caregiver present  ? Chair alarm activated  ? SCDs applied    COMMUNICATION/EDUCATION:   ?         Role of Physical Therapy  ? Fall prevention  ? Bed mobility  ? Transfers  ? Ambulation / gait  ? Assistive device management  ? Stairs  ? Body mechanics  ? Position change   ? Therapeutic exercise  ? Activity pacing / energy conservation  ?          Other:      Melissa Montenegro PTA   Time Calculation: 33 mins

## 2019-05-14 NOTE — ROUTINE PROCESS
Bedside and Verbal shift change report given to Jerzy Gonzales RN (oncoming nurse) by Farida Martinez RN (offgoing nurse). Report included the following information SBAR, Kardex, Recent Results and Quality Measures.

## 2019-05-14 NOTE — PROGRESS NOTES
Unable to see pt for skilled OT at this time due to:  Hold OT at this time 2/2 pt's H&H is below therapeutic values  (6.8)  Will follow up later as appropriate. Thank you.     ANG Menjivar/CHASE

## 2019-05-14 NOTE — PROGRESS NOTES
[facility] Hospitalist Group  Progress Note    Patient: Norma Span Age: 48 y.o. : 1965 MR#: [id number] SSN: [id number]  Date/Time: 2019     Subjective:     Patient sitting in bed in NAD, awake, alert, wants to go home soon    Assessment/Plan:     1. Thrombocytopenia -monitor,Heme following  2. Anemia - monitor, transfuse as per heme  3. Leukocytosis - improving, ?leukemoid reaction  4. Chronic anemia 2y to baseline sickle cell disease   5. ARF on CKD3 - improving , monitor  6. HTN - monitor BP   7. Hypothyroidism - on thyroid armor  8. Ammonia levels improved - off lactulose   9. Sickle cell anemia, not in crisis currently   10. 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  No   BMI 35.35 kg/m²      Tmax/24hrs: Temp (24hrs), Av.4 °F (36.9 °C), Min:97.8 °F (36.6 °C), Max:98.9 °F (37.2 °C)  IOBRIEF    Intake/Output Summary (Last 24 hours) at 2019 1707  Last data filed at 2019 1559  Gross per 24 hour   Intake 600 ml   Output 3900 ml   Net -3300 ml       General:  Awake, alert  Cardiovascular:  S1S2+, RRR  Pulmonary:  CTA b/l  GI:  Soft, BS+, NT, ND  Extremities:  trace edema        Medications:   Current Facility-Administered Medications   Medication Dose Route Frequency    0.9% sodium chloride infusion 250 mL  250 mL IntraVENous PRN    acetaminophen (TYLENOL) tablet 650 mg  650 mg Oral ONCE    diphenhydrAMINE (BENADRYL) capsule 25 mg  25 mg Oral ONCE    dexamethasone (DECADRON) tablet 10 mg  10 mg Oral ONCE    hydrALAZINE (APRESOLINE) tablet 25 mg  25 mg Oral TID   Jesús 0.9% sodium chloride infusion 250 mL  250 mL IntraVENous PRN    hydrALAZINE (APRESOLINE) 20 mg/mL injection 20 mg  20 mg IntraVENous Q6H PRN    sodium bicarbonate tablet 650 mg  650 mg Oral TID    calcium gluconate 1 g in 0.9% sodium chloride 100 mL IVPB  1 g IntraVENous DIALYSIS PRN    calcium carbonate (TUMS) chewable tablet 200 mg [elemental]  200 mg Oral DIALYSIS PRN    0.9% sodium chloride infusion 250 mL  250 mL IntraVENous PRN    glucose chewable tablet 16 g  4 Tab Oral PRN    glucagon (GLUCAGEN) injection 1 mg  1 mg IntraMUSCular PRN    dextrose (D50W) injection syrg 12.5-25 g  25-50 mL IntraVENous PRN    dronabinol (MARINOL) capsule 5 mg  5 mg Oral BID    HYDROcodone-acetaminophen (NORCO) 5-325 mg per tablet 1-2 Tab  1-2 Tab Oral Q4H PRN    famotidine (PEPCID) tablet 20 mg  20 mg Oral DAILY    diphenhydrAMINE (BENADRYL) capsule 25 mg  25 mg Oral Q6H PRN    sodium chloride (NS) flush 5-10 mL  5-10 mL IntraVENous PRN    folic acid (FOLVITE) tablet 1 mg  1 mg Oral DAILY    thyroid (Pork) (ARMOUR) tablet 30 mg  30 mg Oral DAILY    acetaminophen (TYLENOL) tablet 650 mg  650 mg Oral Q4H PRN    ondansetron (ZOFRAN) injection 4 mg  4 mg IntraVENous Q4H PRN       Labs:    Recent Results (from the past 24 hour(s))   GLUCOSE, POC    Collection Time: 05/13/19  9:13 PM   Result Value Ref Range    Glucose (POC) 159 (H) 70 - 110 mg/dL   FIBRINOGEN    Collection Time: 05/14/19  2:56 AM   Result Value Ref Range    Fibrinogen 312 210 - 451 mg/dL   CALCIUM, IONIZED    Collection Time: 05/14/19  2:56 AM   Result Value Ref Range    Ionized Calcium 1.19 1.12 - 0.16 MMOL/L   METABOLIC PANEL, BASIC    Collection Time: 05/14/19  2:56 AM   Result Value Ref Range    Sodium 144 136 - 145 mmol/L    Potassium 3.9 3.5 - 5.5 mmol/L    Chloride 114 (H) 100 - 108 mmol/L    CO2 23 21 - 32 mmol/L    Anion gap 7 3.0 - 18 mmol/L    Glucose 111 (H) 74 - 99 mg/dL    BUN 64 (H) 7.0 - 18 MG/DL    Creatinine 3.83 (H) 0.6 - 1.3 MG/DL BUN/Creatinine ratio 17 12 - 20      GFR est AA 15 (L) >60 ml/min/1.73m2    GFR est non-AA 12 (L) >60 ml/min/1.73m2    Calcium 8.4 (L) 8.5 - 10.1 MG/DL   CBC WITH AUTOMATED DIFF    Collection Time: 05/14/19  2:56 AM   Result Value Ref Range    WBC 17.0 (H) 4.6 - 13.2 K/uL    RBC 2.38 (L) 4.20 - 5.30 M/uL    HGB 6.8 (L) 12.0 - 16.0 g/dL    HCT 19.8 (L) 35.0 - 45.0 %    MCV 83.2 74.0 - 97.0 FL    MCH 28.6 24.0 - 34.0 PG    MCHC 34.3 31.0 - 37.0 g/dL    RDW 17.8 (H) 11.6 - 14.5 %    PLATELET 44 (L) 021 - 420 K/uL    NEUTROPHILS 84 (H) 40 - 73 %    LYMPHOCYTES 10 (L) 21 - 52 %    MONOCYTES 5 3 - 10 %    EOSINOPHILS 1 0 - 5 %    BASOPHILS 0 0 - 2 %    ABS. NEUTROPHILS 14.2 (H) 1.8 - 8.0 K/UL    ABS. LYMPHOCYTES 1.7 0.9 - 3.6 K/UL    ABS. MONOCYTES 0.8 0.05 - 1.2 K/UL    ABS. EOSINOPHILS 0.2 0.0 - 0.4 K/UL    ABS. BASOPHILS 0.0 0.0 - 0.1 K/UL    DF AUTOMATED     GLUCOSE, POC    Collection Time: 05/14/19  6:12 AM   Result Value Ref Range    Glucose (POC) 136 (H) 70 - 110 mg/dL   TYPE + CROSSMATCH    Collection Time: 05/14/19  8:50 AM   Result Value Ref Range    Crossmatch Expiration 05/17/2019     ABO/Rh(D) Mari Graces POSITIVE     Antibody screen POS     Physician instructions Irradiation  SICKLEDEX NEGATIVE       Antibody ID PENDING     Comment        NOTIFIED CONOR BLAKE RN, 4S ABOUT POSSIBLE DELAY DUE TO SPECIMEN SENT TO [facility] ON [id number] AT [id number] BY JNP.     GLUCOSE, POC    Collection Time: 05/14/19 11:23 AM   Result Value Ref Range    Glucose (POC) 133 (H) 70 - 110 mg/dL   GLUCOSE, POC    Collection Time: 05/14/19  4:16 PM   Result Value Ref Range    Glucose (POC) 141 (H) 70 - 110 mg/dL       Signed By: Alma Rosa Isbell MD     May 14, 2019

## 2019-05-14 NOTE — ROUTINE PROCESS
Pt states she can't swallow large pills. Offered to put pills in applesauce for patient and she refused. Pt swallowed sodium bicarb and then stated that her pills need to be crushed for now on as she cannot swallow large pills. Pt handling secretions and food fine, no difficulty swallowing anything other than pills per patient.

## 2019-05-14 NOTE — PROGRESS NOTES
Problem: Pain  Goal: *Control of Pain  Outcome: Progressing Towards Goal     Problem: Pain  Goal: *PALLIATIVE CARE:  Alleviation of Pain  Outcome: Progressing Towards Goal     Problem: Patient Education: Go to Patient Education Activity  Goal: Patient/Family Education  Outcome: Progressing Towards Goal     Problem: Patient Education: Go to Patient Education Activity  Goal: Patient/Family Education  Outcome: Progressing Towards Goal     Problem: Nutrition Deficit  Goal: *Optimize nutritional status  Outcome: Progressing Towards Goal     Problem: Injury - Risk of, Adverse Drug Event  Goal: *Absence of adverse drug events  Outcome: Progressing Towards Goal     Problem: Patient Education: Go to Patient Education Activity  Goal: Patient/Family Education  Outcome: Progressing Towards Goal     Problem: Anemia Care Plan (Adult and Pediatric)  Goal: *Tolerates increased activity  Outcome: Progressing Towards Goal

## 2019-05-14 NOTE — PROGRESS NOTES
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  #3 Ac non gap  metabolic acidosis, improved, off PO bicarb  #4 Sickle cell disease ,acute crisis, pain management per primary team/Hem/onc  #5 Anemia/thrombocytopenia: hem/onc following , Hb down to 6.8 , PRBC X 1 recommended   #6 leukocytosis/Sirs: leukemoid reaction per Hem/Onc   #7 splenomegaly d/t sickle disease per Hem/onc  #8 AMS:resolved  #9 transaminitis/hyperammonemia,better , does have secondary hemochromatoses   Hem/onc following   #10 HTN : avoid too tight control , goal ~ systolic [phone]       Please call with questions,     Doing much better renal stand point, ok to be followed in renal clinic   Discharge depends upon here PT/OT recs and Blood Tx recs     Gold Taylor MD FASN  Cell [phone]  Pager: [phone]      Subjective:     Feels better  Denies SOB  Intake improved  Working with PT/OT       Current Facility-Administered Medications:     0.9% sodium chloride infusion 250 mL, 250 mL, IntraVENous, PRN, Eluterio Sadia, NP    acetaminophen (TYLENOL) tablet 650 mg, 650 mg, Oral, ONCE, Harrington, Brain Costain, NP    diphenhydrAMINE (BENADRYL) capsule 25 mg, 25 mg, Oral, ONCE, Harrington, Brain Costain, NP    dexamethasone (DECADRON) tablet 10 mg, 10 mg, Oral, ONCE, Harrington, Brain Costain, NP    hydrALAZINE (APRESOLINE) tablet 25 mg, 25 mg, Oral, TID, Harvey Valdez MD, 25 mg at 05/14/19 0909    0.9% sodium chloride infusion 250 mL, 250 mL, IntraVENous, PRN, Eluterio Sadia NP, Stopped at 05/10/19 0858    hydrALAZINE (APRESOLINE) 20 mg/mL injection 20 mg, 20 mg, IntraVENous, Q6H PRN, Lily Lyles PA-C, 20 mg at 05/07/19 2218    sodium bicarbonate tablet 650 mg, 650 mg, Oral, TID, Harvey Valdez MD, 650 mg at 05/14/19 0908    calcium gluconate 1 g in 0.9% sodium chloride 100 mL IVPB, 1 g, IntraVENous, DIALYSIS PRNCourtney Viviann Pelton, MD    calcium carbonate (TUMS) chewable tablet 200 mg [elemental], 200 mg, Oral, DIALYSIS PRN, Porfirio Valdez MD    0.9% sodium chloride infusion 250 mL, 250 mL, IntraVENous, PRN, Porfirio Valdez MD    glucose chewable tablet 16 g, 4 Tab, Oral, PRN, Lily Neff PA-C    glucagon (GLUCAGEN) injection 1 mg, 1 mg, IntraMUSCular, PRN, Lily Lyles PA-C    dextrose (D50W) injection syrg 12.5-25 g, 25-50 mL, IntraVENous, PRN, Lily Lyles PA-C    dronabinol (MARINOL) capsule 5 mg, 5 mg, Oral, BID, Rich Goodrich MD, 5 mg at 05/14/19 0908    HYDROcodone-acetaminophen (NORCO) 5-325 mg per tablet 1-2 Tab, 1-2 Tab, Oral, Q4H PRN, Bishop Meg MD, 2 Tab at 05/14/19 6083    famotidine (PEPCID) tablet 20 mg, 20 mg, Oral, DAILY, Bishop Meg MD, 20 mg at 05/14/19 0909    diphenhydrAMINE (BENADRYL) capsule 25 mg, 25 mg, Oral, Q6H PRN, Bishop Meg MD, 25 mg at 05/04/19 1846    sodium chloride (NS) flush 5-10 mL, 5-10 mL, IntraVENous, PRN, Elizabeth Burns MD    folic acid (FOLVITE) tablet 1 mg, 1 mg, Oral, DAILY, Elizabeth Burns MD, 1 mg at 05/14/19 0908    thyroid (Pork) (ARMOUR) tablet 30 mg, 30 mg, Oral, DAILY, Elizabeth Burns MD, 30 mg at 05/14/19 0908    acetaminophen (TYLENOL) tablet 650 mg, 650 mg, Oral, Q4H PRN, Darnell Lang MD, 650 mg at 05/05/19 1621    ondansetron (ZOFRAN) injection 4 mg, 4 mg, IntraVENous, Q4H PRN, Darnell Lang MD, 4 mg at 05/03/19 2133        Objective:     Visit Vitals  /70 (BP 1 Location: Left arm)   Pulse 83   Temp 98.3 °F (36.8 °C)   Resp 20   Ht 5' 6\" (1.676 m)   Wt 99.3 kg (219 lb)   SpO2 98% Breastfeeding?  No   BMI 35.35 kg/m²         Intake/Output Summary (Last 24 hours) at 5/14/2019 1145  Last data filed at 5/14/2019 0911  Gross per 24 hour   Intake 260 ml   Output 3775 ml   Net -3515 ml       Physical Exam:     Gen NAD  HENT mmm  RS AEBE clear   CVS s1 s2 wnl no JVD+  GI soft BS +  Ext 1+ LE  edema     Data Review:    Recent Labs     05/14/19  0256   WBC 17.0*   RBC 2.38*   HCT 19.8*   MCV 83.2   MCH 28.6   MCHC 34.3   RDW 17.8*     Recent Labs     05/14/19  0256 05/13/19  0514 05/12/19  0506   BUN 64* 74* 88*   CREA 3.83* 4.68* 5.59*   CA 8.4* 8.5 8.9   K 3.9 3.5 3.0*    144 142   * 113* 110*   CO2 23 21 21   PHOS  --   --  3.8   * 100* 97       Brenda Li MD

## 2019-05-15 ENCOUNTER — HOME HEALTH ADMISSION (OUTPATIENT)
Dept: HOME HEALTH SERVICES | Facility: HOME HEALTH | Age: 54
End: 2019-05-15
Payer: COMMERCIAL

## 2019-05-15 VITALS
HEIGHT: 66 IN | SYSTOLIC BLOOD PRESSURE: 130 MMHG | HEART RATE: 77 BPM | WEIGHT: 219 LBS | DIASTOLIC BLOOD PRESSURE: 81 MMHG | BODY MASS INDEX: 35.2 KG/M2 | OXYGEN SATURATION: 100 % | RESPIRATION RATE: 19 BRPM | TEMPERATURE: 98.8 F

## 2019-05-15 LAB
ANION GAP SERPL CALC-SCNC: 7 MMOL/L (ref 3–18)
BASOPHILS # BLD: 0 K/UL (ref 0–0.1)
BASOPHILS NFR BLD: 0 % (ref 0–2)
BLD PROD TYP BPU: NORMAL
BPU ID: NORMAL
BUN SERPL-MCNC: 50 MG/DL (ref 7–18)
BUN/CREAT SERPL: 16 (ref 12–20)
CA-I SERPL-SCNC: 1.16 MMOL/L (ref 1.12–1.32)
CALCIUM SERPL-MCNC: 8.6 MG/DL (ref 8.5–10.1)
CHLORIDE SERPL-SCNC: 115 MMOL/L (ref 100–108)
CO2 SERPL-SCNC: 24 MMOL/L (ref 21–32)
CREAT SERPL-MCNC: 3.14 MG/DL (ref 0.6–1.3)
DIFFERENTIAL METHOD BLD: ABNORMAL
EOSINOPHIL # BLD: 0.2 K/UL (ref 0–0.4)
EOSINOPHIL NFR BLD: 2 % (ref 0–5)
ERYTHROCYTE [DISTWIDTH] IN BLOOD BY AUTOMATED COUNT: 18 % (ref 11.6–14.5)
FIBRINOGEN PPP-MCNC: 333 MG/DL (ref 210–451)
GLUCOSE BLD STRIP.AUTO-MCNC: 113 MG/DL (ref 70–110)
GLUCOSE BLD STRIP.AUTO-MCNC: 139 MG/DL (ref 70–110)
GLUCOSE SERPL-MCNC: 109 MG/DL (ref 74–99)
HCT VFR BLD AUTO: 19.9 % (ref 35–45)
HGB BLD-MCNC: 6.7 G/DL (ref 12–16)
LYMPHOCYTES # BLD: 2.1 K/UL (ref 0.9–3.6)
LYMPHOCYTES NFR BLD: 16 % (ref 21–52)
MCH RBC QN AUTO: 28.6 PG (ref 24–34)
MCHC RBC AUTO-ENTMCNC: 33.7 G/DL (ref 31–37)
MCV RBC AUTO: 85 FL (ref 74–97)
MONOCYTES # BLD: 1 K/UL (ref 0.05–1.2)
MONOCYTES NFR BLD: 7 % (ref 3–10)
NEUTS SEG # BLD: 10.2 K/UL (ref 1.8–8)
NEUTS SEG NFR BLD: 75 % (ref 40–73)
PLATELET # BLD AUTO: 47 K/UL (ref 135–420)
POTASSIUM SERPL-SCNC: 4 MMOL/L (ref 3.5–5.5)
RBC # BLD AUTO: 2.34 M/UL (ref 4.2–5.3)
SODIUM SERPL-SCNC: 146 MMOL/L (ref 136–145)
STATUS OF UNIT,%ST: NORMAL
UNIT DIVISION, %UDIV: 0
WBC # BLD AUTO: 13.6 K/UL (ref 4.6–13.2)

## 2019-05-15 PROCEDURE — 80048 BASIC METABOLIC PNL TOTAL CA: CPT

## 2019-05-15 PROCEDURE — 74011250637 HC RX REV CODE- 250/637: Performed by: INTERNAL MEDICINE

## 2019-05-15 PROCEDURE — 85025 COMPLETE CBC W/AUTO DIFF WBC: CPT

## 2019-05-15 PROCEDURE — 74011250637 HC RX REV CODE- 250/637: Performed by: HOSPITALIST

## 2019-05-15 PROCEDURE — 36415 COLL VENOUS BLD VENIPUNCTURE: CPT

## 2019-05-15 PROCEDURE — 74011250636 HC RX REV CODE- 250/636: Performed by: EMERGENCY MEDICINE

## 2019-05-15 PROCEDURE — 82330 ASSAY OF CALCIUM: CPT

## 2019-05-15 PROCEDURE — 86922 COMPATIBILITY TEST ANTIGLOB: CPT

## 2019-05-15 PROCEDURE — 85384 FIBRINOGEN ACTIVITY: CPT

## 2019-05-15 PROCEDURE — 86921 COMPATIBILITY TEST INCUBATE: CPT

## 2019-05-15 PROCEDURE — 36430 TRANSFUSION BLD/BLD COMPNT: CPT

## 2019-05-15 PROCEDURE — 74011000258 HC RX REV CODE- 258: Performed by: EMERGENCY MEDICINE

## 2019-05-15 PROCEDURE — P9040 RBC LEUKOREDUCED IRRADIATED: HCPCS

## 2019-05-15 PROCEDURE — 86902 BLOOD TYPE ANTIGEN DONOR EA: CPT

## 2019-05-15 PROCEDURE — 74011250637 HC RX REV CODE- 250/637: Performed by: NURSE PRACTITIONER

## 2019-05-15 PROCEDURE — 85660 RBC SICKLE CELL TEST: CPT

## 2019-05-15 PROCEDURE — 82962 GLUCOSE BLOOD TEST: CPT

## 2019-05-15 RX ORDER — DRONABINOL 2.5 MG/1
2.5 CAPSULE ORAL 2 TIMES DAILY
Qty: 20 CAP | Refills: 0 | Status: SHIPPED | OUTPATIENT
Start: 2019-05-15 | End: 2019-05-25

## 2019-05-15 RX ORDER — FAMOTIDINE 20 MG/1
20 TABLET, FILM COATED ORAL DAILY
Qty: 30 TAB | Refills: 0 | Status: ON HOLD | OUTPATIENT
Start: 2019-05-16 | End: 2020-07-21

## 2019-05-15 RX ORDER — ACETAMINOPHEN 325 MG/1
650 TABLET ORAL ONCE
Status: COMPLETED | OUTPATIENT
Start: 2019-05-15 | End: 2019-05-15

## 2019-05-15 RX ORDER — HYDROCODONE BITARTRATE AND ACETAMINOPHEN 5; 325 MG/1; MG/1
1 TABLET ORAL
Qty: 5 TAB | Refills: 0 | Status: SHIPPED | OUTPATIENT
Start: 2019-05-15 | End: 2019-05-18

## 2019-05-15 RX ORDER — SODIUM BICARBONATE 650 MG/1
650 TABLET ORAL 3 TIMES DAILY
Qty: 42 TAB | Refills: 0 | Status: SHIPPED | OUTPATIENT
Start: 2019-05-15 | End: 2019-05-29

## 2019-05-15 RX ORDER — DEXAMETHASONE SODIUM PHOSPHATE 100 MG/10ML
10 INJECTION INTRAMUSCULAR; INTRAVENOUS ONCE
Status: DISCONTINUED | OUTPATIENT
Start: 2019-05-15 | End: 2019-05-15 | Stop reason: CLARIF

## 2019-05-15 RX ORDER — DIPHENHYDRAMINE HCL 25 MG
25 CAPSULE ORAL ONCE
Status: COMPLETED | OUTPATIENT
Start: 2019-05-15 | End: 2019-05-15

## 2019-05-15 RX ADMIN — FOLIC ACID 1 MG: 1 TABLET ORAL at 08:52

## 2019-05-15 RX ADMIN — DRONABINOL 5 MG: 5 CAPSULE ORAL at 08:52

## 2019-05-15 RX ADMIN — DEXAMETHASONE SODIUM PHOSPHATE 10 MG: 4 INJECTION, SOLUTION INTRAMUSCULAR; INTRAVENOUS at 10:52

## 2019-05-15 RX ADMIN — HYDROCODONE BITARTRATE AND ACETAMINOPHEN 2 TABLET: 5; 325 TABLET ORAL at 06:12

## 2019-05-15 RX ADMIN — ACETAMINOPHEN 650 MG: 325 TABLET ORAL at 08:53

## 2019-05-15 RX ADMIN — LEVOTHYROXINE, LIOTHYRONINE 30 MG: 19; 4.5 TABLET ORAL at 08:52

## 2019-05-15 RX ADMIN — HYDRALAZINE HYDROCHLORIDE 25 MG: 25 TABLET, FILM COATED ORAL at 08:53

## 2019-05-15 RX ADMIN — FAMOTIDINE 20 MG: 20 TABLET ORAL at 08:53

## 2019-05-15 RX ADMIN — DIPHENHYDRAMINE HYDROCHLORIDE 25 MG: 25 CAPSULE ORAL at 08:53

## 2019-05-15 RX ADMIN — SODIUM BICARBONATE 650 MG TABLET 650 MG: at 08:52

## 2019-05-15 NOTE — PROGRESS NOTES
Pt signed Freedom of Choice form for [address]. Discharge order noted for today. Pt has been accepted to [facility] agency. Met with patient  and she is agreeable to the transition plan today. Transport has been arranged through her family. Patient's  home health  orders have been forwarded to [facility] home health  agency . Updated bedside RN, Juan Dempsey,  to the transition plan.   Discharge information has been documented on the AVS.       Coralyn Severance, BSN RN  Care Management  Pager: [phone]

## 2019-05-15 NOTE — PROGRESS NOTES
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  #3 Ac non gap  metabolic acidosis, improved, off PO bicarb  #4 Sickle cell disease ,acute crisis, pain management per primary team/Hem/onc  #5 Anemia/thrombocytopenia: hem/onc following , PRBC X 1 today  #6 leukocytosis/Sirs: leukemoid reaction per Hem/Onc   #7 splenomegaly d/t sickle disease per Hem/onc  #8 AMS:resolved  #9 transaminitis/hyperammonemia,better , does have secondary hemochromatoses   Hem/onc following   #10 HTN : avoid too tight control , goal ~ systolic [phone]    #16 hypernatremia , increase free water intake , discussed with patient , needs to drink   ~ 50-60 oz of fluids daily     Please call with questions,     Doing much better renal stand point, ok to be followed in renal clinic.   BMP in 1 week and f/u in 2 weeks     Elizabeth Reyes MD FASN  Cell [phone]  Pager: [phone]      Subjective:     Feels better  Denies SOB  Intake improved  Working with PT/OT       Current Facility-Administered Medications:     0.9% sodium chloride infusion 250 mL, 250 mL, IntraVENous, PRN, Juana Adams NP    hydrALAZINE (APRESOLINE) tablet 25 mg, 25 mg, Oral, TID, Nuria Valdez MD, 25 mg at 05/15/19 0853    0.9% sodium chloride infusion 250 mL, 250 mL, IntraVENous, PRN, Juana Adams NP, Stopped at 05/10/19 0858    hydrALAZINE (APRESOLINE) 20 mg/mL injection 20 mg, 20 mg, IntraVENous, Q6H PRN, Lily yLles PA-C, 20 mg at 05/07/19 2218    sodium bicarbonate tablet 650 mg, 650 mg, Oral, TID, Nuria Valdez MD, 650 mg at 05/15/19 0852    calcium gluconate 1 g in 0.9% sodium chloride 100 mL IVPB, 1 g, IntraVENous, DIALYSIS PRN, Elgin Valdez MD    calcium carbonate (TUMS) chewable tablet 200 mg [elemental], 200 mg, Oral, DIALYSIS PRN, Elgin Valdez MD    0.9% sodium chloride infusion 250 mL, 250 mL, IntraVENous, PRN, Elgin Valdez MD    glucose chewable tablet 16 g, 4 Tab, Oral, PRN, Lily Gonzalez PA-C    glucagon (GLUCAGEN) injection 1 mg, 1 mg, IntraMUSCular, PRN, Lily Lyles PA-C    dextrose (D50W) injection syrg 12.5-25 g, 25-50 mL, IntraVENous, PRN, Lily Lyles PA-C    dronabinol (MARINOL) capsule 5 mg, 5 mg, Oral, BID, Radha Helm MD, 5 mg at 05/15/19 0852    HYDROcodone-acetaminophen (NORCO) 5-325 mg per tablet 1-2 Tab, 1-2 Tab, Oral, Q4H PRN, Courtney Shetty MD, 2 Tab at 05/15/19 0612    famotidine (PEPCID) tablet 20 mg, 20 mg, Oral, DAILY, Courtney Shetty MD, 20 mg at 05/15/19 0853    diphenhydrAMINE (BENADRYL) capsule 25 mg, 25 mg, Oral, Q6H PRN, Courtney Shetty MD, 25 mg at 05/04/19 1846    sodium chloride (NS) flush 5-10 mL, 5-10 mL, IntraVENous, PRN, Rola Burns MD    folic acid (FOLVITE) tablet 1 mg, 1 mg, Oral, DAILY, Rola Burns MD, 1 mg at 05/15/19 0540    thyroid (Pork) (ARMOUR) tablet 30 mg, 30 mg, Oral, DAILY, Rola Burns MD, 30 mg at 05/15/19 0852    acetaminophen (TYLENOL) tablet 650 mg, 650 mg, Oral, Q4H PRN, Lynette Navarro MD, 650 mg at 05/05/19 1621    ondansetron (ZOFRAN) injection 4 mg, 4 mg, IntraVENous, Q4H PRN, Lynette Navarro MD, 4 mg at 05/03/19 2133        Objective:     Visit Vitals  /75   Pulse 86   Temp 98.6 °F (37 °C)   Resp 18   Ht 5' 6\" (1.676 m)   Wt 99.3 kg (219 lb)   SpO2 100%   Breastfeeding?  No   BMI 35.35 kg/m²         Intake/Output Summary (Last 24 hours) at 5/15/2019 1154  Last data filed at 5/15/2019 0854  Gross per 24 hour   Intake 1080 ml   Output 2650 ml   Net -1570 ml       Physical Exam:     Gen NAD  HENT mmm  RS AEBE clear   CVS s1 s2 wnl no JVD+  GI soft BS +  Ext no LE  edema     Data Review:    Recent Labs     05/15/19  0320   WBC 13.6*   RBC 2.34*   HCT 19.9*   MCV 85.0   MCH 28.6   MCHC 33.7   RDW 18.0*     Recent Labs     05/15/19  0320 05/14/19  0256 05/13/19  0514   BUN 50* 64* 74*   CREA 3.14* 3.83* 4.68*   CA 8.6 8.4* 8.5   K 4.0 3.9 3.5   * 144 144   * 114* 113*   CO2 24 23 21   * 111* 100*       Mariann Lang MD

## 2019-05-15 NOTE — HOME CARE
Received HH referral , Discharge noted for today, [facility] will follow for SN,PT,OT ; pt states she has a cane,Rollator,shower chair and BSC;  [facility] will follow. ANTHONY AUGUSTIN.

## 2019-05-15 NOTE — PROGRESS NOTES
Hematology/Medical Oncology Progress Note             Name: Juan Castillo   : 1965   MRN: [id number]   Date: 5/15/2019 8:31 AM     [x]I have reviewed the flowsheet and previous days notes. Events overnight reviewed and discussed with nursing staff. Vital signs and records reviewed. Ms. Erlin Garcia is a 47 y/o woman with sickle cell disease. She was admitted with complaints of progressive weakness and was found to have acute on chronic renal failure. She was also very dehydrated and complained of pain. Today the patient states that she is sleepy from taking pain medication over night. However, today she states that she no longer feels the pain, Denies that her pain last night was similar to sickle cell pain crisis and asks if we would clear her to go home on today. ROS:  Constitutional:  Negative for fever, chills, diaphoresis, activity change, appetite change and unexpected weight change. HENT: Negative for nosebleeds, congestion, facial swelling, mouth sores, trouble swallowing, neck pain, neck stiffness, voice change and postnasal drip. Eyes: Negative for photophobia, pain, discharge and itching. Respiratory: Negative for apnea, cough, choking, chest tightness, wheezing and stridor. Cardiovascular: Negative for chest pain, palpitations and leg swelling. Gastrointestinal: Negative for abdominal pain. Negative for nausea, diarrhea, constipation, blood in stool and rectal pain. Genitourinary: Negative for dysuria, urgency, hematuria, flank pain and difficulty urinating. Musculoskeletal: Negative for back pain, joint swelling, arthralgias and gait problem. Skin: Positive for dry skin. Neurological:  Negative for dizziness, facial asymmetry, speech difficulty, light-headedness and headaches. Hematological: Negative for adenopathy. Does not bruise/bleed easily. Psychiatric/Behavioral: Negative for hallucinations.       Vital Signs:    Visit Vitals  /61 (BP 1 Location: Left arm)   Pulse 82   Temp 97.1 °F (36.2 °C)   Resp 20   Ht 5' 6\" (1.676 m)   Wt 99.3 kg (219 lb)   SpO2 97%   Breastfeeding? No   BMI 35.35 kg/m²       O2 Device: Room air   O2 Flow Rate (L/min): 2 l/min   Temp (24hrs), Av.1 °F (36.7 °C), Min:97.1 °F (36.2 °C), Max:98.9 °F (37.2 °C)       Intake/Output:   Last shift:      No intake/output data recorded. Last 3 shifts:  1901 - 05/15 0700  In: 1440 [P.O.:1440]  Out: 5350 [Urine:5350]    Intake/Output Summary (Last 24 hours) at 5/15/2019 0841  Last data filed at 5/15/2019 0612  Gross per 24 hour   Intake 820 ml   Output 2550 ml   Net -1730 ml       Physical Exam:    Constitutional:Appears comfortable, NAD. HENT: Head: Normocephalic and atraumatic. Mouth/Throat: No oropharyngeal exudate. Eyes: Conjunctivae and EOM are normal. Pupils are equal, round, and reactive to light. No scleral icterus. Neck: Normal range of motion. Neck supple. No tracheal deviation present. No thyromegaly present. Cardiovascular: yosef rate and regular rhythm. Exam reveals no gallop and no friction rub. No murmur heard. Pulmonary/Chest:Symmetrical chest expansion, no accessory muscle use; good airway entry; no rales/ wheezing/ rhonchi noted  Abdominal: Soft. Bowel sounds are normal. No distension. No tenderness. There is no rebound and no guarding. Musculoskeletal: Normal range of motion. No edema and no tenderness. Lymphadenopathy:   No cervical adenopathy. Neurological:Alert and oriented to person, place, and time. Coordination normal.   Skin: Skin is dry. Psychiatric: Normal mood and affect. Normal behavior.           DATA:   Current Facility-Administered Medications   Medication Dose Route Frequency    diphenhydrAMINE (BENADRYL) capsule 25 mg  25 mg Oral ONCE    acetaminophen (TYLENOL) tablet 650 mg  650 mg Oral ONCE    dexamethasone (DECADRON) 10 mg in 0.9% sodium chloride 50 mL IVPB  10 mg IntraVENous ONCE    0.9% sodium chloride infusion 250 mL 250 mL IntraVENous PRN    hydrALAZINE (APRESOLINE) tablet 25 mg  25 mg Oral TID    0.9% sodium chloride infusion 250 mL  250 mL IntraVENous PRN    hydrALAZINE (APRESOLINE) 20 mg/mL injection 20 mg  20 mg IntraVENous Q6H PRN    sodium bicarbonate tablet 650 mg  650 mg Oral TID    calcium gluconate 1 g in 0.9% sodium chloride 100 mL IVPB  1 g IntraVENous DIALYSIS PRN    calcium carbonate (TUMS) chewable tablet 200 mg [elemental]  200 mg Oral DIALYSIS PRN    0.9% sodium chloride infusion 250 mL  250 mL IntraVENous PRN    glucose chewable tablet 16 g  4 Tab Oral PRN    glucagon (GLUCAGEN) injection 1 mg  1 mg IntraMUSCular PRN    dextrose (D50W) injection syrg 12.5-25 g  25-50 mL IntraVENous PRN    dronabinol (MARINOL) capsule 5 mg  5 mg Oral BID    HYDROcodone-acetaminophen (NORCO) 5-325 mg per tablet 1-2 Tab  1-2 Tab Oral Q4H PRN    famotidine (PEPCID) tablet 20 mg  20 mg Oral DAILY    diphenhydrAMINE (BENADRYL) capsule 25 mg  25 mg Oral Q6H PRN    sodium chloride (NS) flush 5-10 mL  5-10 mL IntraVENous PRN    folic acid (FOLVITE) tablet 1 mg  1 mg Oral DAILY    thyroid (Pork) (ARMOUR) tablet 30 mg  30 mg Oral DAILY    acetaminophen (TYLENOL) tablet 650 mg  650 mg Oral Q4H PRN    ondansetron (ZOFRAN) injection 4 mg  4 mg IntraVENous Q4H PRN                    Labs:  Recent Labs     05/15/19  0320 05/14/19  0256 05/13/19  0514   WBC 13.6* 17.0* 21.3*   HGB 6.7* 6.8* 7.7*   HCT 19.9* 19.8* 22.0*   PLT 47* 44* 37*     Recent Labs     05/15/19  0320 05/14/19  0256 05/13/19  0514   * 144 144   K 4.0 3.9 3.5   * 114* 113*   CO2 24 23 21   * 111* 100*   BUN 50* 64* 74*   CREA 3.14* 3.83* 4.68*   CA 8.6 8.4* 8.5     No results for input(s): PH, PCO2, PO2, HCO3, FIO2 in the last 72 hours. IMPRESSION:   · Acute kidney injury -secondary to NSAID intake?, hypertension                   nephrosclerosis?vs secondary to sickle cell disease? · Thrombocytosis- Improving.  Likely from sepsis and medications  · Leukocytosis  · Sickle cell anemia/sickle cell disease        PLAN:   · Anemia: Todays H/H is 6.7/19.9  Pt to receive 1 unit PRBC on today. · Thrombocytopenia:Platelet count has slightly increased today from 37,000 to 47,000; no intervention warranted unless her platelets decline below 20,000. · Leukocytosis:WBC continues to be trending down at 13.6 today. · This patients current treatment for iron overload is Jadenu to help keep her ferritin  level below 1000. Ferritin is 3,678. Jadenu to start as an in-patient per nephrology clearance. I have discussed with Nephrology-Jadenu still on hold for now. Creatinine baseline 1.3; Jadenu is contraindicated in GFR <40-today her is slowly increasing but remains well below 40ml/min at 19 and her creatinine continues to be trending down at 3.14- Nephrology following. · Post PRBC transfusion today , the patient is cleared for discharge from hematology perspective pending primary's clearance.  ·        The patient is: [x] acutely ill Risk of deterioration: [] moderate    [] critically ill  [] high         My assessment/plan was discussed with:  [x]nursing []PT/OT    []respiratory therapy [x]Dr.Lloyd Caity Martinez MD      []family []       Marino Carrillo NP

## 2019-05-15 NOTE — ROUTINE PROCESS
Bedside shift change report given to [address] [place] [address] (oncoming nurse) by Jose Longoria (offgoing nurse). Report included the following information SBAR, Kardex, MAR and Recent Results.

## 2019-05-15 NOTE — PROGRESS NOTES
PT tx attempted at . H&H currently at 6.7. Will follow up at later time if appropriate. 2nd PT tx attempted made at 1301. H&H remains at 6.7 per results review. Will follow up at later date if appropriate.     Jason Darnell, INOC[employer]

## 2019-05-15 NOTE — DISCHARGE INSTRUCTIONS
Patient armband removed and shredded     My Medications      STOP taking these medications    ergocalciferol 50,000 unit capsule  Commonly known as:  ERGOCALCIFEROL        ibuprofen 800 mg tablet  Commonly known as:  MOTRIN        JADENU 360 mg tablet  Generic drug:  deferasirox        metoprolol tartrate 50 mg tablet  Commonly known as:  LOPRESSOR        potassium chloride 20 mEq tablet  Commonly known as:  K-DUR, KLOR-CON           TAKE these medications as instructed      Instructions Each Dose to Equal Morning Noon Evening Bedtime   ARMOUR THYROID 30 mg tablet  Generic drug:  thyroid (Pork)    Your last dose was: Your next dose is: Take 30 mg by mouth daily. 30 mg                 dronabinol 2.5 mg capsule  Commonly known as:  MARINOL    Your last dose was: Your next dose is: Take 1 Cap by mouth two (2) times a day for 10 days. Max Daily Amount: 5 mg. 2.5 mg                 famotidine 20 mg tablet  Commonly known as:  PEPCID  Start taking on:  5/16/2019    Your last dose was: Your next dose is: Take 1 Tab by mouth daily. 20 mg                 folic acid 1 mg tablet  Commonly known as:  FOLVITE    Your last dose was: Your next dose is: Take 1 mg by mouth daily. 1 mg                 HYDROcodone-acetaminophen 5-325 mg per tablet  Commonly known as:  [facility]    Your last dose was: Your next dose is: Take 1 Tab by mouth every eight (8) hours as needed for Pain for up to 3 days. Max Daily Amount: 3 Tabs. 1 Tab                 naloxegol 12.5 mg Tab tablet  Commonly known as:  [facility]    Your last dose was: Your next dose is: Take 12.5 mg by mouth daily. 12.5 mg                 OTHER    Your last dose was: Your next dose is:          Incentive Spirometry- use as directed                  OTHER    Your last dose was:       Your next dose is:          Check CBC, CMP, Mg in 4 days, results to PCP and Nephrologist and  John Cronin immediately, Diagnosis- MARIA ALEJANDRA                  sodium bicarbonate 650 mg tablet    Your last dose was: Your next dose is: Take 1 Tab by mouth three (3) times daily for 14 days. 650 mg                       Where to Get Your Medications      Information on where to get these meds will be given to you by the nurse or doctor. Ask your nurse or doctor about these medications  · dronabinol 2.5 mg capsule  · famotidine 20 mg tablet  · HYDROcodone-acetaminophen 5-325 mg per tablet  · OTHER  · OTHER  · sodium bicarbonate 650 mg tablet         [facility] Activation    Thank you for requesting access to Roomixer. Please follow the instructions below to securely access and download your online medical record. Roomixer allows you to send messages to your doctor, view your test results, renew your prescriptions, schedule appointments, and more. How Do I Sign Up? 1. In your internet browser, go to [email or web address]  2. Click on the First Time User? Click Here link in the Sign In box. You will be redirect to the New Member Sign Up page. 3. Enter your Roomixer Access Code exactly as it appears below. You will not need to use this code after youve completed the sign-up process. If you do not sign up before the expiration date, you must request a new code. Roomixer Access Code: Activation code not generated  Current Roomixer Status: Active (This is the date your Roomixer access code will )    4. Enter the last four digits of your Social Security Number (xxxx) and Date of Birth (mm/dd/yyyy) as indicated and click Submit. You will be taken to the next sign-up page. 5. Create a Roomixer ID. This will be your Roomixer login ID and cannot be changed, so think of one that is secure and easy to remember. 6. Create a Roomixer password. You can change your password at any time. 7. Enter your Password Reset Question and Answer. This can be used at a later time if you forget your password.    8. Enter your e-mail address. You will receive e-mail notification when new information is available in [address] 9. Click Sign Up. You can now view and download portions of your medical record. 10. Click the Download Summary menu link to download a portable copy of your medical information. Additional Information    If you have questions, please visit the Frequently Asked Questions section of the [employer] website at [email or web address] Remember, [employer] is NOT to be used for urgent needs. For medical emergencies, dial 911. Patient Education        Anemia: Care Instructions  Your Care Instructions    Anemia is a low level of red blood cells, which carry oxygen throughout your body. Many things can cause anemia. Lack of iron is one of the most common causes. Your body needs iron to make hemoglobin, a substance in red blood cells that carries oxygen from the lungs to your body's cells. Without enough iron, the body produces fewer and smaller red blood cells. As a result, your body's cells do not get enough oxygen, and you feel tired and weak. And you may have trouble concentrating. Bleeding is the most common cause of a lack of iron. You may have heavy menstrual bleeding or bleeding caused by conditions such as ulcers, hemorrhoids, or cancer. Regular use of aspirin or other anti-inflammatory medicines (such as ibuprofen) also can cause bleeding in some people. A lack of iron in your diet also can cause anemia, especially at times when the body needs more iron, such as during pregnancy, infancy, and the teen years. Your doctor may have prescribed iron pills. It may take several months of treatment for your iron levels to return to normal. Your doctor also may suggest that you eat foods that are rich in iron, such as meat and beans. There are many other causes of anemia. It is not always due to a lack of iron.  Finding the specific cause of your anemia will help your doctor find the right treatment for you. Follow-up care is a key part of your treatment and safety. Be sure to make and go to all appointments, and call your doctor if you are having problems. It's also a good idea to know your test results and keep a list of the medicines you take. How can you care for yourself at home? · Take your medicines exactly as prescribed. Call your doctor if you think you are having a problem with your medicine. · If your doctor recommends iron pills, take them as directed:  ? Try to take the pills on an empty stomach about 1 hour before or 2 hours after meals. But you may need to take iron with food to avoid an upset stomach. ? Do not take antacids or drink milk or caffeine drinks (such as coffee, tea, or cola) at the same time or within 2 hours of the time that you take your iron. They can make it hard for your body to absorb the iron. ? Vitamin C (from food or supplements) helps your body absorb iron. Try taking iron pills with a glass of orange juice or some other food that is high in vitamin C, such as citrus fruits. ? Iron pills may cause stomach problems, such as heartburn, nausea, diarrhea, constipation, and cramps. Be sure to drink plenty of fluids, and include fruits, vegetables, and fiber in your diet each day. Iron pills often make your bowel movements dark or green. ? If you forget to take an iron pill, do not take a double dose of iron the next time you take a pill. ? Keep iron pills out of the reach of small children. An overdose of iron can be very dangerous. · Follow your doctor's advice about eating iron-rich foods. These include red meat, shellfish, poultry, eggs, beans, raisins, whole-grain bread, and leafy green vegetables. · Steam vegetables to help them keep their iron content. When should you call for help? Call 911 anytime you think you may need emergency care. For example, call if:    · You have symptoms of a heart attack. These may include:  ?  Chest pain or pressure, or a strange feeling in the chest.  ? Sweating. ? Shortness of breath. ? Nausea or vomiting. ? Pain, pressure, or a strange feeling in the back, neck, jaw, or upper belly or in one or both shoulders or arms. ? Lightheadedness or sudden weakness. ? A fast or irregular heartbeat. After you call 911, the  may tell you to chew 1 adult-strength or 2 to 4 low-dose aspirin. Wait for an ambulance. Do not try to drive yourself.     · You passed out (lost consciousness).    Call your doctor now or seek immediate medical care if:    · You have new or increased shortness of breath.     · You are dizzy or lightheaded, or you feel like you may faint.     · Your fatigue and weakness continue or get worse.     · You have any abnormal bleeding, such as:  ? Nosebleeds. ? Vaginal bleeding that is different (heavier, more frequent, at a different time of the month) than what you are used to.  ? Bloody or black stools, or rectal bleeding. ? Bloody or pink urine.    Watch closely for changes in your health, and be sure to contact your doctor if:    · You do not get better as expected. Where can you learn more? Go to http://beth-roman.info/. Enter R301 in the search box to learn more about \"Anemia: Care Instructions. \"  Current as of: May 6, 2018  Content Version: 11.9  © 0103-3269 [employer]. Care instructions adapted under license by [employer] (which disclaims liability or warranty for this information). If you have questions about a medical condition or this instruction, always ask your healthcare professional. [place] [address] any warranty or liability for your use of this information. Patient Education        Learning About Blood Transfusions  What is a blood transfusion?     Blood transfusion is a medical treatment to replace the blood or parts of blood that your body has lost. The blood goes through a tube from a bag to an intravenous (IV) catheter and into your vein. You may need a blood transfusion after losing blood from an injury, a major surgery, an illness that causes bleeding, or an illness that destroys blood cells. Transfusions are also used to give you the parts of blood--such as platelets, plasma, or substances that cause clotting--that your body needs to fight an illness or stop bleeding. How is a blood transfusion done? Before you receive a blood transfusion, your blood is tested to find out what your blood type is. Blood or blood parts that are a match with your blood type are ordered by your doctor. Blood is typed as A, B, AB, or O. It is also typed as Rh-positive or Rh-negative. Your blood is also screened to look for antibodies that might react with the blood that is given to you. The blood you are getting is checked and rechecked to make sure that it's the right type for you. A sample of your blood is mixed with a sample of the blood you will receive to check for problems. Before actually giving you the transfusion, a doctor and nurses will look at the label on the package of blood and compare it to your hospital ID bracelet and medical records. The transfusion begins only when all agree that this is the correct blood and that you are the correct person to receive it. To receive the transfusion, you will have an intravenous (IV) catheter inserted into a vein. A tube connects the catheter to the bag containing the blood, which is placed higher than your body. The blood then flows slowly into your vein. A doctor or nurse will check you several times during the transfusion to watch for a reaction or other problems. What are the possible risks? Blood transfusions have many benefits and are often life-saving. But they also have a few risks. Possible risks include:  · Your body's reaction to receiving new blood. This may include:  ? Fever. ? Allergic reactions. ? Breathing problems. · An infection from the blood.  This risk is small because of the strict rules placed on handling and storing blood. Getting a viral infection, such as HIV or hepatitis B or C, through blood transfusions has become very rare. The U.S. Food and Drug Administration (FDA) enforces strict guidelines on the collection, testing, storage, and use of blood. · Getting the wrong blood type by accident. Severe reactions, which can be life-threatening, are very rare. What can you expect after a blood transfusion? Here are some things you can do at home to help prevent infection at the transfusion site:  · Wash the area daily with warm, soapy water, and pat it dry. Don't use hydrogen peroxide or alcohol, which can slow healing. You may cover the area with a gauze bandage if it weeps or rubs against clothing. Change the bandage every day. · Keep the area clean and dry. When should you call for help? Call 911 anytime you think you may need emergency care. For example, call if:  · You have severe trouble breathing. Call your doctor now or seek immediate medical care if:  · You have a fever. · You feel weaker or more tired than usual.  · You have a yellow tint to your skin or the whites of your eyes. Watch closely for changes in your health, and be sure to contact your doctor if you have any problems. Follow-up care is a key part of your treatment and safety. Be sure to make and go to all appointments, and call your doctor if you are having problems. It's also a good idea to know your test results and keep a list of the medicines you take. Where can you learn more? Go to http://beth-roman.info/. Enter V588 in the search box to learn more about \"Learning About Blood Transfusions. \"  Current as of: May 6, 2018  Content Version: 11.9  © 5046-0585 "[employer]". Care instructions adapted under license by [employer] (which disclaims liability or warranty for this information).  If you have questions about a medical condition or this instruction, always ask your healthcare professional. [place] [address] any warranty or liability for your use of this information. Patient Education        Sickle Cell Disease: Care Instructions  Your Care Instructions    Sickle cell disease turns normal, round red blood cells into misshaped cells that look like viki or crescent moons. The sickle-shaped cells can get stuck in blood vessels, blocking blood flow and causing severe pain. The sickle-shaped cells also can harm organs, muscles, and bones. It is a lifelong condition. Sickle cell disease is passed down in families. You can talk to your doctor about whether to have genetic tests to find out the chance of having a child with the disease. Your doctor also may recommend that your family members get tested for sickle cell disease. Your doctor may treat you with medicines. Some people get blood transfusions or a bone marrow transplant. Managing pain is an important part of your treatment. Follow-up care is a key part of your treatment and safety. Be sure to make and go to all appointments, and call your doctor if you are having problems. It's also a good idea to know your test results and keep a list of the medicines you take. How can you care for yourself at home? · Take your medicines exactly as prescribed. Call your doctor if you think you are having a problem with your medicine. · Take pain medicines exactly as directed. ? If the doctor gave you a prescription medicine for pain, take it as prescribed. ? If you are not taking a prescription pain medicine, ask your doctor if you can take an over-the-counter medicine. · Try to help ease pain by distracting yourself. Use guided imagery, deep breathing, and relaxation exercises. A pain specialist can teach you pain management skills. · Avoid alcohol. It can make you dehydrated. · Dress warmly in cold weather. The cold and windy weather can lead to severe pain.   · Do not smoke. Smoking can reduce the amount of oxygen in your blood. If you need help quitting, talk to your doctor about stop-smoking programs and medicines. These can increase your chances of quitting for good. · Get plenty of sleep. · Get regular eye exams. Sickle cell disease can cause vision problems. · Wear medical alert jewelry that says that you have sickle cell disease. You can buy this at most drugstores. · Avoid colds and flu. Get a pneumococcal vaccine shot. If you have had one before, ask your doctor whether you need another dose. Get a flu shot every year. If you must be around people with colds or flu, wash your hands often. When should you call for help? Call 911 anytime you think you may need emergency care. For example, call if:    · You have symptoms of a severe problem from sickle cell.     · You have symptoms of a stroke. These may include:  ? Sudden numbness, tingling, weakness, or loss of movement in your face, arm, or leg, especially on only one side of your body. ? Sudden vision changes. ? Sudden trouble speaking. ? Sudden confusion or trouble understanding simple statements. ? Sudden problems with walking or balance. ? A sudden, severe headache that is different from past headaches.     · You are in severe pain.     · You have symptoms of a heart attack. These may include:  ? Chest pain or pressure, or a strange feeling in the chest.  ? Sweating. ? Shortness of breath. ? Nausea or vomiting. ? Pain, pressure, or a strange feeling in the back, neck, jaw, or upper belly or in one or both shoulders or arms. ? Lightheadedness or sudden weakness. ? A fast or irregular heartbeat. After you call 911, the  may tell you to chew 1 adult-strength or 2 to 4 low-dose aspirin. Wait for an ambulance.  Do not try to drive yourself.    Call your doctor now or seek immediate medical care if:    · You have a fever.    Watch closely for changes in your health, and be sure to contact your doctor if you have any problems. Where can you learn more? Go to http://beth-roman.info/. Enter 486 9390 in the search box to learn more about \"Sickle Cell Disease: Care Instructions. \"  Current as of: May 6, 2018  Content Version: 11.9  © 8200-3300 [employer]. Care instructions adapted under license by [employer] (which disclaims liability or warranty for this information). If you have questions about a medical condition or this instruction, always ask your healthcare professional. [address] any warranty or liability for your use of this information. DISCHARGE SUMMARY from Nurse    PATIENT INSTRUCTIONS:    After general anesthesia or intravenous sedation, for 24 hours or while taking prescription Narcotics:  · Limit your activities  · Do not drive and operate hazardous machinery  · Do not make important personal or business decisions  · Do  not drink alcoholic beverages  · If you have not urinated within 8 hours after discharge, please contact your surgeon on call. Report the following to your surgeon:  · Excessive pain, swelling, redness or odor of or around the surgical area  · Temperature over 100.5  · Nausea and vomiting lasting longer than 4 hours or if unable to take medications  · Any signs of decreased circulation or nerve impairment to extremity: change in color, persistent  numbness, tingling, coldness or increase pain  · Any questions    What to do at Home:  Recommended activity: Activity as tolerated      *  Please give a list of your current medications to your Primary Care Provider. *  Please update this list whenever your medications are discontinued, doses are      changed, or new medications (including over-the-counter products) are added. *  Please carry medication information at all times in case of emergency situations.     These are general instructions for a healthy lifestyle:    No smoking/ No tobacco products/ Avoid exposure to second hand smoke  Surgeon General's Warning:  Quitting smoking now greatly reduces serious risk to your health. Obesity, smoking, and sedentary lifestyle greatly increases your risk for illness    A healthy diet, regular physical exercise & weight monitoring are important for maintaining a healthy lifestyle    You may be retaining fluid if you have a history of heart failure or if you experience any of the following symptoms:  Weight gain of 3 pounds or more overnight or 5 pounds in a week, increased swelling in our hands or feet or shortness of breath while lying flat in bed. Please call your doctor as soon as you notice any of these symptoms; do not wait until your next office visit. Recognize signs and symptoms of STROKE:    F-face looks uneven    A-arms unable to move or move unevenly    S-speech slurred or non-existent    T-time-call 911 as soon as signs and symptoms begin-DO NOT go       Back to bed or wait to see if you get better-TIME IS BRAIN. Warning Signs of HEART ATTACK     Call 911 if you have these symptoms:   Chest discomfort. Most heart attacks involve discomfort in the center of the chest that lasts more than a few minutes, or that goes away and comes back. It can feel like uncomfortable pressure, squeezing, fullness, or pain.  Discomfort in other areas of the upper body. Symptoms can include pain or discomfort in one or both arms, the back, neck, jaw, or stomach.  Shortness of breath with or without chest discomfort.  Other signs may include breaking out in a cold sweat, nausea, or lightheadedness. Don't wait more than five minutes to call 911 - MINUTES MATTER! Fast action can save your life. Calling 911 is almost always the fastest way to get lifesaving treatment. Emergency Medical Services staff can begin treatment when they arrive -- up to an hour sooner than if someone gets to the hospital by car.      The discharge information has been reviewed with the patient. The patient verbalized understanding. Discharge medications reviewed with the patient and appropriate educational materials and side effects teaching were provided.   ___________________________________________________________________________________________________________________________________

## 2019-05-16 ENCOUNTER — PATIENT OUTREACH (OUTPATIENT)
Dept: CASE MANAGEMENT | Age: 54
End: 2019-05-16

## 2019-05-16 ENCOUNTER — HOME CARE VISIT (OUTPATIENT)
Dept: HOME HEALTH SERVICES | Facility: HOME HEALTH | Age: 54
End: 2019-05-16

## 2019-05-16 NOTE — PROGRESS NOTES
Hospital Discharge Follow-Up      Date/Time:  2019 10:34 AM    Patient was admitted to [facility] on 5/3/19 and discharged on 5/15/19 for   Active Problems: Thrombocytopenia ([address] (2016)       Leukocytosis (2017)       Anemia (2019)       Chest pain (5/3/2019)       ARF (acute renal failure) ([address] (5/3/2019)       Abdominal pain (5/3/2019)       Bandemia (5/3/2019)    . The physician discharge summary was not available at the time of outreach. Patient was contacted within 1 business days of discharge. Inpatient RRAT score: 23  Was this a readmission? yes   Patient stated reason for the readmission: NA    Nurse Navigator (NN) contacted the patient by telephone to perform post hospital discharge assessment. Verified name and  with patient as identifiers. Provided introduction to self, and explanation of the Nurse Navigator role. Patient states she would prefer to be called back at later time, that she is trying to rest and \"too many people are calling me this morning\". NN will contact patient at later time.

## 2019-05-16 NOTE — PROGRESS NOTES
Hospital Discharge Follow-Up      Date/Time:  2019 2:30 PM      Nurse Navigator (NN) contacted the patient by telephone to perform post hospital discharge assessment. Verified name and  with patient as identifiers. Provided introduction to self, and explanation of the Nurse Navigator role. Patient states that she does not need follow up calls from NN, that she is able to administer her medications without difficulty and understands what her medications are for and any side effects.      NN will resolve this episode per patient request.

## 2019-05-18 ENCOUNTER — HOME CARE VISIT (OUTPATIENT)
Dept: SCHEDULING | Facility: HOME HEALTH | Age: 54
End: 2019-05-18
Payer: COMMERCIAL

## 2019-05-18 VITALS
OXYGEN SATURATION: 100 % | TEMPERATURE: 97 F | DIASTOLIC BLOOD PRESSURE: 80 MMHG | BODY MASS INDEX: 33.91 KG/M2 | WEIGHT: 211 LBS | RESPIRATION RATE: 16 BRPM | HEART RATE: 98 BPM | SYSTOLIC BLOOD PRESSURE: 110 MMHG | HEIGHT: 66 IN

## 2019-05-18 LAB
ABO + RH BLD: NORMAL
ANTIGENS PRESENT RBC DONR: NORMAL
ANTIGENS PRESENT RBC DONR: NORMAL
BLD PROD TYP BPU: NORMAL
BLD PROD TYP BPU: NORMAL
BLOOD BANK CMNT PATIENT-IMP: NORMAL
BLOOD GROUP ANTIBODIES SERPL: NORMAL
BLOOD GROUP ANTIBODIES SERPL: NORMAL
BPU ID: NORMAL
BPU ID: NORMAL
CROSSMATCH RESULT,%XM: NORMAL
CROSSMATCH RESULT,%XM: NORMAL
PHYSICIAN INSTRUCTIO,%PI: NORMAL
SPECIMEN EXP DATE BLD: NORMAL
STATUS OF UNIT,%ST: NORMAL
STATUS OF UNIT,%ST: NORMAL
UNIT DIVISION, %UDIV: 0
UNIT DIVISION, %UDIV: 0

## 2019-05-18 PROCEDURE — 400013 HH SOC

## 2019-05-18 PROCEDURE — G0299 HHS/HOSPICE OF RN EA 15 MIN: HCPCS

## 2019-05-20 ENCOUNTER — HOME CARE VISIT (OUTPATIENT)
Dept: HOME HEALTH SERVICES | Facility: HOME HEALTH | Age: 54
End: 2019-05-20
Payer: COMMERCIAL

## 2019-05-20 ENCOUNTER — HOME CARE VISIT (OUTPATIENT)
Dept: SCHEDULING | Facility: HOME HEALTH | Age: 54
End: 2019-05-20
Payer: COMMERCIAL

## 2019-05-20 ENCOUNTER — HOSPITAL ENCOUNTER (OUTPATIENT)
Dept: LAB | Age: 54
Discharge: HOME | End: 2019-05-20
Payer: COMMERCIAL

## 2019-05-20 PROCEDURE — 85025 COMPLETE CBC W/AUTO DIFF WBC: CPT

## 2019-05-20 PROCEDURE — G0299 HHS/HOSPICE OF RN EA 15 MIN: HCPCS

## 2019-05-20 PROCEDURE — 80053 COMPREHEN METABOLIC PANEL: CPT

## 2019-05-20 PROCEDURE — 83735 ASSAY OF MAGNESIUM: CPT

## 2019-05-21 ENCOUNTER — HOME CARE VISIT (OUTPATIENT)
Dept: SCHEDULING | Facility: HOME HEALTH | Age: 54
End: 2019-05-21
Payer: COMMERCIAL

## 2019-05-21 LAB
ALBUMIN SERPL-MCNC: 4 G/DL (ref 3.4–5)
ALBUMIN/GLOB SERPL: 1.4 {RATIO} (ref 0.8–1.7)
ALP SERPL-CCNC: 160 U/L (ref 45–117)
ALT SERPL-CCNC: 22 U/L (ref 13–56)
ANION GAP SERPL CALC-SCNC: 6 MMOL/L (ref 3–18)
AST SERPL-CCNC: 12 U/L (ref 15–37)
BASOPHILS # BLD: 0 K/UL (ref 0–0.06)
BASOPHILS NFR BLD: 0 % (ref 0–3)
BILIRUB SERPL-MCNC: 3.2 MG/DL (ref 0.2–1)
BUN SERPL-MCNC: 12 MG/DL (ref 7–18)
BUN/CREAT SERPL: 7 (ref 12–20)
CALCIUM SERPL-MCNC: 9.3 MG/DL (ref 8.5–10.1)
CHLORIDE SERPL-SCNC: 111 MMOL/L (ref 100–108)
CO2 SERPL-SCNC: 23 MMOL/L (ref 21–32)
CREAT SERPL-MCNC: 1.82 MG/DL (ref 0.6–1.3)
DIFFERENTIAL METHOD BLD: ABNORMAL
EOSINOPHIL # BLD: 0 K/UL (ref 0–0.4)
EOSINOPHIL NFR BLD: 0 % (ref 0–5)
ERYTHROCYTE [DISTWIDTH] IN BLOOD BY AUTOMATED COUNT: 16.4 % (ref 11.6–14.5)
GLOBULIN SER CALC-MCNC: 2.8 G/DL (ref 2–4)
GLUCOSE SERPL-MCNC: 86 MG/DL (ref 74–99)
HCT VFR BLD AUTO: 20.1 % (ref 35–45)
HGB BLD-MCNC: 6.8 G/DL (ref 12–16)
LYMPHOCYTES # BLD: 2.9 K/UL (ref 0.8–3.5)
LYMPHOCYTES NFR BLD: 18 % (ref 20–51)
MAGNESIUM SERPL-MCNC: 1.8 MG/DL (ref 1.6–2.6)
MCH RBC QN AUTO: 29.1 PG (ref 24–34)
MCHC RBC AUTO-ENTMCNC: 33.8 G/DL (ref 31–37)
MCV RBC AUTO: 85.9 FL (ref 74–97)
MONOCYTES # BLD: 1.3 K/UL (ref 0–1)
MONOCYTES NFR BLD: 8 % (ref 2–9)
NEUTS SEG # BLD: 12 K/UL (ref 1.8–8)
NEUTS SEG NFR BLD: 74 % (ref 42–75)
PLATELET # BLD AUTO: 82 K/UL (ref 135–420)
PLATELET COMMENTS,PCOM: ABNORMAL
PMV BLD AUTO: 11.6 FL (ref 9.2–11.8)
POTASSIUM SERPL-SCNC: 3.4 MMOL/L (ref 3.5–5.5)
PROT SERPL-MCNC: 6.8 G/DL (ref 6.4–8.2)
RBC # BLD AUTO: 2.34 M/UL (ref 4.2–5.3)
RBC MORPH BLD: ABNORMAL
SODIUM SERPL-SCNC: 140 MMOL/L (ref 136–145)
WBC # BLD AUTO: 16.2 K/UL (ref 4.6–13.2)

## 2019-05-22 ENCOUNTER — OFFICE VISIT (OUTPATIENT)
Dept: ONCOLOGY | Age: 54
End: 2019-05-22

## 2019-05-22 ENCOUNTER — HOSPITAL ENCOUNTER (OUTPATIENT)
Dept: INFUSION THERAPY | Age: 54
Discharge: HOME | End: 2019-05-22
Payer: COMMERCIAL

## 2019-05-22 ENCOUNTER — HOSPITAL ENCOUNTER (OUTPATIENT)
Dept: LAB | Age: 54
Discharge: HOME | End: 2019-05-22
Payer: COMMERCIAL

## 2019-05-22 VITALS
OXYGEN SATURATION: 100 % | SYSTOLIC BLOOD PRESSURE: 118 MMHG | TEMPERATURE: 98.2 F | DIASTOLIC BLOOD PRESSURE: 80 MMHG | RESPIRATION RATE: 18 BRPM | HEART RATE: 106 BPM

## 2019-05-22 VITALS
DIASTOLIC BLOOD PRESSURE: 80 MMHG | HEART RATE: 110 BPM | RESPIRATION RATE: 18 BRPM | WEIGHT: 196 LBS | BODY MASS INDEX: 31.5 KG/M2 | TEMPERATURE: 98.8 F | SYSTOLIC BLOOD PRESSURE: 122 MMHG | OXYGEN SATURATION: 96 % | HEIGHT: 66 IN

## 2019-05-22 VITALS
TEMPERATURE: 97.6 F | RESPIRATION RATE: 20 BRPM | HEART RATE: 100 BPM | SYSTOLIC BLOOD PRESSURE: 128 MMHG | OXYGEN SATURATION: 98 % | DIASTOLIC BLOOD PRESSURE: 78 MMHG

## 2019-05-22 DIAGNOSIS — Z17.0 MALIGNANT NEOPLASM OF LOWER-OUTER QUADRANT OF LEFT BREAST OF FEMALE, ESTROGEN RECEPTOR POSITIVE (HCC): ICD-10-CM

## 2019-05-22 DIAGNOSIS — D62 ACUTE BLOOD LOSS ANEMIA: ICD-10-CM

## 2019-05-22 DIAGNOSIS — D57.00 SICKLE CELL ANEMIA WITH CRISIS (HCC): ICD-10-CM

## 2019-05-22 DIAGNOSIS — C50.512 MALIGNANT NEOPLASM OF LOWER-OUTER QUADRANT OF LEFT BREAST OF FEMALE, ESTROGEN RECEPTOR POSITIVE (HCC): ICD-10-CM

## 2019-05-22 DIAGNOSIS — E55.9 VITAMIN D DEFICIENCY: ICD-10-CM

## 2019-05-22 DIAGNOSIS — D62 ACUTE BLOOD LOSS ANEMIA: Primary | ICD-10-CM

## 2019-05-22 PROCEDURE — 82728 ASSAY OF FERRITIN: CPT

## 2019-05-22 PROCEDURE — 86900 BLOOD TYPING SEROLOGIC ABO: CPT

## 2019-05-22 PROCEDURE — 82306 VITAMIN D 25 HYDROXY: CPT

## 2019-05-22 PROCEDURE — 86901 BLOOD TYPING SEROLOGIC RH(D): CPT

## 2019-05-22 PROCEDURE — 86300 IMMUNOASSAY TUMOR CA 15-3: CPT

## 2019-05-22 PROCEDURE — 86880 COOMBS TEST DIRECT: CPT

## 2019-05-22 PROCEDURE — 86921 COMPATIBILITY TEST INCUBATE: CPT

## 2019-05-22 PROCEDURE — 36415 COLL VENOUS BLD VENIPUNCTURE: CPT

## 2019-05-22 PROCEDURE — 86860 RBC ANTIBODY ELUTION: CPT

## 2019-05-22 PROCEDURE — 86920 COMPATIBILITY TEST SPIN: CPT

## 2019-05-22 PROCEDURE — 86870 RBC ANTIBODY IDENTIFICATION: CPT

## 2019-05-22 PROCEDURE — 83540 ASSAY OF IRON: CPT

## 2019-05-22 PROCEDURE — 86922 COMPATIBILITY TEST ANTIGLOB: CPT

## 2019-05-22 PROCEDURE — 86850 RBC ANTIBODY SCREEN: CPT

## 2019-05-22 PROCEDURE — 86902 BLOOD TYPE ANTIGEN DONOR EA: CPT

## 2019-05-22 PROCEDURE — 85660 RBC SICKLE CELL TEST: CPT

## 2019-05-22 PROCEDURE — 86978 RBC PRETREATMENT SERUM: CPT

## 2019-05-22 RX ORDER — SODIUM CHLORIDE 9 MG/ML
250 INJECTION, SOLUTION INTRAVENOUS AS NEEDED
Status: DISCONTINUED | OUTPATIENT
Start: 2019-05-22 | End: 2019-05-26 | Stop reason: HOSPADM

## 2019-05-22 RX ORDER — DRONABINOL 5 MG/1
5 CAPSULE ORAL 2 TIMES DAILY
Qty: 30 CAP | Refills: 0 | Status: SHIPPED | OUTPATIENT
Start: 2019-05-22 | End: 2019-11-08

## 2019-05-22 NOTE — PATIENT INSTRUCTIONS
Iron Deficiency Anemia: Care Instructions  Your Care Instructions    Anemia means that you do not have enough red blood cells. Red blood cells carry oxygen around your body. When you have anemia, it can make you pale, weak, and tired. Many things can cause anemia. The most common cause is loss of blood. This can happen if you have heavy menstrual periods. It can also happen if you have bleeding in your stomach or bowel. You can also get anemia if you don't have enough iron in your diet or if it's hard for your body to absorb iron. In some cases, pregnancy causes anemia. That's because a pregnant woman needs more iron. Your doctor may do more tests to find the cause of your anemia. If a disease or other health problem is causing it, your doctor will treat that problem. It's important to follow up with your doctor to make sure that your iron level returns to normal.  Follow-up care is a key part of your treatment and safety. Be sure to make and go to all appointments, and call your doctor if you are having problems. It's also a good idea to know your test results and keep a list of the medicines you take. How can you care for yourself at home? · If your doctor recommended iron pills, take them as directed. ? Try to take the pills on an empty stomach. You can do this about 1 hour before or 2 hours after meals. But you may need to take iron with food to avoid an upset stomach. ? Do not take antacids or drink milk or anything with caffeine within 2 hours of when you take your iron. They can keep your body from absorbing the iron well. ? Vitamin C helps your body absorb iron. You may want to take iron pills with a glass of orange juice or some other food high in vitamin C.  ? Iron pills may cause stomach problems. These include heartburn, nausea, diarrhea, constipation, and cramps. It can help to drink plenty of fluids and include fruits, vegetables, and fiber in your diet.   ? It's normal for iron pills to make your stool a greenish or grayish black. But internal bleeding can also cause dark stool. So it's important to tell your doctor about any color changes. ? Call your doctor if you think you are having a problem with your iron pills. Even after you start to feel better, it will take several months for your body to build up its supply of iron. ? If you miss a pill, don't take a double dose. ? Keep iron pills out of the reach of small children. Too much iron can be very dangerous. · Eat foods with a lot of iron. These include red meat, shellfish, poultry, and eggs. They also include beans, raisins, whole-grain bread, and leafy green vegetables. · Steam your vegetables. This is the best way to prepare them if you want to get as much iron as possible. · Be safe with medicines. Do not take nonsteroidal anti-inflammatory pain relievers unless your doctor tells you to. These include aspirin, naproxen (Aleve), and ibuprofen (Advil, Motrin). · Liquid iron can stain your teeth. But you can mix it with water or juice and drink it with a straw. Then it won't get on your teeth. When should you call for help? Call 911 anytime you think you may need emergency care. For example, call if:    · You passed out (lost consciousness).    Call your doctor now or seek immediate medical care if:    · You are short of breath.     · You are dizzy or light-headed, or you feel like you may faint.     · You have new or worse bleeding.    Watch closely for changes in your health, and be sure to contact your doctor if:    · You feel weaker or more tired than usual.     · You do not get better as expected. Where can you learn more? Go to http://beth-roman.info/. Enter Y720 in the search box to learn more about \"Iron Deficiency Anemia: Care Instructions. \"  Current as of: May 6, 2018  Content Version: 11.9  © 2938-5616 [employer].  Care instructions adapted under license by [employer] (which disclaims liability or warranty for this information). If you have questions about a medical condition or this instruction, always ask your healthcare professional. [address] any warranty or liability for your use of this information. Breast Cancer: Care Instructions  Your Care Instructions    Breast cancer occurs when abnormal cells grow out of control in the breast. These cancer cells can spread within the breast, to nearby lymph nodes and other tissues, and to other parts of the body. Being treated for cancer can weaken your body, and you may feel very tired. Get the rest your body needs so you can feel better. Finding out that you have cancer is scary. You may feel many emotions and may need some help coping. Seek out family, friends, and counselors for support. You also can do things at home to make yourself feel better while you go through treatment. Call the [facility] (6-782.751.3220) or visit its website at [employer] for more information. Follow-up care is a key part of your treatment and safety. Be sure to make and go to all appointments, and call your doctor if you are having problems. It's also a good idea to know your test results and keep a list of the medicines you take. How can you care for yourself at home? · Take your medicines exactly as prescribed. Call your doctor if you think you are having a problem with your medicine. You may get medicine for nausea and vomiting if you have these side effects. · Follow your doctor's instructions to relieve pain. Pain from cancer and surgery can almost always be controlled. Use pain medicine when you first notice pain, before it becomes severe. · Eat healthy food. If you do not feel like eating, try to eat food that has protein and extra calories to keep up your strength and prevent weight loss. Drink liquid meal replacements for extra calories and protein. Try to eat your main meal early.   · Get some physical activity every day, but do not get too tired. Keep doing the hobbies you enjoy as your energy allows. · Do not smoke. Smoking can make your cancer worse. If you need help quitting, talk to your doctor about stop-smoking programs and medicines. These can increase your chances of quitting for good. · Take steps to control your stress and workload. Learn relaxation techniques. ? Share your feelings. Stress and tension affect our emotions. By expressing your feelings to others, you may be able to understand and cope with them. ? Consider joining a support group. Talking about a problem with your spouse, a good friend, or other people with similar problems is a good way to reduce tension and stress. ? Express yourself through art. Try writing, crafts, dance, or art to relieve stress. Some dance, writing, or art groups may be available just for people who have cancer. ? Be kind to your body and mind. Getting enough sleep, eating a healthy diet, and taking time to do things you enjoy can contribute to an overall feeling of balance in your life and can help reduce stress. ? Get help if you need it. Discuss your concerns with your doctor or counselor. · If you are vomiting or have diarrhea:  ? Drink plenty of fluids (enough so that your urine is light yellow or clear like water) to prevent dehydration. Choose water and other caffeine-free clear liquids. If you have kidney, heart, or liver disease and have to limit fluids, talk with your doctor before you increase the amount of fluids you drink. ? When you are able to eat, try clear soups, mild foods, and liquids until all symptoms are gone for 12 to 48 hours. Other good choices include dry toast, crackers, cooked cereal, and gelatin dessert, such as Jell-O.  · If you have not already done so, prepare a list of advance directives.  Advance directives are instructions to your doctor and family members about what kind of care you want if you become unable to speak or express yourself. When should you call for help? Call 911 anytime you think you may need emergency care. For example, call if:    · You passed out (lost consciousness).    Call your doctor now or seek immediate medical care if:    · You have a fever.     · You have abnormal bleeding.     · You think you have an infection.     · You have new or worse pain.     · You have new symptoms, such as a cough, belly pain, vomiting, diarrhea, or a rash.    Watch closely for changes in your health, and be sure to contact your doctor if:    · You are much more tired than usual.     · You have swollen glands in your armpits, groin, or neck.     · You do not get better as expected. Where can you learn more? Go to http://beth-roman.info/. Enter V321 in the search box to learn more about \"Breast Cancer: Care Instructions. \"  Current as of: March 27, 2018  Content Version: 11.9  © 2006-2018 [employer]. Care instructions adapted under license by [employer] (which disclaims liability or warranty for this information). If you have questions about a medical condition or this instruction, always ask your healthcare professional. [place] [address] any warranty or liability for your use of this information. Iron-Rich Diet: Care Instructions  Your Care Instructions    Your body needs iron to make hemoglobin. Hemoglobin is a substance in red blood cells that carries oxygen from the lungs to cells all through your body. If you do not get enough iron, your body makes fewer and smaller red blood cells. As a result, your body's cells may not get enough oxygen. Adult men need 8 milligrams of iron a day; adult women need 18 milligrams of iron a day. After menopause, women need 8 milligrams of iron a day. A pregnant woman needs 27 milligrams of iron a day. Infants and young children have higher iron needs relative to their size than other age groups.  People who have lost blood because of ulcers or heavy menstrual periods may become very low in iron and may develop anemia. Most people can get the iron their bodies need by eating enough of certain iron-rich foods. Your doctor may recommend that you take an iron supplement along with eating an iron-rich diet. Follow-up care is a key part of your treatment and safety. Be sure to make and go to all appointments, and call your doctor if you are having problems. It's also a good idea to know your test results and keep a list of the medicines you take. How can you care for yourself at home? · Make iron-rich foods a part of your daily diet. Iron-rich foods include:  ? All meats, such as chicken, beef, lamb, pork, fish, and shellfish. Liver is especially high in iron. ? Leafy green vegetables. ? Raisins, peas, beans, lentils, barley, and eggs. ? Iron-fortified breakfast cereals. · Eat foods with vitamin C along with iron-rich foods. Vitamin C helps you absorb more iron from food. Drink a glass of orange juice or another citrus juice with your food. · Eat meat and vegetables or grains together. The iron in meat helps your body absorb the iron in other foods. Where can you learn more? Go to http://beth-roman.info/. Enter 0328 [id number] in the search box to learn more about \"Iron-Rich Diet: Care Instructions. \"  Current as of: March 28, 2018  Content Version: 11.9  © 0043-8361 [employer]. Care instructions adapted under license by [employer] (which disclaims liability or warranty for this information). If you have questions about a medical condition or this instruction, always ask your healthcare professional. [place] [address] any warranty or liability for your use of this information.

## 2019-05-22 NOTE — PROGRESS NOTES
Hematology/Oncology  Progress Note    Name: Shante Carvalho  Date: 2019  : 1965    Yola Preston MD     Ms. 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 Current therapy: Active surveillance; the patient has previously completed systemic chemotherapy and radiation treatment. Subjective:     Ms. Lalo Morris is a 51-year-old [facility] American woman with a history of triple-negative breast cancer, involving the left breast. The patient in the office today for follow up after recent hospitalization for anemia, thrombocytopenia, acute kidney injury and was followed by nephrology. She required transfusion of multiple units of packed red blood cells during her hospitalzation. She denies any sickle cell crises during her hospitalization. Today she report fatigue and weakness. She is currently using a wheelchair. Past medical history, family history, and social history: these were reviewed and remains unchanged.     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    Fatigue     Fibromyalgia     GERD (gastroesophageal reflux disease)     Hx of endometriosis     Hypertension 2011    Ill-defined condition 2018    Sickle cell crisis    Osteoarthritis of left hip 2016    Osteoarthritis of right hip 1/3/2017    Osteoarthritis of right knee 2018    Sickle cell anemia ([address]     Sleep apnea     Does not use CPAP    Thyroid disease     hypo     Past Surgical History:   Procedure Laterality Date    HX BREAST BIOPSY Left 2016    HX  SECTION      HX HERNIA REPAIR      HX LAP CHOLECYSTECTOMY      HX MASTECTOMY Left 2012    with axillary lymph node dissection    TOTAL HIP ARTHROPLASTY Bilateral  & 2017     Social History     Socioeconomic History    Marital status:      Spouse name: Not on file    Number of children: Not on file    Years of education: Not on file    Highest education level: Not on file   Occupational History    Not on file   Social Needs    Financial resource strain: Not on file    Food insecurity:     Worry: Not on file     Inability: Not on file    Transportation needs:     Medical: Not on file     Non-medical: Not on file   Tobacco Use    Smoking status: Former Smoker     Packs/day: 0.25     Years: 30.00     Pack years: 7.50     Last attempt to quit: 2011     Years since quittin.3    Smokeless tobacco: Former User   Substance and Sexual Activity    Alcohol use: Yes     Comment: 2 times yearly    Drug use: No    Sexual activity: Not Currently   Lifestyle    Physical activity:     Days per week: Not on file     Minutes per session: Not on file    Stress: Not on file   Relationships    Social connections:     Talks on phone: Not on file     Gets together: Not on file     Attends Church service: Not on file     Active member of club or organization: Not on file     Attends meetings of clubs or organizations: Not on file     Relationship status: Not on file    Intimate partner violence:     Fear of current or ex partner: Not on file     Emotionally abused: Not on file     Physically abused: Not on file     Forced sexual activity: Not on file   Other Topics Concern    Not on file   Social History Narrative    Not on file     Family History   Problem Relation Age of Onset    Cancer Mother         breast    Diabetes Mother     Heart Disease Father     Diabetes Father     Sickle Cell Anemia Brother      Current Outpatient Medications   Medication Sig Dispense Refill    dronabinol (MARINOL) 5 mg capsule Take 1 Cap by mouth two (2) times a day. Max Daily Amount: 10 mg. 30 Cap 0    famotidine (PEPCID) 20 mg tablet Take 1 Tab by mouth daily. 30 Tab 0    sodium bicarbonate 650 mg tablet Take 1 Tab by mouth three (3) times daily for 14 days.  42 Tab 0  OTHER Incentive Spirometry- use as directed 1 Each 0    OTHER Check CBC, CMP, Mg in 4 days, results to PCP and Nephrologist and Dr Lamar Jasso immediately, Diagnosis- MARIA ALEJANDRA 1 Each 0    dronabinol (MARINOL) 2.5 mg capsule Take 1 Cap by mouth two (2) times a day for 10 days. Max Daily Amount: 5 mg. 20 Cap 0    thyroid, Pork, (ARMOUR THYROID) 30 mg tablet Take 30 mg by mouth daily.  naloxegol (MOVANTIK) 12.5 mg tab tablet Take 12.5 mg by mouth daily.  folic acid (FOLVITE) 1 mg tablet Take 1 mg by mouth daily. Facility-Administered Medications Ordered in Other Visits   Medication Dose Route Frequency Provider Last Rate Last Dose    0.9% sodium chloride infusion 250 mL  250 mL IntraVENous PRN Marifer Merritt MD           Review of Systems  Constitutional: The patient has no acute distress or discomfort. HEENT: The patient denies recent head trauma, eye pain, blurred vision,  hearing deficit, oropharyngeal mucosal pain or lesions, and the patient denies throat pain or discomfort. Chest wall: The patient complained of having several small nodular areas over her left anterior chest wall surgical site for which she is aware it may represent recurrent breast cancer. Lymphatics: The patient denies palpable peripheral lymphadenopathy. Hematologic: The patient denies having bruising, bleeding, report progressive fatigue. Respiratory: Patient denies having shortness of breath, cough, sputum production, fever, or dyspnea on exertion. Cardiovascular: The patient denies having leg pain, leg swelling, heart palpitations, chest permit, chest pain, or lightheadedness. The patient denies having dyspnea on exertion. Gastrointestinal: The patient denies having nausea, emesis, or diarrhea. The patient denies having any hematemesis or blood in the stool. Genitourinary: Patient denies having urinary urgency, frequency, or dysuria. The patient denies having blood in the urine.   Psychological: The patient denies having symptoms of nervousness, anxiety, depression, or thoughts of harming himself some of this. Skin: Patient denies having skin rashes, skin, ulcerations, or unexplained itching or pruritus. Musculoskeletal: She denies muscle or joint aches/pain. Objective:     Visit Vitals  /80   Pulse (!) 110   Temp 98.8 °F (37.1 °C) (Oral)   Resp 18   Ht 5' 6\" (1.676 m)   Wt 88.9 kg (196 lb)   SpO2 96%   BMI 31.64 kg/m²     ECOG PS=0, pain score=0/10     Physical Exam:   Gen. Appearance: The patient is in no acute distress. Skin: There is no bruise or rash. Her skin is very dry  HEENT: The exam is unremarkable. Neck: Supple without lymphadenopathy or thyromegaly. Lungs: Clear to auscultation and percussion; there are no wheezes or rhonchi. Heart: Regular rate and rhythm; there are no murmurs, gallops, or rubs. Anterior chest wall and breast: The right breast shows no mass, nipple discharge, nipple retraction, or skin dimpling. The axilla reveals no palpable axillary lymphadenopathy. The left breast is surgically absent. There is some scarring over the left anterior rib cage and a few small nodular areas are noted over the anterior lateral upper chest wall is well. Abdomen: Bowel sounds are present and normal.  There is no guarding, tenderness, or hepatosplenomegaly. Extremities: There is no clubbing, cyanosis, or edema. Neurologic: There are no focal neurologic deficits. Lymphatics: There is no palpable peripheral lymphadenopathy. Musculoskeletal: The patient has right sided weakness, she is in wheelchair at this time. Psychological/psychiatric: There is no clinical evidence of anxiety, depression, or melancholy.     Lab data:      Results for orders placed or performed during the hospital encounter of 01/14/19   CBC WITH 3 PART DIFF     Status: Abnormal   Result Value Ref Range Status    WBC 6.2 4.5 - 13.0 K/uL Final    RBC 3.37 (L) 4.10 - 5.10 M/uL Final    HGB 10.4 (L) 12.0 - 16 g/dL Final    HCT 28.8 (L) 36 - 48 % Final    MCV 85.5 78 - 102 FL Final    MCH 30.9 25.0 - 35.0 PG Final    MCHC 36.1 31 - 37 g/dL Final    RDW 13.8 11.5 - 14.5 % Final    PLATELET 978 (L) 683 - 440 K/uL Final    NEUTROPHILS 63 40 - 70 % Final    MIXED CELLS 5 0.1 - 17 % Final    LYMPHOCYTES 33 14 - 44 % Final    ABS. NEUTROPHILS 3.9 1.8 - 9.5 K/UL Final    ABS. MIXED CELLS 0.3 0.0 - 2.3 K/uL Final    ABS. LYMPHOCYTES 2.0 1.1 - 5.9 K/UL Final     Comment: Test performed at [place] [id number] Location. Results Reviewed by Medical Director. DF AUTOMATED   Final           Assessment:     1. Acute blood loss anemia    2. Sickle cell anemia with crisis ([address]    3. Malignant neoplasm of lower-outer quadrant of left breast of female, estrogen receptor positive ([facility]    4. Vitamin D deficiency      Plan:   Invasive ductal carcinoma of the left breast: The patient is status post modified radical mastectomy and continues to do well. Clinically there is no evidence of disease recurrence. Her most recent CA-27-29 level  was 37.8 units /ml  from 11/2/2018. This will be repeated today. Vitamin D deficiency: She previously completed a 12 week prescription of Ergocalciferol 50,000 units weekly. Her vitamin d will be check at this time. Sickle cell disease, SS: Clinically the patient is relatively stable at this time she has not had any recent pain crises. I have recommended that the patient continued to remain well-hydrated     Thrombocytopenia: I have explained to the patient that her CBC from 5/20/2019 shows that her platelet count is currently 82,000. Therapeutic intervention is not warranted unless her platelets decline below 15,000. Iron deficiency anemia: CBC done on 5/20/2019 shows a WBC of 16.2, hemoglobin of 6.8 g/dl and 20.1%. I have have inform patient that she will need to be transfuse with 2 units of PRBC.  Type and cross will be done today and transfusion will be done on Friday 5/24/2019 because patient has antibodies and it takes the blood bank  awhile to process her blood. Iron overload (Presisting problem): At this time her Noah Ales has been on hold due decrease in her renal function. Medication will remain on hold for her renal functions to improve. She will follow up with the nephrology     Lymphocytosis: The total WBC count is elevated at 16.2, with 75 % neutrophils and 18 % lymphocytes. This will  Be monitored. I will continue to see her in clinic in 4 weeks. Orders Placed This Encounter    FERRITIN     Standing Status:   Future     Standing Expiration Date:   5/22/2020    IRON PROFILE     Standing Status:   Future     Standing Expiration Date:   5/22/2020    CA 27.29     Standing Status:   Future     Standing Expiration Date:   5/22/2020    VITAMIN D, 25 HYDROXY     Standing Status:   Future     Standing Expiration Date:   6/22/2019    dronabinol (MARINOL) 5 mg capsule     Sig: Take 1 Cap by mouth two (2) times a day. Max Daily Amount: 10 mg. Dispense:  30 Cap     Refill:  0       Monisha Sam, NP  5/22/2019      I have assessed the patient independently and  agree with the full assessment as outlined.   Beatriz Casas MD, [address]

## 2019-05-22 NOTE — DISCHARGE SUMMARY
[address] [place] [address]    Name:  Basilia Park  MR#:   [id number]  :  1965  ACCOUNT #:  [de-identified]  ADMIT DATE:  2019  DISCHARGE DATE:  05/15/2019      DISCHARGE DIAGNOSES:  Include,  1. Thrombocytopenia. 2.  Anemia. 3.  Leukocytosis, which may be a leukemoid reaction. 4.  Chronic anemia secondary to sickle cell disease. 5.  Acute renal failure on top of chronic kidney disease, stage III. 6.  Hypertension. 7.  Hypothyroidism. 8.  Elevated ammonia levels, which have shown improvement. 9.  Sickle cell anemia. No crisis at this time. 10.  Secondary hemochromatosis. HOSPITAL COURSE:  This is a 63-year-old female presented to the ED with complaints of abdominal pain. The patient was evaluated. The patient was noted to have some acute kidney injury. The patient was started on IV fluids. Nephrology was consulted. Hematology/Oncology was also consulted as the patient was noted to have thrombocytopenia and anemia. Subsequently, the patient was noted to have worsening anemia. There was also concern for sickle cell crisis. The patient was transferred to ICU and received packed RBC transfusions. Hemoglobin and hematocrit were monitored. Renal function was monitored. The patient was seen by Pulmonary Critical Care too. The patient also had some hypotension, which improved with midodrine. Subsequently, the patient remained stable and was moved out of ICU. Renal function showed improvement. Hematology/Oncology continued to follow the patient. Given the renal failure, the patient's Jadenu, which is the medication for secondary hemochromatosis, was held because of the patient's renal function. The patient's renal function slowly showed improvement. Hemoglobin and hematocrit remained stable. Platelets remained stable. PT/OT saw the patient. Case management was involved. The patient was hemodynamically stable, and the patient was cleared for discharge.   Discharge plans were discussed with the patient. DISPOSITION:  Home. CONSULTATIONS:  Pulmonary Critical Care, with Nephrology, and with Hematology. PROCEDURES:  None. DISCHARGE CONDITION:  The patient is hemodynamically stable at discharge. FOLLOWUP:  The patient was advised to follow up with PCP in one week and with Dr. German Guzmán in one week and with Nephrology in 10 days. DISCHARGE MEDICATIONS:  For the patient included,  1. Pepcid 20 mg p.o. daily. 2.  Norco 5/325 one tablet p.o. every 8 hours as needed. 3.  Sodium bicarbonate 650 mg p.o. three times daily for two weeks. 4.  Incentive spirometry, use as directed. 5.  Check CBC, CMP, magnesium in four days. Results to PCP and nephrologist and Dr. German Guzmán immediately. 6.  Marinol 2.5 mg p.o. twice daily for 10 days. 7.  Thyroid Truxton 30 mg p.o. daily. 8.  Movantik 12.5 mg p.o. daily. 9.  Folic acid 1 mg p.o. daily. Discharge plans were discussed with the patient. Total time for the discharge is more than 35 minutes.       Fay Rapp MD      [id number]  D:  05/21/2019 10:30  T:  05/21/2019 12:56  JOB #:  [id number]  CC:  Yoko Huitron MD

## 2019-05-23 LAB
25(OH)D3 SERPL-MCNC: 49.3 NG/ML (ref 30–100)
FERRITIN SERPL-MCNC: 2427 NG/ML (ref 8–388)
IRON SATN MFR SERPL: 26 %
IRON SERPL-MCNC: 73 UG/DL (ref 50–175)
TIBC SERPL-MCNC: 285 UG/DL (ref 250–450)

## 2019-05-24 ENCOUNTER — HOME CARE VISIT (OUTPATIENT)
Dept: SCHEDULING | Facility: HOME HEALTH | Age: 54
End: 2019-05-24
Payer: COMMERCIAL

## 2019-05-24 ENCOUNTER — HOSPITAL ENCOUNTER (OUTPATIENT)
Dept: INFUSION THERAPY | Age: 54
Discharge: HOME | End: 2019-05-24
Payer: COMMERCIAL

## 2019-05-24 VITALS
DIASTOLIC BLOOD PRESSURE: 81 MMHG | HEART RATE: 93 BPM | RESPIRATION RATE: 18 BRPM | TEMPERATURE: 98.8 F | OXYGEN SATURATION: 100 % | SYSTOLIC BLOOD PRESSURE: 123 MMHG

## 2019-05-24 LAB — CANCER AG27-29 SERPL-ACNC: 34.9 U/ML (ref 0–38.6)

## 2019-05-24 PROCEDURE — 74011250637 HC RX REV CODE- 250/637: Performed by: INTERNAL MEDICINE

## 2019-05-24 PROCEDURE — 77030013169 SET IV BLD ICUM -A

## 2019-05-24 PROCEDURE — 74011250637 HC RX REV CODE- 250/637: Performed by: NURSE PRACTITIONER

## 2019-05-24 PROCEDURE — P9040 RBC LEUKOREDUCED IRRADIATED: HCPCS

## 2019-05-24 PROCEDURE — 36430 TRANSFUSION BLD/BLD COMPNT: CPT

## 2019-05-24 RX ORDER — DIPHENHYDRAMINE HCL 25 MG
25 CAPSULE ORAL ONCE
Status: COMPLETED | OUTPATIENT
Start: 2019-05-24 | End: 2019-05-24

## 2019-05-24 RX ORDER — SODIUM CHLORIDE 0.9 % (FLUSH) 0.9 %
10-40 SYRINGE (ML) INJECTION AS NEEDED
Status: DISCONTINUED | OUTPATIENT
Start: 2019-05-24 | End: 2019-05-28 | Stop reason: HOSPADM

## 2019-05-24 RX ORDER — ACETAMINOPHEN 325 MG/1
650 TABLET ORAL ONCE
Status: COMPLETED | OUTPATIENT
Start: 2019-05-24 | End: 2019-05-24

## 2019-05-24 RX ORDER — SODIUM CHLORIDE 9 MG/ML
250 INJECTION, SOLUTION INTRAVENOUS AS NEEDED
Status: DISCONTINUED | OUTPATIENT
Start: 2019-05-24 | End: 2019-05-28 | Stop reason: HOSPADM

## 2019-05-24 RX ADMIN — ACETAMINOPHEN 650 MG: 325 TABLET ORAL at 14:37

## 2019-05-24 RX ADMIN — DIPHENHYDRAMINE HYDROCHLORIDE 25 MG: 25 CAPSULE ORAL at 14:37

## 2019-05-24 RX ADMIN — ACETAMINOPHEN 650 MG: 325 TABLET ORAL at 08:57

## 2019-05-24 RX ADMIN — DIPHENHYDRAMINE HYDROCHLORIDE 25 MG: 25 CAPSULE ORAL at 08:57

## 2019-05-24 NOTE — PROGRESS NOTES
[facility] Progress Note    Date: May 24, 2019    Name: Alex Franco    MRN: [id number]         : 1965    1 unit PRBC    Ms. Jeana Noel arrived to [facility] at [address] [facility] accompanied by her . Ms. Jeana Noel was assessed and education was provided. Discussed risks and benefits of blood transfusion with patient, including risk of transfusion reaction and disease transmission. Patient verbalized understanding and signed consent placed on chart. Ms. Aly Frost vitals were reviewed. Visit Vitals  /81 (BP 1 Location: Right arm, BP Patient Position: Sitting)   Pulse 93   Temp 98.8 °F (37.1 °C)   Resp 18   SpO2 100%   Breastfeeding? No       24g IV inserted in patient's Right hand  x1 attempt. Positive for blood return and flushes without difficulty. Normal saline initiated at [facility]. Pre-medications (Tylenol 650 mg and Benadryl 25 mg) were administered as ordered. Blood was started 30 minutes after pre-medication. First unit of two ordered units of PRBCs initiated @ 75 ml/hr at 0930. Fifteen minutes into infusion, VS stable and pt denied c/o SOB, itching/hives, lip/tongue/facial swelling, CP or other complaints. Infusion rate increased to 100 ml/hr. Fifteen minutes later, VS stable and pt denied complaints; infusion rate increased to 155 ml/hr for the remainder of the transfusion. Unit finished @ 1130. VS stable and no transfusion reaction suspected. Patient Vitals for the past 12 hrs:   Temp Pulse Resp BP SpO2   19 1130 98.8 °F (37.1 °C) 93 18 123/81 100 %   19 1030 98.7 °F (37.1 °C) 90 18 112/75 100 %   19 1000 98.7 °F (37.1 °C) 94 18 116/76 100 %   19 0945 98.2 °F (36.8 °C) 90 18 112/76 100 %   19 0925 98.2 °F (36.8 °C) 99 18 117/78 100 %   19 0844 98.9 °F (37.2 °C) (!) 118 18 119/83 100 %     Due to the multiple antibodies the patient has, the second unit of blood was delayed as the [employer] had to do a nation wide search for a unit.   Patient was set up to go to the ED to received the second unit of blood when it arrived at blood bank since [facility] would be closed. Patient and family verbalized understanding. However, patient states that she is very tired and if at all possible would like to return to the ED first thing in the morning to received the second unit. Blood bank notified and states that would be ok. ED notified and instructed patient to arrive at 0800 to [facility] ED. Again, patient aware and verbalized understanding. Ms. Vish Karimi tolerated infusion without complaints. IV left in place. Wrapped in coban to secure. Discharge instructions reviewed with pt. Pt instructed to report SOB, CP, elevated temp, back pain, or other symptoms of transfusion reaction to MD or ED. Pt verbalized understanding. Patient armband removed and shredded    Ms. Vish Karimi was discharged from [place] [address] in stable condition at 1530. She is to follow up in the ED at 0800.     Nadege Padron RN  May 24, 2019

## 2019-05-25 ENCOUNTER — HOSPITAL ENCOUNTER (EMERGENCY)
Age: 54
Discharge: HOME | End: 2019-05-25
Attending: EMERGENCY MEDICINE
Payer: COMMERCIAL

## 2019-05-25 VITALS
HEIGHT: 66 IN | WEIGHT: 197 LBS | BODY MASS INDEX: 31.66 KG/M2 | OXYGEN SATURATION: 100 % | RESPIRATION RATE: 17 BRPM | DIASTOLIC BLOOD PRESSURE: 84 MMHG | SYSTOLIC BLOOD PRESSURE: 129 MMHG | HEART RATE: 101 BPM | TEMPERATURE: 98.2 F

## 2019-05-25 DIAGNOSIS — R79.89 ELEVATED SERUM CREATININE: ICD-10-CM

## 2019-05-25 DIAGNOSIS — D64.9 ANEMIA, UNSPECIFIED TYPE: Primary | ICD-10-CM

## 2019-05-25 DIAGNOSIS — E87.6 HYPOKALEMIA: ICD-10-CM

## 2019-05-25 LAB
ANION GAP BLD CALC-SCNC: 18 MMOL/L (ref 10–20)
BUN BLD-MCNC: 7 MG/DL (ref 7–18)
CA-I BLD-MCNC: 1.18 MMOL/L (ref 1.12–1.32)
CHLORIDE BLD-SCNC: 102 MMOL/L (ref 100–108)
CO2 BLD-SCNC: 23 MMOL/L (ref 19–24)
CREAT UR-MCNC: 1.9 MG/DL (ref 0.6–1.3)
GLUCOSE BLD STRIP.AUTO-MCNC: 109 MG/DL (ref 74–106)
HCT VFR BLD CALC: 22 % (ref 36–49)
HGB BLD-MCNC: 7.5 G/DL (ref 12–16)
POTASSIUM BLD-SCNC: 2.9 MMOL/L (ref 3.5–5.5)
SODIUM BLD-SCNC: 138 MMOL/L (ref 136–145)

## 2019-05-25 PROCEDURE — 36430 TRANSFUSION BLD/BLD COMPNT: CPT

## 2019-05-25 PROCEDURE — 74011250637 HC RX REV CODE- 250/637: Performed by: PHYSICIAN ASSISTANT

## 2019-05-25 PROCEDURE — 77030013169 SET IV BLD ICUM -A

## 2019-05-25 PROCEDURE — 99285 EMERGENCY DEPT VISIT HI MDM: CPT

## 2019-05-25 PROCEDURE — 80047 BASIC METABLC PNL IONIZED CA: CPT

## 2019-05-25 PROCEDURE — P9040 RBC LEUKOREDUCED IRRADIATED: HCPCS

## 2019-05-25 RX ORDER — POTASSIUM CHLORIDE 20 MEQ/1
20 TABLET, EXTENDED RELEASE ORAL 3 TIMES DAILY
Qty: 9 TAB | Refills: 0 | Status: SHIPPED | OUTPATIENT
Start: 2019-05-25 | End: 2019-05-28

## 2019-05-25 RX ORDER — SODIUM CHLORIDE 9 MG/ML
250 INJECTION, SOLUTION INTRAVENOUS AS NEEDED
Status: DISCONTINUED | OUTPATIENT
Start: 2019-05-25 | End: 2019-05-25 | Stop reason: HOSPADM

## 2019-05-25 RX ORDER — SODIUM CHLORIDE 9 MG/ML
250 INJECTION, SOLUTION INTRAVENOUS AS NEEDED
Status: DISCONTINUED | OUTPATIENT
Start: 2019-05-25 | End: 2019-05-25

## 2019-05-25 RX ORDER — POTASSIUM CHLORIDE 20 MEQ/1
40 TABLET, EXTENDED RELEASE ORAL
Status: COMPLETED | OUTPATIENT
Start: 2019-05-25 | End: 2019-05-25

## 2019-05-25 RX ADMIN — POTASSIUM CHLORIDE 40 MEQ: 20 TABLET, EXTENDED RELEASE ORAL at 10:03

## 2019-05-25 NOTE — DISCHARGE INSTRUCTIONS
Patient Education   Take medication as prescribed. Follow-up with your primary care physician in 2 days for reassessment. Bring the results from this visit with you for their review. Return to the ED immediately for any new, worsening, or persistent symptoms, including chest pain, shortness of breath, fatigue, or any other medical concerns. Anemia: Care Instructions  Your Care Instructions    Anemia is a low level of red blood cells, which carry oxygen throughout your body. Many things can cause anemia. Lack of iron is one of the most common causes. Your body needs iron to make hemoglobin, a substance in red blood cells that carries oxygen from the lungs to your body's cells. Without enough iron, the body produces fewer and smaller red blood cells. As a result, your body's cells do not get enough oxygen, and you feel tired and weak. And you may have trouble concentrating. Bleeding is the most common cause of a lack of iron. You may have heavy menstrual bleeding or bleeding caused by conditions such as ulcers, hemorrhoids, or cancer. Regular use of aspirin or other anti-inflammatory medicines (such as ibuprofen) also can cause bleeding in some people. A lack of iron in your diet also can cause anemia, especially at times when the body needs more iron, such as during pregnancy, infancy, and the teen years. Your doctor may have prescribed iron pills. It may take several months of treatment for your iron levels to return to normal. Your doctor also may suggest that you eat foods that are rich in iron, such as meat and beans. There are many other causes of anemia. It is not always due to a lack of iron. Finding the specific cause of your anemia will help your doctor find the right treatment for you. Follow-up care is a key part of your treatment and safety. Be sure to make and go to all appointments, and call your doctor if you are having problems.  It's also a good idea to know your test results and keep a list of the medicines you take. How can you care for yourself at home? · Take your medicines exactly as prescribed. Call your doctor if you think you are having a problem with your medicine. · If your doctor recommends iron pills, take them as directed:  ? Try to take the pills on an empty stomach about 1 hour before or 2 hours after meals. But you may need to take iron with food to avoid an upset stomach. ? Do not take antacids or drink milk or caffeine drinks (such as coffee, tea, or cola) at the same time or within 2 hours of the time that you take your iron. They can make it hard for your body to absorb the iron. ? Vitamin C (from food or supplements) helps your body absorb iron. Try taking iron pills with a glass of orange juice or some other food that is high in vitamin C, such as citrus fruits. ? Iron pills may cause stomach problems, such as heartburn, nausea, diarrhea, constipation, and cramps. Be sure to drink plenty of fluids, and include fruits, vegetables, and fiber in your diet each day. Iron pills often make your bowel movements dark or green. ? If you forget to take an iron pill, do not take a double dose of iron the next time you take a pill. ? Keep iron pills out of the reach of small children. An overdose of iron can be very dangerous. · Follow your doctor's advice about eating iron-rich foods. These include red meat, shellfish, poultry, eggs, beans, raisins, whole-grain bread, and leafy green vegetables. · Steam vegetables to help them keep their iron content. When should you call for help? Call 911 anytime you think you may need emergency care. For example, call if:    · You have symptoms of a heart attack. These may include:  ? Chest pain or pressure, or a strange feeling in the chest.  ? Sweating. ? Shortness of breath. ? Nausea or vomiting. ? Pain, pressure, or a strange feeling in the back, neck, jaw, or upper belly or in one or both shoulders or arms.   ? Lightheadedness or sudden weakness. ? A fast or irregular heartbeat. After you call 911, the  may tell you to chew 1 adult-strength or 2 to 4 low-dose aspirin. Wait for an ambulance. Do not try to drive yourself.     · You passed out (lost consciousness).    Call your doctor now or seek immediate medical care if:    · You have new or increased shortness of breath.     · You are dizzy or lightheaded, or you feel like you may faint.     · Your fatigue and weakness continue or get worse.     · You have any abnormal bleeding, such as:  ? Nosebleeds. ? Vaginal bleeding that is different (heavier, more frequent, at a different time of the month) than what you are used to.  ? Bloody or black stools, or rectal bleeding. ? Bloody or pink urine.    Watch closely for changes in your health, and be sure to contact your doctor if:    · You do not get better as expected. Where can you learn more? Go to http://beth-roman.info/. Enter R301 in the search box to learn more about \"Anemia: Care Instructions. \"  Current as of: May 6, 2018  Content Version: 11.9  © 5863-1619 [employer], [employer]. Care instructions adapted under license by [employer] (which disclaims liability or warranty for this information). If you have questions about a medical condition or this instruction, always ask your healthcare professional. [address] any warranty or liability for your use of this information.

## 2019-05-25 NOTE — ED NOTES
Pt provided with warm blankets, made comfortable. Blood transfusion started at 0909. No s/s of distress or c/o at this time, will continue to monitor.

## 2019-05-25 NOTE — ED PROVIDER NOTES
EMERGENCY DEPARTMENT HISTORY AND PHYSICAL EXAM    8:09 AM      Date: 5/25/2019  Patient Name: Christofer Garcia    History of Presenting Illness     Chief Complaint   Patient presents with    Abnormal Lab Results     hgb-6.9         History Provided By: Patient    Additional History (Context): Christofer Garcia is a 48 y.o. female with hx of anemia, GERD, arthritis, fibromyalgia who presents for a blood transfusion. Patient notes she was seen at the infusion center yesterday and was transfused 1 unit. 2nd unit was delayed so she was told to come to the ED today for transfusion. Denies fever/chills, chest pain, dyspnea, n/v, or any complaints at this time. PCP: Linda Chavira MD    Current Facility-Administered Medications   Medication Dose Route Frequency Provider Last Rate Last Dose    potassium chloride (K-DUR, KLOR-CON) SR tablet 40 mEq  40 mEq Oral NOW Anais Carranza PA        0.9% sodium chloride infusion 250 mL  250 mL IntraVENous PRN Marisel Colon,          Current Outpatient Medications   Medication Sig Dispense Refill    potassium chloride (K-DUR, KLOR-CON) 20 mEq tablet Take 1 Tab by mouth three (3) times daily for 3 days. 9 Tab 0    dronabinol (MARINOL) 5 mg capsule Take 1 Cap by mouth two (2) times a day. Max Daily Amount: 10 mg. 30 Cap 0    famotidine (PEPCID) 20 mg tablet Take 1 Tab by mouth daily. 30 Tab 0    sodium bicarbonate 650 mg tablet Take 1 Tab by mouth three (3) times daily for 14 days. 42 Tab 0    OTHER Incentive Spirometry- use as directed 1 Each 0    OTHER Check CBC, CMP, Mg in 4 days, results to PCP and Nephrologist and Dr Caesar Catalan immediately, Diagnosis- MARIAA LEJANDRA 1 Each 0    dronabinol (MARINOL) 2.5 mg capsule Take 1 Cap by mouth two (2) times a day for 10 days. Max Daily Amount: 5 mg. 20 Cap 0    thyroid, Pork, (ARMOUR THYROID) 30 mg tablet Take 30 mg by mouth daily.  naloxegol (MOVANTIK) 12.5 mg tab tablet Take 12.5 mg by mouth daily.       folic acid (FOLVITE) 1 mg tablet Take 1 mg by mouth daily. 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    Fatigue     Fibromyalgia     GERD (gastroesophageal reflux disease)     Hx of endometriosis     Hypertension     Ill-defined condition 2018    Sickle cell crisis    Osteoarthritis of left hip 2016    Osteoarthritis of right hip 1/3/2017    Osteoarthritis of right knee 2018    Sickle cell anemia (HCC)     Sleep apnea     Does not use CPAP    Thyroid disease     hypo       Past Surgical History:  Past Surgical History:   Procedure Laterality Date    HX BREAST BIOPSY Left 2016    HX  SECTION      HX HERNIA REPAIR      HX LAP CHOLECYSTECTOMY      HX MASTECTOMY Left 2012    with axillary lymph node dissection    TOTAL HIP ARTHROPLASTY Bilateral  &        Family History:  Family History   Problem Relation Age of Onset    Cancer Mother         breast    Diabetes Mother     Heart Disease Father     Diabetes Father     Sickle Cell Anemia Brother        Social History:  Social History     Tobacco Use    Smoking status: Former Smoker     Packs/day: 0.25     Years: 30.00     Pack years: 7.50     Last attempt to quit: 2011     Years since quittin.4    Smokeless tobacco: Former User   Substance Use Topics    Alcohol use: Yes     Comment: 2 times yearly    Drug use: No       Allergies:   Allergies   Allergen Reactions    Neulasta [Pegfilgrastim] Other (comments)     \"Put me in a comma for 3 months\"         Review of Systems       Review of Systems   Constitutional: Negative for chills and fever. Respiratory: Negative for shortness of breath. Cardiovascular: Negative for chest pain. Gastrointestinal: Negative for abdominal pain, nausea and vomiting. Skin: Negative for rash. Neurological: Negative for weakness. All other systems reviewed and are negative. Physical Exam     Visit Vitals  /77   Pulse 93   Temp 98.3 °F (36.8 °C)   Resp 15   Ht 5' 6\" (1.676 m)   Wt 89.4 kg (197 lb)   SpO2 100%   BMI 31.80 kg/m²         Physical Exam   Constitutional: She appears well-developed and well-nourished. No distress. HENT:   Head: Normocephalic and atraumatic. Neck: Normal range of motion. Neck supple. Cardiovascular: Normal rate, regular rhythm, normal heart sounds and intact distal pulses. Exam reveals no gallop and no friction rub. No murmur heard. Pulmonary/Chest: Effort normal and breath sounds normal. No respiratory distress. She has no wheezes. She has no rales. Abdominal: Soft. She exhibits no distension and no mass. There is no tenderness. There is no rebound and no guarding. Musculoskeletal: Normal range of motion. Neurological: She is alert. Skin: Skin is warm. No rash noted. She is not diaphoretic. Nursing note and vitals reviewed.         Diagnostic Study Results     Labs -  Recent Results (from the past 12 hour(s))   POC CHEM8    Collection Time: 05/25/19  8:49 AM   Result Value Ref Range    CO2, POC 23 19 - 24 MMOL/L    Glucose,  (H) 74 - 106 MG/DL    BUN, POC 7 7 - 18 MG/DL    Creatinine, POC 1.9 (H) 0.6 - 1.3 MG/DL    GFRAA, POC 34 (L) >60 ml/min/1.73m2    GFRNA, POC 28 (L) >60 ml/min/1.73m2    Sodium,  136 - 145 MMOL/L    Potassium, POC 2.9 (LL) 3.5 - 5.5 MMOL/L    Calcium, ionized (POC) 1.18 1.12 - 1.32 mmol/L    Chloride,  100 - 108 MMOL/L    Anion gap, POC 18 10 - 20      Hematocrit, POC 22 (L) 36 - 49 %    Hemoglobin, POC 7.5 (L) 12 - 16 G/DL       Radiologic Studies -   No orders to display         Medical Decision Making   I am the first provider for this patient. I reviewed the vital signs, available nursing notes, past medical history, past surgical history, family history and social history. Vital Signs-Reviewed the patient's vital signs. Records Reviewed: Nursing Notes and Old Medical Records (Time of Review: 8:09 AM)  Infusion center note from 5/24:  Due to the multiple antibodies the patient has, the second unit of blood was delayed as the [employer] had to do a nation wide search for a unit. Patient was set up to go to the ED to received the second unit of blood when it arrived at blood bank since [facility] would be closed. Patient and family verbalized understanding. However, patient states that she is very tired and if at all possible would like to return to the ED first thing in the morning to received the second unit. Blood bank notified and states that would be ok. ED notified and instructed patient to arrive at 0800 to [facility] ED.    ED Course: Progress Notes, Reevaluation, and Consults:  8:31AM: Pt consented for blood products. 9:00 AM: Transfusion being started. 11:00 AM Reviewed results with patient. Discussed need for close outpatient follow-up. Discussed strict return precautions, including chest pain, shortness of breath, or any other medical concerns. Pt in agreement with plan. Provider Notes (Medical Decision Making): 59-year-old female who presents for blood transfusion. Patient was evaluated by her oncologist on 5/22. Patient had 1 unit transfused at the infusion center yesterday, second unit was delayed so she was told to come to the ED for a second unit. Asymptomatic at this time. 1 unit transfused without any complications. Stable for discharge with close outpatient follow-up      Diagnosis     Clinical Impression:   1. Anemia, unspecified type    2. Hypokalemia    3. Elevated serum creatinine        Disposition: home     Follow-up Information     Follow up With Specialties Details Why [address]    [facility] EMERGENCY DEPT Emergency Medicine  If symptoms worsen [address]  [phone]    Gorge Alfaro MD Internal Medicine In 2 days  [facility]              Patient's Medications   Start Taking    POTASSIUM CHLORIDE (K-DUR, KLOR-CON) 20 MEQ TABLET    Take 1 Tab by mouth three (3) times daily for 3 days. Continue Taking    DRONABINOL (MARINOL) 2.5 MG CAPSULE    Take 1 Cap by mouth two (2) times a day for 10 days. Max Daily Amount: 5 mg. DRONABINOL (MARINOL) 5 MG CAPSULE    Take 1 Cap by mouth two (2) times a day. Max Daily Amount: 10 mg.    FAMOTIDINE (PEPCID) 20 MG TABLET    Take 1 Tab by mouth daily. FOLIC ACID (FOLVITE) 1 MG TABLET    Take 1 mg by mouth daily. NALOXEGOL (MOVANTIK) 12.5 MG TAB TABLET    Take 12.5 mg by mouth daily. OTHER    Incentive Spirometry- use as directed    OTHER    Check CBC, CMP, Mg in 4 days, results to PCP and Nephrologist and Dr Jamilah Disla immediately, Diagnosis- MARIA ALEJANDRA    SODIUM BICARBONATE 650 MG TABLET    Take 1 Tab by mouth three (3) times daily for 14 days. THYROID, PORK, (ARMOUR THYROID) 30 MG TABLET    Take 30 mg by mouth daily. These Medications have changed    No medications on file   Stop Taking    No medications on file       Dictation disclaimer:  Please note that this dictation was completed with MAPPING, the [employer] voice recognition software. Quite often unanticipated grammatical, syntax, homophones, and other interpretive errors are inadvertently transcribed by the computer software. Please disregard these errors. Please excuse any errors that have escaped final proofreading.

## 2019-05-26 LAB
ABO + RH BLD: NORMAL
ANTIGENS PRESENT RBC DONR: NORMAL
ANTIGENS PRESENT RBC DONR: NORMAL
BLD PROD TYP BPU: NORMAL
BLD PROD TYP BPU: NORMAL
BLOOD GROUP ANTIBODIES SERPL: NORMAL
BLOOD GROUP ANTIBODIES SERPL: NORMAL
BPU ID: NORMAL
BPU ID: NORMAL
CROSSMATCH RESULT,%XM: NORMAL
CROSSMATCH RESULT,%XM: NORMAL
PHYSICIAN INSTRUCTIO,%PI: NORMAL
SPECIMEN EXP DATE BLD: NORMAL
STATUS OF UNIT,%ST: NORMAL
STATUS OF UNIT,%ST: NORMAL
UNIT DIVISION, %UDIV: 0
UNIT DIVISION, %UDIV: 0

## 2019-05-28 ENCOUNTER — HOME CARE VISIT (OUTPATIENT)
Dept: SCHEDULING | Facility: HOME HEALTH | Age: 54
End: 2019-05-28
Payer: COMMERCIAL

## 2019-05-31 ENCOUNTER — OFFICE VISIT (OUTPATIENT)
Dept: CARDIOLOGY CLINIC | Age: 54
End: 2019-05-31

## 2019-05-31 VITALS
HEIGHT: 66 IN | SYSTOLIC BLOOD PRESSURE: 107 MMHG | WEIGHT: 194.8 LBS | HEART RATE: 103 BPM | DIASTOLIC BLOOD PRESSURE: 73 MMHG | BODY MASS INDEX: 31.31 KG/M2

## 2019-05-31 DIAGNOSIS — R00.0 TACHYCARDIA: ICD-10-CM

## 2019-05-31 DIAGNOSIS — Z85.3 HISTORY OF CANCER OF LEFT BREAST: ICD-10-CM

## 2019-05-31 DIAGNOSIS — R00.2 PALPITATIONS: Primary | ICD-10-CM

## 2019-05-31 DIAGNOSIS — D57.1 HB-SS DISEASE WITHOUT CRISIS (HCC): ICD-10-CM

## 2019-05-31 RX ORDER — METOPROLOL TARTRATE 25 MG/1
25 TABLET, FILM COATED ORAL 2 TIMES DAILY
COMMUNITY
End: 2019-11-08

## 2019-05-31 RX ORDER — OXYCODONE HCL 10 MG/1
10 TABLET, FILM COATED, EXTENDED RELEASE ORAL EVERY 12 HOURS
COMMUNITY

## 2019-05-31 NOTE — PROGRESS NOTES
Patient brought medication list in.       1. Have you been to the ER, urgent care clinic since your last visit? Hospitalized since your last visit? Yes When: 5/25 Where: [facility] Reason for visit: blood transfusion    2. Have you seen or consulted any other health care providers outside of the [address] [place] [address] since your last visit? Include any pap smears or colon screening. No     3. Since your last visit, have you had any of the following symptoms?      palpitations and shortness of breath. Explain:on 05/25 after receiving a medication in [facility]    4. Have you had any blood work, X-rays or cardiac testing? Yes Where: [facility]     Requested: NO     In [facility]: YES    5. Where do you normally have your labs drawn? Dr. Marielena Moran    6. Do you need any refills today?    no

## 2019-05-31 NOTE — PROGRESS NOTES
HISTORY OF PRESENT ILLNESS  Jesus Matute is a 48 y.o. female. New Patient   The history is provided by the patient and medical records. Associated symptoms include shortness of breath. Pertinent negatives include no chest pain and no headaches. Palpitations    The history is provided by the patient. This is a new problem. The current episode started more than 1 week ago (5/19). The problem has not changed (No symptoms felt by the patient but finds pulse rate is high when checked by MDs. Possibly improving since stopped Marinol) since onset. The problem is associated with nothing. Associated symptoms include shortness of breath. Pertinent negatives include no fever, no malaise/fatigue, no chest pain, no claudication, no orthopnea, no PND, no nausea, no vomiting, no headaches, no dizziness and no cough. Shortness of Breath   The history is provided by the patient. This is a new problem. The problem occurs intermittently. The current episode started more than 1 week ago. Pertinent negatives include no fever, no headaches, no cough, no wheezing, no PND, no orthopnea, no chest pain, no vomiting, no rash, no leg swelling and no claudication. The problem's precipitants include exercise (steps). Review of Systems   Constitutional: Negative for chills, fever, malaise/fatigue and weight loss. HENT: Negative for nosebleeds. Eyes: Negative for discharge. Respiratory: Positive for shortness of breath. Negative for cough and wheezing. Cardiovascular: Positive for palpitations. Negative for chest pain, orthopnea, claudication, leg swelling and PND. Gastrointestinal: Negative for diarrhea, nausea and vomiting. Genitourinary: Negative for dysuria and hematuria. Musculoskeletal: Negative for joint pain. Skin: Negative for rash. Neurological: Negative for dizziness, seizures, loss of consciousness and headaches. Endo/Heme/Allergies: Negative for polydipsia. Does not bruise/bleed easily. Psychiatric/Behavioral: Negative for depression and substance abuse. The patient does not have insomnia. Allergies   Allergen Reactions    Neulasta [Pegfilgrastim] Other (comments)     \"Put me in a comma for 3 months\"       Past Medical History:   Diagnosis Date    Anemia NEC     Arthritis     Chronic kidney disease     Chronic pain     Ductal carcinoma ([facility] [address]     left breast    Fatigue     Fibromyalgia     GERD (gastroesophageal reflux disease)     Hx of endometriosis     Hypertension     Ill-defined condition 2018    Sickle cell crisis    Osteoarthritis of left hip 2016    Osteoarthritis of right hip 1/3/2017    Osteoarthritis of right knee 2018    Sickle cell anemia ([facility] [address]     Sleep apnea     Does not use CPAP    Thyroid disease     hypo       Family History   Problem Relation Age of Onset    Cancer Mother         breast    Diabetes Mother     Heart Disease Father     Diabetes Father     Sickle Cell Anemia Brother     Stroke Neg Hx     Heart Attack Neg Hx        Social History     Tobacco Use    Smoking status: Former Smoker     Packs/day: 0.25     Years: 30.00     Pack years: 7.50     Last attempt to quit: 2011     Years since quittin.4    Smokeless tobacco: Never Used   Substance Use Topics    Alcohol use: Not Currently     Comment: 2 times yearly    Drug use: No        Current Outpatient Medications   Medication Sig    oxyCODONE ER (OXYCONTIN) 10 mg ER tablet Take 10 mg by mouth every twelve (12) hours.  metoprolol tartrate (LOPRESSOR) 25 mg tablet Take 25 mg by mouth two (2) times a day.  dronabinol (MARINOL) 5 mg capsule Take 1 Cap by mouth two (2) times a day. Max Daily Amount: 10 mg.    famotidine (PEPCID) 20 mg tablet Take 1 Tab by mouth daily.  thyroid, Pork, (ARMOUR THYROID) 30 mg tablet Take 30 mg by mouth daily.  naloxegol (MOVANTIK) 12.5 mg tab tablet Take 12.5 mg by mouth daily.     folic acid (FOLVITE) 1 mg tablet Take 1 mg by mouth daily.  OTHER Incentive Spirometry- use as directed    OTHER Check CBC, CMP, Mg in 4 days, results to PCP and Nephrologist and Dr Donald Parsons immediately, Diagnosis- MARIA ALEJANDRA     No current facility-administered medications for this visit. Past Surgical History:   Procedure Laterality Date    HX BREAST BIOPSY Left 2016    HX  SECTION      HX HERNIA REPAIR      HX KNEE REPLACEMENT Right     HX LAP CHOLECYSTECTOMY      HX MASTECTOMY Left 2012    with axillary lymph node dissection    TOTAL HIP ARTHROPLASTY Bilateral 2016 & 2017       Visit Vitals  /73 (BP 1 Location: Right arm)   Pulse (!) 103   Ht 5' 6\" (1.676 m)   Wt 88.4 kg (194 lb 12.8 oz)   BMI 31.44 kg/m²       Diagnostic Studies:  I have reviewed the relevant tests done on the patient and show as follows  EKG tracings reviewed by me today. EKG Results     None        XR Results (most recent):  Results from Hospital Encounter encounter on 19   XR CHEST PORT    Narrative EXAM:  XR CHEST PORT    INDICATION:   HD catheter placement    COMPARISON: 5/3/2019 and priors    FINDINGS:     Interval placement of right jugular catheter with the tip projecting at the  right atrium. No pneumothorax. Hypoinflation. Mild enlargement of the cardiac  silhouette. Vascular congestion and vascular crowding. No pneumothorax or  pleural effusion. Streaky retrocardiac opacities. No acute osseous abnormality. Impression IMPRESSION:  1. No pneumothorax post right jugular catheter placement. 2. Hypoinflation and likely retrocardiac basilar atelectasis, infiltrate not  excluded. No flowsheet data found. Ms. Rome Lainez has a reminder for a \"due or due soon\" health maintenance. I have asked that she contact her primary care provider for follow-up on this health maintenance. Physical Exam   Constitutional: She is oriented to person, place, and time. She appears well-developed and well-nourished. No distress.    obese   HENT:   Head: Normocephalic and atraumatic. Mouth/Throat: Normal dentition. Eyes: Right eye exhibits no discharge. Left eye exhibits no discharge. No scleral icterus. Neck: Neck supple. No JVD present. Carotid bruit is not present. No thyromegaly present. Cardiovascular: Regular rhythm, S1 normal, S2 normal, normal heart sounds and intact distal pulses. Tachycardia present. Exam reveals no gallop and no friction rub. No murmur heard. Pulmonary/Chest: Effort normal and breath sounds normal. She has no wheezes. She has no rales. Abdominal: Soft. She exhibits no mass. There is no tenderness. Musculoskeletal: She exhibits no edema. Lymphadenopathy:        Right cervical: No superficial cervical adenopathy present. Left cervical: No superficial cervical adenopathy present. Neurological: She is alert and oriented to person, place, and time. Skin: Skin is warm and dry. No rash noted. Psychiatric: She has a normal mood and affect. Her behavior is normal.       ASSESSMENT and PLAN    Tachycardia is likely related to anemia and deconditioning. Advised to drink plenty of water and use plenty of salt to keep the blood pressure high. Home chart for BP/HR on metoprolol which should be continued for now. Testing as ordered below. Diagnoses and all orders for this visit:    1. Palpitations  -     ECHO ADULT COMPLETE; Future  -     CARDIAC HOLTER MONITOR; Future  -     METABOLIC PANEL, BASIC; Future  -     CBC W/O DIFF; Future  -     TSH 3RD GENERATION; Future  -     LIPID PANEL; Future  -     HEPATIC FUNCTION PANEL; Future    2. Hb-SS disease without crisis ([facility] [address]    3. History of cancer of left breast    4. Tachycardia  -     ECHO ADULT COMPLETE; Future        Pertinent laboratory and test data reviewed and discussed with patient. See patient instructions also for other medical advice given    There are no discontinued medications.     Follow-up and Dispositions    · Return in about 6 weeks (around 7/12/2019), or if symptoms worsen or fail to improve, for post test.

## 2019-05-31 NOTE — PATIENT INSTRUCTIONS
There are no discontinued medications. After the recommended changes have been made in blood pressure medicines, patient advised to keep BP/HR(pulse rate) chart twice daily and bring us results in next office visit. Patient may send the results via \"My Chart\" if desired. Please rest for 5-10 minutes before checking blood pressure       A Healthy Heart: Care Instructions  Your Care Instructions    Heart disease occurs when a substance called plaque builds up in the vessels that supply oxygen-rich blood to your heart. This can narrow the blood vessels and reduce blood flow. A heart attack happens when blood flow is completely blocked. A high-fat diet, smoking, and other factors increase the risk of heart disease. Your doctor has found that you have a chance of having heart disease. You can do lots of things to keep your heart healthy. It may not be easy, but you can change your diet, exercise more, and quit smoking. These steps really work to lower your chance of heart disease. Follow-up care is a key part of your treatment and safety. Be sure to make and go to all appointments, and call your doctor if you are having problems. It's also a good idea to know your test results and keep a list of the medicines you take. How can you care for yourself at home? Diet    · Use less salt when you cook and eat. This helps lower your blood pressure. Taste food before salting. Add only a little salt when you think you need it. With time, your taste buds will adjust to less salt.     · Eat fewer snack items, fast foods, canned soups, and other high-salt, high-fat, processed foods.     · Read food labels and try to avoid saturated and trans fats. They increase your risk of heart disease by raising cholesterol levels.     · Limit the amount of solid fat-butter, margarine, and shortening-you eat. Use olive, peanut, or canola oil when you cook.  Bake, broil, and steam foods instead of frying them.     · Eating fish can lower your risk for heart disease. Eat at least 2 servings of fish a week. [place], mackerel, herring, sardines, and chunk light tuna are very good choices. These fish contain omega-3 fatty acids.     · Eat a variety of fruit and vegetables every day. Dark green, deep orange, red, or yellow fruits and vegetables are especially good for you. Examples include spinach, carrots, peaches, and berries.     · Foods high in fiber can reduce your cholesterol and provide important vitamins and minerals. High-fiber foods include whole-grain cereals and breads, oatmeal, beans, brown rice, citrus fruits, and apples.     · Limit drinks and foods with added sugar. These include candy, desserts, and soda pop.    Lifestyle changes    · If your doctor recommends it, get more exercise. Walking is a good choice. Bit by bit, increase the amount you walk every day. Try for at least 30 minutes on most days of the week. You also may want to swim, bike, or do other activities.     · Do not smoke. If you need help quitting, talk to your doctor about stop-smoking programs and medicines. These can increase your chances of quitting for good. Quitting smoking may be the most important step you can take to protect your heart. It is never too late to quit. You will get health benefits right away.     · Limit alcohol to 2 drinks a day for men and 1 drink a day for women. Too much alcohol can cause health problems. Medicines    · Take your medicines exactly as prescribed. Call your doctor if you think you are having a problem with your medicine.     · If your doctor recommends aspirin, take the amount directed each day. Make sure you take aspirin and not another kind of pain reliever, such as acetaminophen (Tylenol). If you take ibuprofen (such as Advil or Motrin) for other problems, take aspirin at least 2 hours before taking ibuprofen. When should you call for help? Call 911 if you have symptoms of a heart attack.  These may include:    · Chest pain or pressure, or a strange feeling in the chest.     · Sweating.     · Shortness of breath.     · Pain, pressure, or a strange feeling in the back, neck, jaw, or upper belly or in one or both shoulders or arms.     · Lightheadedness or sudden weakness.     · A fast or irregular heartbeat.    After you call 911, the  may tell you to chew 1 adult-strength or 2 to 4 low-dose aspirin. Wait for an ambulance. Do not try to drive yourself.   Watch closely for changes in your health, and be sure to contact your doctor if you have any problems. Where can you learn more? Go to http://beth-roman.info/. Enter B560 in the search box to learn more about \"A Healthy Heart: Care Instructions. \"  Current as of: July 22, 2018  Content Version: 11.9  © 0808-0195 [employer]. Care instructions adapted under license by [employer] (which disclaims liability or warranty for this information). If you have questions about a medical condition or this instruction, always ask your healthcare professional. [address] any warranty or liability for your use of this information.

## 2019-06-01 ENCOUNTER — HOME CARE VISIT (OUTPATIENT)
Dept: SCHEDULING | Facility: HOME HEALTH | Age: 54
End: 2019-06-01
Payer: COMMERCIAL

## 2019-06-01 PROCEDURE — G0299 HHS/HOSPICE OF RN EA 15 MIN: HCPCS

## 2019-06-02 VITALS
TEMPERATURE: 97.6 F | DIASTOLIC BLOOD PRESSURE: 78 MMHG | OXYGEN SATURATION: 97 % | HEART RATE: 88 BPM | SYSTOLIC BLOOD PRESSURE: 128 MMHG | RESPIRATION RATE: 20 BRPM

## 2019-06-05 ENCOUNTER — HOSPITAL ENCOUNTER (OUTPATIENT)
Dept: ONCOLOGY | Age: 54
Discharge: HOME | End: 2019-06-05

## 2019-06-05 ENCOUNTER — CLINICAL SUPPORT (OUTPATIENT)
Dept: ONCOLOGY | Age: 54
End: 2019-06-05

## 2019-06-05 DIAGNOSIS — D62 ACUTE BLOOD LOSS ANEMIA: ICD-10-CM

## 2019-06-05 DIAGNOSIS — D62 ACUTE BLOOD LOSS ANEMIA: Primary | ICD-10-CM

## 2019-06-05 LAB
BASO+EOS+MONOS # BLD AUTO: 1.9 K/UL (ref 0–2.3)
BASO+EOS+MONOS NFR BLD AUTO: 8 % (ref 0.1–17)
DIFFERENTIAL METHOD BLD: ABNORMAL
ERYTHROCYTE [DISTWIDTH] IN BLOOD BY AUTOMATED COUNT: 15.3 % (ref 11.5–14.5)
HCT VFR BLD AUTO: 25.6 % (ref 36–48)
HGB BLD-MCNC: 9.2 G/DL (ref 12–16)
LYMPHOCYTES # BLD: 3.9 K/UL (ref 1.1–5.9)
LYMPHOCYTES NFR BLD: 17 % (ref 14–44)
MCH RBC QN AUTO: 31 PG (ref 25–35)
MCHC RBC AUTO-ENTMCNC: 35.9 G/DL (ref 31–37)
MCV RBC AUTO: 86.2 FL (ref 78–102)
NEUTS SEG # BLD: 17.7 K/UL (ref 1.8–9.5)
NEUTS SEG NFR BLD: 75 % (ref 40–70)
PLATELET # BLD AUTO: 201 K/UL (ref 135–420)
RBC # BLD AUTO: 2.97 M/UL (ref 4.1–5.1)
WBC # BLD AUTO: 23.5 K/UL (ref 4.5–13)

## 2019-06-26 ENCOUNTER — HOSPITAL ENCOUNTER (OUTPATIENT)
Dept: ONCOLOGY | Age: 54
Discharge: HOME | End: 2019-06-26

## 2019-06-26 ENCOUNTER — HOSPITAL ENCOUNTER (OUTPATIENT)
Dept: LAB | Age: 54
Discharge: HOME | End: 2019-06-26
Payer: COMMERCIAL

## 2019-06-26 ENCOUNTER — OFFICE VISIT (OUTPATIENT)
Dept: ONCOLOGY | Age: 54
End: 2019-06-26

## 2019-06-26 VITALS
WEIGHT: 183 LBS | OXYGEN SATURATION: 98 % | RESPIRATION RATE: 16 BRPM | DIASTOLIC BLOOD PRESSURE: 75 MMHG | TEMPERATURE: 98.8 F | SYSTOLIC BLOOD PRESSURE: 119 MMHG | HEART RATE: 126 BPM | BODY MASS INDEX: 29.41 KG/M2 | HEIGHT: 66 IN

## 2019-06-26 DIAGNOSIS — D69.6 THROMBOCYTOPENIA (HCC): ICD-10-CM

## 2019-06-26 DIAGNOSIS — C50.512 MALIGNANT NEOPLASM OF LOWER-OUTER QUADRANT OF LEFT BREAST OF FEMALE, ESTROGEN RECEPTOR POSITIVE (HCC): ICD-10-CM

## 2019-06-26 DIAGNOSIS — Z17.0 MALIGNANT NEOPLASM OF LOWER-OUTER QUADRANT OF LEFT BREAST OF FEMALE, ESTROGEN RECEPTOR POSITIVE (HCC): ICD-10-CM

## 2019-06-26 DIAGNOSIS — C50.512 MALIGNANT NEOPLASM OF LOWER-OUTER QUADRANT OF LEFT BREAST OF FEMALE, ESTROGEN RECEPTOR POSITIVE (HCC): Primary | ICD-10-CM

## 2019-06-26 DIAGNOSIS — D57.00 SICKLE CELL ANEMIA WITH CRISIS (HCC): ICD-10-CM

## 2019-06-26 DIAGNOSIS — M15.9 PRIMARY OSTEOARTHRITIS INVOLVING MULTIPLE JOINTS: ICD-10-CM

## 2019-06-26 DIAGNOSIS — E83.19 IRON OVERLOAD: ICD-10-CM

## 2019-06-26 DIAGNOSIS — D62 ACUTE BLOOD LOSS ANEMIA: ICD-10-CM

## 2019-06-26 DIAGNOSIS — D72.820 LYMPHOCYTOSIS: ICD-10-CM

## 2019-06-26 DIAGNOSIS — Z17.0 MALIGNANT NEOPLASM OF LOWER-OUTER QUADRANT OF LEFT BREAST OF FEMALE, ESTROGEN RECEPTOR POSITIVE (HCC): Primary | ICD-10-CM

## 2019-06-26 LAB
ALBUMIN SERPL-MCNC: 3.2 G/DL (ref 3.4–5)
ALBUMIN/GLOB SERPL: 0.9 {RATIO} (ref 0.8–1.7)
ALP SERPL-CCNC: 179 U/L (ref 45–117)
ALT SERPL-CCNC: 10 U/L (ref 13–56)
ANION GAP SERPL CALC-SCNC: 12 MMOL/L (ref 3–18)
AST SERPL-CCNC: 18 U/L (ref 15–37)
BASO+EOS+MONOS # BLD AUTO: 0.6 K/UL (ref 0–2.3)
BASO+EOS+MONOS NFR BLD AUTO: 5 % (ref 0.1–17)
BILIRUB SERPL-MCNC: 1.6 MG/DL (ref 0.2–1)
BUN SERPL-MCNC: 3 MG/DL (ref 7–18)
BUN/CREAT SERPL: 3 (ref 12–20)
CALCIUM SERPL-MCNC: 9.2 MG/DL (ref 8.5–10.1)
CHLORIDE SERPL-SCNC: 101 MMOL/L (ref 100–108)
CO2 SERPL-SCNC: 25 MMOL/L (ref 21–32)
CREAT SERPL-MCNC: 1.15 MG/DL (ref 0.6–1.3)
DIFFERENTIAL METHOD BLD: ABNORMAL
ERYTHROCYTE [DISTWIDTH] IN BLOOD BY AUTOMATED COUNT: 13.3 % (ref 11.5–14.5)
FERRITIN SERPL-MCNC: 2341 NG/ML (ref 8–388)
GLOBULIN SER CALC-MCNC: 3.7 G/DL (ref 2–4)
GLUCOSE SERPL-MCNC: 94 MG/DL (ref 74–99)
HCT VFR BLD AUTO: 25.9 % (ref 36–48)
HGB BLD-MCNC: 9.2 G/DL (ref 12–16)
IRON SATN MFR SERPL: 88 %
IRON SERPL-MCNC: 130 UG/DL (ref 50–175)
LYMPHOCYTES # BLD: 3.9 K/UL (ref 1.1–5.9)
LYMPHOCYTES NFR BLD: 28 % (ref 14–44)
MCH RBC QN AUTO: 31.7 PG (ref 25–35)
MCHC RBC AUTO-ENTMCNC: 35.5 G/DL (ref 31–37)
MCV RBC AUTO: 89.3 FL (ref 78–102)
NEUTS SEG # BLD: 9.4 K/UL (ref 1.8–9.5)
NEUTS SEG NFR BLD: 67 % (ref 40–70)
PLATELET # BLD AUTO: 160 K/UL (ref 140–440)
POTASSIUM SERPL-SCNC: 3.4 MMOL/L (ref 3.5–5.5)
PROT SERPL-MCNC: 6.9 G/DL (ref 6.4–8.2)
RBC # BLD AUTO: 2.9 M/UL (ref 4.1–5.1)
SODIUM SERPL-SCNC: 138 MMOL/L (ref 136–145)
TIBC SERPL-MCNC: 148 UG/DL (ref 250–450)
WBC # BLD AUTO: 13.9 K/UL (ref 4.5–13)

## 2019-06-26 PROCEDURE — 80053 COMPREHEN METABOLIC PANEL: CPT

## 2019-06-26 PROCEDURE — 82728 ASSAY OF FERRITIN: CPT

## 2019-06-26 PROCEDURE — 83540 ASSAY OF IRON: CPT

## 2019-06-26 PROCEDURE — 86300 IMMUNOASSAY TUMOR CA 15-3: CPT

## 2019-06-26 PROCEDURE — 36415 COLL VENOUS BLD VENIPUNCTURE: CPT

## 2019-06-26 NOTE — PROGRESS NOTES
Hematology/Oncology  Progress Note    Name: Nel Piper  Date: 2019  : 1965    PCP:BROOKS Hopper MD     Ms. 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 Current therapy: Active surveillance; the patient has previously completed systemic chemotherapy and radiation treatment. Subjective:     Ms. Meghan Mendez is a 14-year-old  woman with a history of triple-negative breast cancer, involving the left breast. The patient in the office today for follow up after recent hospitalization for anemia, thrombocytopenia, acute kidney injury and was followed by nephrology. At her last clinic visit she had an elevated WBC count of approximately 23.5. Her hemoglobin had slightly improved to 9.2 g/dL. Today she reports that she is doing a little better. However, she report fatigue and weakness are persisting but are improving. She is currently ambulating without the use of her walker or cane. Past medical history, family history, and social history: these were reviewed and remains unchanged.     Past Medical History:   Diagnosis Date    Anemia NEC     Arthritis     Chronic kidney disease     Chronic pain     Ductal carcinoma ([facility] [address]     left breast    Fatigue     Fibromyalgia     GERD (gastroesophageal reflux disease)     Hx of endometriosis     Hypertension     Ill-defined condition 2018    Sickle cell crisis    Osteoarthritis of left hip 2016    Osteoarthritis of right hip 1/3/2017    Osteoarthritis of right knee 2018    Sickle cell anemia ([facility] [address]     Sleep apnea     Does not use CPAP    Thyroid disease     hypo     Past Surgical History:   Procedure Laterality Date    HX BREAST BIOPSY Left     HX  SECTION      HX HERNIA REPAIR      HX KNEE REPLACEMENT Right     HX LAP CHOLECYSTECTOMY      HX MASTECTOMY Left 2012    with axillary lymph node dissection    TOTAL HIP ARTHROPLASTY Bilateral 2016 & 2017     Social History     Socioeconomic History    Marital status:      Spouse name: Not on file    Number of children: Not on file    Years of education: Not on file    Highest education level: Not on file   Occupational History    Not on file   Social Needs    Financial resource strain: Not on file    Food insecurity:     Worry: Not on file     Inability: Not on file    Transportation needs:     Medical: Not on file     Non-medical: Not on file   Tobacco Use    Smoking status: Former Smoker     Packs/day: 0.25     Years: 30.00     Pack years: 7.50     Last attempt to quit: 2011     Years since quittin.4    Smokeless tobacco: Never Used   Substance and Sexual Activity    Alcohol use: Not Currently     Comment: 2 times yearly    Drug use: No    Sexual activity: Not Currently   Lifestyle    Physical activity:     Days per week: Not on file     Minutes per session: Not on file    Stress: Not on file   Relationships    Social connections:     Talks on phone: Not on file     Gets together: Not on file     Attends Mandaen service: Not on file     Active member of club or organization: Not on file     Attends meetings of clubs or organizations: Not on file     Relationship status: Not on file    Intimate partner violence:     Fear of current or ex partner: Not on file     Emotionally abused: Not on file     Physically abused: Not on file     Forced sexual activity: Not on file   Other Topics Concern    Not on file   Social History Narrative    Not on file     Family History   Problem Relation Age of Onset    Cancer Mother         breast    Diabetes Mother     Heart Disease Father     Diabetes Father     Sickle Cell Anemia Brother     Stroke Neg Hx     Heart Attack Neg Hx      Current Outpatient Medications   Medication Sig Dispense Refill    oxyCODONE ER (OXYCONTIN) 10 mg ER tablet Take 10 mg by mouth every twelve (12) hours.  metoprolol tartrate (LOPRESSOR) 25 mg tablet Take 25 mg by mouth two (2) times a day.  dronabinol (MARINOL) 5 mg capsule Take 1 Cap by mouth two (2) times a day. Max Daily Amount: 10 mg. 30 Cap 0    famotidine (PEPCID) 20 mg tablet Take 1 Tab by mouth daily. 30 Tab 0    OTHER Incentive Spirometry- use as directed 1 Each 0    OTHER Check CBC, CMP, Mg in 4 days, results to PCP and Nephrologist and Dr Brian Washington immediately, Diagnosis- MARIA ALEJANDRA 1 Each 0    thyroid, Pork, (ARMOUR THYROID) 30 mg tablet Take 30 mg by mouth daily.  naloxegol (MOVANTIK) 12.5 mg tab tablet Take 12.5 mg by mouth daily.  folic acid (FOLVITE) 1 mg tablet Take 1 mg by mouth daily. Review of Systems  Constitutional: The patient has no acute distress or discomfort. HEENT: The patient denies recent head trauma, eye pain, blurred vision,  hearing deficit, oropharyngeal mucosal pain or lesions, and the patient denies throat pain or discomfort. Chest wall: The patient complained of having several small nodular areas over her left anterior chest wall surgical site for which she is aware it may represent recurrent breast cancer. Lymphatics: The patient denies palpable peripheral lymphadenopathy. Hematologic: The patient denies having bruising, bleeding, report progressive fatigue. Respiratory: Patient denies having shortness of breath, cough, sputum production, fever, or dyspnea on exertion. Cardiovascular: The patient denies having leg pain, leg swelling, heart palpitations, chest permit, chest pain, or lightheadedness. The patient denies having dyspnea on exertion. Gastrointestinal: The patient denies having nausea, emesis, or diarrhea. The patient denies having any hematemesis or blood in the stool. Genitourinary: Patient denies having urinary urgency, frequency, or dysuria. The patient denies having blood in the urine.   Psychological: The patient denies having symptoms of nervousness, anxiety, depression, or thoughts of harming himself some of this. Skin: Patient denies having skin rashes, skin, ulcerations, or unexplained itching or pruritus. Musculoskeletal: She denies muscle or joint aches/pain. Objective:     Visit Vitals  /75   Pulse (!) 126   Temp 98.8 °F (37.1 °C) (Oral)   Resp 16   Ht 5' 6\" (1.676 m)   Wt 83 kg (183 lb)   SpO2 98%   BMI 29.54 kg/m²     ECOG PS=0, pain score=0/10     Physical Exam:   Gen. Appearance: The patient is in no acute distress. Skin: There is no bruise or rash. Her skin is very dry  HEENT: The exam is unremarkable. Neck: Supple without lymphadenopathy or thyromegaly. Lungs: Clear to auscultation and percussion; there are no wheezes or rhonchi. Heart: Regular rate and rhythm; there are no murmurs, gallops, or rubs. Anterior chest wall and breast: The right breast shows no mass, nipple discharge, nipple retraction, or skin dimpling. The axilla reveals no palpable axillary lymphadenopathy. The left breast is surgically absent. There is some scarring over the left anterior rib cage and a few small nodular areas are noted over the anterior lateral upper chest wall is well. Abdomen: Bowel sounds are present and normal.  There is no guarding, tenderness, or hepatosplenomegaly. Extremities: There is no clubbing, cyanosis, or edema. Neurologic: There are no focal neurologic deficits. Lymphatics: There is no palpable peripheral lymphadenopathy. Musculoskeletal: The patient has right sided weakness, she is in wheelchair at this time. Psychological/psychiatric: There is no clinical evidence of anxiety, depression, or melancholy.     Lab data:      Results for orders placed or performed during the hospital encounter of 06/26/19   CBC WITH 3 PART DIFF     Status: Abnormal   Result Value Ref Range Status    WBC 13.9 (H) 4.5 - 13.0 K/uL Final    RBC 2.90 (L) 4.10 - 5.10 M/uL Final    HGB 9.2 (L) 12.0 - 16 g/dL Final    HCT 25.9 (L) 36 - 48 % Final    MCV 89.3 78 - 102 FL Final    MCH 31.7 25.0 - 35.0 PG Final    MCHC 35.5 31 - 37 g/dL Final    RDW 13.3 11.5 - 14.5 % Final    PLATELET 882 280 - 330 K/uL Final    NEUTROPHILS 67 40 - 70 % Final    MIXED CELLS 5 0.1 - 17 % Final    LYMPHOCYTES 28 14 - 44 % Final    ABS. NEUTROPHILS 9.4 1.8 - 9.5 K/UL Final    ABS. MIXED CELLS 0.6 0.0 - 2.3 K/uL Final    ABS. LYMPHOCYTES 3.9 1.1 - 5.9 K/UL Final     Comment: Test performed at [address] [place] [address] or Outpatient Infusion Center Location. Reviewed by Medical Director. DF AUTOMATED   Final           Assessment:     1. Malignant neoplasm of lower-outer quadrant of left breast of female, estrogen receptor positive ([facility] [address]    2. Acute blood loss anemia    3. Sickle cell anemia with crisis ([facility] [address]    4. Iron overload    5. Thrombocytopenia ([facility] [address]    6. Primary osteoarthritis involving multiple joints    7. Lymphocytosis      Plan:   Invasive ductal carcinoma of the left breast: The patient is status post modified radical mastectomy and continues to do well. Clinically there is no evidence of disease recurrence. Her most recent CA-27-29 level  was 37.8 units /ml  from 11/2/2018. This will be repeated today. Vitamin D deficiency: She previously completed a 12 week prescription of Ergocalciferol 50,000 units weekly. Her vitamin d will be check at this time. Sickle cell disease, SS: Clinically the patient is relatively stable at this time she has not had any recent pain crises. I have recommended that the patient continued to remain well-hydrated     Thrombocytopenia: I have explained to the patient that her CBC from 5/20/2019 shows that her platelet count is currently 160,000. Therapeutic intervention is not warranted unless her platelets decline below 15,000. Iron deficiency anemia: CBC done today shows that her hemoglobin is remaining stable at 9.2 g/dL with hematocrit of 25.9%. Her WBC count has declined from 23.5 down to 13.9..      Iron overload (Presisting problem): At this time her Rosemarie Moore has been on hold due decrease in her renal function. Medication will remain on hold for her renal functions to improve. She will follow up with the nephrology. Once we get clearance from nephrology we will restart the chelation therapy. Lymphocytosis: The total WBC count is elevated at 13.9 from a previous level of 23.5 on 6/5/2019       I will continue to see her in clinic in 6 weeks. Orders Placed This Encounter    COMPLETE CBC & AUTO DIFF WBC    InHouse CBC ([employer])     Standing Status:   Future     Number of Occurrences:   1     Standing Expiration Date:   4/5/3696    METABOLIC PANEL, COMPREHENSIVE     Standing Status:   Future     Standing Expiration Date:   6/26/2020    IRON PROFILE     Standing Status:   Future     Standing Expiration Date:   6/26/2020    FERRITIN     Standing Status:   Future     Standing Expiration Date:   6/26/2020    CA 27.29     Standing Status:   Future     Standing Expiration Date:   6/26/2020       Britta Davison MD  5/22/2019      I have assessed the patient independently and  agree with the full assessment as outlined.   Kamilah Rucker MD, [address]

## 2019-06-26 NOTE — PATIENT INSTRUCTIONS
Iron Deficiency Anemia: Care Instructions  Your Care Instructions    Anemia means that you do not have enough red blood cells. Red blood cells carry oxygen around your body. When you have anemia, it can make you pale, weak, and tired. Many things can cause anemia. The most common cause is loss of blood. This can happen if you have heavy menstrual periods. It can also happen if you have bleeding in your stomach or bowel. You can also get anemia if you don't have enough iron in your diet or if it's hard for your body to absorb iron. In some cases, pregnancy causes anemia. That's because a pregnant woman needs more iron. Your doctor may do more tests to find the cause of your anemia. If a disease or other health problem is causing it, your doctor will treat that problem. It's important to follow up with your doctor to make sure that your iron level returns to normal.  Follow-up care is a key part of your treatment and safety. Be sure to make and go to all appointments, and call your doctor if you are having problems. It's also a good idea to know your test results and keep a list of the medicines you take. How can you care for yourself at home? · If your doctor recommended iron pills, take them as directed. ? Try to take the pills on an empty stomach. You can do this about 1 hour before or 2 hours after meals. But you may need to take iron with food to avoid an upset stomach. ? Do not take antacids or drink milk or anything with caffeine within 2 hours of when you take your iron. They can keep your body from absorbing the iron well. ? Vitamin C helps your body absorb iron. You may want to take iron pills with a glass of orange juice or some other food high in vitamin C.  ? Iron pills may cause stomach problems. These include heartburn, nausea, diarrhea, constipation, and cramps. It can help to drink plenty of fluids and include fruits, vegetables, and fiber in your diet.   ? It's normal for iron pills to make your stool a greenish or grayish black. But internal bleeding can also cause dark stool. So it's important to tell your doctor about any color changes. ? Call your doctor if you think you are having a problem with your iron pills. Even after you start to feel better, it will take several months for your body to build up its supply of iron. ? If you miss a pill, don't take a double dose. ? Keep iron pills out of the reach of small children. Too much iron can be very dangerous. · Eat foods with a lot of iron. These include red meat, shellfish, poultry, and eggs. They also include beans, raisins, whole-grain bread, and leafy green vegetables. · Steam your vegetables. This is the best way to prepare them if you want to get as much iron as possible. · Be safe with medicines. Do not take nonsteroidal anti-inflammatory pain relievers unless your doctor tells you to. These include aspirin, naproxen (Aleve), and ibuprofen (Advil, Motrin). · Liquid iron can stain your teeth. But you can mix it with water or juice and drink it with a straw. Then it won't get on your teeth. When should you call for help? Call 911 anytime you think you may need emergency care. For example, call if:    · You passed out (lost consciousness).    Call your doctor now or seek immediate medical care if:    · You are short of breath.     · You are dizzy or light-headed, or you feel like you may faint.     · You have new or worse bleeding.    Watch closely for changes in your health, and be sure to contact your doctor if:    · You feel weaker or more tired than usual.     · You do not get better as expected. Where can you learn more? Go to http://beth-roman.info/. Enter O689 in the search box to learn more about \"Iron Deficiency Anemia: Care Instructions. \"  Current as of: May 6, 2018  Content Version: 11.9  © 9783-4729 [employer].  Care instructions adapted under license by [employer] (which disclaims liability or warranty for this information). If you have questions about a medical condition or this instruction, always ask your healthcare professional. [address] any warranty or liability for your use of this information.

## 2019-06-27 LAB — CANCER AG27-29 SERPL-ACNC: 38.9 U/ML (ref 0–38.6)

## 2019-08-14 ENCOUNTER — OFFICE VISIT (OUTPATIENT)
Dept: ONCOLOGY | Age: 54
End: 2019-08-14

## 2019-08-14 ENCOUNTER — HOSPITAL ENCOUNTER (OUTPATIENT)
Dept: LAB | Age: 54
Discharge: HOME | End: 2019-08-14
Payer: COMMERCIAL

## 2019-08-14 VITALS
TEMPERATURE: 98 F | OXYGEN SATURATION: 100 % | WEIGHT: 185 LBS | HEART RATE: 92 BPM | BODY MASS INDEX: 29.73 KG/M2 | RESPIRATION RATE: 18 BRPM | SYSTOLIC BLOOD PRESSURE: 132 MMHG | DIASTOLIC BLOOD PRESSURE: 86 MMHG | HEIGHT: 66 IN

## 2019-08-14 DIAGNOSIS — D69.6 THROMBOCYTOPENIA (HCC): ICD-10-CM

## 2019-08-14 DIAGNOSIS — M15.9 PRIMARY OSTEOARTHRITIS INVOLVING MULTIPLE JOINTS: ICD-10-CM

## 2019-08-14 DIAGNOSIS — E83.19 IRON OVERLOAD: ICD-10-CM

## 2019-08-14 DIAGNOSIS — Z17.0 MALIGNANT NEOPLASM OF LOWER-OUTER QUADRANT OF LEFT BREAST OF FEMALE, ESTROGEN RECEPTOR POSITIVE (HCC): ICD-10-CM

## 2019-08-14 DIAGNOSIS — C50.512 MALIGNANT NEOPLASM OF LOWER-OUTER QUADRANT OF LEFT BREAST OF FEMALE, ESTROGEN RECEPTOR POSITIVE (HCC): ICD-10-CM

## 2019-08-14 DIAGNOSIS — D57.00 SICKLE CELL ANEMIA WITH CRISIS (HCC): ICD-10-CM

## 2019-08-14 DIAGNOSIS — E55.9 VITAMIN D DEFICIENCY: ICD-10-CM

## 2019-08-14 DIAGNOSIS — C50.512 MALIGNANT NEOPLASM OF LOWER-OUTER QUADRANT OF LEFT BREAST OF FEMALE, ESTROGEN RECEPTOR POSITIVE (HCC): Primary | ICD-10-CM

## 2019-08-14 DIAGNOSIS — Z17.0 MALIGNANT NEOPLASM OF LOWER-OUTER QUADRANT OF LEFT BREAST OF FEMALE, ESTROGEN RECEPTOR POSITIVE (HCC): Primary | ICD-10-CM

## 2019-08-14 DIAGNOSIS — D72.820 LYMPHOCYTOSIS: ICD-10-CM

## 2019-08-14 LAB
ALBUMIN SERPL-MCNC: 3.7 G/DL (ref 3.4–5)
ALBUMIN/GLOB SERPL: 1 {RATIO} (ref 0.8–1.7)
ALP SERPL-CCNC: 126 U/L (ref 45–117)
ALT SERPL-CCNC: 10 U/L (ref 13–56)
ANION GAP SERPL CALC-SCNC: 9 MMOL/L (ref 3–18)
AST SERPL-CCNC: 11 U/L (ref 10–38)
BASOPHILS # BLD: 0 K/UL (ref 0–0.1)
BASOPHILS NFR BLD: 0 % (ref 0–2)
BILIRUB SERPL-MCNC: 1.5 MG/DL (ref 0.2–1)
BUN SERPL-MCNC: 8 MG/DL (ref 7–18)
BUN/CREAT SERPL: 8 (ref 12–20)
CALCIUM SERPL-MCNC: 9.5 MG/DL (ref 8.5–10.1)
CHLORIDE SERPL-SCNC: 109 MMOL/L (ref 100–111)
CO2 SERPL-SCNC: 25 MMOL/L (ref 21–32)
CREAT SERPL-MCNC: 1.03 MG/DL (ref 0.6–1.3)
DIFFERENTIAL METHOD BLD: ABNORMAL
EOSINOPHIL # BLD: 0.1 K/UL (ref 0–0.4)
EOSINOPHIL NFR BLD: 1 % (ref 0–5)
ERYTHROCYTE [DISTWIDTH] IN BLOOD BY AUTOMATED COUNT: 13.4 % (ref 11.6–14.5)
GLOBULIN SER CALC-MCNC: 3.8 G/DL (ref 2–4)
GLUCOSE SERPL-MCNC: 51 MG/DL (ref 74–99)
HCT VFR BLD AUTO: 25.5 % (ref 35–45)
HGB BLD-MCNC: 8.9 G/DL (ref 12–16)
LYMPHOCYTES # BLD: 3 K/UL (ref 0.9–3.6)
LYMPHOCYTES NFR BLD: 39 % (ref 21–52)
MCH RBC QN AUTO: 30.3 PG (ref 24–34)
MCHC RBC AUTO-ENTMCNC: 34.9 G/DL (ref 31–37)
MCV RBC AUTO: 86.7 FL (ref 74–97)
MONOCYTES # BLD: 0.4 K/UL (ref 0.05–1.2)
MONOCYTES NFR BLD: 5 % (ref 3–10)
NEUTS SEG # BLD: 4.3 K/UL (ref 1.8–8)
NEUTS SEG NFR BLD: 55 % (ref 40–73)
PLATELET # BLD AUTO: 125 K/UL (ref 135–420)
PMV BLD AUTO: 9.9 FL (ref 9.2–11.8)
POTASSIUM SERPL-SCNC: 3.4 MMOL/L (ref 3.5–5.5)
PROT SERPL-MCNC: 7.5 G/DL (ref 6.4–8.2)
RBC # BLD AUTO: 2.94 M/UL (ref 4.2–5.3)
SODIUM SERPL-SCNC: 143 MMOL/L (ref 136–145)
WBC # BLD AUTO: 7.8 K/UL (ref 4.6–13.2)

## 2019-08-14 PROCEDURE — 80053 COMPREHEN METABOLIC PANEL: CPT

## 2019-08-14 PROCEDURE — 83540 ASSAY OF IRON: CPT

## 2019-08-14 PROCEDURE — 85025 COMPLETE CBC W/AUTO DIFF WBC: CPT

## 2019-08-14 PROCEDURE — 86300 IMMUNOASSAY TUMOR CA 15-3: CPT

## 2019-08-14 PROCEDURE — 82306 VITAMIN D 25 HYDROXY: CPT

## 2019-08-14 PROCEDURE — 36415 COLL VENOUS BLD VENIPUNCTURE: CPT

## 2019-08-14 PROCEDURE — 82728 ASSAY OF FERRITIN: CPT

## 2019-08-14 NOTE — PATIENT INSTRUCTIONS

## 2019-08-14 NOTE — PROGRESS NOTES
Hematology/Oncology  Progress Note    Name: Cresencio Epps  Date: 2019  : 1965    Malena Erwin MD     Ms. 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 Current therapy: Active surveillance; the patient has previously completed systemic chemotherapy and radiation treatment. Subjective:     Ms. Jamari Alejandra is a 59-year-old  woman with a history of triple-negative breast cancer, involving the left breast. The patient in the office today for follow up for anemia, thrombocytopenia, acute kidney injury, and sickle cell disease. At her last clinic visit she had an elevated WBC count of approximately 23.5. Her hemoglobin had slightly improved to 9.2 g/dL. Today she reports that she is doing a little better. However, she report fatigue and weakness are persisting but are improving. She is currently ambulating without the use of her walker or cane. Past medical history, family history, and social history: these were reviewed and remains unchanged.     Past Medical History:   Diagnosis Date    Anemia NEC     Arthritis     Chronic kidney disease     Chronic pain     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    Fatigue     Fibromyalgia     GERD (gastroesophageal reflux disease)     Hx of endometriosis     Hypertension 2011    Ill-defined condition 2018    Sickle cell crisis    Osteoarthritis of left hip 2016    Osteoarthritis of right hip 1/3/2017    Osteoarthritis of right knee 2018    Sickle cell anemia ([address]     Sleep apnea     Does not use CPAP    Thyroid disease     hypo     Past Surgical History:   Procedure Laterality Date    HX BREAST BIOPSY Left 2016    HX  SECTION      HX HERNIA REPAIR      HX KNEE REPLACEMENT Right     HX LAP CHOLECYSTECTOMY      HX MASTECTOMY Left 2012    with axillary lymph node dissection    TOTAL HIP ARTHROPLASTY Bilateral 2016 & 2017     Social History     Socioeconomic History    Marital status:      Spouse name: Not on file    Number of children: Not on file    Years of education: Not on file    Highest education level: Not on file   Occupational History    Not on file   Social Needs    Financial resource strain: Not on file    Food insecurity:     Worry: Not on file     Inability: Not on file    Transportation needs:     Medical: Not on file     Non-medical: Not on file   Tobacco Use    Smoking status: Former Smoker     Packs/day: 0.25     Years: 30.00     Pack years: 7.50     Last attempt to quit: 2011     Years since quittin.6    Smokeless tobacco: Never Used   Substance and Sexual Activity    Alcohol use: Not Currently     Comment: 2 times yearly    Drug use: No    Sexual activity: Not Currently   Lifestyle    Physical activity:     Days per week: Not on file     Minutes per session: Not on file    Stress: Not on file   Relationships    Social connections:     Talks on phone: Not on file     Gets together: Not on file     Attends Faith service: Not on file     Active member of club or organization: Not on file     Attends meetings of clubs or organizations: Not on file     Relationship status: Not on file    Intimate partner violence:     Fear of current or ex partner: Not on file     Emotionally abused: Not on file     Physically abused: Not on file     Forced sexual activity: Not on file   Other Topics Concern    Not on file   Social History Narrative    Not on file     Family History   Problem Relation Age of Onset    Cancer Mother         breast    Diabetes Mother     Heart Disease Father     Diabetes Father     Sickle Cell Anemia Brother     Stroke Neg Hx     Heart Attack Neg Hx      Current Outpatient Medications   Medication Sig Dispense Refill    oxyCODONE ER (OXYCONTIN) 10 mg ER tablet Take 10 mg by mouth every twelve (12) hours.       metoprolol tartrate (LOPRESSOR) 25 mg tablet Take 25 mg by mouth two (2) times a day.  dronabinol (MARINOL) 5 mg capsule Take 1 Cap by mouth two (2) times a day. Max Daily Amount: 10 mg. 30 Cap 0    famotidine (PEPCID) 20 mg tablet Take 1 Tab by mouth daily. 30 Tab 0    OTHER Incentive Spirometry- use as directed 1 Each 0    OTHER Check CBC, CMP, Mg in 4 days, results to PCP and Nephrologist and Dr Sari Van immediately, Diagnosis- MARIA ALEJANDRA 1 Each 0    thyroid, Pork, (ARMOUR THYROID) 30 mg tablet Take 30 mg by mouth daily.  naloxegol (MOVANTIK) 12.5 mg tab tablet Take 12.5 mg by mouth daily.  folic acid (FOLVITE) 1 mg tablet Take 1 mg by mouth daily. Review of Systems  Constitutional: The patient has no acute distress or discomfort. HEENT: The patient denies recent head trauma, eye pain, blurred vision,  hearing deficit, oropharyngeal mucosal pain or lesions, and the patient denies throat pain or discomfort. Chest wall: The patient complained of having several small nodular areas over her left anterior chest wall surgical site for which she is aware it may represent recurrent breast cancer. Lymphatics: The patient denies palpable peripheral lymphadenopathy. Hematologic: The patient denies having bruising, bleeding, report progressive fatigue. Respiratory: Patient denies having shortness of breath, cough, sputum production, fever, or dyspnea on exertion. Cardiovascular: The patient denies having leg pain, leg swelling, heart palpitations, chest permit, chest pain, or lightheadedness. The patient denies having dyspnea on exertion. Gastrointestinal: The patient denies having nausea, emesis, or diarrhea. The patient denies having any hematemesis or blood in the stool. Genitourinary: Patient denies having urinary urgency, frequency, or dysuria. The patient denies having blood in the urine.   Psychological: The patient denies having symptoms of nervousness, anxiety, depression, or thoughts of harming himself some of this. Skin: Patient denies having skin rashes, skin, ulcerations, or unexplained itching or pruritus. Musculoskeletal: She denies muscle or joint aches/pain. Objective:     Visit Vitals  /86   Pulse 92   Temp 98 °F (36.7 °C) (Oral)   Resp 18   Ht 5' 6\" (1.676 m)   Wt 83.9 kg (185 lb)   SpO2 100%   BMI 29.86 kg/m²     ECOG PS=0, pain score=0/10     Physical Exam:   Gen. Appearance: The patient is in no acute distress. Skin: There is no bruise or rash. Her skin is very dry  HEENT: The exam is unremarkable. Neck: Supple without lymphadenopathy or thyromegaly. Lungs: Clear to auscultation and percussion; there are no wheezes or rhonchi. Heart: Regular rate and rhythm; there are no murmurs, gallops, or rubs. Anterior chest wall and breast: The right breast shows no mass, nipple discharge, nipple retraction, or skin dimpling. The axilla reveals no palpable axillary lymphadenopathy. The left breast is surgically absent. There is some scarring over the left anterior rib cage and a few small nodular areas are noted over the anterior lateral upper chest wall is well. Abdomen: Bowel sounds are present and normal.  There is no guarding, tenderness, or hepatosplenomegaly. Extremities: There is no clubbing, cyanosis, or edema. Neurologic: There are no focal neurologic deficits. Lymphatics: There is no palpable peripheral lymphadenopathy. Musculoskeletal: The patient has right sided weakness, she is in wheelchair at this time. Psychological/psychiatric: There is no clinical evidence of anxiety, depression, or melancholy.     Lab data:      Results for orders placed or performed during the hospital encounter of 06/26/19   CBC WITH 3 PART DIFF     Status: Abnormal   Result Value Ref Range Status    WBC 13.9 (H) 4.5 - 13.0 K/uL Final    RBC 2.90 (L) 4.10 - 5.10 M/uL Final    HGB 9.2 (L) 12.0 - 16 g/dL Final    HCT 25.9 (L) 36 - 48 % Final    MCV 89.3 78 - 102 FL Final    MCH 31.7 25.0 - 35.0 PG Final    MCHC 35.5 31 - 37 g/dL Final    RDW 13.3 11.5 - 14.5 % Final    PLATELET 540 217 - 594 K/uL Final    NEUTROPHILS 67 40 - 70 % Final    MIXED CELLS 5 0.1 - 17 % Final    LYMPHOCYTES 28 14 - 44 % Final    ABS. NEUTROPHILS 9.4 1.8 - 9.5 K/UL Final    ABS. MIXED CELLS 0.6 0.0 - 2.3 K/uL Final    ABS. LYMPHOCYTES 3.9 1.1 - 5.9 K/UL Final     Comment: Test performed at [address] [place] [address] or [facility]patient [facility] Location. Reviewed by Medical Director. DF AUTOMATED   Final           Assessment:     1. Malignant neoplasm of lower-outer quadrant of left breast of female, estrogen receptor positive ([facility]    2. Sickle cell anemia with crisis ([facility]    3. Iron overload    4. Thrombocytopenia ([facility]    5. Lymphocytosis    6. Primary osteoarthritis involving multiple joints    7. Vitamin D deficiency      Plan:   Invasive ductal carcinoma of the left breast: The patient is status post modified radical mastectomy and continues to do well. Clinically there is no evidence of disease recurrence. Her most recent CA-27-29 level  was 37.8 units /ml  from 11/2/2018. This will be repeated today. Vitamin D deficiency: She previously completed a 12 week prescription of Ergocalciferol 50,000 units weekly. Her vitamin d will be check at this time. Sickle cell disease, SS: Clinically the patient is relatively stable at this time she has not had any recent pain crises. I have recommended that the patient continued to remain well-hydrated     Thrombocytopenia: I have explained to the patient that her CBC from 6/27/2019 shows that her platelet count is currently 160,000. Therapeutic intervention is not warranted unless her platelets decline below 15,000. Iron deficiency anemia: CBC done today is pending. The most recent test shows that her hemoglobin is remaining stable at 9.2 g/dL with hematocrit of 25.9%. Her WBC count has declined from 23.5 down to 13.9.   On 8/8/2019 the hemoglobin had improved to 10.8 g/dL with hematocrit of 32.3. The WBC had declined further to a normal level of 5.1. Iron overload (Presisting problem): At this time her Kelvin Merlos has been on hold due decrease in her renal function. Medication will remain on hold for her renal functions to improve. She will follow up with the nephrology. Once we get clearance from nephrology we will restart the chelation therapy. Lymphocytosis: The total WBC count was elevated at 13.9 from a previous level of 23.5 on 6/5/2019. The CBC from today is pending. However the CBC from 8/8/2019 showed that her white count had normalized to 5000. I will continue to see her in clinic in 8 weeks. Orders Placed This Encounter    CBC WITH AUTOMATED DIFF     Standing Status:   Future     Standing Expiration Date:   7/17/5648    METABOLIC PANEL, COMPREHENSIVE     Standing Status:   Future     Standing Expiration Date:   8/14/2020    IRON PROFILE     Standing Status:   Future     Standing Expiration Date:   8/14/2020    FERRITIN     Standing Status:   Future     Standing Expiration Date:   8/14/2020    CA 27.29     Standing Status:   Future     Standing Expiration Date:   8/14/2020    VITAMIN D, 25 HYDROXY     Standing Status:   Future     Standing Expiration Date:   8/14/2020       Mark Avila MD  5/22/2019      I have assessed the patient independently and  agree with the full assessment as outlined.   Piper Turner MD, Zeina Gaona

## 2019-08-15 LAB
25(OH)D3 SERPL-MCNC: 59.4 NG/ML (ref 30–100)
FERRITIN SERPL-MCNC: 2127 NG/ML (ref 8–388)
IRON SATN MFR SERPL: 47 %
IRON SERPL-MCNC: 100 UG/DL (ref 50–175)
TIBC SERPL-MCNC: 213 UG/DL (ref 250–450)

## 2019-08-16 LAB — CANCER AG27-29 SERPL-ACNC: 28.9 U/ML (ref 0–38.6)

## 2019-10-10 DIAGNOSIS — R00.2 PALPITATIONS: ICD-10-CM

## 2019-11-08 ENCOUNTER — OFFICE VISIT (OUTPATIENT)
Dept: CARDIOLOGY CLINIC | Age: 54
End: 2019-11-08

## 2019-11-08 VITALS
HEART RATE: 75 BPM | DIASTOLIC BLOOD PRESSURE: 82 MMHG | BODY MASS INDEX: 31.66 KG/M2 | WEIGHT: 197 LBS | HEIGHT: 66 IN | SYSTOLIC BLOOD PRESSURE: 140 MMHG

## 2019-11-08 DIAGNOSIS — R03.0 PREHYPERTENSION: ICD-10-CM

## 2019-11-08 DIAGNOSIS — I47.1 PAT (PAROXYSMAL ATRIAL TACHYCARDIA) (HCC): Primary | ICD-10-CM

## 2019-11-08 DIAGNOSIS — Z85.3 HISTORY OF CANCER OF LEFT BREAST: ICD-10-CM

## 2019-11-08 RX ORDER — ERGOCALCIFEROL 1.25 MG/1
50000 CAPSULE ORAL
COMMUNITY

## 2019-11-08 RX ORDER — HYDROMORPHONE HYDROCHLORIDE 4 MG/1
2 TABLET ORAL
Status: ON HOLD | COMMUNITY
End: 2020-07-21

## 2019-11-08 NOTE — PROGRESS NOTES
Patient brought medications list    1. Have you been to the ER, urgent care clinic since your last visit? Hospitalized since your last visit? No    2. Have you seen or consulted any other health care providers outside of the [address] [place] [address] since your last visit? Include any pap smears or colon screening. Yes Where: PCP Routine     3. Since your last visit, have you had any of the following symptoms?      palpitations. 4.  Have you had any blood work, X-rays or cardiac testing? Yes Where: PCP     Requested: NO     In [facility]: NO    5. Where do you normally have your labs drawn? PCP    6. Do you need any refills today?    NO

## 2019-11-08 NOTE — PATIENT INSTRUCTIONS
Medications Discontinued During This Encounter   Medication Reason    OTHER Error    OTHER Error    dronabinol (MARINOL) 5 mg capsule     metoprolol tartrate (LOPRESSOR) 25 mg tablet      After the recommended changes have been made in blood pressure medicines, patient advised to keep BP/HR(pulse rate) chart twice daily and bring us results in next 1 weeks or so. Patient may send the results via \"My Chart\" if desired. Please rest for 5-10 minutes before checking blood pressure       High Blood Pressure: Care Instructions  Overview    It's normal for blood pressure to go up and down throughout the day. But if it stays up, you have high blood pressure. Another name for high blood pressure is hypertension. Despite what a lot of people think, high blood pressure usually doesn't cause headaches or make you feel dizzy or lightheaded. It usually has no symptoms. But it does increase your risk of stroke, heart attack, and other problems. You and your doctor will talk about your risks of these problems based on your blood pressure. Your doctor will give you a goal for your blood pressure. Your goal will be based on your health and your age. Lifestyle changes, such as eating healthy and being active, are always important to help lower blood pressure. You might also take medicine to reach your blood pressure goal.  Follow-up care is a key part of your treatment and safety. Be sure to make and go to all appointments, and call your doctor if you are having problems. It's also a good idea to know your test results and keep a list of the medicines you take. How can you care for yourself at home? Medical treatment  · If you stop taking your medicine, your blood pressure will go back up. You may take one or more types of medicine to lower your blood pressure. Be safe with medicines. Take your medicine exactly as prescribed. Call your doctor if you think you are having a problem with your medicine.   · Talk to your doctor before you start taking aspirin every day. Aspirin can help certain people lower their risk of a heart attack or stroke. But taking aspirin isn't right for everyone, because it can cause serious bleeding. · See your doctor regularly. You may need to see the doctor more often at first or until your blood pressure comes down. · If you are taking blood pressure medicine, talk to your doctor before you take decongestants or anti-inflammatory medicine, such as ibuprofen. Some of these medicines can raise blood pressure. · Learn how to check your blood pressure at home. Lifestyle changes  · Stay at a healthy weight. This is especially important if you put on weight around the waist. Losing even 10 pounds can help you lower your blood pressure. · If your doctor recommends it, get more exercise. Walking is a good choice. Bit by bit, increase the amount you walk every day. Try for at least 30 minutes on most days of the week. You also may want to swim, bike, or do other activities. · Avoid or limit alcohol. Talk to your doctor about whether you can drink any alcohol. · Try to limit how much sodium you eat to less than 2,300 milligrams (mg) a day. Your doctor may ask you to try to eat less than 1,500 mg a day. · Eat plenty of fruits (such as bananas and oranges), vegetables, legumes, whole grains, and low-fat dairy products. · Lower the amount of saturated fat in your diet. Saturated fat is found in animal products such as milk, cheese, and meat. Limiting these foods may help you lose weight and also lower your risk for heart disease. · Do not smoke. Smoking increases your risk for heart attack and stroke. If you need help quitting, talk to your doctor about stop-smoking programs and medicines. These can increase your chances of quitting for good. When should you call for help? Call  911 anytime you think you may need emergency care.  This may mean having symptoms that suggest that your blood pressure is causing a serious heart or blood vessel problem. Your blood pressure may be over 180/120.   For example, call  911 if:    · You have symptoms of a heart attack. These may include:  ? Chest pain or pressure, or a strange feeling in the chest.  ? Sweating. ? Shortness of breath. ? Nausea or vomiting. ? Pain, pressure, or a strange feeling in the back, neck, jaw, or upper belly or in one or both shoulders or arms. ? Lightheadedness or sudden weakness. ? A fast or irregular heartbeat.     · You have symptoms of a stroke. These may include:  ? Sudden numbness, tingling, weakness, or loss of movement in your face, arm, or leg, especially on only one side of your body. ? Sudden vision changes. ? Sudden trouble speaking. ? Sudden confusion or trouble understanding simple statements. ? Sudden problems with walking or balance. ? A sudden, severe headache that is different from past headaches.     · You have severe back or belly pain.    Do not wait until your blood pressure comes down on its own. Get help right away.   Call your doctor now or seek immediate care if:    · Your blood pressure is much higher than normal (such as 180/120 or higher), but you don't have symptoms.     · You think high blood pressure is causing symptoms, such as:  ? Severe headache.  ? Blurry vision.    Watch closely for changes in your health, and be sure to contact your doctor if:    · Your blood pressure measures higher than your doctor recommends at least 2 times. That means the top number is higher or the bottom number is higher, or both.     · You think you may be having side effects from your blood pressure medicine. Where can you learn more? Go to http://beth-roman.info/. Enter L327 in the search box to learn more about \"High Blood Pressure: Care Instructions. \"  Current as of: April 9, 2019  Content Version: 12.2  © 0697-6893 [employer].  Care instructions adapted under license by [employer] (which disclaims liability or warranty for this information). If you have questions about a medical condition or this instruction, always ask your healthcare professional. [address] any warranty or liability for your use of this information.

## 2019-11-08 NOTE — PROGRESS NOTES
HISTORY OF PRESENT ILLNESS  Mariola Jonas is a 47 y.o. female. Palpitations    The history is provided by the patient. This is a new problem. The current episode started more than 1 week ago (5/19). The problem has been gradually improving. The problem occurs rarely (Once or twice every 3 to 4 weeks). The problem is associated with nothing. Associated symptoms include shortness of breath. Pertinent negatives include no fever, no malaise/fatigue, no chest pain, no claudication, no orthopnea, no PND, no nausea, no vomiting, no headaches, no dizziness and no cough. Shortness of Breath   The history is provided by the patient. This is a new problem. The problem occurs intermittently. The current episode started more than 1 week ago. The problem has not changed since onset. Pertinent negatives include no fever, no headaches, no cough, no wheezing, no PND, no orthopnea, no chest pain, no vomiting, no rash, no leg swelling and no claudication. The problem's precipitants include exercise (steps). Review of Systems   Constitutional: Negative for chills, fever, malaise/fatigue and weight loss. HENT: Negative for nosebleeds. Eyes: Negative for discharge. Respiratory: Positive for shortness of breath. Negative for cough and wheezing. Cardiovascular: Positive for palpitations. Negative for chest pain, orthopnea, claudication, leg swelling and PND. Gastrointestinal: Negative for diarrhea, nausea and vomiting. Genitourinary: Negative for dysuria and hematuria. Musculoskeletal: Negative for joint pain. Skin: Negative for rash. Neurological: Negative for dizziness, seizures, loss of consciousness and headaches. Endo/Heme/Allergies: Negative for polydipsia. Does not bruise/bleed easily. Psychiatric/Behavioral: Negative for depression and substance abuse. The patient does not have insomnia.       Allergies   Allergen Reactions    Neulasta [Pegfilgrastim] Other (comments)     \"Put me in a comma for 3 months\"       Past Medical History:   Diagnosis Date    Anemia NEC     Arthritis     Chronic kidney disease     Chronic pain     Ductal carcinoma [address] [address]     left breast    Fatigue     Fibromyalgia     GERD (gastroesophageal reflux disease)     Hx of endometriosis     Hypertension 2011    Ill-defined condition 2018    Sickle cell crisis    Osteoarthritis of left hip 2016    Osteoarthritis of right hip 1/3/2017    Osteoarthritis of right knee 2018    Sickle cell anemia ([facility] [address]     Sleep apnea     Does not use CPAP    Thyroid disease     hypo       Family History   Problem Relation Age of Onset    Cancer Mother         breast    Diabetes Mother     Heart Disease Father     Diabetes Father     Sickle Cell Anemia Brother     Stroke Neg Hx     Heart Attack Neg Hx        Social History     Tobacco Use    Smoking status: Former Smoker     Packs/day: 0.25     Years: 30.00     Pack years: 7.50     Last attempt to quit: 2011     Years since quittin.8    Smokeless tobacco: Never Used   Substance Use Topics    Alcohol use: Not Currently     Comment: 2 times yearly    Drug use: No        Current Outpatient Medications   Medication Sig    HYDROmorphone (DILAUDID) 4 mg tablet Take  by mouth every three (3) hours as needed for Pain.  ergocalciferol (VITAMIN D2) 50,000 unit capsule Take 50,000 Units by mouth every seven (7) days.  oxyCODONE ER (OXYCONTIN) 10 mg ER tablet Take 10 mg by mouth every twelve (12) hours.  famotidine (PEPCID) 20 mg tablet Take 1 Tab by mouth daily.  thyroid, Pork, (ARMOUR THYROID) 30 mg tablet Take 90 mg by mouth daily.  naloxegol (MOVANTIK) 12.5 mg tab tablet Take 12.5 mg by mouth daily.  folic acid (FOLVITE) 1 mg tablet Take 1 mg by mouth daily. No current facility-administered medications for this visit.          Past Surgical History:   Procedure Laterality Date    HX BREAST BIOPSY Left 2016    HX  SECTION      HX HERNIA REPAIR      HX KNEE REPLACEMENT Right     HX LAP CHOLECYSTECTOMY      HX MASTECTOMY Left 03/2012    with axillary lymph node dissection    TOTAL HIP ARTHROPLASTY Bilateral 2016 & 2017       Visit Vitals  /82   Pulse 75   Ht 5' 6\" (1.676 m)   Wt 89.4 kg (197 lb)   BMI 31.80 kg/m²       Diagnostic Studies:  I have reviewed the relevant tests done on the patient and show as follows  EKG tracings reviewed by me today. EKG Results     None        XR Results (most recent):  Results from Hospital Encounter encounter on 05/03/19   XR CHEST PORT    Narrative EXAM:  XR CHEST PORT    INDICATION:   HD catheter placement    COMPARISON: 5/3/2019 and priors    FINDINGS:     Interval placement of right jugular catheter with the tip projecting at the  right atrium. No pneumothorax. Hypoinflation. Mild enlargement of the cardiac  silhouette. Vascular congestion and vascular crowding. No pneumothorax or  pleural effusion. Streaky retrocardiac opacities. No acute osseous abnormality. Impression IMPRESSION:  1. No pneumothorax post right jugular catheter placement. 2. Hypoinflation and likely retrocardiac basilar atelectasis, infiltrate not  excluded. 10/19 echo  Interpretation Summary        · Left Ventricle: Normal cavity size and wall thickness. Low normal systolic dysfunction. Estimated left ventricular ejection fraction is 51 - 55%. No regional wall motion abnormality noted. Age-appropriate left ventricular diastolic function. · Right Ventricle: Normal right ventricular size and function. · Mitral Valve: Mitral valve thickening. Mild mitral valve regurgitation is present. · Pulmonary Artery: There is no evidence of pulmonary hypertension. 10/19 Holter monitor  Normal sinus rhythm, rare PVCs, occasional PACs including 4-5 beats of PAT. Total of 3-4 episodes of PAT in 24 hours     No flowsheet data found. Ms. Indira Sarabia has a reminder for a \"due or due soon\" health maintenance.  I have asked that she contact her primary care provider for follow-up on this health maintenance. Physical Exam   Constitutional: She is oriented to person, place, and time. She appears well-developed and well-nourished. No distress. obese   HENT:   Head: Normocephalic and atraumatic. Mouth/Throat: Normal dentition. Eyes: Right eye exhibits no discharge. Left eye exhibits no discharge. No scleral icterus. Neck: Neck supple. No JVD present. Carotid bruit is not present. No thyromegaly present. Cardiovascular: Regular rhythm, S1 normal, S2 normal, normal heart sounds and intact distal pulses. Tachycardia present. Exam reveals no gallop and no friction rub. No murmur heard. Pulmonary/Chest: Effort normal and breath sounds normal. She has no wheezes. She has no rales. Abdominal: Soft. She exhibits no mass. There is no tenderness. Musculoskeletal: She exhibits no edema. Lymphadenopathy:        Right cervical: No superficial cervical adenopathy present. Left cervical: No superficial cervical adenopathy present. Neurological: She is alert and oriented to person, place, and time. Skin: Skin is warm and dry. No rash noted. Psychiatric: She has a normal mood and affect. Her behavior is normal.       ASSESSMENT and PLAN    Palpitations are occasional but easily tolerated. Somehow she stopped metoprolol. Blood pressure is usually good at home but sometimes elevated to more than 130. Likely she has had pre-hypertension. Will get home chart and then decide on medications. Beta-blockers will be a good idea as they will help occasional PAT episodes that are noted on the Holter monitor as well as hypertension. Diagnoses and all orders for this visit:    1. PAT (paroxysmal atrial tachycardia) ([facility] [address]  Comments:  10/19 seen on Holter, 3-4 beat episodes    2. Prehypertension    3. History of cancer of left breast        Pertinent laboratory and test data reviewed and discussed with patient.   See patient instructions also for other medical advice given    Medications Discontinued During This Encounter   Medication Reason    OTHER Error    OTHER Error    dronabinol (MARINOL) 5 mg capsule     metoprolol tartrate (LOPRESSOR) 25 mg tablet        Follow-up and Dispositions    · Return in about 6 months (around 5/8/2020), or if symptoms worsen or fail to improve.

## 2019-11-15 ENCOUNTER — OFFICE VISIT (OUTPATIENT)
Dept: ONCOLOGY | Age: 54
End: 2019-11-15

## 2019-11-15 ENCOUNTER — HOSPITAL ENCOUNTER (OUTPATIENT)
Dept: ONCOLOGY | Age: 54
Discharge: HOME | End: 2019-11-15

## 2019-11-15 VITALS
BODY MASS INDEX: 30.37 KG/M2 | HEIGHT: 66 IN | RESPIRATION RATE: 16 BRPM | DIASTOLIC BLOOD PRESSURE: 77 MMHG | OXYGEN SATURATION: 99 % | TEMPERATURE: 98.1 F | WEIGHT: 189 LBS | SYSTOLIC BLOOD PRESSURE: 130 MMHG | HEART RATE: 93 BPM

## 2019-11-15 DIAGNOSIS — D72.820 LYMPHOCYTOSIS: ICD-10-CM

## 2019-11-15 DIAGNOSIS — Z17.0 MALIGNANT NEOPLASM OF LOWER-OUTER QUADRANT OF LEFT BREAST OF FEMALE, ESTROGEN RECEPTOR POSITIVE (HCC): Primary | ICD-10-CM

## 2019-11-15 DIAGNOSIS — E55.9 VITAMIN D DEFICIENCY: ICD-10-CM

## 2019-11-15 DIAGNOSIS — E83.19 IRON OVERLOAD: ICD-10-CM

## 2019-11-15 DIAGNOSIS — D50.8 IRON DEFICIENCY ANEMIA DUE TO DIETARY CAUSES: ICD-10-CM

## 2019-11-15 DIAGNOSIS — C50.512 MALIGNANT NEOPLASM OF LOWER-OUTER QUADRANT OF LEFT BREAST OF FEMALE, ESTROGEN RECEPTOR POSITIVE (HCC): ICD-10-CM

## 2019-11-15 DIAGNOSIS — D57.00 SICKLE CELL ANEMIA WITH CRISIS (HCC): ICD-10-CM

## 2019-11-15 DIAGNOSIS — D69.6 THROMBOCYTOPENIA (HCC): ICD-10-CM

## 2019-11-15 DIAGNOSIS — Z17.0 MALIGNANT NEOPLASM OF LOWER-OUTER QUADRANT OF LEFT BREAST OF FEMALE, ESTROGEN RECEPTOR POSITIVE (HCC): ICD-10-CM

## 2019-11-15 DIAGNOSIS — C50.512 MALIGNANT NEOPLASM OF LOWER-OUTER QUADRANT OF LEFT BREAST OF FEMALE, ESTROGEN RECEPTOR POSITIVE (HCC): Primary | ICD-10-CM

## 2019-11-15 LAB
BASO+EOS+MONOS # BLD AUTO: 0.2 K/UL (ref 0–2.3)
BASO+EOS+MONOS NFR BLD AUTO: 2 % (ref 0.1–17)
DIFFERENTIAL METHOD BLD: ABNORMAL
ERYTHROCYTE [DISTWIDTH] IN BLOOD BY AUTOMATED COUNT: 13.6 % (ref 11.5–14.5)
HCT VFR BLD AUTO: 30.2 % (ref 36–48)
HGB BLD-MCNC: 10.8 G/DL (ref 12–16)
LYMPHOCYTES # BLD: 3.2 K/UL (ref 1.1–5.9)
LYMPHOCYTES NFR BLD: 34 % (ref 14–44)
MCH RBC QN AUTO: 32.2 PG (ref 25–35)
MCHC RBC AUTO-ENTMCNC: 35.8 G/DL (ref 31–37)
MCV RBC AUTO: 90.1 FL (ref 78–102)
NEUTS SEG # BLD: 6 K/UL (ref 1.8–9.5)
NEUTS SEG NFR BLD: 64 % (ref 40–70)
PLATELET # BLD AUTO: 93 K/UL (ref 140–440)
RBC # BLD AUTO: 3.35 M/UL (ref 4.1–5.1)
WBC # BLD AUTO: 9.4 K/UL (ref 4.5–13)

## 2019-11-15 RX ORDER — DEFERASIROX 360 MG/1
TABLET, FILM COATED ORAL
Qty: 120 TAB | Refills: 2 | Status: SHIPPED | OUTPATIENT
Start: 2019-11-15 | End: 2020-01-03 | Stop reason: SDUPTHER

## 2019-11-15 NOTE — PROGRESS NOTES
Hematology/Oncology  Progress Note    Name: Gary Claire  Date: 11/15/2019  : 1965    Elver Hansen MD     Ms. 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 Current therapy: Active surveillance. The patient completed systemic chemotherapy and radiation treatment. Subjective:     Ms. Sally Turk is a 59-year-old On license of [facility] [employer] woman with a history of triple-negative breast cancer, involving the left breast. She was diagnosed more than 6 years ago. The patient presents today for follow up for anemia, thrombocytopenia, acute kidney injury, and sickle cell disease. She denies fatigue, shortness of breath, and weakness. She denies chest pain or dizziness. She denies pain or any discomfort. She denies fevers, infections, and lymphadenopathy. She denies bruising and bleeding. She does not have any concerns or complaints to report at this time. Past medical history, family history, and social history: these were reviewed and remains unchanged.     Past Medical History:   Diagnosis Date    Anemia NEC     Arthritis     Chronic kidney disease     Chronic pain     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    Fatigue     Fibromyalgia     GERD (gastroesophageal reflux disease)     Hx of endometriosis     Hypertension 2011    Ill-defined condition 2018    Sickle cell crisis    Osteoarthritis of left hip 2016    Osteoarthritis of right hip 1/3/2017    Osteoarthritis of right knee 2018    Sickle cell anemia ([address]     Sleep apnea     Does not use CPAP    Thyroid disease     hypo     Past Surgical History:   Procedure Laterality Date    HX BREAST BIOPSY Left 2016    HX  SECTION      HX HERNIA REPAIR      HX KNEE REPLACEMENT Right     HX LAP CHOLECYSTECTOMY      HX MASTECTOMY Left 2012    with axillary lymph node dissection    TOTAL HIP ARTHROPLASTY Bilateral 2016 &      Social History     Socioeconomic History    Marital status:      Spouse name: Not on file    Number of children: Not on file    Years of education: Not on file    Highest education level: Not on file   Occupational History    Not on file   Social Needs    Financial resource strain: Not on file    Food insecurity:     Worry: Not on file     Inability: Not on file    Transportation needs:     Medical: Not on file     Non-medical: Not on file   Tobacco Use    Smoking status: Former Smoker     Packs/day: 0.25     Years: 30.00     Pack years: 7.50     Last attempt to quit: 2011     Years since quittin.8    Smokeless tobacco: Never Used   Substance and Sexual Activity    Alcohol use: Not Currently     Comment: 2 times yearly    Drug use: No    Sexual activity: Not Currently   Lifestyle    Physical activity:     Days per week: Not on file     Minutes per session: Not on file    Stress: Not on file   Relationships    Social connections:     Talks on phone: Not on file     Gets together: Not on file     Attends Protestant service: Not on file     Active member of club or organization: Not on file     Attends meetings of clubs or organizations: Not on file     Relationship status: Not on file    Intimate partner violence:     Fear of current or ex partner: Not on file     Emotionally abused: Not on file     Physically abused: Not on file     Forced sexual activity: Not on file   Other Topics Concern    Not on file   Social History Narrative    Not on file     Family History   Problem Relation Age of Onset    Cancer Mother         breast    Diabetes Mother     Heart Disease Father     Diabetes Father     Sickle Cell Anemia Brother     Stroke Neg Hx     Heart Attack Neg Hx      Current Outpatient Medications   Medication Sig Dispense Refill    HYDROmorphone (DILAUDID) 4 mg tablet Take  by mouth every three (3) hours as needed for Pain.       ergocalciferol (VITAMIN D2) 50,000 unit capsule Take 50,000 Units by mouth every seven (7) days.  oxyCODONE ER (OXYCONTIN) 10 mg ER tablet Take 10 mg by mouth every twelve (12) hours.  famotidine (PEPCID) 20 mg tablet Take 1 Tab by mouth daily. 30 Tab 0    thyroid, Pork, (ARMOUR THYROID) 30 mg tablet Take 90 mg by mouth daily.  naloxegol (MOVANTIK) 12.5 mg tab tablet Take 12.5 mg by mouth daily.  folic acid (FOLVITE) 1 mg tablet Take 1 mg by mouth daily. Review of Systems  Constitutional: The patient has no acute distress or discomfort. HEENT: The patient denies recent head trauma, eye pain, blurred vision,  hearing deficit, oropharyngeal mucosal pain or lesions, and the patient denies throat pain or discomfort. Chest wall: The patient complained of having several small nodular areas over her left anterior chest wall surgical site for which she is aware it may represent recurrent breast cancer. Lymphatics: The patient denies palpable peripheral lymphadenopathy. Hematologic: The patient denies having bruising, bleeding, report progressive fatigue. Respiratory: Patient denies having shortness of breath, cough, sputum production, fever, or dyspnea on exertion. Cardiovascular: The patient denies having leg pain, leg swelling, heart palpitations, chest permit, chest pain, or lightheadedness. The patient denies having dyspnea on exertion. Gastrointestinal: The patient denies having nausea, emesis, or diarrhea. The patient denies having any hematemesis or blood in the stool. Genitourinary: Patient denies having urinary urgency, frequency, or dysuria. The patient denies having blood in the urine. Psychological: The patient denies having symptoms of nervousness, anxiety, depression, or thoughts of harming himself some of this. Skin: Patient denies having skin rashes, skin, ulcerations, or unexplained itching or pruritus. Musculoskeletal: She denies muscle or joint aches/pain.       Objective:     Visit Vitals  BP 130/77   Pulse 93   Temp 98.1 °F (36.7 °C) (Oral)   Resp 16   Ht 5' 6\" (1.676 m)   Wt 85.7 kg (189 lb)   SpO2 99%   BMI 30.51 kg/m²     ECOG PS=0, pain score=0/10     Physical Exam:   Gen. Appearance: The patient is in no acute distress. Skin: There is no bruise or rash. Her skin is very dry  HEENT: The exam is unremarkable. Neck: Supple without lymphadenopathy or thyromegaly. Lungs: Clear to auscultation and percussion; there are no wheezes or rhonchi. Heart: Regular rate and rhythm; there are no murmurs, gallops, or rubs. Anterior chest wall and breast: The right breast shows no mass, nipple discharge, nipple retraction, or skin dimpling. The axilla reveals no palpable axillary lymphadenopathy. The left breast is surgically absent. There is some scarring over the left anterior rib cage and a few small nodular areas are noted over the anterior lateral upper chest wall is well. Abdomen: Bowel sounds are present and normal.  There is no guarding, tenderness, or hepatosplenomegaly. Extremities: There is no clubbing, cyanosis, or edema. Neurologic: There are no focal neurologic deficits. Lymphatics: There is no palpable peripheral lymphadenopathy. Musculoskeletal: The patient has right sided weakness, she is in wheelchair at this time. Psychological/psychiatric: There is no clinical evidence of anxiety, depression, or melancholy.     Lab data:      Results for orders placed or performed during the hospital encounter of 11/15/19   CBC WITH 3 PART DIFF     Status: Abnormal   Result Value Ref Range Status    WBC 9.4 4.5 - 13.0 K/uL Final    RBC 3.35 (L) 4.10 - 5.10 M/uL Final    HGB 10.8 (L) 12.0 - 16.0 g/dL Final    HCT 30.2 (L) 36 - 48 % Final    MCV 90.1 78 - 102 FL Final    MCH 32.2 25.0 - 35.0 PG Final    MCHC 35.8 31 - 37 g/dL Final    RDW 13.6 11.5 - 14.5 % Final    PLATELET 93 (L) 455 - 440 K/uL Final    NEUTROPHILS 64 40 - 70 % Final    MIXED CELLS 2 0.1 - 17 % Final    LYMPHOCYTES 34 14 - 44 % Final ABS. NEUTROPHILS 6.0 1.8 - 9.5 K/UL Final    ABS. MIXED CELLS 0.2 0.0 - 2.3 K/uL Final    ABS. LYMPHOCYTES 3.2 1.1 - 5.9 K/UL Final     Comment: Test performed at [address] [place] [address] or Outpatient Infusion Center Location. Reviewed by Medical Director. DF AUTOMATED   Final           Assessment:     1. Malignant neoplasm of lower-outer quadrant of left breast of female, estrogen receptor positive ([facility] [address]    2. Iron deficiency anemia due to dietary causes    3. Vitamin D deficiency    4. Sickle cell anemia with crisis ([facility] [address]    5. Thrombocytopenia ([facility] [address]    6. Iron overload    7. Lymphocytosis      Plan:   Invasive ductal carcinoma of the left breast: The patient is status post modified radical mastectomy and continues to do well. Clinically there is no evidence of disease recurrence. Her most recent CA-27-29 level  was 38.9U/mL  From 08/2019. CA27-29 and CMP will be obtained at this time. Iron deficiency anemia: CBC today shows that her hemoglobin is stable at  10.8g/dL with hematocrit of 30.2%. Her WBC count is normal at 9.4K/uL and her Platelet count is at 93,000. Vitamin D deficiency: She completed a 12 week prescription of Ergocalciferol 50,000units PO weekly. Her most recent Vitamin D level was normal at 59.4ng/mL. Vitamin D will be obtained at this time. Sickle cell disease, SS: Clinically the patient is relatively stable at this time she has not had any recent pain crises. I have recommended that the patient continued to remain well-hydrated and dress appropriately depending on the weather. Thrombocytopenia: I have explained to the patient that her CBC today shows that her platelet count is currently at 93,000. Therapeutic intervention is not warranted unless her platelets decline below 15,000. Iron overload (Presisting problem): Her Valeria Lipps was held due to her renal function. On 8/2019 her BUN and creatine were back to normal limits.  BUN at 8 with creatinine of 1. 03. At this time I recommended to restart Jadenu 360mg, with instructions to take 2 tablets PO daily. Iron Profile and Ferritin level will be obtained at this time. Lymphocytosis: Today her WBC count is normal at 9.4K/uL, absolute nuetophil count is normal at 6.0K/uL, and absolute lymphocyte count of 3.2K/uL    Follow up in 8 weeks or sooner if indicated. Orders Placed This Encounter    COMPLETE CBC & AUTO DIFF WBC    InHouse CBC ([employer])     Standing Status:   Future     Number of Occurrences:   1     Standing Expiration Date:   11/22/2019    IRON PROFILE     Standing Status:   Future     Standing Expiration Date:   10/44/9039    METABOLIC PANEL, COMPREHENSIVE     Standing Status:   Future     Standing Expiration Date:   11/15/2020    FERRITIN     Standing Status:   Future     Standing Expiration Date:   11/15/2020    VITAMIN D, 25 HYDROXY     Standing Status:   Future     Standing Expiration Date:   11/15/2020    CA 27.29     Standing Status:   Future     Standing Expiration Date:   11/15/2020       Elle Plaza NP  11/15/2019    I have assessed the patient independently and  agree with the full assessment as outlined.   Sandi Vazquez MD, Ricki Fernandes

## 2019-11-18 LAB
25(OH)D3+25(OH)D2 SERPL-MCNC: 42.3 NG/ML (ref 30–100)
ALBUMIN SERPL-MCNC: 4.6 G/DL (ref 3.5–5.5)
ALBUMIN/GLOB SERPL: 1.9 {RATIO} (ref 1.2–2.2)
ALP SERPL-CCNC: 132 IU/L (ref 39–117)
ALT SERPL-CCNC: 5 IU/L (ref 0–32)
AST SERPL-CCNC: 9 IU/L (ref 0–40)
BILIRUB SERPL-MCNC: 1.3 MG/DL (ref 0–1.2)
BUN SERPL-MCNC: 11 MG/DL (ref 6–24)
BUN/CREAT SERPL: 10 (ref 9–23)
CALCIUM SERPL-MCNC: 9.5 MG/DL (ref 8.7–10.2)
CANCER AG27-29 SERPL-ACNC: 31.4 U/ML (ref 0–38.6)
CHLORIDE SERPL-SCNC: 104 MMOL/L (ref 96–106)
CO2 SERPL-SCNC: 20 MMOL/L (ref 20–29)
CREAT SERPL-MCNC: 1.15 MG/DL (ref 0.57–1)
FERRITIN SERPL-MCNC: 2371 NG/ML (ref 15–150)
GLOBULIN SER CALC-MCNC: 2.4 G/DL (ref 1.5–4.5)
GLUCOSE SERPL-MCNC: 87 MG/DL (ref 65–99)
IRON SATN MFR SERPL: 41 % (ref 15–55)
IRON SERPL-MCNC: 85 UG/DL (ref 27–159)
POTASSIUM SERPL-SCNC: 3.9 MMOL/L (ref 3.5–5.2)
PROT SERPL-MCNC: 7 G/DL (ref 6–8.5)
SODIUM SERPL-SCNC: 143 MMOL/L (ref 134–144)
SPECIMEN STATUS REPORT, ROLRST: NORMAL
TIBC SERPL-MCNC: 207 UG/DL (ref 250–450)
UIBC SERPL-MCNC: 122 UG/DL (ref 131–425)

## 2019-11-20 NOTE — PROGRESS NOTES
Her Rabia Spaniel was held due to her renal function. On 8/2019 her BUN and creatine were back to normal limits. BUN at 8 with creatinine of 1.03. At this time I recommended to restart Jadenu 360mg, with instructions to take 2 tablets PO daily.

## 2019-12-16 NOTE — ROUTINE PROCESS
Assumed care of patient from off going nurse. Patient resting in bed. No distress noted. Call bell within reach, siderails up x 3, bed in lowest position, and patient instructed to use call bell for assistance. Will continue to monitor. 2230 1 unit of PRBCs hung at this time. Consent, patient information and blood product verified with 2nd nurse, Olivia Gutierrez RN. Patient educated and instructed to report any unusual feelings such as CP, flank pain, shortness of breath, nausea, chills, warm sensation, etc. Will stay in room with patient the first 15 minutes to monitor for any signs of reaction. 2245 Patient tolerating well. No s/s of transfusion reaction noted. Will continue to monitor. 0114 Transfusion completed. Patient tolerated well. No s/s of post transfusion action. Patient Vitals for the past 8 hrs:   Temp Pulse Resp BP SpO2   10/22/18 0116 98.8 °F (37.1 °C) 73 15 154/85 100 %   10/22/18 0032 98.3 °F (36.8 °C) 64 17 144/77 99 %   10/21/18 2328 98.9 °F (37.2 °C) 70 17 159/89 100 %   10/21/18 2246 98.2 °F (36.8 °C) 65 17 152/87 --   10/21/18 2227 98.5 °F (36.9 °C) 68 16 153/88 --   10/21/18 1921 98.1 °F (36.7 °C) 76 19 151/85 100 %       0723 Bedside and Verbal shift change report given to [facility] and [address] [place] [address] (oncoming nurse) by Elinor Bell RN(offgoing nurse). Report included the following information Kardex, Intake/Output, MAR, Recent Results and Cardiac Rhythm SR tele box#[id number]. Home

## 2020-01-03 RX ORDER — DEFERASIROX 360 MG/1
TABLET, FILM COATED ORAL
Qty: 120 TAB | Refills: 2 | Status: SHIPPED | OUTPATIENT
Start: 2020-01-03 | End: 2022-05-06 | Stop reason: CLARIF

## 2020-01-14 ENCOUNTER — DOCUMENTATION ONLY (OUTPATIENT)
Dept: ONCOLOGY | Age: 55
End: 2020-01-14

## 2020-01-14 NOTE — PROGRESS NOTES
Patient selected reschedule with reminder call. Contacted patient to confirm needing to r/s. Pt will have to call back since no longer drives. Patient will need to get with daughter for her schedule.

## 2020-05-21 ENCOUNTER — DOCUMENTATION ONLY (OUTPATIENT)
Dept: NEPHROLOGY | Age: 55
End: 2020-05-21

## 2020-05-22 ENCOUNTER — VIRTUAL VISIT (OUTPATIENT)
Dept: CARDIOLOGY CLINIC | Age: 55
End: 2020-05-22

## 2020-05-22 ENCOUNTER — TELEPHONE (OUTPATIENT)
Dept: CARDIOLOGY CLINIC | Age: 55
End: 2020-05-22

## 2020-05-22 VITALS
DIASTOLIC BLOOD PRESSURE: 85 MMHG | BODY MASS INDEX: 31.66 KG/M2 | WEIGHT: 197 LBS | SYSTOLIC BLOOD PRESSURE: 132 MMHG | HEIGHT: 66 IN | HEART RATE: 68 BPM

## 2020-05-22 DIAGNOSIS — Z85.3 HISTORY OF CANCER OF LEFT BREAST: ICD-10-CM

## 2020-05-22 DIAGNOSIS — R03.0 PREHYPERTENSION: ICD-10-CM

## 2020-05-22 DIAGNOSIS — I47.1 PAT (PAROXYSMAL ATRIAL TACHYCARDIA) (HCC): Primary | ICD-10-CM

## 2020-05-22 NOTE — PATIENT INSTRUCTIONS
Medications Discontinued During This Encounter Medication Reason  naloxegol (MOVANTIK) 12.5 mg tab tablet Discontinued by Another Clinician After the recommended changes have been made in blood pressure medicines, patient advised to keep BP/HR(pulse rate) chart twice daily and bring us results in next 1 week or so. Patient may send the results via \"My Chart\" if desired. Please rest for 5-10 minutes before checking blood pressure. Sit on a comfortable chair without crossing the legs and put your arm on a table. We recommend that you use an upper arm cuff. Check the blood pressure 3 times weekly and take the lowest reading. If you check the blood pressure in both arms, use the higher reading. Body Mass Index: Care Instructions Your Care Instructions Body mass index (BMI) can help you see if your weight is raising your risk for health problems. It uses a formula to compare how much you weigh with how tall you are. · A BMI lower than 18.5 is considered underweight. · A BMI between 18.5 and 24.9 is considered healthy. · A BMI between 25 and 29.9 is considered overweight. A BMI of 30 or higher is considered obese. If your BMI is in the normal range, it means that you have a lower risk for weight-related health problems. If your BMI is in the overweight or obese range, you may be at increased risk for weight-related health problems, such as high blood pressure, heart disease, stroke, arthritis or joint pain, and diabetes. If your BMI is in the underweight range, you may be at increased risk for health problems such as fatigue, lower protection (immunity) against illness, muscle loss, bone loss, hair loss, and hormone problems. BMI is just one measure of your risk for weight-related health problems. You may be at higher risk for health problems if you are not active, you eat an unhealthy diet, or you drink too much alcohol or use tobacco products. Follow-up care is a key part of your treatment and safety. Be sure to make and go to all appointments, and call your doctor if you are having problems. It's also a good idea to know your test results and keep a list of the medicines you take. How can you care for yourself at home? · Practice healthy eating habits. This includes eating plenty of fruits, vegetables, whole grains, lean protein, and low-fat dairy. · If your doctor recommends it, get more exercise. Walking is a good choice. Bit by bit, increase the amount you walk every day. Try for at least 30 minutes on most days of the week. · Do not smoke. Smoking can increase your risk for health problems. If you need help quitting, talk to your doctor about stop-smoking programs and medicines. These can increase your chances of quitting for good. · Limit alcohol to 2 drinks a day for men and 1 drink a day for women. Too much alcohol can cause health problems. If you have a BMI higher than 25 · Your doctor may do other tests to check your risk for weight-related health problems. This may include measuring the distance around your waist. A waist measurement of more than 40 inches in men or 35 inches in women can increase the risk of weight-related health problems. · Talk with your doctor about steps you can take to stay healthy or improve your health. You may need to make lifestyle changes to lose weight and stay healthy, such as changing your diet and getting regular exercise. If you have a BMI lower than 18.5 · Your doctor may do other tests to check your risk for health problems. · Talk with your doctor about steps you can take to stay healthy or improve your health. You may need to make lifestyle changes to gain or maintain weight and stay healthy, such as getting more healthy foods in your diet and doing exercises to build muscle. Where can you learn more? Go to http://beth-roman.info/ Enter S176 in the search box to learn more about \"Body Mass Index: Care Instructions. \" Current as of: December 10, 2019Content Version: 12.4 © 1594-7724 [employer]. Care instructions adapted under license by [employer] (which disclaims liability or warranty for this information). If you have questions about a medical condition or this instruction, always ask your healthcare professional. [place] [address] any warranty or liability for your use of this information.

## 2020-05-22 NOTE — PROGRESS NOTES
1. Have you been to the ER, urgent care clinic since your last visit? Hospitalized since your last visit? No    2. Have you seen or consulted any other health care providers outside of the [address] [place] [address] since your last visit? Include any pap smears or colon screening. Yes Where: Oncology Routine/   PCP  Routine  / Nephrology Routine     3. Since your last visit, have you had any of the following symptoms? No    4. Have you had any blood work, X-rays or cardiac testing? Yes Where: [facility]      Requested: NO     In [facility]: YES    5. Where do you normally have your labs drawn? [facility]    6. Do you need any refills today?    No

## 2020-05-22 NOTE — PROGRESS NOTES
HISTORY OF PRESENT ILLNESS  Jarett Diaz is a 47 y.o. female. Jarett Diaz is a 47 y.o. female who was seen by synchronous (real-time) audio-video technology on 5/22/2020. Consent:  She and/or her healthcare decision maker is aware that this patient-initiated Telehealth encounter is a billable service, with coverage as determined by her insurance carrier. She is aware that she may receive a bill and has provided verbal consent to proceed: Yes    I was in the office while conducting this encounter. Palpitations    The history is provided by the patient. This is a new problem. The current episode started more than 1 week ago (5/19). The problem has been resolved. The problem occurs rarely (Once or twice every 3 to 4 weeks). The problem is associated with nothing. Pertinent negatives include no fever, no malaise/fatigue, no chest pain, no claudication, no orthopnea, no PND, no nausea, no vomiting, no headaches, no dizziness, no cough and no shortness of breath. Shortness of Breath   The history is provided by the patient. This is a new problem. The problem occurs intermittently. The current episode started more than 1 week ago. The problem has been resolved. Pertinent negatives include no fever, no headaches, no cough, no wheezing, no PND, no orthopnea, no chest pain, no vomiting, no rash, no leg swelling and no claudication. The problem's precipitants include exercise (steps). Review of Systems   Constitutional: Negative for chills, fever, malaise/fatigue and weight loss. HENT: Negative for nosebleeds. Eyes: Negative for discharge. Respiratory: Negative for cough, shortness of breath and wheezing. Cardiovascular: Negative for chest pain, palpitations, orthopnea, claudication, leg swelling and PND. Gastrointestinal: Negative for diarrhea, nausea and vomiting. Genitourinary: Negative for dysuria and hematuria. Musculoskeletal: Negative for joint pain. Skin: Negative for rash. Neurological: Negative for dizziness, seizures, loss of consciousness and headaches. Endo/Heme/Allergies: Negative for polydipsia. Does not bruise/bleed easily. Psychiatric/Behavioral: Negative for depression and substance abuse. The patient does not have insomnia. Allergies   Allergen Reactions    Neulasta [Pegfilgrastim] Other (comments)     \"Put me in a comma for 3 months\"       Past Medical History:   Diagnosis Date    Anemia NEC     Arthritis     Chronic kidney disease     Chronic pain     Ductal carcinoma ([facility] [address]     left breast    Fatigue     Fibromyalgia     GERD (gastroesophageal reflux disease)     Hx of endometriosis     Hypertension 2011    Ill-defined condition 2018    Sickle cell crisis    Osteoarthritis of left hip 2016    Osteoarthritis of right hip 1/3/2017    Osteoarthritis of right knee 2018    Sickle cell anemia ([facility] [address]     Sleep apnea     Does not use CPAP    Thyroid disease     hypo       Family History   Problem Relation Age of Onset    Cancer Mother         breast    Diabetes Mother     Heart Disease Father     Diabetes Father     Sickle Cell Anemia Brother     Stroke Neg Hx     Heart Attack Neg Hx        Social History     Tobacco Use    Smoking status: Former Smoker     Packs/day: 0.25     Years: 30.00     Pack years: 7.50     Last attempt to quit: 2011     Years since quittin.3    Smokeless tobacco: Never Used   Substance Use Topics    Alcohol use: Not Currently     Comment: 2 times yearly    Drug use: No        Current Outpatient Medications   Medication Sig    deferasirox (JADENU) 360 mg tablet Take 2 tabs by mouth daily.  HYDROmorphone (DILAUDID) 4 mg tablet Take 2 mg by mouth every six (6) hours as needed for Pain.  ergocalciferol (VITAMIN D2) 50,000 unit capsule Take 50,000 Units by mouth every seven (7) days.  oxyCODONE ER (OXYCONTIN) 10 mg ER tablet Take 10 mg by mouth every twelve (12) hours.     famotidine (PEPCID) 20 mg tablet Take 1 Tab by mouth daily.  thyroid, Pork, (ARMOUR THYROID) 30 mg tablet Take 90 mg by mouth daily.  folic acid (FOLVITE) 1 mg tablet Take 1 mg by mouth daily. No current facility-administered medications for this visit. Past Surgical History:   Procedure Laterality Date    HX BREAST BIOPSY Left 2016    HX  SECTION      HX HERNIA REPAIR      HX KNEE REPLACEMENT Right     HX LAP CHOLECYSTECTOMY      HX MASTECTOMY Left 2012    with axillary lymph node dissection    TOTAL HIP ARTHROPLASTY Bilateral 2016 & 2017       Visit Vitals  /85   Pulse 68   Ht 5' 6\" (1.676 m)   Wt 89.4 kg (197 lb)   BMI 31.80 kg/m²       Diagnostic Studies:  I have reviewed the relevant tests done on the patient and show as follows  EKG tracings reviewed by me today. EKG Results     None        XR Results (most recent):  Results from Hospital Encounter encounter on 19   XR CHEST PORT    Narrative EXAM:  XR CHEST PORT    INDICATION:   HD catheter placement    COMPARISON: 5/3/2019 and priors    FINDINGS:     Interval placement of right jugular catheter with the tip projecting at the  right atrium. No pneumothorax. Hypoinflation. Mild enlargement of the cardiac  silhouette. Vascular congestion and vascular crowding. No pneumothorax or  pleural effusion. Streaky retrocardiac opacities. No acute osseous abnormality. Impression IMPRESSION:  1. No pneumothorax post right jugular catheter placement. 2. Hypoinflation and likely retrocardiac basilar atelectasis, infiltrate not  excluded. 10/19 echo  Interpretation Summary        · Left Ventricle: Normal cavity size and wall thickness. Low normal systolic dysfunction. Estimated left ventricular ejection fraction is 51 - 55%. No regional wall motion abnormality noted. Age-appropriate left ventricular diastolic function. · Right Ventricle: Normal right ventricular size and function. · Mitral Valve: Mitral valve thickening. Mild mitral valve regurgitation is present. · Pulmonary Artery: There is no evidence of pulmonary hypertension. 10/19 Holter monitor  Normal sinus rhythm, rare PVCs, occasional PACs including 4-5 beats of PAT. Total of 3-4 episodes of PAT in 24 hours     No flowsheet data found. Ms. Rosemarie Lowry has a reminder for a \"due or due soon\" health maintenance. I have asked that she contact her primary care provider for follow-up on this health maintenance. Physical Exam   Constitutional: She is oriented to person, place, and time. She appears well-developed and well-nourished. No distress. obese   HENT:   Head: Normocephalic and atraumatic. Mouth/Throat: Normal dentition. Eyes: Right eye exhibits no discharge. Left eye exhibits no discharge. No scleral icterus. Neck: Neck supple. No JVD present. Carotid bruit is not present. No thyromegaly present. Cardiovascular: Regular rhythm, S1 normal, S2 normal, normal heart sounds and intact distal pulses. Tachycardia present. Exam reveals no gallop and no friction rub. No murmur heard. Pulmonary/Chest: Effort normal and breath sounds normal. She has no wheezes. She has no rales. Abdominal: Soft. She exhibits no mass. There is no abdominal tenderness. Musculoskeletal:         General: No edema. Lymphadenopathy:        Right cervical: No superficial cervical adenopathy present. Left cervical: No superficial cervical adenopathy present. Neurological: She is alert and oriented to person, place, and time. Skin: Skin is warm and dry. No rash noted. Psychiatric: She has a normal mood and affect. Her behavior is normal.       ASSESSMENT and PLAN    Weight loss has been strongly encouraged by following dietary restrictions and an exercise routine. Palpitations have resolved. Shortness of breath is also resolved. She is not taking any cardiac medications. But is started walking more and has felt better.   Blood pressure is usually good at home but sometimes elevated to more than 130. Likely she has had pre-hypertension. Will get home chart and then decide on medications. Beta-blockers will be a good idea as they will help occasional PAT episodes that are noted on the Holter monitor as well as hypertension. Diagnoses and all orders for this visit:    1. PAT (paroxysmal atrial tachycardia) ([facility] [address]    2. Prehypertension    3. History of cancer of left breast        Pertinent laboratory and test data reviewed and discussed with patient. See patient instructions also for other medical advice given    Medications Discontinued During This Encounter   Medication Reason    naloxegol (MOVANTIK) 12.5 mg tab tablet Discontinued by Another Clinician       Follow-up and Dispositions    · Return in about 1 year (around 5/22/2021), or if symptoms worsen or fail to improve, for with ekg. Vital Signs: (As obtained by patient/caregiver at home)  Visit Vitals  /85   Pulse 68   Ht 5' 6\" (1.676 m)   Wt 89.4 kg (197 lb)   BMI 31.80 kg/m²          Other pertinent observable physical exam findings:-      We discussed the expected course, resolution and complications of the diagnosis(es) in detail. Medication risks, benefits, costs, interactions, and alternatives were discussed as indicated. I advised her to contact the office if her condition worsens, changes or fails to improve as anticipated. She expressed understanding with the diagnosis(es) and plan. Pursuant to the emergency declaration under the [facility] waiver authority and the [employer] and [employer] General Act, this Virtual  Visit was conducted, with patient's consent, to reduce the patient's risk of exposure to COVID-19 and provide continuity of care for an established patient. Services were provided through a video synchronous discussion virtually to substitute for in-person clinic visit.     Malika Delacruz MD

## 2020-06-22 NOTE — PROGRESS NOTES
Bedside and Verbal shift change report given to [address] [place] [address] (oncoming nurse) by this nurse (offgoing nurse). Report included the following information SBAR, Kardex and Cardiac Rhythm NSR. Patient Instructions:  It was a pleasure to see you in the cardiology clinic today.      If you have any questions, you can reach my nurse, Selena CORBETT LPN, at [phone].  Press Option #1 for the [facility], and then press Option #4 for nursing.    We are encouraging the use of SNSplust to communicate with your HealthCare Provider    Medication Changes:   - Reduce spironolactone to 0.5 tablet (25 mg) every day.  - If blood pressure is below 120 mmHg, decrease lisinopril-hydrochlorothiazide to 0.5 tablet (10 mg -12.5 mg).  Contact clinic if this occurs.  - Stop Aspirin for a few days to see if your GI upset improves.  Contact clinic to update on status of symptoms after this time.  - Continue Plavix    Recommendations:   - Complete non-fasting metabolic panel in one week.  - Continue to monitor your home blood pressures and pulse daily.      Studies Ordered: Echocardiogram in about one week.    The results from today include: Labs.    Please follow up: With Dr. Peace based on results of testing.    Sincerely,    Milan Peace MD     If you have an urgent need after hours (8:00 am to 4:30 pm) please call [phone] and ask for the cardiology fellow on call.

## 2020-07-20 ENCOUNTER — ANESTHESIA EVENT (OUTPATIENT)
Dept: ENDOSCOPY | Age: 55
End: 2020-07-20
Payer: COMMERCIAL

## 2020-07-21 ENCOUNTER — HOSPITAL ENCOUNTER (OUTPATIENT)
Age: 55
Setting detail: OUTPATIENT SURGERY
Discharge: HOME | End: 2020-07-21
Attending: INTERNAL MEDICINE | Admitting: INTERNAL MEDICINE
Payer: COMMERCIAL

## 2020-07-21 ENCOUNTER — ANESTHESIA (OUTPATIENT)
Dept: ENDOSCOPY | Age: 55
End: 2020-07-21
Payer: COMMERCIAL

## 2020-07-21 VITALS
SYSTOLIC BLOOD PRESSURE: 145 MMHG | TEMPERATURE: 96.8 F | HEIGHT: 66 IN | BODY MASS INDEX: 34.72 KG/M2 | DIASTOLIC BLOOD PRESSURE: 73 MMHG | OXYGEN SATURATION: 100 % | WEIGHT: 216 LBS | HEART RATE: 72 BPM | RESPIRATION RATE: 16 BRPM

## 2020-07-21 PROCEDURE — 76060000031 HC ANESTHESIA FIRST 0.5 HR: Performed by: INTERNAL MEDICINE

## 2020-07-21 PROCEDURE — 76040000019: Performed by: INTERNAL MEDICINE

## 2020-07-21 PROCEDURE — 74011000250 HC RX REV CODE- 250: Performed by: NURSE ANESTHETIST, CERTIFIED REGISTERED

## 2020-07-21 PROCEDURE — 74011250636 HC RX REV CODE- 250/636: Performed by: ANESTHESIOLOGY

## 2020-07-21 PROCEDURE — 74011250636 HC RX REV CODE- 250/636: Performed by: NURSE ANESTHETIST, CERTIFIED REGISTERED

## 2020-07-21 RX ORDER — PROPOFOL 10 MG/ML
INJECTION, EMULSION INTRAVENOUS AS NEEDED
Status: DISCONTINUED | OUTPATIENT
Start: 2020-07-21 | End: 2020-07-21 | Stop reason: HOSPADM

## 2020-07-21 RX ORDER — SODIUM CHLORIDE, SODIUM LACTATE, POTASSIUM CHLORIDE, CALCIUM CHLORIDE 600; 310; 30; 20 MG/100ML; MG/100ML; MG/100ML; MG/100ML
50 INJECTION, SOLUTION INTRAVENOUS CONTINUOUS
Status: DISCONTINUED | OUTPATIENT
Start: 2020-07-21 | End: 2020-07-21 | Stop reason: HOSPADM

## 2020-07-21 RX ADMIN — PROPOFOL 100 MG: 10 INJECTION, EMULSION INTRAVENOUS at 08:37

## 2020-07-21 RX ADMIN — FAMOTIDINE 20 MG: 10 INJECTION, SOLUTION INTRAVENOUS at 08:35

## 2020-07-21 RX ADMIN — SODIUM CHLORIDE, SODIUM LACTATE, POTASSIUM CHLORIDE, AND CALCIUM CHLORIDE 50 ML/HR: 600; 310; 30; 20 INJECTION, SOLUTION INTRAVENOUS at 08:37

## 2020-07-21 NOTE — H&P
[email or web address]  [phone]      Impression:   1. Average risk colon cancer screening exam      Plan:     1.  Colonoscopy      Addendum: All lab tests orders pertaining to the procedure have been ordered by the anesthesia personnel and results will be addressed by the anesthesia team    Chief Complaint: Average risk colon cancer screening exam.      HPI:  Oscar Tejada is a 54 y.o. female who is being seen on consult for average risk colon cancer screening with colonoscopy    PMH:   Past Medical History:   Diagnosis Date    Anemia NEC     Arthritis     Chronic kidney disease     Chronic pain     [facility] [address]     left breast    Fatigue     Fibromyalgia     GERD (gastroesophageal reflux disease)     Hx of endometriosis     Hypertension 2011    Ill-defined condition 2018    Sickle cell crisis    Osteoarthritis of left hip 2016    Osteoarthritis of right hip 1/3/2017    Osteoarthritis of right knee 2018    Paroxysmal atrial tachycardia (HCC)     Sickle cell anemia ([facility] [address]     Sleep apnea     Does not use CPAP    Thyroid disease     hypo       PSH:   Past Surgical History:   Procedure Laterality Date    HX BREAST BIOPSY Left 2016    HX  SECTION      HX HERNIA REPAIR      HX KNEE REPLACEMENT Right     HX LAP CHOLECYSTECTOMY      HX MASTECTOMY Left 2012    with axillary lymph node dissection    TOTAL HIP ARTHROPLASTY Bilateral  &        Social HX:   Social History     Socioeconomic History    Marital status:      Spouse name: Not on file    Number of children: Not on file    Years of education: Not on file    Highest education level: Not on file   Occupational History    Not on file   Social Needs    Financial resource strain: Not on file    Food insecurity     Worry: Not on file     Inability: Not on file    Transportation needs     Medical: Not on file     Non-medical: Not on file   Tobacco Use    Smoking status: Former Smoker Packs/day: 0.25     Years: 30.00     Pack years: 7.50     Last attempt to quit: 2011     Years since quittin.5    Smokeless tobacco: Never Used   Substance and Sexual Activity    Alcohol use: Not Currently     Comment: 2 times yearly    Drug use: No    Sexual activity: Not Currently   Lifestyle    Physical activity     Days per week: Not on file     Minutes per session: Not on file    Stress: Not on file   Relationships    Social connections     Talks on phone: Not on file     Gets together: Not on file     Attends [employer] service: Not on file     Active member of club or organization: Not on file     Attends meetings of clubs or organizations: Not on file     Relationship status: Not on file    Intimate partner violence     Fear of current or ex partner: Not on file     Emotionally abused: Not on file     Physically abused: Not on file     Forced sexual activity: Not on file   Other Topics Concern    Not on file   Social History Narrative    Not on file       FHX:   Family History   Problem Relation Age of Onset    Cancer Mother         breast    Diabetes Mother     Heart Disease Father     Diabetes Father     Sickle Cell Anemia Brother     Stroke Neg Hx     Heart Attack Neg Hx        Allergy:   Allergies   Allergen Reactions    Neulasta [Pegfilgrastim] Other (comments)     \"Put me in a comma for 3 months\"       Home Medications:     Medications Prior to Admission   Medication Sig    oxyCODONE ER (OXYCONTIN) 10 mg ER tablet Take 10 mg by mouth every twelve (12) hours.  thyroid, Pork, (ARMOUR THYROID) 30 mg tablet Take 90 mg by mouth daily.  deferasirox (JADENU) 360 mg tablet Take 2 tabs by mouth daily.  ergocalciferol (VITAMIN D2) 50,000 unit capsule Take 50,000 Units by mouth every seven (7) days.  folic acid (FOLVITE) 1 mg tablet Take 1 mg by mouth daily. Review of Systems:     Constitutional: No fevers, chills, weight loss, fatigue.    Skin: No rashes, pruritis, jaundice, ulcerations, erythema. HENT: No headaches, nosebleeds, sinus pressure, rhinorrhea, sore throat. Eyes: No visual changes, blurred vision, eye pain, photophobia, jaundice. Cardiovascular: No chest pain, heart palpitations. Respiratory: No cough, SOB, wheezing, chest discomfort, orthopnea. Gastrointestinal:    Genitourinary: No dysuria, bleeding, discharge, pyuria. Musculoskeletal: No weakness, arthralgias, wasting. Endo: No sweats. Heme: No bruising, easy bleeding. Allergies: As noted. Neurological: Cranial nerves intact. Alert and oriented. Gait not assessed. Psychiatric:  No anxiety, depression, hallucinations. Visit Vitals  Pulse 77   Temp 97.8 °F (36.6 °C)   Resp 16   Ht 5' 6\" (1.676 m)   Wt 98 kg (216 lb)   SpO2 100%   BMI 34.86 kg/m²       Physical Assessment:     constitutional: appearance: well developed, well nourished, normal habitus, no deformities, in no acute distress. skin: inspection: no rashes, ulcers, icterus or other lesions; no clubbing or telangiectasias. palpation: no induration or subcutaneos nodules. eyes: inspection: normal conjunctivae and lids; no jaundice pupils: symmetrical, normoreactive to light, normal accommodation and size. ENMT: mouth: normal oral mucosa,lips and gums; good dentition. oropharynx: normal tongue, hard and soft palate; posterior pharynx without erithema, exudate or lesions. neck: thyroid: normal size, consistency and position; no masses or tenderness. respiratory: effort: normal chest excursion; no intercostal retraction or accessory muscle use. cardiovascular: abdominal aorta: normal size and position; no bruits. palpation: PMI of normal size and position; normal rhythm; no thrill or murmurs. abdominal: abdomen: normal consistency; no tenderness or masses. hernias: no hernias appreciated. liver: normal size and consistency. spleen: not palpable. rectal: hemoccult/guaiac: not performed.    musculoskeletal: digits and nails: no clubbing, cyanosis, petechiae or other inflammatory conditions. gait: normal gait and station head and neck: normal range of motion; no pain, crepitation or contracture. spine/ribs/pelvis: normal range of motion; no pain, deformity or contracture. lymphatic: axilae: not palpable. groin: not palpable. neck: within normal limits. other: not palpable. neurologic: cranial nerves: II-XII normal.   psychiatric: judgement/insight: within normal limits. memory: within normal limits for recent and remote events. mood and affect: no evidence of depression, anxiety or agitation. orientation: oriented to time, space and person. Basic Metabolic Profile   No results for input(s): NA, K, CL, CO2, BUN, GLU, CA, MG, PHOS in the last 72 hours. No lab exists for component: CREAT      CBC w/Diff    No results for input(s): WBC, RBC, HGB, HCT, MCV, MCH, MCHC, RDW, PLT, HGBEXT, HCTEXT, PLTEXT in the last 72 hours. No lab exists for component: MPV No results for input(s): GRANS, LYMPH, EOS, PRO, MYELO, METAS, BLAST in the last 72 hours. No lab exists for component: MONO, BASO     Hepatic Function   No results for input(s): ALB, TP, TBILI, AP, AML, LPSE in the last 72 hours. No lab exists for component: DBILI, GPT, SGOT       Sara Gerard MD, M.D. [facility] [place] [id number], [facility]  www.Located within [facility]. [facility]

## 2020-07-21 NOTE — ANESTHESIA PREPROCEDURE EVALUATION
Anesthetic History   No history of anesthetic complications            Review of Systems / Medical History  Patient summary reviewed, nursing notes reviewed and pertinent labs reviewed    Pulmonary        Sleep apnea: CPAP      Pertinent negatives: No COPD, asthma and recent URI  Comments: Former smoker   Neuro/Psych   Within defined limits           Cardiovascular    Hypertension: well controlled            Pertinent negatives: No past MI, CAD, PAD, dysrhythmias, angina and CHF  Exercise tolerance: >4 METS     GI/Hepatic/Renal     GERD: well controlled        Pertinent negatives: No hepatitis, liver disease and renal disease   Endo/Other      Hypothyroidism: well controlled  Obesity, blood dyscrasia and arthritis  Pertinent negatives: No diabetes, hyperthyroidism and morbid obesity   Other Findings   Comments: Sickle cell anemia           Physical Exam    Airway  Mallampati: III  TM Distance: 4 - 6 cm  Neck ROM: normal range of motion   Mouth opening: Normal     Cardiovascular  Regular rate and rhythm,  S1 and S2 normal,  no murmur, click, rub, or gallop             Dental  No notable dental hx       Pulmonary  Breath sounds clear to auscultation               Abdominal  GI exam deferred       Other Findings            Anesthetic Plan    ASA: 3  Anesthesia type: MAC          Induction: Intravenous  Anesthetic plan and risks discussed with: Patient

## 2020-07-21 NOTE — DISCHARGE INSTRUCTIONS
Colonoscopy: What to Expect at [address] [place] [address]  After you have a colonoscopy, you will stay at the clinic for 1 to 2 hours until the medicines wear off. Then you can go home. But you will need to arrange for a ride. Your doctor will tell you when you can eat and do your other usual activities. Your doctor will talk to you about when you will need your next colonoscopy. Your doctor can help you decide how often you need to be checked. This will depend on the results of your test and your risk for colorectal cancer. After the test, you may be bloated or have gas pains. You may need to pass gas. If a biopsy was done or a polyp was removed, you may have streaks of blood in your stool (feces) for a few days. This care sheet gives you a general idea about how long it will take for you to recover. But each person recovers at a different pace. Follow the steps below to get better as quickly as possible. How can you care for yourself at home? Activity  · Rest when you feel tired. · You can do your normal activities when it feels okay to do so. Diet  · Follow your doctor's directions for eating. · Unless your doctor has told you not to, drink plenty of fluids. This helps to replace the fluids that were lost during the colon prep. · Do not drink alcohol. Medicines  · If polyps were removed or a biopsy was done during the test, your doctor may tell you not to take aspirin or other anti-inflammatory medicines for a few days. These include ibuprofen (Advil, Motrin) and naproxen (Aleve). Other instructions  · For your safety, do not drive or operate machinery until the medicine wears off and you can think clearly. Your doctor may tell you not to drive or operate machinery until the day after your test.  · Do not sign legal documents or make major decisions until the medicine wears off and you can think clearly. The anesthesia can make it hard for you to fully understand what you are agreeing to.   Follow-up care is a key part of your treatment and safety. Be sure to make and go to all appointments, and call your doctor if you are having problems. It's also a good idea to know your test results and keep a list of the medicines you take. When should you call for help? Call 911 anytime you think you may need emergency care. For example, call if:  · You passed out (lost consciousness). · You pass maroon or bloody stools. · You have severe belly pain. Call your doctor now or seek immediate medical care if:  · Your stools are black and tarlike. · Your stools have streaks of blood, but you did not have a biopsy or any polyps removed. · You have belly pain, or your belly is swollen and firm. · You vomit. · You have a fever. · You are very dizzy. Watch closely for changes in your health, and be sure to contact your doctor if you have any problems. Where can you learn more? Go to [employer]  Enter E264 in the search box to learn more about \"Colonoscopy: What to Expect at Home. \"   © 7034-9225 Healthwise, Incorporated. Care instructions adapted under license by [employer] (which disclaims liability or warranty for this information). This care instruction is for use with your licensed healthcare professional. If you have questions about a medical condition or this instruction, always ask your healthcare professional. [place] [address] any warranty or liability for your use of this information. Content Version: 58.4.401613; Current as of: November 14, 2014      DISCHARGE SUMMARY from Nurse     POST-PROCEDURE INSTRUCTIONS:    Call your Physician if you:  ? Observe any excess bleeding. ? Develop a temperature over 100.5o F.  ? Experience abdominal, shoulder or chest pain. ? Notice any signs of decreased circulation or nerve impairment to an extremity such as a change in color, persistent numbness, tingling, coldness or increase in pain. ?  Vomit blood or you have nausea and vomiting lasting longer than 4 hours. ? Are unable to take medications. ? Are unable to urinate within 8 hours after discharge following general anesthesia or intravenous sedation. For the next 24 hours after receiving general anesthesia or intravenous sedation, or while taking prescription Narcotics, limit your activities:  ? Do NOT drive a motor vehicle, operate hazard machinery or power tools, or perform tasks that require coordination. The medication you received during your procedure may have some effect on your mental awareness. ? Do NOT make important personal or business decisions. The medication you received during your procedure may have some effect on your mental awareness. ? Do NOT drink alcoholic beverages. These drinks do not mix well with the medications that have been given to you. ? Upon discharge from the hospital, you must be accompanied by a responsible adult. ? Resume your diet as directed by your physician. ? Resume medications as your physician has prescribed. ? Please give a list of your current medications to your Primary Care Provider. ? Please update this list whenever your medications are discontinued, doses are changed, or new medications (including over-the-counter products) are added. ? Please carry medication information at all times in case of emergency situations. These are general instructions for a healthy lifestyle:    No smoking/ No tobacco products/ Avoid exposure to second hand smoke.  Surgeon General's Warning:  Quitting smoking now greatly reduces serious risk to your health. Obesity, smoking, and a sedentary lifestyle greatly increase your risk for illness.    A healthy diet, regular physical exercise & weight monitoring are important for maintaining a healthy lifestyle   You may be retaining fluid if you have a history of heart failure or if you experience any of the following symptoms:  Weight gain of 3 pounds or more overnight or 5 pounds in a week, increased swelling in our hands or feet or shortness of breath while lying flat in bed. Please call your doctor as soon as you notice any of these symptoms; do not wait until your next office visit. Recognize signs and symptoms of STROKE:  F  -  Face looks uneven  A  -  Arms unable to move or move unevenly  S  -  Speech slurred or non-existent  T  -  Time to call 911 - as soon as signs and symptoms begin - DO NOT go back to bed or wait to see If you get better - TIME IS BRAIN. Colorectal Screening   Colorectal cancer almost always develops from precancerous polyps (abnormal growths) in the colon or rectum. Screening tests can find precancerous polyps, so that they can be removed before they turn into cancer. Screening tests can also find colorectal cancer early, when treatment works best.  [facility] Speak with your physician about when you should begin screening and how often you should be tested. Additional Information    If you have questions, please call [phone]. Remember, [employer] is NOT to be used for urgent needs. For medical emergencies, dial 911. Educational references and/or instructions provided during this visit included:    See attached. Discharge information has been reviewed with the patient. The patient verbalized understanding.

## 2020-07-21 NOTE — ANESTHESIA POSTPROCEDURE EVALUATION
Procedure(s):  COLONOSCOPY.     MAC    Anesthesia Post Evaluation      Multimodal analgesia: multimodal analgesia used between 6 hours prior to anesthesia start to PACU discharge  Patient location during evaluation: bedside  Patient participation: complete - patient participated  Level of consciousness: awake  Pain management: adequate  Airway patency: patent  Anesthetic complications: no  Cardiovascular status: stable  Respiratory status: acceptable  Hydration status: acceptable  Post anesthesia nausea and vomiting:  controlled      INITIAL Post-op Vital signs:   Vitals Value Taken Time   /70 7/21/2020  8:50 AM   Temp 36 °C (96.8 °F) 7/21/2020  8:50 AM   Pulse 68 7/21/2020  8:50 AM   Resp 18 7/21/2020  8:50 AM   SpO2 99 % 7/21/2020  8:50 AM

## 2021-08-03 PROBLEM — N17.9 ACUTE RENAL INJURY (HCC): Status: RESOLVED | Noted: 2019-04-07 | Resolved: 2021-08-03

## 2021-08-03 PROBLEM — D64.9 ANEMIA: Status: RESOLVED | Noted: 2019-04-07 | Resolved: 2021-08-03

## 2022-03-18 PROBLEM — D72.829 LEUKOCYTOSIS: Status: ACTIVE | Noted: 2017-02-20

## 2022-03-18 PROBLEM — R06.02 SHORTNESS OF BREATH: Status: ACTIVE | Noted: 2018-10-18

## 2022-03-19 PROBLEM — M16.11 OSTEOARTHRITIS OF RIGHT HIP: Status: ACTIVE | Noted: 2017-01-03

## 2022-03-19 PROBLEM — D72.825 BANDEMIA: Status: ACTIVE | Noted: 2019-05-03

## 2022-03-19 PROBLEM — R10.9 ABDOMINAL PAIN: Status: ACTIVE | Noted: 2019-05-03

## 2022-03-19 PROBLEM — R07.9 CHEST PAIN: Status: ACTIVE | Noted: 2019-05-03

## 2022-03-20 PROBLEM — N17.9 ARF (ACUTE RENAL FAILURE) (HCC): Status: ACTIVE | Noted: 2019-05-03

## 2022-03-20 PROBLEM — M17.11 OSTEOARTHRITIS OF RIGHT KNEE: Status: ACTIVE | Noted: 2018-06-28

## 2022-03-20 PROBLEM — D57.00 SICKLE CELL ANEMIA WITH CRISIS (HCC): Status: ACTIVE | Noted: 2019-04-07

## 2022-04-18 ENCOUNTER — TRANSCRIBE ORDER (OUTPATIENT)
Dept: REGISTRATION | Age: 57
End: 2022-04-18

## 2022-04-18 ENCOUNTER — HOSPITAL ENCOUNTER (OUTPATIENT)
Dept: PREADMISSION TESTING | Age: 57
End: 2022-04-18
Attending: ORTHOPAEDIC SURGERY | Admitting: ORTHOPAEDIC SURGERY
Payer: COMMERCIAL

## 2022-04-18 ENCOUNTER — HOSPITAL ENCOUNTER (OUTPATIENT)
Dept: PREADMISSION TESTING | Age: 57
Discharge: HOME | End: 2022-04-18
Payer: COMMERCIAL

## 2022-04-18 DIAGNOSIS — M17.12 DEGENERATIVE ARTHRITIS OF LEFT KNEE: Primary | ICD-10-CM

## 2022-04-18 DIAGNOSIS — Z01.812 BLOOD TESTS PRIOR TO TREATMENT OR PROCEDURE: ICD-10-CM

## 2022-04-18 DIAGNOSIS — M17.12 DEGENERATIVE ARTHRITIS OF LEFT KNEE: ICD-10-CM

## 2022-04-18 LAB
ALBUMIN SERPL-MCNC: 4.2 G/DL (ref 3.4–5)
ALBUMIN/GLOB SERPL: 1.1 (ref 0.8–1.7)
ALP SERPL-CCNC: 129 U/L (ref 45–117)
ALT SERPL-CCNC: 12 U/L (ref 13–56)
ANION GAP SERPL CALC-SCNC: 2 MMOL/L (ref 3–18)
APPEARANCE UR: CLEAR
APTT PPP: 38 SEC (ref 23–36.4)
AST SERPL-CCNC: 10 U/L (ref 10–38)
ATRIAL RATE: 79 BPM
BASOPHILS # BLD: 0 K/UL (ref 0–0.1)
BASOPHILS NFR BLD: 0 % (ref 0–2)
BILIRUB SERPL-MCNC: 1.7 MG/DL (ref 0.2–1)
BILIRUB UR QL: NEGATIVE
BUN SERPL-MCNC: 19 MG/DL (ref 7–18)
BUN/CREAT SERPL: 14 (ref 12–20)
CALCIUM SERPL-MCNC: 9.5 MG/DL (ref 8.5–10.1)
CALCULATED P AXIS, ECG09: 79 DEGREES
CALCULATED R AXIS, ECG10: 77 DEGREES
CALCULATED T AXIS, ECG11: 58 DEGREES
CHLORIDE SERPL-SCNC: 110 MMOL/L (ref 100–111)
CO2 SERPL-SCNC: 27 MMOL/L (ref 21–32)
COLOR UR: YELLOW
CREAT SERPL-MCNC: 1.32 MG/DL (ref 0.6–1.3)
DIAGNOSIS, 93000: NORMAL
DIFFERENTIAL METHOD BLD: ABNORMAL
EOSINOPHIL # BLD: 0.1 K/UL (ref 0–0.4)
EOSINOPHIL NFR BLD: 1 % (ref 0–5)
ERYTHROCYTE [DISTWIDTH] IN BLOOD BY AUTOMATED COUNT: 14.2 % (ref 11.6–14.5)
ERYTHROCYTE [SEDIMENTATION RATE] IN BLOOD: 36 MM/HR (ref 0–30)
EST. AVERAGE GLUCOSE BLD GHB EST-MCNC: ABNORMAL MG/DL
GLOBULIN SER CALC-MCNC: 3.8 G/DL (ref 2–4)
GLUCOSE SERPL-MCNC: 103 MG/DL (ref 74–99)
GLUCOSE UR STRIP.AUTO-MCNC: NEGATIVE MG/DL
HBA1C MFR BLD: <3.8 % (ref 4.2–5.6)
HCT VFR BLD AUTO: 31.3 % (ref 35–45)
HGB BLD-MCNC: 11.1 G/DL (ref 12–16)
HGB UR QL STRIP: NEGATIVE
IMM GRANULOCYTES # BLD AUTO: 0 K/UL (ref 0–0.04)
IMM GRANULOCYTES NFR BLD AUTO: 0 % (ref 0–0.5)
INR PPP: 1.1 (ref 0.8–1.2)
KETONES UR QL STRIP.AUTO: NEGATIVE MG/DL
LEUKOCYTE ESTERASE UR QL STRIP.AUTO: NEGATIVE
LYMPHOCYTES # BLD: 2.6 K/UL (ref 0.9–3.6)
LYMPHOCYTES NFR BLD: 28 % (ref 21–52)
MCH RBC QN AUTO: 29 PG (ref 24–34)
MCHC RBC AUTO-ENTMCNC: 35.5 G/DL (ref 31–37)
MCV RBC AUTO: 81.7 FL (ref 78–100)
MONOCYTES # BLD: 0.6 K/UL (ref 0.05–1.2)
MONOCYTES NFR BLD: 6 % (ref 3–10)
NEUTS SEG # BLD: 5.9 K/UL (ref 1.8–8)
NEUTS SEG NFR BLD: 64 % (ref 40–73)
NITRITE UR QL STRIP.AUTO: NEGATIVE
NRBC # BLD: 0 K/UL (ref 0–0.01)
NRBC BLD-RTO: 0 PER 100 WBC
P-R INTERVAL, ECG05: 146 MS
PH UR STRIP: 5.5 (ref 5–8)
PLATELET # BLD AUTO: 133 K/UL (ref 135–420)
PMV BLD AUTO: 10.3 FL (ref 9.2–11.8)
POTASSIUM SERPL-SCNC: 4.4 MMOL/L (ref 3.5–5.5)
PROT SERPL-MCNC: 8 G/DL (ref 6.4–8.2)
PROT UR STRIP-MCNC: NEGATIVE MG/DL
PROTHROMBIN TIME: 13.5 SEC (ref 11.5–15.2)
Q-T INTERVAL, ECG07: 372 MS
QRS DURATION, ECG06: 84 MS
QTC CALCULATION (BEZET), ECG08: 426 MS
RBC # BLD AUTO: 3.83 M/UL (ref 4.2–5.3)
SODIUM SERPL-SCNC: 139 MMOL/L (ref 136–145)
SP GR UR REFRACTOMETRY: 1.01 (ref 1–1.03)
UROBILINOGEN UR QL STRIP.AUTO: 0.2 EU/DL (ref 0.2–1)
VENTRICULAR RATE, ECG03: 79 BPM
WBC # BLD AUTO: 9.2 K/UL (ref 4.6–13.2)

## 2022-04-18 PROCEDURE — 85610 PROTHROMBIN TIME: CPT

## 2022-04-18 PROCEDURE — 81003 URINALYSIS AUTO W/O SCOPE: CPT

## 2022-04-18 PROCEDURE — 83036 HEMOGLOBIN GLYCOSYLATED A1C: CPT

## 2022-04-18 PROCEDURE — 85730 THROMBOPLASTIN TIME PARTIAL: CPT

## 2022-04-18 PROCEDURE — 36415 COLL VENOUS BLD VENIPUNCTURE: CPT

## 2022-04-18 PROCEDURE — 93005 ELECTROCARDIOGRAM TRACING: CPT

## 2022-04-18 PROCEDURE — 85025 COMPLETE CBC W/AUTO DIFF WBC: CPT

## 2022-04-18 PROCEDURE — 80053 COMPREHEN METABOLIC PANEL: CPT

## 2022-04-18 PROCEDURE — 85652 RBC SED RATE AUTOMATED: CPT

## 2022-04-19 LAB
BACTERIA SPEC CULT: NORMAL
BACTERIA SPEC CULT: NORMAL
SERVICE CMNT-IMP: NORMAL

## 2022-05-04 PROBLEM — M17.12 PRIMARY LOCALIZED OSTEOARTHRITIS OF LEFT KNEE: Status: ACTIVE | Noted: 2022-05-04

## 2022-05-04 PROBLEM — M17.12 PRIMARY LOCALIZED OSTEOARTHRITIS OF LEFT KNEE: Chronic | Status: ACTIVE | Noted: 2022-05-04

## 2022-05-04 RX ORDER — FOLIC ACID 1 MG/1
1 TABLET ORAL DAILY
Status: CANCELLED | OUTPATIENT
Start: 2022-05-05

## 2022-05-04 RX ORDER — LANOLIN ALCOHOL/MO/W.PET/CERES
1 CREAM (GRAM) TOPICAL 3 TIMES DAILY
Status: CANCELLED | OUTPATIENT
Start: 2022-05-04

## 2022-05-04 RX ORDER — LEVOTHYROXINE AND LIOTHYRONINE 19; 4.5 UG/1; UG/1
90 TABLET ORAL DAILY
Status: CANCELLED | OUTPATIENT
Start: 2022-05-05

## 2022-05-04 RX ORDER — OXYCODONE HCL 10 MG/1
10 TABLET, FILM COATED, EXTENDED RELEASE ORAL EVERY 12 HOURS
Status: CANCELLED | OUTPATIENT
Start: 2022-05-04

## 2022-05-04 RX ORDER — DOCUSATE SODIUM 100 MG/1
100 CAPSULE, LIQUID FILLED ORAL 2 TIMES DAILY
Status: CANCELLED | OUTPATIENT
Start: 2022-05-04

## 2022-05-04 NOTE — H&P
[address] [facility] [address] Medicine  History and Physical Exam    Patient: Billie Newman MRN: [id number]  SSN: [id number]    YOB: 1965  Age: 64 y.o. Sex: female      Subjective:      Chief Complaint: Left knee pain    History of Present Illness:  Patient complains of pain to the left knee and difficulty ambulating, which has progressively worsened over several months. X-rays showed osteoarthritis of the joint with some surrounding H.O. The patient's pain has persisted and progressed despite conservative treatments and therapies. The patient has been previously treated with nsaids. The patient has at this time opted for surgical intervention.        Past Medical History:   Diagnosis Date    Anemia NEC     Arthritis     Chronic kidney disease     Chronic pain     Ductal carcinoma ([facility] [address]     left breast    Fatigue     Fibromyalgia     GERD (gastroesophageal reflux disease)     Hx of endometriosis     Hypertension     Ill-defined condition 2018    Sickle cell crisis    Osteoarthritis of left hip 2016    Osteoarthritis of right hip 1/3/2017    Osteoarthritis of right knee 2018    Paroxysmal atrial tachycardia (HCC)     Primary localized osteoarthritis of left knee 2022    Sickle cell anemia (HCC)     Sleep apnea     Does not use CPAP    Thyroid disease     hypo     Past Surgical History:   Procedure Laterality Date    COLONOSCOPY N/A 2020    COLONOSCOPY performed by Clover Estrada MD at [facility] HX BREAST BIOPSY Left     HX  SECTION      HX HERNIA REPAIR      HX KNEE REPLACEMENT Right     HX LAP CHOLECYSTECTOMY      HX MASTECTOMY Left 2012    with axillary lymph node dissection    ND TOTAL HIP ARTHROPLASTY Bilateral  & 2017     Social History     Occupational History    Not on file   Tobacco Use    Smoking status: Former Smoker     Packs/day: 0.25     Years: 30.00     Pack years: 7.50     Quit date: 2011     Years since quittin.3    Smokeless tobacco: Never Used   Substance and Sexual Activity    Alcohol use: Not Currently     Comment: 2 times yearly    Drug use: No    Sexual activity: Not Currently     Prior to Admission medications    Medication Sig Start Date End Date Taking? Authorizing Provider   deferasirox (JADENU) 360 mg tablet Take 2 tabs by mouth daily. 1/3/20   Mavis ADAMS DNP   ergocalciferol (VITAMIN D2) 50,000 unit capsule Take 50,000 Units by mouth every seven (7) days. Provider, Historical   oxyCODONE ER (OXYCONTIN) 10 mg ER tablet Take 10 mg by mouth every twelve (12) hours. Provider, Historical   thyroid, Pork, (ARMOUR THYROID) 30 mg tablet Take 90 mg by mouth daily. Provider, Historical   folic acid (FOLVITE) 1 mg tablet Take 1 mg by mouth daily. Provider, Historical       Allergies: Allergies   Allergen Reactions    Neulasta [Pegfilgrastim] Other (comments)     \"Put me in a comma for 3 months\"        Review of Systems:  A comprehensive review of systems was negative except for that written in the History of Present Illness. Objective:       Physical Exam:  HEENT: Normocephalic, atraumatic  Lungs:  Clear to auscultation  Heart:   Regular rate and rhythm  Abdomen: Soft  Extremities:  Pain with range of motion of the left knee. Active extension decreased, active flexion decreased   Tenderness generalized. No deformity. No effusion. Positive crepitus. Antalgic gait. Assessment:      Arthritis of the left knee. Plan:       Proceed with scheduled LEFT TOTAL KNEE ARTHROPLASTY/EXCISION OF H.O. The various methods of treatment have been discussed with the patient and family. After consideration of risks, benefits, and other options for treatment, the patient has consented to surgical interventions. Questions were answered and preoperative teaching was done by Dr Carmen Farnsworth.      Signed By: MARTIN Mejia     May 4, 2022

## 2022-05-06 ENCOUNTER — HOSPITAL ENCOUNTER (OUTPATIENT)
Dept: PREADMISSION TESTING | Age: 57
Discharge: HOME | End: 2022-05-06

## 2022-05-06 VITALS — WEIGHT: 216 LBS | BODY MASS INDEX: 33.9 KG/M2 | HEIGHT: 67 IN

## 2022-05-06 RX ORDER — OXYCODONE HYDROCHLORIDE 5 MG/1
5 TABLET ORAL
COMMUNITY
End: 2022-05-16

## 2022-05-06 RX ORDER — SODIUM CHLORIDE, SODIUM LACTATE, POTASSIUM CHLORIDE, CALCIUM CHLORIDE 600; 310; 30; 20 MG/100ML; MG/100ML; MG/100ML; MG/100ML
125 INJECTION, SOLUTION INTRAVENOUS CONTINUOUS
Status: CANCELLED | OUTPATIENT
Start: 2022-05-16

## 2022-05-06 RX ORDER — METOPROLOL SUCCINATE 25 MG/1
25 TABLET, EXTENDED RELEASE ORAL
COMMUNITY

## 2022-05-06 RX ORDER — CEFAZOLIN SODIUM/WATER 2 G/20 ML
2 SYRINGE (ML) INTRAVENOUS ONCE
Status: CANCELLED | OUTPATIENT
Start: 2022-05-16 | End: 2022-05-16

## 2022-05-06 NOTE — PERIOP NOTES
Patient advised to wear mask when entering any of the buildings. Being fully vaccinated, they will no longer need to be tested or quarantine prior to procedure. Patient does meet criteria for special pop. Patient has sickle cell anemia. No hx of sleep apnea or previous screening. Denies hx of MH. PCP is aware of surgery. G skin care kit given and process reviewed. Not participating in any research study or clinical trials. Patient instructed when coming in for surgery, do not use any lotions, creams or deodorant. Also to remove all jewelry, hairpins, make-up and nail polish. Instructed to wear something loose fitting and comfortable, easy to take off, easy to put on. Does not have a DNR. Preoperative Nutrition Screen (JOSE)   Patient's Age: 64 y.o. Patient's BMI: Estimated body mass index is 34.34 kg/m² as calculated from the following:    Height as of this encounter: 5' 6.5\" (1.689 m). Weight as of this encounter: 98 kg (216 lb). 1. Does the patient have a documented serum albumin less than 3.0 within the last 90 days? No = 0          2. Is patient's BMI less than 18.5 (or less than 20 if age over 72)? No = 0        3. Has the patient had an unplanned weight loss of 10% of body weight or more in the last 6 months? No = 0   4. Has the patient been eating less than 50% of their normal diet in the preceding week?  No = 0   JOSE Score (number of Yes responses), 0-4 0       Electronically signed by Stella Hays RN on 5/6/22 at 4:10 PM

## 2022-05-10 NOTE — PERIOP NOTES
Life Vest Report Received and placed in NP Office fr review and recommendations.   Thania Waters in lab called and stated that [employer] in working on antibody screen now approx 20 min left in that process, and that then they will be testing units for compatibility which the time frame could be 1-2 hours or more.  aware.

## 2022-05-10 NOTE — NURSE NAVIGATOR
Marco A Richardson watched the pre op seminar online and received a preoperative education booklet in anticipation of surgery    Nurse Navigator

## 2022-05-16 ENCOUNTER — ANESTHESIA (OUTPATIENT)
Dept: SURGERY | Age: 57
End: 2022-05-16
Payer: COMMERCIAL

## 2022-05-16 ENCOUNTER — ANESTHESIA EVENT (OUTPATIENT)
Dept: SURGERY | Age: 57
End: 2022-05-16
Payer: COMMERCIAL

## 2022-05-16 ENCOUNTER — HOME HEALTH ADMISSION (OUTPATIENT)
Dept: HOME HEALTH SERVICES | Facility: HOME HEALTH | Age: 57
End: 2022-05-16
Payer: COMMERCIAL

## 2022-05-16 ENCOUNTER — APPOINTMENT (OUTPATIENT)
Dept: GENERAL RADIOLOGY | Age: 57
End: 2022-05-16
Attending: PHYSICIAN ASSISTANT
Payer: COMMERCIAL

## 2022-05-16 ENCOUNTER — TRANSCRIBE ORDER (OUTPATIENT)
Dept: LAB | Age: 57
End: 2022-05-16

## 2022-05-16 ENCOUNTER — HOSPITAL ENCOUNTER (OUTPATIENT)
Age: 57
Discharge: HOME HEALTH | End: 2022-05-16
Attending: ORTHOPAEDIC SURGERY | Admitting: ORTHOPAEDIC SURGERY
Payer: COMMERCIAL

## 2022-05-16 VITALS
SYSTOLIC BLOOD PRESSURE: 125 MMHG | HEIGHT: 67 IN | WEIGHT: 217.5 LBS | TEMPERATURE: 97.5 F | BODY MASS INDEX: 34.14 KG/M2 | OXYGEN SATURATION: 100 % | DIASTOLIC BLOOD PRESSURE: 92 MMHG | HEART RATE: 57 BPM | RESPIRATION RATE: 18 BRPM

## 2022-05-16 DIAGNOSIS — M17.12 PRIMARY LOCALIZED OSTEOARTHRITIS OF LEFT KNEE: Primary | Chronic | ICD-10-CM

## 2022-05-16 PROCEDURE — 77030037713 HC CLOSR DEV INCIS ZIP STRY -B: Performed by: ORTHOPAEDIC SURGERY

## 2022-05-16 PROCEDURE — 77030003666 HC NDL SPINAL BD -A: Performed by: ORTHOPAEDIC SURGERY

## 2022-05-16 PROCEDURE — 2709999900 HC NON-CHARGEABLE SUPPLY: Performed by: ORTHOPAEDIC SURGERY

## 2022-05-16 PROCEDURE — 74011250636 HC RX REV CODE- 250/636: Performed by: NURSE ANESTHETIST, CERTIFIED REGISTERED

## 2022-05-16 PROCEDURE — 74011250636 HC RX REV CODE- 250/636: Performed by: ORTHOPAEDIC SURGERY

## 2022-05-16 PROCEDURE — 77030020782 HC GWN BAIR PAWS FLX 3M -B: Performed by: ORTHOPAEDIC SURGERY

## 2022-05-16 PROCEDURE — 77030002934 HC SUT MCRYL J&J -B: Performed by: ORTHOPAEDIC SURGERY

## 2022-05-16 PROCEDURE — 77030014144 HC TY SPN ANES BBMI -B: Performed by: ANESTHESIOLOGY

## 2022-05-16 PROCEDURE — C1713 ANCHOR/SCREW BN/BN,TIS/BN: HCPCS | Performed by: ORTHOPAEDIC SURGERY

## 2022-05-16 PROCEDURE — 76210000006 HC OR PH I REC 0.5 TO 1 HR: Performed by: ORTHOPAEDIC SURGERY

## 2022-05-16 PROCEDURE — 76942 ECHO GUIDE FOR BIOPSY: CPT | Performed by: ORTHOPAEDIC SURGERY

## 2022-05-16 PROCEDURE — 86880 COOMBS TEST DIRECT: CPT

## 2022-05-16 PROCEDURE — 77030013708 HC HNDPC SUC IRR PULS STRY –B: Performed by: ORTHOPAEDIC SURGERY

## 2022-05-16 PROCEDURE — 74011000258 HC RX REV CODE- 258: Performed by: ORTHOPAEDIC SURGERY

## 2022-05-16 PROCEDURE — 77030027138 HC INCENT SPIROMETER -A: Performed by: ORTHOPAEDIC SURGERY

## 2022-05-16 PROCEDURE — 73560 X-RAY EXAM OF KNEE 1 OR 2: CPT

## 2022-05-16 PROCEDURE — C1776 JOINT DEVICE (IMPLANTABLE): HCPCS | Performed by: ORTHOPAEDIC SURGERY

## 2022-05-16 PROCEDURE — 64447 NJX AA&/STRD FEMORAL NRV IMG: CPT | Performed by: ORTHOPAEDIC SURGERY

## 2022-05-16 PROCEDURE — 86900 BLOOD TYPING SEROLOGIC ABO: CPT

## 2022-05-16 PROCEDURE — 74011250636 HC RX REV CODE- 250/636: Performed by: ANESTHESIOLOGY

## 2022-05-16 PROCEDURE — 77030031139 HC SUT VCRL2 J&J -A: Performed by: ORTHOPAEDIC SURGERY

## 2022-05-16 PROCEDURE — 76060000035 HC ANESTHESIA 2 TO 2.5 HR: Performed by: ORTHOPAEDIC SURGERY

## 2022-05-16 PROCEDURE — 77030011628: Performed by: ORTHOPAEDIC SURGERY

## 2022-05-16 PROCEDURE — 76210000022 HC REC RM PH II 1.5 TO 2 HR: Performed by: ORTHOPAEDIC SURGERY

## 2022-05-16 PROCEDURE — 77030034694 HC SCPL CANADY PLSM DISP USMD -E: Performed by: ORTHOPAEDIC SURGERY

## 2022-05-16 PROCEDURE — 86970 RBC PRETX INCUBATJ W/CHEMICL: CPT

## 2022-05-16 PROCEDURE — 86885 COOMBS TEST INDIRECT QUAL: CPT

## 2022-05-16 PROCEDURE — 74011000250 HC RX REV CODE- 250: Performed by: NURSE ANESTHETIST, CERTIFIED REGISTERED

## 2022-05-16 PROCEDURE — 97165 OT EVAL LOW COMPLEX 30 MIN: CPT

## 2022-05-16 PROCEDURE — 74011250636 HC RX REV CODE- 250/636: Performed by: PHYSICIAN ASSISTANT

## 2022-05-16 PROCEDURE — 86870 RBC ANTIBODY IDENTIFICATION: CPT

## 2022-05-16 PROCEDURE — 86901 BLOOD TYPING SEROLOGIC RH(D): CPT

## 2022-05-16 PROCEDURE — 77030033263 HC DRSG MEPILEX 16-48IN BORD MOLN -B: Performed by: ORTHOPAEDIC SURGERY

## 2022-05-16 PROCEDURE — 74011000250 HC RX REV CODE- 250: Performed by: ANESTHESIOLOGY

## 2022-05-16 PROCEDURE — 74011250637 HC RX REV CODE- 250/637: Performed by: ORTHOPAEDIC SURGERY

## 2022-05-16 PROCEDURE — 77030012893: Performed by: ORTHOPAEDIC SURGERY

## 2022-05-16 PROCEDURE — 97161 PT EVAL LOW COMPLEX 20 MIN: CPT

## 2022-05-16 PROCEDURE — 74011250637 HC RX REV CODE- 250/637: Performed by: PHYSICIAN ASSISTANT

## 2022-05-16 PROCEDURE — 74011000250 HC RX REV CODE- 250: Performed by: ORTHOPAEDIC SURGERY

## 2022-05-16 PROCEDURE — 74011250637 HC RX REV CODE- 250/637: Performed by: ANESTHESIOLOGY

## 2022-05-16 PROCEDURE — 86971 RBC PRETX INCUBATJ W/ENZYMES: CPT

## 2022-05-16 PROCEDURE — 77030039760: Performed by: ORTHOPAEDIC SURGERY

## 2022-05-16 PROCEDURE — 77030038010: Performed by: ORTHOPAEDIC SURGERY

## 2022-05-16 PROCEDURE — 76010000131 HC OR TIME 2 TO 2.5 HR: Performed by: ORTHOPAEDIC SURGERY

## 2022-05-16 DEVICE — CEMENT BNE 40GM FULL DOSE PMMA W/O ANTIBIO HI VISC N RADPQ: Type: IMPLANTABLE DEVICE | Site: KNEE | Status: FUNCTIONAL

## 2022-05-16 DEVICE — SIZE 4 TIBIAL TRAY NONPOROUS
Type: IMPLANTABLE DEVICE | Site: KNEE | Status: FUNCTIONAL
Brand: BALANCED KNEE SYSTEM

## 2022-05-16 DEVICE — KNEE K1 TOT HEMI STD CEM IMPL CAPPED K1 OD: Type: IMPLANTABLE DEVICE | Site: KNEE | Status: FUNCTIONAL

## 2022-05-16 DEVICE — 32MM PATELLA, VITAMIN E
Type: IMPLANTABLE DEVICE | Site: KNEE | Status: FUNCTIONAL
Brand: BKS E-VITALIZE

## 2022-05-16 DEVICE — LT SIZE 4 FEMORAL CR NONPOROUS
Type: IMPLANTABLE DEVICE | Site: KNEE | Status: FUNCTIONAL
Brand: BKS TRIMAX

## 2022-05-16 DEVICE — SIZE 4 8MM TIBIAL INSERT UC
Type: IMPLANTABLE DEVICE | Site: KNEE | Status: FUNCTIONAL
Brand: BKS E-VITALIZE

## 2022-05-16 RX ORDER — CEFAZOLIN SODIUM/WATER 2 G/20 ML
2 SYRINGE (ML) INTRAVENOUS EVERY 8 HOURS
Status: DISCONTINUED | OUTPATIENT
Start: 2022-05-16 | End: 2022-05-16 | Stop reason: HOSPADM

## 2022-05-16 RX ORDER — DIPHENHYDRAMINE HYDROCHLORIDE 50 MG/ML
12.5 INJECTION, SOLUTION INTRAMUSCULAR; INTRAVENOUS
Status: DISCONTINUED | OUTPATIENT
Start: 2022-05-16 | End: 2022-05-16 | Stop reason: HOSPADM

## 2022-05-16 RX ORDER — NALOXONE HYDROCHLORIDE 0.4 MG/ML
0.4 INJECTION, SOLUTION INTRAMUSCULAR; INTRAVENOUS; SUBCUTANEOUS AS NEEDED
Status: DISCONTINUED | OUTPATIENT
Start: 2022-05-16 | End: 2022-05-16 | Stop reason: HOSPADM

## 2022-05-16 RX ORDER — ONDANSETRON 2 MG/ML
4 INJECTION INTRAMUSCULAR; INTRAVENOUS
Status: DISCONTINUED | OUTPATIENT
Start: 2022-05-16 | End: 2022-05-16 | Stop reason: HOSPADM

## 2022-05-16 RX ORDER — ALBUTEROL SULFATE 0.83 MG/ML
2.5 SOLUTION RESPIRATORY (INHALATION) AS NEEDED
Status: DISCONTINUED | OUTPATIENT
Start: 2022-05-16 | End: 2022-05-16 | Stop reason: HOSPADM

## 2022-05-16 RX ORDER — CEFAZOLIN SODIUM/WATER 2 G/20 ML
2 SYRINGE (ML) INTRAVENOUS ONCE
Status: COMPLETED | OUTPATIENT
Start: 2022-05-16 | End: 2022-05-16

## 2022-05-16 RX ORDER — FENTANYL CITRATE 50 UG/ML
25 INJECTION, SOLUTION INTRAMUSCULAR; INTRAVENOUS AS NEEDED
Status: DISCONTINUED | OUTPATIENT
Start: 2022-05-16 | End: 2022-05-16 | Stop reason: HOSPADM

## 2022-05-16 RX ORDER — LIDOCAINE HYDROCHLORIDE 20 MG/ML
INJECTION, SOLUTION EPIDURAL; INFILTRATION; INTRACAUDAL; PERINEURAL AS NEEDED
Status: DISCONTINUED | OUTPATIENT
Start: 2022-05-16 | End: 2022-05-16 | Stop reason: HOSPADM

## 2022-05-16 RX ORDER — DEXAMETHASONE SODIUM PHOSPHATE 4 MG/ML
8 INJECTION, SOLUTION INTRA-ARTICULAR; INTRALESIONAL; INTRAMUSCULAR; INTRAVENOUS; SOFT TISSUE ONCE
Status: COMPLETED | OUTPATIENT
Start: 2022-05-16 | End: 2022-05-16

## 2022-05-16 RX ORDER — ACETAMINOPHEN 325 MG/1
650 TABLET ORAL EVERY 6 HOURS
Status: DISCONTINUED | OUTPATIENT
Start: 2022-05-16 | End: 2022-05-16 | Stop reason: HOSPADM

## 2022-05-16 RX ORDER — SODIUM CHLORIDE, SODIUM LACTATE, POTASSIUM CHLORIDE, CALCIUM CHLORIDE 600; 310; 30; 20 MG/100ML; MG/100ML; MG/100ML; MG/100ML
75 INJECTION, SOLUTION INTRAVENOUS CONTINUOUS
Status: DISCONTINUED | OUTPATIENT
Start: 2022-05-16 | End: 2022-05-16 | Stop reason: HOSPADM

## 2022-05-16 RX ORDER — INSULIN LISPRO 100 [IU]/ML
INJECTION, SOLUTION INTRAVENOUS; SUBCUTANEOUS ONCE
Status: DISCONTINUED | OUTPATIENT
Start: 2022-05-16 | End: 2022-05-16 | Stop reason: HOSPADM

## 2022-05-16 RX ORDER — DEXAMETHASONE SODIUM PHOSPHATE 10 MG/ML
INJECTION INTRAMUSCULAR; INTRAVENOUS
Status: SHIPPED | OUTPATIENT
Start: 2022-05-16 | End: 2022-05-16

## 2022-05-16 RX ORDER — OXYCODONE HYDROCHLORIDE 5 MG/1
5 TABLET ORAL
Status: DISCONTINUED | OUTPATIENT
Start: 2022-05-16 | End: 2022-05-16 | Stop reason: HOSPADM

## 2022-05-16 RX ORDER — SODIUM CHLORIDE 0.9 % (FLUSH) 0.9 %
5-40 SYRINGE (ML) INJECTION EVERY 8 HOURS
Status: DISCONTINUED | OUTPATIENT
Start: 2022-05-16 | End: 2022-05-16 | Stop reason: HOSPADM

## 2022-05-16 RX ORDER — SODIUM CHLORIDE, SODIUM LACTATE, POTASSIUM CHLORIDE, CALCIUM CHLORIDE 600; 310; 30; 20 MG/100ML; MG/100ML; MG/100ML; MG/100ML
125 INJECTION, SOLUTION INTRAVENOUS CONTINUOUS
Status: DISCONTINUED | OUTPATIENT
Start: 2022-05-16 | End: 2022-05-16 | Stop reason: HOSPADM

## 2022-05-16 RX ORDER — ROPIVACAINE HYDROCHLORIDE 5 MG/ML
INJECTION, SOLUTION EPIDURAL; INFILTRATION; PERINEURAL
Status: SHIPPED | OUTPATIENT
Start: 2022-05-16 | End: 2022-05-16

## 2022-05-16 RX ORDER — PANTOPRAZOLE SODIUM 40 MG/1
40 TABLET, DELAYED RELEASE ORAL DAILY
Status: DISCONTINUED | OUTPATIENT
Start: 2022-05-16 | End: 2022-05-16 | Stop reason: HOSPADM

## 2022-05-16 RX ORDER — HYDROMORPHONE HYDROCHLORIDE 1 MG/ML
0.5 INJECTION, SOLUTION INTRAMUSCULAR; INTRAVENOUS; SUBCUTANEOUS
Status: DISCONTINUED | OUTPATIENT
Start: 2022-05-16 | End: 2022-05-16 | Stop reason: HOSPADM

## 2022-05-16 RX ORDER — KETAMINE HYDROCHLORIDE 10 MG/ML
INJECTION, SOLUTION INTRAMUSCULAR; INTRAVENOUS AS NEEDED
Status: DISCONTINUED | OUTPATIENT
Start: 2022-05-16 | End: 2022-05-16 | Stop reason: HOSPADM

## 2022-05-16 RX ORDER — SODIUM CHLORIDE 0.9 % (FLUSH) 0.9 %
5-40 SYRINGE (ML) INJECTION AS NEEDED
Status: DISCONTINUED | OUTPATIENT
Start: 2022-05-16 | End: 2022-05-16 | Stop reason: HOSPADM

## 2022-05-16 RX ORDER — SODIUM CHLORIDE 9 MG/ML
125 INJECTION, SOLUTION INTRAVENOUS CONTINUOUS
Status: DISCONTINUED | OUTPATIENT
Start: 2022-05-16 | End: 2022-05-16 | Stop reason: HOSPADM

## 2022-05-16 RX ORDER — CEFADROXIL 500 MG/1
500 CAPSULE ORAL 2 TIMES DAILY
Qty: 10 CAPSULE | Refills: 0 | Status: SHIPPED | OUTPATIENT
Start: 2022-05-16 | End: 2022-05-21

## 2022-05-16 RX ORDER — TRANEXAMIC ACID 650 1/1
1950 TABLET ORAL ONCE
Status: COMPLETED | OUTPATIENT
Start: 2022-05-16 | End: 2022-05-16

## 2022-05-16 RX ORDER — MAGNESIUM SULFATE 100 %
4 CRYSTALS MISCELLANEOUS AS NEEDED
Status: DISCONTINUED | OUTPATIENT
Start: 2022-05-16 | End: 2022-05-16 | Stop reason: HOSPADM

## 2022-05-16 RX ORDER — MIDAZOLAM HYDROCHLORIDE 1 MG/ML
INJECTION, SOLUTION INTRAMUSCULAR; INTRAVENOUS AS NEEDED
Status: DISCONTINUED | OUTPATIENT
Start: 2022-05-16 | End: 2022-05-16 | Stop reason: HOSPADM

## 2022-05-16 RX ORDER — DEXMEDETOMIDINE HYDROCHLORIDE 100 UG/ML
INJECTION, SOLUTION INTRAVENOUS
Status: SHIPPED | OUTPATIENT
Start: 2022-05-16 | End: 2022-05-16

## 2022-05-16 RX ORDER — PANTOPRAZOLE SODIUM 40 MG/1
40 TABLET, DELAYED RELEASE ORAL DAILY
Status: DISCONTINUED | OUTPATIENT
Start: 2022-05-16 | End: 2022-05-16

## 2022-05-16 RX ORDER — OXYCODONE HYDROCHLORIDE 5 MG/1
10 TABLET ORAL
Status: DISCONTINUED | OUTPATIENT
Start: 2022-05-16 | End: 2022-05-16 | Stop reason: HOSPADM

## 2022-05-16 RX ORDER — ACETAMINOPHEN 500 MG
1000 TABLET ORAL ONCE
Status: COMPLETED | OUTPATIENT
Start: 2022-05-16 | End: 2022-05-16

## 2022-05-16 RX ORDER — OXYCODONE HYDROCHLORIDE 5 MG/1
5 TABLET ORAL
Qty: 60 TABLET | Refills: 0 | Status: SHIPPED | OUTPATIENT
Start: 2022-05-16 | End: 2022-05-23

## 2022-05-16 RX ORDER — SODIUM CHLORIDE 9 MG/ML
300 INJECTION, SOLUTION INTRAVENOUS CONTINUOUS
Status: DISCONTINUED | OUTPATIENT
Start: 2022-05-16 | End: 2022-05-16 | Stop reason: HOSPADM

## 2022-05-16 RX ORDER — METOCLOPRAMIDE HYDROCHLORIDE 5 MG/ML
10 INJECTION INTRAMUSCULAR; INTRAVENOUS
Status: DISCONTINUED | OUTPATIENT
Start: 2022-05-16 | End: 2022-05-16 | Stop reason: HOSPADM

## 2022-05-16 RX ORDER — PROPOFOL 10 MG/ML
INJECTION, EMULSION INTRAVENOUS AS NEEDED
Status: DISCONTINUED | OUTPATIENT
Start: 2022-05-16 | End: 2022-05-16 | Stop reason: HOSPADM

## 2022-05-16 RX ADMIN — ROPIVACAINE HYDROCHLORIDE 25 ML: 5 INJECTION, SOLUTION EPIDURAL; INFILTRATION; PERINEURAL at 11:25

## 2022-05-16 RX ADMIN — ACETAMINOPHEN 1000 MG: 500 TABLET ORAL at 10:46

## 2022-05-16 RX ADMIN — PROPOFOL 50 MG: 10 INJECTION, EMULSION INTRAVENOUS at 12:13

## 2022-05-16 RX ADMIN — KETAMINE HYDROCHLORIDE 10 MG: 10 INJECTION, SOLUTION INTRAMUSCULAR; INTRAVENOUS at 13:19

## 2022-05-16 RX ADMIN — DEXMEDETOMIDINE HYDROCHLORIDE 20 MCG: 100 INJECTION, SOLUTION INTRAVENOUS at 11:25

## 2022-05-16 RX ADMIN — Medication 2 G: at 12:19

## 2022-05-16 RX ADMIN — SODIUM CHLORIDE, SODIUM LACTATE, POTASSIUM CHLORIDE, AND CALCIUM CHLORIDE 125 ML/HR: 600; 310; 30; 20 INJECTION, SOLUTION INTRAVENOUS at 10:48

## 2022-05-16 RX ADMIN — DEXAMETHASONE SODIUM PHOSPHATE 8 MG: 4 INJECTION, SOLUTION INTRAMUSCULAR; INTRAVENOUS at 10:48

## 2022-05-16 RX ADMIN — TRANEXAMIC ACID 1950 MG: 650 TABLET ORAL at 10:37

## 2022-05-16 RX ADMIN — PROPOFOL 50 MCG/KG/MIN: 10 INJECTION, EMULSION INTRAVENOUS at 12:18

## 2022-05-16 RX ADMIN — MEPIVACAINE HYDROCHLORIDE 45 MG: 15 INJECTION, SOLUTION EPIDURAL; INFILTRATION at 12:10

## 2022-05-16 RX ADMIN — OXYCODONE 5 MG: 5 TABLET ORAL at 16:20

## 2022-05-16 RX ADMIN — DEXAMETHASONE SODIUM PHOSPHATE 4 MG: 10 INJECTION, SOLUTION INTRAMUSCULAR; INTRAVENOUS at 11:25

## 2022-05-16 RX ADMIN — MIDAZOLAM 2 MG: 1 INJECTION INTRAMUSCULAR; INTRAVENOUS at 11:17

## 2022-05-16 RX ADMIN — SODIUM CHLORIDE, SODIUM LACTATE, POTASSIUM CHLORIDE, AND CALCIUM CHLORIDE 1000 ML: 600; 310; 30; 20 INJECTION, SOLUTION INTRAVENOUS at 10:49

## 2022-05-16 RX ADMIN — KETAMINE HYDROCHLORIDE 10 MG: 10 INJECTION, SOLUTION INTRAMUSCULAR; INTRAVENOUS at 12:13

## 2022-05-16 RX ADMIN — LIDOCAINE HYDROCHLORIDE 60 MG: 20 INJECTION, SOLUTION INTRAVENOUS at 12:13

## 2022-05-16 RX ADMIN — KETAMINE HYDROCHLORIDE 10 MG: 10 INJECTION, SOLUTION INTRAMUSCULAR; INTRAVENOUS at 12:22

## 2022-05-16 RX ADMIN — PANTOPRAZOLE SODIUM 40 MG: 40 TABLET, DELAYED RELEASE ORAL at 10:37

## 2022-05-16 RX ADMIN — KETAMINE HYDROCHLORIDE 10 MG: 10 INJECTION, SOLUTION INTRAMUSCULAR; INTRAVENOUS at 12:55

## 2022-05-16 RX ADMIN — KETAMINE HYDROCHLORIDE 20 MG: 10 INJECTION, SOLUTION INTRAMUSCULAR; INTRAVENOUS at 12:38

## 2022-05-16 NOTE — INTERVAL H&P NOTE
Update History & Physical    The Patient's History and Physical of May 4,   2022 was reviewed with the patient and I examined the patient. There was no change. The surgical site was confirmed by the patient and me. Plan:  The risk, benefits, expected outcome, and alternative to the recommended procedure have been discussed with the patient. Patient understands and wants to proceed with the procedure.     Electronically signed by Catalina Dave MD on 5/16/2022 at 10:09 AM

## 2022-05-16 NOTE — ANESTHESIA PROCEDURE NOTES
Spinal Block    Start time: 5/16/2022 12:00 PM  End time: 5/16/2022 12:10 PM  Performed by: Flaca Miller CRNA  Authorized by: Devan Duran DO     Pre-procedure:   Indications: at surgeon's request and primary anesthetic  Preanesthetic Checklist: patient identified, risks and benefits discussed, anesthesia consent, site marked, patient being monitored and timeout performed    Timeout Time: 12:00 EDT          Spinal Block:   Patient Position:  Seated  Prep Region:  Lumbar  Prep: chlorhexidine      Location:  L4-5  Technique:  Single shot    Local Dose (mL):  2    Needle:   Needle Type:  Pencan  Needle Gauge:  25 G  Attempts:  2      Events: CSF confirmed, no blood with aspiration and no paresthesia        Assessment:  Insertion:  Uncomplicated  Patient tolerance:  Patient tolerated the procedure well with no immediate complications

## 2022-05-16 NOTE — ANESTHESIA PREPROCEDURE EVALUATION
Relevant Problems   No relevant active problems       Anesthetic History               Review of Systems / Medical History  Patient summary reviewed, nursing notes reviewed and pertinent labs reviewed    Pulmonary        Sleep apnea: CPAP  Smoker         Neuro/Psych              Cardiovascular    Hypertension: well controlled              Exercise tolerance: >4 METS     GI/Hepatic/Renal     GERD: well controlled           Endo/Other      Hypothyroidism: well controlled  Obesity and arthritis     Other Findings   Comments: Sickle Cell Disease         Physical Exam    Airway  Mallampati: II  TM Distance: > 6 cm  Neck ROM: normal range of motion   Mouth opening: Normal     Cardiovascular    Rhythm: regular  Rate: normal    Murmur: Grade 1, Aortic area     Dental         Pulmonary  Breath sounds clear to auscultation               Abdominal  GI exam deferred       Other Findings            Anesthetic Plan    ASA: 3  Anesthesia type: spinal and general - backup      Post-op pain plan if not by surgeon: peripheral nerve block single    Induction: Intravenous  Anesthetic plan and risks discussed with: Patient and Spouse      Left ACNB for POA

## 2022-05-16 NOTE — PERIOP NOTES
Phase 2 Recovery Summary    Report received from [facility]:    05/16/22 1532 05/16/22 1546 05/16/22 1601 05/16/22 1632   BP: 128/68 137/65 (!) 141/70 (!) 125/92   Pulse: 61 (!) 58 (!) 57    Resp:    18   Temp:       SpO2: 99% 100% 100%    Weight:       Height:           oriented to time, place, person and situation          Lines and Drains  Peripheral Intravenous Line: Removed prior to discharge  Peripheral IV 05/16/22 Right Forearm (Active)   Site Assessment Clean, dry, & intact 05/16/22 1524   Phlebitis Assessment 0 05/16/22 1524   Infiltration Assessment 0 05/16/22 1524   Dressing Status Clean, dry, & intact 05/16/22 1524   Dressing Type Tape;Transparent 05/16/22 1524   Hub Color/Line Status Green; Infusing 05/16/22 1524       Wound  Wound Chest Lateral;Right;Superior; Upper  05/05/19 (Active)   Number of days: 1107       Wound Leg upper Anterior;Right Bull [place] removed from this area 05/07/19 (Active)   Number of days: 1105       Incision 05/16/22 Knee Left (Active)   Dressing Status Clean;Dry; Intact 05/16/22 1524   Dressing Change Due 05/23/22 05/16/22 1100   Dressing/Treatment Other (Comment) 05/16/22 1524   Margins Approximated 05/16/22 1100   Number of days: 0        Patient assisted to chair with minimal assist. Pateint tolerating liquids well. Patient's family at bedside. Discharge discussed with patient and family. No questions or concerns at this time. Patient had time to ask any questions. Discharge medications reviewed with patient and spouse and appropriate educational materials and side effects teaching were provided. Patient discharged to home with her .        Kat Long RN

## 2022-05-16 NOTE — PROGRESS NOTES
Problem: Mobility Impaired (Adult and Pediatric)  Goal: *Acute Goals and Plan of Care (Insert Text)  Description: PT goals to be met in 1 day:  Pt will be able to perform supine<>sit SBA for transfers at home. Pt will be able to perform sit<>stand SBA for increased ability to transfer at home safely. Pt will be able to participate in gt training >100' w/ RW, WBAT, GB and CGA/SBA for improved ability in home upon d/c. Pt will be able to perform stair training step to pattern, B/U rail and CGA to obtain safe entry into home upon d/c. Pt will be educated regarding HEP per MD protocol for optimal AROM/strength outcomes. Note: [x]  Patient has met MD mobilization critieria for d/c home   [x]  Recommend HH with 24 hour adult care   []  Benefit from additional acute PT session to address:      PHYSICAL THERAPY EVALUATION    Patient: Marco A Richardson (36 y.o. female)  Date: 5/16/2022  Primary Diagnosis: Primary localized osteoarthritis of left knee [M17.12]  Procedure(s) (LRB):  LEFT TOTAL KNEE REPLACEMENT WITH EXCISION OF HETEROTOPIC OSSIFICATION \"SPEC POP\" (Left) Day of Surgery   Precautions:   Fall,WBAT    PLOF: Independent    ASSESSMENT :  Based on the objective data described below, the patient presents with decreased mobility in regards to bed mobility, transfers, gt quality and tolerance, balance, stair negotiation and safety due to L TKA surgery. Decreased AROM of L knee, dec strength of L knee, pain in L knee, dec sensation of L knee also impacting pt functional mobility. Pt rating pain on numerical pain scale pre/post and during session 5/10. Pt and  ed regarding mobility safety, WB, HEP, ice application/use, elevation, environmental safety and home safe techniques. Pt in bathroom upon arrival.  Pt able to perform sit<>stand w/ CGA. Safety vc required throughout session to reinforce safety. Pt able to participate in gt training using RW, GB, WBAT and CGA w/ antalgic gt pattern.   Pt was able to participate in stair training using step to pattern, B rails and CGA. Answered questions by pt and  in regards to PT and mobility. Pt left sitting in recliner w/ all needs within reach. Nurse Aida Palma aware of session and outcomes. Recommend HHPT with responsible adult care at least 24 hours upon hospital d/c. Jared Montiel Patient will benefit from skilled intervention to address the above impairments. Patient's rehabilitation potential is considered to be Good   Factors which may influence rehabilitation potential include:   []         None noted  []         Mental ability/status  []         Medical condition  []         Home/family situation and support systems  []         Safety awareness  [x]         Pain tolerance/management  []         Other:      PLAN :  Recommendations and Planned Interventions:   [x]           Bed Mobility Training             []    Neuromuscular Re-Education  [x]           Transfer Training                   []    Orthotic/Prosthetic Training  [x]           Gait Training                          [x]    Modalities  [x]           Therapeutic Exercises           [x]    Edema Management/Control  [x]           Therapeutic Activities            [x]    Family Training/Education  [x]           Patient Education  []           Other (comment):    Frequency/Duration: Patient will be followed by physical therapy 1-2 times per day/4-7 days per week to address goals. Discharge Recommendations: Home Health  Further Equipment Recommendations for Discharge: N/A     SUBJECTIVE:   Patient stated I am ready to go.     OBJECTIVE DATA SUMMARY:     Past Medical History:   Diagnosis Date    Anemia NEC     Arthritis     Chronic kidney disease     Chronic pain     Ductal carcinoma ([facility] [address] 2011    left breast- surgery, radiation and chemo    Fatigue     Fibromyalgia     GERD (gastroesophageal reflux disease)     Hx of endometriosis     Hypertension 2011    Ill-defined condition 02/2018    Sickle cell crisis Osteoarthritis of left hip 8/17/2016    Osteoarthritis of right hip 1/3/2017    Osteoarthritis of right knee 6/28/2018    Primary localized osteoarthritis of left knee 5/4/2022    Sickle cell anemia (HCC)     Sleep apnea     Uses cpap- instructed to bring dos    Thyroid disease     hypo     Past Surgical History:   Procedure Laterality Date    COLONOSCOPY N/A 7/21/2020    COLONOSCOPY performed by Savannah Martins MD at [facility] 88    HX BREAST BIOPSY Left 2016    [facility] 64    [address]X [address]      as a child, umbilical    HX KNEE REPLACEMENT Right     HX LAP CHOLECYSTECTOMY  2015    HX MASTECTOMY Left 03/2012    with axillary lymph node dissection    HX TUBAL LIGATION  1998    [place] TOTAL HIP ARTHROPLASTY Bilateral 2016 & 2017     Barriers to Learning/Limitations: yes;  anesthesia   Compensate with: Visual Cues, Verbal Cues, and Tactile Cues  Home Situation:  Home Situation  Home Environment: Private residence  # Steps to Enter: 4  Rails to Enter: Yes  Hand Rails : Left  One/Two Story Residence: Two story  # of Interior Steps: 15  Interior Rails: Left (transitions to R)  Lift Chair Available: No  Living Alone: No  Support Systems: Spouse/Significant Other  Patient Expects to be Discharged to[de-identified] Home with home health  Current DME Used/Available at Home: Cane, straight,Walker, rolling  Tub or Shower Type: Shower (seat)  Critical Behavior:  Neurologic State: Alert  Orientation Level: Oriented to person;Oriented to place;Oriented to situation  Cognition: Follows commands  Safety/Judgement: Awareness of environment  Psychosocial  Patient Behaviors: Calm; Cooperative  Family  Behaviors: Supportive;Calm  Skin Condition/Temp: Dry;Warm  Family  Behaviors: Supportive;Calm  Skin Integrity: Incision (comment) (L knee)  Skin Integumentary  Skin Color: Appropriate for ethnicity  Skin Condition/Temp: Dry;Warm  Skin Integrity: Incision (comment) (L knee)  Strength:    Strength: Generally decreased, functional  Tone & Sensation:   Tone: Normal  Sensation: Impaired (L knee)  Range Of Motion:  AROM: Generally decreased, functional  Posture:  Functional Mobility:  Bed Mobility:  Scooting: Stand-by assistance (vc)  Transfers:  Sit to Stand: Contact guard assistance;Stand-by assistance (vc)  Stand to Sit: Contact guard assistance;Stand-by assistance (vc)  Balance:   Sitting: Intact  Standing: Intact; With support  Ambulation/Gait Training:  Distance (ft): 150 Feet (ft)  Assistive Device: Walker, rolling;Gait belt  Ambulation - Level of Assistance: Contact guard assistance (vc)  Gait Abnormalities: Antalgic;Decreased step clearance; Step to gait  Left Side Weight Bearing: As tolerated  Base of Support: Shift to right  Stance: Left decreased  Speed/Yoana: Slow  Step Length: Left shortened;Right shortened  Swing Pattern: Left asymmetrical;Right asymmetrical  Interventions: Safety awareness training; Tactile cues; Verbal cues; Visual/Demos  Stairs:  Number of Stairs Trained: 5  Stairs - Level of Assistance: Contact guard assistance (vc)  Rail Use: Both     Therapeutic Exercises:   Encouraged HEP  Pain:  Pain level pre-treatment: 5/10   Pain level post-treatment: 5/10   Pain Intervention(s) : Medication (see MAR); Rest, Ice, Repositioning  Response to intervention: Nurse notified, See doc flow    Activity Tolerance:   Fair   Please refer to the flowsheet for vital signs taken during this treatment. After treatment:   [x]         Patient left in no apparent distress sitting up in chair  []         Patient left in no apparent distress in bed  [x]         Call bell left within reach  [x]         Nursing notified  [x]         Caregiver present  []         Bed alarm activated  []         SCDs applied    COMMUNICATION/EDUCATION:   [x]         Role of Physical Therapy in the acute care setting. [x]         Fall prevention education was provided and the patient/caregiver indicated understanding.   [x]         Patient/family have participated as able in goal setting and plan of care. [x]         Patient/family agree to work toward stated goals and plan of care. []         Patient understands intent and goals of therapy, but is neutral about his/her participation. []         Patient is unable to participate in goal setting/plan of care: ongoing with therapy staff.  []         Other:     Thank you for this referral.  Ana Luisa Parish, PT   Time Calculation: 13 mins      Eval Complexity: History: HIGH Complexity :3+ comorbidities / personal factors will impact the outcome/ POC Exam:MEDIUM Complexity : 3 Standardized tests and measures addressing body structure, function, activity limitation and / or participation in recreation  Presentation: LOW Complexity : Stable, uncomplicated  Clinical Decision Making:Low Complexity    Overall Complexity:LOW

## 2022-05-16 NOTE — PROGRESS NOTES
Problem: Self Care Deficits Care Plan (Adult)  Goal: *Acute Goals and Plan of Care (Insert Text)  Description: Initial Occupational Therapy Goals (5/16/2022) Within 7 day(s):    1. Patient will perform grooming standing sinkside with supervision for increased independence with ADLs. 2. Patient will perform LB dressing with supervision & A/E PRN for increased independence with ADLs. 3. Patient will perform toilet transfer with supervision for increased independence with ADLs. 4. Patient will perform all aspects of toileting with supervision for increased independence with ADLs. 5. Patient will independently apply energy conservation techniques with 1 verbal cue(s)for increased independence with ADLs. 6. Patient will perform bathroom mobility with supervision for increased independence/safety with ADLs. Outcome: Progressing Towards Goal     OCCUPATIONAL THERAPY EVALUATION    Patient: Dala Phalen (89 y.o. female)  Date: 5/16/2022  Primary Diagnosis: Primary localized osteoarthritis of left knee [M17.12]  Procedure(s) (LRB):  LEFT TOTAL KNEE REPLACEMENT WITH EXCISION OF HETEROTOPIC OSSIFICATION \"SPEC POP\" (Left) Day of Surgery   Precautions: Fall,WBAT  PLOF: pt independent for ADLs/functional mobility    ASSESSMENT AND RECOMMENDATIONS:  Based on the objective data described below, the patient presents with LLE decreased ROM and strength affecting LE ADLs. Pt found ambulating in bathroom with RW, reporting pain 5/10, agreeable to therapy. Pt ambulated to recliner chair,  for safe use of RW. Pt already dressed, educated on adaptive strategies and safety with lower body ADLs with pt verbalizing understanding. Pt able to don slip on shoes with setup/SBA. Pt CGA/SBA for STS and to ambulate functional household distance. Pt ambulated back to recliner, ice applied to L knee. Spouse present during session for education on home safety.  Provided opportunity for pt to voice questions on ADL performance when home, pt has no further concerns. Patient will benefit from skilled Occupational Therapy intervention to maximize safety/independence with ADLs at d/c.    Education: Reviewed home safety, body mechanics, importance of moving every hour to prevent joint stiffness, role of ice for edema/pain control, Rolling Walker management/safety, and adaptive dressing techniques with patient verbalizing  understanding at this time     Patient will benefit from skilled intervention to address the above impairments. Patient's rehabilitation potential is considered to be Good  Factors which may influence rehabilitation potential include:   [x]             None noted  []             Mental ability/status  []             Medical condition  []             Home/family situation and support systems  []             Safety awareness  []             Pain tolerance/management  []             Other:        PLAN :  Recommendations and Planned Interventions:   [x]               Self Care Training                  [x]      Therapeutic Activities  [x]               Functional Mobility Training   []      Cognitive Retraining  []               Therapeutic Exercises           []      Endurance Activities  []               Balance Training                    []      Neuromuscular Re-Education  []               Visual/Perceptual Training     [x]      Home Safety Training  [x]               Patient Education                   [x]      Family Training/Education  []               Other (comment):    Frequency/Duration: Patient will be followed by Occupational Therapy 1-2 times per day/4-7 days per week to address goals. Discharge Recommendations: Home health with adult supervision at least 24 hours after d/c  Further Equipment Recommendations for Discharge: N/A     SUBJECTIVE:   Patient stated i'm ready to go.     OBJECTIVE DATA SUMMARY:     Past Medical History:   Diagnosis Date    Anemia NEC     Arthritis     Chronic kidney disease     Chronic pain     Ductal carcinoma ([facility] [address] 2011    left breast- surgery, radiation and chemo    Fatigue     Fibromyalgia     GERD (gastroesophageal reflux disease)     Hx of endometriosis     Hypertension 2011    Ill-defined condition 02/2018    Sickle cell crisis    Osteoarthritis of left hip 8/17/2016    Osteoarthritis of right hip 1/3/2017    Osteoarthritis of right knee 6/28/2018    Primary localized osteoarthritis of left knee 5/4/2022    Sickle cell anemia (HCC)     Sleep apnea     Uses cpap- instructed to bring dos    Thyroid disease     hypo     Past Surgical History:   Procedure Laterality Date    COLONOSCOPY N/A 7/21/2020    COLONOSCOPY performed by Mickey Bishop MD at [facility] BIOPSY Left 2016    [address] [place]      as a child, umbilical    HX KNEE REPLACEMENT Right     HX LAP CHOLECYSTECTOMY  2015    HX MASTECTOMY Left 03/2012    with axillary lymph node dissection    HX TUBAL LIGATION  1998    CT TOTAL HIP ARTHROPLASTY Bilateral 2016 & 2017     Barriers to Learning/Limitations: yes;  physical and other (post-anesthesia)  Compensate with: visual, verbal, tactile, kinesthetic cues/model    Home Situation/Prior Level of Function:   Home Situation  Home Environment: Private residence  # Steps to Enter: 4  Rails to Enter: Yes  Hand Rails : Left  One/Two Story Residence: Two story  # of Interior Steps: [address]: Left (transitions to R)  Lift Chair Available: No  Living Alone: No  Support Systems: Spouse/Significant Other  Patient Expects to be Discharged to[de-identified] Home with home health  Current DME Used/Available at Home: Cane, straight,Walker, rolling  Tub or Shower Type: Shower (seat)  []  Right hand dominant   []  Left hand dominant    Cognitive/Behavioral Status:  Neurologic State: Alert  Orientation Level: Oriented to person;Oriented to place;Oriented to situation  Cognition: Follows commands  Safety/Judgement: Awareness of environment    Skin: L knee incision w/ Mepilex Edema: compression hose in place & applied ice     Coordination: BUE  Coordination: Within functional limits  Fine Motor Skills-Upper: Left Intact; Right Intact    Gross Motor Skills-Upper: Left Intact; Right Intact    Balance:  Sitting: Intact  Standing: Intact; With support    Strength: BUE  Strength: Generally decreased, functional    Tone & Sensation:BUE  Tone: Normal  Sensation: Impaired (L knee)    Range of Motion: BUE  AROM: Generally decreased, functional    Functional Mobility and Transfers for ADLs:  Bed Mobility:  Scooting: Stand-by assistance (vc)  Transfers:  Sit to Stand: Contact guard assistance;Stand-by assistance (vc)   Bathroom Mobility: Contact guard assistance;Stand-by assistance    ADL Assessment:  Feeding: Independent  Oral Facial Hygiene/Grooming: Stand-by assistance  Bathing: Contact guard assistance  Upper Body Dressing: Supervision  Lower Body Dressing: Contact guard assistance  Toileting: Stand by assistance    ADL Intervention:  Lower Body Dressing Assistance  Dressing Assistance: Stand-by assistance  Slip on Shoes Without Back: Stand-by assistance  Leg Crossed Method Used: No  Position Performed: Seated in chair  Cues: Verbal cues provided;Visual cues provided    Cognitive Retraining  Safety/Judgement: Awareness of environment    Pain:  Pain level pre-treatment: 5/10  Pain level post-treatment: 5/10  Pain Intervention(s): Medication administer by Nursing (see MAR); Rest, Ice, Repositioning   Response to intervention: Nurse notified, see doc flow     Activity Tolerance:   Fair. Patient able to stand ~5 minute(s). Patient able to complete ADLs with intermittent rest breaks. Patient limited by strength, ROM, pain. Patient unsteady. Please refer to the flowsheet for vital signs taken during this treatment.   After treatment:   [x]  Patient left in no apparent distress sitting up in chair  []  Patient sitting on EOB  []  Patient left in no apparent distress in bed  [x]  Call bell left within reach  [x]  Nursing notified  [x]  Caregiver present  [x]  Ice applied  []  SCD's on while back in bed  [] Bed alarm activated    COMMUNICATION/EDUCATION:   Communication/Collaboration:  [x]       Role of Occupational Therapy in the acute care setting. [x]      Home safety education was provided and the patient/caregiver indicated understanding. [x]      Patient/family have participated as able in goal setting and plan of care. [x]      Patient/family agree to work toward stated goals and plan of care. []      Patient understands intent and goals of therapy, but is neutral about his/her participation. []      Patient is unable to participate in plan of care at this time. Thank you for this referral.  Lesly Parr OTR/L  Time Calculation: 12 mins    Eval Complexity: History: MEDIUM Complexity : Expanded review of history including physical, cognitive and psychosocial  history ; Examination: LOW Complexity : 1-3 performance deficits relating to physical, cognitive , or psychosocial skils that result in activity limitations and / or participation restrictions ;    Decision Making:LOW Complexity : No comorbidities that affect functional and no verbal or physical assistance needed to complete eval tasks

## 2022-05-16 NOTE — ANESTHESIA PROCEDURE NOTES
Peripheral Block    Start time: 5/16/2022 11:17 AM  End time: 5/16/2022 11:26 AM  Performed by: Melchor Luevano DO  Authorized by: Melchor Luevano DO       Pre-procedure: Indications: at surgeon's request and post-op pain management    Preanesthetic Checklist: patient identified, risks and benefits discussed, site marked, timeout performed, anesthesia consent given and patient being monitored    Timeout Time: 11:17 EDT          Block Type:   Block Type: Adductor canal block  Laterality:  Left  Monitoring:  Standard ASA monitoring, responsive to questions, oxygen, continuous pulse ox, frequent vital sign checks and heart rate  Injection Technique:  Single shot  Procedures: ultrasound guided and nerve stimulator    Patient Position: supine  Prep: chlorhexidine    Location:  Mid thigh  Needle Type:  Stimuplex  Needle Gauge:  20 G  Needle Localization:  Nerve stimulator and ultrasound guidance  Medication Injected:  Ropivacaine (PF) (NAROPIN) 5 mg/mL (0.5 %) injection, 25 mL  dexamethasone (PF) (DECADRON) 10 mg/mL injection, 4 mg  dexmedeTOMidine (PRECEDEX) 100 mcg/mL iv solution, 20 mcg  Med Admin Time: 5/16/2022 11:25 AM    Assessment:  Number of attempts:  1  Injection Assessment:  Incremental injection every 5 mL, no paresthesia, ultrasound image on chart, no intravascular symptoms, negative aspiration for blood and local visualized surrounding nerve on ultrasound  Patient tolerance:  Patient tolerated the procedure well with no immediate complications  10 mL injected near NVM using nerve stim.

## 2022-05-16 NOTE — PERIOP NOTES
Rena Comes from blood bank requested that Sukhi Whelan be notified that pt has 7 antibodies and it will be some time before the blood bank will have compatible blood products. Sukhi Whelan notified via telephone, orders to proceed.  notified in person as well. No new orders.

## 2022-05-16 NOTE — DISCHARGE SUMMARY
[address] [facility] [place] [id number]   [facility] [place] [address]     DISCHARGE SUMMARY     PATIENT: Gauri Dunbar     MRN: [id number]   ADMIT DATE: 2022   BILL[place]   DISCHARGE DATE:      ATTENDING: Flash Barrera MD   DICTATING: MARTIN Granados         ADMISSION DIAGNOSIS: Primary localized osteoarthritis of left knee [M17.12]    DISCHARGE DIAGNOSIS: Status post LEFT TOTAL KNEE ARTHROPLASTY    HISTORY OF PRESENT ILLNESS: The patient is a 64y.o. year-old female   with ongoing left knee pain secondary to osteoarthritis of her left knee with surrounding heterotopic ossification along the medial femoral condyle. The patient's pain has persisted and progressed despite conservative treatments and therapies. The patient has at this time opted for surgical intervention.     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, radiation and chemo    Fatigue     Fibromyalgia     GERD (gastroesophageal reflux disease)     Hx of endometriosis     Hypertension     Ill-defined condition 2018    Sickle cell crisis    Osteoarthritis of left hip 2016    Osteoarthritis of right hip 1/3/2017    Osteoarthritis of right knee 2018    Primary localized osteoarthritis of left knee 2022    Sickle cell anemia (HCC)     Sleep apnea     Uses cpap- instructed to bring dos    Thyroid disease     hypo       PAST SURGICAL HISTORY:   Past Surgical History:   Procedure Laterality Date    COLONOSCOPY N/A 2020    COLONOSCOPY performed by Cheryl Murrell MD at [facility] HX BREAST BIOPSY Left     HX [address]    HX [address]      as a child, umbilical    HX KNEE REPLACEMENT Right     HX LAP CHOLECYSTECTOMY  2015    HX MASTECTOMY Left 2012    with axillary lymph node dissection    HX TUBAL LIGATION  1998    TN TOTAL HIP ARTHROPLASTY Bilateral 2016 & 2017       ALLERGIES:   Allergies   Allergen Reactions    Neulasta [Pegfilgrastim] Other (comments)     \"Put me in a comma for 3 months\"        CURRENT MEDICATIONS:  A list of medications prior to the time of admission include:  Prior to Admission medications    Medication Sig Start Date End Date Taking? Authorizing Provider   apixaban (Eliquis) 2.5 mg tablet Take 1 Tablet by mouth two (2) times a day for 14 days. 5/16/22 5/30/22 Yes Checo Peraza PA   cefadroxil (DURICEF) 500 mg capsule Take 1 Capsule by mouth two (2) times a day for 5 days. 5/16/22 5/21/22 Yes Checo Peraza PA   oxyCODONE IR (ROXICODONE) 5 mg immediate release tablet Take 1 Tablet by mouth every four (4) hours as needed for Pain for up to 7 days. Max Daily Amount: 30 mg. 5/16/22 5/23/22 Yes Checo Peraza PA   oxyCODONE IR (ROXICODONE) 5 mg immediate release tablet Take 5 mg by mouth every eight (8) hours as needed for Pain. Yes Provider, Historical   linaCLOtide (Linzess) 145 mcg cap capsule Take 145 mcg by mouth Daily (before breakfast). Yes Provider, Historical   metoprolol succinate (TOPROL-XL) 25 mg XL tablet Take 25 mg by mouth nightly. Yes Provider, Historical   thyroid, Pork, (ARMOUR THYROID) 30 mg tablet Take 120 mg by mouth daily. Yes Provider, Historical   folic acid (FOLVITE) 1 mg tablet Take 1 mg by mouth daily. Yes Provider, Historical   ergocalciferol (VITAMIN D2) 50,000 unit capsule Take 50,000 Units by mouth every seven (7) days. Provider, Historical   oxyCODONE ER (OXYCONTIN) 10 mg ER tablet Take 10 mg by mouth every twelve (12) hours.     Provider, Historical       FAMILY HISTORY:   Family History   Problem Relation Age of Onset    Cancer Mother         breast    Diabetes Mother     Heart Disease Father     Diabetes Father     Sickle Cell Anemia Brother     Stroke Neg Hx     Heart Attack Neg Hx        SOCIAL HISTORY:   Social History Socioeconomic History    Marital status:    Tobacco Use    Smoking status: Former Smoker     Packs/day: 0.25     Years: 30.00     Pack years: 7.50     Quit date: 2011     Years since quittin.3    Smokeless tobacco: Never Used    Tobacco comment: Does smoke every now and then, instructed not to smoke 24 hours prior to [employer] Vaping Use: Never used   Substance and Sexual Activity    Alcohol use: Yes     Comment: 2 times yearly    Drug use: No    Sexual activity: Not Currently       REVIEW OF SYSTEMS: All review of systems are negative. PHYSICAL EXAMINATION: For a detailed physical exam, please refer to the patient's chart. HOSPITAL COURSE: The patient was taken to surgery the day of admission. she underwent a left total knee replacement/excision of H.O. Operative course was benign. Estimated blood loss was approximately 250 cc. The patient was taken to the PACU in stable condition and was later taken to the floor in stable condition. During her hospital stay, the patient progressed well with physical therapy and occupational therapy, adherent to instructions. she had been cleared by physical therapy with stair training. she was placed on Eliquis for DVT prophylaxis. her pain has been well controlled with oral pain medications. her vitals have remained stable. she has also remained hemodynamically stable. The patient has been recommended for discharge home. DISCHARGE INSTRUCTIONS: The patient is to be discharged home. she is to continue on her prior medications per the medication reconciliation form, to which we will add:  1. Eliquis 2.5 mg; 1 tablet po bid x 14 days  2. Duricef 500 mg; 1 tablet po bid x 5 days  3.  Roxicodone 5 mg; 1 tablets p.o. every 4 hours p.r.n. for pain    The patient is to continue at home with home physical therapy 3 times a week to work on gait training, range of motion, strengthening, and weightbearing exercises as tolerated on her left lower extremity. The patient is to progress from a walker to a cane to complete total weightbearing as tolerable. The patient is to continue to keep her incision dry. The patient is to followup with Adilia Posey PA-C and/or César Lara PA-C in the office approximately 10-14 days status post for x-rays and further evaluation.     Sawyer Brooke  5/59/15001:17 PM

## 2022-05-16 NOTE — PERIOP NOTES
Glasses in clear plastic bag with patient label on chart. Patient wished to keep glasses per Caitlin Jackson RN. Given to PACU post procedure.

## 2022-05-16 NOTE — ANESTHESIA POSTPROCEDURE EVALUATION
Procedure(s):  LEFT TOTAL KNEE REPLACEMENT WITH EXCISION OF HETEROTOPIC OSSIFICATION \"SPEC POP\". spinal, general - backup    Anesthesia Post Evaluation      Multimodal analgesia: multimodal analgesia used between 6 hours prior to anesthesia start to PACU discharge  Patient location during evaluation: PACU  Patient participation: complete - patient participated  Level of consciousness: awake and alert  Pain score: 0  Pain management: adequate  Airway patency: patent  Anesthetic complications: no  Cardiovascular status: acceptable  Respiratory status: acceptable  Hydration status: acceptable  Post anesthesia nausea and vomiting:  none  Final Post Anesthesia Temperature Assessment:  Normothermia (36.0-37.5 degrees C)      INITIAL Post-op Vital signs:   Vitals Value Taken Time   /66 05/16/22 1506   Temp 36.7 °C (98.1 °F) 05/16/22 1451   Pulse 59 05/16/22 1509   Resp 16 05/16/22 1509   SpO2 98 % 05/16/22 1509   Vitals shown include unvalidated device data.

## 2022-05-16 NOTE — OP NOTES
[address] [facility]  Total Knee Arthroplasty    Patient: Yasmin Rubio MRN: [id number]  SSN: [id number]    YOB: 1965  Age: 64 y.o. Sex: female      Date of Surgery: 5/16/2022   Preoperative Diagnosis: LEFT KNEE OSTEOARTHRITIS   Postoperative Diagnosis: LEFT KNEE OSTEOARTHRITIS   Location: [facility]  Surgeon: Nelda Koyanagi, MD  Assistant: Dianne Hatchet, PA-C    Anesthesia: Spinal and adductor canal Nerve Block    Procedure: Total Knee Arthroplasty: and excision of heterotopic bone  The complexity of the total joint surgery requires the use of a first assistant for positioning, retraction and assistance in closure. Tourniquet Time: Tourniquet not used. Estimated Blood Loss: Less than 100cc     Implants:   Implant Name Type Inv. Item Serial No.  Lot No. LRB No. Used Action   CEMENT BNE 40GM FULL DOSE PMMA W/O ANTIBIO HI VISC N RADPQ - [id number]  CEMENT BNE 40GM FULL DOSE PMMA W/O ANTIBIO HI VISC N RADPQ  [employer]WD [id number] Left 2 Implanted   COMPONENT FEM SZ 4 L KNEE NP CRUCE RET BKS TRIMAX - [id number]  COMPONENT FEM SZ 4 L KNEE NP CRUCE RET BKS TRIMAX  [employer]WD [id number] Left 1 Implanted   TRAY TIB SZ 4 NP A BAL KNEE - [id number]  TRAY TIB SZ 4 NP A BAL KNEE  [employer]WD [id number] Left 1 Implanted   INSERT TIB SZ 4 THK8MM UCONG BKS E VITALIZE - [id number]  INSERT TIB SZ 4 THK8MM UCONG BKS E VITALIZE  ORTHO DEVELOPMENT [employer]WD [id number] Left 1 Implanted   COMPONENT PAT H32MM STD E VITALIZE ELMER HASMUKH RND BAL SYS - [id number]  COMPONENT PAT H32MM STD E VITALIZE ELMER HASMUKH RND BAL SYS  [employer] [id number] Left 1 Implanted        Specimens: None    Additional Findings: None     Complications: none    Body Mass Index: Body mass index is 34.07 kg/m².     Procedure Detail:  Prior to the surgery the patient was administered a femoral nerve block in the preoperative holding area by the anesthesiologist. Zuleyma Tripp was brought to the operating room and positioned on the operating table. She was anesthetized with anesthesia. Intravenous antibiotics were administered. Prior to the incision being made a timeout was called identifying the patient, procedure, operative side, and surgeon. A pneumatic tourniquet was placed about the limb and the left leg was prepped and draped in the usual sterile manner. The tourniquet was not inflated throughout the case. A midline anterior incision made over the knee. The incision was carried down through the subcutaneous tissue to the underlying capsule. A medial parapatellar capsular incision was performed. The medial capsular flap was carefully elevated around to the posterior medial corner protecting the medial collateral ligaments and the fibers. The patella was sized with a caliper, and approximately 10-12 mm was resected with an oscillating saw allowing the patella to be slid into the lateral gutter. It was not everted throughout the case. The large area of heterotopic bone along the medial femoral condyle was excised carefully preserving the mcl. Our attention was first turned to the distal femur and using intermedullary instrumentation, a 5-degree valgus cut was on the distal end of the femur. The distal end of the femur was sized to a size 4 femoral component. Pins were inserted through the sizer and the corresponding 4-in-1 block was slid into place and pinned for stability. Anterior and posterior and chamfer cuts were made to accommodate the femoral component. The medial and lateral menisci were excised as were the anterior cruciate ligaments. Our attention was then turned to the tibia. Using extramedullary instrumentation, a 3-degree cut was made on the proximal end of the tibia. A spacer block was placed to show gaps had been balanced and a size 4  tibial base plate was placed on the tibia and pinned into place.  Intramedullary reaming guide was placed on the tibia and the appropriate reamer was used followed by the keel punch to complete the preparation of the tibia. A trial femoral component was then impacted on to the distal end of the femur. Trial reduction was then performed with incremental size trial bearing surfaces. The orthodevelopment BKS trimax UC 8mm bearing surface was inserted and allowed for full extension, good medial, lateral stability at 90 degrees of flexion, especially medially. Our attention was then turned to the patella. The patella was sized to a 32mm patella. The guide was pinned, placed over patella, 3 holes were drilled. Trial patella button was inserted and the patella was reduced in the knee. The patella tracked normally using no-touch technique. The trial components were then all removed. The real components were opened on the back. The cut surfaces of the bone were prepared using the PulsaVac lavage. Prior to this, the Aquamantys was used to cauterize any soft tissue bleeding. 40 grams of Depuy HV cement was mixed. The femoral and tibial components  and patellar component was cemented into place. Excess cement was removed from around the edge of bone using plastic curette. Once the cement had hardened, the knee was placed through range of motion and noted to be stable as mentioned above with the trail components. The wound was dry, therefore no drain was used. The operative knee was injected with 20 cc of exparel. The knee was then soaked with a diluted betadine solution for approximately 3 min. This was then thoroughly irrigated. The capsular layer was closed using a #2 stratafix suture with the knee flexed 90 degrees, while subcutaneous layers were closed using 2-0 Vicryl suture. Finally the skin was closed using Prineo, which were applied in occlusive fashion and sterile bandage applied. All sponge and needle counts were correct.   She was taken to the recovery room extubated in stable condition.     Signed By: Coyd Gar MD     May 16, 2022

## 2022-05-16 NOTE — PERIOP NOTES
Reviewed PTA medication list with patient/caregiver and patient/caregiver denies any additional medications. Patient admits to having a responsible adult care for them at home for at least 24 hours after surgery. Patient encouraged to use gown warming system and informed that using said warming gown to regulate body temperature prior to a procedure has been shown to help reduce the risks of blood clots and infection. Patient's pharmacy of choice verified and documented in PTA medication section. Dual skin assessment & fall risk band verification completed with Max AGUILLON.

## 2022-05-16 NOTE — DISCHARGE INSTRUCTIONS
DISCHARGE SUMMARY from Nurse    PATIENT INSTRUCTIONS:    After general anesthesia or intravenous sedation, for 24 hours or while taking prescription Narcotics:  · Limit your activities  · Do not drive and operate hazardous machinery  · Do not make important personal or business decisions  · Do  not drink alcoholic beverages  · If you have not urinated within 8 hours after discharge, please contact your surgeon on call. Report the following to your surgeon:  · Excessive pain, swelling, redness or odor of or around the surgical area  · Temperature over 100.5  · Nausea and vomiting lasting longer than 4 hours or if unable to take medications  · Any signs of decreased circulation or nerve impairment to extremity: change in color, persistent  numbness, tingling, coldness or increase pain  · Any questions    What to do at Home:  Recommended activity: Activity as tolerated and no driving for today,     *  Please carry medication information at all times in case of emergency situations. These are general instructions for a healthy lifestyle:    No smoking/ No tobacco products/ Avoid exposure to second hand smoke  Surgeon General's Warning:  Quitting smoking now greatly reduces serious risk to your health. Obesity, smoking, and sedentary lifestyle greatly increases your risk for illness    A healthy diet, regular physical exercise & weight monitoring are important for maintaining a healthy lifestyle    You may be retaining fluid if you have a history of heart failure or if you experience any of the following symptoms:  Weight gain of 3 pounds or more overnight or 5 pounds in a week, increased swelling in our hands or feet or shortness of breath while lying flat in bed. Please call your doctor as soon as you notice any of these symptoms; do not wait until your next office visit. The discharge information has been reviewed with the patient and caregiver.   The patient and caregiver verbalized understanding. Discharge medications reviewed with the patient and caregiver and appropriate educational materials and side effects teaching were provided    Patient armband removed and shredded  ___________________________________________________________________________________________________________________________________  [address] [facility]   Patient Discharge Instructions    Venkatesh Wilmar / [id number] : 1965    Admitted (Not on file) Discharged: 2022     IF YOU HAVE ANY PROBLEMS ONCE YOU ARE  [facility] [address]:   Main office number: [phone]    Your follow up appointment to see either Dr. Krystian Mulligan PA-C, or [facility]ERTO as scheduled in 2 weeks. If you are unsure of your appointment date call the office at [phone]. Medication Instructions     · Resume your home medictions as directed, you may have directed not to resume supplements until after your follow up. · A prescription for pain medication has been given   · It is important that you take the medication exactly as they are prescribed. · Keep your medication in the bottles provided by the pharmacist and keep a list of the medication names, dosages, and times to be taken in your wallet. · Do not take other medications without consulting your doctor. What to do at [address] [place] [address] Ave your prehospital diet. If you have excessive nausea or vomitting call your doctor's office. Be sure to maintain adequate fluid intake. Some pain medications may cause constipation. Remember to drink fluids, stay as active as possible, and eat plenty of fiber-rich foods. Begin In-Home Physical Therapy; 3 times a week to work on gait training, range of motion, strengthening, and weight bearing exercises as tolerable. Continue to use your walker or cane when walking. May progress from the walker to a cane to complete total bearing as tolerable. Patient may shower.   Wrap incision with plastic wrap/covering to prevent incision from getting wet. Avoid complete immersion. YOUR DRESSING SHOULD BE CHANGED BY YOUR HOME HEALTH NURSE 5-7 AFTER SURGERY ACCORDING TO THE DATE WRITTEN ON YOUR DRESSING. When to Call    - Call if you have a temperature greater then 101  - Unable to keep food down  - Are unable to bear any wieght   - Need a pain medication refill     Information obtained by :  I understand that if any problems occur once I am at home I am to contact my physician. I understand and acknowledge receipt of the instructions indicated above.                                                                                                                                            Physician's or R.N.'s Signature                                                                  Date/Time                                                                                                                                              Patient or Representative Signature                                                          Date/Time

## 2022-05-17 ENCOUNTER — HOME CARE VISIT (OUTPATIENT)
Dept: HOME HEALTH SERVICES | Facility: HOME HEALTH | Age: 57
End: 2022-05-17

## 2022-05-18 ENCOUNTER — HOME CARE VISIT (OUTPATIENT)
Dept: HOME HEALTH SERVICES | Facility: HOME HEALTH | Age: 57
End: 2022-05-18
Payer: COMMERCIAL

## 2022-05-18 ENCOUNTER — HOME CARE VISIT (OUTPATIENT)
Dept: SCHEDULING | Facility: HOME HEALTH | Age: 57
End: 2022-05-18
Payer: COMMERCIAL

## 2022-05-18 VITALS
OXYGEN SATURATION: 100 % | SYSTOLIC BLOOD PRESSURE: 140 MMHG | DIASTOLIC BLOOD PRESSURE: 84 MMHG | HEART RATE: 61 BPM | TEMPERATURE: 97.5 F | RESPIRATION RATE: 16 BRPM

## 2022-05-18 PROCEDURE — G0151 HHCP-SERV OF PT,EA 15 MIN: HCPCS

## 2022-05-18 PROCEDURE — 400018 HH-NO PAY CLAIM PROCEDURE

## 2022-05-18 PROCEDURE — A6213 FOAM DRG >16<=48 SQ IN W/BDR: HCPCS

## 2022-05-18 PROCEDURE — 400013 HH SOC

## 2022-05-18 NOTE — HOME HEALTH
PT INITIAL EVALUATION  Patient complains of pain to the left knee and difficulty ambulating, which has progressively worsened over several months. X-rays showed osteoarthritis of the joint with some surrounding H.O. The patient's pain has persisted and progressed despite conservative treatments and therapies. The patient has been previously treated with nsaids. 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    Fatigue      Fibromyalgia      GERD (gastroesophageal reflux disease)      Hx of endometriosis      Hypertension 2011    Ill-defined condition 02/2018      Sickle cell crisis    Osteoarthritis of left hip 8/17/2016    Osteoarthritis of right hip 1/3/2017    Osteoarthritis of right knee 6/28/2018    Paroxysmal atrial tachycardia (HCC)      Primary localized osteoarthritis of left knee 5/4/2022    Sickle cell anemia (HCC)      Sleep apnea        Does not use CPAP    Thyroid disease        hypo   Recent H/o current illness:  64 year old female presents with MD referral for HHPT s/p hospitalization due to left TKR  Medication Management:  assists with medication management  Social hx and home eval:  Pt lives in 2 story home with .    Caregiver Involvement: assists with ADL's, functional mobility and MD appointments  PLOF:  Pt PLOF is ambulation w no AD, pts base level of function independent with all ADL's  BALANCE:     Seated unsupported balance is fair   Standing static balance is fair-  Standing dynamic balance is poor  Tinetti 6 /28    Patient is at risk for falls due to recent hospitalization and left TKR  BLE Strength:  Left Hip flexion 3-/5 , hip abduction 3-/5, hip adduction 3-/5, Knee flexion 2-/5  knee extension 2-/5, ankle dorsiflexion 3-/5  Right Hip flexion 3/5 , hip abduction 3/5, hip adduction 3/5, Knee flexion 3/5  knee extension 3/5, ankle dorsiflexion 3/5  BLE ROM:  Right hip/knee/ankle: WFL  Left hip/ankle: WFL   KNEE: 20 - 60 degrees  Bed mobility:  mod/max   Transfers:  mod/max with sit<->stand for bed to chair, with rollator AD. mod cues and instruction needed for safety and sequencing  GAIT:  Patient ambulated  10 ft  with rollator  on level  surfaces mod/max. Pt demonstrates with decreased hip and knee flexion on LLE in pre and mid swing phase of gait as well as decreased stride-length and rochelle. Patient is unable to safely ambulate without assistance at this time. Pt required mod cues for  safety and sequencing  Stairs: NT  Patient education provided this visit: Safety with functional mobility and instruction with HEP. Patient level of understanding of education provided: good ,able to return demonstrate mobility instruction and HEP with mod assistance  Patient response to treatment:  fair with no c/o increased pain  Instructed patient with regards to signs and symptoms of infection. WOUND: see wound LDA  Collaborated with PTA on 5/23/22 dressing change. Assessment: Referral for HHPT following recent hospitalization due to left TKR. HHPT is medically necessary to address the following clinical findings: decreased BLE strength and ROM, impaired gait, decreased I and safety with transfers and gait, decreased endurance, decreased balance and decreased safety in order to improve functional mobility and decrease fall risk. Pt is a fall risk as indicated by Tinetti score of 6/28. Patient will be seen for HHPT 3w2, 2w1 for therapeutic exercises to increase BLE strength and ROM,  training for gait, stairs and transfers to increase I and safety with daily functional mobility and balance and endurance activities to decrease fall risk, decrease impairment and increase functional mobility and independence in the home.   Pt. requires skilled PT intervention to instruct Pt. with gait training with LRAD, ROM and strengthening, stair training, transfer training, balance training, manual therapy, neuromuscular re-education, patient care-giver education, HEP, endurance training, body mechanics. Instructed patient with HEP for left knee ROM and  strengthening and left HEP handout for the patient. Patient was able to return demonstrate the exercises given. HEP includes:   Pt. received therapeutic exercises which included:     TKA basic protocol: gluteal sets with isometric hold x 10 seconds, quad set with isometric hold x 10 seconds, hip abd, ham string sets with isometric hold x 10 seconds, Sartorius strengthening with  isometric hold x 10 seconds , heel slides, stretching of the L knee with belt assist, knee extension with isometric hold x 10 seconds, SAQ with isometric hold x 10 seconds x 10 x 2, 3 times/day    Skilled services/Home bound verification:     Skilled Reason for admission/summary of clinical condition:  s/p right TKR . This patient is homebound for the following reasons Requires considerable and taxing effort to leave the home , Requires the assistance of 1 or more persons to leave the home  and Only leaves the home for medical reasons or [employer] services and are infrequent and of short duration for other reasons . Caregiver: spouse. Caregiver assists with medication management, medical appointments and ADL's. Medications reconciled and all medications are available in the home this visit. The following education was provided regarding medications: NA  Medications  are available at this time. High risk medication teaching regarding anticoagulants, hyperglycemic agents or opiod narcotics performed (specify) eliquis, oxycodone, oxicontin, hydromorphone (take as ordered),    Vanessa Mauricio MD notified of any discrepancies/look a like medications/medication interactions NA.      Home health supplies by type and quantity ordered/delivered this visit include: all needed wound supplies    Patient education provided this visit to include: safety with functional mobility. Patient level of understanding of education provided: good with repated verbalization    Sharps Education Provided: NA  Patient response to procedure performed:  NA    Pt/Caregiver instructed on plan of care and are agreeable to plan of care at this time. Physician *Natalie Griffin MD notified of patient admission to home health and plan of care including anticipated frequency of 3w2, 2w1 and treatments/interventions/modalities of cryotherapy, therex, left knee ROM, functional mobility, balance, gait, safety with mobility. Discharge planning discussed with patient and caregiver. Discharge planning as follows: Once goals are met, pt will be discharged to self under MD supervision. Pt/Caregiver did verbalize understanding of discharge planning. Next MD appointment 6/1/22 with Natalie Griffin MD.  Patient/caregiver encouraged/instructed to keep appointment as lack of follow through with physician appointment could result in discontinuation of home care services for non-compliance.

## 2022-05-18 NOTE — Clinical Note
Dear Dr. Rodolfo Ramirez  I wanted to thank you for your referral of your patient Yesenia Mcginnis. We have admitted this patient for home health Physical Therapy with the following frequency: 3w2, 2w1.       Sincerely,  DIPAK Wayne   Physical Therapist

## 2022-05-20 ENCOUNTER — HOME CARE VISIT (OUTPATIENT)
Dept: HOME HEALTH SERVICES | Facility: HOME HEALTH | Age: 57
End: 2022-05-20
Payer: COMMERCIAL

## 2022-05-20 ENCOUNTER — HOME CARE VISIT (OUTPATIENT)
Dept: SCHEDULING | Facility: HOME HEALTH | Age: 57
End: 2022-05-20
Payer: COMMERCIAL

## 2022-05-20 VITALS
TEMPERATURE: 97.9 F | RESPIRATION RATE: 16 BRPM | OXYGEN SATURATION: 100 % | SYSTOLIC BLOOD PRESSURE: 140 MMHG | HEART RATE: 89 BPM | DIASTOLIC BLOOD PRESSURE: 86 MMHG

## 2022-05-20 LAB
ABO + RH BLD: NORMAL
BLOOD GROUP ANTIBODIES SERPL: NORMAL
BLOOD GROUP ANTIBODIES SERPL: NORMAL
DAT POLY-SP REAG RBC QL: NORMAL
SPECIMEN EXP DATE BLD: NORMAL

## 2022-05-20 PROCEDURE — G0151 HHCP-SERV OF PT,EA 15 MIN: HCPCS

## 2022-05-20 NOTE — HOME HEALTH
PT VISIT NOTE  S: Pt. states that she is ready for therapy and is feelig better  O: Medications reconciled with the patient, medications updates as needed.  assists with some iADL's, medication management and medical appointments as needed. Gait with rollator AD x 40 feet with Min A. Pt. required min verbal cues for sequencing and coordination. Pt. demonstrates with decreased hip and knee flexion in pre and mid swing phase of gait on left lower extremity. Pt. demonstrates with slow cadences and decreased stride length. Transfers are min/mod A  with sit to stand and bed to chair. This allows for improved functional mobility and independence. Pt. received therapeutic exercises which included:  TKA basic protocol: gluteal sets with isometric hold x 10 seconds, quad set with isometric hold x 10 seconds, hip abd, ham string sets with isometric hold x 10 seconds, heel slides, stretching of the L knee with belt assist, SAQ with isometric hold x 10 seconds. All exercises performed 10 x 1  . The need for the therex include lower extremity strengthening to facilitate safe and effective functional mobility, improvement with gait activities and to allow the patient to safely transfer within the home and allow for maximal functional independence. Reviewed HEP with the patient which include  Squats, marching in place, Hip abd/add, toe raises and hip ext. A: Pt. requires skilled PT for instruction in strengthening activities, balance and coordination activities as well as gait, transfer and safety activities. Patient/Caregiver level of understanding of education provided: Pt. was able to return demonstrate all mobility training and HEP shown witn min A. Patient response to treatment: Patient tolerated treatment well, with no complaint of new pain noted post treatment.  Instructed the patient with safety during mobility as well as reviewing the HEP program to facilitate increased independence with all aspects of functional mobility. Instruction with gait sequencing to facilitate increase in gait quality by increasing the hip and knee flexion in pre and mid swing phase of gait. Pt. was able to return demonstrate the gait training by demonstrating an increase in hip and knee flexion in the pre and mid swing phase of gait. Pt. was also able to return demonstrate the HEP as instructed. P: Next session there will be continued focus on transfer, mobility and gait as well as on safety education during all mobility including balance as well as strengthening the hip and knee musculature to allow for an increased level of functional independence with mobility.

## 2022-05-21 ENCOUNTER — HOME CARE VISIT (OUTPATIENT)
Dept: SCHEDULING | Facility: HOME HEALTH | Age: 57
End: 2022-05-21
Payer: COMMERCIAL

## 2022-05-21 VITALS
OXYGEN SATURATION: 98 % | DIASTOLIC BLOOD PRESSURE: 80 MMHG | SYSTOLIC BLOOD PRESSURE: 130 MMHG | TEMPERATURE: 97.9 F | HEART RATE: 81 BPM

## 2022-05-21 PROCEDURE — G0157 HHC PT ASSISTANT EA 15: HCPCS

## 2022-05-21 NOTE — HOME HEALTH
Patient's Subjective: The patient reports moderate sx levels. Falls since last session: no  Pain/Discomfort ? Yes- see pain page  New Meds: no    Caregiver involvement/assistance needed for: The patient's caregiver assists with meals, meds, transportation, household chores  . Home health supplies by type and quantity ordered/delivered this visit include: none  . Objective:  See interventions. Patient response to treatment:    Fair tolerance to exercises and ambulation. Patient level of understanding of education provided:  -The patient has been instructed in the following medicine education:   If there are any medicines/supplements (by MD order or OTC) added or DC 'd let [facility] staff know and have bottles/packages available. It is necessary to keep an accurate record of meds to avoid any medication errors. She report understanding.   - Educated on correct gait pattern to normalize gait and decrease fall risk. She reported understanding. She plans to get shoes to accommodate correct amb. Pt with fair+ return demonstration of new exercises. - Discussed performing exercises on a schedule to ensure compliance  i.e. pre/post breakfast, dinner and lunch with restorator in between. Allow time in between for recovery. She reported understanding. Assess of progress towards goals:   No falls to date. Increase in sx from 5-6/10 only with exercises and amb. No s/sx of infection present. Reports understanding for correct foot wear in order to progress with correct amb. Continued need for the following skills:Home Health PT is medically necessary to address the following:  pain, decreased ROM, decreased strength, increased edema, impaired bed mobility, decreased Ind with functional transfers, impaired gait, impaired stair negotiation and impaired stability to decrease sx, improve functional Ind, quality of life, reduce the fall risk.      Plan for next visit: Progress with exercises and cont with gait training.      The following discharge was discussed with the patient/caregiver :     Estimated PT DC date  6/2  [address] needed : Yes

## 2022-05-23 ENCOUNTER — HOME CARE VISIT (OUTPATIENT)
Dept: SCHEDULING | Facility: HOME HEALTH | Age: 57
End: 2022-05-23
Payer: COMMERCIAL

## 2022-05-23 PROCEDURE — G0157 HHC PT ASSISTANT EA 15: HCPCS

## 2022-05-24 VITALS
SYSTOLIC BLOOD PRESSURE: 121 MMHG | HEART RATE: 91 BPM | OXYGEN SATURATION: 99 % | DIASTOLIC BLOOD PRESSURE: 79 MMHG | TEMPERATURE: 96.9 F

## 2022-05-24 NOTE — HOME HEALTH
PTA Visit  S: Pt reports a decrease in pain today to 4/10, states she is fatigued from pedaling bike this morning    Falls since last session:  none  Medication changes: None  Pain: Yes- see pain tab  Caregiver: Spouse assist with meals, transportation, and household chores. , Pt able to use bedside commode Ind.    O:Dressing change completed, no s/s of infection, no drainage noted. GAIT: Ambulated with rollator 2 x 75ft Sup, decreased WB through her UE for support. VC's for increased swing phase on RLE, Pt demonstrates with decreased hip and knee flexion in swing phase of gait on LLE. Pt demonstrated with decreased heel strike and inability to correct due to incorrect footwear. THEREX/HEP: Pt instructed in standing heel raises, ham curls, single leg marches,  hip abd, Hip Ext,  all increased to 12 reps, with VC's for technique and reeducation for posture during exercise. Required rest prior to hip flexion x 10 reps with increased difficulty  Supine exercises performed quad sets, glut sets x 10 reps with 10 second isometric hold, heel slides with belt assist to stretch L knee and increase ROM, with VC's for correction on technique and hold time. TRANSFERS: Performed transfers from EOB to commode and from arm chair to rollator with SUP, VC's for technique and safety not pulling on AD.    EDUCATION and RESPONSE: Pt instructed to perform HEP 3x day, pt able to return demonstrate exercises with handout for visual cues and states understanding of schedule 3x day with rest breaks. Pt educated on correct gait pattern with even step length and decrease fall risk, Pt reported understanding and plans to get correct footwear prior to new session. Pt instructed on medicine education that if there are any changes to let [facility] staff know and to have medications available. Pt is to keep accurate written records of meds taken to avoid any errors, Pt reports understanding and the need for compliance.      A:Pt continues to require skilled PT for instruction in strengthening, balance and coordination activities, as well as gait, transfer and safety training to improve strength and facilitate increased [place] with functional mobility. No falls to date. No s/sx of infection present. Pain increase from 4-5/10 with WB activity, but decreased with rest breaks. P: Progress with exercises and cont with gait training, continued focus on safety with transfers and balance. Increase gait distance if able with correct footwear.  Check ROM and AROM  of L knee following vist.    Cont with PT 3x week , with DC scheduled for 6/2  [facility] HEART needed : Yes

## 2022-05-25 ENCOUNTER — HOME CARE VISIT (OUTPATIENT)
Dept: SCHEDULING | Facility: HOME HEALTH | Age: 57
End: 2022-05-25
Payer: COMMERCIAL

## 2022-05-25 PROCEDURE — G0157 HHC PT ASSISTANT EA 15: HCPCS

## 2022-05-26 VITALS
SYSTOLIC BLOOD PRESSURE: 133 MMHG | DIASTOLIC BLOOD PRESSURE: 89 MMHG | HEART RATE: 84 BPM | OXYGEN SATURATION: 92 % | TEMPERATURE: 97.5 F

## 2022-05-26 NOTE — HOME HEALTH
SUBJECTIVE: pt reports \"I'm not doing good this morning, my knee hurts more after the bike\", reports pain of 6/10,     Falls since last session:  none  Medication changes: None  Pain: Yes- see pain tab    CAREGIVER PARTICIPATION:Spouse assist with ADL's, meals, transportation, and household chores. , Pt improved to commode transfers Ind. OBJECTIVE: see interventions   TRANSFERS: Instructed on transfers from arm chair to rollator and from EOB with improved balance noted today, and correct technique pushing up from chair, Ind. GAIT:Training with rollator 2 x 75ft SUP, VC's for even step length, pt corrected following cues. Pt demonstrates with decreased hip and knee flexion in swing phase of gait on LLE. Pt declined increased ambulation today due to increase in pain with activity. THEREX/HEP: Pt instructed in standing heel raises, ham curls, single leg marches, hip abd, Hip Ext,  all increased to 12 reps, Increased hip flexion to 12 reps, tactile cues for posture to prevent trunk flexion during exercises. Supine exercises performed quad sets, glut sets x 10 reps with 10 second isometric hold, heel slides with belt assist to stretch L knee and increase ROM,  MEASUREMENTS: L knee ROM improved from eval (20-60 degrees) to today 18-78 degrees. EDUCATION and RESPONSE:Patient educated on Safety with functional mobility and instruction with HEP to perform 3X day., Instructed on pain managemenet, and icing every hour to decrease pain. Patient verbalizes understanding of education provided and able to return demonstrate mobility instruction and HEP with min assist and handout    ASSESSMENT:Patient response to [address], was fair today due to increased pain prior to session. Patient is progressing torward goals with increased ROM, strength, and endurance noted.      Pt continues to require skilled PT for instruction in strengthening, balance and coordination activities, as well as gait, transfer and safety training to improve strength and facilitate increased [place] with functional mobility. PLAN:Next visit perform Tinetti and TUG if able, Progress with exercises and cont with gait training, continued focus on safety with transfers and balance. Cont with PT 3x week , with DC scheduled for 6/2    [facility].

## 2022-05-27 ENCOUNTER — HOME CARE VISIT (OUTPATIENT)
Dept: SCHEDULING | Facility: HOME HEALTH | Age: 57
End: 2022-05-27
Payer: COMMERCIAL

## 2022-05-27 VITALS
OXYGEN SATURATION: 99 % | TEMPERATURE: 98.4 F | SYSTOLIC BLOOD PRESSURE: 133 MMHG | HEART RATE: 81 BPM | DIASTOLIC BLOOD PRESSURE: 75 MMHG

## 2022-05-27 PROCEDURE — G0157 HHC PT ASSISTANT EA 15: HCPCS

## 2022-05-27 NOTE — HOME HEALTH
SUBJECTIVE: pt reports being tired todaty and states not sleeping well,  Falls since last session:  none  Medication changes: YES, DC the Eliquis and started on Xarelto  Pain: Yes- see pain tab    CAREGIVER PARTICIPATION:Pt able to dress upper and lower body Ind, perform ADL with chair in restroom Ind. Spouse assist with showers, meals, transportation, and household chores.,    OBJECTIVE: see interventions     TRANSFERS: Instructed on transfers from arm chair to rollator with improved balance noted, Commode transfers Ind. GAIT:Training with rollator 1 x100ft SUP and 1 x 50ft SUO, required a 5 second standing rest break,  VC's for even step length, Pt demonstrates with decreased hip and knee flexion in swing phase of gait on LLE. THEREX/HEP: Pt instructed in standing heel raises, ham curls, single leg marches, hip abd, Hip Ext,  all increased to 15 reps,  hip flexion to 15reps, tactile cues for posture to prevent trunk flexion during exercises. Supine exercises performed quad sets, glut sets x 10 reps with 10 second isometric hold, heel slides with belt assist to stretch L knee and increase ROM, instructed to increase hold to 12-20 seconds. TINETTI: performed balance and gait assessment, pt improved to score of 14 from previous score of 6, showing noted improvement. TUG: Time of 43 seconds,      EDUCATION and RESPONSE:Patient educated on Safety with functional mobility and instruction with HEP to perform 3X day., Instructed on pain managemenet, and icing every hour to decrease pain. Patient verbalizes understanding of education provided and able to return demonstrate mobility instruction and  return demonstrate HEP     ASSESSMENT:Patient response to [address], was fair today due to increased increased rest breaks for fatigue, O2 levels remained 98% or better throughout exercises and gait training  Patient is progressing torward goals with increased ROM, strength, and improved balance.      Pt continues to require skilled PT for instruction in strengthening, balance and coordination activities, as well as gait, transfer and safety training to improve strength and facilitate increased [place] with functional mobility. PLAN:Progress with exercises and cont with gait training, discussed wearing tennis shoe for gait if possible due to decreased edema to allow for improved heel strike.   Cont with PT 2x next week , with DC scheduled for 6/2

## 2022-05-31 ENCOUNTER — HOME CARE VISIT (OUTPATIENT)
Dept: SCHEDULING | Facility: HOME HEALTH | Age: 57
End: 2022-05-31
Payer: COMMERCIAL

## 2022-05-31 VITALS
RESPIRATION RATE: 16 BRPM | OXYGEN SATURATION: 100 % | DIASTOLIC BLOOD PRESSURE: 82 MMHG | HEART RATE: 84 BPM | TEMPERATURE: 97.6 F | SYSTOLIC BLOOD PRESSURE: 128 MMHG

## 2022-05-31 PROCEDURE — G0151 HHCP-SERV OF PT,EA 15 MIN: HCPCS

## 2022-06-01 NOTE — HOME HEALTH
PT VISIT NOTE  S: Pt. states that her knee is doing better  O: Medications reconciled with the patient, medications updates as needed.  assists with some iADL's, medication management and medical appointments as needed. Gait with rollator AD x 50 feet with min A. Pt. required min verbal cues for sequencing and coordination. Pt. demonstrates with decreased hip and knee flexion in pre and mid swing phase of gait on left lower extremity. Pt. demonstrates with slow cadences and decreased stride length. Stairs x 18 with min/mod A. Car transfers with min A. Transfers are CGA  with sit to stand and bed to chair. This allows for improved functional mobility and independence. Pt. received therapeutic exercises which included: ROMTECH bike x 15 minutes. The need for the therex include lower extremity strengthening to facilitate safe and effective functional mobility, improvement with gait activities and to allow the patient to safely transfer within the home and allow for maximal functional independence. Reviewed HEP with the patient which include  Squats, marching in place, Hip abd/add, toe raises and hip ext. A: Pt. requires skilled PT for instruction in strengthening activities, balance and coordination activities as well as gait, transfer and safety activities. Patient/Caregiver level of understanding of education provided: Pt. was able to return demonstrate all mobility training and HEP shown independently. Patient response to treatment: Patient tolerated treatment good, with no complaint of new pain noted post treatment. Instructed the patient with safety during mobility as well as reviewing the HEP program to facilitate increased independence with all aspects of functional mobility. Instruction with gait sequencing to facilitate increase in gait quality by increasing the hip and knee flexion in pre and mid swing phase of gait.  Pt. was able to return demonstrate the gait training by demonstrating an increase in hip and knee flexion in the pre and mid swing phase of gait. Pt. was also able to return demonstrate the HEP as instructed. P: Next session there will be continued focus on transfer, mobility and gait as well as on safety education during all mobility including balance as well as strengthening the hip and knee musculature to allow for an increased level of functional independence with mobility.

## 2022-06-02 ENCOUNTER — HOME CARE VISIT (OUTPATIENT)
Dept: SCHEDULING | Facility: HOME HEALTH | Age: 57
End: 2022-06-02
Payer: COMMERCIAL

## 2022-06-02 VITALS
HEART RATE: 76 BPM | TEMPERATURE: 98.3 F | DIASTOLIC BLOOD PRESSURE: 70 MMHG | SYSTOLIC BLOOD PRESSURE: 110 MMHG | RESPIRATION RATE: 16 BRPM | OXYGEN SATURATION: 98 %

## 2022-06-02 PROCEDURE — G0151 HHCP-SERV OF PT,EA 15 MIN: HCPCS

## 2022-06-02 NOTE — HOME HEALTH
Supervisory Visit  Reviewed POC with patient. Pt. is making good progress with therapy as evidenced by increased functional mobility, gait, left knee ROM, strength, balance and safety education  Current Functional Status and progress towards goals:  Strength: Pt. left knee strength is 3-/5 which is an improvement from the initial evaluation strength of 2-/5. This allows the patient increased functional independence and mobility  ROM: left knee: 15 - 90 which is an improvement from the inital evaluation of 20 -60 degrees. BED MOBILITY: Pt. is independent with all bed mobility which demonstrates an improvement from the initial evaluation of mod/max A. This allows for the patient to be functionally more independent. TRANSFERS: Pt. is min A with all transfers with rollator which demonstrates and improvement from the initial evaluation score of mod/max A with rollator. This allows the patient increased functional independence and mobility  GAIT/WC MOBILITY: Pt. is able to ambulate >100 feet outside over even and uneven surfaces with min/mod A with SPC AD. This represents an improvement from the initial evaluation of 10 feet with mod/max A on level surfaces with rollator AD. STAIRS: 14 stairs with min a  BALANCE: Patient's tinetti is 12/28 which is a new assessment. Patient level of understanding of education provided: good with repeat verbalization  Patient response to treatment:  tolerated treatment well with no c/o increase in pain. Reviewed medications and updated any new medication changes as needed. PLAN: Continue with skilled therapy with emphasis on left knee ROM, balance, gait, strength, safety education and functional mobility . Plan is for discharge in next 2-3 week(s) as progress continues. New orders received to continue therapy 3w2.

## 2022-06-07 ENCOUNTER — HOME CARE VISIT (OUTPATIENT)
Dept: SCHEDULING | Facility: HOME HEALTH | Age: 57
End: 2022-06-07
Payer: COMMERCIAL

## 2022-06-07 PROCEDURE — G0157 HHC PT ASSISTANT EA 15: HCPCS

## 2022-06-08 ENCOUNTER — HOME CARE VISIT (OUTPATIENT)
Dept: SCHEDULING | Facility: HOME HEALTH | Age: 57
End: 2022-06-08
Payer: COMMERCIAL

## 2022-06-08 PROCEDURE — G0157 HHC PT ASSISTANT EA 15: HCPCS

## 2022-06-11 ENCOUNTER — HOME CARE VISIT (OUTPATIENT)
Dept: SCHEDULING | Facility: HOME HEALTH | Age: 57
End: 2022-06-11
Payer: COMMERCIAL

## 2022-06-11 VITALS
TEMPERATURE: 97 F | RESPIRATION RATE: 18 BRPM | HEART RATE: 63 BPM | DIASTOLIC BLOOD PRESSURE: 87 MMHG | OXYGEN SATURATION: 99 % | SYSTOLIC BLOOD PRESSURE: 127 MMHG

## 2022-06-11 PROCEDURE — G0157 HHC PT ASSISTANT EA 15: HCPCS

## 2022-06-12 NOTE — HOME HEALTH
SUBJECTIVE: Patient notes her stomach was upset this am possibly from something she ate. CAREGIVER INVOLVEMENT/ASSISTANCE NEEDED FOR: Patient resides with  who assist with ADL's and transfers as needed. HOME HEALTH SUPPLIES BY TYPE AND QUANTITY ORDERED/DELIVERED THIS VISIT INCLUDE: none   OBJECTIVE: See interventions. PATIENT RESPONSE TO TREATMENT: Patient with AROM of left knee extension 91 degrees flexion in sitting. PATIENT LEVEL OF UNDERSTANDING OF EDUCATION PROVIDED: Instructed pt on elevating/ice to left knee. Educated pt on signs/sx of infection and signs/sx of DVT. Educated in use of AD at all times and safety with transfers and ambulation. Instructed pt to ambulate every 1-2 hours. Educated importance of HEP and increasing knee flexion stretches to 5x/day. Patient verbalized understanding with teach back to therapist.   ASSESSMENT OF PROGRESS TOWARD GOALS: Patient is progressing towards goals. Patient ambulated throughout home ~200 feet with cane, mod I with verbal cues for heel to toe gait pattern. Patient presents with antalgic gait pattern and decreased knee flexion. Patient transferred sit<->stand and sit<->supine mod I with use of bilateral UE's. AROM of left knee 91 degrees flexion in sitting. CONTINUED NEED FOR THE FOLLOWING SKILLS: Continuation of PT to further improve patient balance, functional mobility and strength to decrease pts risk for falls. PLAN FOR NEXT VISIT: Progress with knee flexion thera ex. THE FOLLOWING DISCHARGE PLANNING WAS DISCUSSED WITH THE PATIENT/CAREGIVER: D/C from HHPT when goals are met or max potential benefit achieved.

## 2022-06-14 ENCOUNTER — HOME CARE VISIT (OUTPATIENT)
Dept: SCHEDULING | Facility: HOME HEALTH | Age: 57
End: 2022-06-14
Payer: COMMERCIAL

## 2022-06-14 PROCEDURE — G0157 HHC PT ASSISTANT EA 15: HCPCS

## 2022-06-15 ENCOUNTER — HOME CARE VISIT (OUTPATIENT)
Dept: SCHEDULING | Facility: HOME HEALTH | Age: 57
End: 2022-06-15
Payer: COMMERCIAL

## 2022-06-15 PROCEDURE — G0157 HHC PT ASSISTANT EA 15: HCPCS

## 2022-06-16 VITALS
DIASTOLIC BLOOD PRESSURE: 86 MMHG | TEMPERATURE: 98.2 F | SYSTOLIC BLOOD PRESSURE: 138 MMHG | DIASTOLIC BLOOD PRESSURE: 82 MMHG | HEART RATE: 90 BPM | TEMPERATURE: 97.4 F | HEART RATE: 68 BPM | OXYGEN SATURATION: 99 % | OXYGEN SATURATION: 99 % | HEART RATE: 70 BPM | SYSTOLIC BLOOD PRESSURE: 127 MMHG | SYSTOLIC BLOOD PRESSURE: 136 MMHG | TEMPERATURE: 97.6 F | OXYGEN SATURATION: 92 % | DIASTOLIC BLOOD PRESSURE: 82 MMHG

## 2022-06-16 VITALS
HEART RATE: 66 BPM | SYSTOLIC BLOOD PRESSURE: 134 MMHG | TEMPERATURE: 97.2 F | OXYGEN SATURATION: 99 % | DIASTOLIC BLOOD PRESSURE: 92 MMHG

## 2022-06-16 NOTE — HOME HEALTH
S:Patient reports Pain in knee due to riding bike earlier this morning. Caregiver: Pt is ind. with ADL care,  assist with meals, and Sup for safety in/out of shower. O: See interventions    Patient Education/ Response: Patient instructed in HEP, and increased to 20 reps including stretches, able to redemonstrate and reteach exercises. Educated on pain management and following medication schedule, patient with verbal acknowledgement and understanding. A:Patient performed all taks, but with c/o increased pain. Patient progressing toward strength, gait and balance goals to allow for increased functional mobility and decreased risk for falls. Patient with increased gait outdoors on uneven sufrfaces with SPC. Pt able to negotiate stairs safely with correct technique. P:Patient to continue with strengthening and stretchin of L knee, Tinetti next visit, car transfer if weather permits.

## 2022-06-17 NOTE — HOME HEALTH
S: Patient reports feeling tired, but \"ready to got outside.' no falls reported, no s/sx of infection. Caregiver: Pt is ind. with ADL care,  assist with meals, and Sup for safety in/out of shower. O: See interventions  Strength: Pt. L knee strength is 3+/5 which is an improvement from the initial evaluation strength of 2-,/5. This allows the patient increased functional independence and mobility  ROM: 10-90 ROM with stretch. BED MOBILITY: Pt. is Ind with all bed mobility which demonstrates an improvement from the initial evaluation of Mod/MaxA. This allows for the patient to be functionally more independent. TRANSFERS: Pt. is Ind with all transfers with SPC/FWW which demonstrates and improvement from the initial evaluation score of Mod/MaxA with FWW. This allows the patient increased functional independence and mobility  GAIT/WC MOBILITY: Pt. is able to ambulate >200 feet outside over even and uneven surfaces with SUP/CGA with SPC AD. This represents an improvement from the initial evaluation of 10 feet with Mod/MaxA on level surfaces with FWW AD.  STAIRS: Up down 14 steps with SPC and 1 rail with SUP  BALANCE: Patient's tinetti is 20/28 which is an improvement from the initial evaluation score of 12/28. This allows the patient to be functionally more independent and have a decreased fall risk  Home exercise program: Patient has been given a HEP and patient/caregiver understand the HEP. Patient is doing the HEP 3x/day as able    Patient Education/ Response: Patient instructed in HEP,including stretches, able to redemonstrate and reteach exercises. Educated on pain management and following medication schedule, patient with verbal acknowledgement    A:Patient performed all taks with c/o increased pain. Patient progressing toward strength, gait and balance goals to alowl for increased functional mobility and decreased risk for falls. P:Patient to continue with strengthening and stretchin of L knee. with discharge scheduled for 6/7/22.

## 2022-06-18 ENCOUNTER — HOME CARE VISIT (OUTPATIENT)
Dept: SCHEDULING | Facility: HOME HEALTH | Age: 57
End: 2022-06-18
Payer: COMMERCIAL

## 2022-06-18 VITALS
DIASTOLIC BLOOD PRESSURE: 80 MMHG | OXYGEN SATURATION: 97 % | TEMPERATURE: 97.9 F | HEART RATE: 72 BPM | SYSTOLIC BLOOD PRESSURE: 120 MMHG | RESPIRATION RATE: 18 BRPM

## 2022-06-18 PROCEDURE — G0151 HHCP-SERV OF PT,EA 15 MIN: HCPCS

## 2022-06-18 PROCEDURE — 400013 HH SOC

## 2022-06-18 NOTE — HOME HEALTH
SUBJECTIVE no compaint of pain on L knee   CAREGIVER ASSISTANCE:patient's spouse will be able to assist as needed   MEDICATIONS RECONCILED AND UPDATED: no changes in medications at this time  WOUND L knee healing well, no drainage or dressing noted   ROM L knee 8-98  BED MOBILITY independent   TRANSFERS independent using SPC   GAIT TRAINING independent using [facility] for household mobility and rollator for community mobility   STAIRS independent with [address] independent with HEP   BALANCE Hernan 23/28 at time of dc   PATIENT/CAREGIVER EDUCATION THIS VISIT: fall prevention, safety, need for assistance as needed for transfers and gait  ASSESSMENT:patient provided with skilled PT intervention consisting of graded therapeutic exercises, gait training, balance training, safe transfers training, caregiver education and Home exercise program education.  Patient at time of discharge was able to demonstrate independence with bed mobility, transfers and gait with [facility] for community mobility and [facility] for household mobility . pt. also noted that she is doing HEP 2x a day  PLAN DC at this time due to goals are met   DISCHARGE PLANNING DISCUSSED: dc to self and family under MD supervision

## 2022-06-23 NOTE — HOME HEALTH
S: Patient upstairs in bed upon start of session. CAREGIVIER  PARTICIPATION: Patient able to permorm ADL at sink, Husbamd assists with meals and shower for safety. O: See Interventions    PATIENT RESPONSE TO TREATMENT:Pt with increased c/o pain during manual stretch for flexion. Pt states compliance with HEP, but L knee flexion remains 90     PATIENT LEVEL OF UNDERSTANDING OF EDUCATION PROVIDED: Instructed pt on elevating/ice to left knee. Educated pt on signs/sx of infection and Educated in use of AD at all times and safety with transfers and ambulation. Educated importance of HEP and increasing knee flexion stretches. PATIENT RESPONSE TO EDUCATION: Patient verbalized understanding of education and able to teach back to therapist.     ASSESSMENT OF PROGRESS TOWARD GOALS: Patient is progressing towards goals with increased ambultion within house x 200 feet with cane, Kristie. Patient presents with antalgic gait pattern and decreased knee flexion. Patient knee flexion at 90, continue working toward goals for ROM. PLAN: Progress with knee flexion thera ex. Gait training outdoors and uneven ground as able.

## 2022-06-23 NOTE — HOME HEALTH
S: Patient states she feels tired today, but willing to participate outdoors. CAREGIVIER  PARTICIPATION: Patient able to permorm ADL at sink Ind, Her husbamd assists with meal prep and SUP for shower safety. O: See Interventions  PATIENT RESPONSE TO TREATMENT:Pt with increased c/o pain during manual stretch for flexion. Pt states compliance with HEP, but L knee flexion remains 90. Patient without SOB during stairs or outside gait training. PATIENT EDUCATION PROVIDED: Instructed pt on elevating/ice to left knee. Patient educated in use of AD at all times and safety with ambulation on stairs and outdoors. . Educated importance of HEP and increasing knee flexion stretches. PATIENT RESPONSE TO EDUCATION: Patient verbalized understanding of education and able to teach back fall prevention, pain management, and safety with gait. Pt able to demonstrate      ASSESSMENT OF PROGRESS TOWARD GOALS: Patient is progressing towards goals. She has increased ambulation with SPC, Kristie. Patient presents with antalgic gait pattern and decreased knee flexion. Patient knee flexion  remains at 90, Pt working toward goal of outdoor community ambulation with SPC and SUP. PLAN: Progress with knee flexion stretches and measure next visit, Gait training outdoors and uneven ground as able to increase distance, perform cr transfer as able. Ambreen Dahl

## 2022-07-26 ENCOUNTER — HOSPITAL ENCOUNTER (OUTPATIENT)
Dept: PHYSICAL THERAPY | Age: 57
Discharge: HOME | End: 2022-07-26
Payer: COMMERCIAL

## 2022-07-26 PROCEDURE — 97110 THERAPEUTIC EXERCISES: CPT

## 2022-07-26 PROCEDURE — 97535 SELF CARE MNGMENT TRAINING: CPT

## 2022-07-26 PROCEDURE — 97163 PT EVAL HIGH COMPLEX 45 MIN: CPT

## 2022-07-26 NOTE — PROGRESS NOTES
[facility]  [address] [facility] [address]  [place], [address]  [phone] fax    Plan of Care/ Statement of Necessity for Physical Therapy Services    Patient name: Jerry Mares Start of Care: 2022   Referral source: Serenity Maguire MD : 1965    Medical Diagnosis: Pain in left knee [M25.562]  Payor: [employer] / Plan: [facility] PPO / Product Type: PPO /  Onset Date:22    Treatment Diagnosis: Left knee pain s/p TKR   Prior Hospitalization: see medical history Provider#: [id number]   Medications: Verified on Patient summary List    Comorbidities: FMS; sickle-cell; Breast CA; B THR; right TKR   Prior Level of Function: Ambulated without AD; difficulty with sit<>stand; retired     [employer] of Care and following information is based on the information from the initial evaluation. Assessment/ key information: 62y.o. year old female presents with CC of left knee pain and weakness s/p TKR. Patient has complex history of breast CA. Reports she was given one shot of Neulasta to increase white blood count which ultimately put patient in a coma for 3 months. Reports joints deteriorated while she was in a coma. Impairments noted today: decreased patellar and tib/fib mobility;decreased left PROM/AROM; significant weakness in B glute med and max; decreased strength in left hamstring; impaired gait and balance. Patient will benefit from physical therapy to address deficits, and ultimately to return patient to prior level of function. Evaluation Complexity History HIGH Complexity :3+ comorbidities / personal factors will impact the outcome/ POC ; Examination HIGH Complexity : 4+ Standardized tests and measures addressing body structure, function, activity limitation and / or participation in recreation  ;Presentation MEDIUM Complexity : Evolving with changing characteristics  ; Clinical Decision Making HIGH Complexity : FOTO score of 1- 25 Overall Complexity Rating: HIGH   Problem List: pain affecting function, decrease ROM, decrease strength, edema affecting function, impaired gait/ balance, decrease ADL/ functional abilitiies, decrease activity tolerance, and decrease flexibility/ joint mobility   Treatment Plan may include any combination of the following: Therapeutic exercise, Neuromuscular re-education, Physical agent/modality, Gait/balance training, Manual therapy, and Patient education  Patient / Family readiness to learn indicated by: asking questions, trying to perform skills, and interest  Persons(s) to be included in education: patient (P)  Barriers to Learning/Limitations: None  Patient Goal (s): to be able to stand up from a chair and ambulate without AD  Patient Self Reported Health Status:  fair  Rehabilitation Potential: good    Short Term Goals: To be accomplished in 2 weeks:  1. I and compliant with HEP for self management of symptoms. 2. Increase left knee PROM to -5-105 to increase ease with sit<>stand. Long Term Goals: To be accomplished in 4 weeks:  1. Improve FOTO to 56 to indicate improved function with daily activities. 2. Increase left knee AROM to -3-110 to increase ease with negotiating stairs. 3. Increase B hip abd and ext strength to grossly 4-/5 to enable patient to perform sit<>stand without difficulty. 4. Increase left hamstring and quad strength to grossly 4+/5 to enable patient to ambulate community distances without AD. Frequency / Duration: Patient to be seen 2 times per week for 4 weeks. Patient/ Caregiver education and instruction: Diagnosis, prognosis, self care   [x]  Plan of care has been reviewed with WILL Mar, PT 7/26/2022 9:38 AM    ________________________________________________________________________    I certify that the above Therapy Services are being furnished while the patient is under my care.  I agree with the treatment plan and certify that this therapy is necessary.     [de-identified] Signature:____________Date:_________TIME:________     Muriel Grande MD  ** Signature, Date and Time must be completed for valid certification **    Please sign and return to [facility] [address] [place] [address] & [facility] [address]  [address]  [place], [address]  [phone] fax

## 2022-07-26 NOTE — PROGRESS NOTES
PT DAILY TREATMENT NOTE 10-18    Patient Name: Jarett Diaz  Date:2022  : 1965  [x]  Patient  Verified  Payor: BLUE [employer] / Plan: [employer] [id number] PPO / Product Type: PPO /    In time:900  Out time:935  Total Treatment Time (min): 35  Visit #: 1 of 8    Medicare/BCBS Only   Total Timed Codes (min):  23 1:1 Treatment Time:  34       Treatment Area: Pain in left knee [M25.562]    SUBJECTIVE  Pain Level (0-10 scale): 0  Any medication changes, allergies to medications, adverse drug reactions, diagnosis change, or new procedure performed?: [x] No    [] Yes (see summary sheet for update)  Subjective functional status/changes:   [] No changes reported  See eval    OBJECTIVE    12 min [x]Eval                  []Re-Eval       15 min Therapeutic Exercise:  [] See flow sheet :   Rationale: increase ROM and increase strength to improve the patients ability to perform daily activities   8 min Self Care/Home Management: HEP, progressing of program   Rationale: increase ROM and increase strength  to improve the patients ability to perform daily activities        With   [] TE   [] TA   [] neuro   [] other: Patient Education: [x] Review HEP    [] Progressed/Changed HEP based on:   [] positioning   [] body mechanics   [] transfers   [] heat/ice application    [] other:      Other Objective/Functional Measures:      Pain Level (0-10 scale) post treatment: 0    ASSESSMENT/Changes in Function: see POC    Patient will continue to benefit from skilled PT services to modify and progress therapeutic interventions, address functional mobility deficits, address ROM deficits, address strength deficits, analyze and address soft tissue restrictions, analyze and cue movement patterns, and address imbalance/dizziness to attain remaining goals. [x]  See Plan of Care  []  See progress note/recertification  []  See Discharge Summary         Progress towards goals / Updated goals:  Short Term Goals:  To be accomplished in 2 weeks:  1. I and compliant with HEP for self management of symptoms. IE: issued HEP  2. Increase left knee PROM to -5-105 to increase ease with sit<>stand. IE: -8-95  Long Term Goals: To be accomplished in 4 weeks:  1. Improve FOTO to 56 to indicate improved function with daily activities. iE:18  2. Increase left knee AROM to -3-110 to increase ease with negotiating stairs. IE; -8-95  3. Increase B hip abd and ext strength to grossly 4-/5 to enable patient to perform sit<>stand without difficulty. IE: B hip abd 3+/5; left hip ext 2+/5; right hip ext 3+/5  4. Increase left hamstring and quad strength to grossly 4+/5 to enable patient to ambulate community distances without AD.    IE:left quad 4-/5; left ham 3+/5  PLAN  []  Upgrade activities as tolerated     [x]  Continue plan of care  []  Update interventions per flow sheet       []  Discharge due to:_  []  Other:_      Kathleen Chavira, DIPAK, CMTPT 7/26/2022  9:46 AM    Future Appointments   Date Time Provider [facility]g [facility]   7/28/2022  7:40 AM Irina Rings [facility]   8/2/2022  7:30 AM Kathleen Lopez, PT [facility]   8/4/2022  7:45 AM Irina Rings [facility]   8/9/2022  7:30 AM Kathleen Lopez, PT [facility]   8/11/2022  7:45 AM Tim Unger [facility]   8/16/2022  7:30 AM Kathleen Lopez, PT [facility]   8/18/2022  9:00 AM Cheryl Parmar, PT [facility]

## 2022-07-28 ENCOUNTER — HOSPITAL ENCOUNTER (OUTPATIENT)
Dept: PHYSICAL THERAPY | Age: 57
Discharge: HOME | End: 2022-07-28
Payer: COMMERCIAL

## 2022-07-28 PROCEDURE — 97110 THERAPEUTIC EXERCISES: CPT

## 2022-07-28 PROCEDURE — 97140 MANUAL THERAPY 1/> REGIONS: CPT

## 2022-07-28 PROCEDURE — 97112 NEUROMUSCULAR REEDUCATION: CPT

## 2022-07-28 NOTE — PROGRESS NOTES
PT DAILY TREATMENT NOTE     Patient Name: Charly Casas  Date:2022  : 1965  [x]  Patient  Verified  Payor: BLUE CROSS / Plan: [employer] 5747 PPO / Product Type: PPO /    In time:744  Out time:830  Total Treatment Time (min): 55  Visit #: 2 of 8    Medicare/BCBS Only   Total Timed Codes (min):  46 1:1 Treatment Time:  46       Treatment Area: Pain in left knee [M25.562]    SUBJECTIVE  Pain Level (0-10 scale): 0  Any medication changes, allergies to medications, adverse drug reactions, diagnosis change, or new procedure performed?: [x] No    [] Yes (see summary sheet for update)  Subjective functional status/changes:   [] No changes reported  \"Some more stiffness in the mornings but no pain today. \"     OBJECTIVE    28 min Therapeutic Exercise:  [x] See flow sheet :   Rationale: increase ROM, increase strength, and improve coordination to improve the patients ability to perform ADLs and self care tasks with greater ease       10 min Neuromuscular Re-education:  [x]  See flow sheet : bridges, sit<>stands    Rationale: increase strength, improve coordination, improve balance, and increase proprioception  to improve the patients ability to perform functional tasks with greater ease    8 min Manual Therapy:  Supine - left patellar mobs, grade II/III tib-fem mobilizations in flexion and extension with PROM    The manual therapy interventions were performed at a separate and distinct time from the therapeutic activities interventions.   Rationale: decrease pain, increase ROM, and increase tissue extensibility to perform functional tasks with greater ease        With   [] TE   [] TA   [] neuro   [] other: Patient Education: [x] Review HEP    [] Progressed/Changed HEP based on:   [] positioning   [] body mechanics   [] transfers   [] heat/ice application    [] other:      Other Objective/Functional Measures: first follow up visit since initial evaluation      Pain Level (0-10 scale) post treatment: 0    ASSESSMENT/Changes in Function: Patient presents for first visit with good effort noted on all exercises. She was unable to perform lateral step ups on 4\" box without significant UE assist therefore opted for 2\" for initiation. Patient performing rocking method to achieve sit<>stand. Educated on weight shifting forward and scooting towards edge of surface for greater control with transfers. Patient leaving with slight soreness in left knee though no pain. Continue skilled PT to further improve ROM, strength, and stabilization to left knee to improve ease with daily activities. Patient will continue to benefit from skilled PT services to modify and progress therapeutic interventions, address functional mobility deficits, address ROM deficits, address strength deficits, analyze and address soft tissue restrictions, analyze and cue movement patterns, analyze and modify body mechanics/ergonomics, assess and modify postural abnormalities, and instruct in home and community integration to attain remaining goals. [x]  See Plan of Care  []  See progress note/recertification  []  See Discharge Summary         Progress towards goals / Updated goals:  Short Term Goals: To be accomplished in 2 weeks:  1. I and compliant with HEP for self management of symptoms. IE: issued HEP  Current: reports compliance 7/28/2022  2. Increase left knee PROM to -5-105 to increase ease with sit<>stand. IE: -8-95  Long Term Goals: To be accomplished in 4 weeks:  1. Improve FOTO to 56 to indicate improved function with daily activities. iE:18  2. Increase left knee AROM to -3-110 to increase ease with negotiating stairs. IE; -8-95  3. Increase B hip abd and ext strength to grossly 4-/5 to enable patient to perform sit<>stand without difficulty. IE: B hip abd 3+/5; left hip ext 2+/5; right hip ext 3+/5  4.  Increase left hamstring and quad strength to grossly 4+/5 to enable patient to ambulate community distances without AD.    IE:left quad 4-/5; left ham 3+/5    PLAN  [x]  Upgrade activities as tolerated     []  Continue plan of care  []  Update interventions per flow sheet       []  Discharge due to:_  []  Other:_      Luis Irvin, PT, DPT 7/28/2022  7:47 AM    Future Appointments   Date Time Provider Lg [facility]   8/2/2022  7:30 AM Diane Lane, PT [facility]   8/4/2022  7:45 AM Sarah Wang [facility]   8/9/2022  7:30 AM Diane Lane, PT [facility]   8/11/2022  7:45 AM Benjamin Vasquez [facility]   8/16/2022  7:30 AM Diane Lane, PT [facility]   8/18/2022  9:00 AM Gurwinder Erwin, PT [facility]

## 2022-08-02 ENCOUNTER — HOSPITAL ENCOUNTER (OUTPATIENT)
Dept: PHYSICAL THERAPY | Age: 57
Discharge: HOME | End: 2022-08-02
Payer: COMMERCIAL

## 2022-08-02 PROCEDURE — 97110 THERAPEUTIC EXERCISES: CPT

## 2022-08-02 PROCEDURE — 97140 MANUAL THERAPY 1/> REGIONS: CPT

## 2022-08-02 PROCEDURE — 97530 THERAPEUTIC ACTIVITIES: CPT

## 2022-08-02 NOTE — PROGRESS NOTES
PT DAILY TREATMENT NOTE     Patient Name: Bisi Gao  Date:2022  : 1965  [x]  Patient  Verified  Payor: BLUE CROSS / Plan: [facility] PPO / Product Type: PPO /    In time:9:45 AM  Out time:10:27 AM  Total Treatment Time (min): 42  Visit #: 3 of 8    Medicare/BCBS Only   Total Timed Codes (min):  42 1:1 Treatment Time:  42       Treatment Area: Pain in left knee [M25.562]    SUBJECTIVE  Pain Level (0-10 scale): 0  Any medication changes, allergies to medications, adverse drug reactions, diagnosis change, or new procedure performed?: [x] No    [] Yes (see summary sheet for update)  Subjective functional status/changes:   [] No changes reported  Was a little sore after last session but resolved. OBJECTIVE    26 min Therapeutic Exercise:  [x] See flow sheet :   Rationale: increase ROM and increase strength to improve the patients ability to negotiate stairs. 8 min Therapeutic Activity:  [x]  See flow sheet : sit to stand, multidirectional walking   Rationale: increase strength and improve coordination  to improve the patients ability to perform functional transfers and mobility with improved mechanics and ease     8 min Manual Therapy:    Supine - left patellar mobilizations Grade II-III, tibiofemoral mobilizations flexion/extension, PROM   The manual therapy interventions were performed at a separate and distinct time from the therapeutic activities interventions. Rationale: increase ROM and increase tissue extensibility to normalize gait mechanics. With   [x] TE   [] TA   [] neuro   [] other: Patient Education: [] Review HEP    [] Progressed/Changed HEP based on:   [x] positioning   [x] body mechanics   [] transfers   [] heat/ice application    [] other:      Other Objective/Functional Measures:   Intervention per flowsheet.     Addition of multidirectional walking    Pain Level (0-10 scale) post treatment: 0    ASSESSMENT/Changes in Function: Patient requesting to attempt 4\" step again, unable to perform without heavy UE use. Gait mechanics continue to lack heel strike and TKE. Patient will continue to benefit from skilled PT services to modify and progress therapeutic interventions, address functional mobility deficits, address ROM deficits, address strength deficits, analyze and address soft tissue restrictions, analyze and cue movement patterns, and analyze and modify body mechanics/ergonomics to attain remaining goals. Progress towards goals / Updated goals:  Short Term Goals: To be accomplished in 2 weeks:  1. I and compliant with HEP for self management of symptoms. IE: issued HEP  Current: reports compliance 7/28/2022  2. Increase left knee PROM to -5-105 to increase ease with sit<>stand. IE: -8-95  Long Term Goals: To be accomplished in 4 weeks:  1. Improve FOTO to 56 to indicate improved function with daily activities. iE:18  2. Increase left knee AROM to -3-110 to increase ease with negotiating stairs. IE; -8-95  3. Increase B hip abd and ext strength to grossly 4-/5 to enable patient to perform sit<>stand without difficulty. IE: B hip abd 3+/5; left hip ext 2+/5; right hip ext 3+/5  4. Increase left hamstring and quad strength to grossly 4+/5 to enable patient to ambulate community distances without AD.    IE:left quad 4-/5; left ham 3+/5    PLAN  [x]  Upgrade activities as tolerated     []  Continue plan of care  []  Update interventions per flow sheet       []  Discharge due to:_  []  Other:_      Otis Ewing, PT 8/2/2022  10:00 AM    Future Appointments   Date Time Provider Lg Gilbert   8/4/2022  7:45 AM Gabriel Mejia [facility]   8/9/2022  7:30 [facility]   [facility]  [facility]   8/16/2022  7:30 AM [facility]   8/18/2022  9:00 AM [facility]

## 2022-08-04 ENCOUNTER — HOSPITAL ENCOUNTER (OUTPATIENT)
Dept: PHYSICAL THERAPY | Age: 57
Discharge: HOME | End: 2022-08-04
Payer: COMMERCIAL

## 2022-08-04 PROCEDURE — 97140 MANUAL THERAPY 1/> REGIONS: CPT

## 2022-08-04 PROCEDURE — 97112 NEUROMUSCULAR REEDUCATION: CPT

## 2022-08-04 PROCEDURE — 97110 THERAPEUTIC EXERCISES: CPT

## 2022-08-04 NOTE — PROGRESS NOTES
PT DAILY TREATMENT NOTE     Patient Name: Elizabeth Owens  Date:2022  : 1965  [x]  Patient  Verified  Payor: BLUE CROSS / Plan: [facility] PPO / Product Type: PPO /    In time:832  Out time:916  Total Treatment Time (min): 44  Visit #: 4 of 8    Medicare/BCBS Only   Total Timed Codes (min):  44 1:1 Treatment Time:  44       Treatment Area: Pain in left knee [M25.562]    SUBJECTIVE  Pain Level (0-10 scale): 0  Any medication changes, allergies to medications, adverse drug reactions, diagnosis change, or new procedure performed?: [x] No    [] Yes (see summary sheet for update)  Subjective functional status/changes:   [] No changes reported  \"I don't need the cane I'm just unsure of my feet sometimes. \" Patient reports doing well day-of therapy sessions, though has some soreness/stiffness the following day. OBJECTIVE    22 min Therapeutic Exercise:  [x] See flow sheet :   Rationale: increase ROM, increase strength, and improve coordination to improve the patients ability to perform ADLs and self care tasks with greater ease     14 min Neuromuscular Re-education:  [x]  See flow sheet : side stepping, retro walking, step taps, sit<>stands    Rationale: increase strength, improve coordination, improve balance, and increase proprioception  to improve the patients ability to perform functional tasks with improved stabilization and control     8 min Manual Therapy:  Supine - grade III extension and flexion mobilizations with PROM, overpressure into extension, left patellar mobs sup/inf grade II/III   The manual therapy interventions were performed at a separate and distinct time from the therapeutic activities interventions.   Rationale: decrease pain, increase ROM, and increase tissue extensibility to perform functional tasks with greater ease           With   [] TE   [] TA   [] neuro   [] other: Patient Education: [x] Review HEP    [] Progressed/Changed HEP based on:   [] positioning [] body mechanics   [] transfers   [] heat/ice application    [] other:      Other Objective/Functional Measures: Step taps performed with single UE support, increased reps for most activities per flow sheet    Added leg press (single leg and double leg)    Pain Level (0-10 scale) post treatment: 0    0-8-98* AROM    ASSESSMENT/Changes in Function: Patient unable to perform step up on 4\" box without significant UE assist using HR, however when placed in parallel bars she was able to complete with minimal UE support. Emphasized knee flexion with swing phase and heel strike on left to improve gait mechanics. Patient requires assist to get on and prep for leg press activities though good tolerance to activity. Encouraged heel slides, gastroc stretch, and heel prop at home to further improve ROM. Patient will continue to benefit from skilled PT services to modify and progress therapeutic interventions, address functional mobility deficits, address ROM deficits, address strength deficits, analyze and address soft tissue restrictions, analyze and cue movement patterns, analyze and modify body mechanics/ergonomics, assess and modify postural abnormalities, address imbalance/dizziness, and instruct in home and community integration to attain remaining goals. []  See Plan of Care  []  See progress note/recertification  []  See Discharge Summary         Progress towards goals / Updated goals:  Short Term Goals: To be accomplished in 2 weeks:  1. I and compliant with HEP for self management of symptoms. IE: issued HEP  Current: reports compliance 7/28/2022  2. Increase left knee PROM to -5-105 to increase ease with sit<>stand. IE: -8-95  Current: slow progress 8/4/2022 - 8-98*  Long Term Goals: To be accomplished in 4 weeks:  1. Improve FOTO to 56 to indicate improved function with daily activities. iE:18  2. Increase left knee AROM to -3-110 to increase ease with negotiating stairs. IE; -8-95  3.  Increase B hip abd and ext strength to grossly 4-/5 to enable patient to perform sit<>stand without difficulty. IE: B hip abd 3+/5; left hip ext 2+/5; right hip ext 3+/5  4. Increase left hamstring and quad strength to grossly 4+/5 to enable patient to ambulate community distances without AD.    IE:left quad 4-/5; left ham 3+/5    PLAN  [x]  Upgrade activities as tolerated     []  Continue plan of care  []  Update interventions per flow sheet       []  Discharge due to:_  []  Other:_      Johan Brunner, PT, DPT  8/4/2022  8:37 AM    Future Appointments   Date Time Provider Lg Gilbert   8/9/2022  7:30 AM Abraham Oppenheim, PT [facility]   8/11/2022  7:45 AM Sonia Pritchard [facility]   8/16/2022  7:30 [facility]   8/18/2022  9:00 AM Kirti Will PT [facility] initiation of breastfeeding/breast milk feeding

## 2022-08-09 ENCOUNTER — HOSPITAL ENCOUNTER (OUTPATIENT)
Dept: PHYSICAL THERAPY | Age: 57
Discharge: HOME | End: 2022-08-09
Payer: COMMERCIAL

## 2022-08-09 PROCEDURE — 97110 THERAPEUTIC EXERCISES: CPT

## 2022-08-09 PROCEDURE — 97112 NEUROMUSCULAR REEDUCATION: CPT

## 2022-08-09 PROCEDURE — 97140 MANUAL THERAPY 1/> REGIONS: CPT

## 2022-08-09 NOTE — PROGRESS NOTES
PT DAILY TREATMENT NOTE     Patient Name: Estephania Delong  Date:2022  : 1965  [x]  Patient  Verified  Payor: BLUE CROSS / Plan: [id number] PPO / Product Type: PPO /    In time:7:31 AM  Out time:8:15 AM  Total Treatment Time (min): 44  Visit #: 5 of 8    Medicare/BCBS Only   Total Timed Codes (min):  44 1:1 Treatment Time:  44       Treatment Area: Pain in left knee [M25.562]    SUBJECTIVE  Pain Level (0-10 scale): 0  Any medication changes, allergies to medications, adverse drug reactions, diagnosis change, or new procedure performed?: [x] No    [] Yes (see summary sheet for update)  Subjective functional status/changes:   [x] No changes reported      OBJECTIVE    22 min Therapeutic Exercise:  [x] See flow sheet :   Rationale: increase ROM and increase strength to improve the patients ability to perform household chores. 14 min Neuromuscular Re-education:  [x]  See flow sheet :   Rationale: increase strength, improve coordination, and increase proprioception  to improve the patients ability to navigate dynamic surfaces. 8 min Manual Therapy:    Supine - left tibiofemoral mobilizations flexion/extension Grade III-IV with PROM and gentle overpressure    The manual therapy interventions were performed at a separate and distinct time from the therapeutic activities interventions. Rationale: decrease pain, increase ROM, and increase tissue extensibility to normalize gait mechanics. With   [x] TE   [] TA   [x] neuro   [] other: Patient Education: [] Review HEP    [] Progressed/Changed HEP based on:   [x] positioning   [x] body mechanics   [] transfers   [] heat/ice application    [] other:      Other Objective/Functional Measures:   Left knee AROM = -7 - 0 - 98 deg     Pain Level (0-10 scale) post treatment: 0    ASSESSMENT/Changes in Function: Continues with significant limitations in left knee AROM as observed with exercise and per goniometric measurements.  Presents to clinic without straight cane but reaches for objects to support self while walking through treatment area. Patient will continue to benefit from skilled PT services to address functional mobility deficits, address ROM deficits, address strength deficits, analyze and address soft tissue restrictions, analyze and cue movement patterns, analyze and modify body mechanics/ergonomics, and assess and modify postural abnormalities to attain remaining goals. Progress towards goals / Updated goals:  Short Term Goals: To be accomplished in 2 weeks:  1. I and compliant with HEP for self management of symptoms. IE: issued HEP  Current: reports compliance 7/28/2022  2. Increase left knee PROM to -5-105 to increase ease with sit<>stand. IE: -8-95  Current: 8/9/2022 - Slow progress - Left knee AROM = -7 - 0 - 98 deg     Long Term Goals: To be accomplished in 4 weeks:  1. Improve FOTO to 56 to indicate improved function with daily activities. iE:18  2. Increase left knee AROM to -3-110 to increase ease with negotiating stairs. IE; -8-95  3. Increase B hip abd and ext strength to grossly 4-/5 to enable patient to perform sit<>stand without difficulty. IE: B hip abd 3+/5; left hip ext 2+/5; right hip ext 3+/5  4. Increase left hamstring and quad strength to grossly 4+/5 to enable patient to ambulate community distances without AD.    IE:left quad 4-/5; left ham 3+/5    PLAN  []  Upgrade activities as tolerated     []  Continue plan of care  []  Update interventions per flow sheet       []  Discharge due to:_  []  Other:_      Oj Dennison PT 8/9/2022  7:37 AM    Future Appointments   Date Time Provider Lg Gilbert   8/11/2022  7:45 AM Kym Melgoza [facility]   8/16/2022  7:30 AM Mary Knox, PT [facility]   8/18/2022  9:00 AM Meseret Ricketts, PT [facility]

## 2022-08-11 ENCOUNTER — HOSPITAL ENCOUNTER (OUTPATIENT)
Dept: PHYSICAL THERAPY | Age: 57
Discharge: HOME | End: 2022-08-11
Payer: COMMERCIAL

## 2022-08-11 PROCEDURE — 97110 THERAPEUTIC EXERCISES: CPT

## 2022-08-11 PROCEDURE — 97140 MANUAL THERAPY 1/> REGIONS: CPT

## 2022-08-11 PROCEDURE — 97112 NEUROMUSCULAR REEDUCATION: CPT

## 2022-08-11 NOTE — PROGRESS NOTES
PT DAILY TREATMENT NOTE     Patient Name: Susanne Anguiano  Date:2022  : 1965  [x]  Patient  Verified  Payor: BLUE CROSS / Plan: [facility] PPO / Product Type: PPO /    In time:752  Out time:832  Total Treatment Time (min): 40  Visit #: 6 of 8    Medicare/BCBS Only   Total Timed Codes (min):  40 1:1 Treatment Time:  40       Treatment Area: Pain in left knee [M25.562]    SUBJECTIVE  Pain Level (0-10 scale): 3  Any medication changes, allergies to medications, adverse drug reactions, diagnosis change, or new procedure performed?: [x] No    [] Yes (see summary sheet for update)  Subjective functional status/changes:   [] No changes reported  Patient reports she felt fine following last session, however yesterday when completing her HEP for the first time of the day she felt aching after and again the second time she completed it despite icing between which usually helps with the discomfort. OBJECTIVE    20 min Therapeutic Exercise:  [x] See flow sheet :   Rationale: increase ROM, increase strength, and improve coordination to improve the patients ability to perform ADLs and self care tasks with greater ease     12 min Neuromuscular Re-education:  [x]  See flow sheet : step taps, retro walking x 30', side stepping x 30'   Rationale: increase strength, improve coordination, improve balance, and increase proprioception  to improve the patients ability to perform functional tasks with improved stabilization and control     8 min Manual Therapy:  Supine - grade III extension/flexion mobs on left, PROM, TKE with over pressure    The manual therapy interventions were performed at a separate and distinct time from the therapeutic activities interventions.   Rationale: decrease pain, increase ROM, and increase tissue extensibility to perform functional tasks with greater ease        With   [] TE   [] TA   [] neuro   [] other: Patient Education: [x] Review HEP    [] Progressed/Changed HEP based on:   [] positioning   [] body mechanics   [] transfers   [] heat/ice application    [] other:      Other Objective/Functional Measures: 4+/5 quad (in available range), 4-/5 HS strength    Added heel prop and heel slides in clinic      0-6-102 AROM     Pain Level (0-10 scale) post treatment: 3    ASSESSMENT/Changes in Function: Patient continues to remain limited with extension and flexion, though left knee extension is greater than right knee at this time. Patient more limited with walking today likely as a result of increased pain. Recommend patient focus on walking today and starting back with exercises tomorrow. Patient will continue to benefit from skilled PT services to modify and progress therapeutic interventions, address functional mobility deficits, address ROM deficits, address strength deficits, analyze and address soft tissue restrictions, analyze and cue movement patterns, analyze and modify body mechanics/ergonomics, assess and modify postural abnormalities, address imbalance/dizziness, and instruct in home and community integration to attain remaining goals. []  See Plan of Care  []  See progress note/recertification  []  See Discharge Summary         Progress towards goals / Updated goals:  Short Term Goals: To be accomplished in 2 weeks:  1. I and compliant with HEP for self management of symptoms. IE: issued HEP  Current: reports compliance 7/28/2022  2. Increase left knee PROM to -5-105 to increase ease with sit<>stand. IE: -8-95  Current: 8/9/2022 - Slow progress - Left knee AROM = -7 - 0 - 98 deg         Long Term Goals: To be accomplished in 4 weeks:  1. Improve FOTO to 56 to indicate improved function with daily activities. IE:18  2. Increase left knee AROM to -3-110 to increase ease with negotiating stairs. IE; -8-95  Current: 0-6-102 AROM 8/11/2022  3. Increase B hip abd and ext strength to grossly 4-/5 to enable patient to perform sit<>stand without difficulty.   IE: B hip abd 3+/5; left hip ext 2+/5; right hip ext 3+/5  4. Increase left hamstring and quad strength to grossly 4+/5 to enable patient to ambulate community distances without AD.    IE:left quad 4-/5; left ham 3+/5  Current: progressing 4+/5 quad (in available range), 4-/5 HS strength on 8/11/2022     PLAN  [x]  Upgrade activities as tolerated     []  Continue plan of care  []  Update interventions per flow sheet       []  Discharge due to:_  []  Other:_      Patricia Dwyer, PT, DPT 8/11/2022  7:55 AM    Future Appointments   Date Time Provider Lg Gilbert   8/16/2022  7:30 AM [facility]   8/18/2022  9:00 AM [facility]

## 2022-08-16 ENCOUNTER — HOSPITAL ENCOUNTER (OUTPATIENT)
Dept: PHYSICAL THERAPY | Age: 57
Discharge: HOME | End: 2022-08-16
Payer: COMMERCIAL

## 2022-08-16 PROCEDURE — 97140 MANUAL THERAPY 1/> REGIONS: CPT

## 2022-08-16 PROCEDURE — 97110 THERAPEUTIC EXERCISES: CPT

## 2022-08-16 PROCEDURE — 97112 NEUROMUSCULAR REEDUCATION: CPT

## 2022-08-16 NOTE — PROGRESS NOTES
PT DAILY TREATMENT NOTE     Patient Name: Bisi Gao  Date:2022  : 1965  [x]  Patient  Verified  Payor: BLUE CROSS / Plan: [facility] PPO / Product Type: PPO /    In time: 7:30 AM Out time:8:15 AM  Total Treatment Time (min): 45  Visit #: 7 of 8    Medicare/BCBS Only   Total Timed Codes (min):  45 1:1 Treatment Time:  45       Treatment Area: Pain in left knee [M25.562]    SUBJECTIVE  Pain Level (0-10 scale): 0  Any medication changes, allergies to medications, adverse drug reactions, diagnosis change, or new procedure performed?: [x] No    [] Yes (see summary sheet for update)  Subjective functional status/changes:   [] No changes reported  Says knee is back to feeling like it usually does (see Subjective from ). Has noticed she is able to turn/pivot better on the left knee and though she still requires UE support to get up from chair, sit to stand is getting easier. OBJECTIVE    25 min Therapeutic Exercise:  [x] See flow sheet :   Rationale: increase ROM and increase strength to improve the patients ability to perform household chores. 12 min Neuromuscular Re-education:  []  See flow sheet :   Rationale: improve coordination, improve balance, and increase proprioception  to improve the patients ability to navigate dynamic environments and change direction with greater ease and stability    8 min Manual Therapy:    Supine - tibiofemoral mobilizations flexion/extension Grade III-IV, PROM with overpressure as tolerated   The manual therapy interventions were performed at a separate and distinct time from the therapeutic activities interventions. Rationale: increase ROM and increase tissue extensibility to normalize gait mechanics.           With   [x] TE   [] TA   [] neuro   [] other: Patient Education: [] Review HEP    [] Progressed/Changed HEP based on:   [] positioning   [x] body mechanics   [] transfers   [] heat/ice application    [] other:      Other Objective/Functional Measures:   Intervention per flowsheet     Pain Level (0-10 scale) post treatment: 0    ASSESSMENT/Changes in Function: Patient demonstrating improved left single leg strength and stability as observed with requiring less UE support in step taps and step ups. Also showing improved functional strength with sit to stand. Patient would benefit from further physical therapy to progress interventions and facilitate optimal functional outcomes. Patient will continue to benefit from skilled PT services to modify and progress therapeutic interventions, address functional mobility deficits, address ROM deficits, address strength deficits, analyze and address soft tissue restrictions, analyze and cue movement patterns, analyze and modify body mechanics/ergonomics, and assess and modify postural abnormalities to attain remaining goals. Progress towards goals / Updated goals:  Short Term Goals: To be accomplished in 2 weeks:  1. I and compliant with HEP for self management of symptoms. IE: issued HEP  Current: reports compliance 7/28/2022  2. Increase left knee PROM to -5-105 to increase ease with sit<>stand. IE: -8-95  Current: 8/9/2022 - Slow progress - Left knee AROM = -7 - 0 - 98 deg         Long Term Goals: To be accomplished in 4 weeks:  1. Improve FOTO to 56 to indicate improved function with daily activities. IE:18  2. Increase left knee AROM to -3-110 to increase ease with negotiating stairs. IE; -8-95  Current: 0-6-102 AROM 8/11/2022  3. Increase B hip abd and ext strength to grossly 4-/5 to enable patient to perform sit<>stand without difficulty. IE: B hip abd 3+/5; left hip ext 2+/5; right hip ext 3+/5  4. Increase left hamstring and quad strength to grossly 4+/5 to enable patient to ambulate community distances without AD.    IE:left quad 4-/5; left ham 3+/5  Current: progressing 4+/5 quad (in available range), 4-/5 HS strength on 8/11/2022     PLAN  [x]  Upgrade activities as tolerated     []  Continue plan of care  []  Update interventions per flow sheet       []  Discharge due to:_  []  Other:_      Kevin Ureña PT 8/16/2022  7:30 AM    Future Appointments   Date Time Provider Lg Gilbert   8/18/2022  9:00 AM Raina Arriaza PT [facility]

## 2022-08-18 ENCOUNTER — APPOINTMENT (OUTPATIENT)
Dept: PHYSICAL THERAPY | Age: 57
End: 2022-08-18
Payer: COMMERCIAL

## 2022-09-13 ENCOUNTER — HOSPITAL ENCOUNTER (OUTPATIENT)
Dept: PREADMISSION TESTING | Age: 57
Discharge: HOME | End: 2022-09-13
Payer: COMMERCIAL

## 2022-09-13 ENCOUNTER — TRANSCRIBE ORDER (OUTPATIENT)
Dept: REGISTRATION | Age: 57
End: 2022-09-13

## 2022-09-13 DIAGNOSIS — M24.662 ANKYLOSIS OF LEFT KNEE: ICD-10-CM

## 2022-09-13 DIAGNOSIS — M24.662 ANKYLOSIS OF LEFT KNEE: Primary | ICD-10-CM

## 2022-09-13 DIAGNOSIS — Z01.812 BLOOD TESTS PRIOR TO TREATMENT OR PROCEDURE: ICD-10-CM

## 2022-09-13 LAB
ALBUMIN SERPL-MCNC: 4.3 G/DL (ref 3.4–5)
ALBUMIN/GLOB SERPL: 1.1 {RATIO} (ref 0.8–1.7)
ALP SERPL-CCNC: 140 U/L (ref 45–117)
ALT SERPL-CCNC: 11 U/L (ref 13–56)
ANION GAP SERPL CALC-SCNC: 4 MMOL/L (ref 3–18)
AST SERPL-CCNC: 14 U/L (ref 10–38)
ATRIAL RATE: 67 BPM
BASOPHILS # BLD: 0 K/UL (ref 0–0.1)
BASOPHILS NFR BLD: 0 % (ref 0–2)
BILIRUB SERPL-MCNC: 1.8 MG/DL (ref 0.2–1)
BUN SERPL-MCNC: 17 MG/DL (ref 7–18)
BUN/CREAT SERPL: 12 (ref 12–20)
CALCIUM SERPL-MCNC: 9.9 MG/DL (ref 8.5–10.1)
CALCULATED P AXIS, ECG09: 58 DEGREES
CALCULATED R AXIS, ECG10: 68 DEGREES
CALCULATED T AXIS, ECG11: 38 DEGREES
CHLORIDE SERPL-SCNC: 107 MMOL/L (ref 100–111)
CO2 SERPL-SCNC: 24 MMOL/L (ref 21–32)
CREAT SERPL-MCNC: 1.39 MG/DL (ref 0.6–1.3)
DIAGNOSIS, 93000: NORMAL
DIFFERENTIAL METHOD BLD: ABNORMAL
EOSINOPHIL # BLD: 0.1 K/UL (ref 0–0.4)
EOSINOPHIL NFR BLD: 1 % (ref 0–5)
ERYTHROCYTE [DISTWIDTH] IN BLOOD BY AUTOMATED COUNT: 14.5 % (ref 11.6–14.5)
GLOBULIN SER CALC-MCNC: 3.9 G/DL (ref 2–4)
GLUCOSE SERPL-MCNC: 95 MG/DL (ref 74–99)
HCT VFR BLD AUTO: 34.6 % (ref 35–45)
HGB BLD-MCNC: 12 G/DL (ref 12–16)
IMM GRANULOCYTES # BLD AUTO: 0 K/UL (ref 0–0.04)
IMM GRANULOCYTES NFR BLD AUTO: 0 % (ref 0–0.5)
LYMPHOCYTES # BLD: 3 K/UL (ref 0.9–3.6)
LYMPHOCYTES NFR BLD: 33 % (ref 21–52)
MCH RBC QN AUTO: 29.1 PG (ref 24–34)
MCHC RBC AUTO-ENTMCNC: 34.7 G/DL (ref 31–37)
MCV RBC AUTO: 84 FL (ref 78–100)
MONOCYTES # BLD: 0.5 K/UL (ref 0.05–1.2)
MONOCYTES NFR BLD: 6 % (ref 3–10)
NEUTS SEG # BLD: 5.5 K/UL (ref 1.8–8)
NEUTS SEG NFR BLD: 60 % (ref 40–73)
NRBC # BLD: 0 K/UL (ref 0–0.01)
NRBC BLD-RTO: 0 PER 100 WBC
P-R INTERVAL, ECG05: 148 MS
PLATELET # BLD AUTO: 141 K/UL (ref 135–420)
PMV BLD AUTO: 10.6 FL (ref 9.2–11.8)
POTASSIUM SERPL-SCNC: 4.7 MMOL/L (ref 3.5–5.5)
PROT SERPL-MCNC: 8.2 G/DL (ref 6.4–8.2)
Q-T INTERVAL, ECG07: 396 MS
QRS DURATION, ECG06: 82 MS
QTC CALCULATION (BEZET), ECG08: 418 MS
RBC # BLD AUTO: 4.12 M/UL (ref 4.2–5.3)
SODIUM SERPL-SCNC: 135 MMOL/L (ref 136–145)
VENTRICULAR RATE, ECG03: 67 BPM
WBC # BLD AUTO: 9.1 K/UL (ref 4.6–13.2)

## 2022-09-13 PROCEDURE — 93005 ELECTROCARDIOGRAM TRACING: CPT

## 2022-09-13 PROCEDURE — 36415 COLL VENOUS BLD VENIPUNCTURE: CPT

## 2022-09-13 PROCEDURE — 80053 COMPREHEN METABOLIC PANEL: CPT

## 2022-09-13 PROCEDURE — 85025 COMPLETE CBC W/AUTO DIFF WBC: CPT

## 2022-09-13 NOTE — PROGRESS NOTES
[facility]  [address] [facility] [address]  [place], [address]  [phone] fax    Physical Therapy Discharge Summary  Patient name: Robby Samuel Start of Care: 22   Referral source: Charis Benitez MD : 1965   Medical/Treatment Diagnosis: Pain in left knee [M25.562]  Payor: BLUE CROSS / Plan: [id number] PPO / Product Type: PPO /  Onset Date:22     Prior Hospitalization: see medical history Provider#: [id number]   Medications: Verified on Patient Summary List     Comorbidities: FMS; sickle-cell; Breast CA; B THR; right TKR   Prior Level of Function: Ambulated without AD; difficulty with sit<>stand; retired                 Visits from Start of Care: 7    Missed Visits: 1  Reporting Period : 22 to 22      Summary of Care:  Short Term Goals: To be accomplished in 2 weeks:  1. I and compliant with HEP for self management of symptoms. IE: issued HEP  Current: reports compliance   2. Increase left knee PROM to -5-105 to increase ease with sit<>stand. IE: -  Current: 2022 - Slow progress - Left knee AROM = -7 - 0 - 98 deg         Long Term Goals: To be accomplished in 4 weeks:  1. Improve FOTO to 56 to indicate improved function with daily activities. IE:18  Current: unable to reassess   2. Increase left knee AROM to -3-110 to increase ease with negotiating stairs. IE; -8-95  Current: 0-6-102 AROM   3. Increase B hip abd and ext strength to grossly 4-/5 to enable patient to perform sit<>stand without difficulty. IE: B hip abd 3+/5; left hip ext 2+/5; right hip ext 3+/5  Current: unable to reassess   4. Increase left hamstring and quad strength to grossly 4+/5 to enable patient to ambulate community distances without AD.    IE:left quad 4-/5; left ham 3+/5  Current: progressing 4+/5 quad (in available range), 4-/5 HS strength    Patient has not returned to therapy since 22; is to undergo manipulation of left knee later this month.    ASSESSMENT/RECOMMENDATIONS:  [x]Discontinue therapy: []Patient has reached or is progressing toward set goals      [x]Patient is non-compliant or has abdicated      []Due to lack of appreciable progress towards set Ul. Velasquez Berumen, PT 9/13/2022 1:11 PM

## 2022-09-19 ENCOUNTER — HOSPITAL ENCOUNTER (OUTPATIENT)
Dept: PREADMISSION TESTING | Age: 57
Discharge: HOME | End: 2022-09-19

## 2022-09-19 VITALS — BODY MASS INDEX: 33.34 KG/M2 | HEIGHT: 68 IN | WEIGHT: 220 LBS

## 2022-09-19 RX ORDER — SODIUM CHLORIDE, SODIUM LACTATE, POTASSIUM CHLORIDE, CALCIUM CHLORIDE 600; 310; 30; 20 MG/100ML; MG/100ML; MG/100ML; MG/100ML
125 INJECTION, SOLUTION INTRAVENOUS CONTINUOUS
Status: CANCELLED | OUTPATIENT
Start: 2022-09-19

## 2022-09-19 RX ORDER — CEFAZOLIN SODIUM/WATER 2 G/20 ML
2 SYRINGE (ML) INTRAVENOUS ONCE
Status: CANCELLED | OUTPATIENT
Start: 2022-09-19 | End: 2022-09-19

## 2022-09-19 NOTE — PERIOP NOTES
PAT - SURGICAL PRE-ADMISSION INSTRUCTIONS    NAME:  Dl Bowles                                                          TODAY'S DATE:  9/19/2022    SURGERY DATE:  9/26/2022                                  SURGERY ARRIVAL TIME:   TBA    Do NOT eat or drink anything, including candy or gum, after MIDNIGHT on 9/26/2022 , unless you have specific instructions from your Surgeon or Anesthesia Provider to do so. No smoking 24 hours before surgery. No alcohol 24 hours prior to the day of surgery. No recreational drugs for one week prior to the day of surgery. Leave all valuables, including money/purse, at home. Remove all jewelry, nail polish, makeup (including mascara); no lotions, powders, deodorant, or perfume/cologne/after shave. Glasses/Contact lenses and Dentures may be worn to the hospital.  They will be removed prior to surgery. Call your doctor if symptoms of a cold or illness develop within 24 ours prior to surgery. AN ADULT MUST DRIVE YOU HOME AFTER OUTPATIENT SURGERY. If you are having an OUTPATIENT procedure, please make arrangements for a responsible adult to be with you for 24 hours after your surgery. If you are admitted to the hospital, you will be assigned to a bed after surgery is complete. Normally a family member will not be able to see you until you are in your assigned bed. 12. Visitation Restrictions Explained.     Special Instructions:  Covid Test not needed  covid card on media, Quarantine requirements discussed  No Advanced Directive or DNR  Bring CPAP., Take these medications the morning of surgery with a sip of water:  tiffanie    Patient Prep:    use CHG solution three nights prior to procedure     These surgical instructions were reviewed with patient during the PAT phone call

## 2022-09-23 PROBLEM — M24.662 ARTHROFIBROSIS OF KNEE JOINT, LEFT: Chronic | Status: ACTIVE | Noted: 2022-09-23

## 2022-09-23 PROBLEM — M24.662 ARTHROFIBROSIS OF KNEE JOINT, LEFT: Status: ACTIVE | Noted: 2022-09-23

## 2022-09-23 NOTE — H&P
[address] [facility] [address]  History and Physical Exam    Patient: Chano Marinelli MRN: [id number]  SSN: [id number]    YOB: 1965  Age: 62 y.o. Sex: female      Subjective:      Chief Complaint: left knee pain and stiffness    History of Present Illness:  Patient complains of continued left knee pain and stiffness following total knee arthroplasty on 5/16/2022. The patient has not had success with physical therapy or home exercise program and continues to lack range of motion. The patient has at this time opted for manipulation under anesthesia.         Past Medical History:   Diagnosis Date    Anemia NEC     Arthritis     Arthrofibrosis of knee joint, left 9/23/2022    Chronic kidney disease     Chronic pain     Ductal carcinoma ([facility] 2011    left breast- surgery, radiation and chemo    Fatigue     Fibromyalgia     Hx of endometriosis     Hypertension 2011    Ill-defined condition 02/2018    Sickle cell crisis    Osteoarthritis of left hip 08/17/2016    Osteoarthritis of right hip 01/03/2017    Osteoarthritis of right knee 06/28/2018    Primary localized osteoarthritis of left knee 05/04/2022    Sickle cell anemia ([facility]     Sleep apnea     Uses cpap- instructed to bring dos    Thyroid disease     hypo     Past Surgical History:   Procedure Laterality Date    COLONOSCOPY N/A 07/21/2020    COLONOSCOPY performed by Vaughn Nissen, MD at [facility]    HX BREAST BIOPSY Left 2016    [address]      as a child, umbilical    HX KNEE REPLACEMENT Right     HX KNEE REPLACEMENT Left 2022    HX LAP CHOLECYSTECTOMY  2015    HX MASTECTOMY Left 03/2012    with axillary lymph node dissection    HX TUBAL LIGATION  1998    [place] TOTAL HIP ARTHROPLASTY Bilateral 2016 & 2017     Social History     Occupational History    Not on file   Tobacco Use    Smoking status: Former     Packs/day: 0.25     Years: 30.00     Pack years: 7.50     Types: Cigarettes     Quit date: 2011     Years since quittin.7    Smokeless tobacco: Never    Tobacco comments:     Does smoke every now and then, instructed not to smoke 24 hours prior to [employer] Use: Never used   Substance and Sexual Activity    Alcohol use: Yes     Comment: 2 times yearly    Drug use: No    Sexual activity: Not Currently     Prior to Admission medications    Medication Sig Start Date End Date Taking? Authorizing Provider   HYDROmorphone (DILAUDID) 4 mg tablet Take 4 mg by mouth every eight (8) hours as needed for Pain. Provider, Historical   linaCLOtide (LINZESS) 145 mcg cap capsule Take 145 mcg by mouth Daily (before breakfast). Provider, Historical   metoprolol succinate (TOPROL-XL) 25 mg XL tablet Take 25 mg by mouth nightly. Provider, Historical   ergocalciferol (ERGOCALCIFEROL) 1,250 mcg (50,000 unit) capsule Take 50,000 Units by mouth every seven (7) days. Provider, Historical   oxyCODONE ER (OXYCONTIN) 10 mg ER tablet Take 10 mg by mouth every twelve (12) hours. Provider, Historical   thyroid, Pork, (ARMOUR) 30 mg tablet Take 120 mg by mouth daily. Provider, Historical   folic acid (FOLVITE) 1 mg tablet Take 1 mg by mouth daily. Provider, Historical       Allergies: Allergies   Allergen Reactions    Neulasta [Pegfilgrastim] Other (comments)     \"Put me in a comma for 3 months\"        Review of Systems:  A comprehensive review of systems was negative except for that written in the History of Present Illness. Objective:       Physical Exam:  HEENT: Normocephalic, atraumatic  Lungs:  Clear to auscultation  Heart:   Regular rate and rhythm  Abdomen: Soft  Extremities:  left knee incision well healed. Mild knee swelling. ROM limited to 5-100 degrees. Assessment:      Arthrofibrosis of left knee arthroplasty. Plan:       Proceed with scheduled left knee manipulation under anesthesia.      The various methods of treatment have been discussed with the patient and family. After consideration of risks, benefits, and other options for treatment, the patient has consented to surgical interventions. Questions were answered and preoperative teaching was done by Dr Britni Mcgregor.      Signed By: MARTIN De Souza     September 23, 2022

## 2022-09-26 ENCOUNTER — HOSPITAL ENCOUNTER (OUTPATIENT)
Dept: PHYSICAL THERAPY | Age: 57
Discharge: HOME | End: 2022-09-26
Payer: COMMERCIAL

## 2022-09-26 ENCOUNTER — ANESTHESIA (OUTPATIENT)
Dept: SURGERY | Age: 57
End: 2022-09-26
Payer: COMMERCIAL

## 2022-09-26 ENCOUNTER — HOSPITAL ENCOUNTER (OUTPATIENT)
Age: 57
Setting detail: OUTPATIENT SURGERY
Discharge: HOME | End: 2022-09-26
Attending: ORTHOPAEDIC SURGERY | Admitting: ORTHOPAEDIC SURGERY
Payer: COMMERCIAL

## 2022-09-26 ENCOUNTER — ANESTHESIA EVENT (OUTPATIENT)
Dept: SURGERY | Age: 57
End: 2022-09-26
Payer: COMMERCIAL

## 2022-09-26 VITALS
OXYGEN SATURATION: 100 % | SYSTOLIC BLOOD PRESSURE: 115 MMHG | WEIGHT: 225.6 LBS | BODY MASS INDEX: 35.41 KG/M2 | DIASTOLIC BLOOD PRESSURE: 62 MMHG | TEMPERATURE: 97.5 F | HEART RATE: 72 BPM | HEIGHT: 67 IN | RESPIRATION RATE: 16 BRPM

## 2022-09-26 DIAGNOSIS — M24.662 ARTHROFIBROSIS OF KNEE JOINT, LEFT: Primary | Chronic | ICD-10-CM

## 2022-09-26 PROCEDURE — 77030020782 HC GWN BAIR PAWS FLX 3M -B: Performed by: ORTHOPAEDIC SURGERY

## 2022-09-26 PROCEDURE — 74011000250 HC RX REV CODE- 250: Performed by: ANESTHESIOLOGY

## 2022-09-26 PROCEDURE — 76210000020 HC REC RM PH II FIRST 0.5 HR: Performed by: ORTHOPAEDIC SURGERY

## 2022-09-26 PROCEDURE — 97162 PT EVAL MOD COMPLEX 30 MIN: CPT

## 2022-09-26 PROCEDURE — 76060000031 HC ANESTHESIA FIRST 0.5 HR: Performed by: ORTHOPAEDIC SURGERY

## 2022-09-26 PROCEDURE — 74011250636 HC RX REV CODE- 250/636: Performed by: ANESTHESIOLOGY

## 2022-09-26 PROCEDURE — 74011250636 HC RX REV CODE- 250/636: Performed by: ORTHOPAEDIC SURGERY

## 2022-09-26 PROCEDURE — 97110 THERAPEUTIC EXERCISES: CPT

## 2022-09-26 PROCEDURE — 74011000250 HC RX REV CODE- 250: Performed by: ORTHOPAEDIC SURGERY

## 2022-09-26 PROCEDURE — 76210000063 HC OR PH I REC FIRST 0.5 HR: Performed by: ORTHOPAEDIC SURGERY

## 2022-09-26 PROCEDURE — 76010000154 HC OR TIME FIRST 0.5 HR: Performed by: ORTHOPAEDIC SURGERY

## 2022-09-26 PROCEDURE — 74011000250 HC RX REV CODE- 250: Performed by: NURSE ANESTHETIST, CERTIFIED REGISTERED

## 2022-09-26 PROCEDURE — 74011250636 HC RX REV CODE- 250/636: Performed by: NURSE ANESTHETIST, CERTIFIED REGISTERED

## 2022-09-26 RX ORDER — MAGNESIUM SULFATE 100 %
4 CRYSTALS MISCELLANEOUS AS NEEDED
Status: DISCONTINUED | OUTPATIENT
Start: 2022-09-26 | End: 2022-09-26 | Stop reason: HOSPADM

## 2022-09-26 RX ORDER — FENTANYL CITRATE 50 UG/ML
25 INJECTION, SOLUTION INTRAMUSCULAR; INTRAVENOUS AS NEEDED
Status: DISCONTINUED | OUTPATIENT
Start: 2022-09-26 | End: 2022-09-26 | Stop reason: HOSPADM

## 2022-09-26 RX ORDER — DEXAMETHASONE SODIUM PHOSPHATE 10 MG/ML
INJECTION INTRAMUSCULAR; INTRAVENOUS
Status: SHIPPED | OUTPATIENT
Start: 2022-09-26 | End: 2022-09-26

## 2022-09-26 RX ORDER — SODIUM CHLORIDE 0.9 % (FLUSH) 0.9 %
5-40 SYRINGE (ML) INJECTION AS NEEDED
Status: DISCONTINUED | OUTPATIENT
Start: 2022-09-26 | End: 2022-09-26 | Stop reason: HOSPADM

## 2022-09-26 RX ORDER — MIDAZOLAM HYDROCHLORIDE 1 MG/ML
INJECTION, SOLUTION INTRAMUSCULAR; INTRAVENOUS AS NEEDED
Status: DISCONTINUED | OUTPATIENT
Start: 2022-09-26 | End: 2022-09-26 | Stop reason: HOSPADM

## 2022-09-26 RX ORDER — SODIUM CHLORIDE 0.9 % (FLUSH) 0.9 %
5-40 SYRINGE (ML) INJECTION EVERY 8 HOURS
Status: DISCONTINUED | OUTPATIENT
Start: 2022-09-26 | End: 2022-09-26 | Stop reason: HOSPADM

## 2022-09-26 RX ORDER — ALBUTEROL SULFATE 0.83 MG/ML
2.5 SOLUTION RESPIRATORY (INHALATION) AS NEEDED
Status: DISCONTINUED | OUTPATIENT
Start: 2022-09-26 | End: 2022-09-26 | Stop reason: HOSPADM

## 2022-09-26 RX ORDER — LIDOCAINE HYDROCHLORIDE 20 MG/ML
INJECTION, SOLUTION EPIDURAL; INFILTRATION; INTRACAUDAL; PERINEURAL AS NEEDED
Status: DISCONTINUED | OUTPATIENT
Start: 2022-09-26 | End: 2022-09-26 | Stop reason: HOSPADM

## 2022-09-26 RX ORDER — CEFAZOLIN SODIUM/WATER 2 G/20 ML
2 SYRINGE (ML) INTRAVENOUS ONCE
Status: DISCONTINUED | OUTPATIENT
Start: 2022-09-26 | End: 2022-09-26

## 2022-09-26 RX ORDER — PROPOFOL 10 MG/ML
INJECTION, EMULSION INTRAVENOUS AS NEEDED
Status: DISCONTINUED | OUTPATIENT
Start: 2022-09-26 | End: 2022-09-26 | Stop reason: HOSPADM

## 2022-09-26 RX ORDER — SODIUM CHLORIDE, SODIUM LACTATE, POTASSIUM CHLORIDE, CALCIUM CHLORIDE 600; 310; 30; 20 MG/100ML; MG/100ML; MG/100ML; MG/100ML
75 INJECTION, SOLUTION INTRAVENOUS CONTINUOUS
Status: DISCONTINUED | OUTPATIENT
Start: 2022-09-26 | End: 2022-09-26 | Stop reason: HOSPADM

## 2022-09-26 RX ORDER — ROPIVACAINE HYDROCHLORIDE 5 MG/ML
INJECTION, SOLUTION EPIDURAL; INFILTRATION; PERINEURAL
Status: SHIPPED | OUTPATIENT
Start: 2022-09-26 | End: 2022-09-26

## 2022-09-26 RX ORDER — INSULIN LISPRO 100 [IU]/ML
INJECTION, SOLUTION INTRAVENOUS; SUBCUTANEOUS ONCE
Status: DISCONTINUED | OUTPATIENT
Start: 2022-09-26 | End: 2022-09-26 | Stop reason: HOSPADM

## 2022-09-26 RX ORDER — SODIUM CHLORIDE, SODIUM LACTATE, POTASSIUM CHLORIDE, CALCIUM CHLORIDE 600; 310; 30; 20 MG/100ML; MG/100ML; MG/100ML; MG/100ML
125 INJECTION, SOLUTION INTRAVENOUS CONTINUOUS
Status: DISCONTINUED | OUTPATIENT
Start: 2022-09-26 | End: 2022-09-26 | Stop reason: HOSPADM

## 2022-09-26 RX ORDER — DEXMEDETOMIDINE HYDROCHLORIDE 100 UG/ML
INJECTION, SOLUTION INTRAVENOUS
Status: SHIPPED | OUTPATIENT
Start: 2022-09-26 | End: 2022-09-26

## 2022-09-26 RX ADMIN — ROPIVACAINE HYDROCHLORIDE 25 ML: 5 INJECTION, SOLUTION EPIDURAL; INFILTRATION; PERINEURAL at 07:21

## 2022-09-26 RX ADMIN — PROPOFOL 100 MG: 10 INJECTION, EMULSION INTRAVENOUS at 07:29

## 2022-09-26 RX ADMIN — SODIUM CHLORIDE, SODIUM LACTATE, POTASSIUM CHLORIDE, AND CALCIUM CHLORIDE 125 ML/HR: 600; 310; 30; 20 INJECTION, SOLUTION INTRAVENOUS at 06:23

## 2022-09-26 RX ADMIN — LIDOCAINE HYDROCHLORIDE 40 MG: 20 INJECTION, SOLUTION INTRAVENOUS at 07:29

## 2022-09-26 RX ADMIN — MIDAZOLAM 2 MG: 1 INJECTION INTRAMUSCULAR; INTRAVENOUS at 07:17

## 2022-09-26 RX ADMIN — DEXAMETHASONE SODIUM PHOSPHATE 4 MG: 10 INJECTION, SOLUTION INTRAMUSCULAR; INTRAVENOUS at 07:21

## 2022-09-26 RX ADMIN — DEXMEDETOMIDINE HYDROCHLORIDE 20 MCG: 100 INJECTION, SOLUTION INTRAVENOUS at 07:21

## 2022-09-26 NOTE — DISCHARGE SUMMARY
[address] [facility]   [address] [facility] [place] [address]     DISCHARGE SUMMARY     PATIENT: Douglas Reynolds     MRN: [id number]   ADMIT DATE: 2022   BILLIN   DISCHARGE DATE: 2022     ATTENDING: Gerhardt Credit, MD   DICTATING: MARTIN Nuñez         ADMISSION DIAGNOSIS: ARTHROFIBROSIS OF LEFT KNEE    DISCHARGE DIAGNOSIS: Status post LEFT  KNEE MANIPULATION     HISTORY OF PRESENT ILLNESS: The patient is a 62y.o. year-old female   with ongoing LEFT knee pain secondary to arthrofibrosis of her LEFT prosthetic. The patient's pain has persisted and progressed despite conservative treatments and therapies. The patient has at this time opted for surgical intervention.     PAST MEDICAL HISTORY:   Past Medical History:   Diagnosis Date    Anemia NEC     Arthritis     Arthrofibrosis of knee joint, left 2022    Chronic kidney disease     Chronic pain     Ductal carcinoma ([address] 2011    left breast- surgery, radiation and chemo    Fatigue     Fibromyalgia     Hx of endometriosis     Hypertension 2011    Ill-defined condition 2018    Sickle cell crisis    Osteoarthritis of left hip 2016    Osteoarthritis of right hip 2017    Osteoarthritis of right knee 2018    Primary localized osteoarthritis of left knee 2022    Sickle cell anemia ([address]     Sleep apnea     Uses cpap- instructed to bring dos    Thyroid disease     hypo       PAST SURGICAL HISTORY:   Past Surgical History:   Procedure Laterality Date    COLONOSCOPY N/A 2020    COLONOSCOPY performed by Thania Pizano MD at [place] [address]    HX BREAST BIOPSY Left     HX  [address] [facility]      as a child, umbilical    HX KNEE REPLACEMENT Right     HX KNEE REPLACEMENT Left     HX LAP CHOLECYSTECTOMY  2015    HX MASTECTOMY Left 2012    with axillary lymph node dissection    HX TUBAL LIGATION  1998    ME TOTAL HIP ARTHROPLASTY Bilateral  &  ALLERGIES:   Allergies   Allergen Reactions    Neulasta [Pegfilgrastim] Other (comments)     \"Put me in a comma for 3 months\"        CURRENT MEDICATIONS:  A list of medications prior to the time of admission include:  Prior to Admission medications    Medication Sig Start Date End Date Taking? Authorizing Provider   HYDROmorphone (DILAUDID) 4 mg tablet Take 4 mg by mouth every eight (8) hours as needed for Pain. Yes Provider, Historical   linaCLOtide (LINZESS) 145 mcg cap capsule Take 145 mcg by mouth Daily (before breakfast). Yes Provider, Historical   metoprolol succinate (TOPROL-XL) 25 mg XL tablet Take 25 mg by mouth nightly. Yes Provider, Historical   ergocalciferol (ERGOCALCIFEROL) 1,250 mcg (50,000 unit) capsule Take 50,000 Units by mouth every seven (7) days. Yes Provider, Historical   oxyCODONE ER (OXYCONTIN) 10 mg ER tablet Take 10 mg by mouth every twelve (12) hours. Yes Provider, Historical   thyroid, Pork, (ARMOUR) 30 mg tablet Take 120 mg by mouth daily. Yes Provider, Historical   folic acid (FOLVITE) 1 mg tablet Take 1 mg by mouth daily.    Yes Provider, Historical       FAMILY HISTORY:   Family History   Problem Relation Age of Onset    Cancer Mother         breast    Diabetes Mother     Heart Disease Father     Diabetes Father     Sickle Cell Anemia Brother     Stroke Neg Hx     Heart Attack Neg Hx        SOCIAL HISTORY:   Social History     Socioeconomic History    Marital status:    Tobacco Use    Smoking status: Former     Packs/day: 0.25     Years: 30.00     Pack years: 7.50     Types: Cigarettes     Quit date: 2011     Years since quittin.7    Smokeless tobacco: Never    Tobacco comments:     Does smoke every now and then, instructed not to smoke 24 hours prior to [employer] Use: Never used   Substance and Sexual Activity    Alcohol use: Yes     Comment: 2 times yearly    Drug use: No    Sexual activity: Not Currently       REVIEW OF SYSTEMS: All review of systems are negative. PHYSICAL EXAMINATION: For a detailed physical exam, please refer to the patient's chart. HOSPITAL COURSE: The patient was taken to surgery the day of admission. she underwent a left knee manipulation under anesthesia with improvement of range of motion from 5-100 to 0-120. Operative course was benign. The patient was taken to the PACU in stable condition and was later discharged home in stable condition. DISCHARGE INSTRUCTIONS: The patient is to be discharged home. Discharge Medication List as of 9/26/2022  8:19 AM        CONTINUE these medications which have NOT CHANGED    Details   HYDROmorphone (DILAUDID) 4 mg tablet Take 4 mg by mouth every eight (8) hours as needed for Pain., Historical Med      linaCLOtide (LINZESS) 145 mcg cap capsule Take 145 mcg by mouth Daily (before breakfast). , Historical Med      metoprolol succinate (TOPROL-XL) 25 mg XL tablet Take 25 mg by mouth nightly., Historical Med      ergocalciferol (ERGOCALCIFEROL) 1,250 mcg (50,000 unit) capsule Take 50,000 Units by mouth every seven (7) days. , Historical Med      oxyCODONE ER (OXYCONTIN) 10 mg ER tablet Take 10 mg by mouth every twelve (12) hours. , Historical Med      thyroid, Pork, (ARMOUR) 30 mg tablet Take 120 mg by mouth daily. , Historical Med      folic acid (FOLVITE) 1 mg tablet Take 1 mg by mouth daily. , Historical Med               The patient is to begin daily outpatient physical therapy starting this afternoon. The patient is to continue ice, elevation, and gentle range of motion exercises at home. The patient is to continue to use crutches, cane or walker to protect weightbearing until follow up. The patient is to followup with Dr. Lexus Tidwell, Kristal Elizabeth PA-C and/or [facility] ROBERTO in the office approximately 7-10 days status post for x-rays and further evaluation.     Bedford Goldmann, PA  9/26/2022

## 2022-09-26 NOTE — PROGRESS NOTES
PT DAILY TREATMENT NOTE/ EVAL    Patient Name: Herlinda Gilliam  Date:2022  : 1965  [x]  Patient  Verified  Payor: [facility] [employer] / Plan: [place] & [place] / Product Type: Medicare /    In time:1552 pm  Out time:1630 pm  Total Treatment Time (min): 38  Visit #: 1 of 20    Medicare/BCBS Only   Total Timed Codes (min):  20 1:1 Treatment Time:  38     Treatment Area: Pain in left knee [M25.562]    SUBJECTIVE  Pain Level (0-10 scale): 1-2/10  []constant []intermittent []improving []worsening []no change since onset    Any medication changes, allergies to medications, adverse drug reactions, diagnosis change, or new procedure performed?: See summary sheet. Subjective functional status/changes:     SEE POC    OBJECTIVE/EXAMINATION  SEE POC    18 min [x]Eval                  []Re-Eval       15 min Therapeutic Exercise:  Established and reviewed safe performance of daily initial HEP, PROM left knee flexion and extension    Rationale: increase ROM and increase strength to improve the patients ability to perform ADL's, functional transfers     5 min Therapeutic Activity:  Patient/ Caregiver education and instruction: Diagnosis, prognosis, PT POC, cold pack application (71-04 minutes with appropriate layering), importance of compliance with exercises and HEP   Rationale: to improve the patients ability to adhere to HEP and therapy sessions for increased compliance when working toward therapy goals.          With   [x] TE   [x] TA   [] neuro   [] other: Patient Education: [x] Review HEP    [] Progressed/Changed HEP based on:   [] positioning   [] body mechanics   [] transfers   [x] heat/ice application    [x] other: See above      Other Objective/Functional Measures: SEE POC      Other tests/comments: SEE POC       Pain Level (0-10 scale) post treatment: 1-2/10    ASSESSMENT/Changes in Function: SEE POC    Patient will benefit from skilled PT services to modify and progress therapeutic interventions, address functional mobility deficits, address ROM deficits, address strength deficits, analyze and address soft tissue restrictions, analyze and cue movement patterns, analyze and modify body mechanics/ergonomics, assess and modify postural abnormalities, address imbalance/dizziness and instruct in home and community integration to attain goals and maximize pt's functional status.       [x]  See Plan of Care  []  See progress note/recertification  []  See Discharge Summary         Progress towards goals / Updated goals:  Goals established at time of evaluation     PLAN  []  Upgrade activities as tolerated     [x]  Continue plan of care  []  Update interventions per flow sheet       []  Discharge due to:_  [x]  Other: See POC    Adry Corcoran, PT 9/26/2022 5:43 PM        Future Appointments   Date Time Provider Lg [place]   9/27/2022 11:15 AM Euna Shed, PT [facility]   9/28/2022  9:15 AM Jadyn Denver, PTA [facility]   9/29/2022  7:45 AM Ralph Lapping, PTA [facility]   9/30/2022  7:45 AM Teretha Prow, PTA [facility]   10/3/2022  7:45 AM Teretha Prow, PTA [facility]   10/5/2022  7:45 AM Teretha Prow, PTA [facility]   10/10/2022  8:30 AM Euna Shed, PT [facility]   10/12/2022  7:45 AM Teretha Prow, PTA [facility]   10/17/2022  8:30 AM Euna Shed, PT [facility]   10/19/2022  9:00 AM Ralph Lapping, PTA [facility]   10/24/2022  8:30 AM Euna Shed, PT [facility]   10/26/2022  8:00 AM Ralph Lapping, PTA [facility]   10/31/2022 10:30 AM Eirn Christine, PT [facility]   11/2/2022  7:45 AM Raiza Little, PT [facility]

## 2022-09-26 NOTE — PERIOP NOTES
TRANSFER - IN REPORT:    Verbal report received from CAITLIN Goldman RN(name) on [employer]  being received from [employer]) for routine post - op      Report consisted of patients Situation, Background, Assessment and   Recommendations(SBAR). Information from the following report(s) SBAR, Procedure Summary, and MAR was reviewed with the receiving nurse. Opportunity for questions and clarification was provided. Assessment completed upon patients arrival to unit and care assumed.

## 2022-09-26 NOTE — PERIOP NOTES
Pt. Used restroom in pre-op area with assistance. Patient placed on Lesli Paws for a minimum of 30 min in  Preop.

## 2022-09-26 NOTE — PERIOP NOTES
TRANSFER - IN REPORT:    Verbal report received from [employer](name) on Radha Nayak  being received from OR(unit) for routine post - op      Report consisted of patients Situation, Background, Assessment and   Recommendations(SBAR). Information from the following report(s) SBAR was reviewed with the receiving nurse. Opportunity for questions and clarification was provided. Assessment completed upon patients arrival to unit and care assumed.

## 2022-09-26 NOTE — BRIEF OP NOTE
Brief Postoperative Note    Patient: Cole Dewitt  YOB: 1965  MRN: [id number]    Date of Procedure: 9/26/2022     Pre-Op Diagnosis: ARTHROFIBROSIS OF LEFT KNEE    Post-Op Diagnosis: Same as preoperative diagnosis.       Procedure(s):  MANIPULATION OF LEFT KNEE WITH STEROID INJECTION    Surgeon(s):  Celia Triplett MD    Surgical Assistant: None    Anesthesia: Other     Estimated Blood Loss (mL): Minimal    Complications: None    Specimens: * No specimens in log *     Implants: * No implants in log *    Drains: * No LDAs found *    Findings: stiffness left knee    Electronically Signed by Wm Geller MD on 9/26/2022 at 9:26 AM

## 2022-09-26 NOTE — PROGRESS NOTES
[facility]  [address] [facility] [address]  Ilya parsons, [address]  [phone] fax    Plan of Care/ Statement of Necessity for Physical Therapy Services    Patient name: Hesham Paige Start of Care: 2022   Referral source: Catie Pillai, Radha Muniz : 1965    Medical Diagnosis: Pain in left knee [M25.562]  [place]or: [place] / Plan: [place] MED[place]ARE PART A & B / Product Type: Medicare /  Onset Date:  Date of left TKR: 22  Date of Manipulation: 22     Treatment Diagnosis: left knee pain   Prior Hospitalization: see medical history Provider#: [id number]   Medications: Verified on Patient summary List    Comorbidities: HBP, Visual impaired, Thyroid problems, Fibromyalgia; sickle-cell; Hx of Breast CA; Bilat THR; right TKR   Prior Level of Function: walking without use of cane, resides with spouse who is able to assist, 2 story home; difficulty getting in and out of a chair (11 years)      The Plan of Care and following information is based on the information from the initial evaluation. Assessment / key information:  Patient is a 62 y.o. yo female who presents to [facility] at \[facility]\"" with diagnosis of Pain in left knee [M25.562]. Patient reports chief complaint of left knee pain. Mechanism of Injury: Pt is s/p Left TKR on 22 and s/p left knee manipulation on 22. Pain level is a 1-2(C) and ranges from 0/10 to 8/10 which increases with \"If I was doing a lot\", movement, decreases with rest.    Upon objective evaluation, the patient demonstrates the following impairments: patient presents with impaired and painful AROM/PROM of left knee knee, impaired strength in left LE grossly, decreased flexibility of  hamstring muscles, impaired gait, decreased LE functional strength. Patient scored 35 on FOTO indicating very severe decreased function and quality of life.  Functional limitations include using cane when walking for balance, difficulty getting in and out of chair, difficulty getting in and out of the car. Patient would benefit from Skilled OP PT to address these deficits for increased functional mobility and quality of life. Today's evaluation is significant for the following functional and objective measures:   Gait with SPC on right UE, antalgic, right trunk lean. Stairs per patient step to for ascending and descending with use of rails. Visual inspection: incision on anterior aspect of left knee, healed. Bandaid present from injection. Knee AROM/PROM (degrees):    Right lacking 18 deg of extension-101 deg of flexion/NT,   Left AROM: lacking 15 deg of extension-96 deg of flexion / PROM: lacking 10 deg of extension-105 deg of flexion. Strength:   Right (/5) Left (/5)   Hip     Flexion 5 4+ (quad extension lag)             Abduction 5 4+             Adduction NT NT             Extension 2+ 2+              ER NT NT             IR NT NT   Knee   Extension 5 4+              Flexion in prone 3+ 3+     Special Tests:   NI  Flexibility: Hamstring 90/90 right lacking 25 deg,  left lacking 38. Mehrdad test right NT, left NT. Carissa Khanna right: NT left: NT  Palpation:  Unable to elicit TTP at left knee, left gastroc, left distal quad  Girth (cm):    Right  midpatellar 48  Left  midpatella 47.5  Single leg balance (seconds):  Right NT, Left NT. Other:   30 second sit to stand test: 7 reps with bilat UE assist      Justification for Eval Code Complexity:  Patient History : high  Examination see exam   Clinical Presentation: mod  Clinical Decision Making : FOTO : 33 /100     Evaluation Complexity History HIGH Complexity :3+ comorbidities / personal factors will impact the outcome/ POC ; Examination MEDIUM Complexity : 3 Standardized tests and measures addressing body structure, function, activity limitation and / or participation in recreation  ;Presentation MEDIUM Complexity : Evolving with changing characteristics  ; Clinical Decision Making MEDIUM Complexity : FOTO score of 26-74  Overall Complexity Rating: MEDIUM  Problem List: pain affecting function, decrease ROM, decrease strength, edema affecting function, impaired gait/ balance, decrease ADL/ functional abilitiies, decrease activity tolerance, decrease flexibility/ joint mobility, and decrease transfer abilities   Treatment Plan may include any combination of the following: Therapeutic exercise, Therapeutic activities, Neuromuscular re-education, Physical agent/modality, Gait/balance training, Manual therapy, Aquatic therapy, Patient education, Self Care training, Functional mobility training, Home safety training, and Stair training  Patient / Family readiness to learn indicated by: trying to perform skills and interest  Persons(s) to be included in education: patient (P)  Barriers to Learning/Limitations: None  Actions taken: n/a  Patient Goal (s): range of motion improve  Patient Self Reported Health Status: fair  Rehabilitation Potential: fair-good       · Short Term Goals: To be accomplished in 1  weeks:  1) Patient will be independent in performing daily initial home exercise program..  Status at last certification/assessment: established and reviewed with patient   2) Increase Left knee AROM flexion by at least 5  degrees to facilitate getting in and out of a car, stair negotiation, Functional transfers. Status at last certification/assessment:   Knee AROM/PROM (degrees):    Right lacking 18 deg of extension-101 deg of flexion/NT, Left lacking 15 deg of extension-96 deg of flexion/lacking 10 deg of extension-105 deg of flexion. · Long Term Goals: To be accomplished in  6  weeks:  1) Patient  will be independent  with comprehensive updated HEP to continue to manage care independently after discharge.    Status at last note/certification: n/a  2) Patient will improve left knee AROM flexion to at least 110 degrees to facilitate ambulation, stair negotiation and transfers. Status at last certification/assessment:  Left AROM: lacking 15 deg of extension-96 deg of flexion   3) Patient will improve left knee AROM extension by at least 5 degrees to facilitate ambulation. Status at last certification/assessment:  Left AROM: lacking 15 deg of extension-96 deg of flexion   4) Increase FOTO to 62 indicating improved function and quality of life. Status at last certification/assessment: 33  5) Patient will increase left LE strength by at least 1/3 grade grossly so patient has improved ability to perform ADL's, functional transfers. Status at last certification/assessment:   Strength:   Right (/5) Left (/5)   Hip     Flexion 5 4+ (quad extension lag)             Abduction 5 4+             Adduction NT NT             Extension 2+ 2+              ER NT NT             IR NT NT   Knee   Extension 5 4+              Flexion in prone 3+ 3+   6) Pt will perform 12 sit to stand repetitions in 30 seconds with 1-2 UE assist to improve ease of transfers and demo improved functional LE strength. Status at last note/certification: 7 repetitions with bilat UE     Frequency / Duration: Patient to be seen 5 times a week for 1 week and then 3 times per week for 5 weeks. All LTG as above will be assessed and updated every 10 visits or 30 days and progressed as needed     Patient/ Caregiver education and instruction: Diagnosis, prognosis, exercises (reviewed and established HEP), PT POC, cold pack application (32-12 minutes with appropriate layering), importance of compliance with exercises and HEP   [x]  Plan of care has been reviewed with PTA    Certification Period: 9/26/22 - 10/25/22  Jose Park, PT 9/26/2022 5:33 PM     ________________________________________________________________________    I certify that the above Therapy Services are being furnished while the patient is under my care. I agree with the treatment plan and certify that this therapy is necessary.     [address] [facility] Signature:____________________  Date:____________Time: _________     MARTIN Terry  Please sign and return to [facility] [address] [place] [address] & [address]  [address]  [place], [facility] [address]  [phone] fax

## 2022-09-26 NOTE — ANESTHESIA POSTPROCEDURE EVALUATION
Procedure(s):  MANIPULATION OF LEFT KNEE WITH STEROID INJECTION. MAC, total IV anesthesia    Anesthesia Post Evaluation      Multimodal analgesia: multimodal analgesia used between 6 hours prior to anesthesia start to PACU discharge  Patient location during evaluation: PACU  Patient participation: complete - patient participated  Level of consciousness: awake and alert  Pain score: 0  Pain management: adequate  Airway patency: patent  Anesthetic complications: no  Cardiovascular status: acceptable  Respiratory status: acceptable  Hydration status: acceptable  Post anesthesia nausea and vomiting:  none  Final Post Anesthesia Temperature Assessment:  Normothermia (36.0-37.5 degrees C)      INITIAL Post-op Vital signs:   Vitals Value Taken Time   /90 09/26/22 0805   Temp 36.1 °C (97 °F) 09/26/22 0739   Pulse 83 09/26/22 0805   Resp 19 09/26/22 0805   SpO2 100 % 09/26/22 0805   Vitals shown include unvalidated device data.

## 2022-09-26 NOTE — INTERVAL H&P NOTE
Update History & Physical    The Patient's History and Physical of September 23, 2022 was reviewed with the patient and I examined the patient. There was no change. The surgical site was confirmed by the patient and me. Plan:  The risk, benefits, expected outcome, and alternative to the recommended procedure have been discussed with the patient. Patient understands and wants to proceed with the procedure.     Electronically signed by Emely Rivas MD on 9/26/2022 at 6:44 AM

## 2022-09-26 NOTE — ANESTHESIA PREPROCEDURE EVALUATION
Relevant Problems   No relevant active problems       Anesthetic History   No history of anesthetic complications            Review of Systems / Medical History  Patient summary reviewed, nursing notes reviewed and pertinent labs reviewed    Pulmonary        Sleep apnea: CPAP           Neuro/Psych   Within defined limits           Cardiovascular    Hypertension: well controlled              Exercise tolerance: >4 METS     GI/Hepatic/Renal  Within defined limits              Endo/Other      Hypothyroidism: well controlled  Arthritis     Other Findings              Physical Exam    Airway  Mallampati: II  TM Distance: > 6 cm  Neck ROM: normal range of motion   Mouth opening: Normal     Cardiovascular    Rhythm: regular  Rate: normal         Dental         Pulmonary  Breath sounds clear to auscultation               Abdominal  GI exam deferred       Other Findings            Anesthetic Plan    ASA: 3  Anesthesia type: MAC and total IV anesthesia      Post-op pain plan if not by surgeon: peripheral nerve block single    Induction: Intravenous  Anesthetic plan and risks discussed with: Patient and Spouse      Left ACNB for POA

## 2022-09-26 NOTE — ANESTHESIA PROCEDURE NOTES
Peripheral Block    Start time: 9/26/2022 7:17 AM  End time: 9/26/2022 7:22 AM  Performed by: Josr Ibarra DO  Authorized by: Josr Ibarra DO       Pre-procedure: Indications: at surgeon's request and post-op pain management    Preanesthetic Checklist: patient identified, risks and benefits discussed, site marked, timeout performed, anesthesia consent given, patient being monitored and fire risk safety assessment completed and verbalized    Timeout Time: 07:17 EDT      Block Type:   Block Type: Adductor canal block  Laterality:  Left  Monitoring:  Standard ASA monitoring, responsive to questions, oxygen, continuous pulse ox, frequent vital sign checks and heart rate  Injection Technique:  Single shot  Procedures: ultrasound guided    Patient Position: supine  Prep: chlorhexidine    Location:  Mid thigh  Needle Type:  Stimuplex  Needle Gauge:  20 G  Needle Localization:  Nerve stimulator and ultrasound guidance  Medication Injected:  DexmedeTOMidine (PRECEDEX) 100 mcg/mL iv solution - Peripheral Nerve Block   20 mcg - 9/26/2022 7:21:00 AM  dexamethasone (PF) (DECADRON) 10 mg/mL injection - Peripheral Nerve Block   4 mg - 9/26/2022 7:21:00 AM  ropivacaine (PF) (NAROPIN) 5 mg/mL (0.5 %) injection - Peripheral Nerve Block   25 mL - 9/26/2022 7:21:00 AM  Med Admin Time: 9/26/2022 7:21 AM    Assessment:  Number of attempts:  1  Injection Assessment:  Incremental injection every 5 mL, no paresthesia, ultrasound image on chart, no intravascular symptoms, negative aspiration for blood and local visualized surrounding nerve on ultrasound  Patient tolerance:  Patient tolerated the procedure well with no immediate complications  10 ml near NVM using nerve stim.

## 2022-09-26 NOTE — OP NOTES
[facility]  OPER[facility]    Name:  Morgan Spangler  MR#:   [id number]  :  1965  ACCOUNT #:  [de-identified]  DATE OF SERVICE:  2022    PREOPERATIVE DIAGNOSIS:  Arthrofibrosis status post left total knee arthroplasty. POSTOPERATIVE DIAGNOSIS:  Arthrofibrosis status post left total knee arthroplasty. PROCEDURE PERFORMED:  Manipulation under anesthesia of the left knee and injection of left knee with cortisone. SURGEON:  Mayco Tong. Severiano Byers, MD    ASSISTANT:  None. ANESTHESIA:  Adductor canal block and IV sedation. COMPLICATIONS:  None. SPECIMENS REMOVED:  None. IMPLANTS:  None. ESTIMATED BLOOD LOSS:  None. PROCEDURE:  After the patient was brought into the operating suite, she was left supine on the stretcher. She had been anesthetized in the preoperative holding area with an adductor canal block. After proper time-out was performed, she was given IV sedation. Her left knee was then manipulated with palpable and audible lysis of adhesions. We were able to obtain range of motion from essentially 0 to 130 degrees with no instability detected throughout the range of motion. The knee was then injected intraarticular under sterile conditions with 2 mL of 0.5% Marcaine, 2 mL of 1% lidocaine, 80 mg of Depo-Medrol. A Band-Aid was placed. She tolerated this well. She was taken to the recovery room on the stretcher in stable condition.       MD JUDAH Wellington/[email or web address]  D:  2022 16:04  T:  2022 19:46  JOB #:  [id number]

## 2022-09-26 NOTE — PERIOP NOTES
TRANSFER - OUT REPORT:    Verbal report given to KENAN Davis RN(name[facility]  [facility] to Phase II(unit) for routine progression of care       Report consisted of patients Situation, Background, Assessment and   Recommendations(SBAR). Information from the following report(s) SBAR was reviewed with the receiving nurse. Lines:   Peripheral IV 09/26/22 Right Forearm (Active)   Site Assessment Clean, dry, & intact 09/26/22 0820   Phlebitis Assessment 0 09/26/22 0820   Infiltration Assessment 0 09/26/22 0820   Dressing Status Clean, dry, & intact; Occlusive 09/26/22 0820   Dressing Type Tape;Transparent 09/26/22 0820   Hub Color/Line Status Pink; Infusing 09/26/22 0820        Opportunity for questions and clarification was provided.       Patient transported with:   Registered Nurse

## 2022-09-26 NOTE — DISCHARGE INSTRUCTIONS
[address] [place] [address] Sports Medicine   Patient Discharge Instructions    Douglas Reynolds / [id number] : 1965    Admitted 2022 Discharged: 2022     IF YOU HAVE ANY PROBLEMS ONCE YOU ARE AT HOME CALL THE FOLLOWING NUMBERS:   Main office number: [phone]    Your follow up appointment to see either Dr. Alfonso Theodore PA-C, or True Spear PA-C as scheduled in 2 weeks. If you are unsure of your appointment date call the office at [phone]. Medication Instructions     Resume your home medictions as directed, you may have directed not to resume supplements until after your follow up. A prescription for pain medication has been given   It is important that you take the medication exactly as they are prescribed. Keep your medication in the bottles provided by the pharmacist and keep a list of the medication names, dosages, and times to be taken in your wallet. Do not take other medications without consulting your doctor. What to do at [address] [place] [address] Ave your prehospital diet. If you have excessive nausea or vomitting call your doctor's office. Be sure to maintain adequate fluid intake. Some pain medications may cause constipation. Remember to drink fluids, stay as active as possible, and eat plenty of fiber-rich foods. Begin Outpatient Physical Therapy; daily for the first week to work on gait training, range of motion, strengthening, and weight bearing exercises as tolerable. Continue to use your walker or cane when walking. May progress from the walker to a cane to complete total bearing as tolerable. Patient may shower. When to Call    - Call if you have a temperature greater then 101  - Unable to keep food down  - Are unable to bear any wieght   - Need a pain medication refill     Information obtained by :  I understand that if any problems occur once I am at home I am to contact my physician.     I understand and acknowledge receipt of the instructions indicated above. Physician's or R.N.'s Signature                                                                  Date/Time                                                                                                                                              Patient or Representative Signature                                                          Date/Time         DISCHARGE SUMMARY from Nurse    PATIENT INSTRUCTIONS:    After general anesthesia or intravenous sedation, for 24 hours or while taking prescription Narcotics:  Limit your activities  Do not drive and operate hazardous machinery  Do not make important personal or business decisions  Do  not drink alcoholic beverages  If you have not urinated within 8 hours after discharge, please contact your surgeon on call. Report the following to your surgeon:  Excessive pain, swelling, redness or odor of or around the surgical area  Temperature over 100.5  Nausea and vomiting lasting longer than 4 hours or if unable to take medications  Any signs of decreased circulation or nerve impairment to extremity: change in color, persistent  numbness, tingling, coldness or increase pain  Any questions    What to do at Home:  Recommended activity: Ambulate in house and No driving while on analgesics,     If you experience any of the following symptoms above, please follow up with Dr. Freida Goldmann. *  Please give a list of your current medications to your Primary Care Provider. *  Please update this list whenever your medications are discontinued, doses are      changed, or new medications (including over-the-counter products) are added. *  Please carry medication information at all times in case of emergency situations.     These are general instructions for a healthy lifestyle:    No smoking/ No tobacco products/ Avoid exposure to second hand smoke  Surgeon General's Warning:  Quitting smoking now greatly reduces serious risk to your health. Obesity, smoking, and sedentary lifestyle greatly increases your risk for illness    A healthy diet, regular physical exercise & weight monitoring are important for maintaining a healthy lifestyle    You may be retaining fluid if you have a history of heart failure or if you experience any of the following symptoms:  Weight gain of 3 pounds or more overnight or 5 pounds in a week, increased swelling in our hands or feet or shortness of breath while lying flat in bed. Please call your doctor as soon as you notice any of these symptoms; do not wait until your next office visit. Patient armband removed and shredded     The discharge information has been reviewed with the patient and caregiver. The patient and caregiver verbalized understanding. Discharge medications reviewed with the patient and caregiver and appropriate educational materials and side effects teaching were provided.   ___________________________________________________________________________________________________________________________________

## 2022-09-27 ENCOUNTER — HOSPITAL ENCOUNTER (OUTPATIENT)
Dept: PHYSICAL THERAPY | Age: 57
Discharge: HOME | End: 2022-09-27
Payer: COMMERCIAL

## 2022-09-27 PROCEDURE — 97110 THERAPEUTIC EXERCISES: CPT

## 2022-09-27 PROCEDURE — 97112 NEUROMUSCULAR REEDUCATION: CPT

## 2022-09-27 PROCEDURE — 97140 MANUAL THERAPY 1/> REGIONS: CPT

## 2022-09-27 NOTE — PROGRESS NOTES
PT DAILY TREATMENT NOTE     Patient Name: Manuel Lemos  Date:2022  : 1965  [x]  Patient  Verified  Payor: BLUE CROSS / Plan: [facility] PPO / Product Type: PPO /    In time:1118am  Out time:1215pm  Total Treatment Time (min): 62  Visit #: 2 of 20    Medicare/BCBS Only   Total Timed Codes (min):  57 1:1 Treatment Time:  45       Treatment Area: Pain in left knee [M25.562]    SUBJECTIVE  Pain Level (0-10 scale): 1  Any medication changes, allergies to medications, adverse drug reactions, diagnosis change, or new procedure performed?: [x] No    [] Yes (see summary sheet for update)  Subjective functional status/changes:   [x] No changes reported    OBJECTIVE    Modality rationale: decrease edema, decrease inflammation, and decrease pain to improve the patients ability to manage self care. Min Type Additional Details    []  Ice     []  heat  []  Ice massage  []  Laser   []  Anodyne Position:  Location:    []  Laser with stim  []  Other:  Position:  Location:   10 [x]  Vasopneumatic Device    []  Right     [x]  Left  Pre-treatment girth:  Post-treatment girth:  Measured at (location):  Pressure:       [x] lo [] med [] hi   Temperature: [x] lo [] med [] hi   [] Skin assessment post-treatment:  []intact []redness- no adverse reaction    []redness - adverse reaction:      25 min Therapeutic Exercise:  [x] See flow sheet :   Rationale: increase ROM, increase strength, and improve coordination to improve the patients ability to manage ADLs. 10 min Neuromuscular Re-education:  []  See flow sheet : quad set with TKE, SLR and LAQ; squats   Rationale: increase strength and improve coordination  to improve the patients ability to manage ADLs. 10 min Manual Therapy:  PROM with overpressure in supine and prone -- GR III posterior tib-fem mobs   The manual therapy interventions were performed at a separate and distinct time from the therapeutic activities interventions.   Rationale: decrease pain, increase ROM, and increase tissue extensibility to improve gait mechanics. With   [] TE   [] TA   [] neuro   [] other: Patient Education: [x] Review HEP    [] Progressed/Changed HEP based on:   [] positioning   [] body mechanics   [] transfers   [] heat/ice application    [] other:      Other Objective/Functional Measures: Pt able to attain terminal knee extension following 2 minutes in a prone stretch. PROM 0-107 deg    Pain Level (0-10 scale) post treatment: 3    ASSESSMENT/Changes in Function: Quads and HS remain quite tight, with continued limitations in knee flexion in prone and supine. She is able to make full rotations on the bike today, but she has to hike her hip to accomplish it despite direction to do rocking to avoid this. End range knee flexion has very firm end feels in all positions. Patient will continue to benefit from skilled PT services to modify and progress therapeutic interventions, address functional mobility deficits, address ROM deficits, address strength deficits, analyze and address soft tissue restrictions, analyze and cue movement patterns, analyze and modify body mechanics/ergonomics, assess and modify postural abnormalities, address imbalance/dizziness, and instruct in home and community integration to attain remaining goals. []  See Plan of Care   []  See progress note/recertification  []  See Discharge Summary         Progress towards goals / Updated goals:  Short Term Goals: To be accomplished in 1  weeks:  1. Patient will be independent in performing daily initial home exercise program..  Eval: established and reviewed with patient   2. Increase Left knee AROM flexion by at least 5  degrees to facilitate getting in and out of a car, stair negotiation, Functional transfers. Eval: right knee , left knee 15-96 AROM and  PROM  Long Term Goals: To be accomplished in  6  weeks:  1.  Patient  will be independent  with comprehensive updated HEP to continue to manage care independently after discharge. Eval: n/a  2. Patient will improve left knee AROM flexion to at least 110 degrees to facilitate ambulation, stair negotiation and transfers. Eval:  Left AROM: lacking 15 deg of extension-96 deg of flexion   3. Patient will improve left knee AROM extension by at least 5 degrees to facilitate ambulation. Eval:  Left AROM: lacking 15 deg of extension-96 deg of flexion   4. Increase FOTO to 62 indicating improved function and quality of life. Eval: 33  5. Patient will increase left LE strength by at least 1/3 grade grossly so patient has improved ability to perform ADL's, functional transfers. Eval: hip extension 2+/5 B, knee flexion 3+/5 B, left quad 4+/5, left hip flexion 4+/5 with quad lag, left hip abd 4+/5  6. Pt will perform 12 sit to stand repetitions in 30 seconds with 1-2 UE assist to improve ease of transfers and demo improved functional LE strength.   Eval: 7 repetitions with bilat UE      PLAN  []  Upgrade activities as tolerated     [x]  Continue plan of care  []  Update interventions per flow sheet       []  Discharge due to:_  []  Other:_      Ame Starkey, PT 9/27/2022  11:25 AM    Future Appointments   Date Time Provider Lg Gilbert   9/28/2022  9:15 AM Claus López, PTA [facility]   9/29/2022  7:45 AM Orquidea Rivera, PTA [facility]   9/30/2022  7:45 AM Tigist Bravo, PTA [facility]   10/3/2022  7:45 AM Tigist Bravo, PTA [facility]   10/5/2022  7:45 AM Tigist Bravo, PTA [facility]   10/10/2022  8:30 AM Olga Estrada, PT [facility]   10/12/2022  7:45 AM Tigist Bravo, PTA [facility]   10/17/2022  8:30 AM Olga Estrada, PT [facility]   10/19/2022  9:00 AM Orquidea Rivera, PTA [facility]   10/24/2022  8:30 AM Olga Estrada, PT [facility]   10/26/2022  8:00 AM Orquidea Rivera, PTA [facility]   10/31/2022 10:30 AM Sedrick Carrasco, PT [facility]   11/2/2022  7:45 AM Mason Davis, PT [facility]

## 2022-09-28 ENCOUNTER — HOSPITAL ENCOUNTER (OUTPATIENT)
Dept: PHYSICAL THERAPY | Age: 57
Discharge: HOME | End: 2022-09-28
Payer: COMMERCIAL

## 2022-09-28 PROCEDURE — 97016 VASOPNEUMATIC DEVICE THERAPY: CPT

## 2022-09-28 PROCEDURE — 97530 THERAPEUTIC ACTIVITIES: CPT

## 2022-09-28 PROCEDURE — 97140 MANUAL THERAPY 1/> REGIONS: CPT

## 2022-09-28 PROCEDURE — 97110 THERAPEUTIC EXERCISES: CPT

## 2022-09-28 NOTE — PROGRESS NOTES
PT DAILY TREATMENT NOTE     Patient Name: Bala Mckay  Date:2022  : 1965  [x]  Patient  Verified  Payor: BLUE CROSS / Plan: [employer] [facility] [id number] PPO / Product Type: PPO /    In time:915  Out time:1018  Total Treatment Time (min): 63  Visit #: 3 of 20    Medicare/BCBS Only   Total Timed Codes (min):   63 1:1 Treatment Time:  48       Treatment Area: Pain in left knee [M25.562]    SUBJECTIVE  Pain Level (0-10 scale): 1  Any medication changes, allergies to medications, adverse drug reactions, diagnosis change, or new procedure performed?: [x] No    [] Yes (see summary sheet for update)  Subjective functional status/changes:   [] No changes reported  Patient reported stiff tightness in left knee upon arrival   OBJECTIVE    Modality rationale: decrease inflammation and decrease pain to improve the patients ability to perform ADLs   Min Type Additional Details    [] Estim:  []Unatt       []IFC  []Premod                        []Other:  []w/ice   []w/heat  Position:  Location:    [] Estim: []Att    []TENS instruct  []NMES                    []Other:  []w/US   []w/ice   []w/heat  Position:  Location:    []  Traction: [] Cervical       []Lumbar                       [] Prone          []Supine                       []Intermittent   []Continuous Lbs:  [] before manual  [] after manual    []  Ultrasound: []Continuous   [] Pulsed                           []1MHz   []3MHz W/cm2:  Location:    []  Iontophoresis with dexamethasone         Location: [] Take home patch   [] In clinic    []  Ice     []  heat  []  Ice massage  []  Laser   []  Anodyne Position:  Location:    []  Laser with stim  []  Other:  Position:  Location:   10 [x]  Vasopneumatic Device    []  Right     [x]  Left  Pre-treatment girth:  Post-treatment girth:  Measured at (location):  Pressure:       [] lo [] med [] hi   Temperature: [] lo [] med [] hi   [] Skin assessment post-treatment:  []intact []redness- no adverse reaction []redness -       30 min Therapeutic Exercise:  [] See flow sheet :   Rationale: increase ROM and increase strength to improve the patients ability to perform ADLs    13 min Therapeutic Activity:  []  See flow sheet :   Rationale: increase strength and improve coordination  to improve the patients ability to perform ADLs       10 min Manual Therapy:  left LE MFR with LAD, anterior left hip mobs and quad stretching in prone with contract-relax at end range. Proximal and distal tib-fib mobs to improve alignment and ROM at knee and ankle into DF. The manual therapy interventions were performed at a separate and distinct time from the therapeutic activities interventions. Rationale: decrease pain, increase ROM, and increase tissue extensibility to perform ADLs              With   [] TE   [] TA   [] neuro   [] other: Patient Education: [x] Review HEP    [] Progressed/Changed HEP based on:   [] positioning   [] body mechanics   [] transfers   [] heat/ice application    [] other:      Other Objective/Functional Measures:   - added anterior left hip mobs and  MFR left LE prior joint mobs left knee  - good tolerance of progressions   - AROM post manual left knee 6-105 degs    Pain Level (0-10 scale) post treatment: 1    ASSESSMENT/Changes in Function: Patient presented with significant dysfunction in gait at B hip and knee with forward trunk posture, thoracic rotation and lateral shift, decreased TKE B knees and limited ankle mobility. Required frequent tactile and postioning cues to correct alignment and promote proper mm activation. Introduced [address] with LE distraction and more positional stretching to include ant hip and knee for greater lengthening with good tolerance and improved AROM post manual today.     Patient will continue to benefit from skilled PT services to modify and progress therapeutic interventions, address functional mobility deficits, address ROM deficits, address strength deficits, analyze and address soft tissue restrictions, and analyze and cue movement patterns to attain remaining goals. [x]  See Plan of Care  []  See progress note/recertification  []  See Discharge Summary         Progress towards goals / Updated goals:  Short Term Goals: To be accomplished in 1  weeks:  1. Patient will be independent in performing daily initial home exercise program..  Eval: established and reviewed with patient   2. Increase Left knee AROM flexion by at least 5  degrees to facilitate getting in and out of a car, stair negotiation, Functional transfers. Eval: right knee , left knee 15-96 AROM and  PROM  Long Term Goals: To be accomplished in  6  weeks:  1. Patient  will be independent  with comprehensive updated HEP to continue to manage care independently after discharge. Eval: n/a  2. Patient will improve left knee AROM flexion to at least 110 degrees to facilitate ambulation, stair negotiation and transfers. Eval:  Left AROM: lacking 15 deg of extension-96 deg of flexion   3. Patient will improve left knee AROM extension by at least 5 degrees to facilitate ambulation. Eval:  Left AROM: lacking 15 deg of extension-96 deg of flexion   4. Increase FOTO to 62 indicating improved function and quality of life. Eval: 33  5. Patient will increase left LE strength by at least 1/3 grade grossly so patient has improved ability to perform ADL's, functional transfers. Eval: hip extension 2+/5 B, knee flexion 3+/5 B, left quad 4+/5, left hip flexion 4+/5 with quad lag, left hip abd 4+/5  6. Pt will perform 12 sit to stand repetitions in 30 seconds with 1-2 UE assist to improve ease of transfers and demo improved functional LE strength.   Eval: 7 repetitions with bilat UE        PLAN  [x]  Upgrade activities as tolerated     [x]  Continue plan of care  []  Update interventions per flow sheet       []  Discharge due to:_  []  Other:_      Jeremy Monte, PTA 9/28/2022  9:54 AM    Future Appointments Date Time Provider Lg Gilbert   9/29/2022  7:45 AM Gleda Coho, PTA [facility]   9/30/2022  7:45 AM Armando Garrett, PTA [facility]   10/3/2022  7:45 AM Armando Garrett, PTA [facility]   10/5/2022  7:45 AM Armando Garrett, PTA [facility]   10/10/2022  8:30 AM Steven Dikes, PT [facility]   10/12/2022  7:45 AM Armando Garrett, PTA [facility]   10/17/2022  8:30 AM Steven Lorettakes, PT [facility]   10/19/2022  9:00 AM Gleda Coho, PTA [facility]   10/24/2022  8:30 AM Steven Disandy, PT [facility]   10/26/2022  8:00 AM Gleda Coho, PTA [facility]   10/31/2022 10:30 AM Rajesh Sandra, Christiana Javier, PT [facility]   11/2/2022  7:45 AM Ramon Little [place], PT [facility]

## 2022-09-29 ENCOUNTER — TELEPHONE (OUTPATIENT)
Dept: PHYSICAL THERAPY | Age: 57
End: 2022-09-29

## 2022-09-29 ENCOUNTER — APPOINTMENT (OUTPATIENT)
Dept: PHYSICAL THERAPY | Age: 57
End: 2022-09-29
Payer: COMMERCIAL

## 2022-09-30 ENCOUNTER — APPOINTMENT (OUTPATIENT)
Dept: PHYSICAL THERAPY | Age: 57
End: 2022-09-30
Payer: COMMERCIAL

## 2022-10-03 ENCOUNTER — HOSPITAL ENCOUNTER (OUTPATIENT)
Dept: PHYSICAL THERAPY | Age: 57
Discharge: HOME | End: 2022-10-03
Payer: COMMERCIAL

## 2022-10-03 PROCEDURE — 97140 MANUAL THERAPY 1/> REGIONS: CPT

## 2022-10-03 PROCEDURE — 97112 NEUROMUSCULAR REEDUCATION: CPT

## 2022-10-03 PROCEDURE — 97110 THERAPEUTIC EXERCISES: CPT

## 2022-10-03 PROCEDURE — 97016 VASOPNEUMATIC DEVICE THERAPY: CPT

## 2022-10-03 NOTE — PROGRESS NOTES
PT DAILY TREATMENT NOTE     Patient Name: Petrona Pittman  Date:10/3/2022  : 1965  [x]  Patient  Verified  Payor: [place] [facility] / Plan: [place] MED[place] PART [place] [facility] B / Product Type: Medicare /    In time:9:59  Out time:10:55  Total Treatment Time (min): 64  Visit #: 4 of 20    Medicare/BCBS Only   Total Timed Codes (min):  46 1:1 Treatment Time:  46       Treatment Area: Pain in left knee [M25.562]    SUBJECTIVE  Pain Level (0-10 scale): 0  Any medication changes, allergies to medications, adverse drug reactions, diagnosis change, or new procedure performed?: [x] No    [] Yes (see summary sheet for update)  Subjective functional status/changes:   [] No changes reported  \"It's not as stiff today, I've been doing a lot of exercise\"    OBJECTIVE    Modality rationale: decrease inflammation and decrease pain to improve the patients ability to perform daily tasks   Min Type Additional Details    [] Estim:  []Unatt       []IFC  []Premod                        []Other:  []w/ice   []w/heat  Position:  Location:    [] Estim: []Att    []TENS instruct  []NMES                    []Other:  []w/US   []w/ice   []w/heat  Position:  Location:    []  Traction: [] Cervical       []Lumbar                       [] Prone          []Supine                       []Intermittent   []Continuous Lbs:  [] before manual  [] after manual    []  Ultrasound: []Continuous   [] Pulsed                           []1MHz   []3MHz W/cm2:  Location:    []  Iontophoresis with dexamethasone         Location: [] Take home patch   [] In clinic    []  Ice     []  heat  []  Ice massage  []  Laser   []  Anodyne Position:  Location:    []  Laser with stim  []  Other:  Position:  Location:   10 [x]  Vasopneumatic Device    []  Right     [x]  Left  Pre-treatment girth:  Post-treatment girth:  Measured at (location):  Pressure:       [x] lo [] med [] hi   Temperature: [x] lo [] med [] hi   [] Skin assessment post-treatment:  []intact []redness- no adverse reaction    []redness - adverse reaction:     26 min Therapeutic Exercise:  [x] See flow sheet :   Rationale: increase ROM and increase strength to improve the patients ability to perform ADLs    10 min Neuromuscular Re-education:  [x]  See flow sheet :   Rationale: increase strength, improve coordination, and increase proprioception  to improve the patients ability to perform functional tasks    10 min Manual Therapy:  tib-fem jt mobs for knee flex and ext, patella mobs, DTM to distal quad   The manual therapy interventions were performed at a separate and distinct time from the therapeutic activities interventions. Rationale: decrease pain, increase ROM, and increase tissue extensibility to improve functional mobility          With   [] TE   [] TA   [] neuro   [] other: Patient Education: [x] Review HEP    [] Progressed/Changed HEP based on:   [] positioning   [] body mechanics   [] transfers   [] heat/ice application    [] other:      Other Objective/Functional Measures: Added shuttle press  Left knee PROM 5-108*    Pain Level (0-10 scale) post treatment: 0    ASSESSMENT/Changes in Function: Pt reports decr'd stiffness upon arrival and improved ease with bike today. Slowly improving left knee PROM since [facility]. Pt denies pain at end ranges and c/o knee feeling blocked. Good tolerance to incr'd reps and shuttle press today. Patient will continue to benefit from skilled PT services to modify and progress therapeutic interventions, address functional mobility deficits, address ROM deficits, address strength deficits, analyze and address soft tissue restrictions, analyze and cue movement patterns, analyze and modify body mechanics/ergonomics, assess and modify postural abnormalities, and address imbalance/dizziness to attain remaining goals. []  See Plan of Care  []  See progress note/recertification  []  See Discharge Summary         Progress towards goals / Updated goals:  Short Term Goals:  To be accomplished in 1  weeks:  1. Patient will be independent in performing daily initial home exercise program..  Eval: established and reviewed with patient   Current: Met, pt reports compliance 10/3/2022  2. Increase Left knee AROM flexion by at least 5  degrees to facilitate getting in and out of a car, stair negotiation, Functional transfers. Eval: right knee , left knee 15-96 AROM and  PROM  Current: PROM left knee -108* 10/3/2022  Long Term Goals: To be accomplished in  6  weeks:  1. Patient  will be independent  with comprehensive updated HEP to continue to manage care independently after discharge. Eval: n/a  2. Patient will improve left knee AROM flexion to at least 110 degrees to facilitate ambulation, stair negotiation and transfers. Eval:  Left AROM: lacking 15 deg of extension-96 deg of flexion   3. Patient will improve left knee AROM extension by at least 5 degrees to facilitate ambulation. Eval:  Left AROM: lacking 15 deg of extension-96 deg of flexion   4. Increase FOTO to 62 indicating improved function and quality of life. Eval: 33  5. Patient will increase left LE strength by at least 1/3 grade grossly so patient has improved ability to perform ADL's, functional transfers. Eval: hip extension 2+/5 B, knee flexion 3+/5 B, left quad 4+/5, left hip flexion 4+/5 with quad lag, left hip abd 4+/5  6. Pt will perform 12 sit to stand repetitions in 30 seconds with 1-2 UE assist to improve ease of transfers and demo improved functional LE strength.   Eval: 7 repetitions with bilat UE     PLAN  []  Upgrade activities as tolerated     []  Continue plan of care  []  Update interventions per flow sheet       []  Discharge due to:_  []  Other:_      Elizabeth Watts PTA 10/3/2022  9:56 AM    Future Appointments   Date Time Provider Lg Gilbert   10/3/2022 10:00 [facility]   10/5/2022  7:45 AM Rj Herrera PTA [facility]   10/10/2022  8:30 AM [facility] [place] PT [facility]   10/12/2022  7:45 AM Nguyen German, PTA [facility]   10/17/2022  8:30 AM Stacey Burgos, PT [facility]   10/19/2022  9:00 AM Jarvis Sanchez, PTA [facility]   10/24/2022  8:30 AM Stacey Burgos, PT [facility]   10/26/2022  8:00 AM Jarvis Sancehz, PTA [facility]   10/31/2022 10:30 AM Geoff Singh, PT [facility]   11/2/2022  7:45 AM Nanette Little, PT [facility]

## 2022-10-05 ENCOUNTER — HOSPITAL ENCOUNTER (OUTPATIENT)
Dept: PHYSICAL THERAPY | Age: 57
Discharge: HOME | End: 2022-10-05
Payer: COMMERCIAL

## 2022-10-05 PROCEDURE — 97140 MANUAL THERAPY 1/> REGIONS: CPT

## 2022-10-05 PROCEDURE — 97112 NEUROMUSCULAR REEDUCATION: CPT

## 2022-10-05 PROCEDURE — 97110 THERAPEUTIC EXERCISES: CPT

## 2022-10-05 NOTE — PROGRESS NOTES
PT DAILY TREATMENT NOTE     Patient Name: Mami Olson  Date:10/5/2022  : 1965  [x]  Patient  Verified  Payor: BLUE CROSS / Plan: [facility] PPO / Product Type: PPO /    In time:7:48  Out time:8:32  Total Treatment Time (min): 44  Visit #: 5 of 20    Medicare/BCBS Only   Total Timed Codes (min):  44 1:1 Treatment Time:  44       Treatment Area: Pain in left knee [M25.562]    SUBJECTIVE  Pain Level (0-10 scale): 0  Any medication changes, allergies to medications, adverse drug reactions, diagnosis change, or new procedure performed?: [x] No    [] Yes (see summary sheet for update)  Subjective functional status/changes:   [] No changes reported  Pt reports feeling good today, no extra soreness after last visit    OBJECTIVE    26 min Therapeutic Exercise:  [x] See flow sheet :   Rationale: increase ROM and increase strength to improve the patients ability to perform ADLs    10 min Neuromuscular Re-education:  [x]  See flow sheet :   Rationale: increase strength, improve coordination, and increase proprioception  to improve the patients ability to perform functional tasks    8 min Manual Therapy:   tib-fem jt mobs for knee flex and ext, patella mobs, DTM to distal quad   The manual therapy interventions were performed at a separate and distinct time from the therapeutic activities interventions. Rationale: decrease pain, increase ROM, and increase tissue extensibility to improve functional mobility          With   [] TE   [] TA   [] neuro   [] other: Patient Education: [x] Review HEP    [] Progressed/Changed HEP based on:   [] positioning   [] body mechanics   [] transfers   [] heat/ice application    [] other:      Other Objective/Functional Measures: Added step ups     Pain Level (0-10 scale) post treatment: 0    ASSESSMENT/Changes in Function: Patient is challenged well with step ups, req's chava UE but denies incr'd pain.  ROM continues to progress slowly but pt puts for good effort and tolerates manual therapy well. PD modalities today, will ice at home if needed. Patient will continue to benefit from skilled PT services to modify and progress therapeutic interventions, address functional mobility deficits, address ROM deficits, address strength deficits, analyze and address soft tissue restrictions, analyze and cue movement patterns, analyze and modify body mechanics/ergonomics, and assess and modify postural abnormalities to attain remaining goals. []  See Plan of Care  []  See progress note/recertification  []  See Discharge Summary         Progress towards goals / Updated goals:  Short Term Goals: To be accomplished in 1  weeks:  1. Patient will be independent in performing daily initial home exercise program..  Eval: established and reviewed with patient   Current: Met, pt reports compliance 10/3/2022  2. Increase Left knee AROM flexion by at least 5  degrees to facilitate getting in and out of a car, stair negotiation, Functional transfers. Eval: right knee , left knee 15-96 AROM and  PROM  Current: PROM left knee -108* 10/3/2022  Long Term Goals: To be accomplished in  6  weeks:  1. Patient  will be independent  with comprehensive updated HEP to continue to manage care independently after discharge. Eval: n/a  2. Patient will improve left knee AROM flexion to at least 110 degrees to facilitate ambulation, stair negotiation and transfers. Eval:  Left AROM: lacking 15 deg of extension-96 deg of flexion   3. Patient will improve left knee AROM extension by at least 5 degrees to facilitate ambulation. Eval:  Left AROM: lacking 15 deg of extension-96 deg of flexion   4. Increase FOTO to 62 indicating improved function and quality of life. Eval: 33  5. Patient will increase left LE strength by at least 1/3 grade grossly so patient has improved ability to perform ADL's, functional transfers.   Eval: hip extension 2+/5 B, knee flexion 3+/5 B, left quad 4+/5, left hip flexion 4+/5 with quad lag, left hip abd 4+/5  6. Pt will perform 12 sit to stand repetitions in 30 seconds with 1-2 UE assist to improve ease of transfers and demo improved functional LE strength.   Eval: 7 repetitions with bilat UE     PLAN  []  Upgrade activities as tolerated     [x]  Continue plan of care  []  Update interventions per flow sheet       []  Discharge due to:_  []  Other:_      Dick Webster, PTA 10/5/2022  7:51 AM    Future Appointments   Date Time Provider Lg Guilloryi   10/10/2022 12:00 PM Harjinder [email or web address] [facility]   10/12/2022  7:45 AM Zachary Gan, PTA [facility]   10/17/2022  8:30 AM Sg Mckinnon, PT [facility]   10/19/2022  9:00 AM Nataliia Olmstead, PTA [facility]   10/24/2022  8:30 AM Sg Mckinnon, PT [facility]   10/26/2022  8:00 AM Nataliia Olmstead, PTA [facility]   10/31/2022 10:30 AM Sarah Spencer, PT [facility]   11/2/2022  7:45 AM Lorin Little, PT [facility]

## 2022-10-10 ENCOUNTER — APPOINTMENT (OUTPATIENT)
Dept: PHYSICAL THERAPY | Age: 57
End: 2022-10-10
Payer: COMMERCIAL

## 2022-10-10 ENCOUNTER — HOSPITAL ENCOUNTER (OUTPATIENT)
Dept: PHYSICAL THERAPY | Age: 57
Discharge: HOME | End: 2022-10-10
Payer: COMMERCIAL

## 2022-10-10 PROCEDURE — 97140 MANUAL THERAPY 1/> REGIONS: CPT

## 2022-10-10 PROCEDURE — 97110 THERAPEUTIC EXERCISES: CPT

## 2022-10-10 NOTE — PROGRESS NOTES
PT DAILY TREATMENT NOTE     Patient Name: Harris Ty  Date:10/10/2022  : 1965  [x]  Patient  Verified  Payor: [place] MED[place] / Plan: [place] MEDICARE PART A & B / Product Type: Medicare /    In time:12:02  Out time:12:42  Total Treatment Time (min): 40  Visit #: 6 of 20    Medicare/BCBS Only   Total Timed Codes (min):  40 1:1 Treatment Time:  40       Treatment Area: Pain in left knee [M25.562]    SUBJECTIVE  Pain Level (0-10 scale): 0  Any medication changes, allergies to medications, adverse drug reactions, diagnosis change, or new procedure performed?: [x] No    [] Yes (see summary sheet for update)  Subjective functional status/changes:   [] No changes reported  The pt reports she is feeling her knee is getting looser.  She reports seeing surgeon's office today who stated they flexed her knee to 130 deg while under anesthesia    OBJECTIVE    Modality rationale: PD  will perform at home   Min Type Additional Details    [] Estim:  []Unatt       []IFC  []Premod                        []Other:  []w/ice   []w/heat  Position:  Location:    [] Estim: []Att    []TENS instruct  []NMES                    []Other:  []w/US   []w/ice   []w/heat  Position:  Location:    []  Traction: [] Cervical       []Lumbar                       [] Prone          []Supine                       []Intermittent   []Continuous Lbs:  [] before manual  [] after manual    []  Ultrasound: []Continuous   [] Pulsed                           []1MHz   []3MHz W/cm2:  Location:    []  Iontophoresis with dexamethasone         Location: [] Take home patch   [] In clinic    []  Ice     []  heat  []  Ice massage  []  Laser   []  Anodyne Position:  Location:    []  Laser with stim  []  Other:  Position:  Location:    []  Vasopneumatic Device    []  Right     []  Left  Pre-treatment girth:  Post-treatment girth:  Measured at (location):  Pressure:       [] lo [] med [] hi   Temperature: [] lo [] med [] hi   [] Skin assessment post-treatment: []intact []redness- no adverse reaction    []redness - adverse reaction:       30 min Therapeutic Exercise:  [] See flow sheet :   Rationale: increase ROM and increase strength to improve the patients ability to perform ADLs    10 min Manual Therapy:  tib-fem extension/flexion mobs grade III-IV, passive left knee flexion and extension stretching, STM to distal hamstrings and proximal gastrocs in prone hang    The manual therapy interventions were performed at a separate and distinct time from the therapeutic activities interventions. Rationale: increase ROM and increase tissue extensibility to improve efficiency with gait and ADLs            With   [] TE   [] TA   [] neuro   [] other: Patient Education: [x] Review HEP    [] Progressed/Changed HEP based on:   [] positioning   [] body mechanics   [] transfers   [] heat/ice application    [] other:      Other Objective/Functional Measures: 5-110 deg AROM  3-110 deg PROM left knee     Pain Level (0-10 scale) post treatment: 0    ASSESSMENT/Changes in Function: The pt demonstrates gradually progressing knee ROM at this time. Significant soft tissue restrictions in popliteal space. Advised pt to continue with low-load long duration stretching at home to improve flexibility    Patient will continue to benefit from skilled PT services to modify and progress therapeutic interventions, address functional mobility deficits, address ROM deficits, address strength deficits, analyze and address soft tissue restrictions, analyze and cue movement patterns, and analyze and modify body mechanics/ergonomics to attain remaining goals. []  See Plan of Care  []  See progress note/recertification  []  See Discharge Summary         Progress towards goals / Updated goals:  Short Term Goals: To be accomplished in 1  weeks:  1.  Patient will be independent in performing daily initial home exercise program..  Eval: established and reviewed with patient   Current: Met, pt reports compliance 10/3/2022  2. Increase Left knee AROM flexion by at least 5  degrees to facilitate getting in and out of a car, stair negotiation, Functional transfers. Eval: right knee , left knee 15-96 AROM and  PROM  Current: PROM left knee -108* 10/3/2022 Met 110 deg 10/10/2022  Long Term Goals: To be accomplished in  6  weeks:  1. Patient  will be independent  with comprehensive updated HEP to continue to manage care independently after discharge. Eval: n/a  2. Patient will improve left knee AROM flexion to at least 110 degrees to facilitate ambulation, stair negotiation and transfers. Eval:  Left AROM: lacking 15 deg of extension-96 deg of flexion   Current: Met 110 deg 10/10/2022  3. Patient will improve left knee AROM extension by at least 5 degrees to facilitate ambulation. Eval:  Left AROM: lacking 15 deg of extension-96 deg of flexion   Current: Met 5 deg lacking actively 10/10/2022  4. Increase FOTO to 62 indicating improved function and quality of life. Eval: 33  5. Patient will increase left LE strength by at least 1/3 grade grossly so patient has improved ability to perform ADL's, functional transfers. Eval: hip extension 2+/5 B, knee flexion 3+/5 B, left quad 4+/5, left hip flexion 4+/5 with quad lag, left hip abd 4+/5  6. Pt will perform 12 sit to stand repetitions in 30 seconds with 1-2 UE assist to improve ease of transfers and demo improved functional LE strength.   Eval: 7 repetitions with bilat UE     PLAN  []  Upgrade activities as tolerated     []  Continue plan of care  []  Update interventions per flow sheet       []  Discharge due to:_  []  Other:_      Nathalia Eastman DPT CMTPT 10/10/2022  12:04 PM    Future Appointments   Date Time Provider Lg Gilbert   10/12/2022  7:45 AM Zachary Gan, WILL [facility]   10/17/2022  8:30 AM Sg Mckinnon, PT [facility]   10/19/2022  9:00 AM Nataliia Olmstead PTA [facility]   10/24/2022  8:30 AM Sg Mckinnon, PT [facility]   10/26/2022  8:00 AM Hernandez Lagos, PTA [facility]   10/31/2022 10:30 AM Rosette Meyers, PT [facility]   11/2/2022  7:45 AM [facility]

## 2022-10-12 ENCOUNTER — HOSPITAL ENCOUNTER (OUTPATIENT)
Dept: PHYSICAL THERAPY | Age: 57
Discharge: HOME | End: 2022-10-12
Payer: COMMERCIAL

## 2022-10-12 PROCEDURE — 97110 THERAPEUTIC EXERCISES: CPT

## 2022-10-12 PROCEDURE — 97112 NEUROMUSCULAR REEDUCATION: CPT

## 2022-10-12 NOTE — PROGRESS NOTES
PT DAILY TREATMENT NOTE     Patient Name: Lane Maurer  Date:10/12/2022  : 1965  [x]  Patient  Verified  Payor: [place] MEDICARE / Plan: [place] MEDICARE PART A & B / Product Type: Medicare /    In time:7:47  Out time:8:25  Total Treatment Time (min): 38  Visit #: 7 of 20    Medicare/BCBS Only   Total Timed Codes (min):  38 1:1 Treatment Time:  38       Treatment Area: Pain in left knee [M25.562]    SUBJECTIVE  Pain Level (0-10 scale): 0  Any medication changes, allergies to medications, adverse drug reactions, diagnosis change, or new procedure performed?: [x] No    [] Yes (see summary sheet for update)  Subjective functional status/changes:   [x] No changes reported    OBJECTIVE    28 min Therapeutic Exercise:  [x] See flow sheet :   Rationale: increase ROM and increase strength to improve the patients ability to perform ADLs    10 min Manual Therapy:     tib-fem jt mobs for knee flex and ext, patella mobs, DTM to distal quad   The manual therapy interventions were performed at a separate and distinct time from the therapeutic activities interventions. Rationale: decrease pain, increase ROM, and increase tissue extensibility to improve functional mobility            With   [] TE   [] TA   [] neuro   [] other: Patient Education: [x] Review HEP    [] Progressed/Changed HEP based on:   [] positioning   [] body mechanics   [] transfers   [] heat/ice application    [] other:      Other Objective/Functional Measures:      Pain Level (0-10 scale) post treatment: 0    ASSESSMENT/Changes in Function: Fatigued with step ups and presents with some compensation that improves with vc's. Shuttle press performed with focus on knee flex stretch. Pt reports prone is uncomfortable but it able to perform prone hand and prone knee flex str. No pain following treatment. No pain with end range PROM.      Patient will continue to benefit from skilled PT services to modify and progress therapeutic interventions, address functional mobility deficits, address ROM deficits, address strength deficits, analyze and address soft tissue restrictions, analyze and cue movement patterns, analyze and modify body mechanics/ergonomics, assess and modify postural abnormalities, and address imbalance/dizziness to attain remaining goals. []  See Plan of Care  []  See progress note/recertification  []  See Discharge Summary         Progress towards goals / Updated goals:  Short Term Goals: To be accomplished in 1  weeks:  1. Patient will be independent in performing daily initial home exercise program..  Eval: established and reviewed with patient   Current: Met, pt reports compliance 10/3/2022  2. Increase Left knee AROM flexion by at least 5  degrees to facilitate getting in and out of a car, stair negotiation, Functional transfers. Eval: right knee , left knee 15-96 AROM and  PROM  Current: PROM left knee -108* 10/3/2022 Met 110 deg 10/10/2022  Long Term Goals: To be accomplished in  6  weeks:  1. Patient  will be independent  with comprehensive updated HEP to continue to manage care independently after discharge. Eval: n/a  2. Patient will improve left knee AROM flexion to at least 110 degrees to facilitate ambulation, stair negotiation and transfers. Eval:  Left AROM: lacking 15 deg of extension-96 deg of flexion   Current: Met 110 deg 10/10/2022  3. Patient will improve left knee AROM extension by at least 5 degrees to facilitate ambulation. Eval:  Left AROM: lacking 15 deg of extension-96 deg of flexion   Current: Met 5 deg lacking actively 10/10/2022  4. Increase FOTO to 62 indicating improved function and quality of life. Eval: 33  5. Patient will increase left LE strength by at least 1/3 grade grossly so patient has improved ability to perform ADL's, functional transfers. Eval: hip extension 2+/5 B, knee flexion 3+/5 B, left quad 4+/5, left hip flexion 4+/5 with quad lag, left hip abd 4+/5  6.  Pt will perform 12 sit to stand repetitions in 30 seconds with 1-2 UE assist to improve ease of transfers and demo improved functional LE strength.   Eval: 7 repetitions with bilat UE     PLAN  []  Upgrade activities as tolerated     [x]  Continue plan of care  []  Update interventions per flow sheet       []  Discharge due to:_  []  Other:_      Radha German, WILL 10/12/2022  7:50 AM    Future Appointments   Date Time Provider Lg Gilbert   10/17/2022  8:30 AM Frank Rome, PT [facility]   10/19/2022  9:00 AM Caro Lizama, PTA [facility]   10/24/2022  8:30 AM Frank Rome, PT [facility]   10/26/2022  8:00 AM Caro Lizama, PTA [facility]   10/31/2022 10:30 AM Gregorio Montez, PT [facility]   11/2/2022  7:45 AM Alva Little, PT [facility]

## 2022-10-17 ENCOUNTER — HOSPITAL ENCOUNTER (OUTPATIENT)
Dept: PHYSICAL THERAPY | Age: 57
Discharge: HOME | End: 2022-10-17
Payer: COMMERCIAL

## 2022-10-17 PROCEDURE — 97110 THERAPEUTIC EXERCISES: CPT

## 2022-10-17 PROCEDURE — 97140 MANUAL THERAPY 1/> REGIONS: CPT

## 2022-10-17 NOTE — PROGRESS NOTES
PT DAILY TREATMENT NOTE     Patient Name: Indra Cody  Date:10/17/2022  : 1965  [x]  Patient  Verified  Payor: [place] MEDICARE / Plan: [place] MEDICARE PART A & B / Product Type: Medicare /    In time:830am  Out time:930am  Total Treatment Time (min): 60  Visit #: 8 of 8    Medicare/BCBS Only   Total Timed Codes (min):  50 1:1 Treatment Time:  45       Treatment Area: Pain in left knee [M25.562]    SUBJECTIVE  Pain Level (0-10 scale): 0  Any medication changes, allergies to medications, adverse drug reactions, diagnosis change, or new procedure performed?: [x] No    [] Yes (see summary sheet for update)  Subjective functional status/changes:   [] No changes reported  Reports knee is doing well    OBJECTIVE    Modality rationale: decrease inflammation and decrease pain to improve the patients ability to manage self care. Min Type Additional Details    []  Ice     []  heat  []  Ice massage  []  Laser   []  Anodyne Position:  Location:   10 [x]  Vasopneumatic Device    []  Right     [x]  Left  Pre-treatment girth:  Post-treatment girth:  Measured at (location):  Pressure:       [x] lo [] med [] hi   Temperature: [x] lo [] med [] hi   [] Skin assessment post-treatment:  []intact []redness- no adverse reaction    []redness - adverse reaction:     35 min Therapeutic Exercise:  [x] See flow sheet :   Rationale: increase ROM and increase strength to improve the patients ability to perform ADLs. 10 min Manual Therapy:     tib-fem jt mobs for knee flex and ext, patella mobs, DTM to distal quad   The manual therapy interventions were performed at a separate and distinct time from the therapeutic activities interventions. Rationale: decrease pain, increase ROM, and increase tissue extensibility to improve functional mobility.      With   [] TE   [] TA   [] neuro   [] other: Patient Education: [x] Review HEP    [] Progressed/Changed HEP based on:   [] positioning   [] body mechanics   [] transfers   [] heat/ice application    [] other:      Other Objective/Functional Measures: knee PROM 0- deg with overpressure     Pain Level (0-10 scale) post treatment: 0    ASSESSMENT/Changes in Function: Pt retains quite firm end feels into flexion and extension. Therapist unable to attain less than 10 deg from TKE today. With time, pt able to reattain 110 deg of flexion. Lack of ROM impacts fluidity of gait mechanics, with increased hip hike and trunk lean, but she is able to walk without an AD. Must hip hike with step ups onto a 6 inch step. Plan to re-initiate ely stretch immediately following prone knee hang due to limited tolerance to prone. Patient will continue to benefit from skilled PT services to modify and progress therapeutic interventions, address functional mobility deficits, address ROM deficits, address strength deficits, analyze and address soft tissue restrictions, analyze and cue movement patterns, analyze and modify body mechanics/ergonomics, assess and modify postural abnormalities, address imbalance/dizziness, and instruct in home and community integration to attain remaining goals. []  See Plan of Care  []  See progress note/recertification  []  See Discharge Summary         Progress towards goals / Updated goals:  Short Term Goals: To be accomplished in 1  weeks:  1. Patient will be independent in performing daily initial home exercise program..  Eval: established and reviewed with patient   Current: Met, pt reports compliance 10/3/2022  2. Increase Left knee AROM flexion by at least 5  degrees to facilitate getting in and out of a car, stair negotiation, Functional transfers. Eval: right knee , left knee 15-96 AROM and  PROM  Current: Met 110 deg 10/10/2022  Long Term Goals: To be accomplished in  6  weeks:  1. Patient  will be independent  with comprehensive updated HEP to continue to manage care independently after discharge. Eval: n/a  2.  Patient will improve left knee AROM flexion to at least 110 degrees to facilitate ambulation, stair negotiation and transfers. Eval:  Left AROM: lacking 15 deg of extension-96 deg of flexion   Current: Met 110 deg 10/10/2022  3. Patient will improve left knee AROM extension by at least 5 degrees to facilitate ambulation. Eval:  Left AROM: lacking 15 deg of extension-96 deg of flexion   Current: Met 10 deg lacking actively 10/17/2022  4. Increase FOTO to 62 indicating improved function and quality of life. Eval: 33  5. Patient will increase left LE strength by at least 1/3 grade grossly so patient has improved ability to perform ADL's, functional transfers. Eval: hip extension 2+/5 B, knee flexion 3+/5 B, left quad 4+/5, left hip flexion 4+/5 with quad lag, left hip abd 4+/5  Current: slow progress, hip extension 2+/5, knee flexion 4-/5, left quad 4+/5, left hip flexion 4/5, left hip abd 4/5 (10/17/2022)  6. Pt will perform 12 sit to stand repetitions in 30 seconds with 1-2 UE assist to improve ease of transfers and demo improved functional LE strength.   Eval: 7 repetitions with bilat UE     PLAN  []  Upgrade activities as tolerated     [x]  Continue plan of care  []  Update interventions per flow sheet       []  Discharge due to:_  []  Other:_      Loyd Rosas, PT 10/17/2022  8:37 AM    Future Appointments   Date Time Provider Lg Of[place]   10/19/2022  9:00 AM Caro Lizama PTA [facility]   10/24/2022  8:30 AM Frank Rome, PT [facility]   10/26/2022  8:00 AM Caro Lizama PTA [facility]   10/31/2022 10:30 AM Gregorio Montez, PT [facility]   11/2/2022  7:45 AM Alva Little, PT [facility]

## 2022-10-19 ENCOUNTER — HOSPITAL ENCOUNTER (OUTPATIENT)
Dept: PHYSICAL THERAPY | Age: 57
Discharge: HOME | End: 2022-10-19
Payer: COMMERCIAL

## 2022-10-19 PROCEDURE — 97112 NEUROMUSCULAR REEDUCATION: CPT

## 2022-10-19 PROCEDURE — 97140 MANUAL THERAPY 1/> REGIONS: CPT

## 2022-10-19 PROCEDURE — 97110 THERAPEUTIC EXERCISES: CPT

## 2022-10-19 NOTE — PROGRESS NOTES
PT DAILY TREATMENT NOTE     Patient Name: Cherie Dow  Date:10/19/2022  : 1965  [x]  Patient  Verified  Payor: [place] MEDICARE / Plan: [place] MEDICARE PART A & B / Product Type: Medicare /    In time:900  Out time:958  Total Treatment Time (min): 58  Visit #: 9 of 20    Medicare/BCBS Only   Total Timed Codes (min):  48 1:1 Treatment Time:  48       Treatment Area: Pain in left knee [M25.562]    SUBJECTIVE  Pain Level (0-10 scale): 0  Any medication changes, allergies to medications, adverse drug reactions, diagnosis change, or new procedure performed?: [x] No    [] Yes (see summary sheet for update)  Subjective functional status/changes:   [] No changes reported  Patient reports that she experienced some minor stiffness immediately post last session but that it had loosened up by the end of the day.      OBJECTIVE    Modality rationale: decrease inflammation and decrease pain to improve the patients ability to manage self care   Min Type Additional Details    [] Estim:  []Unatt       []IFC  []Premod                        []Other:  []w/ice   []w/heat  Position:  Location:    [] Estim: []Att    []TENS instruct  []NMES                    []Other:  []w/US   []w/ice   []w/heat  Position:  Location:    []  Traction: [] Cervical       []Lumbar                       [] Prone          []Supine                       []Intermittent   []Continuous Lbs:  [] before manual  [] after manual    []  Ultrasound: []Continuous   [] Pulsed                           []1MHz   []3MHz W/cm2:  Location:    []  Iontophoresis with dexamethasone         Location: [] Take home patch   [] In clinic    []  Ice     []  heat  []  Ice massage  []  Laser   []  Anodyne Position:  Location:    []  Laser with stim  []  Other:  Position:  Location:   10 [x]  Vasopneumatic Device    []  Right     [x]  Left  Pre-treatment girth: 46.5 cm  Post-treatment girth:45.25 cm  Measured at (location): left knee joint Pressure:       [x] lo [] med [] hi Temperature: [x] lo [] med [] hi   [x] Skin assessment post-treatment:  [x]intact [x]redness- no adverse reaction    []redness - adverse reaction:         20 min Therapeutic Exercise:  [x] See flow sheet :   Rationale: increase ROM and increase strength to improve the patients ability to complete ADLs with ease. 20 min Neuromuscular Re-education:  [x]  See flow sheet : hip, glute, and quad re-education, including VC/TC to increase knee flexion/decrease ankle PF and hip flexor compensations and to decrease dynamic valgus collapse   Rationale: increase ROM, increase strength, improve coordination, improve balance, and increase proprioception  to improve the patients ability to improve biomechanics and increase kinesthetic awareness for ease of ADL completion. 8 min Manual Therapy:  patellar mobs in all directions and progressive PROM into knee flexion with patient supine. Grade 3 patellar mobs with patient at end range flexion   The manual therapy interventions were performed at a separate and distinct time from the therapeutic activities interventions. Rationale: decrease pain, increase ROM, increase tissue extensibility, and decrease trigger points to improve functional mobility. With   [x] TE   [] TA   [] neuro   [] other: Patient Education: [x] Review HEP    [] Progressed/Changed HEP based on:   [] positioning   [] body mechanics   [] transfers   [] heat/ice application    [] other:      Other Objective/Functional Measures:   -moderate Tc to quad and HS during step ups helped to  Activate dynamic knee flexion and extension during step ups.      -increased time under tension during HS curls and banded TKE    -added 3\" pause at top and bottom of shuttle press DL and SL squats    -patient required frequent monitoring/cueing to remain stationary vs. Extending hips while completing manual knee flexion in supine on plinth     Pain Level (0-10 scale) post treatment: 0    ASSESSMENT/Changes in Function: Today's session focused on increased quad and HS strength, mobility, and kinesthetic awareness. Patient tolerated treatment well, reporting no adverse responses throughout treatment, despite progressions in time under tension. Patient requires moderate VC/TC to decrease multiple hip compensatory motions (see objective and neuro). Knee flexion appears to have increased with additional time under tension in weight bearing positions; consider increasing frequency of these exercises at next visit. Patient will continue to benefit from skilled PT services to modify and progress therapeutic interventions, address functional mobility deficits, address ROM deficits, address strength deficits, analyze and address soft tissue restrictions, analyze and cue movement patterns, analyze and modify body mechanics/ergonomics, assess and modify postural abnormalities, address imbalance/dizziness, and instruct in home and community integration to attain remaining goals. []  See Plan of Care  []  See progress note/recertification  []  See Discharge Summary         Progress towards goals / Updated goals:  Short Term Goals: To be accomplished in 1  weeks:  1. Patient will be independent in performing daily initial home exercise program..  Eval: established and reviewed with patient   Current: Met, pt reports compliance 10/3/2022  2. Increase Left knee AROM flexion by at least 5  degrees to facilitate getting in and out of a car, stair negotiation, Functional transfers. Eval: right knee , left knee 15-96 AROM and  PROM  Current: Met 110 deg 10/10/2022  Long Term Goals: To be accomplished in  6  weeks:  1. Patient  will be independent  with comprehensive updated HEP to continue to manage care independently after discharge. Eval: n/a  2. Patient will improve left knee AROM flexion to at least 110 degrees to facilitate ambulation, stair negotiation and transfers.   Eval:  Left AROM: lacking 15 deg of extension-96 deg of flexion   Current: Met 110 deg 10/10/2022  3. Patient will improve left knee AROM extension by at least 5 degrees to facilitate ambulation. Eval:  Left AROM: lacking 15 deg of extension-96 deg of flexion   Current: Met 10 deg lacking actively 10/17/2022  4. Increase FOTO to 62 indicating improved function and quality of life. Eval: 33  5. Patient will increase left LE strength by at least 1/3 grade grossly so patient has improved ability to perform ADL's, functional transfers. Eval: hip extension 2+/5 B, knee flexion 3+/5 B, left quad 4+/5, left hip flexion 4+/5 with quad lag, left hip abd 4+/5  Current: slow progress, hip extension 2+/5, knee flexion 4-/5, left quad 4+/5, left hip flexion 4/5, left hip abd 4/5 (10/17/2022)  6. Pt will perform 12 sit to stand repetitions in 30 seconds with 1-2 UE assist to improve ease of transfers and demo improved functional LE strength.   Eval: 7 repetitions with bilat UE     PLAN  []  Upgrade activities as tolerated     []  Continue plan of care  []  Update interventions per flow sheet       []  Discharge due to:_  []  Other:_      Anabela Jones, WILL 10/19/2022  8:47 AM    Future Appointments   Date Time Provider Lg Gilbert   10/19/2022  9:00 AM Consuelo Powers PTA [facility]   10/24/2022  8:30 AM Anup Patel, PT [facility]   10/26/2022  8:00 AM Consuelo Powers PTA [facility]   10/31/2022 10:30 AM Levon Meneses, PT [facility]   11/2/2022  7:45 AM Esvin Little, PT [facility]

## 2022-10-24 ENCOUNTER — HOSPITAL ENCOUNTER (OUTPATIENT)
Dept: PHYSICAL THERAPY | Age: 57
Discharge: HOME | End: 2022-10-24
Payer: COMMERCIAL

## 2022-10-24 PROCEDURE — 97110 THERAPEUTIC EXERCISES: CPT

## 2022-10-24 PROCEDURE — 97140 MANUAL THERAPY 1/> REGIONS: CPT

## 2022-10-24 PROCEDURE — 97112 NEUROMUSCULAR REEDUCATION: CPT

## 2022-10-24 NOTE — PROGRESS NOTES
PT DAILY TREATMENT NOTE     Patient Name: Chavez Florez  Date:10/24/2022  : 1965  [x]  Patient  Verified  Payor: [place] MEDICARE / Plan: [place] MEDICARE PART A & B / Product Type: Medicare /    In time:830am  Out time:932am  Total Treatment Time (min): 62  Visit #: 10 of 20    Medicare/BCBS Only   Total Timed Codes (min):  52 1:1 Treatment Time:  45       Treatment Area: Pain in left knee [M25.562]    SUBJECTIVE  Pain Level (0-10 scale): 0  Any medication changes, allergies to medications, adverse drug reactions, diagnosis change, or new procedure performed?: [x] No    [] Yes (see summary sheet for update)  Subjective functional status/changes:   [x] No changes reported    OBJECTIVE    Modality rationale: decrease edema, decrease inflammation, and decrease pain to improve the patients ability to manage self care. Min Type Additional Details    []  Laser with stim  []  Other:  Position:  Location:   10 [x]  Vasopneumatic Device    []  Right     [x]  Left  Pre-treatment girth:  Post-treatment girth:  Measured at (location):  Pressure:       [x] lo [] med [] hi   Temperature: [x] lo [] med [] hi   [] Skin assessment post-treatment:  []intact []redness- no adverse reaction    []redness - adverse reaction:   20 min Therapeutic Exercise:  [x] See flow sheet :   Rationale: increase ROM and increase strength to improve the patients ability to complete ADLs with ease. 17 min Neuromuscular Re-education:  [x]  See flow sheet : hip, glute, and quad re-education, including VC/TC to increase knee flexion/decrease ankle PF and hip flexor compensations and to decrease dynamic valgus collapse   Rationale: increase ROM, increase strength, improve coordination, improve balance, and increase proprioception  to improve the patients ability to improve biomechanics and increase kinesthetic awareness for ease of ADL completion.           8 min Manual Therapy:   tib-fem jt mobs for knee flex and ext, patella mobs, DTM to distal HS   The manual therapy interventions were performed at a separate and distinct time from the therapeutic activities interventions. Rationale: decrease pain, increase ROM, increase tissue extensibility, and decrease trigger points to improve functional mobility. With   [] TE   [] TA   [] neuro   [] other: Patient Education: [x] Review HEP    [] Progressed/Changed HEP based on:   [] positioning   [] body mechanics   [] transfers   [] heat/ice application    [] other:      Other Objective/Functional Measures: see re-cert     Pain Level (0-10 scale) post treatment: 0    ASSESSMENT/Changes in Function: Pt is a 59-year-old woman who underwent a left TKA on 5/16/2022 and a manipulation under anesthesia on 9/26/2022 presenting to the clinic for a reassessment and treatment. Today, her AROM measures  deg, with PROM measuring 5-112 deg with firm end feels. She demonstrates slowly improving strength, but her gait continues to be impaired by reduced knee AROM and tightness. PT recommends continued therapy for 2x/week for 5 more weeks to continue to address impairments in ROM, flexibility, mobility, strength, and function. Patient will continue to benefit from skilled PT services to modify and progress therapeutic interventions, address functional mobility deficits, address ROM deficits, address strength deficits, analyze and address soft tissue restrictions, analyze and cue movement patterns, analyze and modify body mechanics/ergonomics, assess and modify postural abnormalities, address imbalance/dizziness, and instruct in home and community integration to attain remaining goals. []  See Plan of Care  []  See progress note/recertification  []  See Discharge Summary         Progress towards goals / Updated goals:  Short Term Goals: To be accomplished in 1  weeks:  1.  Patient will be independent in performing daily initial home exercise program..  Eval: established and reviewed with patient Current: Met, pt reports compliance 10/3/2022  2. Increase Left knee AROM flexion by at least 5  degrees to facilitate getting in and out of a car, stair negotiation, Functional transfers. Eval: right knee , left knee 15-96 AROM and  PROM  Current: Met 110 deg 10/10/2022  Long Term Goals: To be accomplished in  6  weeks:  1. Patient  will be independent  with comprehensive updated HEP to continue to manage care independently after discharge. Eval: n/a  Current: n/a (10/24/2022)  2. Patient will improve left knee AROM flexion to at least 110 degrees to facilitate ambulation, stair negotiation and transfers. Eval:  Left AROM: lacking 15 deg of extension-96 deg of flexion   Current: met, 112 deg of flexion (10/24/2022)  3. Patient will improve left knee AROM extension by at least 5 degrees to facilitate ambulation. Eval:  Left AROM: lacking 15 deg of extension-96 deg of flexion   Current: Met 10 deg lacking actively, lacking 4 deg passively 10/24/2022  4. Increase FOTO to 62 indicating improved function and quality of life. Eval: 33  Current: progressing, 38 (10/24/2022)  5. Patient will increase left LE strength by at least 1/3 grade grossly so patient has improved ability to perform ADL's, functional transfers. Eval: hip extension 2+/5 B, knee flexion 3+/5 B, left quad 4+/5, left hip flexion 4+/5 with quad lag, left hip abd 4+/5  Current: progressing, hip extension 3/5, knee flexion 4/5, left quad 4+/5, left hip flexion 4/5, left hip abd 4/5 (10/24/2022)  6. Pt will perform 12 sit to stand repetitions in 30 seconds with 1-2 UE assist to improve ease of transfers and demo improved functional LE strength.   Eval: 7 repetitions with bilat UE     Current: progressing, 11 without UE assist. (10/24/2022)    PLAN  []  Upgrade activities as tolerated     [x]  Continue plan of care  []  Update interventions per flow sheet       []  Discharge due to:_  []  Other:_      Paige Favor, PT 10/24/2022  8:39 AM    Future Appointments   Date Time Provider Lg [place]   10/26/2022  8:00 AM Arley Julian, PTA [facility]   10/31/2022 10:30 AM Harsh Rossi, PT [facility]   11/2/2022  7:45 AM Nicola Little, PT [facility]

## 2022-10-24 NOTE — PROGRESS NOTES
[facility]  [address]  [place], [address]  [phone] fax    Continued Plan of Care/ Re-certification for Physical Therapy Services    Patient name: Fran Saab Start of Care: 2022   Referral source: Tyler Bagley, Radha Muniz : 1965   Medical/Treatment Diagnosis: Pain in left knee [M25.562]  Payor: VA MEDICARE / Plan: VA MEDICARE PART A & B / Product Type: Medicare /  Onset Date:YAYO 2022, TKA 2022     Prior Hospitalization: see medical history Provider#: [id number]   Medications: Verified on Patient Summary List    Comorbidities: HBP, Visual impaired, Thyroid problems, Fibromyalgia; sickle-cell; Hx of Breast CA; Bilat THR; right TKR   Prior Level of Function: walking without use of cane, resides with spouse who is able to assist, 2 story home; difficulty getting in and out of a chair (11 years)  Visits from Start of Care: 10    Missed Visits: 0    The Plan of Care and following information is based on the patient's current status:  Short Term Goals: To be accomplished in 1  weeks:  1. Patient will be independent in performing daily initial home exercise program..  Eval: established and reviewed with patient   Current: Met, pt reports compliance   2. Increase Left knee AROM flexion by at least 5  degrees to facilitate getting in and out of a car, stair negotiation, Functional transfers. Eval: right knee , left knee 15-96 AROM and  PROM  Current: Met 110 deg   Long Term Goals: To be accomplished in  6  weeks:  1. Patient  will be independent  with comprehensive updated HEP to continue to manage care independently after discharge. Eval: n/a  Current: n/a   2. Patient will improve left knee AROM flexion to at least 110 degrees to facilitate ambulation, stair negotiation and transfers. Eval:  Left AROM: lacking 15 deg of extension-96 deg of flexion   Current: met, 112 deg of flexion   3.  Patient will improve left knee AROM extension by at least 5 degrees to facilitate ambulation. Eval:  Left AROM: lacking 15 deg of extension-96 deg of flexion   Current: Met 10 deg lacking actively, lacking 4 deg passively   4. Increase FOTO to 62 indicating improved function and quality of life. Eval: 33  Current: progressing, 38   5. Patient will increase left LE strength by at least 1/3 grade grossly so patient has improved ability to perform ADL's, functional transfers. Eval: hip extension 2+/5 B, knee flexion 3+/5 B, left quad 4+/5, left hip flexion 4+/5 with quad lag, left hip abd 4+/5  Current: progressing, hip extension 3/5, knee flexion 4/5, left quad 4+/5, left hip flexion 4/5, left hip abd 4/5   6. Pt will perform 12 sit to stand repetitions in 30 seconds with 1-2 UE assist to improve ease of transfers and demo improved functional LE strength. Eval: 7 repetitions with bilat UE     Current: progressing, 11 without UE assist.     Key functional changes: AROM  deg, PROM 5-112 deg      Problems/ barriers to goal attainment: pain     Problem List: pain affecting function, decrease ROM, decrease strength, edema affecting function, impaired gait/ balance, decrease ADL/ functional abilitiies, decrease activity tolerance, decrease flexibility/ joint mobility, and decrease transfer abilities    Treatment Plan: Therapeutic exercise, Neuromuscular reeducation, Manual therapy, Therapeutic activity, Self care/home management, Electric stim unattended , Vasopneumatic device, and Gait training     Patient Goal (s) has been updated and includes: better motion     Goals for this certification period to be accomplished in 10 treatments:  1. Patient  will be independent  with comprehensive updated HEP to continue to manage care independently after discharge. Recert: n/a   2. Patient will improve left knee PROM flexion to at least 115 degrees to facilitate ambulation, stair negotiation and transfers. Recert: 364 deg of flexion   3. Patient will improve left knee PROM extension to neutral to facilitate ambulation. Recert: 10 deg lacking actively, lacking 4 deg passively   4. Increase FOTO to 62 indicating improved function and quality of life. Recert: progressing, 38   5. Patient will increase left LE strength by at least 1/3 grade grossly so patient has improved ability to perform ADL's, functional transfers. Recert: progressing, hip extension 3/5, knee flexion 4/5, left quad 4+/5, left hip flexion 4/5, left hip abd 4/5   6. Pt will perform 12 sit to stand repetitions in 30 seconds without UE assist to improve ease of transfers and demo improved functional LE strength. Recert: progressing, 11 without UE assist.     Frequency / Duration: Patient to be seen 2 times per week for 10 treatments:    Assessment / Recommendations:Pt is a 30-year-old woman who underwent a left TKA on 5/16/2022 and a manipulation under anesthesia on 9/26/2022 presenting to the clinic for a reassessment and treatment. Today, her AROM measures  deg, with PROM measuring 5-112 deg with firm end feels. She demonstrates slowly improving strength, but her gait continues to be impaired by reduced knee AROM and tightness. PT recommends continued therapy for 2x/week for 5 more weeks to continue to address impairments in ROM, flexibility, mobility, strength, and function. Certification Period: 10/26/2022 -- 11/24/2022    Danis Fox, PT 10/24/2022 9:33 AM    ________________________________________________________________________  I certify that the above Therapy Services are being furnished while the patient is under my care. I agree with the treatment plan and certify that this therapy is necessary. [] I have read the above and request that my patient continue as recommended.   [] I have read the above report and request that my patient continue therapy with the following changes/special instructions: _____________________________________________  [] SAMARA mark read the above report and request that my patient be discharged from therapy    Physician's Signature:____________Date:_________TIME:________     MARTIN Amaro  ** Signature, Date and Time must be completed for valid certification **    Please sign and return to [facility] [address] [place] [address] & [address]  [address]  [place], [facility] [address]  [phone] fax

## 2022-10-26 ENCOUNTER — HOSPITAL ENCOUNTER (OUTPATIENT)
Dept: PHYSICAL THERAPY | Age: 57
Discharge: HOME | End: 2022-10-26
Payer: COMMERCIAL

## 2022-10-26 PROCEDURE — 97112 NEUROMUSCULAR REEDUCATION: CPT

## 2022-10-26 PROCEDURE — 97110 THERAPEUTIC EXERCISES: CPT

## 2022-10-26 PROCEDURE — 97140 MANUAL THERAPY 1/> REGIONS: CPT

## 2022-10-26 NOTE — PROGRESS NOTES
PT DAILY TREATMENT NOTE     Patient Name: Dl Bowles  Date:10/26/2022  : 1965  [x]  Patient  Verified  Payor: VA MEDICARE / Plan: VA MEDICARE PART A & B / Product Type: Medicare /    In time:800  Out time:855  Total Treatment Time (min): 55  Visit #: 1 of 20    Medicare/BCBS Only   Total Timed Codes (min):  45 1:1 Treatment Time:  44       Treatment Area: Pain in left knee [M25.562]    SUBJECTIVE  Pain Level (0-10 scale): 0  Any medication changes, allergies to medications, adverse drug reactions, diagnosis change, or new procedure performed?: [x] No    [] Yes (see summary sheet for update)  Subjective functional status/changes:   [] No changes reported  While patient denies pain, she states that she \"doesn't feel well\" today. Despite this, she wishes to complete PT today. \"I just don't feel right today. \"     OBJECTIVE    Modality rationale: decrease inflammation and decrease pain to improve the patients ability to tolerate ADLs.     Min Type Additional Details    [] Estim:  []Unatt       []IFC  []Premod                        []Other:  []w/ice   []w/heat  Position:  Location:    [] Estim: []Att    []TENS instruct  []NMES                    []Other:  []w/US   []w/ice   []w/heat  Position:  Location:    []  Traction: [] Cervical       []Lumbar                       [] Prone          []Supine                       []Intermittent   []Continuous Lbs:  [] before manual  [] after manual    []  Ultrasound: []Continuous   [] Pulsed                           []1MHz   []3MHz W/cm2:  Location:    []  Iontophoresis with dexamethasone         Location: [] Take home patch   [] In clinic    []  Ice     []  heat  []  Ice massage  []  Laser   []  Anodyne Position:  Location:    []  Laser with stim  []  Other:  Position:  Location:   10 [x]  Vasopneumatic Device    []  Right     [x]  Left  Pre-treatment girth:  Post-treatment girth:  Measured at (location):  Pressure:       [x] lo [] med [] hi   Temperature: [x] lo [] med [] hi   [x] Skin assessment post-treatment:  [x]intact [x]redness- no adverse reaction    []redness - adverse reaction:       17 min Therapeutic Exercise:  [x] See flow sheet :   Rationale: increase ROM and increase strength to improve the patients ability to complete ADLs with ease. 17 min Neuromuscular Re-education:  [x]  See flow sheet : knee re-education, including VC to decrease ankle PF compensation and TC to increase quad and HS engagement   Rationale: increase ROM, increase strength, improve coordination, improve balance, and increase proprioception  to improve the patients ability to improve biomechanics and increase kinesthetic awareness for ease of ADL completion. 10 min Manual Therapy:  PROM and superior and inferior patellar mobs  at end range flexion and extension with patient sitting and supine. Gentle OP into extension with patient supine. The manual therapy interventions were performed at a separate and distinct time from the therapeutic activities interventions. Rationale: decrease pain, increase ROM, increase tissue extensibility, and decrease trigger points to improve functional mobility. With   [x] TE   [] TA   [] neuro   [] other: Patient Education: [x] Review HEP    [] Progressed/Changed HEP based on:   [] positioning   [] body mechanics   [] transfers   [] heat/ice application    [] other:      Other Objective/Functional Measures:   -TRX squats completed to 18\" box + yoga mat for increased knee flexion    -patient unable to complete DL leg presses with 5B; modified to 4B today. -increased time under tension during heel slides    Pain Level (0-10 scale) post treatment: 0    ASSESSMENT/Changes in Function: Today's session focused on quad and accessory musculature strength, mobility, and kinesthetic awareness for improved motor recruitment and neuromuscular sequencing.  Patient tolerated session well, reporting no adverse responses but requiring slight modification in load and required a rest and water break, probably due to feeling unwell upon presentation to clinic. Patient exhibited increased terminal knee extension but continues to demonstrate quad lag. Plan to elevate foot of bed during heel slides at next visit. Patient will continue to benefit from skilled PT services to modify and progress therapeutic interventions, address functional mobility deficits, address ROM deficits, address strength deficits, analyze and address soft tissue restrictions, analyze and cue movement patterns, analyze and modify body mechanics/ergonomics, assess and modify postural abnormalities, address imbalance/dizziness, and instruct in home and community integration to attain remaining goals. []  See Plan of Care  [x]  See progress note/recertification  []  See Discharge Summary         Progress towards goals / Updated goals:  1. Patient  will be independent  with comprehensive updated HEP to continue to manage care independently after discharge. Recert: n/a   2. Patient will improve left knee PROM flexion to at least 115 degrees to facilitate ambulation, stair negotiation and transfers. Recert: 893 deg of flexion   3. Patient will improve left knee PROM extension to neutral to facilitate ambulation. Recert: 10 deg lacking actively, lacking 4 deg passively   4. Increase FOTO to 62 indicating improved function and quality of life. Recert: progressing, 38   5. Patient will increase left LE strength by at least 1/3 grade grossly so patient has improved ability to perform ADL's, functional transfers. Recert: progressing, hip extension 3/5, knee flexion 4/5, left quad 4+/5, left hip flexion 4/5, left hip abd 4/5   6. Pt will perform 12 sit to stand repetitions in 30 seconds without UE assist to improve ease of transfers and demo improved functional LE strength.   Recert: progressing, 11 without UE assist.     PLAN  [x]  Upgrade activities as tolerated     []  Continue plan of care  []  Update interventions per flow sheet       []  Discharge due to:_  []  Other:_      Rhonda Asher, WILL 10/26/2022  7:45 AM    Future Appointments   Date Time Provider Lg [place]   10/26/2022  8:00 AM Katey Soria, PTA [facility]   10/31/2022 10:30 AM Jefry Owens, PT [facility]   11/2/2022  7:45 AM Ethan Little, PT [facility]

## 2022-10-31 ENCOUNTER — HOSPITAL ENCOUNTER (OUTPATIENT)
Dept: PHYSICAL THERAPY | Age: 57
Discharge: HOME | End: 2022-10-31
Payer: COMMERCIAL

## 2022-10-31 PROCEDURE — 97110 THERAPEUTIC EXERCISES: CPT

## 2022-10-31 PROCEDURE — 97112 NEUROMUSCULAR REEDUCATION: CPT

## 2022-10-31 PROCEDURE — 97140 MANUAL THERAPY 1/> REGIONS: CPT

## 2022-10-31 NOTE — PROGRESS NOTES
PT DAILY TREATMENT NOTE     Patient Name: Agiular Heller  Date:10/31/2022  : 1965  [x]  Patient  Verified  Payor: [place] [facility] / Plan: [place] [place] & B / Product Type: Medicare /    In time:1031 am  Out time:1115 am  Total Treatment Time (min): 44  Visit #: 2 of 20 (visit count 12)    Medicare/BCBS Only   Total Timed Codes (min):  44 1:1 Treatment Time:  44       Treatment Area: Pain in left knee [M25.562]    SUBJECTIVE  Pain Level (0-10 scale): 0  Any medication changes, allergies to medications, adverse drug reactions, diagnosis change, or new procedure performed?: [x] No    [] Yes (see summary sheet for update)  Subjective functional status/changes:   [x] No changes reported    \"I've been working on getting it as far back as I can\"      OBJECTIVE    Modality rationale: decrease pain to improve the patients ability to perform LE functional mobility, transfers, and ambulation with greater ease   Min Type Additional Details    [] Estim:  []Unatt       []IFC  []Premod                        []Other:  []w/ice   []w/heat  Position:   Location:     [] Estim: []Att    []TENS instruct  []NMES                    []Other:  []w/US   []w/ice   []w/heat  Position:  Location:    []  Traction: [] Cervical       []Lumbar                       [] Prone          []Supine                       []Intermittent   []Continuous Lbs:  [] before manual  [] after manual    []  Ultrasound: []Continuous   [] Pulsed                           []1MHz   []3MHz W/cm2:  Location:    []  Iontophoresis with dexamethasone         Location: [] Take home patch   [] In clinic   PD []  Ice     []  heat  []  Ice massage  []  Laser   []  Anodyne Position:   Location:     []  Laser with stim  []  Other:  Position:  Location:    []  Vasopneumatic Device    []  Right     []  Left  Pre-treatment girth:  Post-treatment girth:  Measured at (location):  Pressure:       [] lo [] med [] hi   Temperature: [] lo [] med [] hi   [x] Skin assessment post-treatment:  [x]intact []redness- no adverse reaction    []redness - adverse reaction:       26 min Therapeutic Exercise:  [x] See flow sheet :   Rationale: increase ROM and increase strength to improve the patients ability to perform gait and transfers with improved LE strength and mobility. 10 min Neuromuscular Re-education:  [x]  See flow sheet :   Rationale: increase strength, improve coordination, improve balance and increase proprioception  to improve the patients ability to perform stair negotiation and functional mobility in the home/community with improved LE control. 8 min Manual Therapy:  PROM flexion and extension with overpressure. STM left distal quad and popliteal space. The manual therapy interventions were performed at a separate and distinct time from the therapeutic activities interventions. Rationale: decrease pain, increase ROM, increase tissue extensibility and decrease trigger points to improve ease of ADLs and functional activities. With   [x] TE   [] TA   [x] neuro   [] other: Patient Education: [x] Review HEP    [] Progressed/Changed HEP based on:   [] positioning   [] body mechanics   [] transfers   [] heat/ice application    [] other:      Other Objective/Functional Measures: Added: prone TKE and prone hamstring curls      Pain Level (0-10 scale) post treatment: 0    ASSESSMENT/Changes in Function: Session with continued emphasis on left knee ROM, LE strengthening and stability. Pt challenged with prone TKE requiring cues to maintain left hip on plinth. Able to participate in PT session with no increased pain reported. PD modalities post PT session.      Patient will continue to benefit from skilled PT services to modify and progress therapeutic interventions, address functional mobility deficits, address ROM deficits, address strength deficits, analyze and address soft tissue restrictions, analyze and cue movement patterns, analyze and modify body mechanics/ergonomics, assess and modify postural abnormalities and address imbalance/dizziness to attain remaining goals and improve overall function. []  See Plan of Care  []  See progress note/recertification  []  See Discharge Summary         Progress towards goals / Updated goals:  1. Patient  will be independent  with comprehensive updated HEP to continue to manage care independently after discharge. Recert: n/a   Current:   2. Patient will improve left knee PROM flexion to at least 115 degrees to facilitate ambulation, stair negotiation and transfers. Recert: 413 deg of flexion   Current: Goal in progress, addressing with manual therapy and heel slides (10/31/22)   3. Patient will improve left knee PROM extension to neutral to facilitate ambulation. Recert: 10 deg lacking actively, lacking 4 deg passively   Current:   4. Increase FOTO to 62 indicating improved function and quality of life. Recert: progressing, 38   Current:   5. Patient will increase left LE strength by at least 1/3 grade grossly so patient has improved ability to perform ADL's, functional transfers. Recert: progressing, hip extension 3/5, knee flexion 4/5, left quad 4+/5, left hip flexion 4/5, left hip abd 4/5   Current:   6. Pt will perform 12 sit to stand repetitions in 30 seconds without UE assist to improve ease of transfers and demo improved functional LE strength.   Recert: progressing, 11 without UE assist.  Current: Goal in progress, addressing with TRX squats (10/31/22)     PLAN  [x]  Upgrade activities as tolerated     [x]  Continue plan of care  []  Update interventions per flow sheet       []  Discharge due to:_  []  Other:_      Yo Carter PT 10/31/2022  12:20 PM     Future Appointments   Date Time Provider Lg [facility]   11/2/2022  7:45 AM Ethan Little, PT [facility]

## 2022-11-02 ENCOUNTER — HOSPITAL ENCOUNTER (OUTPATIENT)
Dept: PHYSICAL THERAPY | Age: 57
Discharge: HOME | End: 2022-11-02
Payer: COMMERCIAL

## 2022-11-02 PROCEDURE — 97140 MANUAL THERAPY 1/> REGIONS: CPT

## 2022-11-02 PROCEDURE — 97112 NEUROMUSCULAR REEDUCATION: CPT

## 2022-11-02 PROCEDURE — 97110 THERAPEUTIC EXERCISES: CPT

## 2022-11-02 NOTE — PROGRESS NOTES
PT DAILY TREATMENT NOTE     Patient Name: Ammon Mckeon  Date:2022  : 1965  [x]  Patient  Verified  Payor: BLUE CROSS / Plan: [facility] PPO / Product Type: PPO /    In time:7:53  Out time:8:34  Total Treatment Time (min): 41  Visit #: 3 of 20    Medicare/BCBS Only   Total Timed Codes (min):  41 1:1 Treatment Time:  41       Treatment Area: Pain in left knee [M25.562]    SUBJECTIVE  Pain Level (0-10 scale): 0/10  Any medication changes, allergies to medications, adverse drug reactions, diagnosis change, or new procedure performed?: [x] No    [] Yes (see summary sheet for update)  Subjective functional status/changes:   [] No changes reported  The patient reports that her knee feels stiff this morning but denies pain. OBJECTIVE  21 min Therapeutic Exercise:  [] See flow sheet :   Rationale: increase ROM and increase strength to improve the patients ability to improve ADL ease. 12 min Neuromuscular Re-education:  [x]  See flow sheet :   Rationale: increase ROM and increase strength  to improve the patients ability to improve ADL ease. 8 min Manual Therapy:  Left tibiofemoral mobs grade III flexion/extension with the patient in supine. The manual therapy interventions were performed at a separate and distinct time from the therapeutic activities interventions. Rationale: decrease pain, increase ROM, and increase tissue extensibility to improve ADL ease. With   [] TE   [] TA   [] neuro   [] other: Patient Education: [x] Review HEP    [] Progressed/Changed HEP based on:   [] positioning   [] body mechanics   [] transfers   [] heat/ice application    [] other:      Other Objective/Functional Measures:   Left knee flexion PROM: 113 degrees   Left knee extension PROM: 4 degrees    Pain Level (0-10 scale) post treatment: 0/10    ASSESSMENT/Changes in Function: The patient is making fairly good progress functionally.  Her gait appears to be improving and she can self correct when antalgia and decreased stance phase through left begins to be appreciated. Her PROM is currently -4 to 113 degrees. She leaves in no pain and denies modalities post session. Patient will continue to benefit from skilled PT services to modify and progress therapeutic interventions, address functional mobility deficits, address ROM deficits, address strength deficits, analyze and address soft tissue restrictions, analyze and cue movement patterns, analyze and modify body mechanics/ergonomics, assess and modify postural abnormalities, and instruct in home and community integration to attain remaining goals. []  See Plan of Care  []  See progress note/recertification  []  See Discharge Summary         Progress towards goals / Updated goals:  1. Patient  will be independent  with comprehensive updated HEP to continue to manage care independently after discharge. Recert: n/a   Current:   2. Patient will improve left knee PROM flexion to at least 115 degrees to facilitate ambulation, stair negotiation and transfers. Recert: 678 deg of flexion   Current: Attainted 113 degrees passively 11/02/2022  3. Patient will improve left knee PROM extension to neutral to facilitate ambulation. Recert: 10 deg lacking actively, lacking 4 deg passively   Current: No change - lacking 4 degrees passively 11/02/2022  4. Increase FOTO to 62 indicating improved function and quality of life. Recert: progressing, 38   Current:   5. Patient will increase left LE strength by at least 1/3 grade grossly so patient has improved ability to perform ADL's, functional transfers. Recert: progressing, hip extension 3/5, knee flexion 4/5, left quad 4+/5, left hip flexion 4/5, left hip abd 4/5   Current:   6. Pt will perform 12 sit to stand repetitions in 30 seconds without UE assist to improve ease of transfers and demo improved functional LE strength.   Recert: progressing, 11 without UE assist.  Current: Goal in progress, addressing with TRENT vital (10/31/22)     PLAN  [x]  Upgrade activities as tolerated     []  Continue plan of care  []  Update interventions per flow sheet       []  Discharge due to:_  []  Other:_      Jeronimo Hollis, PT 11/2/2022  7:56 AM    Future Appointments   Date Time Provider Lg [facility]   11/8/2022  8:30 AM Shira Yañez, PTA [facility]   11/14/2022  7:00 AM Feli Angel, PTA [facility]   11/17/2022  8:30 AM Renetta Cervantes, PT [facility]   11/22/2022  8:30 AM Shira Yañez, PTA [facility]   11/23/2022  7:45 AM Chinedu Castañeda, PTA [facility]   11/28/2022  7:45 AM Feli Angel, PTA [facility]   11/30/2022  7:45 AM Feli Angel, PTA [facility]

## 2022-11-08 ENCOUNTER — HOSPITAL ENCOUNTER (OUTPATIENT)
Dept: PHYSICAL THERAPY | Age: 57
Discharge: HOME | End: 2022-11-08
Payer: COMMERCIAL

## 2022-11-08 PROCEDURE — 97140 MANUAL THERAPY 1/> REGIONS: CPT

## 2022-11-08 PROCEDURE — 97112 NEUROMUSCULAR REEDUCATION: CPT

## 2022-11-08 PROCEDURE — 97110 THERAPEUTIC EXERCISES: CPT

## 2022-11-08 NOTE — PROGRESS NOTES
PT DAILY TREATMENT NOTE     Patient Name: Keri Bettencourt  Date:2022  : 1965  [x]  Patient  Verified  Payor: BLUE CROSS / Plan: [facility] PPO / Product Type: PPO /    In time:8:31  Out time:9:10  Total Treatment Time (min): 39  Visit #: 4 of 10    Medicare/BCBS Only   Total Timed Codes (min):  39 1:1 Treatment Time:  39       Treatment Area: Pain in left knee [M25.562]    SUBJECTIVE  Pain Level (0-10 scale): 0/10  Any medication changes, allergies to medications, adverse drug reactions, diagnosis change, or new procedure performed?: [x] No    [] Yes (see summary sheet for update)  Subjective functional status/changes:   [] No changes reported  Pt reports no new complaints of pain. Pt reports compliance with HEP. OBJECTIVE    21 min Therapeutic Exercise:  [x] See flow sheet :   Rationale: increase ROM and increase strength to improve the patients ability to perform ADLs with increased ease. 10 min Neuromuscular Re-education:  [x]  See flow sheet :   Rationale: increase strength, improve coordination, and increase proprioception  to improve the patients ability to perform ADLs with increased ease. 8 min Manual Therapy:  PROM to left knee, Tibfem mobs grade II-III flexion and extension, patellar mobs inferior/superior grade II-III with patient in supine   The manual therapy interventions were performed at a separate and distinct time from the therapeutic activities interventions. Rationale: decrease pain, increase ROM, and increase tissue extensibility to improve tolerance to ADLs.              With   [] TE   [] TA   [] neuro   [] other: Patient Education: [x] Review HEP    [] Progressed/Changed HEP based on:   [] positioning   [] body mechanics   [] transfers   [] heat/ice application    [] other:      Other Objective/Functional Measures:      Pain Level (0-10 scale) post treatment: 0/10    ASSESSMENT/Changes in Function: Pt demonstrates continued stiffness and limited left knee AROM. Pt continues to have antalgic gait pattern but has no pain with ambulation. Patient will continue to benefit from skilled PT services to modify and progress therapeutic interventions, address functional mobility deficits, address ROM deficits, address strength deficits, analyze and address soft tissue restrictions, analyze and cue movement patterns, analyze and modify body mechanics/ergonomics, and assess and modify postural abnormalities to attain remaining goals. []  See Plan of Care  []  See progress note/recertification  []  See Discharge Summary         Progress towards goals / Updated goals:  1. Patient  will be independent  with comprehensive updated HEP to continue to manage care independently after discharge. Recert: n/a   Current:   2. Patient will improve left knee PROM flexion to at least 115 degrees to facilitate ambulation, stair negotiation and transfers. Recert: 996 deg of flexion   Current: Attainted 113 degrees passively 11/02/2022  3. Patient will improve left knee PROM extension to neutral to facilitate ambulation. Recert: 10 deg lacking actively, lacking 4 deg passively   Current: No change - lacking 4 degrees passively 11/02/2022  4. Increase FOTO to 62 indicating improved function and quality of life. Recert: progressing, 38   Current:   5. Patient will increase left LE strength by at least 1/3 grade grossly so patient has improved ability to perform ADL's, functional transfers. Recert: progressing, hip extension 3/5, knee flexion 4/5, left quad 4+/5, left hip flexion 4/5, left hip abd 4/5   Current:   6. Pt will perform 12 sit to stand repetitions in 30 seconds without UE assist to improve ease of transfers and demo improved functional LE strength.   Recert: progressing, 11 without UE assist.  Current: Goal in progress, addressing with TRX squats (10/31/22)        PLAN  []  Upgrade activities as tolerated     [x]  Continue plan of care  []  Update interventions per flow sheet       []  Discharge due to:_  []  Other:_      Cathren Fresh, PTA 11/8/2022  8:57 AM    Future Appointments   Date Time Provider Lg [place]   11/14/2022  7:00 AM Hansa Klickitat, PTA [facility]   11/17/2022  8:30 AM Dodie Enriquez, PT [facility]   11/22/2022  8:30 AM Damon Armstrong, PTA [facility]   11/23/2022  7:45 AM Cj Gómez, PTA [facility]   11/28/2022  7:45 AM Hansa Klickitat, PTA [facility]   11/30/2022  7:45 AM Hansa Katharina, PTA [facility]

## 2022-11-14 ENCOUNTER — HOSPITAL ENCOUNTER (OUTPATIENT)
Dept: PHYSICAL THERAPY | Age: 57
Discharge: HOME | End: 2022-11-14
Payer: COMMERCIAL

## 2022-11-14 PROCEDURE — 97140 MANUAL THERAPY 1/> REGIONS: CPT

## 2022-11-14 PROCEDURE — 97110 THERAPEUTIC EXERCISES: CPT

## 2022-11-14 PROCEDURE — 97112 NEUROMUSCULAR REEDUCATION: CPT

## 2022-11-14 NOTE — PROGRESS NOTES
PT DAILY TREATMENT NOTE     Patient Name: Indra Cody  Date:2022  : 1965  [x]  Patient  Verified  Payor: BLUE CROSS / Plan: [facility] PPO / Product Type: PPO /    In time:7:00  Out time:7:40  Total Treatment Time (min): 40  Visit #: 5 of 10    Medicare/BCBS Only   Total Timed Codes (min):  40 1:1 Treatment Time:  40       Treatment Area: Pain in left knee [M25.562]    SUBJECTIVE  Pain Level (0-10 scale): 0  Any medication changes, allergies to medications, adverse drug reactions, diagnosis change, or new procedure performed?: [x] No    [] Yes (see summary sheet for update)  Subjective functional status/changes:   [] No changes reported  Pt states her knee is just stiff this morning, also reports getting in and out of a car is much easier now    OBJECTIVE    22 min Therapeutic Exercise:  [x] See flow sheet :   Rationale: increase ROM and increase strength to improve the patients ability to perform ADLs    10 min Neuromuscular Re-education:  [x]  See flow sheet :   Rationale: increase strength, improve coordination, and increase proprioception  to improve the patients ability to perform functional tasks    8 min Manual Therapy:  patella mobs, tib-fem jt mobs and PROM to left knee. STM with therastick to lateral quad   The manual therapy interventions were performed at a separate and distinct time from the therapeutic activities interventions.   Rationale: decrease pain, increase ROM, and increase tissue extensibility to improve functional mobility         With   [] TE   [] TA   [] neuro   [] other: Patient Education: [x] Review HEP    [] Progressed/Changed HEP based on:   [] positioning   [] body mechanics   [] transfers   [] heat/ice application    [] other:      Other Objective/Functional Measures:   Increased reps per flow sheet     Pain Level (0-10 scale) post treatment: 0    ASSESSMENT/Changes in Function: Patient was challenged well with incr'd reps today, reports fatigue but denies incr'd pain. Patient reports improved ease with getting in/out of a car and reports about 20 walking tolerance before fatigued. Patient will continue to benefit from skilled PT services to modify and progress therapeutic interventions, address functional mobility deficits, address ROM deficits, address strength deficits, analyze and address soft tissue restrictions, analyze and cue movement patterns, analyze and modify body mechanics/ergonomics, and assess and modify postural abnormalities to attain remaining goals. []  See Plan of Care  []  See progress note/recertification  []  See Discharge Summary         Progress towards goals / Updated goals:  1. Patient  will be independent  with comprehensive updated HEP to continue to manage care independently after discharge. Recert: n/a   Current:   2. Patient will improve left knee PROM flexion to at least 115 degrees to facilitate ambulation, stair negotiation and transfers. Recert: 335 deg of flexion   Current: Attainted 113 degrees passively 11/02/2022  3. Patient will improve left knee PROM extension to neutral to facilitate ambulation. Recert: 10 deg lacking actively, lacking 4 deg passively   Current: No change - lacking 4 degrees passively 11/02/2022  4. Increase FOTO to 62 indicating improved function and quality of life. Recert: progressing, 38   Current:   5. Patient will increase left LE strength by at least 1/3 grade grossly so patient has improved ability to perform ADL's, functional transfers. Recert: progressing, hip extension 3/5, knee flexion 4/5, left quad 4+/5, left hip flexion 4/5, left hip abd 4/5   Current:   6. Pt will perform 12 sit to stand repetitions in 30 seconds without UE assist to improve ease of transfers and demo improved functional LE strength.   Recert: progressing, 11 without UE assist.  Current: Goal in progress, addressing with TRX squats (10/31/22)        PLAN  []  Upgrade activities as tolerated     [x] Continue plan of care  []  Update interventions per flow sheet       []  Discharge due to:_  []  Other:_      Patria German, PTA 11/14/2022  7:05 AM    Future Appointments   Date Time Provider Lg [facility]   11/17/2022  8:30 AM Roseann Martinez, PT [facility]   11/22/2022  8:30 AM Hayden Erwin, PTA [facility]   11/23/2022  7:45 AM Vania Chacon, PTA [facility]   11/28/2022  7:45 AM Ramon Oliver, PTA [facility]   11/30/2022  7:45 AM Ramon Oliver, PTA [facility]

## 2022-11-16 ENCOUNTER — APPOINTMENT (OUTPATIENT)
Dept: PHYSICAL THERAPY | Age: 57
End: 2022-11-16
Payer: COMMERCIAL

## 2022-11-17 ENCOUNTER — HOSPITAL ENCOUNTER (OUTPATIENT)
Dept: PHYSICAL THERAPY | Age: 57
Discharge: HOME | End: 2022-11-17
Payer: COMMERCIAL

## 2022-11-17 PROCEDURE — 97112 NEUROMUSCULAR REEDUCATION: CPT

## 2022-11-17 PROCEDURE — 97140 MANUAL THERAPY 1/> REGIONS: CPT

## 2022-11-17 PROCEDURE — 97110 THERAPEUTIC EXERCISES: CPT

## 2022-11-17 NOTE — PROGRESS NOTES
PT DAILY TREATMENT NOTE     Patient Name: Ernesto Barkaat  Date:2022  : 1965  [x]  Patient  Verified  Payor: BLUE CROSS / Plan: [facility] PPO / Product Type: PPO /    In time:831  Out time:912  Total Treatment Time (min): 41  Visit #: 6 of 10    Medicare/BCBS Only   Total Timed Codes (min):  41 1:1 Treatment Time:  41       Treatment Area: Pain in left knee [M25.562]    SUBJECTIVE  Pain Level (0-10 scale): 0  Any medication changes, allergies to medications, adverse drug reactions, diagnosis change, or new procedure performed?: [x] No    [] Yes (see summary sheet for update)  Subjective functional status/changes:   [] No changes reported  No pain in my knees this morning, just a bit stiff. OBJECTIVE    22 min Therapeutic Exercise:  [x] See flow sheet :   Rationale: increase ROM and increase strength to improve the patients ability to perform ADLs     11 min Neuromuscular Re-education:  [x]  See flow sheet :   Rationale: increase strength, improve coordination, and increase proprioception  to improve the patients ability to perform functional tasks     8 min Manual Therapy:  patella mobs, tib-fem jt mobs and PROM to left knee. STM with therastick to lateral quad   The manual therapy interventions were performed at a separate and distinct time from the therapeutic activities interventions. Rationale: decrease pain, increase ROM, and increase tissue extensibility to improve functional mobility          With   [] TE   [] TA   [] neuro   [] other: Patient Education: [x] Review HEP    [] Progressed/Changed HEP based on:   [] positioning   [] body mechanics   [] transfers   [] heat/ice application    [] other:      Other Objective/Functional Measures: continued with therex and NMR per flow sheet      Pain Level (0-10 scale) post treatment: 0/10    ASSESSMENT/Changes in Function: Pt continues to make slow and steady progress towards all goals.  Pt reports ambulating and getting in/out of a car is much better, states sit to stand transfers are still difficult but progressing. Demo's fatigue at end of session today however denies increase in pain. Minimal change in PROM L knee flexion since last progress note. Will progress as able. Patient will continue to benefit from skilled PT services to modify and progress therapeutic interventions, address functional mobility deficits, address ROM deficits, address strength deficits, analyze and address soft tissue restrictions, analyze and cue movement patterns, analyze and modify body mechanics/ergonomics, assess and modify postural abnormalities, address imbalance/dizziness, and instruct in home and community integration to attain remaining goals. []  See Plan of Care  []  See progress note/recertification  []  See Discharge Summary         Progress towards goals / Updated goals:  1. Patient  will be independent  with comprehensive updated HEP to continue to manage care independently after discharge. Recert: n/a   Current:   2. Patient will improve left knee PROM flexion to at least 115 degrees to facilitate ambulation, stair negotiation and transfers. Recert: 482 deg of flexion   Current: Attainted 113 degrees passively 11/17/2022  3. Patient will improve left knee PROM extension to neutral to facilitate ambulation. Recert: 10 deg lacking actively, lacking 4 deg passively   Current: No change - lacking 4 degrees passively 11/02/2022  4. Increase FOTO to 62 indicating improved function and quality of life. Recert: progressing, 38   Current:   5. Patient will increase left LE strength by at least 1/3 grade grossly so patient has improved ability to perform ADL's, functional transfers. Recert: progressing, hip extension 3/5, knee flexion 4/5, left quad 4+/5, left hip flexion 4/5, left hip abd 4/5   Current:   6.  Pt will perform 12 sit to stand repetitions in 30 seconds without UE assist to improve ease of transfers and demo improved functional LE strength.   Recert: progressing, 11 without UE assist.  Current: Goal in progress, addressing with TRX squats (10/31/22)     PLAN  []  Upgrade activities as tolerated     [x]  Continue plan of care  []  Update interventions per flow sheet       []  Discharge due to:_  []  Other:_      Ana Flannery, PT 11/17/2022  8:26 AM    Future Appointments   Date Time Provider Lg [facility]   11/17/2022  8:30 AM Carlito Sanchez, PT [facility]   11/22/2022  8:30 AM Kasi Paulson, PTA [facility]   11/23/2022  7:45 AM Arley Julian, PTA [facility]   11/28/2022  7:45 AM Bernardino Pierre, PTA [facility]   11/30/2022  7:45 AM Bernardino Pierre, PTA [facility]

## 2022-11-21 ENCOUNTER — APPOINTMENT (OUTPATIENT)
Dept: PHYSICAL THERAPY | Age: 57
End: 2022-11-21
Payer: COMMERCIAL

## 2022-11-22 ENCOUNTER — APPOINTMENT (OUTPATIENT)
Dept: PHYSICAL THERAPY | Age: 57
End: 2022-11-22
Payer: COMMERCIAL

## 2022-11-23 ENCOUNTER — APPOINTMENT (OUTPATIENT)
Dept: PHYSICAL THERAPY | Age: 57
End: 2022-11-23
Payer: COMMERCIAL

## 2022-11-28 ENCOUNTER — HOSPITAL ENCOUNTER (OUTPATIENT)
Dept: PHYSICAL THERAPY | Age: 57
Discharge: HOME | End: 2022-11-28
Payer: COMMERCIAL

## 2022-11-28 PROCEDURE — 97112 NEUROMUSCULAR REEDUCATION: CPT

## 2022-11-28 PROCEDURE — 97110 THERAPEUTIC EXERCISES: CPT

## 2022-11-28 NOTE — PROGRESS NOTES
PT DAILY TREATMENT NOTE     Patient Name: Aidan Hurst  Date:2022  : 1965  [x]  Patient  Verified  Payor: BLUE CROSS / Plan: [facility] PPO / Product Type: PPO /    In time:7:45  Out time:9:23  Total Treatment Time (min): 38  Visit #: 7 of 10    Medicare/BCBS Only   Total Timed Codes (min):  38 1:1 Treatment Time:  38       Treatment Area: Pain in left knee [M25.562]    SUBJECTIVE  Pain Level (0-10 scale): 0  Any medication changes, allergies to medications, adverse drug reactions, diagnosis change, or new procedure performed?: [x] No    [] Yes (see summary sheet for update)  Subjective functional status/changes:   [] No changes reported  Pt reports ms feel a little tight today    OBJECTIVE    30 min Therapeutic Exercise:  [x] See flow sheet :   Rationale: increase ROM and increase strength to improve the patients ability to perform ADLs    8 min Neuromuscular Re-education:  [x]  See flow sheet :   Rationale: increase strength, improve coordination, improve balance, and increase proprioception  to improve the patients ability to perform functional tasks           With   [] TE   [] TA   [] neuro   [] other: Patient Education: [x] Review HEP    [] Progressed/Changed HEP based on:   [] positioning   [] body mechanics   [] transfers   [] heat/ice application    [] other:      Other Objective/Functional Measures:   hip extension 3+/5, knee flexion 4+/5, left quad 5/5, left hip flexion 4+/5, left hip abd 4+/5    Sit to  30 sec: 13    FOTO 50     Pain Level (0-10 scale) post treatment: 0    ASSESSMENT/Changes in Function: Patient has made good progress in Physical Therapy towards improved left LE strength and improved ease with sit to stand transfers. Patient has made some progress but continues to be limited with left knee AROM. PROM was last measured at 4*-113* with firm end feel.  Patient reports some fatigue with prolonged standing but is able to perform light household chores with only a little bit of difficulty. Patient is compliant with HEP and is prepared for D/C from PT at this time with plans to continue home-based program.     Patient will continue to benefit from skilled PT services to modify and progress therapeutic interventions, address functional mobility deficits, address ROM deficits, address strength deficits, analyze and address soft tissue restrictions, analyze and cue movement patterns, analyze and modify body mechanics/ergonomics, assess and modify postural abnormalities, address imbalance/dizziness, and instruct in home and community integration to attain remaining goals. []  See Plan of Care  []  See progress note/recertification  []  See Discharge Summary         Progress towards goals / Updated goals:  1. Patient  will be independent  with comprehensive updated HEP to continue to manage care independently after discharge. Recert: n/a   Current: Met  2. Patient will improve left knee PROM flexion to at least 115 degrees to facilitate ambulation, stair negotiation and transfers. Recert: 688 deg of flexion   Current: Progressing, Attainted 113 degrees passively   3. Patient will improve left knee PROM extension to neutral to facilitate ambulation. Recert: 10 deg lacking actively, lacking 4 deg passively   Current: Not Met- lacking 4 degrees passively   4. Increase FOTO to 62 indicating improved function and quality of life. Recert: progressing, 38   Current: Progressing, 50  5. Patient will increase left LE strength by at least 1/3 grade grossly so patient has improved ability to perform ADL's, functional transfers. Recert: progressing, hip extension 3/5, knee flexion 4/5, left quad 4+/5, left hip flexion 4/5, left hip abd 4/5   Current: Met, hip extension 3+/5, knee flexion 4+/5, left quad 5/5, left hip flexion 4+/5, left hip abd 4+/5  6.  Pt will perform 12 sit to stand repetitions in 30 seconds without UE assist to improve ease of transfers and demo improved functional LE strength.   Recert: progressing, 11 without UE assist.  Current: Met, 13 reps in 30 sec    PLAN  []  Upgrade activities as tolerated     []  Continue plan of care  []  Update interventions per flow sheet       [x]  Discharge due to:_program complete   []  Other:_      Meghann German PTA 11/28/2022  7:49 AM    Future Appointments   Date Time Provider Lg Gilbert   11/30/2022  7:45 AM Naun Delgado PTA [id number]

## 2022-11-28 NOTE — PROGRESS NOTES
[facility]   [address]  [place], [address]  [phone] fax    Physical Therapy Discharge Summary  Patient name: Karina Abbott Start of Care: 2022   Referral source: Mardesapphire SaldanaNelsonIsela noriegama : 1965   Medical/Treatment Diagnosis: Pain in left knee [M25.562]  Payor: [employer] / Plan: [facility] PPO / Product Type: PPO /  Onset Date::YAYO 2022, TKA 2022     Prior Hospitalization: see medical history Provider#: [id number]   Medications: Verified on Patient Summary List    Comorbidities: HBP, Visual impaired, Thyroid problems, Fibromyalgia; sickle-cell; Hx of Breast CA; Bilat THR; right TKR  Prior Level of Function:walking without use of cane, resides with spouse who is able to assist, 2 story home; difficulty getting in and out of a chair (11 years)  Visits from Start of Care: 17    Missed Visits: 1  Reporting Period : 2022 to 2022      Summary of Care:  1. Patient  will be independent  with comprehensive updated HEP to continue to manage care independently after discharge. Recert: n/a   Current: Met  2. Patient will improve left knee PROM flexion to at least 115 degrees to facilitate ambulation, stair negotiation and transfers. Recert: 814 deg of flexion   Current: Progressing, Attainted 113 degrees passively   3. Patient will improve left knee PROM extension to neutral to facilitate ambulation. Recert: 10 deg lacking actively, lacking 4 deg passively   Current: Not Met- lacking 4 degrees passively   4. Increase FOTO to 62 indicating improved function and quality of life. Recert: progressing, 38   Current: Progressing, 50  5. Patient will increase left LE strength by at least 1/3 grade grossly so patient has improved ability to perform ADL's, functional transfers.    Recert: progressing, hip extension 3/5, knee flexion 4/5, left quad 4+/5, left hip flexion 4/5, left hip abd 4/5   Current: Met, hip extension 3+/5, knee flexion 4+/5, left quad 5/5, left hip flexion 4+/5, left hip abd 4+/5  6. Pt will perform 12 sit to stand repetitions in 30 seconds without UE assist to improve ease of transfers and demo improved functional LE strength. Recert: progressing, 11 without UE assist.  Current: Met, 13 reps in 30 sec      ASSESSMENT/RECOMMENDATIONS:Patient has made good progress in Physical Therapy towards improved left LE strength and improved ease with sit to stand transfers. Patient has made some progress but continues to be limited with left knee AROM. PROM was last measured at 4*-113* with firm end feel. Patient reports some fatigue with prolonged standing but is able to perform light household chores with only a little bit of difficulty.  Patient is compliant with HEP and is prepared for D/C from PT at this time with plans to continue home-based program.     [x]Discontinue therapy: [x]Patient has reached or is progressing toward set goals      []Patient is non-compliant or has abdicated      []Due to lack of appreciable progress towards set goals    Mikey German, PTA 11/28/2022 7:58 AM  Co-sign: Maerly Issa DPT [facility]

## 2022-11-28 NOTE — PROGRESS NOTES
Physical Therapy Discharge Instructions      [facility]  [address]  [place], [address]  [phone] fax    Patient: Cherie Dow  : 1965      Continue Home Exercise Program 1-2 times per day for 4 weeks, then decrease to 3-4 times per week      Continue with    [x] Ice  as needed      [] Heat           Follow up with MD:     [] Upon completion of therapy     [x] As needed      Recommendations:     [x]   Return to activity with home program    []   Return to activity with the following modifications:       []Post Rehab Program    []Join Independent aquatic program     []Return to/join local gym        Additional Comments:  Thank you for working with us!!          Karina German, PTA 2022 7:57 AM

## 2022-11-30 ENCOUNTER — APPOINTMENT (OUTPATIENT)
Dept: PHYSICAL THERAPY | Age: 57
End: 2022-11-30
Payer: COMMERCIAL

## 2023-02-03 DIAGNOSIS — M24.662 ANKYLOSIS OF LEFT KNEE: Primary | ICD-10-CM

## 2023-02-03 DIAGNOSIS — Z01.812 BLOOD TESTS PRIOR TO TREATMENT OR PROCEDURE: ICD-10-CM

## 2023-02-05 DIAGNOSIS — Z01.812 BLOOD TESTS PRIOR TO TREATMENT OR PROCEDURE: ICD-10-CM

## 2023-02-05 DIAGNOSIS — M24.662 ANKYLOSIS OF LEFT KNEE: Primary | ICD-10-CM

## 2023-03-17 NOTE — PATIENT INSTRUCTIONS
Caller: Catracho Sahu    Relationship to patient: Self    Best call back number: [phone]    Chief complaint: COUGH SINCE DECEMBER THAT HAS NOT GOTTEN BETTER     Type of visit: NEW PATIENT     Requested date: ASAP     If rescheduling, when is the original appointment:     Additional notes: PATIENT STATES HE WAS ORIGINALLY A PATIENT OF DR. RYAN BUT MOVED TO [place] AND THE DRIVE WAS QUITE FAR FOR HIM AND CHOSE TO BE SEEN AT THE [facility] DUE TO TRAVEL. PATIENT STATES HE HAS HAD A COUGH SINCE December AND HAS BEEN SEEN A FEW TIMES FOR IT BY HIS CURRENT PCP BUT THE COUGH HAS NOT GOTTEN ANY BETTER. PATIENT STATES HE FELT VERY COMFORTABLE WITH DR. RYAN AND WOULD LIKE TO COME BACK TO SEE HER IF SHE WOULD BE WILLING TO TAKE HIM BACK AS A NEW PATIENT. DR. RYAN IS NOT TAKING NEW PATIENT'S AT THIS TIME.             Breast Cancer: Care Instructions  Your Care Instructions    Breast cancer occurs when abnormal cells grow out of control in the breast. These cancer cells can spread within the breast, to nearby lymph nodes and other tissues, and to other parts of the body. Being treated for cancer can weaken your body, and you may feel very tired. Get the rest your body needs so you can feel better. Finding out that you have cancer is scary. You may feel many emotions and may need some help coping. Seek out family, friends, and counselors for support. You also can do things at home to make yourself feel better while you go through treatment. Call the [employer] (7-160.127.7212) or visit its website at [employer] for more information. Follow-up care is a key part of your treatment and safety. Be sure to make and go to all appointments, and call your doctor if you are having problems. It's also a good idea to know your test results and keep a list of the medicines you take. How can you care for yourself at home? · Take your medicines exactly as prescribed. Call your doctor if you think you are having a problem with your medicine. You may get medicine for nausea and vomiting if you have these side effects. · Follow your doctor's instructions to relieve pain. Pain from cancer and surgery can almost always be controlled. Use pain medicine when you first notice pain, before it becomes severe. · Eat healthy food. If you do not feel like eating, try to eat food that has protein and extra calories to keep up your strength and prevent weight loss. Drink liquid meal replacements for extra calories and protein. Try to eat your main meal early. · Get some physical activity every day, but do not get too tired. Keep doing the hobbies you enjoy as your energy allows. · Do not smoke. Smoking can make your cancer worse. If you need help quitting, talk to your doctor about stop-smoking programs and medicines.  These can increase your chances of quitting for good. · Take steps to control your stress and workload. Learn relaxation techniques. ? Share your feelings. Stress and tension affect our emotions. By expressing your feelings to others, you may be able to understand and cope with them. ? Consider joining a support group. Talking about a problem with your spouse, a good friend, or other people with similar problems is a good way to reduce tension and stress. ? Express yourself through art. Try writing, crafts, dance, or art to relieve stress. Some dance, writing, or art groups may be available just for people who have cancer. ? Be kind to your body and mind. Getting enough sleep, eating a healthy diet, and taking time to do things you enjoy can contribute to an overall feeling of balance in your life and can help reduce stress. ? Get help if you need it. Discuss your concerns with your doctor or counselor. · If you are vomiting or have diarrhea:  ? Drink plenty of fluids (enough so that your urine is light yellow or clear like water) to prevent dehydration. Choose water and other caffeine-free clear liquids. If you have kidney, heart, or liver disease and have to limit fluids, talk with your doctor before you increase the amount of fluids you drink. ? When you are able to eat, try clear soups, mild foods, and liquids until all symptoms are gone for 12 to 48 hours. Other good choices include dry toast, crackers, cooked cereal, and gelatin dessert, such as Jell-O.  · If you have not already done so, prepare a list of advance directives. Advance directives are instructions to your doctor and family members about what kind of care you want if you become unable to speak or express yourself. When should you call for help? Call 911 anytime you think you may need emergency care.  For example, call if:    · You passed out (lost consciousness).    Call your doctor now or seek immediate medical care if:    · You have a fever.     · You have abnormal bleeding.     · You think you have an infection.     · You have new or worse pain.     · You have new symptoms, such as a cough, belly pain, vomiting, diarrhea, or a rash.    Watch closely for changes in your health, and be sure to contact your doctor if:    · You are much more tired than usual.     · You have swollen glands in your armpits, groin, or neck.     · You do not get better as expected. Where can you learn more? Go to http://beth-orman.info/. Enter V321 in the search box to learn more about \"Breast Cancer: Care Instructions. \"  Current as of: March 28, 2018  Content Version: 11.8  © 4371-7562 [employer]. Care instructions adapted under license by [employer] (which disclaims liability or warranty for this information). If you have questions about a medical condition or this instruction, always ask your healthcare professional. [address] any warranty or liability for your use of this information.

## 2023-07-03 ENCOUNTER — HOSPITAL ENCOUNTER (OUTPATIENT)
Facility: HOSPITAL | Age: 58
Discharge: HOME | End: 2023-07-06
Attending: INTERNAL MEDICINE
Payer: COMMERCIAL

## 2023-07-03 DIAGNOSIS — Z12.31 VISIT FOR SCREENING MAMMOGRAM: ICD-10-CM

## 2023-07-03 PROCEDURE — G0279 TOMOSYNTHESIS, MAMMO: HCPCS

## 2023-07-03 PROCEDURE — 77063 BREAST TOMOSYNTHESIS BI: CPT

## 2023-07-24 NOTE — H&P
"Occupational Therapy Evaluation    Visit Type: Initial Evaluation -  Daily Treatment Note  Visit: Visit count could not be calculated. Make sure you are using a visit which is associated with an episode. Referring Provider: Navin Ricks MD  Medical Diagnosis (from order): Diagnosis Information    Diagnosis  183.0 (ICD-9-CM) - C56.1 (ICD-10-CM) - Malignant neoplasm of right ovary (CMD)  728.87 (ICD-9-CM) - R29.898 (ICD-10-CM) - Left hand weakness  728.87 (ICD-9-CM) - R29.898 (ICD-10-CM) - Right hand weakness       Treatment Diagnosis: LUE, RUE - increased pain/symptoms, impaired strength, impaired range of motion and impaired sensation. Onset  - Date of onset: 7/10/2023  Chart reviewed at time of initial evaluation (relevant co-morbidities, allergies, tests and medications listed):  Past Medical History:  No date: Essential (primary) hypertension  No date: Gastroesophageal reflux disease  No date: Osteoporosis  No date: Ovarian cancer (CMD)        SUBJECTIVE                                                                                                               ""I have neuropathy from Chemotherapy and then all the sudden I'm really weak. \""       Patient is right hand dominant. Patient notice recent sever onset of weakness in bilateral UE. Started immunotherapy on it every 3 weeks. Last Thursday was the 3rd dose. Patient was admitted to hospital because of weakness and concern for CVA. Hospital admission showed OA in hand, mild cervical stenosis. MRI was negative for brain and neck. NO pain in hands, however has constant tingling in all the digits. Has not tried any nerve medications. Was RN, now on disability secondary to treatment. Patient has EMG today.      Pain / Symptoms  - Pain/symptom is: constant  - Pain rating (out of 10): ; Worst: 10  - Location: bilateral tingling in all digits  - Quality / Description: pins and needles, numbness  - Alleviating Factors: unable to state anything that helps " History & Physical    Patient: Kevin Chance MRN: [id number]  CSN: [id number]    YOB: 1965  Age: 48 y.o. Sex: female      DOA: 2019    Chief Complaint   Patient presents with    Shortness of breath          HPI:     Kevin Chance is a 48 y.o. female with sickle cell disease who presents with complaint of shortness of breath. Patient says she went into a pain crisis for her sickle cell last week 6 days ago. It is typical for her to have pain in her legs and in her arms during the crisis. Denied any chest pain or abdominal pain. She knows that whenever she feels this way, she knows that her red cell counts are low. Denies wheezing cough or fever. Has had a transfusion previously but not since 2018. Denies melena hematochezia. She has not been eating well for the last few days, has had constipation also.     She sees Dr. Hilario Neal as her hematologist.      Past Medical History:   Diagnosis Date    Anemia NEC     Arthritis     Chronic pain     Ductal carcinoma ([facility] [address]     left breast    Fatigue     Fibromyalgia     GERD (gastroesophageal reflux disease)     Hx of endometriosis     Hypertension 2011    Ill-defined condition 2018    Sickle cell crisis    Osteoarthritis of left hip 2016    Osteoarthritis of right hip 1/3/2017    Osteoarthritis of right knee 2018    Sickle cell anemia (HCC)     Sleep apnea     Does not use CPAP    Thyroid disease     hypo       Past Surgical History:   Procedure Laterality Date    HX BREAST BIOPSY Left 2016    HX  SECTION      HX HERNIA REPAIR      HX LAP CHOLECYSTECTOMY      HX MASTECTOMY Left 2012    with axillary lymph node dissection    TOTAL HIP ARTHROPLASTY Bilateral  & 2017       Family History   Problem Relation Age of Onset    Cancer Mother         breast    Diabetes Mother     Heart Disease Father     Diabetes Father     Sickle Cell Anemia Brother        Social History     Socioeconomic History    Marital status:      Spouse name: Not on file    Number of children: Not on file    Years of education: Not on file    Highest education level: Not on file   Tobacco Use    Smoking status: Former Smoker     Packs/day: 0.25     Years: 30.00     Pack years: 7.50     Last attempt to quit: 2011     Years since quittin.2    Smokeless tobacco: Former User   Substance and Sexual Activity    Alcohol use: Yes     Comment: 2 times yearly    Drug use: No    Sexual activity: Not Currently       Prior to Admission medications    Medication Sig Start Date End Date Taking? Authorizing Provider   ibuprofen (MOTRIN) 800 mg tablet Take 800 mg by mouth three (3) times daily as needed for Pain. Yes Other, MD Nanette   HYDROmorphone (DILAUDID) 2 mg tablet Take 1 Tab by mouth every four (4) hours as needed for Pain for up to 14 days. Max Daily Amount: 12 mg. 19  Monisha Nugent NP   JADENU 360 mg tablet Use as directed by prescriber. Take 4 tablets (1440 mg TOTAL) by mouth at approximately the same time once daily on an empty stomach or wit 3/19/19   Andrei Stone MD   potassium chloride (K-DUR, KLOR-CON) 20 mEq tablet Take 1 Tab by mouth daily. 10/22/18   Jasson Olson MD   OTHER CBC, BMP in 1 week  Dx: sickle call disease   Fax results to Dr. Marixa Alonzo and Dr. Dieudonne Kelly 10/22/18   Jasson Olson MD   metoprolol tartrate (LOPRESSOR) 50 mg tablet 50 mg.    Provider, Historical   thyroid, Pork, (ARMOUR THYROID) 30 mg tablet Take 30 mg by mouth daily. Provider, Historical   ergocalciferol (ERGOCALCIFEROL) 50,000 unit capsule Take 1 Cap by mouth every seven (7) days. 3/8/18   Jarek Hernandez NP   oxyCODONE ER (OXYCONTIN) 20 mg ER tablet Take 1 Tab by mouth two (2) times a day. Max Daily Amount: 40 mg. Patient taking differently: Take 20 mg by mouth two (2) times a day.  0600 & 1800 11/15/16   Munir Quintero MD   naloxegol (MOVANTIK) 12.5 mg tab tablet Take reduce the pain    Function:   Limitations / Exacerbation Factors:   - Patient reports difficulty and increased time with function reported below. - upper body dressing, grooming/hygiene/self-care activities, lower body dressing, meal/food prep  Prior Level of Function: no limitation in involved extremity,  Personal Occupations Profile Affected: upper body dressing, lower body dressing, toileting/toilet hygiene, personal hygiene/grooming, bathing/showering, feeding    Patient Goals: decreased pain, increased motion, increased strength and independence with ADLs/IADLs. Prior treatment  - no therapies  - Discharged from hospital, home health, or skilled nursing facility in last 30 days: no  Home Environment   - Patient lives with: significant other  - Assistance available: as needed  - Denies 2 or more falls or an unexplained fall with injury in the last year.   - Feel safe at home / work / school: yes     OBJECTIVE                                                                                                                     Range of Motion (ROM)   (degrees unless noted; active unless noted; norms in ( ); negative=lacking to 0, positive=beyond 0)  Shoulder:    - Flexion (180):        â¢ Left:145         â¢ Right: 75    - Abduction (180):        â¢ Left: 110        â¢ Right: 70    Strength  (out of 5 unless noted, standard test position unless noted)   WFL: left elbow, right elbow, left wrist, right wrist  Elbow/Forearm:    - Flexion:        â¢ Left: 4-        â¢ Right: 3+     - Extension:        â¢ Left: 4-        â¢ Right: 3+     - Supination:        â¢ Left: 4-        â¢ Right: 3+    - Pronation:        â¢ Left: 4-        â¢ Right: 3+  Wrist:    - Flexion:        â¢ Left: 4-        â¢ Right: 3+    - Extension:        â¢ Left: 4-        â¢ Right: 3+  : (lbs)    - Neutral:        â¢ Left: Trial(s): 7.3, 6.5, 6.7, Average: 6.83        â¢ Right: Trial(s): 14.2, 12, 11.9, Average: 12.7     norms: 50-54 years, male: left 12.5 mg by mouth daily. Provider, Historical   folic acid (FOLVITE) 1 mg tablet Take 1 mg by mouth daily. Provider, Historical       Allergies   Allergen Reactions    Neulasta [Pegfilgrastim] Other (comments)     \"Put me in a comma for 3 months\"       Review of Systems  GENERAL: No fevers or chills. + fatigue. HEENT: No change in vision, no earache, tinnitus, sore throat or sinus congestion. NECK: No pain or stiffness. CARDIOVASCULAR: No chest pain or pressure. No palpitations. PULMONARY: + shortness of breath, cough or wheeze. GASTROINTESTINAL: No abdominal pain, nausea, vomiting or diarrhea, melena or       bright red blood per rectum. GENITOURINARY: No urinary frequency, urgency, hesitancy or dysuria. MUSCULOSKELETAL: No joint or muscle pain, no back pain, no recent trauma. DERMATOLOGIC: No rash, no itching, no lesions. ENDOCRINE: No polyuria, polydipsia, no heat or cold intolerance. No recent change in    weight. HEMATOLOGICAL: No anemia or easy bruising or bleeding. NEUROLOGIC: No headache, seizures, numbness, tingling or weakness. PSYCHIATRIC: No depression, anxiety, mood disorder, no loss of interest in normal       activity or change in sleep pattern. Physical Exam:     Physical Exam:  Visit Vitals  /57 (BP 1 Location: Right arm, BP Patient Position: At rest)   Pulse 92   Temp 99.1 °F (37.3 °C)   Resp 15   Ht 5' 6\" (1.676 m)   Wt 95.3 kg (210 lb)   SpO2 100%   BMI 33.89 kg/m²      O2 Device: Room air    Temp (24hrs), Av.6 °F (37.6 °C), Min:99.1 °F (37.3 °C), Max:100 °F (37.8 °C)       No intake/output data recorded. No intake/output data recorded. General:  Alert, cooperative, no distress, appears stated age. Head:  Normocephalic, without obvious abnormality, atraumatic. Eyes:  Conjunctivae/corneas clear. PERRL, EOMs intact. Nose: Nares normal. No drainage or sinus tenderness.    Throat: Lips, mucosa, and tongue normal. Teeth and gums normal.   Neck: Supple, 84.9-118.9, right 95.5-131.7; female: left 46.6-68, right 54.2-77.4           Special Tests  Wrist/Hand: Nerve  - Tinel's Sign ([place]):   Left: negative Right: negative  - Tinel's Sign (Radial Tunnel):  Left: negative Right: negative  - Tinel's Sign (Median Nerve):  Left: negative Right: negative  - Guyon's Tinel's:  Left: negative  - Phalen's:  Left: negative Right: negative   - Tinel's Sign (Cubital Tunnel):  Left: negative Right: negative  - Elbow Flexion:  Left: negative Right: negative             Outcome/Assessments  Outcome Measures:   Quick Disabilities of the Arm, Shoulder and Hand: QuickDash Total Score (Score will not calculate if more then 2 questions are left blank): 86.36  (scored 0-100; a higher score indicates greater disability) see flowsheet for additional documentation        Treatment     Therapeutic Exercise  Performed to develop strength and increase endurance for increased independence and improved functional mobility with lifting/carrying, household chores, kitchen tasks and grooming/hygiene. Completed HEP in session- see HEP box below. Home Exercise Program  Access Code: [email or web address]  URL: [email or web address]  Date: 07/24/2023  Prepared by: Olivia Roberson    Exercises  - Putty Squeezes  - 2 x daily - 7 x weekly - 1 sets - 5-10 reps  - Standing Shoulder Flexion Full Range  - 4 x daily - 7 x weekly - 1 sets - 5-10 reps  - Standing Shoulder Abduction Full Range  - 4 x daily - 7 x weekly - 1 sets - 5-10 reps  - Standing Elbow Flexion Extension AROM  - 4 x daily - 7 x weekly - 1 sets - 5-10 reps  - Seated Forearm Pronation and Supination AROM  - 4 x daily - 7 x weekly - 1 sets - 5-10 reps  - Wrist Flexion Extension AROM - Palms Down  - 4 x daily - 7 x weekly - 1 sets - 5-10 reps      ASSESSMENT                                                                                                          47year old patient has reported functional limitations listed above impacted by signs and symptoms consistent with treatment diagnosis below. Treatment Diagnosis:   - Involved: MARSHA ADEN.  - Symptoms/impairments: increased pain/symptoms, impaired strength, impaired range of motion and impaired sensation. Patient presents with bilateral UE weakness and chemotherapy induced neuropathy. Patient is right hand dominant and is currently not working secondary to CA treatment. Patient had recent onset of severe weakness. Complete 3 session of immunotherapy. Patient was hospitalized and found to have a clear MRI and will see neurologist today for EMG. Testing in session showed no nerve compression and significant weakness in bilateral UE shoulder shoulder down. Patient is dropping items, has difficulty washing face and cannot open or carry jars or containers. Patient may benefit from skilled occupational therapy to address deficits. Prognosis: Patient will benefit from skilled therapy. Rehabilitative potential is: fair due to. Positive factors: motivation level and family support. Negative factors: co-morbidities. Clinical decision making: Moderate - Patient has several limitations (3-5), comorbidities and/or complexities, as noted in detailed assessment above, that impact their occupational profile. Resulting in several treatment options and minimal to moderate task modification consistent with moderate clinical decision making complexity. Education:   - Present and ready to learn: patient    PLAN                                                                                                                         The following skilled interventions to be implemented to achieve goals listed below:   Activities of Daily Living/Self Care (36065)  Manual Therapy (63679)  Therapeutic Exercise (57049)  Therapeutic Activity (82329)  Neuromuscular Re-Education (74163)    Frequency / Duration  1 times per week tapering as patient progresses for 6 weeks for an estimated total of 6 symmetrical, trachea midline, no adenopathy, thyroid: no enlargement/tenderness/nodules, no carotid bruit and no JVD. Back:   ROM normal. No CVA tenderness. Lungs:   Clear to auscultation bilaterally. Chest wall:  No tenderness or deformity. Heart:  Regular rate and rhythm, S1, S2 normal, no murmur, click, rub or gallop. Abdomen: Soft, non-tender. Bowel sounds normal. No masses,  No organomegaly. Extremities: Extremities normal, atraumatic, no cyanosis or edema. Pulses: 2+ and symmetric all extremities. Skin: Skin color, texture, turgor normal. No rashes or lesions   Neurologic: CNII-XII intact. No focal motor or sensory deficit. Labs Reviewed:    Recent Results (from the past 24 hour(s))   CBC WITH AUTOMATED DIFF    Collection Time: 04/06/19 11:26 PM   Result Value Ref Range    WBC 27.6 (H) 4.6 - 13.2 K/uL    RBC 2.00 (L) 4.20 - 5.30 M/uL    HGB 6.0 (L) 12.0 - 16.0 g/dL    HCT 17.1 (LL) 35.0 - 45.0 %    MCV 85.5 74.0 - 97.0 FL    MCH 30.0 24.0 - 34.0 PG    MCHC 35.1 31.0 - 37.0 g/dL    RDW 16.2 (H) 11.6 - 14.5 %    PLATELET 76 (L) 021 - 420 K/uL    MPV 11.1 9.2 - 11.8 FL    NEUTROPHILS 60 42 - 75 %    BAND NEUTROPHILS 3 0 - 5 %    LYMPHOCYTES 21 20 - 51 %    MONOCYTES 11 (H) 2 - 9 %    EOSINOPHILS 4 0 - 5 %    BASOPHILS 1 0 - 3 %    NRBC 20.0 (H) 0  WBC    ABS. NEUTROPHILS 17.4 (H) 1.8 - 8.0 K/UL    ABS. LYMPHOCYTES 5.8 (H) 0.8 - 3.5 K/UL    ABS. MONOCYTES 3.0 (H) 0 - 1.0 K/UL    ABS. EOSINOPHILS 1.1 (H) 0.0 - 0.4 K/UL    ABS.  BASOPHILS 0.3 (H) 0.0 - 0.06 K/UL    DF MANUAL      PLATELET COMMENTS DECREASED PLATELETS      RBC COMMENTS ANISOCYTOSIS  1+        RBC COMMENTS POLYCHROMASIA  2+        RBC COMMENTS TARGET CELLS  2+        RBC COMMENTS STOMATOCYTES  1+        RBC COMMENTS SICKLE CELLS  FEW       METABOLIC PANEL, BASIC    Collection Time: 04/06/19 11:26 PM   Result Value Ref Range    Sodium 136 136 - 145 mmol/L    Potassium 3.5 3.5 - 5.5 mmol/L    Chloride 106 100 - 108 mmol/L CO2 23 21 - 32 mmol/L    Anion gap 7 3.0 - 18 mmol/L    Glucose 133 (H) 74 - 99 mg/dL    BUN 24 (H) 7.0 - 18 MG/DL    Creatinine 1.84 (H) 0.6 - 1.3 MG/DL    BUN/Creatinine ratio 13 12 - 20      GFR est AA 35 (L) >60 ml/min/1.73m2    GFR est non-AA 29 (L) >60 ml/min/1.73m2    Calcium 9.3 8.5 - 10.1 MG/DL   RETICULOCYTE COUNT    Collection Time: 04/06/19 11:26 PM   Result Value Ref Range    Reticulocyte count 24.5 (H) 0.5 - 2.3 %   AMYLASE    Collection Time: 04/06/19 11:26 PM   Result Value Ref Range    Amylase 28 25 - 115 U/L   LIPASE    Collection Time: 04/06/19 11:26 PM   Result Value Ref Range    Lipase 36 (L) 73 - 393 U/L   TROPONIN I    Collection Time: 04/06/19 11:26 PM   Result Value Ref Range    Troponin-I, QT <0.02 0.0 - 0.045 NG/ML   HEPATIC FUNCTION PANEL    Collection Time: 04/06/19 11:26 PM   Result Value Ref Range    Protein, total 7.9 6.4 - 8.2 g/dL    Albumin 4.0 3.4 - 5.0 g/dL    Globulin 3.9 2.0 - 4.0 g/dL    A-G Ratio 1.0 0.8 - 1.7      Bilirubin, total 7.0 (H) 0.2 - 1.0 MG/DL    Bilirubin, direct 2.6 (H) 0.0 - 0.2 MG/DL    Alk.  phosphatase 183 (H) 45 - 117 U/L    AST (SGOT) 55 (H) 15 - 37 U/L    ALT (SGPT) 32 13 - 56 U/L   EKG, 12 LEAD, INITIAL    Collection Time: 04/06/19 11:33 PM   Result Value Ref Range    Ventricular Rate 99 BPM    Atrial Rate 99 BPM    P-R Interval 136 ms    QRS Duration 80 ms    Q-T Interval 348 ms    QTC Calculation (Bezet) 446 ms    Calculated P Axis 40 degrees    Calculated R Axis 13 degrees    Calculated T Axis 9 degrees    Diagnosis       Normal sinus rhythm  Minimal voltage criteria for LVH, may be normal variant  Borderline ECG  When compared with ECG of 18-OCT-2018 02:09,  No significant change was found     TYPE + CROSSMATCH    Collection Time: 04/07/19 12:17 AM   Result Value Ref Range    Crossmatch Expiration 04/10/2019     ABO/Rh(D) Nidia Piper POSITIVE     Antibody screen POS     Physician instructions Irradiation  SICKLEDEX NEGATIVE       Comment       NOTIFIED visits    Patient involved in and agreed to plan of care and goals. Suggestions for next session as indicated: Strength and endurance      Goals  Long Term Goals: to be met by end of plan of care   1. Improve Quick DASH to 60% or better. 2. Increase right shoulder flexion/abduction and left shoulder abduction to 130 degrees to be able to style hair. 3. Increase bilateral forearm supination/pronation and wrist flexion/extension to 4/5 to be able carry bag.     4. Increase bilateral elbow extension/flexion strength to 4/5 to push up from chair. 5.  Increase bilateral  to 25 pounds to be able to open jars/lids.             Therapy procedure time and total treatment time can be found documented on the Time Entry flowsheet Chente Scull EMERRM AT 0130 ON 04/07/2019 FOR DELAY ON PRBCS FOR WORKUP AT [facility]   URINALYSIS W/ RFLX MICROSCOPIC    Collection Time: 04/07/19  3:04 AM   Result Value Ref Range    Color DARK YELLOW      Appearance CLOUDY      Specific gravity 1.014 1.005 - 1.030      pH (UA) 5.5 5.0 - 8.0      Protein 30 (A) NEG mg/dL    Glucose NEGATIVE  NEG mg/dL    Ketone NEGATIVE  NEG mg/dL    Bilirubin SMALL (A) NEG      Blood SMALL (A) NEG      Urobilinogen 2.0 (H) 0.2 - 1.0 EU/dL    Nitrites NEGATIVE  NEG      Leukocyte Esterase NEGATIVE  NEG     URINE MICROSCOPIC ONLY    Collection Time: 04/07/19  3:04 AM   Result Value Ref Range    WBC 4 to 10 0 - 5 /hpf    RBC 0 to 3 0 - 5 /hpf    Epithelial cells 2+ 0 - 5 /lpf    Bacteria 1+ (A) NEG /hpf    Granular cast 4 to 10 NEG /lpf       Procedures/imaging: see electronic medical records for all procedures/Xrays and details which were not copied into this note but were reviewed prior to creation of Plan        Assessment/Plan     Active Problems:      [facility] (8/24/2013)  - IV fluids. - analgesia. Leukocytosis (2/20/2017)  - possible stress reaction. - cannot r/o infection.  - IV abx.  - check cultures. Anemia (4/7/2019)  - transfuse 1 unit PRBC. Acute renal injury ([facility] (4/7/2019)  - possibly from dehydration.  - IV fluids. - will follow. Code status  - full code    DVT prophylaxis  - SQ heparin.         Jamari Velez MD  April 7, 2019

## 2023-09-05 NOTE — ROUTINE PROCESS
Bedside and Verbal shift change report given to [address] [place] [address] Avenue (oncoming nurse) by JACOB Soto RN (offgoing nurse). Report given with SBAR, Kardex, MAR and Recent Results. (on 9/5/2023)   L shoulder AROM:     Shoulder flexion: NT     Shoulder abduction: NT     Shoulder extension: NT     Shoulder ER: NT     Shoulder IR: NT  L shoulder PROM: *with elbow flexed per protocol     Shoulder flexion: 65 degrees     Shoulder abduction: 34 degrees     Shoulder ER at 15 degrees abduction: 15 degrees     Shoulder ER at 45 degrees abduction:  NT     Shoulder ER at 90 degrees abduction:  NT R UE AROM:     Shoulder flexion: 139 degrees     Shoulder abduction: 147 degrees     Shoulder extension: 49 degrees     Shoulder ER: 88 degrees     Shoulder IR: level of T7 L shoulder AROM:     Shoulder flexion: NT     Shoulder abduction: NT     Shoulder extension: NT     Shoulder ER: NT     Shoulder IR: NT  L shoulder PROM: *with elbow flexed per protocol     Shoulder flexion: 94 degrees     Shoulder abduction: 67 degrees     Shoulder ER at 15 degrees abduction: 27 degrees     Shoulder ER at 45 degrees abduction:  9 degrees     Shoulder ER at 90 degrees abduction:  NT     Strength:    Initial measurement: (on 8/8/2023) Initial measurement: (on 8/8/2023)     L UE: R UE:   Shoulder flexion  NT  4+/5    Shoulder abduction NT  4/5    Shoulder extension NT  5/5    Shoulder adduction NT  5/5    Shoulder IR  NT  5/5    Shoulder ER NT  4/5    Elbow flexion NT  5/5    Elbow extension NT  5/5    Wrist flexion  NT  5/5    Wrist extension NT  5/5     * L UE strength not tested due to protocol restrictions    ASSESSMENT   REASSESSMENT:  Luba Scott has now attended 9 physical therapy sessions for L shoulder pain/stiffness following L shoulder surgery on 8/3/2023 (details in history above). This session, pt demonstrated improved L shoulder mobility, less postural deficits, and improved functional mobility as evident by a score of 33/55 on the Disabilities of the Arm, Shoulder, and Hand Questionnaire (initial score of 43/55, with higher scores indicating increased disability).  Despite these improvements, she continues

## 2024-02-06 ENCOUNTER — HOSPITAL ENCOUNTER (OUTPATIENT)
Facility: HOSPITAL | Age: 59
Discharge: HOME | End: 2024-02-09
Attending: INTERNAL MEDICINE
Payer: COMMERCIAL

## 2024-02-06 DIAGNOSIS — Z87.891 PERSONAL HISTORY OF TOBACCO USE, PRESENTING HAZARDS TO HEALTH: ICD-10-CM

## 2024-02-06 PROCEDURE — 71271 CT THORAX LUNG CANCER SCR C-: CPT

## 2024-07-05 ENCOUNTER — HOSPITAL ENCOUNTER (OUTPATIENT)
Facility: HOSPITAL | Age: 59
End: 2024-07-05
Attending: INTERNAL MEDICINE
Payer: COMMERCIAL

## 2024-07-05 ENCOUNTER — HOSPITAL ENCOUNTER (OUTPATIENT)
Facility: HOSPITAL | Age: 59
Discharge: HOME | End: 2024-07-05
Attending: INTERNAL MEDICINE
Payer: COMMERCIAL

## 2024-07-05 VITALS — HEIGHT: 67 IN | WEIGHT: 225.53 LBS | BODY MASS INDEX: 35.4 KG/M2

## 2024-07-05 DIAGNOSIS — Z78.0 MENOPAUSE: ICD-10-CM

## 2024-07-05 DIAGNOSIS — Z12.31 VISIT FOR SCREENING MAMMOGRAM: ICD-10-CM

## 2024-07-05 PROCEDURE — 77080 DXA BONE DENSITY AXIAL: CPT

## 2024-07-05 PROCEDURE — 77063 BREAST TOMOSYNTHESIS BI: CPT

## 2024-08-18 NOTE — PERIOP NOTES
Pt up to bathroom to void. 1. I was told the name of the physician that took care of my child while in the hospital.    2. I have been told about any important findings on my child's physical exam and my child's plan of care.    3. The doctor clearly explained my child's diagnosis and other possible diagnoses that were considered.    4. My child's doctor explained all the tests that were done and their results (if available). I understand that some of the test results may not be ready before we go home and I was told how I can get these results. I understand that a summary of my child's hospitalization and important test results will be shared with my child's outpatient doctor.    5. My child's doctor talked to me about what I need to do when we go home.    6. I understand what signs and symptoms to watch for. I understand what symptoms I would need to call my doctor for and/or return to the hospital.    7. I have the phone number to call the hospital for results and/or questions after I leave the hospital.

## 2025-06-10 ENCOUNTER — TRANSCRIBE ORDERS (OUTPATIENT)
Facility: HOSPITAL | Age: 60
End: 2025-06-10

## 2025-06-10 DIAGNOSIS — Z12.31 ENCOUNTER FOR SCREENING MAMMOGRAM FOR BREAST CANCER: Primary | ICD-10-CM

## 2025-07-17 ENCOUNTER — HOSPITAL ENCOUNTER (OUTPATIENT)
Facility: HOSPITAL | Age: 60
Discharge: HOME | End: 2025-07-20
Attending: INTERNAL MEDICINE
Payer: MEDICARE

## 2025-07-17 VITALS — WEIGHT: 225 LBS | HEIGHT: 67 IN | BODY MASS INDEX: 35.31 KG/M2

## 2025-07-17 DIAGNOSIS — Z12.31 ENCOUNTER FOR SCREENING MAMMOGRAM FOR BREAST CANCER: ICD-10-CM

## 2025-07-17 PROCEDURE — 77063 BREAST TOMOSYNTHESIS BI: CPT

## 2025-07-23 ENCOUNTER — HOSPITAL ENCOUNTER (OUTPATIENT)
Facility: HOSPITAL | Age: 60
Discharge: HOME | End: 2025-07-26
Payer: COMMERCIAL

## 2025-07-23 DIAGNOSIS — R92.8 ABNORMAL MAMMOGRAM: ICD-10-CM

## 2025-07-23 DIAGNOSIS — N64.89 BREAST ASYMMETRY: ICD-10-CM

## 2025-07-23 PROCEDURE — G0279 TOMOSYNTHESIS, MAMMO: HCPCS

## (undated) DEVICE — STERILE POLYISOPRENE POWDER-FREE SURGICAL GLOVES: Brand: PROTEXIS

## (undated) DEVICE — SUT VCRL + 1 36IN CT1 VIO --

## (undated) DEVICE — SYSTEM SKIN CLSR 22CM DERMBND PRINEO

## (undated) DEVICE — NEEDLE SPNL 20GA L3.5IN YEL HUB S STL REG WALL FIT STYL W/

## (undated) DEVICE — STERILE POLYISOPRENE POWDER-FREE SURGICAL GLOVES WITH EMOLLIENT COATING: Brand: PROTEXIS

## (undated) DEVICE — Device

## (undated) DEVICE — SUT VCRL + 2-0 36IN CT1 UD --

## (undated) DEVICE — BLADE SAW 1.19X20X90 MM FOR LG BNE

## (undated) DEVICE — SOL INJ L R 1000ML BG --

## (undated) DEVICE — HANDPIECE SET WITH HIGH FLOW TIP AND SUCTION TUBE: Brand: INTERPULSE

## (undated) DEVICE — THE CANADY HYBRID PLASMA SCALPEL IS AN ELECTROSURGICAL PLASMA SCALPEL THAT USES AN 85MM BENDABLE PADDLE BLADE TIP. THE ELECTROSURGICAL PLASMA SCALPEL IS USED TO SIMULTANEOUSLY CUT AND COAGULATE BIOLOGICAL TISSUE.: Brand: CANADY HYBRID PLASMA PADDLE BLADE

## (undated) DEVICE — REM POLYHESIVE ADULT PATIENT RETURN ELECTRODE: Brand: VALLEYLAB

## (undated) DEVICE — BLADE SAW W13XL90MM 1.19MM PARA

## (undated) DEVICE — SYR 50ML LR LCK 1ML GRAD NSAF --

## (undated) DEVICE — TRAY PHAR SYR 30ML PLAS LUERLOCK TIP N CTRL CONVENIENCE

## (undated) DEVICE — THE CANADY HYBRID PLASMA SCALPEL IS AN ELECTROSURGICAL PLASMA SCALPEL THAT USES AN 85MM NON-BENDABLE TIP. THE ELECTROSURGICAL PLASMA SCALPEL IS USED TO SIMULTANEOUSLY CUT AND COAGULATE BIOLOGICAL TISSUE.: Brand: CANADY HYBRID PLASMA SCALPEL

## (undated) DEVICE — SYRINGE MED 20ML STD CLR PLAS LUERLOCK TIP N CTRL DISP

## (undated) DEVICE — BASIC SINGLE BASIN 1-LF: Brand: MEDLINE INDUSTRIES, INC.

## (undated) DEVICE — SOLUTION IV 100ML 0.9% SOD CHL DIL INJ

## (undated) DEVICE — DRSG MEPILES BORDER AG 4X12 -- 5/BX

## (undated) DEVICE — CONCISE CEMENT SCULPS KIT: Brand: CONCISE

## (undated) DEVICE — ADHESIVE SKIN CLOSURE 4X22 CM PREMIERPRO EXOFINFUSION DISP

## (undated) DEVICE — SOL IRRIGATION INJ NACL 0.9% 500ML BTL

## (undated) DEVICE — CATH IV SAFE STR 22GX1IN BLU -- PROTECTIV PLUS

## (undated) DEVICE — GOWN,SIRUS,NONRNF,SETINSLV,XL,20/CS: Brand: MEDLINE

## (undated) DEVICE — NDL SPNE QNCKE 18GX3.5IN LF --

## (undated) DEVICE — KENDALL SCD EXPRESS FOOT CUFF, MEDIUM: Brand: KENDALL SCD

## (undated) DEVICE — 3 BONE CEMENT MIXER: Brand: MIXEVAC

## (undated) DEVICE — (D)PREP SKN CHLRAPRP APPL 26ML -- CONVERT TO ITEM 371833

## (undated) DEVICE — SOLUTION SCRB 4OZ 10% PVP I POVIDONE IOD TOP PAINT EXIDINE

## (undated) DEVICE — SUTURE STRATAFIX SYMMETRIC PDS + 1 SGL ARMED CT 18 IN LEN SXPP1A405

## (undated) DEVICE — NDL PRT INJ NSAF BLNT 18GX1.5 --

## (undated) DEVICE — UNDERCAST PADDING: Brand: DEROYAL

## (undated) DEVICE — PIN GUIDE FIX 3.2X62 MM SCREW [GS903A0620322P] [KOMET MEDICAL]

## (undated) DEVICE — PIN GUIDE FIX 3.2X62 MM SCREW [GS9030620324P] [KOMET MEDICAL]

## (undated) DEVICE — KENDALL SCD EXPRESS SLEEVES, KNEE LENGTH, MEDIUM: Brand: KENDALL SCD

## (undated) DEVICE — (D)PACK TOTAL HIP LF ANT APPRO -- DISC BY MFR USE ITEM 338838

## (undated) DEVICE — PLUS HANDPIECE WITH SPRAY TIP: Brand: SURGILAV

## (undated) DEVICE — SYR 3ML LL TIP 1/10ML GRAD --

## (undated) DEVICE — ZIP 16 SURGICAL SKIN CLOSURE DEVICE: Brand: ZIP 16 SURGICAL SKIN CLOSURE DEVICE

## (undated) DEVICE — SINGLE PORT MANIFOLD: Brand: NEPTUNE 2

## (undated) DEVICE — DRSG MEPILEX BORDER AG 4X8 -- 5/BX

## (undated) DEVICE — HANDPIECE SET WITH FAN SPRAY TIP: Brand: INTERPULSE

## (undated) DEVICE — STRYKER PERFORMANCE SERIES SAGITTAL BLADE: Brand: STRYKER PERFORMANCE SERIES